# Patient Record
Sex: FEMALE | Race: BLACK OR AFRICAN AMERICAN | NOT HISPANIC OR LATINO | Employment: UNEMPLOYED | URBAN - METROPOLITAN AREA
[De-identification: names, ages, dates, MRNs, and addresses within clinical notes are randomized per-mention and may not be internally consistent; named-entity substitution may affect disease eponyms.]

---

## 2017-08-10 ENCOUNTER — HOSPITAL ENCOUNTER (EMERGENCY)
Facility: HOSPITAL | Age: 32
Discharge: HOME | End: 2017-08-10
Attending: EMERGENCY MEDICINE
Payer: MEDICAID

## 2017-08-10 VITALS
TEMPERATURE: 98 F | SYSTOLIC BLOOD PRESSURE: 155 MMHG | WEIGHT: 186 LBS | DIASTOLIC BLOOD PRESSURE: 95 MMHG | HEART RATE: 72 BPM | RESPIRATION RATE: 19 BRPM | HEIGHT: 69 IN | BODY MASS INDEX: 27.55 KG/M2 | OXYGEN SATURATION: 100 %

## 2017-08-10 DIAGNOSIS — R00.1 BRADYCARDIA: ICD-10-CM

## 2017-08-10 DIAGNOSIS — D64.9 ANEMIA, UNSPECIFIED TYPE: ICD-10-CM

## 2017-08-10 DIAGNOSIS — Q44.6 LIVER, POLYCYSTIC: ICD-10-CM

## 2017-08-10 DIAGNOSIS — Q61.3 POLYCYSTIC KIDNEY DISEASE: Primary | ICD-10-CM

## 2017-08-10 DIAGNOSIS — K59.00 CONSTIPATION, UNSPECIFIED CONSTIPATION TYPE: ICD-10-CM

## 2017-08-10 DIAGNOSIS — R03.0 ELEVATED BLOOD PRESSURE READING: ICD-10-CM

## 2017-08-10 LAB
ALBUMIN SERPL BCP-MCNC: 3.7 G/DL
ALP SERPL-CCNC: 33 U/L
ALT SERPL W/O P-5'-P-CCNC: 9 U/L
ANION GAP SERPL CALC-SCNC: 9 MMOL/L
AST SERPL-CCNC: 11 U/L
B-HCG UR QL: NEGATIVE
BACTERIA #/AREA URNS HPF: NORMAL /HPF
BASOPHILS # BLD AUTO: 0.02 K/UL
BASOPHILS NFR BLD: 0.3 %
BILIRUB SERPL-MCNC: 0.4 MG/DL
BILIRUB UR QL STRIP: NEGATIVE
BUN SERPL-MCNC: 10 MG/DL
CALCIUM SERPL-MCNC: 9.2 MG/DL
CHLORIDE SERPL-SCNC: 107 MMOL/L
CLARITY UR: ABNORMAL
CO2 SERPL-SCNC: 22 MMOL/L
COLOR UR: YELLOW
CREAT SERPL-MCNC: 0.8 MG/DL
CTP QC/QA: YES
DIFFERENTIAL METHOD: ABNORMAL
EOSINOPHIL # BLD AUTO: 0 K/UL
EOSINOPHIL NFR BLD: 0.5 %
ERYTHROCYTE [DISTWIDTH] IN BLOOD BY AUTOMATED COUNT: 15.6 %
EST. GFR  (AFRICAN AMERICAN): >60 ML/MIN/1.73 M^2
EST. GFR  (NON AFRICAN AMERICAN): >60 ML/MIN/1.73 M^2
GLUCOSE SERPL-MCNC: 114 MG/DL
GLUCOSE UR QL STRIP: NEGATIVE
HCT VFR BLD AUTO: 33.7 %
HGB BLD-MCNC: 10.9 G/DL
HGB UR QL STRIP: NEGATIVE
HYALINE CASTS #/AREA URNS LPF: 0 /LPF
KETONES UR QL STRIP: NEGATIVE
LEUKOCYTE ESTERASE UR QL STRIP: NEGATIVE
LIPASE SERPL-CCNC: 36 U/L
LYMPHOCYTES # BLD AUTO: 1 K/UL
LYMPHOCYTES NFR BLD: 15.5 %
MCH RBC QN AUTO: 26.8 PG
MCHC RBC AUTO-ENTMCNC: 32.3 G/DL
MCV RBC AUTO: 83 FL
MICROSCOPIC COMMENT: NORMAL
MONOCYTES # BLD AUTO: 0.2 K/UL
MONOCYTES NFR BLD: 3.5 %
NEUTROPHILS # BLD AUTO: 5 K/UL
NEUTROPHILS NFR BLD: 80.2 %
NITRITE UR QL STRIP: NEGATIVE
PH UR STRIP: 8 [PH] (ref 5–8)
PLATELET # BLD AUTO: 227 K/UL
PMV BLD AUTO: 11.5 FL
POTASSIUM SERPL-SCNC: 3.4 MMOL/L
PROT SERPL-MCNC: 7.7 G/DL
PROT UR QL STRIP: ABNORMAL
RBC # BLD AUTO: 4.07 M/UL
RBC #/AREA URNS HPF: 1 /HPF (ref 0–4)
SODIUM SERPL-SCNC: 138 MMOL/L
SP GR UR STRIP: 1.02 (ref 1–1.03)
SQUAMOUS #/AREA URNS HPF: 2 /HPF
URN SPEC COLLECT METH UR: ABNORMAL
UROBILINOGEN UR STRIP-ACNC: ABNORMAL EU/DL
WBC # BLD AUTO: 6.25 K/UL
WBC #/AREA URNS HPF: 1 /HPF (ref 0–5)

## 2017-08-10 PROCEDURE — 83690 ASSAY OF LIPASE: CPT

## 2017-08-10 PROCEDURE — 93005 ELECTROCARDIOGRAM TRACING: CPT

## 2017-08-10 PROCEDURE — 25500020 PHARM REV CODE 255: Performed by: EMERGENCY MEDICINE

## 2017-08-10 PROCEDURE — 81025 URINE PREGNANCY TEST: CPT | Performed by: NURSE PRACTITIONER

## 2017-08-10 PROCEDURE — 80053 COMPREHEN METABOLIC PANEL: CPT

## 2017-08-10 PROCEDURE — 99284 EMERGENCY DEPT VISIT MOD MDM: CPT | Mod: 25

## 2017-08-10 PROCEDURE — 85025 COMPLETE CBC W/AUTO DIFF WBC: CPT

## 2017-08-10 PROCEDURE — 63600175 PHARM REV CODE 636 W HCPCS: Performed by: NURSE PRACTITIONER

## 2017-08-10 PROCEDURE — 96374 THER/PROPH/DIAG INJ IV PUSH: CPT

## 2017-08-10 PROCEDURE — 96375 TX/PRO/DX INJ NEW DRUG ADDON: CPT

## 2017-08-10 PROCEDURE — 81000 URINALYSIS NONAUTO W/SCOPE: CPT

## 2017-08-10 RX ORDER — NAPROXEN 500 MG/1
500 TABLET ORAL 2 TIMES DAILY PRN
Qty: 10 TABLET | Refills: 0 | Status: ON HOLD | OUTPATIENT
Start: 2017-08-10 | End: 2018-08-31 | Stop reason: HOSPADM

## 2017-08-10 RX ORDER — MORPHINE SULFATE 10 MG/ML
4 INJECTION INTRAMUSCULAR; INTRAVENOUS; SUBCUTANEOUS
Status: COMPLETED | OUTPATIENT
Start: 2017-08-10 | End: 2017-08-10

## 2017-08-10 RX ORDER — HYDRALAZINE HYDROCHLORIDE 20 MG/ML
20 INJECTION INTRAMUSCULAR; INTRAVENOUS
Status: COMPLETED | OUTPATIENT
Start: 2017-08-10 | End: 2017-08-10

## 2017-08-10 RX ORDER — ONDANSETRON 2 MG/ML
8 INJECTION INTRAMUSCULAR; INTRAVENOUS
Status: COMPLETED | OUTPATIENT
Start: 2017-08-10 | End: 2017-08-10

## 2017-08-10 RX ORDER — POLYETHYLENE GLYCOL 3350 17 G/17G
17 POWDER, FOR SOLUTION ORAL DAILY
Qty: 1 BOTTLE | Refills: 0 | Status: SHIPPED | OUTPATIENT
Start: 2017-08-10 | End: 2018-01-25

## 2017-08-10 RX ADMIN — ONDANSETRON 8 MG: 2 INJECTION INTRAMUSCULAR; INTRAVENOUS at 04:08

## 2017-08-10 RX ADMIN — MORPHINE SULFATE 4 MG: 10 INJECTION INTRAVENOUS at 04:08

## 2017-08-10 RX ADMIN — IOHEXOL 100 ML: 350 INJECTION, SOLUTION INTRAVENOUS at 04:08

## 2017-08-10 RX ADMIN — HYDRALAZINE HYDROCHLORIDE 20 MG: 20 INJECTION INTRAMUSCULAR; INTRAVENOUS at 07:08

## 2017-08-10 NOTE — ED PROVIDER NOTES
"Encounter Date: 8/10/2017    SCRIBE #1 NOTE: I, Diane Petey, am scribing for, and in the presence of, Sotero Almonte NP. Other sections scribed: HPI/ROS.       History     Chief Complaint   Patient presents with    Flank Pain     Patient states, "I got polycystic kidney disease. My whole stomach and lower back hurts."      CC: Abdominal pain    Pt is a 31 y.o. female w/ HTN, polycystic kidney disease, and hx of anemia presenting to the ED c/o constant, severe (10/10), acute onset, diffuse lower abdominal pain and lower back pain. Pt states the abdominal pain is slightly worse to the RLQ. Pt states the pain started suddenly at 1230 today. Pt states the pain is exacerbated with talking, walking, and laying down. Pt states she felt like this once before, when she was told she has polycystic kidney disease. Pt states she never f/u with nephrology because she found out she was pregnant. Pt states she is also constipated and her last BM was yesterday with difficulty. Pt states she does not have a BM every day.     Pt denies history of abdominal surgery or appendix issues, V/D/N, dysuria, frequency, fever, or vaginal bleeding/discharge. Pt reports no further symptoms. No prior attempted treatment. No alleviating or aggravating factors.         The history is provided by the patient.     Review of patient's allergies indicates:  No Known Allergies  Past Medical History:   Diagnosis Date    Anemia     Hypertension     since 2012    Polycystic kidney disease     Polycystic kidney disease     Renal disorder     Since childhood      Past Surgical History:   Procedure Laterality Date    DILATION AND CURETTAGE OF UTERUS      2013     Family History   Problem Relation Age of Onset    Hypertension Mother     Polycystic kidney disease Mother     Hypertension Paternal Grandmother     Polycystic kidney disease Daughter      Social History   Substance Use Topics    Smoking status: Never Smoker    Smokeless tobacco: Never " Used    Alcohol use Yes      Comment: occasional     Review of Systems   Constitutional: Negative for fever.   HENT: Negative for ear pain, mouth sores and rhinorrhea.    Eyes: Negative for visual disturbance.   Respiratory: Negative for cough, choking and chest tightness.    Cardiovascular: Negative for chest pain and leg swelling.   Gastrointestinal: Positive for abdominal pain and constipation. Negative for diarrhea, nausea and vomiting.   Genitourinary: Negative for dysuria, frequency, vaginal bleeding and vaginal discharge.   Musculoskeletal: Positive for back pain.   Skin: Negative for rash and wound.   Neurological: Negative for headaches.       Physical Exam     Initial Vitals [08/10/17 1320]   BP Pulse Resp Temp SpO2   109/69 60 16 98.9 °F (37.2 °C) 99 %      MAP       82.33         Physical Exam    Nursing note and vitals reviewed.  Constitutional: She appears well-developed and well-nourished. She is not diaphoretic. No distress.   HENT:   Head: Normocephalic and atraumatic.   Right Ear: External ear normal.   Left Ear: External ear normal.   Nose: Nose normal.   Eyes: Conjunctivae and EOM are normal. Pupils are equal, round, and reactive to light. Right eye exhibits no discharge. Left eye exhibits no discharge. No scleral icterus.   Neck: Normal range of motion. Neck supple. No thyromegaly present. No tracheal deviation present. No JVD present.   Cardiovascular: Normal rate, regular rhythm, normal heart sounds and intact distal pulses. Exam reveals no gallop and no friction rub.    No murmur heard.  Pulmonary/Chest: Breath sounds normal. No stridor. No respiratory distress. She has no wheezes. She has no rhonchi. She has no rales.   Abdominal: Soft. Bowel sounds are normal. She exhibits no distension and no mass. There is tenderness in the right lower quadrant and left lower quadrant. There is no rebound and no guarding.   Musculoskeletal: Normal range of motion. She exhibits no edema.        Lumbar  back: She exhibits pain.   Lymphadenopathy:     She has no cervical adenopathy.   Neurological: She is alert and oriented to person, place, and time. She has normal strength and normal reflexes. She displays normal reflexes. No cranial nerve deficit or sensory deficit.   Skin: Skin is warm and dry. No rash and no abscess noted. No erythema. No pallor.   Psychiatric: She has a normal mood and affect. Her behavior is normal. Judgment and thought content normal.         ED Course   Procedures  Labs Reviewed   CBC W/ AUTO DIFFERENTIAL - Abnormal; Notable for the following:        Result Value    Hemoglobin 10.9 (*)     Hematocrit 33.7 (*)     MCH 26.8 (*)     RDW 15.6 (*)     Mono # 0.2 (*)     Gran% 80.2 (*)     Lymph% 15.5 (*)     Mono% 3.5 (*)     All other components within normal limits   COMPREHENSIVE METABOLIC PANEL - Abnormal; Notable for the following:     Potassium 3.4 (*)     CO2 22 (*)     Glucose 114 (*)     Alkaline Phosphatase 33 (*)     ALT 9 (*)     All other components within normal limits   URINALYSIS - Abnormal; Notable for the following:     Appearance, UA Hazy (*)     Protein, UA 1+ (*)     Urobilinogen, UA 2.0-3.0 (*)     All other components within normal limits   LIPASE   URINALYSIS MICROSCOPIC   POCT URINE PREGNANCY     EKG Readings: (Independently Interpreted)   Initial Reading: No STEMI. Rhythm: Sinus Bradycardia. Heart Rate: 45. Ectopy: No Ectopy. Axis: Normal. Clinical Impression: Sinus Bradycardia          Medical Decision Making:   Differential Diagnosis:   Appendicitis, pyelonephritis, nephrolithiasis, tubo-ovarian abscess, ovarian torsion, diverticulitis  Clinical Tests:   Lab Tests: Ordered and Reviewed  Radiological Study: Ordered and Reviewed  ED Management:  This is a 31-year-old female with a history of polycystic kidney disease and hypertension presenting to the emergency department for evaluation of lower abdominal and low back pain that began abruptly earlier today. She also  reports constipation. She denies nausea, vomiting, diarrhea, fevers, vaginal discharge, vaginal bleeding, shortness of breath, chest pain, or any other associated symptoms. She is afebrile, appears uncomfortable. There is bilateral lower abdominal quadrant tenderness to palpation, but it is worse in the right. Abdomen is soft and without guarding or rigidity. I doubt peritonitis. Patient is hypertensive and bradycardic. Sinus bradycardia on EKG. Original vitals were from EMS and I doubt they are accurate. She reports a history of hypertension with unknown antihypertensive medication at home. She is noncompliant and has not taken them for the last several days. Ordered hydralazine 20 milligrams IV after which patient's blood pressure is lowered and heart rate is increased. Instructed patient to begin taking her blood pressure medication as prescribed. CBC remarkable for anemia. Urinalysis and lipase are unremarkable. CMP remarkable for mild hypokalemia at 3.4 ultrasound positive for large amount of colonic stool, polycystic liver and kidney disease, hemorrhagic right adnexal follicle. I suspect that this follicle and the constipation are the etiology of this patient's pain. Instructed patient to follow-up with nephrology and her PCP. Patient's vitals remain stable at discharge. Strict ED return precautions given. Patient expressed understanding of diagnoses and discharge instructions. She is stable for discharge and outpatient follow-up.  Other:   I have discussed this case with another health care provider.       <> Summary of the Discussion: Case discussed my attending physician Carroll Gaona M.D. who agreed with the assessment and plan.            Scribe Attestation:   Scribe #1: I performed the above scribed service and the documentation accurately describes the services I performed. I attest to the accuracy of the note.    Attending Attestation:           Physician Attestation for Scribe:  Physician Attestation  Statement for Scribe #1: I, Sotero Almonte NP, reviewed documentation, as scribed by Diane Angelo in my presence, and it is both accurate and complete.                 ED Course     Clinical Impression:   The primary encounter diagnosis was Polycystic kidney disease. Diagnoses of Bradycardia, Liver, polycystic, Anemia, unspecified type, Elevated blood pressure reading, and Constipation, unspecified constipation type were also pertinent to this visit.    Disposition:   Disposition: Discharged  Condition: Stable                        Sotero Almonte NP  08/10/17 2010

## 2017-08-10 NOTE — ED TRIAGE NOTES
Pt states pain to stomach and lower back began this afternoon.  Denies any trauma.  Denies any problems voiding.  Pt reports pain constant at this time.  Pt rates pain as 10.

## 2017-08-11 ENCOUNTER — TELEPHONE (OUTPATIENT)
Dept: NEPHROLOGY | Facility: CLINIC | Age: 32
End: 2017-08-11

## 2017-08-11 DIAGNOSIS — Q61.3 PKD (POLYCYSTIC KIDNEY DISEASE): Primary | ICD-10-CM

## 2017-08-11 NOTE — DISCHARGE INSTRUCTIONS
Begin taking your blood pressure medicine every day as prescribed as discussed.    Take constipation medicine as prescribed. Take pain medication as needed and only as prescribed.    Follow-up with your primary doctor for your elevated blood pressure and constipation.    Follow-up with nephrology for management of your polycystic kidney disease.

## 2017-08-11 NOTE — TELEPHONE ENCOUNTER
----- Message from Marilyn Tolbert sent at 8/11/2017  8:56 AM CDT -----  Contact: pt  Jasmine     Pt said you told her to call back if PCP wouldn't order refill-   Her pcp quit  New PCP wont fill until he sees her     -     oxycodone-acetaminophen (PERCOCET) 5-325 mg per tablet  pantoperazole   40mg  Once a day -   For acid reflux   gabatentin  300mg     2 in AM  And 2 PM as needed     [facility]       [address]   [place]     Ph [phone]       Pt will call ann to see if she can get in earlier so meds can get filled

## 2017-09-21 DIAGNOSIS — Q61.3 POLYCYSTIC KIDNEY, UNSPECIFIED TYPE: Primary | ICD-10-CM

## 2018-01-25 ENCOUNTER — HOSPITAL ENCOUNTER (EMERGENCY)
Facility: HOSPITAL | Age: 33
Discharge: HOME | End: 2018-01-25
Attending: EMERGENCY MEDICINE
Payer: MEDICAID

## 2018-01-25 VITALS
RESPIRATION RATE: 18 BRPM | OXYGEN SATURATION: 100 % | WEIGHT: 145 LBS | DIASTOLIC BLOOD PRESSURE: 92 MMHG | SYSTOLIC BLOOD PRESSURE: 170 MMHG | TEMPERATURE: 98 F | BODY MASS INDEX: 21.48 KG/M2 | HEIGHT: 69 IN | HEART RATE: 80 BPM

## 2018-01-25 DIAGNOSIS — I10 HYPERTENSION, UNSPECIFIED TYPE: Primary | ICD-10-CM

## 2018-01-25 DIAGNOSIS — R51.9 NONINTRACTABLE EPISODIC HEADACHE, UNSPECIFIED HEADACHE TYPE: ICD-10-CM

## 2018-01-25 LAB
ANION GAP SERPL CALC-SCNC: 11 MMOL/L
B-HCG UR QL: NEGATIVE
BASOPHILS # BLD AUTO: 0.02 K/UL
BASOPHILS NFR BLD: 0.4 %
BUN SERPL-MCNC: 10 MG/DL
CALCIUM SERPL-MCNC: 9.2 MG/DL
CHLORIDE SERPL-SCNC: 103 MMOL/L
CO2 SERPL-SCNC: 27 MMOL/L
CREAT SERPL-MCNC: 0.9 MG/DL
CTP QC/QA: YES
DIFFERENTIAL METHOD: ABNORMAL
EOSINOPHIL # BLD AUTO: 0 K/UL
EOSINOPHIL NFR BLD: 0.4 %
ERYTHROCYTE [DISTWIDTH] IN BLOOD BY AUTOMATED COUNT: 15.7 %
EST. GFR  (AFRICAN AMERICAN): >60 ML/MIN/1.73 M^2
EST. GFR  (NON AFRICAN AMERICAN): >60 ML/MIN/1.73 M^2
GLUCOSE SERPL-MCNC: 98 MG/DL
HCT VFR BLD AUTO: 33.1 %
HGB BLD-MCNC: 10.8 G/DL
LYMPHOCYTES # BLD AUTO: 1.4 K/UL
LYMPHOCYTES NFR BLD: 27 %
MCH RBC QN AUTO: 27.1 PG
MCHC RBC AUTO-ENTMCNC: 32.6 G/DL
MCV RBC AUTO: 83 FL
MONOCYTES # BLD AUTO: 0.3 K/UL
MONOCYTES NFR BLD: 6.3 %
NEUTROPHILS # BLD AUTO: 3.5 K/UL
NEUTROPHILS NFR BLD: 65.9 %
PLATELET # BLD AUTO: 294 K/UL
PMV BLD AUTO: 11.1 FL
POTASSIUM SERPL-SCNC: 2.7 MMOL/L
RBC # BLD AUTO: 3.98 M/UL
SODIUM SERPL-SCNC: 141 MMOL/L
WBC # BLD AUTO: 5.26 K/UL

## 2018-01-25 PROCEDURE — 96376 TX/PRO/DX INJ SAME DRUG ADON: CPT

## 2018-01-25 PROCEDURE — 99285 EMERGENCY DEPT VISIT HI MDM: CPT | Mod: 25

## 2018-01-25 PROCEDURE — 96375 TX/PRO/DX INJ NEW DRUG ADDON: CPT

## 2018-01-25 PROCEDURE — 25000003 PHARM REV CODE 250: Performed by: EMERGENCY MEDICINE

## 2018-01-25 PROCEDURE — 96374 THER/PROPH/DIAG INJ IV PUSH: CPT

## 2018-01-25 PROCEDURE — 85025 COMPLETE CBC W/AUTO DIFF WBC: CPT

## 2018-01-25 PROCEDURE — 80048 BASIC METABOLIC PNL TOTAL CA: CPT

## 2018-01-25 PROCEDURE — 81025 URINE PREGNANCY TEST: CPT | Performed by: EMERGENCY MEDICINE

## 2018-01-25 PROCEDURE — 63600175 PHARM REV CODE 636 W HCPCS: Performed by: EMERGENCY MEDICINE

## 2018-01-25 RX ORDER — KETOROLAC TROMETHAMINE 30 MG/ML
15 INJECTION, SOLUTION INTRAMUSCULAR; INTRAVENOUS
Status: COMPLETED | OUTPATIENT
Start: 2018-01-25 | End: 2018-01-25

## 2018-01-25 RX ORDER — POTASSIUM CHLORIDE 20 MEQ/1
20 TABLET, EXTENDED RELEASE ORAL DAILY
Qty: 6 TABLET | Refills: 0 | Status: SHIPPED | OUTPATIENT
Start: 2018-01-25 | End: 2019-01-29

## 2018-01-25 RX ORDER — ONDANSETRON 8 MG/1
8 TABLET, ORALLY DISINTEGRATING ORAL
Status: COMPLETED | OUTPATIENT
Start: 2018-01-25 | End: 2018-01-25

## 2018-01-25 RX ORDER — NIFEDIPINE 30 MG/1
30 TABLET, EXTENDED RELEASE ORAL DAILY
Qty: 30 TABLET | Refills: 1 | Status: ON HOLD | OUTPATIENT
Start: 2018-01-25 | End: 2018-08-31 | Stop reason: HOSPADM

## 2018-01-25 RX ORDER — POTASSIUM CHLORIDE 750 MG/1
50 TABLET, EXTENDED RELEASE ORAL
Status: COMPLETED | OUTPATIENT
Start: 2018-01-25 | End: 2018-01-25

## 2018-01-25 RX ORDER — TRAMADOL HYDROCHLORIDE 50 MG/1
50 TABLET ORAL EVERY 6 HOURS PRN
Qty: 12 TABLET | Refills: 0 | Status: SHIPPED | OUTPATIENT
Start: 2018-01-25 | End: 2018-02-04

## 2018-01-25 RX ORDER — HYDROMORPHONE HYDROCHLORIDE 2 MG/ML
0.5 INJECTION, SOLUTION INTRAMUSCULAR; INTRAVENOUS; SUBCUTANEOUS
Status: COMPLETED | OUTPATIENT
Start: 2018-01-25 | End: 2018-01-25

## 2018-01-25 RX ORDER — ONDANSETRON 4 MG/1
8 TABLET, FILM COATED ORAL EVERY 6 HOURS PRN
Qty: 12 TABLET | Refills: 0 | Status: SHIPPED | OUTPATIENT
Start: 2018-01-25 | End: 2022-03-12 | Stop reason: SDDI

## 2018-01-25 RX ORDER — HYDRALAZINE HYDROCHLORIDE 20 MG/ML
20 INJECTION INTRAMUSCULAR; INTRAVENOUS
Status: COMPLETED | OUTPATIENT
Start: 2018-01-25 | End: 2018-01-25

## 2018-01-25 RX ADMIN — ONDANSETRON 8 MG: 8 TABLET, ORALLY DISINTEGRATING ORAL at 12:01

## 2018-01-25 RX ADMIN — HYDROMORPHONE HYDROCHLORIDE 0.5 MG: 2 INJECTION, SOLUTION INTRAMUSCULAR; INTRAVENOUS; SUBCUTANEOUS at 12:01

## 2018-01-25 RX ADMIN — HYDROMORPHONE HYDROCHLORIDE 0.5 MG: 2 INJECTION, SOLUTION INTRAMUSCULAR; INTRAVENOUS; SUBCUTANEOUS at 01:01

## 2018-01-25 RX ADMIN — KETOROLAC TROMETHAMINE 15 MG: 30 INJECTION, SOLUTION INTRAMUSCULAR at 02:01

## 2018-01-25 RX ADMIN — POTASSIUM CHLORIDE 50 MEQ: 750 TABLET, EXTENDED RELEASE ORAL at 01:01

## 2018-01-25 RX ADMIN — HYDRALAZINE HYDROCHLORIDE 20 MG: 20 INJECTION INTRAMUSCULAR; INTRAVENOUS at 12:01

## 2018-01-25 NOTE — ED NOTES
Pt discharged with mother Bindu who states that they will get a ride home. Pt and mom state that she is not driving.
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
## 2018-01-25 NOTE — ED NOTES
Patient medicated as ordered for elevated blood pressure.   201/114    Pt. Feeling a little better after medication given and bp came down.
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
## 2018-05-16 ENCOUNTER — HOSPITAL ENCOUNTER (EMERGENCY)
Facility: HOSPITAL | Age: 33
Discharge: HOME | End: 2018-05-16
Attending: EMERGENCY MEDICINE | Admitting: EMERGENCY MEDICINE
Payer: MEDICAID

## 2018-05-16 VITALS
HEART RATE: 60 BPM | RESPIRATION RATE: 20 BRPM | BODY MASS INDEX: 22.22 KG/M2 | WEIGHT: 150 LBS | OXYGEN SATURATION: 94 % | TEMPERATURE: 99 F | SYSTOLIC BLOOD PRESSURE: 189 MMHG | DIASTOLIC BLOOD PRESSURE: 106 MMHG | HEIGHT: 69 IN

## 2018-05-16 DIAGNOSIS — Z3A.12 12 WEEKS GESTATION OF PREGNANCY: Primary | ICD-10-CM

## 2018-05-16 DIAGNOSIS — O46.90 VAGINAL BLEEDING DURING PREGNANCY: ICD-10-CM

## 2018-05-16 LAB
ABO + RH BLD: NORMAL
ALBUMIN SERPL BCP-MCNC: 3.4 G/DL
ALP SERPL-CCNC: 39 U/L
ALT SERPL W/O P-5'-P-CCNC: 7 U/L
ANION GAP SERPL CALC-SCNC: 7 MMOL/L
AST SERPL-CCNC: 12 U/L
B-HCG UR QL: POSITIVE
BACTERIA #/AREA URNS HPF: NORMAL /HPF
BASOPHILS # BLD AUTO: 0.01 K/UL
BASOPHILS NFR BLD: 0.2 %
BILIRUB SERPL-MCNC: 0.2 MG/DL
BILIRUB UR QL STRIP: NEGATIVE
BLD GP AB SCN CELLS X3 SERPL QL: NORMAL
BUN SERPL-MCNC: 11 MG/DL
CALCIUM SERPL-MCNC: 9.1 MG/DL
CHLORIDE SERPL-SCNC: 105 MMOL/L
CLARITY UR: ABNORMAL
CO2 SERPL-SCNC: 23 MMOL/L
COLOR UR: YELLOW
CREAT SERPL-MCNC: 0.7 MG/DL
CTP QC/QA: YES
DIFFERENTIAL METHOD: ABNORMAL
EOSINOPHIL # BLD AUTO: 0 K/UL
EOSINOPHIL NFR BLD: 0.2 %
ERYTHROCYTE [DISTWIDTH] IN BLOOD BY AUTOMATED COUNT: 14.9 %
EST. GFR  (AFRICAN AMERICAN): >60 ML/MIN/1.73 M^2
EST. GFR  (NON AFRICAN AMERICAN): >60 ML/MIN/1.73 M^2
GLUCOSE SERPL-MCNC: 89 MG/DL
GLUCOSE UR QL STRIP: NEGATIVE
HCG INTACT+B SERPL-ACNC: NORMAL MIU/ML
HCT VFR BLD AUTO: 32 %
HGB BLD-MCNC: 10.8 G/DL
HGB UR QL STRIP: NEGATIVE
HYALINE CASTS #/AREA URNS LPF: 0 /LPF
KETONES UR QL STRIP: NEGATIVE
LEUKOCYTE ESTERASE UR QL STRIP: ABNORMAL
LIPASE SERPL-CCNC: 37 U/L
LYMPHOCYTES # BLD AUTO: 1.1 K/UL
LYMPHOCYTES NFR BLD: 18.5 %
MCH RBC QN AUTO: 28.2 PG
MCHC RBC AUTO-ENTMCNC: 33.8 G/DL
MCV RBC AUTO: 84 FL
MICROSCOPIC COMMENT: NORMAL
MONOCYTES # BLD AUTO: 0.4 K/UL
MONOCYTES NFR BLD: 6 %
NEUTROPHILS # BLD AUTO: 4.4 K/UL
NEUTROPHILS NFR BLD: 74.9 %
NITRITE UR QL STRIP: NEGATIVE
PH UR STRIP: 8 [PH] (ref 5–8)
PLATELET # BLD AUTO: 267 K/UL
PMV BLD AUTO: 10.6 FL
POTASSIUM SERPL-SCNC: 3.4 MMOL/L
PROT SERPL-MCNC: 7.4 G/DL
PROT UR QL STRIP: ABNORMAL
RBC # BLD AUTO: 3.83 M/UL
RBC #/AREA URNS HPF: 1 /HPF (ref 0–4)
SODIUM SERPL-SCNC: 135 MMOL/L
SP GR UR STRIP: 1.02 (ref 1–1.03)
SQUAMOUS #/AREA URNS HPF: NORMAL /HPF
URN SPEC COLLECT METH UR: ABNORMAL
UROBILINOGEN UR STRIP-ACNC: NEGATIVE EU/DL
WBC # BLD AUTO: 5.85 K/UL
WBC #/AREA URNS HPF: 4 /HPF (ref 0–5)

## 2018-05-16 PROCEDURE — 81000 URINALYSIS NONAUTO W/SCOPE: CPT

## 2018-05-16 PROCEDURE — 84702 CHORIONIC GONADOTROPIN TEST: CPT

## 2018-05-16 PROCEDURE — 85025 COMPLETE CBC W/AUTO DIFF WBC: CPT

## 2018-05-16 PROCEDURE — 25000003 PHARM REV CODE 250: Performed by: EMERGENCY MEDICINE

## 2018-05-16 PROCEDURE — 99285 EMERGENCY DEPT VISIT HI MDM: CPT | Mod: 25

## 2018-05-16 PROCEDURE — 83690 ASSAY OF LIPASE: CPT

## 2018-05-16 PROCEDURE — 81025 URINE PREGNANCY TEST: CPT | Performed by: NURSE PRACTITIONER

## 2018-05-16 PROCEDURE — 86901 BLOOD TYPING SEROLOGIC RH(D): CPT

## 2018-05-16 PROCEDURE — 80053 COMPREHEN METABOLIC PANEL: CPT

## 2018-05-16 RX ORDER — NIFEDIPINE 30 MG/1
30 TABLET, EXTENDED RELEASE ORAL ONCE
Status: COMPLETED | OUTPATIENT
Start: 2018-05-16 | End: 2018-05-16

## 2018-05-16 RX ORDER — ACETAMINOPHEN 500 MG
1000 TABLET ORAL
Status: COMPLETED | OUTPATIENT
Start: 2018-05-16 | End: 2018-05-16

## 2018-05-16 RX ADMIN — NIFEDIPINE 30 MG: 30 TABLET, FILM COATED, EXTENDED RELEASE ORAL at 12:05

## 2018-05-16 RX ADMIN — ACETAMINOPHEN 1000 MG: 500 TABLET, FILM COATED ORAL at 12:05

## 2018-05-16 NOTE — DISCHARGE INSTRUCTIONS
Continue her blood pressure medication as he had been prescribed.  Use prenatal vitamins.  Use over-the-counter Tylenol as needed for pain.  Do not use ibuprofen or naproxen as this can be harmful to her baby.  Make an appointment to see your gynecologist as soon as possible for prenatal care and further management.  Return to the emergency room for any new or worsening symptoms.

## 2018-05-16 NOTE — ED TRIAGE NOTES
Patient stated on Sat she had vaginal bleeding which was heavy and not her normal cycle. Since then she's had abd pain sharp in nature that radiates to her back. 7/10 pain. Denies fever, chills or N&V.

## 2018-08-10 ENCOUNTER — ANESTHESIA EVENT (OUTPATIENT)
Dept: ENDOSCOPY | Facility: HOSPITAL | Age: 33
End: 2018-08-10
Payer: MEDICAID

## 2018-08-10 ENCOUNTER — HOSPITAL ENCOUNTER (INPATIENT)
Facility: HOSPITAL | Age: 33
LOS: 21 days | Discharge: HOME | End: 2018-08-31
Attending: EMERGENCY MEDICINE | Admitting: PSYCHIATRY & NEUROLOGY
Payer: MEDICAID

## 2018-08-10 ENCOUNTER — ANESTHESIA (OUTPATIENT)
Dept: ENDOSCOPY | Facility: HOSPITAL | Age: 33
End: 2018-08-10
Payer: MEDICAID

## 2018-08-10 DIAGNOSIS — I60.9 NONTRAUMATIC SUBARACHNOID HEMORRHAGE: ICD-10-CM

## 2018-08-10 DIAGNOSIS — Z3A.25 25 WEEKS GESTATION OF PREGNANCY: ICD-10-CM

## 2018-08-10 DIAGNOSIS — E87.6 HYPOKALEMIA: ICD-10-CM

## 2018-08-10 DIAGNOSIS — N17.9 AKI (ACUTE KIDNEY INJURY): ICD-10-CM

## 2018-08-10 DIAGNOSIS — Z36.3 ANTENATAL SCREENING FOR MALFORMATION USING ULTRASONICS: ICD-10-CM

## 2018-08-10 DIAGNOSIS — Z33.3 PREGNANT STATE, GESTATIONAL CARRIER: ICD-10-CM

## 2018-08-10 DIAGNOSIS — G93.5 BRAIN COMPRESSION: ICD-10-CM

## 2018-08-10 DIAGNOSIS — I10 HYPERTENSION, UNSPECIFIED TYPE: ICD-10-CM

## 2018-08-10 DIAGNOSIS — I60.9 SUBARACHNOID HEMORRHAGE: Primary | ICD-10-CM

## 2018-08-10 DIAGNOSIS — O14.92 PRE-ECLAMPSIA IN SECOND TRIMESTER: ICD-10-CM

## 2018-08-10 DIAGNOSIS — O16.2 HYPERTENSION AFFECTING PREGNANCY IN SECOND TRIMESTER: ICD-10-CM

## 2018-08-10 DIAGNOSIS — I60.8 SUBARACHNOID HEMORRHAGE DUE TO RUPTURED ANEURYSM: ICD-10-CM

## 2018-08-10 DIAGNOSIS — R47.01 APHASIA: ICD-10-CM

## 2018-08-10 DIAGNOSIS — I63.9 STROKE: ICD-10-CM

## 2018-08-10 DIAGNOSIS — I67.848 CEREBRAL ARTERY VASOSPASM: ICD-10-CM

## 2018-08-10 DIAGNOSIS — I60.2 SUBARACHNOID HEMORRHAGE FROM ANTERIOR COMMUNICATING ARTERY ANEURYSM: ICD-10-CM

## 2018-08-10 DIAGNOSIS — R01.1 SYSTOLIC MURMUR: ICD-10-CM

## 2018-08-10 DIAGNOSIS — E87.1 HYPONATREMIA: ICD-10-CM

## 2018-08-10 DIAGNOSIS — R40.2423 GLASGOW COMA SCALE TOTAL SCORE 9-12, AT HOSPITAL ADMISSION: ICD-10-CM

## 2018-08-10 PROBLEM — D72.829 LEUKOCYTOSIS: Status: ACTIVE | Noted: 2018-08-10

## 2018-08-10 PROBLEM — Z3A.24 24 WEEKS GESTATION OF PREGNANCY: Status: ACTIVE | Noted: 2018-08-10

## 2018-08-10 LAB
ABO + RH BLD: NORMAL
ALBUMIN SERPL BCP-MCNC: 2.9 G/DL
ALBUMIN SERPL BCP-MCNC: 3 G/DL
ALLENS TEST: ABNORMAL
ALP SERPL-CCNC: 54 U/L
ALP SERPL-CCNC: 58 U/L
ALT SERPL W/O P-5'-P-CCNC: 12 U/L
ALT SERPL W/O P-5'-P-CCNC: 15 U/L
AMPHET+METHAMPHET UR QL: NEGATIVE
ANION GAP SERPL CALC-SCNC: 10 MMOL/L
ANION GAP SERPL CALC-SCNC: 10 MMOL/L
APTT BLDCRRT: 21.4 SEC
AST SERPL-CCNC: 23 U/L
AST SERPL-CCNC: 33 U/L
BACTERIA #/AREA URNS HPF: NORMAL /HPF
BARBITURATES UR QL SCN>200 NG/ML: NEGATIVE
BASOPHILS # BLD AUTO: 0.02 K/UL
BASOPHILS # BLD AUTO: 0.02 K/UL
BASOPHILS NFR BLD: 0.1 %
BASOPHILS NFR BLD: 0.1 %
BENZODIAZ UR QL SCN>200 NG/ML: NEGATIVE
BILIRUB SERPL-MCNC: 0.3 MG/DL
BILIRUB SERPL-MCNC: 0.4 MG/DL
BILIRUB UR QL STRIP: NEGATIVE
BLD GP AB SCN CELLS X3 SERPL QL: NORMAL
BUN SERPL-MCNC: 7 MG/DL
BUN SERPL-MCNC: 8 MG/DL
BZE UR QL SCN: NEGATIVE
CALCIUM SERPL-MCNC: 8.1 MG/DL
CALCIUM SERPL-MCNC: 8.6 MG/DL
CANNABINOIDS UR QL SCN: NEGATIVE
CHLORIDE SERPL-SCNC: 101 MMOL/L
CHLORIDE SERPL-SCNC: 103 MMOL/L
CHOLEST SERPL-MCNC: 242 MG/DL
CHOLEST/HDLC SERPL: 3.9 {RATIO}
CLARITY UR: CLEAR
CO2 SERPL-SCNC: 19 MMOL/L
CO2 SERPL-SCNC: 21 MMOL/L
COLOR UR: COLORLESS
CREAT SERPL-MCNC: 0.6 MG/DL
CREAT SERPL-MCNC: 0.7 MG/DL
CREAT UR-MCNC: 11.1 MG/DL
CREAT UR-MCNC: 11.1 MG/DL
DELSYS: ABNORMAL
DIFFERENTIAL METHOD: ABNORMAL
DIFFERENTIAL METHOD: ABNORMAL
EOSINOPHIL # BLD AUTO: 0 K/UL
EOSINOPHIL # BLD AUTO: 0 K/UL
EOSINOPHIL NFR BLD: 0.1 %
EOSINOPHIL NFR BLD: 0.1 %
ERYTHROCYTE [DISTWIDTH] IN BLOOD BY AUTOMATED COUNT: 14.3 %
ERYTHROCYTE [DISTWIDTH] IN BLOOD BY AUTOMATED COUNT: 14.4 %
ERYTHROCYTE [SEDIMENTATION RATE] IN BLOOD BY WESTERGREN METHOD: 16 MM/H
EST. GFR  (AFRICAN AMERICAN): >60 ML/MIN/1.73 M^2
EST. GFR  (AFRICAN AMERICAN): >60 ML/MIN/1.73 M^2
EST. GFR  (NON AFRICAN AMERICAN): >60 ML/MIN/1.73 M^2
EST. GFR  (NON AFRICAN AMERICAN): >60 ML/MIN/1.73 M^2
FIO2: 80
FLOW: 60
GLUCOSE SERPL-MCNC: 119 MG/DL (ref 70–110)
GLUCOSE SERPL-MCNC: 148 MG/DL
GLUCOSE SERPL-MCNC: 167 MG/DL
GLUCOSE UR QL STRIP: ABNORMAL
HCO3 UR-SCNC: 23.4 MMOL/L (ref 24–28)
HCO3 UR-SCNC: 25.1 MMOL/L (ref 24–28)
HCT VFR BLD AUTO: 29.9 %
HCT VFR BLD AUTO: 32.9 %
HCT VFR BLD CALC: 25 %PCV (ref 36–54)
HDLC SERPL-MCNC: 62 MG/DL
HDLC SERPL: 25.6 %
HGB BLD-MCNC: 10.7 G/DL
HGB BLD-MCNC: 9.8 G/DL
HGB UR QL STRIP: ABNORMAL
HYALINE CASTS #/AREA URNS LPF: 0 /LPF
IMM GRANULOCYTES # BLD AUTO: 0.08 K/UL
IMM GRANULOCYTES # BLD AUTO: 0.13 K/UL
IMM GRANULOCYTES NFR BLD AUTO: 0.4 %
IMM GRANULOCYTES NFR BLD AUTO: 0.7 %
INR PPP: 1
KETONES UR QL STRIP: ABNORMAL
LDH SERPL L TO P-CCNC: 236 U/L
LDLC SERPL CALC-MCNC: 156.8 MG/DL
LEUKOCYTE ESTERASE UR QL STRIP: NEGATIVE
LYMPHOCYTES # BLD AUTO: 0.9 K/UL
LYMPHOCYTES # BLD AUTO: 1.5 K/UL
LYMPHOCYTES NFR BLD: 5.2 %
LYMPHOCYTES NFR BLD: 8.1 %
MAGNESIUM SERPL-MCNC: 1.5 MG/DL
MAGNESIUM SERPL-MCNC: 1.8 MG/DL
MAGNESIUM SERPL-MCNC: 7.4 MG/DL
MCH RBC QN AUTO: 28.5 PG
MCH RBC QN AUTO: 28.8 PG
MCHC RBC AUTO-ENTMCNC: 32.5 G/DL
MCHC RBC AUTO-ENTMCNC: 32.8 G/DL
MCV RBC AUTO: 87 FL
MCV RBC AUTO: 89 FL
METHADONE UR QL SCN>300 NG/ML: NEGATIVE
MICROSCOPIC COMMENT: NORMAL
MODE: ABNORMAL
MONOCYTES # BLD AUTO: 0.7 K/UL
MONOCYTES # BLD AUTO: 0.7 K/UL
MONOCYTES NFR BLD: 3.7 %
MONOCYTES NFR BLD: 3.9 %
NEUTROPHILS # BLD AUTO: 16.1 K/UL
NEUTROPHILS # BLD AUTO: 16.3 K/UL
NEUTROPHILS NFR BLD: 87.1 %
NEUTROPHILS NFR BLD: 90.5 %
NITRITE UR QL STRIP: NEGATIVE
NONHDLC SERPL-MCNC: 180 MG/DL
NRBC BLD-RTO: 0 /100 WBC
NRBC BLD-RTO: 0 /100 WBC
OPIATES UR QL SCN: NEGATIVE
PCO2 BLDA: 34.9 MMHG (ref 35–45)
PCO2 BLDA: 41.7 MMHG (ref 35–45)
PCP UR QL SCN>25 NG/ML: NEGATIVE
PEEP: 5
PH SMN: 7.39 [PH] (ref 7.35–7.45)
PH SMN: 7.43 [PH] (ref 7.35–7.45)
PH UR STRIP: 7 [PH] (ref 5–8)
PHOSPHATE SERPL-MCNC: 1.7 MG/DL
PHOSPHATE SERPL-MCNC: 2.4 MG/DL
PIP: 20
PLATELET # BLD AUTO: 196 K/UL
PLATELET # BLD AUTO: 230 K/UL
PMV BLD AUTO: 10.8 FL
PMV BLD AUTO: 11.4 FL
PO2 BLDA: 445 MMHG (ref 80–100)
PO2 BLDA: 513 MMHG (ref 80–100)
POC BE: -1 MMOL/L
POC BE: 0 MMOL/L
POC IONIZED CALCIUM: 1.25 MMOL/L (ref 1.06–1.42)
POC SATURATED O2: 100 % (ref 95–100)
POC SATURATED O2: 100 % (ref 95–100)
POC TCO2: 24 MMOL/L (ref 23–27)
POC TCO2: 26 MMOL/L (ref 23–27)
POTASSIUM BLD-SCNC: 2.4 MMOL/L (ref 3.5–5.1)
POTASSIUM SERPL-SCNC: 2.4 MMOL/L
POTASSIUM SERPL-SCNC: 3 MMOL/L
POTASSIUM SERPL-SCNC: 3.3 MMOL/L
PROT SERPL-MCNC: 7 G/DL
PROT SERPL-MCNC: 7.4 G/DL
PROT UR QL STRIP: ABNORMAL
PROT UR-MCNC: 91 MG/DL
PROT/CREAT UR: 8.2 MG/G{CREAT}
PROTHROMBIN TIME: 10.2 SEC
PS: 5
RBC # BLD AUTO: 3.44 M/UL
RBC # BLD AUTO: 3.71 M/UL
RBC #/AREA URNS HPF: 4 /HPF (ref 0–4)
SAMPLE: ABNORMAL
SAMPLE: ABNORMAL
SITE: ABNORMAL
SODIUM BLD-SCNC: 134 MMOL/L (ref 136–145)
SODIUM SERPL-SCNC: 132 MMOL/L
SODIUM SERPL-SCNC: 132 MMOL/L
SP GR UR STRIP: 1.01 (ref 1–1.03)
SP02: 100
SQUAMOUS #/AREA URNS HPF: 1 /HPF
TOXICOLOGY INFORMATION: ABNORMAL
TRIGL SERPL-MCNC: 116 MG/DL
TSH SERPL DL<=0.005 MIU/L-ACNC: 1.08 UIU/ML
URN SPEC COLLECT METH UR: ABNORMAL
UROBILINOGEN UR STRIP-ACNC: NEGATIVE EU/DL
VT: 450
WBC # BLD AUTO: 17.79 K/UL
WBC # BLD AUTO: 18.69 K/UL
WBC #/AREA URNS HPF: 0 /HPF (ref 0–5)

## 2018-08-10 PROCEDURE — 99291 CRITICAL CARE FIRST HOUR: CPT | Mod: ,,, | Performed by: PSYCHIATRY & NEUROLOGY

## 2018-08-10 PROCEDURE — 0BH17EZ INSERTION OF ENDOTRACHEAL AIRWAY INTO TRACHEA, VIA NATURAL OR ARTIFICIAL OPENING: ICD-10-PCS | Performed by: ANESTHESIOLOGY

## 2018-08-10 PROCEDURE — 96365 THER/PROPH/DIAG IV INF INIT: CPT

## 2018-08-10 PROCEDURE — 5A1945Z RESPIRATORY VENTILATION, 24-96 CONSECUTIVE HOURS: ICD-10-PCS | Performed by: ANESTHESIOLOGY

## 2018-08-10 PROCEDURE — 85025 COMPLETE CBC W/AUTO DIFF WBC: CPT

## 2018-08-10 PROCEDURE — 25000003 PHARM REV CODE 250: Performed by: NURSE ANESTHETIST, CERTIFIED REGISTERED

## 2018-08-10 PROCEDURE — 85610 PROTHROMBIN TIME: CPT

## 2018-08-10 PROCEDURE — 02HV33Z INSERTION OF INFUSION DEVICE INTO SUPERIOR VENA CAVA, PERCUTANEOUS APPROACH: ICD-10-PCS | Performed by: PSYCHIATRY & NEUROLOGY

## 2018-08-10 PROCEDURE — 63600175 PHARM REV CODE 636 W HCPCS: Performed by: NURSE ANESTHETIST, CERTIFIED REGISTERED

## 2018-08-10 PROCEDURE — 94002 VENT MGMT INPAT INIT DAY: CPT

## 2018-08-10 PROCEDURE — 63600175 PHARM REV CODE 636 W HCPCS: Performed by: EMERGENCY MEDICINE

## 2018-08-10 PROCEDURE — 86850 RBC ANTIBODY SCREEN: CPT

## 2018-08-10 PROCEDURE — 36620 INSERTION CATHETER ARTERY: CPT | Mod: 59,,, | Performed by: ANESTHESIOLOGY

## 2018-08-10 PROCEDURE — 80061 LIPID PANEL: CPT

## 2018-08-10 PROCEDURE — 84443 ASSAY THYROID STIM HORMONE: CPT

## 2018-08-10 PROCEDURE — 99233 SBSQ HOSP IP/OBS HIGH 50: CPT | Mod: ,,, | Performed by: OBSTETRICS & GYNECOLOGY

## 2018-08-10 PROCEDURE — 63600175 PHARM REV CODE 636 W HCPCS: Performed by: NURSE PRACTITIONER

## 2018-08-10 PROCEDURE — 85730 THROMBOPLASTIN TIME PARTIAL: CPT

## 2018-08-10 PROCEDURE — 63600175 PHARM REV CODE 636 W HCPCS: Performed by: STUDENT IN AN ORGANIZED HEALTH CARE EDUCATION/TRAINING PROGRAM

## 2018-08-10 PROCEDURE — D9220A PRA ANESTHESIA: Mod: CRNA,,, | Performed by: NURSE ANESTHETIST, CERTIFIED REGISTERED

## 2018-08-10 PROCEDURE — 27201037 HC PRESSURE MONITORING SET UP

## 2018-08-10 PROCEDURE — 84100 ASSAY OF PHOSPHORUS: CPT

## 2018-08-10 PROCEDURE — 84132 ASSAY OF SERUM POTASSIUM: CPT

## 2018-08-10 PROCEDURE — 31500 INSERT EMERGENCY AIRWAY: CPT

## 2018-08-10 PROCEDURE — 93010 ELECTROCARDIOGRAM REPORT: CPT | Mod: ,,, | Performed by: INTERNAL MEDICINE

## 2018-08-10 PROCEDURE — 37000008 HC ANESTHESIA 1ST 15 MINUTES

## 2018-08-10 PROCEDURE — 25000003 PHARM REV CODE 250: Performed by: STUDENT IN AN ORGANIZED HEALTH CARE EDUCATION/TRAINING PROGRAM

## 2018-08-10 PROCEDURE — 84100 ASSAY OF PHOSPHORUS: CPT | Mod: 91

## 2018-08-10 PROCEDURE — 51702 INSERT TEMP BLADDER CATH: CPT

## 2018-08-10 PROCEDURE — 80053 COMPREHEN METABOLIC PANEL: CPT | Mod: 91

## 2018-08-10 PROCEDURE — 99285 EMERGENCY DEPT VISIT HI MDM: CPT | Mod: ,,, | Performed by: PSYCHIATRY & NEUROLOGY

## 2018-08-10 PROCEDURE — 83735 ASSAY OF MAGNESIUM: CPT | Mod: 91

## 2018-08-10 PROCEDURE — 37000009 HC ANESTHESIA EA ADD 15 MINS

## 2018-08-10 PROCEDURE — 83615 LACTATE (LD) (LDH) ENZYME: CPT

## 2018-08-10 PROCEDURE — 25000003 PHARM REV CODE 250: Performed by: PSYCHIATRY & NEUROLOGY

## 2018-08-10 PROCEDURE — 81000 URINALYSIS NONAUTO W/SCOPE: CPT | Mod: 59

## 2018-08-10 PROCEDURE — 81001 URINALYSIS AUTO W/SCOPE: CPT

## 2018-08-10 PROCEDURE — 63600175 PHARM REV CODE 636 W HCPCS

## 2018-08-10 PROCEDURE — 82570 ASSAY OF URINE CREATININE: CPT

## 2018-08-10 PROCEDURE — 84156 ASSAY OF PROTEIN URINE: CPT | Mod: 91

## 2018-08-10 PROCEDURE — 99291 CRITICAL CARE FIRST HOUR: CPT | Mod: 25

## 2018-08-10 PROCEDURE — D9220A PRA ANESTHESIA: Mod: ANES,,, | Performed by: ANESTHESIOLOGY

## 2018-08-10 PROCEDURE — 96375 TX/PRO/DX INJ NEW DRUG ADDON: CPT

## 2018-08-10 PROCEDURE — 63600175 PHARM REV CODE 636 W HCPCS: Performed by: PSYCHIATRY & NEUROLOGY

## 2018-08-10 PROCEDURE — A4216 STERILE WATER/SALINE, 10 ML: HCPCS | Performed by: STUDENT IN AN ORGANIZED HEALTH CARE EDUCATION/TRAINING PROGRAM

## 2018-08-10 PROCEDURE — 99233 SBSQ HOSP IP/OBS HIGH 50: CPT | Mod: ,,, | Performed by: NEUROLOGICAL SURGERY

## 2018-08-10 PROCEDURE — 80053 COMPREHEN METABOLIC PANEL: CPT

## 2018-08-10 PROCEDURE — 96366 THER/PROPH/DIAG IV INF ADDON: CPT

## 2018-08-10 PROCEDURE — 20000000 HC ICU ROOM

## 2018-08-10 PROCEDURE — 83735 ASSAY OF MAGNESIUM: CPT

## 2018-08-10 PROCEDURE — 87040 BLOOD CULTURE FOR BACTERIA: CPT

## 2018-08-10 PROCEDURE — S0028 INJECTION, FAMOTIDINE, 20 MG: HCPCS | Performed by: STUDENT IN AN ORGANIZED HEALTH CARE EDUCATION/TRAINING PROGRAM

## 2018-08-10 PROCEDURE — 99285 EMERGENCY DEPT VISIT HI MDM: CPT | Mod: 25

## 2018-08-10 PROCEDURE — 80307 DRUG TEST PRSMV CHEM ANLYZR: CPT

## 2018-08-10 PROCEDURE — 25000003 PHARM REV CODE 250: Performed by: OBSTETRICS & GYNECOLOGY

## 2018-08-10 RX ORDER — MIDAZOLAM HYDROCHLORIDE 1 MG/ML
2 INJECTION INTRAMUSCULAR; INTRAVENOUS
Status: COMPLETED | OUTPATIENT
Start: 2018-08-10 | End: 2018-08-10

## 2018-08-10 RX ORDER — CALCIUM GLUCONATE 98 MG/ML
1 INJECTION, SOLUTION INTRAVENOUS
Status: DISCONTINUED | OUTPATIENT
Start: 2018-08-10 | End: 2018-08-25

## 2018-08-10 RX ORDER — NICARDIPINE HYDROCHLORIDE 0.2 MG/ML
2.5 INJECTION INTRAVENOUS CONTINUOUS
Status: DISCONTINUED | OUTPATIENT
Start: 2018-08-10 | End: 2018-08-12

## 2018-08-10 RX ORDER — CEFEPIME HYDROCHLORIDE 1 G/1
1 INJECTION, POWDER, FOR SOLUTION INTRAMUSCULAR; INTRAVENOUS
Status: DISCONTINUED | OUTPATIENT
Start: 2018-08-10 | End: 2018-08-13

## 2018-08-10 RX ORDER — VANCOMYCIN HCL IN 5 % DEXTROSE 1G/250ML
15 PLASTIC BAG, INJECTION (ML) INTRAVENOUS
Status: DISCONTINUED | OUTPATIENT
Start: 2018-08-10 | End: 2018-08-12

## 2018-08-10 RX ORDER — SODIUM CHLORIDE 9 MG/ML
INJECTION, SOLUTION INTRAVENOUS CONTINUOUS PRN
Status: DISCONTINUED | OUTPATIENT
Start: 2018-08-10 | End: 2018-08-10

## 2018-08-10 RX ORDER — PHENYLEPHRINE HYDROCHLORIDE 10 MG/ML
INJECTION INTRAVENOUS
Status: DISCONTINUED | OUTPATIENT
Start: 2018-08-10 | End: 2018-08-10

## 2018-08-10 RX ORDER — FENTANYL CITRATE 50 UG/ML
INJECTION, SOLUTION INTRAMUSCULAR; INTRAVENOUS
Status: COMPLETED
Start: 2018-08-10 | End: 2018-08-11

## 2018-08-10 RX ORDER — LEVETIRACETAM 5 MG/ML
500 INJECTION INTRAVASCULAR EVERY 12 HOURS
Status: DISCONTINUED | OUTPATIENT
Start: 2018-08-10 | End: 2018-08-13

## 2018-08-10 RX ORDER — FENTANYL CITRATE 50 UG/ML
INJECTION, SOLUTION INTRAMUSCULAR; INTRAVENOUS
Status: DISCONTINUED | OUTPATIENT
Start: 2018-08-10 | End: 2018-08-10

## 2018-08-10 RX ORDER — POTASSIUM CHLORIDE 7.45 MG/ML
10 INJECTION INTRAVENOUS
Status: DISCONTINUED | OUTPATIENT
Start: 2018-08-10 | End: 2018-08-10

## 2018-08-10 RX ORDER — MAGNESIUM SULFATE HEPTAHYDRATE 500 MG/ML
10 INJECTION, SOLUTION INTRAMUSCULAR; INTRAVENOUS ONCE
Status: DISCONTINUED | OUTPATIENT
Start: 2018-08-10 | End: 2018-08-10

## 2018-08-10 RX ORDER — LIDOCAINE HYDROCHLORIDE 20 MG/ML
10 INJECTION, SOLUTION EPIDURAL; INFILTRATION; INTRACAUDAL; PERINEURAL ONCE
Status: DISCONTINUED | OUTPATIENT
Start: 2018-08-10 | End: 2018-08-13

## 2018-08-10 RX ORDER — FENTANYL CITRATE 50 UG/ML
50 INJECTION, SOLUTION INTRAMUSCULAR; INTRAVENOUS ONCE
Status: COMPLETED | OUTPATIENT
Start: 2018-08-10 | End: 2018-08-11

## 2018-08-10 RX ORDER — SODIUM CHLORIDE 0.9 % (FLUSH) 0.9 %
3 SYRINGE (ML) INJECTION EVERY 8 HOURS
Status: DISCONTINUED | OUTPATIENT
Start: 2018-08-10 | End: 2018-08-11

## 2018-08-10 RX ORDER — DEXMEDETOMIDINE HYDROCHLORIDE 4 UG/ML
0.2 INJECTION, SOLUTION INTRAVENOUS CONTINUOUS
Status: DISCONTINUED | OUTPATIENT
Start: 2018-08-10 | End: 2018-08-11

## 2018-08-10 RX ORDER — MAGNESIUM SULFATE HEPTAHYDRATE 40 MG/ML
4 INJECTION, SOLUTION INTRAVENOUS
Status: DISCONTINUED | OUTPATIENT
Start: 2018-08-10 | End: 2018-08-10

## 2018-08-10 RX ORDER — SODIUM CHLORIDE, SODIUM LACTATE, POTASSIUM CHLORIDE, CALCIUM CHLORIDE 600; 310; 30; 20 MG/100ML; MG/100ML; MG/100ML; MG/100ML
1000 INJECTION, SOLUTION INTRAVENOUS CONTINUOUS
Status: DISCONTINUED | OUTPATIENT
Start: 2018-08-10 | End: 2018-08-11

## 2018-08-10 RX ORDER — MAGNESIUM SULFATE HEPTAHYDRATE 40 MG/ML
6 INJECTION, SOLUTION INTRAVENOUS ONCE
Status: DISCONTINUED | OUTPATIENT
Start: 2018-08-10 | End: 2018-08-10

## 2018-08-10 RX ORDER — SODIUM CHLORIDE 0.9 % (FLUSH) 0.9 %
5 SYRINGE (ML) INJECTION
Status: DISCONTINUED | OUTPATIENT
Start: 2018-08-10 | End: 2018-08-31 | Stop reason: HOSPADM

## 2018-08-10 RX ORDER — ROCURONIUM BROMIDE 10 MG/ML
INJECTION, SOLUTION INTRAVENOUS
Status: DISCONTINUED | OUTPATIENT
Start: 2018-08-10 | End: 2018-08-10

## 2018-08-10 RX ORDER — MAGNESIUM SULFATE HEPTAHYDRATE 40 MG/ML
INJECTION, SOLUTION INTRAVENOUS
Status: DISCONTINUED | OUTPATIENT
Start: 2018-08-10 | End: 2018-08-10

## 2018-08-10 RX ORDER — MAGNESIUM SULFATE HEPTAHYDRATE 40 MG/ML
4 INJECTION, SOLUTION INTRAVENOUS ONCE
Status: COMPLETED | OUTPATIENT
Start: 2018-08-10 | End: 2018-08-10

## 2018-08-10 RX ORDER — NICARDIPINE HYDROCHLORIDE 0.2 MG/ML
2.5 INJECTION INTRAVENOUS CONTINUOUS
Status: DISCONTINUED | OUTPATIENT
Start: 2018-08-10 | End: 2018-08-10

## 2018-08-10 RX ORDER — MIDAZOLAM HYDROCHLORIDE 1 MG/ML
2 INJECTION INTRAMUSCULAR; INTRAVENOUS
Status: DISCONTINUED | OUTPATIENT
Start: 2018-08-10 | End: 2018-08-23

## 2018-08-10 RX ORDER — HYDRALAZINE HYDROCHLORIDE 20 MG/ML
10 INJECTION INTRAMUSCULAR; INTRAVENOUS
Status: COMPLETED | OUTPATIENT
Start: 2018-08-10 | End: 2018-08-10

## 2018-08-10 RX ORDER — MIDAZOLAM HYDROCHLORIDE 1 MG/ML
2 INJECTION INTRAMUSCULAR; INTRAVENOUS
Status: DISCONTINUED | OUTPATIENT
Start: 2018-08-10 | End: 2018-08-10

## 2018-08-10 RX ORDER — MIDAZOLAM HYDROCHLORIDE 1 MG/ML
INJECTION INTRAMUSCULAR; INTRAVENOUS
Status: DISPENSED
Start: 2018-08-10 | End: 2018-08-11

## 2018-08-10 RX ORDER — FENTANYL CITRATE-0.9 % NACL/PF 10 MCG/ML
50 PLASTIC BAG, INJECTION (ML) INTRAVENOUS CONTINUOUS
Status: DISCONTINUED | OUTPATIENT
Start: 2018-08-10 | End: 2018-08-10

## 2018-08-10 RX ORDER — POTASSIUM CHLORIDE 14.9 MG/ML
INJECTION INTRAVENOUS
Status: COMPLETED
Start: 2018-08-10 | End: 2018-08-10

## 2018-08-10 RX ORDER — AMOXICILLIN 250 MG
1 CAPSULE ORAL 2 TIMES DAILY
Status: DISCONTINUED | OUTPATIENT
Start: 2018-08-10 | End: 2018-08-31 | Stop reason: HOSPADM

## 2018-08-10 RX ORDER — PROPOFOL 10 MG/ML
VIAL (ML) INTRAVENOUS
Status: DISCONTINUED | OUTPATIENT
Start: 2018-08-10 | End: 2018-08-10

## 2018-08-10 RX ORDER — PROPOFOL 10 MG/ML
40 INJECTION, EMULSION INTRAVENOUS CONTINUOUS
Status: DISCONTINUED | OUTPATIENT
Start: 2018-08-10 | End: 2018-08-10

## 2018-08-10 RX ORDER — POTASSIUM CHLORIDE 14.9 MG/ML
INJECTION INTRAVENOUS CONTINUOUS PRN
Status: DISCONTINUED | OUTPATIENT
Start: 2018-08-10 | End: 2018-08-10

## 2018-08-10 RX ORDER — HYDRALAZINE HYDROCHLORIDE 20 MG/ML
INJECTION INTRAMUSCULAR; INTRAVENOUS
Status: COMPLETED
Start: 2018-08-10 | End: 2018-08-10

## 2018-08-10 RX ORDER — FAMOTIDINE 10 MG/ML
20 INJECTION INTRAVENOUS 2 TIMES DAILY
Status: DISCONTINUED | OUTPATIENT
Start: 2018-08-10 | End: 2018-08-12

## 2018-08-10 RX ORDER — BETAMETHASONE SODIUM PHOSPHATE AND BETAMETHASONE ACETATE 3; 3 MG/ML; MG/ML
12 INJECTION, SUSPENSION INTRA-ARTICULAR; INTRALESIONAL; INTRAMUSCULAR; SOFT TISSUE DAILY
Status: COMPLETED | OUTPATIENT
Start: 2018-08-10 | End: 2018-08-11

## 2018-08-10 RX ORDER — PROPOFOL 10 MG/ML
40 INJECTION, EMULSION INTRAVENOUS CONTINUOUS
Status: DISCONTINUED | OUTPATIENT
Start: 2018-08-10 | End: 2018-08-11

## 2018-08-10 RX ORDER — MAGNESIUM SULFATE HEPTAHYDRATE 40 MG/ML
2 INJECTION, SOLUTION INTRAVENOUS CONTINUOUS
Status: DISCONTINUED | OUTPATIENT
Start: 2018-08-10 | End: 2018-08-11

## 2018-08-10 RX ORDER — SUCCINYLCHOLINE CHLORIDE 20 MG/ML
INJECTION INTRAMUSCULAR; INTRAVENOUS
Status: DISCONTINUED | OUTPATIENT
Start: 2018-08-10 | End: 2018-08-10

## 2018-08-10 RX ORDER — ONDANSETRON 2 MG/ML
INJECTION INTRAMUSCULAR; INTRAVENOUS
Status: DISCONTINUED | OUTPATIENT
Start: 2018-08-10 | End: 2018-08-10

## 2018-08-10 RX ORDER — CALCIUM GLUCONATE 98 MG/ML
1 INJECTION, SOLUTION INTRAVENOUS
Status: DISCONTINUED | OUTPATIENT
Start: 2018-08-10 | End: 2018-08-10

## 2018-08-10 RX ORDER — MIDAZOLAM HYDROCHLORIDE 5 MG/ML
2 INJECTION INTRAMUSCULAR; INTRAVENOUS
Status: DISCONTINUED | OUTPATIENT
Start: 2018-08-10 | End: 2018-08-10

## 2018-08-10 RX ORDER — LABETALOL HYDROCHLORIDE 5 MG/ML
10 INJECTION, SOLUTION INTRAVENOUS
Status: DISCONTINUED | OUTPATIENT
Start: 2018-08-10 | End: 2018-08-18

## 2018-08-10 RX ORDER — MAGNESIUM SULFATE HEPTAHYDRATE 40 MG/ML
4 INJECTION, SOLUTION INTRAVENOUS
Status: COMPLETED | OUTPATIENT
Start: 2018-08-10 | End: 2018-08-10

## 2018-08-10 RX ORDER — MAGNESIUM SULFATE HEPTAHYDRATE 40 MG/ML
2 INJECTION, SOLUTION INTRAVENOUS CONTINUOUS
Status: DISCONTINUED | OUTPATIENT
Start: 2018-08-10 | End: 2018-08-10

## 2018-08-10 RX ORDER — LIDOCAINE HYDROCHLORIDE AND EPINEPHRINE 10; 10 MG/ML; UG/ML
1 INJECTION, SOLUTION INFILTRATION; PERINEURAL ONCE
Status: COMPLETED | OUTPATIENT
Start: 2018-08-10 | End: 2018-08-10

## 2018-08-10 RX ADMIN — PROPOFOL 150 MG: 10 INJECTION, EMULSION INTRAVENOUS at 04:08

## 2018-08-10 RX ADMIN — ROCURONIUM BROMIDE 10 MG: 10 INJECTION, SOLUTION INTRAVENOUS at 05:08

## 2018-08-10 RX ADMIN — FENTANYL CITRATE 100 MCG: 50 INJECTION, SOLUTION INTRAMUSCULAR; INTRAVENOUS at 07:08

## 2018-08-10 RX ADMIN — MAGNESIUM SULFATE HEPTAHYDRATE 4 G: 40 INJECTION, SOLUTION INTRAVENOUS at 11:08

## 2018-08-10 RX ADMIN — PROPOFOL 50 MG: 10 INJECTION, EMULSION INTRAVENOUS at 07:08

## 2018-08-10 RX ADMIN — PROPOFOL 60 MCG/KG/MIN: 10 INJECTION, EMULSION INTRAVENOUS at 11:08

## 2018-08-10 RX ADMIN — SUCCINYLCHOLINE CHLORIDE 120 MG: 20 INJECTION, SOLUTION INTRAMUSCULAR; INTRAVENOUS at 04:08

## 2018-08-10 RX ADMIN — CALCIUM CHLORIDE 300 MG: 100 INJECTION, SOLUTION INTRAVENOUS at 05:08

## 2018-08-10 RX ADMIN — MIDAZOLAM HYDROCHLORIDE 2 MG: 1 INJECTION, SOLUTION INTRAMUSCULAR; INTRAVENOUS at 03:08

## 2018-08-10 RX ADMIN — MAGNESIUM SULFATE IN WATER 4 G: 40 INJECTION, SOLUTION INTRAVENOUS at 02:08

## 2018-08-10 RX ADMIN — POTASSIUM CHLORIDE: 14.9 INJECTION, SOLUTION INTRAVENOUS at 05:08

## 2018-08-10 RX ADMIN — PHENYLEPHRINE HYDROCHLORIDE 100 MCG: 10 INJECTION INTRAVENOUS at 05:08

## 2018-08-10 RX ADMIN — SODIUM CHLORIDE, SODIUM LACTATE, POTASSIUM CHLORIDE, AND CALCIUM CHLORIDE 500 ML: .6; .31; .03; .02 INJECTION, SOLUTION INTRAVENOUS at 02:08

## 2018-08-10 RX ADMIN — MAGNESIUM SULFATE IN WATER 2 G: 40 INJECTION, SOLUTION INTRAVENOUS at 04:08

## 2018-08-10 RX ADMIN — ROCURONIUM BROMIDE 10 MG: 10 INJECTION, SOLUTION INTRAVENOUS at 06:08

## 2018-08-10 RX ADMIN — NICARDIPINE HYDROCHLORIDE 5 MG/HR: 0.2 INJECTION, SOLUTION INTRAVENOUS at 07:08

## 2018-08-10 RX ADMIN — SODIUM CHLORIDE, SODIUM GLUCONATE, SODIUM ACETATE, POTASSIUM CHLORIDE, MAGNESIUM CHLORIDE, SODIUM PHOSPHATE, DIBASIC, AND POTASSIUM PHOSPHATE: .53; .5; .37; .037; .03; .012; .00082 INJECTION, SOLUTION INTRAVENOUS at 04:08

## 2018-08-10 RX ADMIN — LEVETIRACETAM 500 MG: 5 INJECTION INTRAVENOUS at 08:08

## 2018-08-10 RX ADMIN — HYDRALAZINE HYDROCHLORIDE 10 MG: 20 INJECTION INTRAMUSCULAR; INTRAVENOUS at 02:08

## 2018-08-10 RX ADMIN — VANCOMYCIN HYDROCHLORIDE 1000 MG: 1 INJECTION, POWDER, LYOPHILIZED, FOR SOLUTION INTRAVENOUS at 11:08

## 2018-08-10 RX ADMIN — PROPOFOL 60 MCG/KG/MIN: 10 INJECTION, EMULSION INTRAVENOUS at 07:08

## 2018-08-10 RX ADMIN — PROPOFOL 60 MCG/KG/MIN: 10 INJECTION, EMULSION INTRAVENOUS at 09:08

## 2018-08-10 RX ADMIN — CEFEPIME 1 G: 1 INJECTION, POWDER, FOR SOLUTION INTRAMUSCULAR; INTRAVENOUS at 09:08

## 2018-08-10 RX ADMIN — FAMOTIDINE 20 MG: 10 INJECTION, SOLUTION INTRAVENOUS at 09:08

## 2018-08-10 RX ADMIN — POTASSIUM CHLORIDE 10 MEQ: 10 INJECTION, SOLUTION INTRAVENOUS at 07:08

## 2018-08-10 RX ADMIN — PHENYLEPHRINE HYDROCHLORIDE 100 MCG: 10 INJECTION INTRAVENOUS at 06:08

## 2018-08-10 RX ADMIN — ROCURONIUM BROMIDE 50 MG: 10 INJECTION, SOLUTION INTRAVENOUS at 04:08

## 2018-08-10 RX ADMIN — SODIUM CHLORIDE: 0.9 INJECTION, SOLUTION INTRAVENOUS at 04:08

## 2018-08-10 RX ADMIN — FENTANYL CITRATE 50 MCG: 50 INJECTION, SOLUTION INTRAMUSCULAR; INTRAVENOUS at 06:08

## 2018-08-10 RX ADMIN — MAGNESIUM SULFATE IN WATER 2 G/HR: 40 INJECTION, SOLUTION INTRAVENOUS at 10:08

## 2018-08-10 RX ADMIN — CALCIUM CHLORIDE 200 MG: 100 INJECTION, SOLUTION INTRAVENOUS at 05:08

## 2018-08-10 RX ADMIN — FENTANYL CITRATE 50 MCG: 50 INJECTION, SOLUTION INTRAMUSCULAR; INTRAVENOUS at 04:08

## 2018-08-10 RX ADMIN — Medication 3 ML: at 10:08

## 2018-08-10 RX ADMIN — LIDOCAINE HYDROCHLORIDE AND EPINEPHRINE 1 ML: 10; 10 INJECTION, SOLUTION INFILTRATION; PERINEURAL at 11:08

## 2018-08-10 NOTE — H&P
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 C/o acute onset AMS that began prior to arrival in ED.  Patient's aunt reports calling the patient's phone at 11:30 am this morning and reports the patient's 6 year old son answering the phone stating that the patient was lying on the floor, not able to get up. EMS reports upon their arrival, the patient was found on the floor, faced down, with her head against a wall. EMS reports the patient to be hypertensive and incontinent.   Pt has been non-verbal since arrival. Per family patient began complaining of a headache yesterday. Last time normal per  was 0500 today while on his way to work. Patient's aunt reports the patient has not received any prenatal care for this pregnancy.  No other history could be obtained at this time.  History is limited secondary to acuity of the patient's condition.  So last seen normal by adult at 5am. jayeshd reports pt has been taking some OTC meds for headache. Pt's  reports pt not taking any regular medications at home.     Past Medical History:   Diagnosis Date    Anemia     Hypertension     since 2012    Polycystic kidney disease     Polycystic kidney disease     Renal disorder     Since childhood      Past Surgical History:   Procedure Laterality Date    DILATION AND CURETTAGE OF UTERUS      2013      No current facility-administered medications on file prior to encounter.      Current Outpatient Prescriptions on File Prior to Encounter   Medication Sig Dispense Refill    ibuprofen (ADVIL,MOTRIN) 600 MG tablet Take 1 tablet (600 mg total) by mouth every 6 (six) hours. 40 tablet 1    naproxen (NAPROSYN) 500 MG tablet Take  1 tablet (500 mg total) by mouth 2 (two) times daily as needed (for pain). 10 tablet 0    NIFEdipine (PROCARDIA-XL) 30 MG (OSM) 24 hr tablet Take 1 tablet (30 mg total) by mouth once daily. 30 tablet 1    ondansetron (ZOFRAN) 4 MG tablet Take 2 tablets (8 mg total) by mouth every 6 (six) hours as needed for Nausea. 12 tablet 0    potassium chloride SA (K-DUR,KLOR-CON) 20 MEQ tablet Take 1 tablet (20 mEq total) by mouth once daily. 6 tablet 0    prenatal multivit-Ca-min-Fe-FA (PRENATAL VITAMIN) Tab Take 1 tablet by mouth once daily. 30 each 11      Allergies: Patient has no known allergies.    Family History   Problem Relation Age of Onset    Hypertension Mother     Polycystic kidney disease Mother     Hypertension Paternal Grandmother     Polycystic kidney disease Daughter      Social History   Substance Use Topics    Smoking status: Never Smoker    Smokeless tobacco: Never Used    Alcohol use Yes      Comment: occasional     Review of Systems   Unable to perform ROS: Mental status change     Objective:     Vitals:    Temp: (S)  (Unable to obtain)  Pulse: 83  BP: (!) 177/102  MAP (mmHg): 121  Resp: 20  ETCO2 (mmHg): 0 mmHg  SpO2: 100 %  O2 Device (Oxygen Therapy): room air    Pulse  Min: 57  Max: 83  BP  Min: 141/91  Max: 191/82  MAP (mmHg)  Min: 112  Max: 135  Resp  Min: 18  Max: 28  ETCO2 (mmHg)  Min: 0 mmHg  Max: 0 mmHg  SpO2  Min: 90 %  Max: 100 %    No intake/output data recorded.           Physical Exam   Constitutional: She appears well-developed and well-nourished. No distress.   HENT:   Head: Normocephalic and atraumatic.   Eyes: Pupils are equal, round, and reactive to light.   Neck: Normal range of motion. Neck supple. No JVD present. No tracheal deviation present. No thyromegaly present.   Cardiovascular: Normal rate, regular rhythm and normal heart sounds.    Pulmonary/Chest: Effort normal and breath sounds normal.   Abdominal: Soft. Bowel sounds are normal. She exhibits no distension. There  is no tenderness.   Musculoskeletal: Normal range of motion. She exhibits no tenderness.   Neurological: She is disoriented. GCS eye subscore is 4. GCS verbal subscore is 2. GCS motor subscore is 4.   Pt unable to respond to commands. Unable to participate in neuro exam.; moving all extremities.    Skin: Skin is warm and dry. Capillary refill takes less than 2 seconds. She is not diaphoretic.     Unable to test orientation, language, memory, judgment, insight, fund of knowledge, hearing, shoulder shrug, tongue protrusion, coordination, gait due to level of consciousness.    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    Goal BP <140 [currently unsecured]  cardene drip   If dx changes to secured: permissive HTN <160 in setting of pregnancy  If vasospasm, fluid bolus, then radha drip to achieve symptomatic resolution and maintain lowest pressure needed to prevent vasospasm  If not vasospaming, permissive HTN <160   If dx remains unsecured, <140   CT shows SAH  MRA head/neck following MRI  Neuro checks q1  Admit to [facility]   Seizure prophylaxis - keppra 500 BID IV  Nimodipine 60 q4  Daily TCDs  A line, central line Mg drip - restart after theraputic range of Mg achieved   Monitor for reflexes   Pt intubated for procedure - agitation prevented cooperation           Renal/   Hypokalemia    K 2.4 on CMP  Replace K PRN  CMP Q12 hours          PKD (polycystic kidney disease)    May predispose to aneurysm         Oncology   Leukocytosis    SAH vs infection  panculture  Vanc, cefepime day 1 = today           Obstetric   24 weeks gestation of pregnancy    Mg drip  theraputic range 4.8 - 8.4  Betamethasone   -- day 1 = 8/10/18   limited prenatal care  Approximate age 24 weeks        Hypertension affecting pregnancy in second trimester    obgyn consulted  Mg per their recommendations   Mg level 4.8 - 8.4 is  goal               Prophylaxis:  Venous Thromboembolism: mechanical  Stress Ulcer: H2B  Ventilator Pneumonia: no     Activity Orders          None        Full Code    Brandon Valdivia MD  Neurocritical Care  [facility]

## 2018-08-10 NOTE — HPI
32 y.o. female 24 weeks pregnant with a history of polycystic kidney disease who presents as transfer from [facility] for SAH. Patient found down by 6 yr old son. Last known normal 0500 today. CT at [facility] revealed SAH within the basal cisterns. US at [facility] with + fetal heart tones. Transferred for neuro evaluation.

## 2018-08-10 NOTE — SUBJECTIVE & OBJECTIVE
Past Medical History:   Diagnosis Date    Anemia     Hypertension     since 2012    Polycystic kidney disease     Polycystic kidney disease     Renal disorder     Since childhood      Past Surgical History:   Procedure Laterality Date    DILATION AND CURETTAGE OF UTERUS      2013      No current facility-administered medications on file prior to encounter.      Current Outpatient Prescriptions on File Prior to Encounter   Medication Sig Dispense Refill    ibuprofen (ADVIL,MOTRIN) 600 MG tablet Take 1 tablet (600 mg total) by mouth every 6 (six) hours. 40 tablet 1    naproxen (NAPROSYN) 500 MG tablet Take 1 tablet (500 mg total) by mouth 2 (two) times daily as needed (for pain). 10 tablet 0    NIFEdipine (PROCARDIA-XL) 30 MG (OSM) 24 hr tablet Take 1 tablet (30 mg total) by mouth once daily. 30 tablet 1    ondansetron (ZOFRAN) 4 MG tablet Take 2 tablets (8 mg total) by mouth every 6 (six) hours as needed for Nausea. 12 tablet 0    potassium chloride SA (K-DUR,KLOR-CON) 20 MEQ tablet Take 1 tablet (20 mEq total) by mouth once daily. 6 tablet 0    prenatal multivit-Ca-min-Fe-FA (PRENATAL VITAMIN) Tab Take 1 tablet by mouth once daily. 30 each 11      Allergies: Patient has no known allergies.    Family History   Problem Relation Age of Onset    Hypertension Mother     Polycystic kidney disease Mother     Hypertension Paternal Grandmother     Polycystic kidney disease Daughter      Social History   Substance Use Topics    Smoking status: Never Smoker    Smokeless tobacco: Never Used    Alcohol use Yes      Comment: occasional     Review of Systems   Unable to perform ROS: Mental status change     Objective:     Vitals:    Temp: (S)  (Unable to obtain)  Pulse: 83  BP: (!) 177/102  MAP (mmHg): 121  Resp: 20  ETCO2 (mmHg): 0 mmHg  SpO2: 100 %  O2 Device (Oxygen Therapy): room air    Pulse  Min: 57  Max: 83  BP  Min: 141/91  Max: 191/82  MAP (mmHg)  Min: 112  Max: 135  Resp  Min: 18  Max: 28  ETCO2 (mmHg)   Min: 0 mmHg  Max: 0 mmHg  SpO2  Min: 90 %  Max: 100 %    No intake/output data recorded.           Physical Exam   Constitutional: She appears well-developed and well-nourished. No distress.   HENT:   Head: Normocephalic and atraumatic.   Eyes: Pupils are equal, round, and reactive to light.   Neck: Normal range of motion. Neck supple. No JVD present. No tracheal deviation present. No thyromegaly present.   Cardiovascular: Normal rate, regular rhythm and normal heart sounds.    Pulmonary/Chest: Effort normal and breath sounds normal.   Abdominal: Soft. Bowel sounds are normal. She exhibits no distension. There is no tenderness.   Musculoskeletal: Normal range of motion. She exhibits no tenderness.   Neurological: She is disoriented. GCS eye subscore is 4. GCS verbal subscore is 2. GCS motor subscore is 4.   Pt unable to respond to commands. Unable to participate in neuro exam.; moving all extremities.    Skin: Skin is warm and dry. Capillary refill takes less than 2 seconds. She is not diaphoretic.     Unable to test orientation, language, memory, judgment, insight, fund of knowledge, hearing, shoulder shrug, tongue protrusion, coordination, gait due to level of consciousness.    Today I personally reviewed pertinent lab results, notably: CMP

## 2018-08-10 NOTE — SUBJECTIVE & OBJECTIVE
Obstetric History       T2      L5     SAB0   TAB0   Ectopic0   Multiple0   Live Births5       # Outcome Date GA Lbr Newton/2nd Weight Sex Delivery Anes PTL Lv   6  11/10/16 36w1d  2.77 kg (6 lb 1.7 oz) F Vag-Spont EPI Y PRITESH      Name: WILFRED MCMILLAN      Apgar1:  8                Apgar5: 9   5  09/12/15 36w2d / 00:42 2.657 kg (5 lb 13.7 oz) M Vag-Vacuum EPI Y PRITESH      Apgar1:  9                Apgar5: 9   4 AB 2013     SAB      3     1.446 kg (3 lb 3 oz) M Vag-Spont   PRITESH      Complications:  premature rupture of membranes (PPROM) delivered, current hospitalization   2 Term    3.402 kg (7 lb 8 oz) F Vag-Spont   PRITESH   1 Term    3.175 kg (7 lb) F Vag-Spont   PRITESH      Obstetric Comments   G3 - PPROM at 24 wks, expectantly managed until 28 wks   G4 - SAB ~ 6 wks s/p suction D&C     Past Medical History:   Diagnosis Date    Anemia     Hypertension     since     Polycystic kidney disease     Polycystic kidney disease     Renal disorder     Since childhood      Past Surgical History:   Procedure Laterality Date    DILATION AND CURETTAGE OF UTERUS               (Not in a hospital admission)    Review of patient's allergies indicates:  No Known Allergies     Family History     Problem Relation (Age of Onset)    Hypertension Mother, Paternal Grandmother    Polycystic kidney disease Mother, Daughter        Social History Main Topics    Smoking status: Never Smoker    Smokeless tobacco: Never Used    Alcohol use Yes      Comment: occasional    Drug use: No    Sexual activity: Yes     Partners: Male     Birth control/ protection: None     Review of Systems   Unable to perform ROS: Mental status change      Objective:     Vital Signs (Most Recent):  Temp: (S)  (Unable to obtain) (08/10/18 1255)  Pulse: 83 (08/10/18 1600)  Resp: 20 (08/10/18 1600)  BP: (!) 177/102 (08/10/18 1600)  SpO2: 100 % (08/10/18 1500) Vital Signs (24h Range):  Pulse:  [57-83]  83  Resp:  [18-28] 20  SpO2:  [90 %-100 %] 100 %  BP: (141-191)/() 177/102     Weight: 68 kg (150 lb)  Body mass index is 22.15 kg/m².    No LMP recorded.    Physical Exam:                           Neurological: She exhibits normal muscle tone. Coordination normal.   Somnolent, non-responsive    Skin: Skin is warm.        Laboratory:  CBC:   Recent Labs  Lab 08/10/18  1500   WBC 18.69*   RBC 3.71*   HGB 10.7*   HCT 32.9*      MCV 89   MCH 28.8   MCHC 32.5     CMP:   Recent Labs  Lab 08/10/18  1500   *   CALCIUM 8.1*   ALBUMIN 2.9*   PROT 7.0   *   K 2.4*   CO2 21*      BUN 7   CREATININE 0.7   ALKPHOS 54*   ALT 12   AST 23   BILITOT 0.3       Diagnostic Results:  CT: Reviewed  aneurysm in right anterior communicating artery

## 2018-08-10 NOTE — PROGRESS NOTES
Procedure complete, pt tolerated well.  Hemostasis achieved by angioseal at this time, dry sterile drsg applied. Pt to remain flat until 2040.

## 2018-08-10 NOTE — ED PROVIDER NOTES
Encounter Date: 8/10/2018    SCRIBE #1 NOTE: I, Natalia Ewing, am scribing for, and in the presence of,  Lidia Iqbal MD. I have scribed the following portions of the note - Other sections scribed: HPI/ROS.       History     Chief Complaint   Patient presents with    Altered Mental Status     Found unresponsive by 6yr old son this AM and called EMS. Per EMS pt was found face down with her face on the wall, with house in shambles. EMS reports possible seizure activity. Hx of HTN and appears to be 5-6 months pregnant with no OB care. Upon arrival pt is altered and possibly post ictal.      CC: Altered Mental Status  Patient arrived via EMS    HPI: This 32 y.o female, approximately 5-6 months gravid presents to the ED for an evaluation for acute onset altered mental status that began prior to arrival.  Patient's aunt reports calling the patient's phone at 11:30 am this morning and reports the patient's 6 year old son answering the phone stating that the patient was lying on the floor, not able to get up.  Aunt reports EMS was activated and sent to the home.  EMS reports upon their arrival, the patient was found on the floor, faced down, with her head against a wall. EMS reports the patient to be hypertensive and incontinent.  EMS reports she has been non-verbal since their arrival.  Aunt reports being informed the patient began complaining of a headache on yesterday and reports the patient's  last seeing her normal at 0500 today while on his way to work.  He reports her making his lunch prior to him leaving and reports her with normal speech.  Patient's aunt reports the patient has not received any prenatal care for this pregnancy.  No other history could be obtained at this time.  History is limited secondary to acuity of the patient's condition.  So last seen normal by adult at 5am.       The history is provided by the EMS personnel and a relative. No  was used.     Review of patient's  allergies indicates:  No Known Allergies  Past Medical History:   Diagnosis Date    Anemia     Hypertension     since     Polycystic kidney disease     Polycystic kidney disease     Renal disorder     Since childhood      Past Surgical History:   Procedure Laterality Date    DILATION AND CURETTAGE OF UTERUS           Family History   Problem Relation Age of Onset    Hypertension Mother     Polycystic kidney disease Mother     Hypertension Paternal Grandmother     Polycystic kidney disease Daughter      Social History   Substance Use Topics    Smoking status: Never Smoker    Smokeless tobacco: Never Used    Alcohol use Yes      Comment: occasional     Review of Systems   Unable to perform ROS: Acuity of condition   Constitutional: Positive for activity change.   Neurological:        Altered mental status   All other systems reviewed and are negative.      Physical Exam     Initial Vitals   BP Pulse Resp Temp SpO2   08/10/18 1255 08/10/18 1255 08/10/18 1410 -- 08/10/18 1255   (!) 173/95 71 (!) 28  98 %      MAP       --                Physical Exam    Nursing note and vitals reviewed.  Constitutional:   Awake looking around the room moving all extremities nonverbal, no obvious signs of trauma.   HENT:   Head: Normocephalic and atraumatic.   Eyes: Conjunctivae are normal.   Neck: Normal range of motion.   Cardiovascular: Normal rate, regular rhythm and normal heart sounds.   No murmur heard.  Abdominal: Soft.    appearance is present.  No signs of trauma.   Musculoskeletal: She exhibits no edema.   Moves all extremities spontaneously.  Does not follow commands.  Grossly well perfused.   Neurological:   Awake.  Looking around the room.  Not interactive.  Does not appear to understand what is going on.  Moves all extremities.  Does not follow commands.  Intermittently mildly combative   Skin: Skin is warm. Capillary refill takes less than 2 seconds. No rash noted. No erythema.   Psychiatric:    Not interactive.  Nonverbal.         ED Course   Critical Care  Date/Time: 8/10/2018 2:41 PM  Performed by: KIKI ROMAN  Authorized by: KIKI ROMAN   Direct patient critical care time: 25 minutes  Additional history critical care time: 10 minutes  Ordering / reviewing critical care time: 10 minutes  Documentation critical care time: 10 minutes  Consulting other physicians critical care time: 10 minutes  Consult with family critical care time: 5 minutes  Total critical care time (exclusive of procedural time) : 70 minutes        Labs Reviewed   CBC W/ AUTO DIFFERENTIAL   COMPREHENSIVE METABOLIC PANEL   PROTIME-INR   MAGNESIUM   PHOSPHORUS   LACTATE DEHYDROGENASE          Imaging Results          CT Head Without Contrast (Final result)  Result time 08/10/18 13:17:12    Final result by Price Ramos MD (08/10/18 13:17:12)                 Impression:      1. Extensive subarachnoid hemorrhage, most likely from an aneurysm rupture.  It is difficult to determine the site of the aneurysm rupture on this study due to the diffuse nature of the subarachnoid hemorrhage throughout the posterior fossa as well as the basilar cisternal spaces.  2. Slight increase in the ventricular size when compared to the previous study suggestive of a developing hydrocephalus.  Results of this test were discussed with the physician in charge of the patient in the emergency room (Virginia Roman at 1.15 p.m. on 08/10/2018).      Electronically signed by: Price Ramos MD  Date:    08/10/2018  Time:    13:17             Narrative:    EXAMINATION:  CT HEAD WITHOUT CONTRAST    CLINICAL HISTORY:  altered LOC, pregnant, htn;    TECHNIQUE:  Low dose axial images were obtained through the head.  Coronal and sagittal reformations were also performed. Contrast was not administered.    COMPARISON:  CT 01/25/2018    FINDINGS:  Since the previous examination there is extensive hyperdense hemorrhage in the basilar cisternal spaces as well as in  the ambient cistern and in the posterior fossa subarachnoid space and in the sylvian fissures as well as in the anterior interhemispheric fissures.  Questionable hyperdensity in the 4th ventricle, suspicious for refluxed hemorrhage within the 4th ventricle.    Slight increase in the size of the lateral ventricles and the temporal bones when compared to the previous study is suggestive of developing hydrocephalus.    No definite signs for parenchymal hemorrhage or midline shift or definite signs for herniations noted.                                 Medical Decision Making:   ED Management:  EMS report given.  One vital signs taken in the ER.  Blood pressure 1 70s over 1 teens.  Patient immediately taken for head CT.  Called Labor and delivery and OBGYN.  High suspicion for eclampsia versus preeclampsia given blood pressure, history of hypertension, pregnant state, altered mental status, evidence of recent urination on self.  Jason PAIGE wanted patient to be taken immediately to their floor.  Patient taken immediately to L and D after CT.  EN route to Labor and delivery, head CT reveals diffuse subarachnoid blood.  Discussed immediately with labor and delivery upstairs.  Patient needs to come back downstairs for likely transfer for neuro interventional care and OB care similar pain easily.  [facility] called and over the course of the next several minutes I spoke with, doctor Jauregui, Dr. Patel, Dr. Seay and Dr. Arzola. Blas Patel once patient transfer to [facility] [place] for further emergent imaging and possible neurosurgical intervention.  I spoke with family extensively.  Patient came back to ED.  In conversations with OBGYN plan was initially to give patient magnesium hydralazine.  Ob did not give meds in L&D.  Hydralazine 10 mg IV and magnesium 4 g IV given in the ED prior to departure from the ED.  Patient at and of stay did respond yes or no to rare questions, and remained awake alert and looking around  theRoom, but not interactive and mostly nonverbal.             Scribe Attestation:   Scribe #1: I performed the above scribed service and the documentation accurately describes the services I performed. I attest to the accuracy of the note.    Attending Attestation:           Physician Attestation for Scribe:  Physician Attestation Statement for Scribe #1: I, Lidia Iqbal MD, reviewed documentation, as scribed by Natalia Ewing in my presence, and it is both accurate and complete.                    Clinical Impression:   The primary encounter diagnosis was Subarachnoid hemorrhage. Diagnoses of Pregnant state, gestational carrier, Hypertension, unspecified type, and Pre-eclampsia in second trimester were also pertinent to this visit.                             Lidia Iqbal MD  08/10/18 [id number]

## 2018-08-10 NOTE — SUBJECTIVE & OBJECTIVE
(Not in a hospital admission)    Review of patient's allergies indicates:  No Known Allergies    Past Medical History:   Diagnosis Date    Anemia     Hypertension     since 2012    Polycystic kidney disease     Polycystic kidney disease     Renal disorder     Since childhood      Past Surgical History:   Procedure Laterality Date    DILATION AND CURETTAGE OF UTERUS      2013     Family History     Problem Relation (Age of Onset)    Hypertension Mother, Paternal Grandmother    Polycystic kidney disease Mother, Daughter        Social History Main Topics    Smoking status: Never Smoker    Smokeless tobacco: Never Used    Alcohol use Yes      Comment: occasional    Drug use: No    Sexual activity: Yes     Partners: Male     Birth control/ protection: None     Review of Systems   Constitutional: no fever, chills or night sweats. No changes in weight   Eyes: no visual changes   ENT: no nasal congestion or sore throat   Respiratory: no cough or shortness of breath   Cardiovascular: no chest pain or palpitations   Gastrointestinal: no nausea or vomiting   Genitourinary: no hematuria or dysuria   Integument/Breast: no rash or pruritis   Hematologic/Lymphatic: no easy bruising or lymphadenopathy   Musculoskeletal: no arthralgias or myalgias.   Neurological: no seizures or tremors   Behavioral/Psych: no auditory or visual hallucinations   Endocrine: no heat or cold intolerance   Objective:     Weight: 68 kg (150 lb)  Body mass index is 22.15 kg/m².  Vital Signs (Most Recent):  Temp: (S)  (Unable to obtain) (08/10/18 1255)  Pulse: 83 (08/10/18 1600)  Resp: 20 (08/10/18 1600)  BP: (!) 177/102 (08/10/18 1600)  SpO2: 100 % (08/10/18 1500) Vital Signs (24h Range):  Pulse:  [57-83] 83  Resp:  [18-28] 20  SpO2:  [90 %-100 %] 100 %  BP: (141-191)/() 177/102       Date 08/10/18 0700 - 08/11/18 0659   Shift 5960-6554 8714-3852 8996-5075 24 Hour Total   I  N  T  A  K  E   I.V.  (mL/kg) 500  (7.3)   500  (7.3)    Shift  Total  (mL/kg) 500  (7.3)   500  (7.3)   O  U  T  P  U  T   Urine  (mL/kg/hr)  1000  1000    Shift Total  (mL/kg)  1000  (14.7)  1000  (14.7)   Weight (kg) 68 68 68 68                        Urethral Catheter 08/10/18 1330 Straight-tip 16 Fr. (Active)       Neurosurgery Physical Exam  General: well developed, well nourished, agitated.   Head: normocephalic, atraumatic  Neurologic: Alert  GCS: Motor: 5/Verbal: 2/Eyes: 4 GCS Total: 11  Agitated, does not follow commands, global aphasia with severe dysarthria  Mental Status: Awake, Alert  Language: Global aphasia  Speech: dysarthria  Cranial nerves: L lower facial droop, CN II-XII otherwise intact.   Eyes: pupils equal, round, reactive to light with accomodation, EOMI.  Pulmonary: normal respirations, no signs of respiratory distress  Abdomen: soft, not tender to palpation  Sensory: intact to light touch throughout    Motor Strength: Moves all extremities spontaneously with good tone. No abnormal movements seen.     DTR's: 2 + and symmetric in UE and LE  Pronator Drift: unable to assess 2/2 does not follow commands  Finger-to-nose: unable to assess 2/2 does not follow commands  Pulses: 2+ and symmetric radial and dorsalis pedis.  Skin: Skin is warm, dry and intact.    Significant Labs:    Recent Labs  Lab 08/10/18  1500   *   *   K 2.4*      CO2 21*   BUN 7   CREATININE 0.7   CALCIUM 8.1*   MG 7.4*       Recent Labs  Lab 08/10/18  1500   WBC 18.69*   HGB 10.7*   HCT 32.9*          Recent Labs  Lab 08/10/18  1500   INR 1.0   APTT 21.4     Microbiology Results (last 7 days)     ** No results found for the last 168 hours. **        Significant Diagnostics:  CT head reviewed 08/10/2018   Diffuse SAH throughout basal cisterns. Possible developing hydrocephalus.

## 2018-08-10 NOTE — SUBJECTIVE & OBJECTIVE
Past Medical History:   Diagnosis Date    Anemia     Hypertension     since 2012    Polycystic kidney disease     Polycystic kidney disease     Renal disorder     Since childhood      Past Surgical History:   Procedure Laterality Date    DILATION AND CURETTAGE OF UTERUS      2013     Family History   Problem Relation Age of Onset    Hypertension Mother     Polycystic kidney disease Mother     Hypertension Paternal Grandmother     Polycystic kidney disease Daughter      Social History   Substance Use Topics    Smoking status: Never Smoker    Smokeless tobacco: Never Used    Alcohol use Yes      Comment: occasional     Review of patient's allergies indicates:  No Known Allergies    Medications: I have reviewed the current medication administration record.      (Not in a hospital admission)    Review of Systems   Constitutional: Negative for chills and fever.   HENT: Negative for congestion and drooling.    Eyes: Negative for photophobia and visual disturbance.   Respiratory: Negative for cough and shortness of breath.    Gastrointestinal: Negative for diarrhea and vomiting.   Genitourinary: Negative for hematuria and urgency.   Musculoskeletal: Negative for arthralgias and back pain.   Skin: Negative for pallor and rash.   Neurological: Positive for speech difficulty and headaches.   Psychiatric/Behavioral: Positive for agitation and confusion.     Objective:     Vital Signs (Most Recent):  Temp: (S)  (Unable to obtain) (08/10/18 1255)  Pulse: 83 (08/10/18 1600)  Resp: 20 (08/10/18 1600)  BP: (!) 177/102 (08/10/18 1600)  SpO2: 100 % (08/10/18 1500)    Vital Signs Range (Last 24H):  Pulse:  [57-83]   Resp:  [18-28]   BP: (141-191)/()   SpO2:  [90 %-100 %]     Physical Exam   Constitutional: She appears well-developed and well-nourished.   HENT:   Head: Normocephalic and atraumatic.   Eyes: EOM are normal. Pupils are equal, round, and reactive to light.   Neck: No thyromegaly present.    Cardiovascular: Normal rate and regular rhythm.    Pulmonary/Chest: Effort normal. No respiratory distress.   Abdominal: She exhibits no distension. There is no guarding.   Musculoskeletal: Normal range of motion.   Neurological:   See below     Skin: Skin is warm and dry.   Psychiatric: Her affect is not angry. She is agitated. She is attentive.       Neurological Exam:   LOC: drowsy  Attention Span: poor  Language: Mute  Articulation: Untestable due to severe aphasia   Orientation: Untestable due to severe aphasia   Visual Fields: Full  EOM (CN III, IV, VI): Full/intact  Pupils (CN II, III): PERRL  Facial Sensation (CN V): Normal  Facial Movement (CN VII): Symmetric facial expression    Gag Reflex: not examined  Reflexes: not examined   Motor:move all extremities   Cebellar: No evidence of appendicular or axial ataxia  Sensation: Intact to light touch, temperature and vibration  Tone: Normal tone throughout      Laboratory:  CMP:   Recent Labs  Lab 08/10/18  1500   CALCIUM 8.1*   ALBUMIN 2.9*   PROT 7.0   *   K 2.4*   CO2 21*      BUN 7   CREATININE 0.7   ALKPHOS 54*   ALT 12   AST 23   BILITOT 0.3     Lipid Panel:   Recent Labs  Lab 08/10/18  1500   CHOL 242*   LDLCALC 156.8   HDL 62   TRIG 116     Hgb A1C: No results for input(s): HGBA1C in the last 168 hours.  TSH:   Recent Labs  Lab 08/10/18  1500   TSH 1.082       Diagnostic Results:      Brain imaging:  CT head 08/10/18  Diffuse SAH   Vasogenic cerebral edema  Motion artifact       Vessel Imaging:      Cardiac Evaluation:

## 2018-08-10 NOTE — ASSESSMENT & PLAN NOTE
Patient is a 32 year old female with medical history of HTN, Renal disorder, polycystic kidney disease who was transferred from [facility] with diffuse SAH.  Etiology likely aneurysmal.  Recommend cerebral angiogram    Antithrombotics for secondary stroke prevention: none SAH     Statins for secondary stroke prevention and hyperlipidemia, if present: SAH     Aggressive risk factor modification: polycytsic kidney disease, HTN      Rehab efforts: Occupational Therapy, PT/OT/SLP to evaluate and treat when appropriate     Diagnostics ordered/pending: cerebral angiogram     VTE prophylaxis: SCDs    BP parameters: SAH: Unsecured aneurysm, SBP <140

## 2018-08-10 NOTE — LETTER
August 20, 2018     To Whom it May Concern             [facility]  [address] [place]  [address]  Phone: [phone]  Fax: [phone] August 20, 2018     Patient: Sharon Grande   YOB: 1985       To Whom It May Concern:    Sharon Grande was admitted to the hospital on 8/10/2018 12:53 PM and has been in the Neurological ICU at [facility] requiring acute care since this time for subarachnoid hemorrhage.  She is anticipated to require continued 24-hour inpatient care for at least 5, but possibly 10 or more days not including rehabilitation or home health care services. Her children have been in the care of their Aunt Steffany Roy since this time and may be enrolled into school in her abscence.  If you have any questions or concerns, please don't hesitate to call staff on her medical team (Dr. Valerio Croft), or the office at [phone].      Sincerely,        Valerio Croft MD  Department of Hospital Medicine

## 2018-08-10 NOTE — HPI
Per earlier notes:   32 y.o female, approximately 5-6 months gravid. C/o acute onset AMS that began prior to arrival in ED.  Patient's aunt reports calling the patient's phone at 11:30 am this morning and reports the patient's 6 year old son answering the phone stating that the patient was lying on the floor, not able to get up. EMS reports upon their arrival, the patient was found on the floor, faced down, with her head against a wall. EMS reports the patient to be hypertensive and incontinent.  Pt has been non-verbal since arrival. Per family patient began complaining of a headache yesterday. Last time normal per  was 0500 today while on his way to work. Patient's aunt reports the patient has not received any prenatal care for this pregnancy.  No other history could be obtained at this time.  History is limited secondary to acuity of the patient's condition.  So last seen normal by adult at 5am. eleanor reports pt has been taking some OTC meds for headache. Pt's  reports pt not taking any regular medications at home.

## 2018-08-10 NOTE — ASSESSMENT & PLAN NOTE
Mg drip  theraputic range 4.8 - 8.4  Betamethasone   -- day 1 = 8/10/18   limited prenatal care  Approximate age 24 weeks

## 2018-08-10 NOTE — ASSESSMENT & PLAN NOTE
- Likely related to inflammation and pregnancy however cannot rule out infectious etiology, cultures pending, on broad spectrum ABX per neuro critical care.
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
## 2018-08-10 NOTE — ED NOTES
"Pt arrived back to ED via stretcher with L&D. Pt awake and alert, pt responds to questioning with "yes". Pt confused and disoriented to situation, time and person. Pt attempting to verbalize headache. Dr. Joseph at bedside. Stat transferred initated.  Verbal order received from Dr. Iqbal to administer 4g of IVPB magnesium. IV medication pulled in emergency to administer. Medication started prior transfer. Report given to EMS personal. Significant other at bedside.   "

## 2018-08-10 NOTE — HOSPITAL COURSE
Admitted on 8/10, underwent angiogram which revealed aneurism in in the anterior communicating artery, which was coiled by interventional radiology. Remained intubated and sedated and transferred to neuro-critical care floor. She also required external ventricular drain placement overnight. Patient extubated on 8/12, stable. No obstetrics issues.

## 2018-08-10 NOTE — CONSULTS
[facility]  Neurosurgery  Consult Note    Inpatient consult to Neurosurgery  Consult performed by: NORRIS KENT  Consult ordered by: JASEN VELEZ        Subjective:     Chief Complaint/Reason for Admission: SAH    History of Present Illness: 32 y.o. female 24 weeks pregnant with a history of polycystic kidney disease who presents as transfer from [facility] for SAH. Patient found down by 6 yr old son. Last known normal 0500 today. CT at [facility] revealed SAH within the basal cisterns. US at [facility] with + fetal heart tones. Transferred for neuro evaluation.       (Not in a hospital admission)    Review of patient's allergies indicates:  No Known Allergies    Past Medical History:   Diagnosis Date    Anemia     Hypertension     since 2012    Polycystic kidney disease     Polycystic kidney disease     Renal disorder     Since childhood      Past Surgical History:   Procedure Laterality Date    DILATION AND CURETTAGE OF UTERUS      2013     Family History     Problem Relation (Age of Onset)    Hypertension Mother, Paternal Grandmother    Polycystic kidney disease Mother, Daughter        Social History Main Topics    Smoking status: Never Smoker    Smokeless tobacco: Never Used    Alcohol use Yes      Comment: occasional    Drug use: No    Sexual activity: Yes     Partners: Male     Birth control/ protection: None     Review of Systems   Constitutional: no fever, chills or night sweats. No changes in weight   Eyes: no visual changes   ENT: no nasal congestion or sore throat   Respiratory: no cough or shortness of breath   Cardiovascular: no chest pain or palpitations   Gastrointestinal: no nausea or vomiting   Genitourinary: no hematuria or dysuria   Integument/Breast: no rash or pruritis   Hematologic/Lymphatic: no easy bruising or lymphadenopathy   Musculoskeletal: no arthralgias or myalgias.   Neurological: no seizures or tremors   Behavioral/Psych: no auditory or visual  hallucinations   Endocrine: no heat or cold intolerance   Objective:     Weight: 68 kg (150 lb)  Body mass index is 22.15 kg/m².  Vital Signs (Most Recent):  Temp: (S)  (Unable to obtain) (08/10/18 1255)  Pulse: 83 (08/10/18 1600)  Resp: 20 (08/10/18 1600)  BP: (!) 177/102 (08/10/18 1600)  SpO2: 100 % (08/10/18 1500) Vital Signs (24h Range):  Pulse:  [57-83] 83  Resp:  [18-28] 20  SpO2:  [90 %-100 %] 100 %  BP: (141-191)/() 177/102       Date 08/10/18 0700 - 08/11/18 0659   Shift 6527-7161 2589-0717 2543-9092 24 Hour Total   I  N  T  A  K  E   I.V.  (mL/kg) 500  (7.3)   500  (7.3)    Shift Total  (mL/kg) 500  (7.3)   500  (7.3)   O  U  T  P  U  T   Urine  (mL/kg/hr)  1000  1000    Shift Total  (mL/kg)  1000  (14.7)  1000  (14.7)   Weight (kg) 68 68 68 68                        Urethral Catheter 08/10/18 1330 Straight-tip 16 Fr. (Active)       Neurosurgery Physical Exam  General: well developed, well nourished, agitated.   Head: normocephalic, atraumatic  Neurologic: Alert  GCS: Motor: 5/Verbal: 2/Eyes: 4 GCS Total: 11  Agitated, does not follow commands, global aphasia with severe dysarthria  Mental Status: Awake, Alert  Language: Global aphasia  Speech: dysarthria  Cranial nerves: L lower facial droop, CN II-XII otherwise intact.   Eyes: pupils equal, round, reactive to light with accomodation, EOMI.  Pulmonary: normal respirations, no signs of respiratory distress  Abdomen: soft, not tender to palpation  Sensory: intact to light touch throughout    Motor Strength: Moves all extremities spontaneously with good tone. No abnormal movements seen.     DTR's: 2 + and symmetric in UE and LE  Pronator Drift: unable to assess 2/2 does not follow commands  Finger-to-nose: unable to assess 2/2 does not follow commands  Pulses: 2+ and symmetric radial and dorsalis pedis.  Skin: Skin is warm, dry and intact.    Significant Labs:    Recent Labs  Lab 08/10/18  1500   *   *   K 2.4*      CO2 21*   BUN 7    CREATININE 0.7   CALCIUM 8.1*   MG 7.4*       Recent Labs  Lab 08/10/18  1500   WBC 18.69*   HGB 10.7*   HCT 32.9*          Recent Labs  Lab 08/10/18  1500   INR 1.0   APTT 21.4     Microbiology Results (last 7 days)     ** No results found for the last 168 hours. **        Significant Diagnostics:  CT head reviewed 08/10/2018   Diffuse SAH throughout basal cisterns. Possible developing hydrocephalus.    Assessment/Plan:     Nontraumatic subarachnoid hemorrhage    32 y.o. female 24 weeks pregnant with history of polycystic kidney disease with [id number] SAH.    - To IR for cerebral angio with possible intervention.  - SBP<140  - Neuro checks qhour  - Nimotop  - OB following.  - Mg, medical management per NCC.  - Will continue to follow. Please contact emergently with any change in exam.              Jojo Serrano PA-C  Neurosurgery  [facility]
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
## 2018-08-10 NOTE — ED NOTES
.Patient identifiers have been checked and are correct.  Patient assisted to ED stretcher and placed in a hospital gown.     LOC: The patient is awake, alert. Non verbal.   SKIN: The skin is warm, dry  RESPIRATORY: Airway is open and patent. Bilateral chest rise and fall. Respirations are spontaneous, even and unlabored. Normal effort and rate noted. No accessory muscle use noted.   CARDIAC: Patient has a normal rate and rhythm.   ABDOMEN: Soft and non tender to palpation. No distention noted.   URINARY: Folet cath in place, draining yellow colored urine.   NEUROLOGIC: See neuro flow sheet.   MUSCULOSKELETAL: No obvious deformities noted.     Side rails up x2.  at bedside. Updated on POC. Will continue to monitor.

## 2018-08-10 NOTE — PROGRESS NOTES
Patient transferred to IR room 190 for cerebral angiogram with possible intervention. Patient under care of anesthesia. OB at bedside as well.
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
## 2018-08-10 NOTE — ED PROVIDER NOTES
Encounter Date: 8/10/2018       History     Chief Complaint   Patient presents with    Altered Mental Status     Found unresponsive by 6yr old son this AM and called EMS. Per EMS pt was found face down with her face on the wall, with house in shambles. EMS reports possible seizure activity. Hx of HTN and appears to be 5-6 months pregnant with no OB care. Upon arrival pt is altered and possibly post ictal.      32y F  with history of HTN presents with acute altered mental status and is a transfer from outside hospital.  Last seen normal at 5:00 a.m. She was found on the ground, face down with urinary incontinence by her son.  Evaluation at outside facility revealed significantly elevated hypertension, pregnancy and a subarachnoid hemorrhage. Patient was found to be nonverbal.  She is not following commands, but she is alert. Her  reports that she is approximately 5-6 months pregnant, though has received no prenatal care.  She has a history of hypertension has not been taking her medications.      The history is provided by the EMS personnel, medical records and a significant other. The history is limited by the condition of the patient.     Review of patient's allergies indicates:  No Known Allergies  Past Medical History:   Diagnosis Date    Anemia     Hypertension     since     Polycystic kidney disease     Polycystic kidney disease     Renal disorder     Since childhood      Past Surgical History:   Procedure Laterality Date    DILATION AND CURETTAGE OF UTERUS           Family History   Problem Relation Age of Onset    Hypertension Mother     Polycystic kidney disease Mother     Hypertension Paternal Grandmother     Polycystic kidney disease Daughter      Social History   Substance Use Topics    Smoking status: Never Smoker    Smokeless tobacco: Never Used    Alcohol use Yes      Comment: occasional     Review of Systems   Unable to perform ROS: Patient nonverbal       Physical  Exam     Initial Vitals   BP Pulse Resp Temp SpO2   08/10/18 1255 08/10/18 1255 08/10/18 1410 -- 08/10/18 1255   (!) 173/95 71 (!) 28  98 %      MAP       --                Physical Exam    Vitals reviewed.  Constitutional: Vital signs are normal. She appears well-developed and well-nourished.   HENT:   Head: Normocephalic and atraumatic.   Mouth/Throat: Oropharynx is clear and moist.   Eyes: Conjunctivae and EOM are normal. Pupils are equal, round, and reactive to light.   Neck: Trachea normal and normal range of motion. Neck supple.   Cardiovascular: Normal rate, regular rhythm, normal heart sounds and normal pulses.   Pulmonary/Chest: Breath sounds normal. No respiratory distress.   Abdominal: Soft. Normal appearance and bowel sounds are normal. There is no tenderness.   Gravid uterus   Musculoskeletal: Normal range of motion.   Neurological: She is alert.   Nonverbal, does not follow commands, moves all extremities equally, seems to respond to painful stimulus   Skin: Skin is warm and dry.         ED Course   Procedures  Labs Reviewed          Imaging Results          CT Head Without Contrast (Final result)  Result time 08/10/18 13:17:12    Final result by Price Ramos MD (08/10/18 13:17:12)                 Impression:      1. Extensive subarachnoid hemorrhage, most likely from an aneurysm rupture.  It is difficult to determine the site of the aneurysm rupture on this study due to the diffuse nature of the subarachnoid hemorrhage throughout the posterior fossa as well as the basilar cisternal spaces.  2. Slight increase in the ventricular size when compared to the previous study suggestive of a developing hydrocephalus.  Results of this test were discussed with the physician in charge of the patient in the emergency room (Virginia Iqbal at 1.15 p.m. on 08/10/2018).      Electronically signed by: Price Ramos MD  Date:    08/10/2018  Time:    13:17             Narrative:    EXAMINATION:  CT HEAD WITHOUT  CONTRAST    CLINICAL HISTORY:  altered LOC, pregnant, htn;    TECHNIQUE:  Low dose axial images were obtained through the head.  Coronal and sagittal reformations were also performed. Contrast was not administered.    COMPARISON:  CT 01/25/2018    FINDINGS:  Since the previous examination there is extensive hyperdense hemorrhage in the basilar cisternal spaces as well as in the ambient cistern and in the posterior fossa subarachnoid space and in the sylvian fissures as well as in the anterior interhemispheric fissures.  Questionable hyperdensity in the 4th ventricle, suspicious for refluxed hemorrhage within the 4th ventricle.    Slight increase in the size of the lateral ventricles and the temporal bones when compared to the previous study is suggestive of developing hydrocephalus.    No definite signs for parenchymal hemorrhage or midline shift or definite signs for herniations noted.                                 Medical Decision Making:   History:   I obtained history from: someone other than patient.  Old Medical Records: I decided to obtain old medical records.  Other:   I have discussed this case with another health care provider.              Attending Attestation:             Attending ED Notes:   Emergent evaluation of subarachnoid transferred from outside hospital.  Patient consult it too narrow ICU, neuro surgery and  Maternal-Fetal Medicine.  Patient has received 4 g of magnesium.  She has received 10 mg of hydralazine from outside facility.  Her blood pressure is currently stable and not requiring additional interventions at this time.  Patient will be sent for MRI emergently and admitted to the neuro ICU.             Clinical Impression:   The primary encounter diagnosis was Subarachnoid hemorrhage. Diagnoses of Pregnant state, gestational carrier, Hypertension, unspecified type, Pre-eclampsia in second trimester, Stroke, and Nontraumatic subarachnoid hemorrhage were also pertinent to this  visit.      Disposition:   Disposition: Admitted  Condition: Serious                        Aleida Seay MD  08/10/18 0475

## 2018-08-10 NOTE — H&P
Radiology History & Physical      SUBJECTIVE:     Chief Complaint: SAH    History of Present Illness:  Sharon Grande is a 32 y.o. female with Grade 5 SAH and currently  5-6 months pregnant who presents for diagnostic cerebral angiogram and possible intervention.  Past Medical History:   Diagnosis Date    Anemia     Hypertension     since 2012    Polycystic kidney disease     Polycystic kidney disease     Renal disorder     Since childhood      Past Surgical History:   Procedure Laterality Date    DILATION AND CURETTAGE OF UTERUS      2013       Home Meds:   Prior to Admission medications    Medication Sig Start Date End Date Taking? Authorizing Provider   ibuprofen (ADVIL,MOTRIN) 600 MG tablet Take 1 tablet (600 mg total) by mouth every 6 (six) hours. 11/11/16   Roman Archibald MD   naproxen (NAPROSYN) 500 MG tablet Take 1 tablet (500 mg total) by mouth 2 (two) times daily as needed (for pain). 8/10/17   Sotero Almonte NP   NIFEdipine (PROCARDIA-XL) 30 MG (OSM) 24 hr tablet Take 1 tablet (30 mg total) by mouth once daily. 1/25/18 1/25/19  Spenser Nina MD   ondansetron (ZOFRAN) 4 MG tablet Take 2 tablets (8 mg total) by mouth every 6 (six) hours as needed for Nausea. 1/25/18   Spenser Nina MD   potassium chloride SA (K-DUR,KLOR-CON) 20 MEQ tablet Take 1 tablet (20 mEq total) by mouth once daily. 1/25/18   Spenser Nina MD   prenatal multivit-Ca-min-Fe-FA (PRENATAL VITAMIN) Tab Take 1 tablet by mouth once daily. 4/23/15 4/22/16  Kentrell Reagan MD     Anticoagulants/Antiplatelets: no anticoagulation    Allergies: Review of patient's allergies indicates:  No Known Allergies    Review of Systems:   Unable to obtain        OBJECTIVE:     Vital Signs (Most Recent)  Temp: (S)  (Unable to obtain) (08/10/18 1255)  Pulse: 83 (08/10/18 1600)  Resp: 20 (08/10/18 1600)  BP: (!) 177/102 (08/10/18 1600)  SpO2: 100 % (08/10/18 1500)    Physical Exam:  ASA: 4      General: no acute distress  Mental Status:  alert and oriented to person, place and time  HEENT: normocephalic, atraumatic  Chest: unlabored breathing  Heart: regular heart rate  Abdomen: nondistended  Extremity: moves all extremities    Laboratory  Lab Results   Component Value Date    INR 1.0 08/10/2018       Lab Results   Component Value Date    WBC 18.69 (H) 08/10/2018    HGB 10.7 (L) 08/10/2018    HCT 32.9 (L) 08/10/2018    MCV 89 08/10/2018     08/10/2018      Lab Results   Component Value Date     (H) 08/10/2018     (L) 08/10/2018    K 2.4 (LL) 08/10/2018     08/10/2018    CO2 21 (L) 08/10/2018    BUN 7 08/10/2018    CREATININE 0.7 08/10/2018    CALCIUM 8.1 (L) 08/10/2018    MG 7.4 (HH) 08/10/2018    ALT 12 08/10/2018    AST 23 08/10/2018    ALBUMIN 2.9 (L) 08/10/2018    BILITOT 0.3 08/10/2018       ASSESSMENT/PLAN:     Sedation Plan: general anesthesia  Patient will undergo diagnostic cerebral angiogram and possible intervention.    Eugene Topete  Radiology PGY-5

## 2018-08-10 NOTE — TREATMENT PLAN
"Pt arrived to unit from ED for fetal monitoring. Pt not able to follow commands and just thrashing around the bed shouting "I need to pee". [facility] started in R AC by CRNA. Card placed, pt calmed down and laid down. FHT assessed via Doppler and EFM monitor. FHT in 125s-130s. Dr. Griffin on unit, ordered labs and to transfer pt back down to ER for care.   "

## 2018-08-10 NOTE — ASSESSMENT & PLAN NOTE
32 y.o. female 24 weeks pregnant with history of polycystic kidney disease with HH4F4 SAH.    - To IR for cerebral angio with possible intervention.  - SBP<140  - Neuro checks qhour  - Nimotop  - OB following.  - Mg, medical management per [facility].  - Will continue to follow. Please contact emergently with any change in exam.

## 2018-08-10 NOTE — HPI
Patient is a 32 year old female with medical history of HTN, Renal disorder, polycystic kidney disease who was transferred from [facility] with SAH.  Patient is currently 24weeks pregnant who was found face down by her 6 year old son.  Attempting to get MRI/MRA by neurosurgery to look for aneurysm.  Patient received 8mg of versed to calm patient down for scan but unsuccessful.  Anesthesia consulted for intubation and further sedation.

## 2018-08-10 NOTE — H&P
`History and Physical  Obstetrics      SUBJECTIVE:     Sharon Grande is a 32 y.o.  female at 24w5 day by a 12 wk us (EDC 18). Pt was found face down on the floor by 7 yo son. Pt has been disoriented since presentation. Her CT shows Extensive subarachnoid hemorrhage, most likely from an aneurysm rupture.    PTA Medications   Medication Sig    ibuprofen (ADVIL,MOTRIN) 600 MG tablet Take 1 tablet (600 mg total) by mouth every 6 (six) hours.    naproxen (NAPROSYN) 500 MG tablet Take 1 tablet (500 mg total) by mouth 2 (two) times daily as needed (for pain).    NIFEdipine (PROCARDIA-XL) 30 MG (OSM) 24 hr tablet Take 1 tablet (30 mg total) by mouth once daily.    ondansetron (ZOFRAN) 4 MG tablet Take 2 tablets (8 mg total) by mouth every 6 (six) hours as needed for Nausea.    potassium chloride SA (K-DUR,KLOR-CON) 20 MEQ tablet Take 1 tablet (20 mEq total) by mouth once daily.    prenatal multivit-Ca-min-Fe-FA (PRENATAL VITAMIN) Tab Take 1 tablet by mouth once daily.       Review of patient's allergies indicates:  No Known Allergies   Obstetric History       T2      L5     SAB0   TAB0   Ectopic0   Multiple0   Live Births5       # Outcome Date GA Lbr Newton/2nd Weight Sex Delivery Anes PTL Lv   6  11/10/16 36w1d  2.77 kg (6 lb 1.7 oz) F Vag-Spont EPI Y PRITESH      Name: HIPOLITO, GIRL SHARON PINTO      Apgar1:  8                Apgar5: 9   5  09/12/15 36w2d / 00:42 2.657 kg (5 lb 13.7 oz) M Vag-Vacuum EPI Y PRITESH      Apgar1:  9                Apgar5: 9   4 AB 2013     SAB      3     1.446 kg (3 lb 3 oz) M Vag-Spont   PRITESH      Complications:  premature rupture of membranes (PPROM) delivered, current hospitalization   2 Term    3.402 kg (7 lb 8 oz) F Vag-Spont   PRITESH   1 Term    3.175 kg (7 lb) F Vag-Spont   PRITESH      Obstetric Comments   G3 - PPROM at 24 wks, expectantly managed until 28 wks   G4 - SAB ~ 6 wks s/p suction D&C     Past Medical History:   Diagnosis Date     Anemia     Hypertension     since 2012    Polycystic kidney disease     Polycystic kidney disease     Renal disorder     Since childhood      Past Surgical History:   Procedure Laterality Date    DILATION AND CURETTAGE OF UTERUS      2013     Family History   Problem Relation Age of Onset    Hypertension Mother     Polycystic kidney disease Mother     Hypertension Paternal Grandmother     Polycystic kidney disease Daughter      Social History   Substance Use Topics    Smoking status: Never Smoker    Smokeless tobacco: Never Used    Alcohol use Yes      Comment: occasional        OBJECTIVE:     Vital Signs (Most Recent)  Pulse:  [71] 71  SpO2:  [98 %] 98 %  BP: (173)/(95) 173/95    Physical Exam:  General:  combative while trying to place thomas. Repeating same phrase.    RRR  Normal resp effort  Ext: No CEC                    Abdomen: Soft gravid no FT,  ~20 wks fundal height           FHT: reviewed   Pelvis: NEFG   Cervix: df                              Laboratory:  No results for input(s): ABORH, HEPBSAG, RUBELLAIGGSC, GBS, AFP, BGPVAWS5LP in the last 168 hours.     Imaging  COMPARISON:  CT 01/25/2018     FINDINGS:  Since the previous examination there is extensive hyperdense hemorrhage in the basilar cisternal spaces as well as in the ambient cistern and in the posterior fossa subarachnoid space and in the sylvian fissures as well as in the anterior interhemispheric fissures.  Questionable hyperdensity in the 4th ventricle, suspicious for refluxed hemorrhage within the 4th ventricle.     Slight increase in the size of the lateral ventricles and the temporal bones when compared to the previous study is suggestive of developing hydrocephalus.     No definite signs for parenchymal hemorrhage or midline shift or definite signs for herniations noted.     IMPRESSION:      1. Extensive subarachnoid hemorrhage, most likely from an aneurysm rupture.  It is difficult to determine the site of the aneurysm  rupture on this study due to the diffuse nature of the subarachnoid hemorrhage throughout the posterior fossa as well as the basilar cisternal spaces.  2. Slight increase in the ventricular size when compared to the previous study suggestive of a developing hydrocephalus.    FHTs: 125bpm      ASSESSMENT/PLAN:   IUP at 24w5d  Extensive subarachnoid hemorrhage, most likely from an aneurysm rupture. - will transfer care to another facility with appropriate Neuro resources and [facility]  cHTN with PCKD- has some severe range blood pressures but resolved spontaneously (no meds needed). Ordered Hellp panel, P:C ratio, UDS, UA, Coags. Will start Magnesium (difficulty for IV bolus so giving 5g per buttocks, total 10g).  Fetal status- reassuring 3 mins stripe.   Disposition- sending pt back down to ER for transfer.

## 2018-08-10 NOTE — ASSESSMENT & PLAN NOTE
Goal BP <140 [currently unsecured]  cardene drip   If dx changes to secured: permissive HTN <160 in setting of pregnancy  If vasospasm, fluid bolus, then radha drip to achieve symptomatic resolution and maintain lowest pressure needed to prevent vasospasm  If not vasospaming, permissive HTN <160   If dx remains unsecured, <140   CT shows SAH  MRA head/neck following MRI  Neuro checks q1  Admit to [facility]   Seizure prophylaxis - keppra 500 BID IV  Nimodipine 60 q4  Daily TCDs  A line, central line Mg drip - restart after theraputic range of Mg achieved   Monitor for reflexes   Pt intubated for procedure - agitation prevented cooperation

## 2018-08-10 NOTE — ED NOTES
Dr. Iqbal in CT room. Per her verbal order pt needs to be transported L&D immediately following CT. MD states L&D charge notified

## 2018-08-10 NOTE — HOSPITAL COURSE
08/10/2018 Cerebral angio  8/11: EVD placed overnight, acom aneurysm coiled.  8/12: Extubated yesterday  8/13: stable neuro exam  8/14: NAEON, neuro exam stable  8/15: NAEON, stable neuro exam, started on 2% yesterday. ICP 4-7.   8/16: NAEON. To angio today for IV verapamil.  8/17: NAEON. Angio yesterday revealed moderate vaspospasm, treated with IV verapamil. EVD@10, ICP 6-13.   8/18: NAEON. Exam stable. Remains on 2% for Na goals >135  8/19: NAEON. Exam stable. Remains on 2%. TCDs elevated today, will give pRBCs and albumin to expand blood volume  8/23: NAEON. Mild L upward drift this AM. Remains on 2%. TCDs remain elevated, will bolus 1L NS this AM.   8/24: NAEON. No drift this AM. Remains on 2%. TCDs elevated. EVD open at 10, ICP 4-10.   8/25: raised evd to 20  8/26: clamp evd today, cth tmrw  8/27: MRI stable, will pull EVD today  8/28: NAEON, exam stable, TTF today on NSGY  8/29: NAEON, exam stable, TTF orders in today, pending bed opening, PT/OT reccs for IPR  8/30: AFVSS, NAEON, labs wnl, exam intact  8/31: HA significantly improved since yesterday and overall since admission. Patient requesting to be discharged on opioids for her headaches in case she needs it. Denies any seizure activity, focal weakness, or blurry vision. She states she feels a lot better after shower yesterday. Tolerating diet and voiding appropriately. Plan for DC home with nimodipine, salt tabs, outpt therapy, support cane, and bedside commode. Patient will return to clinic in 2 weeks for EVD staple removal and 6 weeks with Dr. Bo. She will follow up with OBGYN within 1 week of dc.

## 2018-08-10 NOTE — ED NOTES
Pt arrives per [facility] from [facility] as a transfer for neuro surgery consult. Report given- Pt was found down by 6 year old son this AM. Appeared postical upon arrival to ED, went to CT, CT showed head bleed. Checked in L/D- pregnant approximately 24 weeks, 5 days (measured by ultrasound). Pt arrives awake, non verbal, not following commands.  at bedside. Pt received Hydralazine 10 mg and Magnesium 4 mg from previous facility.

## 2018-08-10 NOTE — PROCEDURES
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    Right femoral hemostasis via angioseal device.  No hematoma at time of hemostasis.    Patient tolerated procedure well.    Eugene Topete  Radiology PGY-5

## 2018-08-10 NOTE — ASSESSMENT & PLAN NOTE
- BP management acutely per neurocritical care team  - Hypertension is either related to intracranial hemorrhage or eclampsia, will remain on magnesium for seizure prophylaxis, elevated P\C ratio either from pre-eclampsia/eclampsia or intracranial hemmorrhage and PCKD  - Will continue to monitor blood pressures, if continue to be labile and hypertensive may be an indication of pre-eclampsia with worsening disease, where delivery would be curative. However maternal stability is currently priority given early gestational age and concern for intra-operative fluid shifts affect on maternal morbidity and mortality. Will continue to monitor.   - Repeat MG level tonight, if ~6 or less will restart Mg at @2g/hour.  - Heart tones q-daily - confirmed post procedure  - Upper level spectra-link [id number] if needed

## 2018-08-10 NOTE — ANESTHESIA PROCEDURE NOTES
Arterial    Diagnosis: cerebral bleed    Patient location during procedure: done in OR  Procedure start time: 8/10/2018 4:54 PM  Timeout: 8/10/2018 4:54 PM  Procedure end time: 8/10/2018 5:00 PM  Staffing  Anesthesiologist: Wilver Soni Jr., MD  Resident/CRNA: Juliocesar Hernandez MD  Performed: resident/CRNA   Anesthesiologist was present at the time of the procedure.  Preanesthetic Checklist  Completed: patient identified, site marked, surgical consent, pre-op evaluation, timeout performed, IV checked, risks and benefits discussed, monitors and equipment checked and anesthesia consent givenArterial  Skin Prep: chlorhexidine gluconate  Local Infiltration: none  Orientation: right  Location: radial  Catheter Size: 20 G  Catheter placement by Anatomical landmarks. Heme positive aspiration all ports.Insertion Attempts: 1  Assessment  Dressing: secured with tape and tegaderm  Patient: Tolerated well

## 2018-08-10 NOTE — CONSULTS
[facility]  Vascular Neurology  Comprehensive Stroke Center  Consult Note    Inpatient consult to Vascular (Stroke) Neurology  Consult performed by: TOY KELLER  Consult ordered by: JASEN VELEZ  Reason for consult: SAH        Assessment/Plan:     Patient is a 32 y.o. year old female with:    24 weeks gestation of pregnancy    Recommend consult to Obgyn and ethics         Nontraumatic subarachnoid hemorrhage    Patient is a 32 year old female with medical history of HTN, Renal disorder, polycystic kidney disease who was transferred from [facility] with diffuse SAH.  Etiology likely aneurysmal.  Recommend cerebral angiogram    Antithrombotics for secondary stroke prevention: none SAH     Statins for secondary stroke prevention and hyperlipidemia, if present: SAH     Aggressive risk factor modification: polycytsic kidney disease, HTN      Rehab efforts: Occupational Therapy, PT/OT/SLP to evaluate and treat when appropriate     Diagnostics ordered/pending: cerebral angiogram     VTE prophylaxis: SCDs    BP parameters: SAH: Unsecured aneurysm, SBP <140            Hypertension affecting pregnancy in second trimester    Risk factor for stroke  Keep systolic under 140        PKD (polycystic kidney disease)    Risk factor for aneurysm             STROKE DOCUMENTATION          NIH Scale:  1a. Level Of Consciousness: 0-->Alert: keenly responsive  1b. LOC Questions: 2-->Answers neither question correctly  1c. LOC Commands: 2-->Performs neither task correctly  2. Best Gaze: 0-->Normal  3. Visual: 0-->No visual loss  4. Facial Palsy: 0-->Normal symmetrical movements  5a. Motor Arm, Left: 0-->No drift: limb holds 90 (or 45) degrees for full 10 secs  5b. Motor Arm, Right: 0-->No drift: limb holds 90 (or 45) degrees for full 10 secs  6a. Motor Leg, Left: 0-->No drift: leg holds 30 degree position for full 5 secs  6b. Motor Leg, Right: 0-->No drift: leg holds 30 degree position for full 5  secs  7. Limb Ataxia: 0-->Absent  8. Sensory: 0-->Normal: no sensory loss  9. Best Language: 3-->Mute, global aphasia: no usable speech or auditory comprehension  10. Dysarthria: 0-->Normal  11. Extinction and Inattention (formerly Neglect): 0-->No abnormality  Total (NIH Stroke Scale): 7    Modified Seb Score: 1  Matthew Coma Scale:    ABCD2 Score:    [id number] Score:   HAS -BLED Score:   ICH Score:   Hunt & Collins Classification:Grade III      Thrombolysis Candidate? No, SAH      Interventional Revascularization Candidate?   Is the patient eligible for mechanical endovascular reperfusion (AB)?  No; No large vessel occlusion      Hemorrhagic change of an Ischemic Stroke: Does this patient have an ischemic stroke with hemorrhagic changes? No     Subjective:     History of Present Illness:  Patient is a 32 year old female with medical history of HTN, Renal disorder, polycystic kidney disease who was transferred from [facility] with SAH.  Patient is currently 24weeks pregnant who was found face down by her 6 year old son.  Attempting to get MRI/MRA by neurosurgery to look for aneurysm.  Patient received 8mg of versed to calm patient down for scan but unsuccessful.  Anesthesia consulted for intubation and further sedation.              Past Medical History:   Diagnosis Date    Anemia     Hypertension     since 2012    Polycystic kidney disease     Polycystic kidney disease     Renal disorder     Since childhood      Past Surgical History:   Procedure Laterality Date    DILATION AND CURETTAGE OF UTERUS      2013     Family History   Problem Relation Age of Onset    Hypertension Mother     Polycystic kidney disease Mother     Hypertension Paternal Grandmother     Polycystic kidney disease Daughter      Social History   Substance Use Topics    Smoking status: Never Smoker    Smokeless tobacco: Never Used    Alcohol use Yes      Comment: occasional     Review of patient's allergies indicates:  No Known  Allergies    Medications: I have reviewed the current medication administration record.      (Not in a hospital admission)    Review of Systems   Constitutional: Negative for chills and fever.   HENT: Negative for congestion and drooling.    Eyes: Negative for photophobia and visual disturbance.   Respiratory: Negative for cough and shortness of breath.    Gastrointestinal: Negative for diarrhea and vomiting.   Genitourinary: Negative for hematuria and urgency.   Musculoskeletal: Negative for arthralgias and back pain.   Skin: Negative for pallor and rash.   Neurological: Positive for speech difficulty and headaches.   Psychiatric/Behavioral: Positive for agitation and confusion.     Objective:     Vital Signs (Most Recent):  Temp: (S)  (Unable to obtain) (08/10/18 1255)  Pulse: 83 (08/10/18 1600)  Resp: 20 (08/10/18 1600)  BP: (!) 177/102 (08/10/18 1600)  SpO2: 100 % (08/10/18 1500)    Vital Signs Range (Last 24H):  Pulse:  [57-83]   Resp:  [18-28]   BP: (141-191)/()   SpO2:  [90 %-100 %]     Physical Exam   Constitutional: She appears well-developed and well-nourished.   HENT:   Head: Normocephalic and atraumatic.   Eyes: EOM are normal. Pupils are equal, round, and reactive to light.   Neck: No thyromegaly present.   Cardiovascular: Normal rate and regular rhythm.    Pulmonary/Chest: Effort normal. No respiratory distress.   Abdominal: She exhibits no distension. There is no guarding.   Musculoskeletal: Normal range of motion.   Neurological:   See below     Skin: Skin is warm and dry.   Psychiatric: Her affect is not angry. She is agitated. She is attentive.       Neurological Exam:   LOC: drowsy  Attention Span: poor  Language: Mute  Articulation: Untestable due to severe aphasia   Orientation: Untestable due to severe aphasia   Visual Fields: Full  EOM (CN III, IV, VI): Full/intact  Pupils (CN II, III): PERRL  Facial Sensation (CN V): Normal  Facial Movement (CN VII): Symmetric facial expression    Gag  Reflex: not examined  Reflexes: not examined   Motor:move all extremities   Cebellar: No evidence of appendicular or axial ataxia  Sensation: Intact to light touch, temperature and vibration  Tone: Normal tone throughout      Laboratory:  CMP:   Recent Labs  Lab 08/10/18  1500   CALCIUM 8.1*   ALBUMIN 2.9*   PROT 7.0   *   K 2.4*   CO2 21*      BUN 7   CREATININE 0.7   ALKPHOS 54*   ALT 12   AST 23   BILITOT 0.3     Lipid Panel:   Recent Labs  Lab 08/10/18  1500   CHOL 242*   LDLCALC 156.8   HDL 62   TRIG 116     Hgb A1C: No results for input(s): HGBA1C in the last 168 hours.  TSH:   Recent Labs  Lab 08/10/18  1500   TSH 1.082       Diagnostic Results:      Brain imaging:  CT head 08/10/18  Diffuse SAH   Vasogenic cerebral edema  Motion artifact       Vessel Imaging:      Cardiac Evaluation:         Sera Oakes PA-C  Comprehensive Stroke Center  Department of Vascular Neurology   [facility]

## 2018-08-10 NOTE — CONSULTS
Neuro ICU   Consult Note    Will admit to neuro ICU.  Please see H&P dated today for full consult.    Brandon Valdivia MD

## 2018-08-10 NOTE — ED TRIAGE NOTES
Pt arrived to ED via EMS for altered mental status. Pt was found face down on the floor by son. Last seen normal last night. Pts family reports pt was c/o HA. Pt is pregnant with no prenatal care. PMHX of HTN. Pt is disoriented at this time, unable to follow commands.

## 2018-08-10 NOTE — HOSPITAL COURSE
8/10: pt admitted to [facility] for SAH. CT, MRI, MRA ordered and results pending   8/11: post angio with coil acom, EVD at 10, wean prop to extubate, MRI once stable, Urine studies for hyponatremia, TCDs, check Mg q 4  8/12: Mg gtt stopped over night, fluid boluses, pain control  8/16: PBD 6: +620mL fluid overnight.  Elevated peak velocity on TCD, Angio in AM.   8/19: PBD 9: +547mL, Na 134, increase 2% to 45/hour, decrease NS to 110/hour. TCDs decreased yesterday from 8/17, monitor today. SBP < 180  8/20: PBD 10: +1.3L, transfused 1 unit pRBC last night, Na 135 put on 2% 100mL/hour this morning. Monitor TCDs. Moderate neck pain; No BM for 4 days.  8/21: PBD 11: + 1.3L last 24 hours, Na 136 on 2% NS at 125mL/hour. TCDs from yesterday remain elevated, will follow exam. Phenylephrine gtt to maintain -220.   8/23: PBD 13, -1.1L last 24 hours, will give 1 unit of blood. TCDs remain elevated, will follow up with neurosurgery on plan to clamp EVD, currently open at 10.   8/26: Hgb slightly down overnight.  Headache decreased this morning, but increased in the afternoon. EVD clamped.

## 2018-08-11 PROBLEM — G93.41 METABOLIC ENCEPHALOPATHY: Status: ACTIVE | Noted: 2018-08-11

## 2018-08-11 PROBLEM — R47.01 APHASIA: Status: ACTIVE | Noted: 2018-08-11

## 2018-08-11 PROBLEM — O14.92 PRE-ECLAMPSIA IN SECOND TRIMESTER: Status: ACTIVE | Noted: 2018-08-11

## 2018-08-11 PROBLEM — N17.9 AKI (ACUTE KIDNEY INJURY): Status: ACTIVE | Noted: 2018-08-11

## 2018-08-11 PROBLEM — G93.6 BRAIN EDEMA: Status: ACTIVE | Noted: 2018-08-11

## 2018-08-11 PROBLEM — G93.5 BRAIN COMPRESSION: Status: ACTIVE | Noted: 2018-08-11

## 2018-08-11 PROBLEM — E83.39 HYPOPHOSPHATEMIA: Status: ACTIVE | Noted: 2018-08-11

## 2018-08-11 LAB
ALBUMIN SERPL BCP-MCNC: 2.9 G/DL
ALLENS TEST: ABNORMAL
ALP SERPL-CCNC: 53 U/L
ALT SERPL W/O P-5'-P-CCNC: 14 U/L
ANION GAP SERPL CALC-SCNC: 10 MMOL/L
AST SERPL-CCNC: 36 U/L
BACTERIA #/AREA URNS AUTO: ABNORMAL /HPF
BACTERIA #/AREA URNS AUTO: NORMAL /HPF
BASOPHILS # BLD AUTO: 0.01 K/UL
BASOPHILS NFR BLD: 0.1 %
BILIRUB SERPL-MCNC: 0.3 MG/DL
BILIRUB UR QL STRIP: NEGATIVE
BILIRUB UR QL STRIP: NEGATIVE
BUN SERPL-MCNC: 7 MG/DL
CALCIUM SERPL-MCNC: 8 MG/DL
CHLORIDE SERPL-SCNC: 103 MMOL/L
CLARITY UR REFRACT.AUTO: CLEAR
CLARITY UR REFRACT.AUTO: CLEAR
CO2 SERPL-SCNC: 18 MMOL/L
COLOR UR AUTO: ABNORMAL
COLOR UR AUTO: YELLOW
CREAT SERPL-MCNC: 0.7 MG/DL
CREAT UR-MCNC: 13 MG/DL
DELSYS: ABNORMAL
DIASTOLIC DYSFUNCTION: NO
DIFFERENTIAL METHOD: ABNORMAL
EOSINOPHIL # BLD AUTO: 0 K/UL
EOSINOPHIL NFR BLD: 0 %
ERYTHROCYTE [DISTWIDTH] IN BLOOD BY AUTOMATED COUNT: 14.2 %
ERYTHROCYTE [SEDIMENTATION RATE] IN BLOOD BY WESTERGREN METHOD: 16 MM/H
EST. GFR  (AFRICAN AMERICAN): >60 ML/MIN/1.73 M^2
EST. GFR  (NON AFRICAN AMERICAN): >60 ML/MIN/1.73 M^2
ESTIMATED PA SYSTOLIC PRESSURE: 30.88
FIO2: 45
FLOW: 60
GLOBAL PERICARDIAL EFFUSION: NORMAL
GLUCOSE SERPL-MCNC: 139 MG/DL
GLUCOSE UR QL STRIP: ABNORMAL
GLUCOSE UR QL STRIP: NEGATIVE
HCO3 UR-SCNC: 21.9 MMOL/L (ref 24–28)
HCT VFR BLD AUTO: 27.3 %
HGB BLD-MCNC: 9.2 G/DL
HGB UR QL STRIP: ABNORMAL
HGB UR QL STRIP: ABNORMAL
HYALINE CASTS UR QL AUTO: 3 /LPF
IMM GRANULOCYTES # BLD AUTO: 0.09 K/UL
IMM GRANULOCYTES NFR BLD AUTO: 0.6 %
KETONES UR QL STRIP: ABNORMAL
KETONES UR QL STRIP: ABNORMAL
LEUKOCYTE ESTERASE UR QL STRIP: NEGATIVE
LEUKOCYTE ESTERASE UR QL STRIP: NEGATIVE
LYMPHOCYTES # BLD AUTO: 0.9 K/UL
LYMPHOCYTES NFR BLD: 5.9 %
MAGNESIUM SERPL-MCNC: 4.3 MG/DL
MAGNESIUM SERPL-MCNC: 4.7 MG/DL
MAGNESIUM SERPL-MCNC: 5.6 MG/DL
MAGNESIUM SERPL-MCNC: 6 MG/DL
MAGNESIUM SERPL-MCNC: 6.5 MG/DL
MCH RBC QN AUTO: 28.9 PG
MCHC RBC AUTO-ENTMCNC: 33.7 G/DL
MCV RBC AUTO: 86 FL
MICROSCOPIC COMMENT: ABNORMAL
MICROSCOPIC COMMENT: NORMAL
MODE: ABNORMAL
MONOCYTES # BLD AUTO: 0.3 K/UL
MONOCYTES NFR BLD: 2 %
NEUTROPHILS # BLD AUTO: 14.5 K/UL
NEUTROPHILS NFR BLD: 91.4 %
NITRITE UR QL STRIP: NEGATIVE
NITRITE UR QL STRIP: NEGATIVE
NRBC BLD-RTO: 0 /100 WBC
OSMOLALITY SERPL: 284 MOSM/KG
OSMOLALITY UR: 395 MOSM/KG
PCO2 BLDA: 29.9 MMHG (ref 35–45)
PEEP: 5
PH SMN: 7.47 [PH] (ref 7.35–7.45)
PH UR STRIP: 5 [PH] (ref 5–8)
PH UR STRIP: 7 [PH] (ref 5–8)
PHOSPHATE SERPL-MCNC: 1.7 MG/DL
PHOSPHATE SERPL-MCNC: 2.4 MG/DL
PIP: 21
PLATELET # BLD AUTO: 218 K/UL
PMV BLD AUTO: 11.3 FL
PO2 BLDA: 232 MMHG (ref 80–100)
POC BE: -2 MMOL/L
POC SATURATED O2: 100 % (ref 95–100)
POC TCO2: 23 MMOL/L (ref 23–27)
POCT GLUCOSE: 134 MG/DL (ref 70–110)
POCT GLUCOSE: 168 MG/DL (ref 70–110)
POCT GLUCOSE: 181 MG/DL (ref 70–110)
POTASSIUM SERPL-SCNC: 3.1 MMOL/L
POTASSIUM SERPL-SCNC: 3.6 MMOL/L
PROT SERPL-MCNC: 7 G/DL
PROT UR QL STRIP: NEGATIVE
PROT UR QL STRIP: NEGATIVE
PROT UR-MCNC: 11 MG/DL
PROT UR-MCNC: 22 MG/DL
PROT/CREAT UR: 0.85 MG/G{CREAT}
PS: 5
RBC # BLD AUTO: 3.18 M/UL
RBC #/AREA URNS AUTO: 2 /HPF (ref 0–4)
RBC #/AREA URNS AUTO: 2 /HPF (ref 0–4)
RETIRED EF AND QEF - SEE NOTES: 60 (ref 55–65)
SAMPLE: ABNORMAL
SITE: ABNORMAL
SODIUM SERPL-SCNC: 131 MMOL/L
SODIUM UR-SCNC: 30 MMOL/L
SP GR UR STRIP: 1.02 (ref 1–1.03)
SP GR UR STRIP: 1.02 (ref 1–1.03)
SP02: 100
SQUAMOUS #/AREA URNS AUTO: 0 /HPF
SQUAMOUS #/AREA URNS AUTO: 0 /HPF
URN SPEC COLLECT METH UR: ABNORMAL
URN SPEC COLLECT METH UR: ABNORMAL
UROBILINOGEN UR STRIP-ACNC: NEGATIVE EU/DL
UROBILINOGEN UR STRIP-ACNC: NEGATIVE EU/DL
VT: 450
WBC # BLD AUTO: 15.87 K/UL
WBC #/AREA URNS AUTO: 2 /HPF (ref 0–5)
WBC #/AREA URNS AUTO: 2 /HPF (ref 0–5)

## 2018-08-11 PROCEDURE — 80053 COMPREHEN METABOLIC PANEL: CPT | Mod: 91

## 2018-08-11 PROCEDURE — 86703 HIV-1/HIV-2 1 RESULT ANTBDY: CPT

## 2018-08-11 PROCEDURE — 63600175 PHARM REV CODE 636 W HCPCS: Performed by: NURSE PRACTITIONER

## 2018-08-11 PROCEDURE — 25000003 PHARM REV CODE 250: Performed by: STUDENT IN AN ORGANIZED HEALTH CARE EDUCATION/TRAINING PROGRAM

## 2018-08-11 PROCEDURE — 63600175 PHARM REV CODE 636 W HCPCS: Performed by: STUDENT IN AN ORGANIZED HEALTH CARE EDUCATION/TRAINING PROGRAM

## 2018-08-11 PROCEDURE — 93306 TTE W/DOPPLER COMPLETE: CPT | Mod: 26,,, | Performed by: INTERNAL MEDICINE

## 2018-08-11 PROCEDURE — 25000003 PHARM REV CODE 250: Performed by: NURSE PRACTITIONER

## 2018-08-11 PROCEDURE — S0028 INJECTION, FAMOTIDINE, 20 MG: HCPCS | Performed by: STUDENT IN AN ORGANIZED HEALTH CARE EDUCATION/TRAINING PROGRAM

## 2018-08-11 PROCEDURE — 99232 SBSQ HOSP IP/OBS MODERATE 35: CPT | Mod: ,,, | Performed by: OBSTETRICS & GYNECOLOGY

## 2018-08-11 PROCEDURE — 009630Z DRAINAGE OF CEREBRAL VENTRICLE WITH DRAINAGE DEVICE, PERCUTANEOUS APPROACH: ICD-10-PCS | Performed by: NEUROLOGICAL SURGERY

## 2018-08-11 PROCEDURE — 82570 ASSAY OF URINE CREATININE: CPT

## 2018-08-11 PROCEDURE — 81001 URINALYSIS AUTO W/SCOPE: CPT

## 2018-08-11 PROCEDURE — 93306 TTE W/DOPPLER COMPLETE: CPT

## 2018-08-11 PROCEDURE — 84100 ASSAY OF PHOSPHORUS: CPT | Mod: 91

## 2018-08-11 PROCEDURE — 36415 COLL VENOUS BLD VENIPUNCTURE: CPT

## 2018-08-11 PROCEDURE — 83735 ASSAY OF MAGNESIUM: CPT

## 2018-08-11 PROCEDURE — A4216 STERILE WATER/SALINE, 10 ML: HCPCS | Performed by: STUDENT IN AN ORGANIZED HEALTH CARE EDUCATION/TRAINING PROGRAM

## 2018-08-11 PROCEDURE — 84300 ASSAY OF URINE SODIUM: CPT

## 2018-08-11 PROCEDURE — 99900035 HC TECH TIME PER 15 MIN (STAT)

## 2018-08-11 PROCEDURE — 99291 CRITICAL CARE FIRST HOUR: CPT | Mod: ,,, | Performed by: PSYCHIATRY & NEUROLOGY

## 2018-08-11 PROCEDURE — 84132 ASSAY OF SERUM POTASSIUM: CPT

## 2018-08-11 PROCEDURE — 83735 ASSAY OF MAGNESIUM: CPT | Mod: 91

## 2018-08-11 PROCEDURE — 63600175 PHARM REV CODE 636 W HCPCS: Performed by: PSYCHIATRY & NEUROLOGY

## 2018-08-11 PROCEDURE — 25000003 PHARM REV CODE 250: Performed by: PSYCHIATRY & NEUROLOGY

## 2018-08-11 PROCEDURE — 85025 COMPLETE CBC W/AUTO DIFF WBC: CPT

## 2018-08-11 PROCEDURE — 94003 VENT MGMT INPAT SUBQ DAY: CPT

## 2018-08-11 PROCEDURE — 86762 RUBELLA ANTIBODY: CPT

## 2018-08-11 PROCEDURE — 87340 HEPATITIS B SURFACE AG IA: CPT

## 2018-08-11 PROCEDURE — 27000221 HC OXYGEN, UP TO 24 HOURS

## 2018-08-11 PROCEDURE — 63600175 PHARM REV CODE 636 W HCPCS

## 2018-08-11 PROCEDURE — 20000000 HC ICU ROOM

## 2018-08-11 PROCEDURE — 99900026 HC AIRWAY MAINTENANCE (STAT)

## 2018-08-11 PROCEDURE — 83935 ASSAY OF URINE OSMOLALITY: CPT

## 2018-08-11 PROCEDURE — 86592 SYPHILIS TEST NON-TREP QUAL: CPT

## 2018-08-11 PROCEDURE — 83930 ASSAY OF BLOOD OSMOLALITY: CPT

## 2018-08-11 PROCEDURE — 94761 N-INVAS EAR/PLS OXIMETRY MLT: CPT

## 2018-08-11 RX ORDER — ACETAMINOPHEN 500 MG
1000 TABLET ORAL EVERY 6 HOURS PRN
Status: DISCONTINUED | OUTPATIENT
Start: 2018-08-11 | End: 2018-08-11

## 2018-08-11 RX ORDER — SODIUM,POTASSIUM PHOSPHATES 280-250MG
2 POWDER IN PACKET (EA) ORAL
Status: DISCONTINUED | OUTPATIENT
Start: 2018-08-11 | End: 2018-08-11

## 2018-08-11 RX ORDER — POTASSIUM CHLORIDE 14.9 MG/ML
20 INJECTION INTRAVENOUS
Status: DISCONTINUED | OUTPATIENT
Start: 2018-08-11 | End: 2018-08-11

## 2018-08-11 RX ORDER — POTASSIUM CHLORIDE 29.8 MG/ML
40 INJECTION INTRAVENOUS
Status: DISCONTINUED | OUTPATIENT
Start: 2018-08-11 | End: 2018-08-13

## 2018-08-11 RX ORDER — SODIUM CHLORIDE 9 MG/ML
INJECTION, SOLUTION INTRAVENOUS CONTINUOUS
Status: DISCONTINUED | OUTPATIENT
Start: 2018-08-11 | End: 2018-08-11

## 2018-08-11 RX ORDER — PROPOFOL 10 MG/ML
40 INJECTION, EMULSION INTRAVENOUS CONTINUOUS
Status: DISCONTINUED | OUTPATIENT
Start: 2018-08-11 | End: 2018-08-11

## 2018-08-11 RX ORDER — MAGNESIUM SULFATE HEPTAHYDRATE 40 MG/ML
2 INJECTION, SOLUTION INTRAVENOUS
Status: DISCONTINUED | OUTPATIENT
Start: 2018-08-11 | End: 2018-08-11

## 2018-08-11 RX ORDER — DEXMEDETOMIDINE HYDROCHLORIDE 4 UG/ML
0.2 INJECTION, SOLUTION INTRAVENOUS CONTINUOUS
Status: DISCONTINUED | OUTPATIENT
Start: 2018-08-11 | End: 2018-08-11

## 2018-08-11 RX ORDER — POTASSIUM CHLORIDE 14.9 MG/ML
60 INJECTION INTRAVENOUS
Status: DISCONTINUED | OUTPATIENT
Start: 2018-08-11 | End: 2018-08-13

## 2018-08-11 RX ORDER — FENTANYL CITRATE 50 UG/ML
INJECTION, SOLUTION INTRAMUSCULAR; INTRAVENOUS
Status: DISPENSED
Start: 2018-08-11 | End: 2018-08-12

## 2018-08-11 RX ORDER — POTASSIUM CHLORIDE 29.8 MG/ML
80 INJECTION INTRAVENOUS
Status: DISCONTINUED | OUTPATIENT
Start: 2018-08-11 | End: 2018-08-13

## 2018-08-11 RX ORDER — FENTANYL CITRATE 50 UG/ML
25 INJECTION, SOLUTION INTRAMUSCULAR; INTRAVENOUS ONCE
Status: COMPLETED | OUTPATIENT
Start: 2018-08-11 | End: 2018-08-11

## 2018-08-11 RX ORDER — ACETAMINOPHEN 325 MG/1
650 TABLET ORAL EVERY 6 HOURS PRN
Status: DISCONTINUED | OUTPATIENT
Start: 2018-08-11 | End: 2018-08-24

## 2018-08-11 RX ORDER — MAGNESIUM SULFATE HEPTAHYDRATE 40 MG/ML
4 INJECTION, SOLUTION INTRAVENOUS
Status: DISCONTINUED | OUTPATIENT
Start: 2018-08-11 | End: 2018-08-11

## 2018-08-11 RX ORDER — OXYCODONE HYDROCHLORIDE 5 MG/1
10 TABLET ORAL EVERY 4 HOURS PRN
Status: DISCONTINUED | OUTPATIENT
Start: 2018-08-11 | End: 2018-08-12

## 2018-08-11 RX ADMIN — BETAMETHASONE SODIUM PHOSPHATE AND BETAMETHASONE ACETATE 12 MG: 3; 3 INJECTION, SUSPENSION INTRA-ARTICULAR; INTRALESIONAL; INTRAMUSCULAR at 09:08

## 2018-08-11 RX ADMIN — FENTANYL CITRATE 50 MCG: 50 INJECTION INTRAMUSCULAR; INTRAVENOUS at 12:08

## 2018-08-11 RX ADMIN — LABETALOL HYDROCHLORIDE 10 MG: 5 INJECTION, SOLUTION INTRAVENOUS at 06:08

## 2018-08-11 RX ADMIN — NIMODIPINE 60 MG: 60 SOLUTION ORAL at 09:08

## 2018-08-11 RX ADMIN — VANCOMYCIN HYDROCHLORIDE 1000 MG: 1 INJECTION, POWDER, LYOPHILIZED, FOR SOLUTION INTRAVENOUS at 11:08

## 2018-08-11 RX ADMIN — OXYCODONE HYDROCHLORIDE 10 MG: 5 TABLET ORAL at 10:08

## 2018-08-11 RX ADMIN — NIMODIPINE 60 MG: 60 SOLUTION ORAL at 01:08

## 2018-08-11 RX ADMIN — FAMOTIDINE 20 MG: 10 INJECTION, SOLUTION INTRAVENOUS at 08:08

## 2018-08-11 RX ADMIN — BETAMETHASONE SODIUM PHOSPHATE AND BETAMETHASONE ACETATE 12 MG: 3; 3 INJECTION, SUSPENSION INTRA-ARTICULAR; INTRALESIONAL; INTRAMUSCULAR at 12:08

## 2018-08-11 RX ADMIN — LEVETIRACETAM 500 MG: 5 INJECTION INTRAVENOUS at 09:08

## 2018-08-11 RX ADMIN — DEXMEDETOMIDINE HYDROCHLORIDE 0.4 MCG/KG/HR: 4 INJECTION, SOLUTION INTRAVENOUS at 09:08

## 2018-08-11 RX ADMIN — FENTANYL CITRATE 25 MCG: 50 INJECTION INTRAMUSCULAR; INTRAVENOUS at 04:08

## 2018-08-11 RX ADMIN — ACETAMINOPHEN 650 MG: 325 TABLET, FILM COATED ORAL at 01:08

## 2018-08-11 RX ADMIN — POTASSIUM CHLORIDE 20 MEQ: 200 INJECTION, SOLUTION INTRAVENOUS at 10:08

## 2018-08-11 RX ADMIN — NIMODIPINE 60 MG: 60 SOLUTION ORAL at 10:08

## 2018-08-11 RX ADMIN — MIDAZOLAM 0.5 MG/HR: 5 INJECTION INTRAMUSCULAR; INTRAVENOUS at 09:08

## 2018-08-11 RX ADMIN — POTASSIUM CHLORIDE 10 MEQ: 10 INJECTION, SOLUTION INTRAVENOUS at 02:08

## 2018-08-11 RX ADMIN — SODIUM CHLORIDE 500 ML: 0.9 INJECTION, SOLUTION INTRAVENOUS at 03:08

## 2018-08-11 RX ADMIN — NIMODIPINE 60 MG: 60 SOLUTION ORAL at 06:08

## 2018-08-11 RX ADMIN — POTASSIUM CHLORIDE 20 MEQ: 200 INJECTION, SOLUTION INTRAVENOUS at 08:08

## 2018-08-11 RX ADMIN — SODIUM PHOSPHATE, MONOBASIC, MONOHYDRATE AND SODIUM PHOSPHATE, DIBASIC, ANHYDROUS 15 MMOL: 276; 142 INJECTION, SOLUTION INTRAVENOUS at 11:08

## 2018-08-11 RX ADMIN — POTASSIUM CHLORIDE 10 MEQ: 10 INJECTION, SOLUTION INTRAVENOUS at 01:08

## 2018-08-11 RX ADMIN — ACETAMINOPHEN 650 MG: 325 TABLET, FILM COATED ORAL at 07:08

## 2018-08-11 RX ADMIN — FENTANYL CITRATE 50 MCG: 50 INJECTION, SOLUTION INTRAMUSCULAR; INTRAVENOUS at 12:08

## 2018-08-11 RX ADMIN — CEFEPIME 1 G: 1 INJECTION, POWDER, FOR SOLUTION INTRAMUSCULAR; INTRAVENOUS at 05:08

## 2018-08-11 RX ADMIN — POTASSIUM CHLORIDE 20 MEQ: 200 INJECTION, SOLUTION INTRAVENOUS at 06:08

## 2018-08-11 RX ADMIN — POTASSIUM & SODIUM PHOSPHATES POWDER PACK 280-160-250 MG 2 PACKET: 280-160-250 PACK at 06:08

## 2018-08-11 RX ADMIN — SENNOSIDES AND DOCUSATE SODIUM 1 TABLET: 8.6; 5 TABLET ORAL at 09:08

## 2018-08-11 RX ADMIN — NIMODIPINE 60 MG: 60 SOLUTION ORAL at 05:08

## 2018-08-11 RX ADMIN — SENNOSIDES AND DOCUSATE SODIUM 1 TABLET: 8.6; 5 TABLET ORAL at 08:08

## 2018-08-11 RX ADMIN — LEVETIRACETAM 500 MG: 5 INJECTION INTRAVENOUS at 08:08

## 2018-08-11 RX ADMIN — CEFEPIME 1 G: 1 INJECTION, POWDER, FOR SOLUTION INTRAMUSCULAR; INTRAVENOUS at 06:08

## 2018-08-11 RX ADMIN — OXYCODONE HYDROCHLORIDE 10 MG: 5 TABLET ORAL at 06:08

## 2018-08-11 RX ADMIN — FAMOTIDINE 20 MG: 10 INJECTION, SOLUTION INTRAVENOUS at 09:08

## 2018-08-11 RX ADMIN — DEXMEDETOMIDINE HYDROCHLORIDE 0.1 MCG/KG/HR: 4 INJECTION, SOLUTION INTRAVENOUS at 01:08

## 2018-08-11 RX ADMIN — PROPOFOL 60 MCG/KG/MIN: 10 INJECTION, EMULSION INTRAVENOUS at 04:08

## 2018-08-11 RX ADMIN — Medication 3 ML: at 06:08

## 2018-08-11 NOTE — ASSESSMENT & PLAN NOTE
32 y.o. female 24 weeks pregnant with history of polycystic kidney disease with HH4F4 SAH, now s/p acom coiling.    -q 1 hr neuro checks  -permissive HTN as aneurysm secured  -nimotop  -strict is and os  -daily tcds  -evd open to 10 and draining, monitor icps  -further mgmt per ncc and obgyn.

## 2018-08-11 NOTE — PROGRESS NOTES
Notified Dr. Alfaro with NCC regading pt's increased agitation and low SBP. Pt maxed on Propofol gtt at 60 mcg/kg/hr. Pt also on 0.1 of Precedex. Pt trying to sit up in bed anD pulling lines. Precedex stopped. Per orders continue to hold precedex and bolus pt with propofol. Will be at bedside shortly.

## 2018-08-11 NOTE — ASSESSMENT & PLAN NOTE
- BP management acutely per neurocritical care team, recommend goal BP <160/110  - BP decreasing, P:C ratio significantly lower than previous, LFTs and Cr normal -- less consistent with pre-eclampsia picture although remains uncertain  - continue magnesium sulfate for seizure ppx, 2g/hr. OB managing mag gtt orders.  - last mag level 5.6 --therapeutic, continue to monitor  - DTR checks q2-4 hrs by nursing  - Heart tones q-daily - confirmed today as above  - Upper level resident spectra-link [id number] if needed

## 2018-08-11 NOTE — SUBJECTIVE & OBJECTIVE
Patient remains intubated and sedated. Awakens with stimulation such as suctioning or positioning, able to follow commands per RN. Not easily arousable to light stimuli. No vaginal bleeding.     Objective:     Vital Signs (Most Recent):  Temp: 99 °F (37.2 °C) (08/11/18 0905)  Pulse: 81 (08/11/18 0905)  Resp: 20 (08/11/18 0905)  BP: 120/73 (08/11/18 0905)  SpO2: 100 % (08/11/18 0905) Vital Signs (24h Range):  Temp:  [96.4 °F (35.8 °C)-99 °F (37.2 °C)] 99 °F (37.2 °C)  Pulse:  [] 81  Resp:  [16-48] 20  SpO2:  [72 %-100 %] 100 %  BP: ()/() 120/73  Arterial Line BP: ()/(49-95) 150/74     Weight: 68 kg (150 lb)  Body mass index is 22.15 kg/m².    FHT: verified with bedside US at 128 bpm, normal  US: fluid grossly normal, + fetal movement, cervix does not appear dilated      Intake/Output Summary (Last 24 hours) at 08/11/18 0935  Last data filed at 08/11/18 0932   Gross per 24 hour   Intake          4471.53 ml   Output             4655 ml   Net          -183.47 ml       Significant Labs:    Recent Labs  Lab 08/10/18  1500 08/10/18  1737 08/10/18  1946 08/11/18  0403   WBC 18.69*  --  17.79* 15.87*   HGB 10.7*  --  9.8* 9.2*   HCT 32.9* 25* 29.9* 27.3*   MCV 89  --  87 86     --  196 218        Recent Labs  Lab 08/10/18  1348  08/10/18  1500 08/10/18  1946 08/10/18  2120 08/11/18  0016 08/11/18  0403   NA  --   --  132* 132*  --   --  131*   K  --   < > 2.4* 3.3* 3.0*  --  3.1*   CL  --   --  101 103  --   --  103   CO2  --   --  21* 19*  --   --  18*   BUN  --   --  7 8  --   --  7   CREATININE  --   --  0.7 0.6  --   --  0.7   GLU  --   --  167* 148*  --   --  139*   PROT  --   --  7.0 7.4  --   --  7.0   BILITOT  --   --  0.3 0.4  --   --  0.3   ALKPHOS  --   --  54* 58  --   --  53*   ALT  --   --  12 15  --   --  14   AST  --   --  23 33  --   --  36   MG  --   < > 7.4* 1.8 1.5*  --  5.6*   PHOS  --   --  1.7* 2.4*  --   --  1.7*   LDH  --   --   --  236  --   --   --    UTPCR 8.20*  --    --   --   --  0.85*  --    < > = values in this interval not displayed.       Physical Exam:   Constitutional: She appears well-developed and well-nourished. No distress.    HENT:   EVD and ET tube in place      Cardiovascular: Normal rate and regular rhythm.     Pulmonary/Chest: No respiratory distress.   intubated        Abdominal: Soft. She exhibits no distension. There is no tenderness.   gravid     Genitourinary:   Genitourinary Comments: No vaginal bleeding on pad           Musculoskeletal: She exhibits no edema.       Neurological:   Sedated, DTRs 1+ in bilateral lower extremities    Skin: Skin is warm and dry.

## 2018-08-11 NOTE — PROGRESS NOTES
Patient arrived to [facility] from  [facility] via EMS    Type of Stroke: SAH    TPA start time and end time: N/A    Patients current symptoms include: Aphasia, HA and AMS

## 2018-08-11 NOTE — PROGRESS NOTES
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 PMhx notable for hypertension (on 30xl procardia), polycystic kidney diease, and renal disorder. Patient has history of  delivery at 36 weeks and PPROM (unknown GA)  Found unresponsive by 6yr old son this AM and called EMS. Per EMS pt was found face down with her face on the wall, with house in shambles. EMS reports possible seizure activity in transit.  Transfer [facility]. She was started on magnesium for eclampsia ppx. Acutely hypertensive (BP in the 190s systolic) P\C ratio noted to be 8.2  Bedside U/S estimates fetal growth at around 23 weeks.    Of note patient has had severe headaches before, with CTA in 2018 that was grossly normal.      Hospital Course:  Admitted on 8/10, underwent angiogram which revealed aneurism in in the anterior communicating artery, which was coiled by interventional radiology. Remained intubated and sedated and transferred to neuro-critical care floor. She also required external ventricular drain placement overnight.    Patient remains intubated and sedated. Awakens with stimulation such as suctioning or positioning, able to follow commands per RN. Not easily arousable to light stimuli. No vaginal bleeding.     Objective:     Vital Signs (Most Recent):  Temp: 99 °F (37.2 °C) (18)  Pulse: 81 (18)  Resp: 20 (18)  BP: 120/73 (18)  SpO2: 100 % (18) Vital Signs (24h Range):  Temp:  [96.4 °F (35.8 °C)-99 °F (37.2 °C)] 99 °F (37.2 °C)  Pulse:   [] 81  Resp:  [16-48] 20  SpO2:  [72 %-100 %] 100 %  BP: ()/() 120/73  Arterial Line BP: ()/(49-95) 150/74     Weight: 68 kg (150 lb)  Body mass index is 22.15 kg/m².    FHT: verified with bedside US at 128 bpm, normal  US: fluid grossly normal, + fetal movement, cervix does not appear dilated      Intake/Output Summary (Last 24 hours) at 08/11/18 0935  Last data filed at 08/11/18 0932   Gross per 24 hour   Intake          4471.53 ml   Output             4655 ml   Net          -183.47 ml       Significant Labs:    Recent Labs  Lab 08/10/18  1500 08/10/18  1737 08/10/18  1946 08/11/18  0403   WBC 18.69*  --  17.79* 15.87*   HGB 10.7*  --  9.8* 9.2*   HCT 32.9* 25* 29.9* 27.3*   MCV 89  --  87 86     --  196 218        Recent Labs  Lab 08/10/18  1348  08/10/18  1500 08/10/18  1946 08/10/18  2120 08/11/18  0016 08/11/18  0403   NA  --   --  132* 132*  --   --  131*   K  --   < > 2.4* 3.3* 3.0*  --  3.1*   CL  --   --  101 103  --   --  103   CO2  --   --  21* 19*  --   --  18*   BUN  --   --  7 8  --   --  7   CREATININE  --   --  0.7 0.6  --   --  0.7   GLU  --   --  167* 148*  --   --  139*   PROT  --   --  7.0 7.4  --   --  7.0   BILITOT  --   --  0.3 0.4  --   --  0.3   ALKPHOS  --   --  54* 58  --   --  53*   ALT  --   --  12 15  --   --  14   AST  --   --  23 33  --   --  36   MG  --   < > 7.4* 1.8 1.5*  --  5.6*   PHOS  --   --  1.7* 2.4*  --   --  1.7*   LDH  --   --   --  236  --   --   --    UTPCR 8.20*  --   --   --   --  0.85*  --    < > = values in this interval not displayed.       Physical Exam:   Constitutional: She appears well-developed and well-nourished. No distress.    HENT:   EVD and ET tube in place      Cardiovascular: Normal rate and regular rhythm.     Pulmonary/Chest: No respiratory distress.   intubated        Abdominal: Soft. She exhibits no distension. There is no tenderness.   gravid     Genitourinary:   Genitourinary Comments: No vaginal bleeding on pad            Musculoskeletal: She exhibits no edema.       Neurological:   Sedated, DTRs 1+ in bilateral lower extremities    Skin: Skin is warm and dry.        Assessment/Plan:     32 y.o. 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   - Family has been counseled that fetus may or may not survive through all effects of maternal condition. Main priority should be optimal care of the mother.        Hypertension affecting pregnancy in second trimester    - BP management acutely per neurocritical care team, recommend goal BP <160/110  - BP decreasing, P:C ratio significantly lower than previous, LFTs and Cr normal -- less consistent with pre-eclampsia picture although remains uncertain  - continue magnesium sulfate for seizure ppx, 2g/hr. OB managing mag gtt orders.  - last mag level 5.6 --therapeutic, continue to monitor  - DTR checks q2-4 hrs by nursing  - Heart tones q-daily - confirmed today as above  - Upper level resident spectra-link [id number] if needed              Guera Jimenes MD  Obstetrics  [facility]

## 2018-08-11 NOTE — PROGRESS NOTES
Dr. Ngo contacted me that patient more hypotensive and getting fluids. Given this, I recommend stopping magnesium sulfate. While preeclampsia cannot be fully ruled out, I think this is somewhat less likely with current trend but we will monitor her carefully for that. If patient has severe Htn,I would recommend critical care notify us.  24 hour urine in progress. I am available if questions.  Patient was in left lat tilt this am and recommend continued slight tilt.

## 2018-08-11 NOTE — PROGRESS NOTES
Pt put on CPAP at 1000 per MD order. Tolerated well. RRT Bulmaro extubated pt at 1024 per MD order. Tolerated well. Sat 100% on 2L NC.

## 2018-08-11 NOTE — PROGRESS NOTES
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 C/o acute onset AMS that began prior to arrival in ED.  Patient's aunt reports calling the patient's phone at 11:30 am this morning and reports the patient's 6 year old son answering the phone stating that the patient was lying on the floor, not able to get up. EMS reports upon their arrival, the patient was found on the floor, faced down, with her head against a wall. EMS reports the patient to be hypertensive and incontinent.   Pt has been non-verbal since arrival. Per family patient began complaining of a headache yesterday. Last time normal per  was 0500 today while on his way to work. Patient's aunt reports the patient has not received any prenatal care for this pregnancy.  No other history could be obtained at this time.  History is limited secondary to acuity of the patient's condition.  So last seen normal by adult at 5am. eleanor reports pt has been taking some OTC meds for headache. Pt's  reports pt not taking any regular medications at home.     Hospital Course: 8/10: pt admitted to [facility] for SAH. CT, MRI, MRA ordered and results pending   8/11: post angio with coil acom, EVD at 10, wean prop to extubate, MRI once stable, Urine studies for hyponatremia, TCDs, check Mg q 4    Review of Symptoms:   Unable to obtain, intubated, neuro-exam    Physical Exam:  GA: comfortable, no acute distress.   HEENT: No scleral icterus or JVD.   Pulmonary: Clear to auscultation A/L. No wheezing, crackles, or rhonchi. intubated  Cardiac: RRR S1 & S2 w/o rubs/murmurs/gallops.   Abdominal: Bowel sounds present x 4. No appreciable hepatosplenomegaly.  Skin: No jaundice, rashes, or  visible lesions.  Pulses: 2+ Dp bilat    Neuro:  --sedation: propofol  --GCS: I2A5pR1  --Mental Status: sedated though follows simple commands to show thumb, stick out tongue, and nods appropriately   --CN II-XII grossly intact.   --Pupils 3-->2mm, PERRL.   --brainstem: intact  --Motor: 4/5 throughout  --sensory: see motor  --Reflexes: not tested  --Gait: deferred      Recent Labs  Lab 08/11/18  0403   WBC 15.87*   RBC 3.18*   HGB 9.2*   HCT 27.3*      MCV 86   MCH 28.9   MCHC 33.7       Recent Labs  Lab 08/11/18  0403   CALCIUM 8.0*   PROT 7.0   *   K 3.1*   CO2 18*      BUN 7   CREATININE 0.7   ALKPHOS 53*   ALT 14   AST 36   BILITOT 0.3       Recent Labs  Lab 08/10/18  1500   INR 1.0   APTT 21.4     Vent Mode: SIMV  Oxygen Concentration (%):  [40-80] 40  Resp Rate Total:  [17 br/min-35 br/min] 20 br/min  Vt Set:  [450 mL] 450 mL  PEEP/CPAP:  [5 cmH20] 5 cmH20  Pressure Support:  [5 cmH20] 5 cmH20  Mean Airway Pressure:  [7.5 cmH20-8.6 cmH20] 7.8 cmH20    Temp: 99 °F (37.2 °C)  Pulse: 81  Rhythm: normal sinus rhythm  BP: 120/73  MAP (mmHg): 90  ICP Mean (mmHg): 7 mmHg  Resp: 20  SpO2: 100 %  Oxygen Concentration (%): 40  O2 Device (Oxygen Therapy): ventilator  Vent Mode: SIMV  Set Rate: 14 bmp  Vt Set: 450 mL  Pressure Support: 5 cmH20  PEEP/CPAP: 5 cmH20  Peak Airway Pressure: 11 cmH2O  Mean Airway Pressure: 7.8 cmH20  Plateau Pressure: 0 cmH20    Temp  Min: 96.4 °F (35.8 °C)  Max: 99 °F (37.2 °C)  Pulse  Min: 57  Max: 118  BP  Min: 94/53  Max: 191/82  MAP (mmHg)  Min: 67  Max: 135  ICP Mean (mmHg)  Min: 3 mmHg  Max: 20 mmHg  Resp  Min: 16  Max: 48  ETCO2 (mmHg)  Min: 0 mmHg  Max: 0 mmHg  SpO2  Min: 72 %  Max: 100 %  Oxygen Concentration (%)  Min: 40  Max: 80    08/10 0701 - 08/11 0700  In: 3689.4 [I.V.:2669.4]  Out: 3995 [Urine:3930; Drains:65]             Body mass index is 22.15 kg/m².    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    Post coiling  EVD at 10  Daily TCDs  Nimodipine  MRI once stable          Brain edema    Goal Na normal  EVD        Brain compression    EVD  Goal Na normal  Neuro checks          Aphasia    From SAH  Monitor  SLP eval once extubated  Though following simple commands today        Metabolic encephalopathy    Seems better now with EVD  Will monitor  Wean to extubate        Renal/   JOSE C (acute kidney injury)    Elevated P:C ratio on admit  Monitor urine output  Monitor Crt        Hypophosphatemia    Replete prn          Hypokalemia    Replete prn            PKD (polycystic kidney disease)     predispose to aneurysm   Will monitor        Oncology   Leukocytosis    Cultures  Broadly cover during workup  Likely inflammatory from SAH          Obstetric   Pre-eclampsia in second trimester    Monitor  Mg gtt and monitoring  MFM consulted  No plan to deliver currently  BP control 120-140 SBP goal        24 weeks gestation of pregnancy    Mg drip  theraputic range 4.8 - 8.4  Betamethasone   -- day 1 = 8/10/18   limited prenatal care  Approximate age 24 weeks            Prophylaxis:  Venous Thromboembolism: mechanical  Stress Ulcer: H2B  Ventilator Pneumonia: not applicable     Activity Orders          None        Full Code    Vishnu Ngo MD  Neurocritical Care  [facility]    Cc time 45 mins  Critical Care was time spent personally by me on the following activities: development of treatment plan with patient or surrogate and bedside caregivers, discussions with consultants, evaluation of patient's response to treatment, examination of patient, ordering and performing treatments and interventions, ordering and review of laboratory studies, ordering and review of radiographic studies, pulse oximetry, re-evaluation of patient's condition. This critical care time did not overlap with that of any other provider or involve time for any  procedures.

## 2018-08-11 NOTE — PT/OT/SLP PROGRESS
Speech Language Pathology      Sharon Grande  MRN: [id number]    Orders received and chart reviewed.  Patient not seen today secondary to Other (Comment) (pt intubated). Orders discontinued.  Please reconsult when medically appropriate.  Thank you.    Sravani Cotton CCC-SLP   8/11/2018

## 2018-08-11 NOTE — SUBJECTIVE & OBJECTIVE
Interval History: naeon    Medications:  Continuous Infusions:   dexmedetomidine (PRECEDEX) infusion Stopped (08/11/18 0315)    magnesium sulfate in water 50 mL/hr (08/11/18 0600)    niCARdipine Stopped (08/10/18 2236)    propofol Stopped (08/11/18 0540)     Scheduled Meds:   betamethasone acetate-betamethasone sodium phosphate  12 mg Intramuscular Daily    ceFEPime (MAXIPIME) IVPB  1 g Intravenous Q12H    famotidine (PF)  20 mg Intravenous BID    levetiracetam IVPB  500 mg Intravenous Q12H    lidocaine (PF) 20 mg/mL (2%)  10 mL Other Once    niMODipine  60 mg Per NG tube Q4H    senna-docusate 8.6-50 mg  1 tablet Oral BID    sodium chloride 0.9%  3 mL Intravenous Q8H    vancomycin (VANCOCIN) IVPB  15 mg/kg Intravenous Q12H     PRN Meds:calcium gluconate, labetalol, midazolam, potassium chloride in water, potassium, sodium phosphates, sodium chloride 0.9%     Review of Systems  Objective:     Weight: 68 kg (150 lb)  Body mass index is 22.15 kg/m².  Vital Signs (Most Recent):  Temp: 99 °F (37.2 °C) (08/11/18 0605)  Pulse: 96 (08/11/18 0605)  Resp: 17 (08/11/18 0605)  BP: (!) 94/53 (08/11/18 0505)  SpO2: 100 % (08/11/18 0605) Vital Signs (24h Range):  Temp:  [96.4 °F (35.8 °C)-99 °F (37.2 °C)] 99 °F (37.2 °C)  Pulse:  [] 96  Resp:  [16-48] 17  SpO2:  [72 %-100 %] 100 %  BP: ()/() 94/53  Arterial Line BP: ()/(49-95) 98/49                 Vent Mode: SIMV  Oxygen Concentration (%):  [40-80] 40  Resp Rate Total:  [17 br/min-35 br/min] 27 br/min  Vt Set:  [450 mL] 450 mL  PEEP/CPAP:  [5 cmH20] 5 cmH20  Pressure Support:  [5 cmH20] 5 cmH20  Mean Airway Pressure:  [7.5 cmH20-8.6 cmH20] 7.8 cmH20         NG/OG Tube 08/10/18 2100 Right mouth (Active)   Placement Check placement verified by aspirate characteristics;placement verified by x-ray 8/11/2018  3:05 AM   Distal Tube Length (cm) 70 8/11/2018  3:05 AM   Tolerance no signs/symptoms of discomfort 8/11/2018  3:05 AM   Securement taped to  "commercial device 8/11/2018  3:05 AM   Clamp Status/Tolerance unclamped 8/11/2018  3:05 AM   Insertion Site Appearance no redness, warmth, tenderness, skin breakdown, drainage 8/11/2018  3:05 AM   Intake (mL) 50 mL 8/11/2018  6:25 AM            Urethral Catheter 08/10/18 2105 (Active)   Site Assessment Clean;Intact 8/11/2018  3:05 AM   Collection Container Urimeter 8/11/2018  3:05 AM   Securement Method secured to top of thigh w/ adhesive device 8/11/2018  3:05 AM   Catheter Care Performed yes 8/11/2018  3:05 AM   Reason for Continuing Urinary Catheterization Critically ill in ICU requiring intensive monitoring 8/11/2018  3:05 AM   CAUTI Prevention Bundle StatLock in place w 1" slack;Intact seal between catheter & drainage tubing;Drainage bag off the floor;Green sheeting clip in use;No dependent loops or kinks;Drainage bag not overfilled (<2/3 full);Drainage bag below bladder 8/11/2018  3:05 AM   Output (mL) 175 mL 8/11/2018  6:00 AM            ICP/Ventriculostomy 08/04/18 0000 Ventricular drainage catheter with ICP microsensor Right Other (Comment) (Active)   Level of Ventriculostomy (cm above) 10 8/11/2018  3:05 AM   Status Open to drainage 8/11/2018  3:05 AM   Site Assessment Clean;Dry 8/11/2018  3:05 AM   Site Drainage Cloudy;Bloody 8/11/2018  3:05 AM   Waveform normal waveform 8/11/2018  3:05 AM   Output (mL) 3 mL 8/11/2018  6:00 AM   CSF Color turbid (cloudy);pink 8/11/2018  3:05 AM   Dressing Status Biopatch in place;Clean;Dry;Intact 8/11/2018  3:05 AM       Neurosurgery Physical Exam   [id number]  PERRL, tongue midline  FCX4  SILT    Significant Labs:    Recent Labs  Lab 08/10/18  1500 08/10/18  1946 08/10/18  2120 08/11/18  0403   * 148*  --  139*   * 132*  --  131*   K 2.4* 3.3* 3.0* 3.1*    103  --  103   CO2 21* 19*  --  18*   BUN 7 8  --  7   CREATININE 0.7 0.6  --  0.7   CALCIUM 8.1* 8.6*  --  8.0*   MG 7.4* 1.8 1.5* 5.6*       Recent Labs  Lab 08/10/18  1500 08/10/18  1737 " 08/10/18  1946 08/11/18  0403   WBC 18.69*  --  17.79* 15.87*   HGB 10.7*  --  9.8* 9.2*   HCT 32.9* 25* 29.9* 27.3*     --  196 218       Recent Labs  Lab 08/10/18  1500   INR 1.0   APTT 21.4     Microbiology Results (last 7 days)     Procedure Component Value Units Date/Time    Blood culture [[id number]] Collected:  08/10/18 2020    Order Status:  Sent Specimen:  Blood from Peripheral, Foot, Left Updated:  08/10/18 2348    Blood culture [[id number]] Collected:  08/10/18 2040    Order Status:  Sent Specimen:  Blood from Peripheral, Antecubital, Left Updated:  08/10/18 2347    Culture, Respiratory with Gram Stain [[id number]]     Order Status:  No result Specimen:  Respiratory             Significant Diagnostics:

## 2018-08-11 NOTE — TRANSFER OF CARE
Anesthesia Transfer of Care Note    Patient: Sharon Grande    Procedure(s) Performed: Procedure(s) (LRB):  Mri (magnetic resonance imaging) (N/A)    Patient location: ICU    Anesthesia Type: general    Transport from OR: Transported from OR intubated on 100% O2 by AMBU with adequate controlled ventilation. Continuous ECG monitoring in transport. Continuous SpO2 monitoring in transport. Continuos invasive BP monitoring in transport    Post pain: adequate analgesia    Post assessment: no apparent anesthetic complications and tolerated procedure well    Post vital signs: stable    Level of consciousness: sedated    Nausea/Vomiting: no nausea/vomiting    Complications: none    Transfer of care protocol was followed      Last vitals:   Hr=86  Rio0=902% on SIMV 400mL x 16  Np=140/86

## 2018-08-11 NOTE — PLAN OF CARE
Problem: Patient Care Overview  Goal: Plan of Care Review  Outcome: Ongoing (interventions implemented as appropriate)  Nutrition assessment completed. Please see RD note for details.    Recommendation/Intervention:   1. If unable to advance diet and enteral access gained, recommend starting TF.   Isosource 1.5 @ goal rate 60mL/hr.   - Initiate @ 10mL/hr and increase by 10mL q4hrs, or as tolerated, until goal rate is reached.   - Hold for residuals >500mL.   - Provides 2160kcals, 98g protein and 1100mL free water.   -This includes additional caloric intake needed for 2nd trimester.     2. If able to advance diet, recommend Regular with texture per SLP recommendations.     RD to monitor.    Goals: Pt to receive nutrition by RD follow up  Nutrition Goal Status: new  Communication of RD Recs: reviewed with RN

## 2018-08-11 NOTE — PROGRESS NOTES
Notified Dr. Alfaro of ,K+ 3.1, Phos 1.7. Per orders no NA goal as of now. Continue to replace k+

## 2018-08-11 NOTE — ASSESSMENT & PLAN NOTE
Monitor  Mg gtt and monitoring  MFM consulted  No plan to deliver currently  BP control 120-140 SBP goal

## 2018-08-11 NOTE — PROCEDURES
"Sharon Grande is a 32 y.o. female patient.    Temp: 97.7 °F (36.5 °C) (08/10/18 2316)  Pulse: 90 (08/10/18 2316)  Resp: (!) 21 (08/10/18 2316)  BP: (!) 177/102 (08/10/18 1600)  SpO2: 100 % (08/10/18 2316)  Weight: 68 kg (150 lb) (08/10/18 2100)  Height: 5' 9" (175.3 cm) (08/10/18 2100)       Central Line  Date/Time: 8/10/2018 11:51 PM  Location procedure was performed: [facility] NEURO CRITICAL CARE  Performed by: ANTHONY ALFARO  Consent Done: Yes  Time out: Immediately prior to procedure a "time out" was called to verify the correct patient, procedure, equipment, support staff and site/side marked as required.  Indications: med administration and vascular access  Anesthesia: local infiltration    Anesthesia:  Local Anesthetic: lidocaine 1% without epinephrine  Preparation: skin prepped with ChloraPrep  Skin prep agent dried: skin prep agent completely dried prior to procedure  Sterile barriers: all five maximum sterile barriers used - cap, mask, sterile gown, sterile gloves, and large sterile sheet  Hand hygiene: hand hygiene performed prior to central venous catheter insertion  Location details: right subclavian  Catheter type: triple lumen  Catheter size: 7 Fr  Catheter Length: 16cm    Ultrasound guidance: yes  Vessel Caliber: medium, compressibility normal  Needle advanced into vessel with real time Ultrasound guidance.  Guidewire confirmed in vessel.  Image recorded and saved.  Sterile sheath used.  Number of attempts: 1  Post-procedure: line sutured,  chlorhexidine patch,  sterile dressing applied and blood return through all ports          Anthony Alfaro  8/10/2018  "

## 2018-08-11 NOTE — PROCEDURES
PREOPERATIVE DIAGNOSES: Hydrocephalus    POSTOPERATIVE DIAGNOSES: Same    PROCEDURE: Chamois hole and insertion of external ventricular drain catheter.    ANESTHESIA: Local, moderate    PROCEDURE:   Consent was obtained. Scalp was clipped. Patient was prepped with Duraprep. Kocher's point was identified on R side. Incision was infiltrated with 1% Xylocaine with epinephrine 1:990023. Patient received preoperative antibiotics. Patient was prepped and draped in typical sterile fashion.     1cm incision was made with scalpel. A self-retaining retractor was placed. Chamois hole was drilled with the cranial twist drill. The dura was punctured with a spinal needle. Ventricular catheter was then passed to a depth of 5cm with return of CSF and then soft passed to a final depth of 7cm.  The CSF was pink. The distal portion of catheter was then tunneled through separate incision and secured with #3-0 nylon suture and staples. The ani hole incision was then closed with staples. The patient tolerated the procedure well. No complications. Sponge and needle counts were correct. Blood loss is minimal. None replaced. Opening pressure was 12.

## 2018-08-11 NOTE — PROGRESS NOTES
Chemistry labs markedly different than on admission-namely magnesium and potassium.  Dr. Jimenes will contact and assist critical care with ordering magnesium.  Will restart at the 2g/hour.  Recommend repeat level in 4 hours.  If low will need a rebolus.  Treat mother to keep stable-ok to increase sedation if needed to keep stable-use lowest dose that can achieve optimal effect.

## 2018-08-11 NOTE — PROGRESS NOTES
BP discussed with Dr. Barbosa on rounds. Advised to go by A-line as A-line and cuff pressures are 20-25 mmHg different.

## 2018-08-11 NOTE — CONSULTS
"  [facility]  Adult Nutrition  Consult Note    SUMMARY     Recommendations    Recommendation/Intervention:   1. If unable to advance diet and enteral access gained, recommend starting TF.   Isosource 1.5 @ goal rate 60mL/hr.   - Initiate @ 10mL/hr and increase by 10mL q4hrs, or as tolerated, until goal rate is reached.   - Hold for residuals >500mL.   - Provides 2160kcals, 98g protein and 1100mL free water.   -This includes additional caloric intake needed for 2nd trimester.     2. If able to advance diet, recommend Regular with texture per SLP recommendations.     RD to monitor.    Goals: Pt to receive nutrition by RD follow up  Nutrition Goal Status: new  Communication of RD Recs: reviewed with RN    Reason for Assessment    Reason for Assessment: consult  Diagnosis: hemorrhage  Relevant Medical History: HTN, PKD  General Information Comments: Pt extubated this morning. OB services following pt as pt is 5-6 months gravid. Pt remains NPO s/p extubation.   Nutrition Discharge Planning: unable to determine at this time    Nutrition Risk Screen    Nutrition Risk Screen: other (see comments) (faith)    Nutrition/Diet History    Do you have any cultural, spiritual, Anglican conflicts, given your current situation?: faith  Factors Affecting Nutritional Intake: NPO    Anthropometrics    Temp: 99.1 °F (37.3 °C)  Height Method: Estimated  Height: 5' 9" (175.3 cm)  Height (inches): 69 in  Weight Method: Bed Scale  Weight: 68 kg (150 lb)  Weight (lb): 150 lb  Ideal Body Weight (IBW), Female: 145 lb  % Ideal Body Weight, Female (lb): 103.45 lb  BMI (Calculated): 22.2  BMI Grade: 18.5-24.9 - normal       Lab/Procedures/Meds    Pertinent Labs Reviewed: reviewed  Pertinent Labs Comments: Na 131, K 3.1, Mg 6.0, POCT Glu 139-167  Pertinent Medications Reviewed: reviewed  Pertinent Medications Comments: precedex, cardene    Physical Findings/Assessment    Overall Physical Appearance: lethargic, listlessness, on " oxygen therapy  Skin: abrasion    Estimated/Assessed Needs    Weight Used For Calorie Calculations: 68 kg (149 lb 14.6 oz)  Energy Calorie Requirements (kcal): 2163  Energy Need Method: [place]-St Jeor (PAL 1.25 + 340calories (2nd trimester))  Protein Requirements: 82-95g (1.2-1.4g/kg)  Weight Used For Protein Calculations: 68 kg (149 lb 14.6 oz)  Fluid Requirements (mL): 1mL/kcal or per MD     RDA Method (mL): 2163         Nutrition Prescription Ordered    Current Diet Order: NPO    Evaluation of Received Nutrient/Fluid Intake       % Intake of Estimated Energy Needs: 0 - 25 %  % Meal Intake: NPO    Nutrition Risk    Level of Risk/Frequency of Follow-up:  (f/u 2x/week)     Assessment and Plan    Nutrition Problem  Inadequate energy intake    Related to (etiology):   NPO    Signs and Symptoms (as evidenced by):   Pt receiving <85% EEN and EPN.     Nutrition Diagnosis Status:   New    Monitor and Evaluation    Food and Nutrient Intake: energy intake, enteral nutrition intake, food and beverage intake  Food and Nutrient Adminstration: diet order, enteral and parenteral nutrition administration  Anthropometric Measurements: weight, weight change, body mass index  Biochemical Data, Medical Tests and Procedures: electrolyte and renal panel, gastrointestinal profile, glucose/endocrine profile, inflammatory profile, lipid profile  Nutrition-Focused Physical Findings: overall appearance     Nutrition Follow-Up    RD Follow-up?: Yes

## 2018-08-11 NOTE — PT/OT/SLP PROGRESS
Physical Therapy      Patient Name:  Sharon Grande   MRN:  [id number]    PT orders acknowledged and attempted. Hold 2* pt extubated in AM. Will re-attempt PT evaluation when next scheduled.     CATIA LINDER, PT

## 2018-08-11 NOTE — CONSULTS
Inpatient consult to Physical Medicine Rehab  Consult performed by: ALEXANDRO LAMBERT  Consult ordered by: JASEN VELEZ  Reason for consult: assess rehab needs      Patient is too acute at this time; rehab potential cannot be appropriately assessed.  Recommend early mobility, OOB in chair, and PT/OT/SLP when appropriate.  Will follow for additional rehab needs and post-acute recommendation when patient is medically stable and able to participate with therapy.    Thank you for consult.    SANDIP Soliman, FNP-C  Physical Medicine & Rehabilitation   08/10/2018  Spectralink: [id number]

## 2018-08-11 NOTE — ASSESSMENT & PLAN NOTE
- fetal status reassuring on US this AM  - At this point, would not deliver emergently for fetal indications as fetus periviable.   - Family has been counseled that fetus may or may not survive through all effects of maternal condition. Main priority should be optimal care of the mother.

## 2018-08-11 NOTE — PLAN OF CARE
Problem: Patient Care Overview  Goal: Plan of Care Review  Outcome: Ongoing (interventions implemented as appropriate)  POC reviewed with pt at 0500. No acute events overnight. Pt SBP maintained 120s-140s. Propofol gtt at 50mcg. EVD open at 10. Right side remains elevated.  Repeat CT completed.  Pt progressing toward goals. Will continue to monitor. See flowsheets for full assessment and VS info   Goal: Individualization & Mutuality  Outcome: Ongoing (interventions implemented as appropriate)   08/11/18 0246   Mutuality/Individual Preferences   What Anxieties, Fears, Concerns, or Questions Do You Have About Your Care? (faith)   What Information Would Help Us Give You More Personalized Care? faith

## 2018-08-11 NOTE — PROGRESS NOTES
Notified Dr. Alfaro with [facility] regarding pt's SBP 90-100s. Pt currently on 60mcg Propofol. Pt still agitated, sitting up in bed, pulling lines. Pt not responding to Precedex gtt. Per orders, once BP recovers to 120s-140s, increase Propofol to 75mcg and keep Precedex off.

## 2018-08-11 NOTE — PROGRESS NOTES
[facility]  Neurosurgery  Progress Note    Subjective:     History of Present Illness: 32 y.o. female 24 weeks pregnant with a history of polycystic kidney disease who presents as transfer from [facility] for SAH. Patient found down by 6 yr old son. Last known normal 0500 today. CT at [facility] revealed SAH within the basal cisterns. US at [facility] with + fetal heart tones. Transferred for neuro evaluation.     Post-Op Info:  Procedure(s) (LRB):  Mri (magnetic resonance imaging) (N/A)   1 Day Post-Op     Interval History: naeon    Medications:  Continuous Infusions:   dexmedetomidine (PRECEDEX) infusion Stopped (08/11/18 0315)    magnesium sulfate in water 50 mL/hr (08/11/18 0600)    niCARdipine Stopped (08/10/18 2236)    propofol Stopped (08/11/18 0540)     Scheduled Meds:   betamethasone acetate-betamethasone sodium phosphate  12 mg Intramuscular Daily    ceFEPime (MAXIPIME) IVPB  1 g Intravenous Q12H    famotidine (PF)  20 mg Intravenous BID    levetiracetam IVPB  500 mg Intravenous Q12H    lidocaine (PF) 20 mg/mL (2%)  10 mL Other Once    niMODipine  60 mg Per NG tube Q4H    senna-docusate 8.6-50 mg  1 tablet Oral BID    sodium chloride 0.9%  3 mL Intravenous Q8H    vancomycin (VANCOCIN) IVPB  15 mg/kg Intravenous Q12H     PRN Meds:calcium gluconate, labetalol, midazolam, potassium chloride in water, potassium, sodium phosphates, sodium chloride 0.9%     Review of Systems  Objective:     Weight: 68 kg (150 lb)  Body mass index is 22.15 kg/m².  Vital Signs (Most Recent):  Temp: 99 °F (37.2 °C) (08/11/18 0605)  Pulse: 96 (08/11/18 0605)  Resp: 17 (08/11/18 0605)  BP: (!) 94/53 (08/11/18 0505)  SpO2: 100 % (08/11/18 0605) Vital Signs (24h Range):  Temp:  [96.4 °F (35.8 °C)-99 °F (37.2 °C)] 99 °F (37.2 °C)  Pulse:  [] 96  Resp:  [16-48] 17  SpO2:  [72 %-100 %] 100 %  BP: ()/() 94/53  Arterial Line BP: ()/(49-95) 98/49                 Vent Mode: SIMV  Oxygen Concentration (%):   "[40-80] 40  Resp Rate Total:  [17 br/min-35 br/min] 27 br/min  Vt Set:  [450 mL] 450 mL  PEEP/CPAP:  [5 cmH20] 5 cmH20  Pressure Support:  [5 cmH20] 5 cmH20  Mean Airway Pressure:  [7.5 cmH20-8.6 cmH20] 7.8 cmH20         NG/OG Tube 08/10/18 2100 Right mouth (Active)   Placement Check placement verified by aspirate characteristics;placement verified by x-ray 8/11/2018  3:05 AM   Distal Tube Length (cm) 70 8/11/2018  3:05 AM   Tolerance no signs/symptoms of discomfort 8/11/2018  3:05 AM   Securement taped to commercial device 8/11/2018  3:05 AM   Clamp Status/Tolerance unclamped 8/11/2018  3:05 AM   Insertion Site Appearance no redness, warmth, tenderness, skin breakdown, drainage 8/11/2018  3:05 AM   Intake (mL) 50 mL 8/11/2018  6:25 AM            Urethral Catheter 08/10/18 2105 (Active)   Site Assessment Clean;Intact 8/11/2018  3:05 AM   Collection Container Urimeter 8/11/2018  3:05 AM   Securement Method secured to top of thigh w/ adhesive device 8/11/2018  3:05 AM   Catheter Care Performed yes 8/11/2018  3:05 AM   Reason for Continuing Urinary Catheterization Critically ill in ICU requiring intensive monitoring 8/11/2018  3:05 AM   CAUTI Prevention Bundle StatLock in place w 1" slack;Intact seal between catheter & drainage tubing;Drainage bag off the floor;Green sheeting clip in use;No dependent loops or kinks;Drainage bag not overfilled (<2/3 full);Drainage bag below bladder 8/11/2018  3:05 AM   Output (mL) 175 mL 8/11/2018  6:00 AM            ICP/Ventriculostomy 08/04/18 0000 Ventricular drainage catheter with ICP microsensor Right Other (Comment) (Active)   Level of Ventriculostomy (cm above) 10 8/11/2018  3:05 AM   Status Open to drainage 8/11/2018  3:05 AM   Site Assessment Clean;Dry 8/11/2018  3:05 AM   Site Drainage Cloudy;Bloody 8/11/2018  3:05 AM   Waveform normal waveform 8/11/2018  3:05 AM   Output (mL) 3 mL 8/11/2018  6:00 AM   CSF Color turbid (cloudy);pink 8/11/2018  3:05 AM   Dressing Status " Biopatch in place;Clean;Dry;Intact 8/11/2018  3:05 AM       Neurosurgery Physical Exam   [id number]  PERRL, tongue midline  FCX4  SILT    Significant Labs:    Recent Labs  Lab 08/10/18  1500 08/10/18  1946 08/10/18  2120 08/11/18  0403   * 148*  --  139*   * 132*  --  131*   K 2.4* 3.3* 3.0* 3.1*    103  --  103   CO2 21* 19*  --  18*   BUN 7 8  --  7   CREATININE 0.7 0.6  --  0.7   CALCIUM 8.1* 8.6*  --  8.0*   MG 7.4* 1.8 1.5* 5.6*       Recent Labs  Lab 08/10/18  1500 08/10/18  1737 08/10/18  1946 08/11/18  0403   WBC 18.69*  --  17.79* 15.87*   HGB 10.7*  --  9.8* 9.2*   HCT 32.9* 25* 29.9* 27.3*     --  196 218       Recent Labs  Lab 08/10/18  1500   INR 1.0   APTT 21.4     Microbiology Results (last 7 days)     Procedure Component Value Units Date/Time    Blood culture [[id number]] Collected:  08/10/18 2020    Order Status:  Sent Specimen:  Blood from Peripheral, Foot, Left Updated:  08/10/18 2348    Blood culture [[id number]] Collected:  08/10/18 2040    Order Status:  Sent Specimen:  Blood from Peripheral, Antecubital, Left Updated:  08/10/18 2347    Culture, Respiratory with Gram Stain [[id number]]     Order Status:  No result Specimen:  Respiratory             Significant Diagnostics:      Assessment/Plan:     * 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 female 24 weeks pregnant with history of polycystic kidney disease with [id number] SAH, now s/p acom coiling.    -q 1 hr neuro checks  -permissive HTN as aneurysm secured  -nimotop  -strict is and os  -daily tcds  -evd open to 10 and draining, monitor icps  -further mgmt per ncc and obgyn.            Philippe Yao, DO  Neurosurgery  [facility]

## 2018-08-12 PROBLEM — Z3A.25 25 WEEKS GESTATION OF PREGNANCY: Status: ACTIVE | Noted: 2018-08-10

## 2018-08-12 PROBLEM — E87.6 HYPOKALEMIA: Status: RESOLVED | Noted: 2018-08-10 | Resolved: 2018-08-12

## 2018-08-12 LAB
ALBUMIN SERPL BCP-MCNC: 2.7 G/DL
ALP SERPL-CCNC: 49 U/L
ALT SERPL W/O P-5'-P-CCNC: 14 U/L
ANION GAP SERPL CALC-SCNC: 10 MMOL/L
AST SERPL-CCNC: 27 U/L
BASOPHILS # BLD AUTO: 0.01 K/UL
BASOPHILS NFR BLD: 0.1 %
BILIRUB SERPL-MCNC: 0.2 MG/DL
BUN SERPL-MCNC: 9 MG/DL
CALCIUM SERPL-MCNC: 7.4 MG/DL
CHLORIDE SERPL-SCNC: 113 MMOL/L
CO2 SERPL-SCNC: 14 MMOL/L
CREAT 24H UR-MRATE: 43.3 MG/HR
CREAT SERPL-MCNC: 0.8 MG/DL
CREAT UR-MCNC: 40 MG/DL
CREATININE, URINE (MG/SPEC): 1040 MG/SPEC
DIFFERENTIAL METHOD: ABNORMAL
EOSINOPHIL # BLD AUTO: 0 K/UL
EOSINOPHIL NFR BLD: 0 %
ERYTHROCYTE [DISTWIDTH] IN BLOOD BY AUTOMATED COUNT: 15.2 %
EST. GFR  (AFRICAN AMERICAN): >60 ML/MIN/1.73 M^2
EST. GFR  (NON AFRICAN AMERICAN): >60 ML/MIN/1.73 M^2
GLUCOSE SERPL-MCNC: 186 MG/DL
HCT VFR BLD AUTO: 25.6 %
HGB BLD-MCNC: 8.4 G/DL
IMM GRANULOCYTES # BLD AUTO: 0.12 K/UL
IMM GRANULOCYTES NFR BLD AUTO: 0.7 %
LYMPHOCYTES # BLD AUTO: 0.6 K/UL
LYMPHOCYTES NFR BLD: 3.3 %
MAGNESIUM SERPL-MCNC: 2.6 MG/DL
MAGNESIUM SERPL-MCNC: 2.7 MG/DL
MAGNESIUM SERPL-MCNC: 3 MG/DL
MAGNESIUM SERPL-MCNC: 3.2 MG/DL
MAGNESIUM SERPL-MCNC: 3.5 MG/DL
MAGNESIUM SERPL-MCNC: 3.7 MG/DL
MAGNESIUM SERPL-MCNC: 3.7 MG/DL
MCH RBC QN AUTO: 29.5 PG
MCHC RBC AUTO-ENTMCNC: 32.8 G/DL
MCV RBC AUTO: 90 FL
MONOCYTES # BLD AUTO: 0.6 K/UL
MONOCYTES NFR BLD: 3.2 %
NEUTROPHILS # BLD AUTO: 16.4 K/UL
NEUTROPHILS NFR BLD: 92.7 %
NRBC BLD-RTO: 0 /100 WBC
PHOSPHATE SERPL-MCNC: 2.2 MG/DL
PLATELET # BLD AUTO: 226 K/UL
PMV BLD AUTO: 11.4 FL
POCT GLUCOSE: 116 MG/DL (ref 70–110)
POCT GLUCOSE: 127 MG/DL (ref 70–110)
POCT GLUCOSE: 151 MG/DL (ref 70–110)
POCT GLUCOSE: 196 MG/DL (ref 70–110)
POTASSIUM SERPL-SCNC: 3.7 MMOL/L
PROT 24H UR-MRATE: 234 MG/SPEC
PROT SERPL-MCNC: 6.6 G/DL
PROT UR-MCNC: 9 MG/DL
RBC # BLD AUTO: 2.85 M/UL
SODIUM SERPL-SCNC: 137 MMOL/L
URINE COLLECTION DURATION: 24 HR
URINE COLLECTION DURATION: 24 HR
URINE VOLUME: 2600 ML
URINE VOLUME: 2600 ML
VANCOMYCIN TROUGH SERPL-MCNC: 9.1 UG/ML
WBC # BLD AUTO: 17.66 K/UL

## 2018-08-12 PROCEDURE — 97162 PT EVAL MOD COMPLEX 30 MIN: CPT

## 2018-08-12 PROCEDURE — 25000003 PHARM REV CODE 250: Performed by: STUDENT IN AN ORGANIZED HEALTH CARE EDUCATION/TRAINING PROGRAM

## 2018-08-12 PROCEDURE — 20000000 HC ICU ROOM

## 2018-08-12 PROCEDURE — 63600175 PHARM REV CODE 636 W HCPCS: Performed by: STUDENT IN AN ORGANIZED HEALTH CARE EDUCATION/TRAINING PROGRAM

## 2018-08-12 PROCEDURE — 25000003 PHARM REV CODE 250: Performed by: PSYCHIATRY & NEUROLOGY

## 2018-08-12 PROCEDURE — 80202 ASSAY OF VANCOMYCIN: CPT

## 2018-08-12 PROCEDURE — 84156 ASSAY OF PROTEIN URINE: CPT

## 2018-08-12 PROCEDURE — 99233 SBSQ HOSP IP/OBS HIGH 50: CPT | Mod: ,,, | Performed by: PSYCHIATRY & NEUROLOGY

## 2018-08-12 PROCEDURE — 83735 ASSAY OF MAGNESIUM: CPT | Mod: 91

## 2018-08-12 PROCEDURE — 97166 OT EVAL MOD COMPLEX 45 MIN: CPT

## 2018-08-12 PROCEDURE — 87086 URINE CULTURE/COLONY COUNT: CPT

## 2018-08-12 PROCEDURE — 83735 ASSAY OF MAGNESIUM: CPT

## 2018-08-12 PROCEDURE — 99231 SBSQ HOSP IP/OBS SF/LOW 25: CPT | Mod: ,,, | Performed by: OBSTETRICS & GYNECOLOGY

## 2018-08-12 PROCEDURE — 82570 ASSAY OF URINE CREATININE: CPT

## 2018-08-12 PROCEDURE — 25000003 PHARM REV CODE 250: Performed by: NURSE PRACTITIONER

## 2018-08-12 RX ORDER — SODIUM,POTASSIUM PHOSPHATES 280-250MG
2 POWDER IN PACKET (EA) ORAL
Status: DISCONTINUED | OUTPATIENT
Start: 2018-08-12 | End: 2018-08-25

## 2018-08-12 RX ORDER — POTASSIUM CHLORIDE 20 MEQ/15ML
40 SOLUTION ORAL
Status: DISCONTINUED | OUTPATIENT
Start: 2018-08-12 | End: 2018-08-25

## 2018-08-12 RX ORDER — NIMODIPINE 30 MG/1
30 CAPSULE, LIQUID FILLED ORAL
Status: DISCONTINUED | OUTPATIENT
Start: 2018-08-12 | End: 2018-08-31 | Stop reason: HOSPADM

## 2018-08-12 RX ORDER — SODIUM CHLORIDE, SODIUM LACTATE, POTASSIUM CHLORIDE, CALCIUM CHLORIDE 600; 310; 30; 20 MG/100ML; MG/100ML; MG/100ML; MG/100ML
INJECTION, SOLUTION INTRAVENOUS CONTINUOUS
Status: DISCONTINUED | OUTPATIENT
Start: 2018-08-12 | End: 2018-08-13

## 2018-08-12 RX ORDER — LANOLIN ALCOHOL/MO/W.PET/CERES
800 CREAM (GRAM) TOPICAL
Status: DISCONTINUED | OUTPATIENT
Start: 2018-08-12 | End: 2018-08-25

## 2018-08-12 RX ORDER — FAMOTIDINE 20 MG/1
20 TABLET, FILM COATED ORAL 2 TIMES DAILY
Status: DISCONTINUED | OUTPATIENT
Start: 2018-08-12 | End: 2018-08-25

## 2018-08-12 RX ORDER — OXYCODONE HYDROCHLORIDE 5 MG/1
15 TABLET ORAL EVERY 4 HOURS PRN
Status: DISCONTINUED | OUTPATIENT
Start: 2018-08-12 | End: 2018-08-17

## 2018-08-12 RX ORDER — POTASSIUM CHLORIDE 20 MEQ/15ML
60 SOLUTION ORAL
Status: DISCONTINUED | OUTPATIENT
Start: 2018-08-12 | End: 2018-08-25

## 2018-08-12 RX ADMIN — LABETALOL HYDROCHLORIDE 10 MG: 5 INJECTION, SOLUTION INTRAVENOUS at 11:08

## 2018-08-12 RX ADMIN — CEFEPIME 1 G: 1 INJECTION, POWDER, FOR SOLUTION INTRAMUSCULAR; INTRAVENOUS at 06:08

## 2018-08-12 RX ADMIN — OXYCODONE HYDROCHLORIDE 15 MG: 5 TABLET ORAL at 08:08

## 2018-08-12 RX ADMIN — CEFEPIME 1 G: 1 INJECTION, POWDER, FOR SOLUTION INTRAMUSCULAR; INTRAVENOUS at 07:08

## 2018-08-12 RX ADMIN — OXYCODONE HYDROCHLORIDE 15 MG: 5 TABLET ORAL at 03:08

## 2018-08-12 RX ADMIN — LEVETIRACETAM 500 MG: 5 INJECTION INTRAVENOUS at 09:08

## 2018-08-12 RX ADMIN — LABETALOL HYDROCHLORIDE 10 MG: 5 INJECTION, SOLUTION INTRAVENOUS at 06:08

## 2018-08-12 RX ADMIN — NIMODIPINE 30 MG: 30 CAPSULE, LIQUID FILLED ORAL at 04:08

## 2018-08-12 RX ADMIN — SENNOSIDES AND DOCUSATE SODIUM 1 TABLET: 8.6; 5 TABLET ORAL at 08:08

## 2018-08-12 RX ADMIN — NIMODIPINE 60 MG: 60 SOLUTION ORAL at 06:08

## 2018-08-12 RX ADMIN — NIMODIPINE 60 MG: 60 SOLUTION ORAL at 02:08

## 2018-08-12 RX ADMIN — FAMOTIDINE 20 MG: 20 TABLET ORAL at 08:08

## 2018-08-12 RX ADMIN — NIMODIPINE 30 MG: 30 CAPSULE, LIQUID FILLED ORAL at 02:08

## 2018-08-12 RX ADMIN — OXYCODONE HYDROCHLORIDE 15 MG: 5 TABLET ORAL at 10:08

## 2018-08-12 RX ADMIN — LABETALOL HYDROCHLORIDE 10 MG: 5 INJECTION, SOLUTION INTRAVENOUS at 03:08

## 2018-08-12 RX ADMIN — NIMODIPINE 30 MG: 30 CAPSULE, LIQUID FILLED ORAL at 08:08

## 2018-08-12 RX ADMIN — NIMODIPINE 30 MG: 30 CAPSULE, LIQUID FILLED ORAL at 12:08

## 2018-08-12 RX ADMIN — NIMODIPINE 30 MG: 30 CAPSULE, LIQUID FILLED ORAL at 10:08

## 2018-08-12 RX ADMIN — OXYCODONE HYDROCHLORIDE 10 MG: 5 TABLET ORAL at 07:08

## 2018-08-12 RX ADMIN — FAMOTIDINE 20 MG: 20 TABLET ORAL at 10:08

## 2018-08-12 RX ADMIN — POTASSIUM & SODIUM PHOSPHATES POWDER PACK 280-160-250 MG 2 PACKET: 280-160-250 PACK at 08:08

## 2018-08-12 RX ADMIN — LEVETIRACETAM 500 MG: 5 INJECTION INTRAVENOUS at 08:08

## 2018-08-12 RX ADMIN — POTASSIUM & SODIUM PHOSPHATES POWDER PACK 280-160-250 MG 2 PACKET: 280-160-250 PACK at 04:08

## 2018-08-12 RX ADMIN — VANCOMYCIN HYDROCHLORIDE 1000 MG: 1 INJECTION, POWDER, LYOPHILIZED, FOR SOLUTION INTRAVENOUS at 11:08

## 2018-08-12 RX ADMIN — SODIUM CHLORIDE, SODIUM LACTATE, POTASSIUM CHLORIDE, AND CALCIUM CHLORIDE: .6; .31; .03; .02 INJECTION, SOLUTION INTRAVENOUS at 11:08

## 2018-08-12 RX ADMIN — OXYCODONE HYDROCHLORIDE 10 MG: 5 TABLET ORAL at 02:08

## 2018-08-12 RX ADMIN — POTASSIUM & SODIUM PHOSPHATES POWDER PACK 280-160-250 MG 2 PACKET: 280-160-250 PACK at 12:08

## 2018-08-12 RX ADMIN — SENNOSIDES AND DOCUSATE SODIUM 1 TABLET: 8.6; 5 TABLET ORAL at 09:08

## 2018-08-12 RX ADMIN — NIMODIPINE 30 MG: 30 CAPSULE, LIQUID FILLED ORAL at 06:08

## 2018-08-12 RX ADMIN — ACETAMINOPHEN 650 MG: 325 TABLET, FILM COATED ORAL at 05:08

## 2018-08-12 NOTE — ASSESSMENT & PLAN NOTE
Post coiling  EVD at 10  Daily TCDs  Nimodipine changed to 30q2 for better BP mnagement  MRI once stable

## 2018-08-12 NOTE — SUBJECTIVE & OBJECTIVE
Interval History: naeon    Medications:  Continuous Infusions:   niCARdipine Stopped (08/10/18 2236)     Scheduled Meds:   ceFEPime (MAXIPIME) IVPB  1 g Intravenous Q12H    famotidine (PF)  20 mg Intravenous BID    levetiracetam IVPB  500 mg Intravenous Q12H    lidocaine (PF) 20 mg/mL (2%)  10 mL Other Once    niMODipine  60 mg Per NG tube Q4H    senna-docusate 8.6-50 mg  1 tablet Oral BID    vancomycin (VANCOCIN) IVPB  15 mg/kg Intravenous Q12H     PRN Meds:acetaminophen, calcium gluconate IVPB, calcium gluconate IVPB, calcium gluconate IVPB, calcium gluconate, labetalol, midazolam, oxyCODONE, potassium chloride in water **AND** potassium chloride in water **AND** potassium chloride in water, sodium chloride 0.9%, sodium phosphate IVPB, sodium phosphate IVPB, sodium phosphate IVPB     Review of Systems  Objective:     Weight: 68 kg (150 lb 0 oz)  Body mass index is 22.15 kg/m².  Vital Signs (Most Recent):  Temp: 99.3 °F (37.4 °C) (08/11/18 2300)  Pulse: 81 (08/12/18 0600)  Resp: (!) 21 (08/12/18 0600)  BP: 128/64 (08/12/18 0600)  SpO2: 100 % (08/12/18 0600) Vital Signs (24h Range):  Temp:  [98.8 °F (37.1 °C)-99.7 °F (37.6 °C)] 99.3 °F (37.4 °C)  Pulse:  [78-98] 81  Resp:  [12-35] 21  SpO2:  [99 %-100 %] 100 %  BP: ()/(51-90) 128/64  Arterial Line BP: (106-159)/(51-80) 136/80                 Vent Mode: Spont  Oxygen Concentration (%):  [32-40] 32  Resp Rate Total:  [16 br/min-21 br/min] 16 br/min  Vt Set:  [450 mL] 450 mL  PEEP/CPAP:  [5 cmH20] 5 cmH20  Pressure Support:  [5 cmH20-7 cmH20] 7 cmH20  Mean Airway Pressure:  [7.8 cmH20-8.2 cmH20] 7.9 cmH20         Urethral Catheter 08/10/18 2103 (Active)   Site Assessment Clean;Intact 8/12/2018  3:00 AM   Collection Container Urimeter 8/12/2018  3:00 AM   Securement Method secured to top of thigh w/ adhesive device 8/12/2018  3:00 AM   Catheter Care Performed yes 8/12/2018  3:00 AM   Reason for Continuing Urinary Catheterization Critically ill in ICU  "requiring intensive monitoring 8/12/2018  3:00 AM   CAUTI Prevention Bundle StatLock in place w 1" slack;Intact seal between catheter & drainage tubing;Drainage bag off the floor;Green sheeting clip in use;No dependent loops or kinks;Drainage bag not overfilled (<2/3 full);Drainage bag below bladder 8/12/2018  3:00 AM   Output (mL) 75 mL 8/12/2018  6:00 AM            ICP/Ventriculostomy 08/04/18 0000 Ventricular drainage catheter with ICP microsensor Right Other (Comment) (Active)   Level of Ventriculostomy (cm above) 10 8/11/2018  7:05 PM   Status Open to drainage 8/12/2018  3:00 AM   Site Assessment Clean;Dry 8/12/2018  3:00 AM   Site Drainage No drainage 8/12/2018  3:00 AM   Waveform normal waveform 8/12/2018  3:00 AM   Output (mL) 11 mL 8/12/2018  6:00 AM   CSF Color clear;pink 8/12/2018  3:00 AM   Dressing Status Biopatch in place;Clean;Dry;Intact 8/12/2018  3:00 AM   Interventions HOB degrees;bed controls locked 8/12/2018  3:00 AM       Neurosurgery Physical Exam   AOX3, speech fluent  PERRL, EOMI, face symm, tongue midline  HOBSON symm  No drift    Significant Labs:  Recent Labs   Lab  08/10/18   1946   08/11/18   0403   08/11/18   1826  08/11/18   2049  08/11/18   2335  08/12/18   0501   GLU  148*   --   139*   --    --    --   186*   --    NA  132*   --   131*   --    --    --   137   --    K  3.3*   < >  3.1*   --   3.6   --   3.7   --    CL  103   --   103   --    --    --   113*   --    CO2  19*   --   18*   --    --    --   14*   --    BUN  8   --   7   --    --    --   9   --    CREATININE  0.6   --   0.7   --    --    --   0.8   --    CALCIUM  8.6*   --   8.0*   --    --    --   7.4*   --    MG  1.8   < >  5.6*   < >  4.7*  4.3*  3.7*  3.7*  3.5*    < > = values in this interval not displayed.     Recent Labs   Lab  08/10/18   1946  08/11/18   0403  08/11/18   2335   WBC  17.79*  15.87*  17.66*   HGB  9.8*  9.2*  8.4*   HCT  29.9*  27.3*  25.6*   PLT  196  218  226     Recent Labs   Lab  08/10/18   1500 "   INR  1.0   APTT  21.4     Microbiology Results (last 7 days)     Procedure Component Value Units Date/Time    Blood culture [[id number]] Collected:  08/10/18 2020    Order Status:  Completed Specimen:  Blood from Peripheral, Foot, Left Updated:  08/12/18 0612     Blood Culture, Routine No Growth to date     Blood Culture, Routine No Growth to date    Blood culture [[id number]] Collected:  08/10/18 2040    Order Status:  Completed Specimen:  Blood from Peripheral, Antecubital, Left Updated:  08/12/18 0612     Blood Culture, Routine No Growth to date     Blood Culture, Routine No Growth to date    C. trachomatis/N. gonorrhoeae by AMP DNA [facility]; Urine [[id number]] Collected:  08/11/18 1435    Order Status:  Sent Specimen:  Genital Updated:  08/11/18 1435    Culture, Respiratory with Gram Stain [[id number]]     Order Status:  No result Specimen:  Respiratory             Significant Diagnostics:

## 2018-08-12 NOTE — PLAN OF CARE
Problem: Patient Care Overview  Goal: Plan of Care Review  Outcome: Ongoing (interventions implemented as appropriate)  POC reviewed with pt and spouse at 0600. Pt verbalized understanding. Questions and concerns addressed. No acute events today. EVD remains open at 10. Card in place. Pt progressing toward goals. Will continue to monitor. See flowsheets for full assessment and VS info.

## 2018-08-12 NOTE — ASSESSMENT & PLAN NOTE
- s/p magnesium sulfate discontinued 8/11  - s/p cardine gtt  - BP now mostly normal with occasional mild range  - no s/s of preeclampsia  - platelets, Cr, LFTs wnl  - 24 hr urine in progress

## 2018-08-12 NOTE — PLAN OF CARE
Problem: Occupational Therapy Goal  Goal: Occupational Therapy Goal  Outcome: Ongoing (interventions implemented as appropriate)  Goals to be met by: 8/19     Patient will increase functional independence with ADLs by performing:    UE Dressing while standing with Set-up Assistance and Stand-by Assistance.  LE Dressing (socks, brief) with Contact Guard Assistance.  Grooming while standing with Contact Guard Assistance.  Stand pivot transfers with Contact Guard Assistance.  Functional mobility at short household distance for ADL task with min(A) and vitals WFL.  Upper extremity exercise program x15 reps per handout, with assistance as needed.    Comments: OT andrés completed. Rec Rehab at this time. Will follow up 4x/w in acute setting. GARRET White 8/12/2018

## 2018-08-12 NOTE — PLAN OF CARE
Problem: Patient Care Overview  Goal: Plan of Care Review  Outcome: Ongoing (interventions implemented as appropriate)  POC reviewed with pt and family at 1400. Pt verbalized understanding. Questions and concerns addressed. No acute events today. Pt progressing toward goals. Pt extubated at 1024, tolerated well. Satting 100% on RA. Passed TALIA and advanced to regular diet per order. Will continue to monitor. See flowsheets for full assessment and VS info.

## 2018-08-12 NOTE — PLAN OF CARE
Problem: Physical Therapy Goal  Goal: Physical Therapy Goal  Goals to be met by: 2018     Patient will increase functional independence with mobility by performin. Supine to sit with Stand-by Assistance  2. Sit to supine with Stand-by Assistance  3. Sit to stand transfer with Minimal Assistance  4. Bed to chair transfer with Minimal Assistance 5  5. Gait  x 10 feet with Moderate Assistance with or without appropriate AD   6. Lower extremity exercise program x15 reps per handout, with assistance as needed     Outcome: Ongoing (interventions implemented as appropriate)  Pt evaluation complete; pt goals set.    CATIA LINDER, PT  2018

## 2018-08-12 NOTE — SUBJECTIVE & OBJECTIVE
Patient extubated yesterday morning. Awake and resting comfortably this AM. Denies contractions, VB, LOF. Good FM. No obstetrics complaints. Reports mild right leg weakness.    Objective:     Vital Signs (Most Recent):  Temp: 99.1 °F (37.3 °C) (08/12/18 0705)  Pulse: 81 (08/12/18 0705)  Resp: 19 (08/12/18 0705)  BP: 122/69 (08/12/18 0705)  SpO2: 100 % (08/12/18 0705) Vital Signs (24h Range):  Temp:  [98.8 °F (37.1 °C)-99.7 °F (37.6 °C)] 99.1 °F (37.3 °C)  Pulse:  [78-98] 81  Resp:  [12-35] 19  SpO2:  [99 %-100 %] 100 %  BP: ()/(51-90) 122/69  Arterial Line BP: (106-159)/(55-80) 135/73     Weight: 68 kg (150 lb 0 oz)  Body mass index is 22.15 kg/m².    US: FHT normal at 132 bpm, fluid grossly normal, + fetal movement      Intake/Output Summary (Last 24 hours) at 8/12/2018 0752  Last data filed at 8/12/2018 0705  Gross per 24 hour   Intake 603.04 ml   Output 3491 ml   Net -2887.96 ml       Significant Labs:  Recent Labs   Lab  08/10/18   1946  08/11/18   0403  08/11/18   2335   WBC  17.79*  15.87*  17.66*   HGB  9.8*  9.2*  8.4*   HCT  29.9*  27.3*  25.6*   MCV  87  86  90   PLT  196  218  226      Recent Labs   Lab  08/10/18   1348   08/10/18   1946   08/11/18   0016  08/11/18   0403   08/11/18   1826  08/11/18   2049  08/11/18   2250  08/11/18   2335  08/12/18   0501   NA   --    < >  132*   --    --   131*   --    --    --    --   137   --    K   --    < >  3.3*   < >   --   3.1*   --   3.6   --    --   3.7   --    CL   --    < >  103   --    --   103   --    --    --    --   113*   --    CO2   --    < >  19*   --    --   18*   --    --    --    --   14*   --    BUN   --    < >  8   --    --   7   --    --    --    --   9   --    CREATININE   --    < >  0.6   --    --   0.7   --    --    --    --   0.8   --    GLU   --    < >  148*   --    --   139*   --    --    --    --   186*   --    PROT   --    < >  7.4   --    --   7.0   --    --    --    --   6.6   --    BILITOT   --    < >  0.4   --    --   0.3   --     --    --    --   0.2   --    ALKPHOS   --    < >  58   --    --   53*   --    --    --    --   49*   --    ALT   --    < >  15   --    --   14   --    --    --    --   14   --    AST   --    < >  33   --    --   36   --    --    --    --   27   --    MG   --    < >  1.8   < >   --   5.6*   < >  4.7*  4.3*   --   3.7*  3.7*  3.5*   PHOS   --    < >  2.4*   --    --   1.7*   --    --    --   2.4*  2.2*   --    LDH   --    --   236   --    --    --    --    --    --    --    --    --    UTPCR  8.20*   --    --    --   0.85*   --    --    --    --    --    --    --     < > = values in this interval not displayed.          Physical Exam:   Constitutional: She is oriented to person, place, and time. She appears well-developed and well-nourished. No distress.    HENT:   EVD and ET tube in place      Cardiovascular: Normal rate and regular rhythm.     Pulmonary/Chest: No respiratory distress.        Abdominal: Soft. She exhibits no distension. There is no tenderness.   gravid             Musculoskeletal: Moves all extremeties. She exhibits no edema.       Neurological: She is alert and oriented to person, place, and time.   answered all questions and followed commands appropriately    Skin: Skin is warm and dry.

## 2018-08-12 NOTE — PROGRESS NOTES
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 PMhx notable for hypertension (on 30xl procardia), polycystic kidney diease, and renal disorder. Patient has history of  delivery at 36 weeks and PPROM (unknown GA)  Found unresponsive by 6yr old son this AM and called EMS. Per EMS pt was found face down with her face on the wall, with house in shambles. EMS reports possible seizure activity in transit.  Transfer from [facility]. She was started on magnesium for eclampsia ppx. Acutely hypertensive (BP in the 190s systolic) P\C ratio noted to be 8.2  Bedside U/S estimates fetal growth at around 23 weeks.    Of note patient has had severe headaches before, with CTA in 2018 that was grossly normal.      Hospital Course:  Admitted on 8/10, underwent angiogram which revealed aneurism in in the anterior communicating artery, which was coiled by interventional radiology. Remained intubated and sedated and transferred to neuro-critical care floor. She also required external ventricular drain placement overnight. Patient extubated on , stable. No obstetrics issues.    Interval History:  Patient extubated yesterday morning. Awake and resting comfortably this AM. Denies contractions, VB, LOF. Good FM. No obstetrics complaints. Reports mild right leg weakness.    Objective:     Vital Signs (Most Recent):  Temp: 99.1 °F (37.3 °C) (18)  Pulse: 81 (18)  Resp: 19 (18)  BP: 122/69 (18)  SpO2: 100 % (18) Vital Signs (24h Range):  Temp:   [98.8 °F (37.1 °C)-99.7 °F (37.6 °C)] 99.1 °F (37.3 °C)  Pulse:  [78-98] 81  Resp:  [12-35] 19  SpO2:  [99 %-100 %] 100 %  BP: ()/(51-90) 122/69  Arterial Line BP: (106-159)/(55-80) 135/73     Weight: 68 kg (150 lb 0 oz)  Body mass index is 22.15 kg/m².    US: FHT normal at 132 bpm, fluid grossly normal, + fetal movement      Intake/Output Summary (Last 24 hours) at 8/12/2018 0752  Last data filed at 8/12/2018 0705  Gross per 24 hour   Intake 603.04 ml   Output 3491 ml   Net -2887.96 ml       Significant Labs:  Recent Labs   Lab  08/10/18   1946  08/11/18   0403  08/11/18   2335   WBC  17.79*  15.87*  17.66*   HGB  9.8*  9.2*  8.4*   HCT  29.9*  27.3*  25.6*   MCV  87  86  90   PLT  196  218  226      Recent Labs   Lab  08/10/18   1348   08/10/18   1946   08/11/18   0016  08/11/18   0403   08/11/18   1826  08/11/18   2049  08/11/18   2250  08/11/18   2335  08/12/18   0501   NA   --    < >  132*   --    --   131*   --    --    --    --   137   --    K   --    < >  3.3*   < >   --   3.1*   --   3.6   --    --   3.7   --    CL   --    < >  103   --    --   103   --    --    --    --   113*   --    CO2   --    < >  19*   --    --   18*   --    --    --    --   14*   --    BUN   --    < >  8   --    --   7   --    --    --    --   9   --    CREATININE   --    < >  0.6   --    --   0.7   --    --    --    --   0.8   --    GLU   --    < >  148*   --    --   139*   --    --    --    --   186*   --    PROT   --    < >  7.4   --    --   7.0   --    --    --    --   6.6   --    BILITOT   --    < >  0.4   --    --   0.3   --    --    --    --   0.2   --    ALKPHOS   --    < >  58   --    --   53*   --    --    --    --   49*   --    ALT   --    < >  15   --    --   14   --    --    --    --   14   --    AST   --    < >  33   --    --   36   --    --    --    --   27   --    MG   --    < >  1.8   < >   --   5.6*   < >  4.7*  4.3*   --   3.7*  3.7*  3.5*   PHOS   --    < >  2.4*   --    --   1.7*   --    --    --   2.4*   2.2*   --    LDH   --    --   236   --    --    --    --    --    --    --    --    --    UTPCR  8.20*   --    --    --   0.85*   --    --    --    --    --    --    --     < > = values in this interval not displayed.          Physical Exam:   Constitutional: She is oriented to person, place, and time. She appears well-developed and well-nourished. No distress.    HENT:   EVD and ET tube in place      Cardiovascular: Normal rate and regular rhythm.     Pulmonary/Chest: No respiratory distress.        Abdominal: Soft. She exhibits no distension. There is no tenderness.   gravid             Musculoskeletal: Moves all extremeties. She exhibits no edema.       Neurological: She is alert and oriented to person, place, and time.   answered all questions and followed commands appropriately    Skin: Skin is warm and dry.        Assessment/Plan:     32 y.o. 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        25 weeks gestation of pregnancy    - fetal status reassuring on US this AM  - prenatal labs in process  - continue FHT daily        Hypertension affecting pregnancy in second trimester    - s/p magnesium sulfate discontinued   - s/p cardine gtt  - BP now mostly normal with occasional mild range  - no s/s of preeclampsia  - platelets, Cr, LFTs wnl  - 24 hr urine in progress              Guera Jimenes MD  Obstetrics  [facility]

## 2018-08-12 NOTE — PT/OT/SLP EVAL
Physical Therapy Evaluation    Patient Name:  Sharon Grande   MRN:  [id number]    Recommendations:     Discharge Recommendations:  rehabilitation facility   Discharge Equipment Recommendations: other (see comments)(TBD)   Barriers to discharge: Decreased caregiver support    Assessment:     Sharon Grande is a 32 y.o. 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  She presents with the following impairments/functional limitations:  weakness, gait instability, decreased lower extremity function, impaired balance, impaired endurance, decreased upper extremity function, decreased safety awareness, impaired functional mobilty, impaired cardiopulmonary response to activity, impaired sensation, impaired self care skills. Pt performed bed mobility min A and sat EOB for ~10min with CGA/min A. Pt performed seated scooting EOB with min, sit to stand transfers not attempted 2* fatigue. Pt will benefit from skilled PT to improve deficits and increase overall functional mobility.     Rehab Prognosis:  Good; patient would benefit from acute skilled PT services to address these deficits and reach maximum level of function.      Recent Surgery: Procedure(s) (LRB):  Mri (magnetic resonance imaging) (N/A) 2 Days Post-Op    Plan:     During this hospitalization, patient to be seen 4 x/week to address the above listed problems via gait training, therapeutic activities, therapeutic exercises, neuromuscular re-education  · Plan of Care Expires:  09/12/18   Plan of Care Reviewed with: patient, spouse    Subjective     Communicated with RN prior to session; EVD clamped.  Patient found supine in bed upon PT entry to room, agreeable to evaluation.      Chief Complaint: HA  Patient comments/goals:return to PLOF  Pain/Comfort:  · Pain Rating 1: other (see comments)(pt did not rate pain)  · Location - Side 1: Bilateral  · Location - Orientation 1: generalized  · Location 1:  head  · Pain Addressed 1: Reposition, Distraction  · Pain Rating Post-Intervention 1: other (see comments)(pt did not rate pain)    Living Environment:  Pt lives with  and 5 children in split level house without DIMITRI and 14 steps with R handrails to 2nd floor. Pt reports (I) with ADLs and amb. Pt is full-time mother. Pt  works during the day.  Prior to admission, patients level of function was (I).  Patient has the following equipment: none.  DME owned (not currently used): none.  Upon discharge, patient will have assistance from  but works during the day.    Objective:     Patient found with: arterial line, blood pressure cuff, peripheral IV, telemetry, pulse ox (continuous), thomas catheter, external ventricular drain     General Precautions: Standard, aspiration, fall   Orthopedic Precautions:N/A   Braces: N/A     Exams:  · Cognitive Exam:  Patient is oriented to Person, Place, Time and Situation and follows 100% of multi-step commands   · Gross Motor Coordination:  WFL  · Postural Exam:  Patient presented with the following abnormalities:    · -       Rounded shoulders  · Sensation:    · -       Intact  light/touch B LE  · Skin Integrity/Edema:      · -       Skin integrity: Visible skin intact  · RLE ROM: WFL  · RLE Strength: gross 4/5  · LLE ROM: WFL  · LLE Strength: gross 3-/5    Functional Mobility:  Bed Mobility:     · Supine to Sit: minimum assistance  · Sit to Supine: minimum assistance    Transfers:     · Seated scooting EOB to R with min A x3 trials.   · Sit to stand not performed 2* fatigue.     AM-PAC 6 CLICK MOBILITY  Total Score:12       Therapeutic Activities and Exercises:  Pt sat EOB for ~10min with CGA/min A for postural control, vc's for head in upright posture.   Pt returned to supine 2* HA and new onset nausea.  Pt and  educated on:  -role of PT/POC    Patient left HOB elevated with all lines intact, call button in reach and Rn notified.    GOALS:    Multidisciplinary Problems     Physical Therapy Goals        Problem: Physical Therapy Goal    Goal Priority Disciplines Outcome Goal Variances Interventions   Physical Therapy Goal     PT, PT/OT Ongoing (interventions implemented as appropriate)     Description:  Goals to be met by: 2018     Patient will increase functional independence with mobility by performin. Supine to sit with Stand-by Assistance  2. Sit to supine with Stand-by Assistance  3. Sit to stand transfer with Minimal Assistance  4. Bed to chair transfer with Minimal Assistance 5  5. Gait  x 10 feet with Moderate Assistance with or without appropriate AD   6. Lower extremity exercise program x15 reps per handout, with assistance as needed                      History:     Past Medical History:   Diagnosis Date    Anemia     Hypertension     since     Polycystic kidney disease     Polycystic kidney disease     Renal disorder     Since childhood        Past Surgical History:   Procedure Laterality Date    DILATION AND CURETTAGE OF UTERUS             Clinical Decision Making:     Decision Making/ Complexity Score   On examination of body system using standardized tests and measures patient presents with 3 or more elements from any of the following: body structures and functions, activity limitations, and/or participation restrictions.  Leading to a clinical presentation that is considered evolving with changing characteristics                              Clinical Decision Making  (Eval Complexity):  Moderate - 04769     Time Tracking:     PT Received On: 18  PT Start Time: 902     PT Stop Time: 925  PT Total Time (min): 23 min     Billable Minutes: Evaluation 23      CATIA LINDER, PT  2018

## 2018-08-12 NOTE — PLAN OF CARE
Problem: Patient Care Overview  Goal: Plan of Care Review  Outcome: Ongoing (interventions implemented as appropriate)  POC reviewed with pt and family at 1700. Pt verbalized understanding. Questions and concerns addressed. No acute events today. Pt needed one dose of PRN labetalol for SBP over 140, complains of constant headache mild relief with oxy and tylenol.  Pt progressing toward goals. Will continue to monitor. See flowsheets for full assessment and VS info.

## 2018-08-12 NOTE — PROGRESS NOTES
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 C/o acute onset AMS that began prior to arrival in ED.  Patient's aunt reports calling the patient's phone at 11:30 am this morning and reports the patient's 6 year old son answering the phone stating that the patient was lying on the floor, not able to get up. EMS reports upon their arrival, the patient was found on the floor, faced down, with her head against a wall. EMS reports the patient to be hypertensive and incontinent.   Pt has been non-verbal since arrival. Per family patient began complaining of a headache yesterday. Last time normal per  was 0500 today while on his way to work. Patient's aunt reports the patient has not received any prenatal care for this pregnancy.  No other history could be obtained at this time.  History is limited secondary to acuity of the patient's condition.  So last seen normal by adult at 5am. eleanor reports pt has been taking some OTC meds for headache. Pt's  reports pt not taking any regular medications at home.     Hospital Course: 8/10: pt admitted to [facility] for SAH. CT, MRI, MRA ordered and results pending   8/11: post angio with coil acom, EVD at 10, wean prop to extubate, MRI once stable, Urine studies for hyponatremia, TCDs, check Mg q 4  8/12: Mg gtt stopped over night, fluid boluses, pain control    Review of Symptoms:   Constitutional: Denies fevers or chills.  ENT: no hearing difficulty, no visual changes  Pulmonary: Denies shortness of breath or cough.  Cardiology: Denies chest pain or palpitations.  GI: Denies abdominal pain or constipation.  Neurologic: Denies new weakness, headaches, or paresthesias.   : no  dysuria  Musk: no muscle pain, no joint pain  Psych: no hallucinations    Physical Exam:  GA: Alert, comfortable, no acute distress.   HEENT: No scleral icterus or JVD.   Pulmonary: Clear to auscultation A/L. No wheezing, crackles, or rhonchi.  Cardiac: RRR S1 & S2 w/o rubs/murmurs/gallops.   Abdominal: Bowel sounds present x 4. No appreciable hepatosplenomegaly.  Skin: No jaundice, rashes, or visible lesions.  Pulses: 2+ Dp bila    Neuro:  --sedation: none  --GCS: 15  --Mental Status: AOx3    --CN II-XII grossly intact.   --Pupils 3-->2mm, PERRL.   --brainstem: intact  --Motor: bilat uppers 5/5 no drift, lower both 3/5 though weaker on right side  --sensory: intact to soft touch and pain throughout  --Reflexes: not tested  --Gait: deferred    Recent Labs   Lab  08/11/18   2335   WBC  17.66*   RBC  2.85*   HGB  8.4*   HCT  25.6*   PLT  226   MCV  90   MCH  29.5   MCHC  32.8     Recent Labs   Lab  08/11/18   2335   CALCIUM  7.4*   PROT  6.6   NA  137   K  3.7   CO2  14*   CL  113*   BUN  9   CREATININE  0.8   ALKPHOS  49*   ALT  14   AST  27   BILITOT  0.2     No results for input(s): PT, INR, APTT in the last 24 hours.       Temp: 98.6 °F (37 °C)  Pulse: 78  Rhythm: normal sinus rhythm  BP: (!) 144/82(gave PRN medication for high BP)  MAP (mmHg): 106  ICP Mean (mmHg): 6 mmHg  Resp: 18  SpO2: 100 %  O2 Device (Oxygen Therapy): room air    Temp  Min: 98.5 °F (36.9 °C)  Max: 99.7 °F (37.6 °C)  Pulse  Min: 66  Max: 98  BP  Min: 115/68  Max: 149/79  MAP (mmHg)  Min: 80  Max: 113  ICP Mean (mmHg)  Min: 3 mmHg  Max: 10 mmHg  Resp  Min: 12  Max: 35  SpO2  Min: 99 %  Max: 100 %    08/11 0701 - 08/12 0700  In: 1153.9 [I.V.:1083.9]  Out: 3490 [Urine:3335; Drains:155]        Nutrition Prescription Ordered  Current Diet Order: NPO    Body mass index is 22.15 kg/m².      I have personally reviewed all labs, imaging, and studies today      Assessment/Plan:     Neuro   * Subarachnoid hemorrhage from anterior communicating artery  aneurysm    Post coiling  EVD at 10  Daily TCDs  Nimodipine changed to 30q2 for better BP mnagement  MRI once stable          Brain edema    Goal Na normal  EVD        Brain compression    EVD  Goal Na normal  Neuro checks          Aphasia    Resolved          Metabolic encephalopathy    Resolved          Renal/   JOSE C (acute kidney injury)    Elevated P:C ratio on admit, now improved  Monitor urine output  Monitor Crt        Hypophosphatemia    Replete prn          PKD (polycystic kidney disease)     predispose to aneurysm   Will monitor        Oncology   Leukocytosis    Improving  NGTD          Obstetric   Pre-eclampsia in second trimester    Workup under way  24 hour protein creatinine  Stop Mag        25 weeks gestation of pregnancy      Betamethasone   -- day 1 = 8/10/18   limited prenatal care  Approximate age 24 weeks        Hypertension affecting pregnancy in second trimester    obgyn consulted  Mg discontinued  Cont to monitor              Prophylaxis:  Venous Thromboembolism: chemical  Stress Ulcer: NA  Ventilator Pneumonia: not applicable     Activity Orders          None        Full Code    Vishnu Ngo MD  Neurocritical Care  [facility]    Level III

## 2018-08-12 NOTE — PT/OT/SLP EVAL
Occupational Therapy   Evaluation    Name: Sharon Grande  MRN: [id number]  Admitting Diagnosis:  Subarachnoid hemorrhage from anterior communicating artery aneurysm 2 Days Post-Op   8/11: post angio wuth coil acom, EVD at 10; extubated on this date; 24 weeks gestation. OT orders placed on this date    Recommendations:     Discharge Recommendations: rehabilitation facility  Discharge Equipment Recommendations:  (TBD )  Barriers to discharge:  Other (Comment)(Level of skilled assistance required at this time)    History:     Occupational Profile:  Living Environment: Pt lives with spouse and 5 dependent children (ages 15,10,6,3,2) in split level home with 0 DIMITRI. Bed/bath on 2nd level with ~14 DIMITRI and R hand rial. Tub/shower combo.   Previous level of function: full time mother. Active and indep. Completing all home mgmt tasks and activities. Requires no DME/assistance for ADLs/self care/mobility.   *Pt reported increased R side weakness, more noted in LE leading up to incident   Roles and Routines: wife, mother, care taker to self and children, home and community dweller,    Equipment Owned:  none  Assistance upon Discharge: yes, good family support; however, spouse works during the day    Past Medical History:   Diagnosis Date    Anemia     Hypertension     since 2012    Polycystic kidney disease     Polycystic kidney disease     Renal disorder     Since childhood        Past Surgical History:   Procedure Laterality Date    DILATION AND CURETTAGE OF UTERUS      2013       Subjective     Chief Complaint: headache  Patient/Family stated goals: none reported  Communicated with: RN prior to session-- RN to clamp EVD for evaluation. Completed with PT.   Pain/Comfort:  · Pain Rating 1: (c/o HA; not given on numerical scale)  · Pain Addressed 1: Reposition, Distraction, Cessation of Activity, Nurse notified  · Pain Rating Post-Intervention 1: (remained throughout)    Patients cultural, spiritual, Protestant  conflicts given the current situation: none reported    Objective:     Patient found with: arterial line, bed alarm, blood pressure cuff, thomas catheter, SCD, telemetry, peripheral IV, external ventricular drain    General Precautions: Standard, aspiration, fall (EVD clamped for mobility)  Orthopedic Precautions:N/A   Braces: N/A     Occupational Performance:    Bed Mobility:    · Patient completed Rolling/Turning to Right with minimum assistance and with side rail  · Patient completed Scooting/Bridging with minimum assistance  · Patient completed Supine to Sit with minimum assistance and with side rail  · Patient completed Sit to Supine with minimum assistance and with side rail    Functional Mobility/Transfers:  · Lateral scooting to R x3 trials with minimal assistance and B UE support    Activities of Daily Living:  · Feeding:  modified independence with set up and added time  · Grooming: unable to complete in standing; requires seated with set up and CGA    · Upper Body Dressing: minimum assistance to don gown as jacket  · Lower Body Dressing: maximal assistance B socks    Cognitive/Visual Perceptual:  Cognitive/Psychosocial Skills:     -       Oriented to: Person, Place, Time and Situation   -       Follows Commands/attention:Follows multistep  commands  -       Communication: clear/fluent and delayed processing  -       Memory: No Deficits noted  -       Safety awareness/insight to disability: intact   -       Mood/Affect/Coping skills/emotional control: Cooperative  Visual/Perceptual:      -Intact  tracking    Physical Exam:  Balance:    -       CGA-min(A) for dynamic seated task ; requiring unilateral UE support  Postural examination/scapula alignment:    -       Rounded shoulders  -       Forward head  -       Posterior pelvic tilt  Skin integrity: Visible skin intact  Edema:  None noted  Sensation:    -       Intact  light/touch B UE  Motor Planning:    -       Intact B UE/Delayed/slowed movt  Dominant  hand:    -       R  Upper Extremity Range of Motion:     -       Right Upper Extremity: WFL  -       Left Upper Extremity: WFL  Upper Extremity Strength:    -       Right Upper Extremity: 3+/5  -       Left Upper Extremity: 4-/5   Strength:    -       Right Upper Extremity: 4/5  -       Left Upper Extremity: 4/5  Fine Motor Coordination:    -       Intact  Left hand, finger to nose, Right hand, finger to nose, Left hand thumb/finger opposition skills and Right hand thumb/finger opposition skills  Gross motor coordination:   R hemiplegia/paresis (mild)    Patient left with bed in chair position with all lines intact, call button in reach, RN notified and spouse present    [facility] 6 Click:  [facility] Total Score: 18     Body mass index is 22.15 kg/m².    Vitals:    08/12/18 1005   BP: 134/81   Pulse: 66   Resp: (!) 21   Temp:        Treatment & Education:  -Pt alert and agreeable to therapy session; edu pt/spouse on OT role in care and POC for acute setting  -EOB ~10 min with CGA-min(A) for postural control  *Return to supine 2/2 cont HA and new onset nausea   -Communication board updated; questions/concerns addressed within OT scope of practice    Education:    Assessment:     Sharon Grande is a 32 y.o. female with a medical diagnosis of Subarachnoid hemorrhage from anterior communicating artery aneurysm.  She presents with generalized weakness throughout; however, more noted on R side. She demo impaired endurance with overall delayed reaction and processing time. She remains with EVD at this time. She would cont to benefit from OT services 4x/w for progression in care for functional tasks and activities. rec Rehab at this time for post acute level of care.  Performance deficits affecting function are weakness, impaired endurance, impaired self care skills, impaired functional mobilty, gait instability, impaired balance, pain, decreased upper extremity function, decreased lower extremity function, impaired  "coordination, impaired fine motor, impaired cardiopulmonary response to activity.      Rehab Prognosis:  Good; patient would benefit from acute skilled OT services to address these deficits and reach maximum level of function.         Clinical Decision Makin.  OT Mod:  "Pt evaluation falls under moderate complexity for evaluation coding due to identification of 3-5 performance deficits noted as stated above. Eval required Min/Mod assistance to complete on this date and detailed assessment(s) were utilized. Moreover, an expanded review of history and occupational profile obtained with additional review of cognitive, physical and psychosocial hx."     Plan:     Patient to be seen 4 x/week to address the above listed problems via self-care/home management, therapeutic activities, therapeutic exercises, neuromuscular re-education  · Plan of Care Expires: 09/10/18  · Plan of Care Reviewed with: patient, spouse    This Plan of care has been discussed with the patient who was involved in its development and understands and is in agreement with the identified goals and treatment plan    GOALS:   Multidisciplinary Problems     Occupational Therapy Goals        Problem: Occupational Therapy Goal    Goal Priority Disciplines Outcome Interventions   Occupational Therapy Goal     OT, PT/OT Ongoing (interventions implemented as appropriate)    Description:  Goals to be met by:     UE Dressing while standing with Set-up Assistance and Stand-by Assistance.  LE Dressing (socks, brief) with Contact Guard Assistance.  Grooming while standing with Contact Guard Assistance.  Stand pivot transfers with Contact Guard Assistance.  Functional mobility at short household distance for ADL task with min(A) and vitals WFL.  Upper extremity exercise program x15 reps per handout, with assistance as needed.                    Time Tracking:     OT Date of Treatment: 18  OT Start Time: 903  OT Stop Time: 925  OT Total Time " (min): 22 min    Billable Minutes:Evaluation 20    GARRET White  8/12/2018

## 2018-08-12 NOTE — PROGRESS NOTES
[facility]  Neurosurgery  Progress Note    Subjective:     History of Present Illness: 32 y.o. female 24 weeks pregnant with a history of polycystic kidney disease who presents as transfer from [facility] for SAH. Patient found down by 6 yr old son. Last known normal 0500 today. CT at [facility] revealed SAH within the basal cisterns. US at [facility] with + fetal heart tones. Transferred for neuro evaluation.     Post-Op Info:  Procedure(s) (LRB):  Mri (magnetic resonance imaging) (N/A)   2 Days Post-Op     Interval History: naeon    Medications:  Continuous Infusions:   niCARdipine Stopped (08/10/18 2236)     Scheduled Meds:   ceFEPime (MAXIPIME) IVPB  1 g Intravenous Q12H    famotidine (PF)  20 mg Intravenous BID    levetiracetam IVPB  500 mg Intravenous Q12H    lidocaine (PF) 20 mg/mL (2%)  10 mL Other Once    niMODipine  60 mg Per NG tube Q4H    senna-docusate 8.6-50 mg  1 tablet Oral BID    vancomycin (VANCOCIN) IVPB  15 mg/kg Intravenous Q12H     PRN Meds:acetaminophen, calcium gluconate IVPB, calcium gluconate IVPB, calcium gluconate IVPB, calcium gluconate, labetalol, midazolam, oxyCODONE, potassium chloride in water **AND** potassium chloride in water **AND** potassium chloride in water, sodium chloride 0.9%, sodium phosphate IVPB, sodium phosphate IVPB, sodium phosphate IVPB     Review of Systems  Objective:     Weight: 68 kg (150 lb 0 oz)  Body mass index is 22.15 kg/m².  Vital Signs (Most Recent):  Temp: 99.3 °F (37.4 °C) (08/11/18 2300)  Pulse: 81 (08/12/18 0600)  Resp: (!) 21 (08/12/18 0600)  BP: 128/64 (08/12/18 0600)  SpO2: 100 % (08/12/18 0600) Vital Signs (24h Range):  Temp:  [98.8 °F (37.1 °C)-99.7 °F (37.6 °C)] 99.3 °F (37.4 °C)  Pulse:  [78-98] 81  Resp:  [12-35] 21  SpO2:  [99 %-100 %] 100 %  BP: ()/(51-90) 128/64  Arterial Line BP: (106-159)/(51-80) 136/80                 Vent Mode: Spont  Oxygen Concentration (%):  [32-40] 32  Resp Rate Total:  [16 br/min-21 br/min] 16  "br/min  Vt Set:  [450 mL] 450 mL  PEEP/CPAP:  [5 cmH20] 5 cmH20  Pressure Support:  [5 cmH20-7 cmH20] 7 cmH20  Mean Airway Pressure:  [7.8 cmH20-8.2 cmH20] 7.9 cmH20         Urethral Catheter 08/10/18 2105 (Active)   Site Assessment Clean;Intact 8/12/2018  3:00 AM   Collection Container Urimeter 8/12/2018  3:00 AM   Securement Method secured to top of thigh w/ adhesive device 8/12/2018  3:00 AM   Catheter Care Performed yes 8/12/2018  3:00 AM   Reason for Continuing Urinary Catheterization Critically ill in ICU requiring intensive monitoring 8/12/2018  3:00 AM   CAUTI Prevention Bundle StatLock in place w 1" slack;Intact seal between catheter & drainage tubing;Drainage bag off the floor;Green sheeting clip in use;No dependent loops or kinks;Drainage bag not overfilled (<2/3 full);Drainage bag below bladder 8/12/2018  3:00 AM   Output (mL) 75 mL 8/12/2018  6:00 AM            ICP/Ventriculostomy 08/04/18 0000 Ventricular drainage catheter with ICP microsensor Right Other (Comment) (Active)   Level of Ventriculostomy (cm above) 10 8/11/2018  7:05 PM   Status Open to drainage 8/12/2018  3:00 AM   Site Assessment Clean;Dry 8/12/2018  3:00 AM   Site Drainage No drainage 8/12/2018  3:00 AM   Waveform normal waveform 8/12/2018  3:00 AM   Output (mL) 11 mL 8/12/2018  6:00 AM   CSF Color clear;pink 8/12/2018  3:00 AM   Dressing Status Biopatch in place;Clean;Dry;Intact 8/12/2018  3:00 AM   Interventions HOB degrees;bed controls locked 8/12/2018  3:00 AM       Neurosurgery Physical Exam   AOX3, speech fluent  PERRL, EOMI, face symm, tongue midline  HOBSON symm  No drift    Significant Labs:  Recent Labs   Lab  08/10/18   1946   08/11/18   0403   08/11/18   1826  08/11/18   2049  08/11/18   2335  08/12/18   0501   GLU  148*   --   139*   --    --    --   186*   --    NA  132*   --   131*   --    --    --   137   --    K  3.3*   < >  3.1*   --   3.6   --   3.7   --    CL  103   --   103   --    --    --   113*   --    CO2  19*   -- "   18*   --    --    --   14*   --    BUN  8   --   7   --    --    --   9   --    CREATININE  0.6   --   0.7   --    --    --   0.8   --    CALCIUM  8.6*   --   8.0*   --    --    --   7.4*   --    MG  1.8   < >  5.6*   < >  4.7*  4.3*  3.7*  3.7*  3.5*    < > = values in this interval not displayed.     Recent Labs   Lab  08/10/18   1946  08/11/18   0403  08/11/18   2335   WBC  17.79*  15.87*  17.66*   HGB  9.8*  9.2*  8.4*   HCT  29.9*  27.3*  25.6*   PLT  196  218  226     Recent Labs   Lab  08/10/18   1500   INR  1.0   APTT  21.4     Microbiology Results (last 7 days)     Procedure Component Value Units Date/Time    Blood culture [[id number]] Collected:  08/10/18 2020    Order Status:  Completed Specimen:  Blood from Peripheral, Foot, Left Updated:  08/12/18 0612     Blood Culture, Routine No Growth to date     Blood Culture, Routine No Growth to date    Blood culture [[id number]] Collected:  08/10/18 2040    Order Status:  Completed Specimen:  Blood from Peripheral, Antecubital, Left Updated:  08/12/18 0612     Blood Culture, Routine No Growth to date     Blood Culture, Routine No Growth to date    C. trachomatis/N. gonorrhoeae by AMP DNA [facility]; Urine [[id number]] Collected:  08/11/18 1435    Order Status:  Sent Specimen:  Genital Updated:  08/11/18 1435    Culture, Respiratory with Gram Stain [[id number]]     Order Status:  No result Specimen:  Respiratory             Significant Diagnostics:      Assessment/Plan:     * 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 female 24 weeks pregnant with history of polycystic kidney disease with HH4F4 SAH, now s/p acom coiling.    -q 1 hr neuro checks  -permissive HTN as aneurysm secured  -nimotop  -strict is and os  -daily tcds  -evd open to 10 and draining, monitor icps  -further mgmt per ncc and obgyn.            Philippe Yao, DO  Neurosurgery  [facility]

## 2018-08-13 PROBLEM — E87.6 HYPOKALEMIA: Status: ACTIVE | Noted: 2018-08-13

## 2018-08-13 PROBLEM — E87.1 HYPONATREMIA: Status: ACTIVE | Noted: 2018-08-13

## 2018-08-13 PROBLEM — Z97.8 ENDOTRACHEAL TUBE PRESENT: Status: ACTIVE | Noted: 2018-08-13

## 2018-08-13 PROBLEM — R40.2420 GLASGOW COMA SCALE TOTAL SCORE 9-12: Status: ACTIVE | Noted: 2018-08-13

## 2018-08-13 PROBLEM — G93.2 INTRACRANIAL HYPERTENSION: Status: ACTIVE | Noted: 2018-08-13

## 2018-08-13 LAB
ALBUMIN SERPL BCP-MCNC: 2.5 G/DL
ALP SERPL-CCNC: 48 U/L
ALT SERPL W/O P-5'-P-CCNC: 15 U/L
ANION GAP SERPL CALC-SCNC: 6 MMOL/L
AST SERPL-CCNC: 19 U/L
BACTERIA UR CULT: NO GROWTH
BASOPHILS # BLD AUTO: 0.01 K/UL
BASOPHILS NFR BLD: 0.1 %
BILIRUB SERPL-MCNC: 0.2 MG/DL
BUN SERPL-MCNC: 11 MG/DL
CALCIUM SERPL-MCNC: 7.3 MG/DL
CHLORIDE SERPL-SCNC: 111 MMOL/L
CO2 SERPL-SCNC: 19 MMOL/L
CREAT SERPL-MCNC: 0.6 MG/DL
DIFFERENTIAL METHOD: ABNORMAL
EOSINOPHIL # BLD AUTO: 0 K/UL
EOSINOPHIL NFR BLD: 0 %
ERYTHROCYTE [DISTWIDTH] IN BLOOD BY AUTOMATED COUNT: 15.8 %
EST. GFR  (AFRICAN AMERICAN): >60 ML/MIN/1.73 M^2
EST. GFR  (NON AFRICAN AMERICAN): >60 ML/MIN/1.73 M^2
GLUCOSE SERPL-MCNC: 103 MG/DL
HBV SURFACE AG SERPL QL IA: NEGATIVE
HCT VFR BLD AUTO: 24.7 %
HGB BLD-MCNC: 7.7 G/DL
HIV 1+2 AB+HIV1 P24 AG SERPL QL IA: NEGATIVE
IMM GRANULOCYTES # BLD AUTO: 0.12 K/UL
IMM GRANULOCYTES NFR BLD AUTO: 0.9 %
INR PPP: 1
LYMPHOCYTES # BLD AUTO: 1.2 K/UL
LYMPHOCYTES NFR BLD: 8.8 %
MAGNESIUM SERPL-MCNC: 2.2 MG/DL
MAGNESIUM SERPL-MCNC: 2.3 MG/DL
MCH RBC QN AUTO: 28.9 PG
MCHC RBC AUTO-ENTMCNC: 31.2 G/DL
MCV RBC AUTO: 93 FL
MONOCYTES # BLD AUTO: 1.1 K/UL
MONOCYTES NFR BLD: 8.1 %
NEUTROPHILS # BLD AUTO: 11.2 K/UL
NEUTROPHILS NFR BLD: 82.1 %
NRBC BLD-RTO: 0 /100 WBC
PHOSPHATE SERPL-MCNC: 2.3 MG/DL
PHOSPHATE SERPL-MCNC: 2.6 MG/DL
PLATELET # BLD AUTO: 196 K/UL
PMV BLD AUTO: 11 FL
POCT GLUCOSE: 106 MG/DL (ref 70–110)
POCT GLUCOSE: 125 MG/DL (ref 70–110)
POCT GLUCOSE: 128 MG/DL (ref 70–110)
POCT GLUCOSE: 144 MG/DL (ref 70–110)
POTASSIUM SERPL-SCNC: 3.8 MMOL/L
POTASSIUM SERPL-SCNC: 4.2 MMOL/L
PROT SERPL-MCNC: 6.1 G/DL
PROTHROMBIN TIME: 10.8 SEC
RBC # BLD AUTO: 2.66 M/UL
RPR SER QL: NORMAL
RUBV IGG SER-ACNC: 16.2 IU/ML
RUBV IGG SER-IMP: REACTIVE
SODIUM SERPL-SCNC: 136 MMOL/L
SODIUM SERPL-SCNC: 136 MMOL/L
SODIUM SERPL-SCNC: 138 MMOL/L
WBC # BLD AUTO: 13.57 K/UL

## 2018-08-13 PROCEDURE — C1751 CATH, INF, PER/CENT/MIDLINE: HCPCS

## 2018-08-13 PROCEDURE — 84295 ASSAY OF SERUM SODIUM: CPT

## 2018-08-13 PROCEDURE — 25000003 PHARM REV CODE 250: Performed by: PHYSICIAN ASSISTANT

## 2018-08-13 PROCEDURE — 84100 ASSAY OF PHOSPHORUS: CPT

## 2018-08-13 PROCEDURE — 36569 INSJ PICC 5 YR+ W/O IMAGING: CPT

## 2018-08-13 PROCEDURE — 92523 SPEECH SOUND LANG COMPREHEN: CPT

## 2018-08-13 PROCEDURE — A4216 STERILE WATER/SALINE, 10 ML: HCPCS | Performed by: PSYCHIATRY & NEUROLOGY

## 2018-08-13 PROCEDURE — 25000003 PHARM REV CODE 250: Performed by: NURSE PRACTITIONER

## 2018-08-13 PROCEDURE — 02HV33Z INSERTION OF INFUSION DEVICE INTO SUPERIOR VENA CAVA, PERCUTANEOUS APPROACH: ICD-10-PCS | Performed by: ANESTHESIOLOGY

## 2018-08-13 PROCEDURE — 63600175 PHARM REV CODE 636 W HCPCS: Performed by: STUDENT IN AN ORGANIZED HEALTH CARE EDUCATION/TRAINING PROGRAM

## 2018-08-13 PROCEDURE — 83735 ASSAY OF MAGNESIUM: CPT | Mod: 91

## 2018-08-13 PROCEDURE — 85025 COMPLETE CBC W/AUTO DIFF WBC: CPT

## 2018-08-13 PROCEDURE — 94761 N-INVAS EAR/PLS OXIMETRY MLT: CPT

## 2018-08-13 PROCEDURE — 63600175 PHARM REV CODE 636 W HCPCS: Performed by: PHYSICIAN ASSISTANT

## 2018-08-13 PROCEDURE — 25000003 PHARM REV CODE 250: Performed by: STUDENT IN AN ORGANIZED HEALTH CARE EDUCATION/TRAINING PROGRAM

## 2018-08-13 PROCEDURE — 85610 PROTHROMBIN TIME: CPT

## 2018-08-13 PROCEDURE — 92610 EVALUATE SWALLOWING FUNCTION: CPT

## 2018-08-13 PROCEDURE — 36620 INSERTION CATHETER ARTERY: CPT | Mod: ,,, | Performed by: ANESTHESIOLOGY

## 2018-08-13 PROCEDURE — 20000000 HC ICU ROOM

## 2018-08-13 PROCEDURE — 99291 CRITICAL CARE FIRST HOUR: CPT | Mod: ,,, | Performed by: ANESTHESIOLOGY

## 2018-08-13 PROCEDURE — 63600175 PHARM REV CODE 636 W HCPCS: Performed by: PSYCHIATRY & NEUROLOGY

## 2018-08-13 PROCEDURE — 76937 US GUIDE VASCULAR ACCESS: CPT

## 2018-08-13 PROCEDURE — 36620 INSERTION CATHETER ARTERY: CPT

## 2018-08-13 PROCEDURE — 80053 COMPREHEN METABOLIC PANEL: CPT

## 2018-08-13 PROCEDURE — 25000003 PHARM REV CODE 250: Performed by: PSYCHIATRY & NEUROLOGY

## 2018-08-13 PROCEDURE — 84132 ASSAY OF SERUM POTASSIUM: CPT

## 2018-08-13 PROCEDURE — 84100 ASSAY OF PHOSPHORUS: CPT | Mod: 91

## 2018-08-13 PROCEDURE — 83735 ASSAY OF MAGNESIUM: CPT

## 2018-08-13 RX ORDER — CEFEPIME HYDROCHLORIDE 1 G/1
1 INJECTION, POWDER, FOR SOLUTION INTRAMUSCULAR; INTRAVENOUS
Status: DISCONTINUED | OUTPATIENT
Start: 2018-08-13 | End: 2018-08-14

## 2018-08-13 RX ORDER — HEPARIN SODIUM 5000 [USP'U]/ML
5000 INJECTION, SOLUTION INTRAVENOUS; SUBCUTANEOUS EVERY 8 HOURS
Status: CANCELLED | OUTPATIENT
Start: 2018-08-13

## 2018-08-13 RX ORDER — LIDOCAINE HYDROCHLORIDE 20 MG/ML
INJECTION, SOLUTION INFILTRATION; PERINEURAL
Status: DISPENSED
Start: 2018-08-13 | End: 2018-08-14

## 2018-08-13 RX ORDER — SODIUM CHLORIDE 0.9 % (FLUSH) 0.9 %
10 SYRINGE (ML) INJECTION
Status: DISCONTINUED | OUTPATIENT
Start: 2018-08-13 | End: 2018-08-31 | Stop reason: HOSPADM

## 2018-08-13 RX ORDER — HEPARIN SODIUM 5000 [USP'U]/ML
5000 INJECTION, SOLUTION INTRAVENOUS; SUBCUTANEOUS EVERY 8 HOURS
Status: DISCONTINUED | OUTPATIENT
Start: 2018-08-13 | End: 2018-08-31 | Stop reason: HOSPADM

## 2018-08-13 RX ORDER — SODIUM CHLORIDE 0.9 % (FLUSH) 0.9 %
10 SYRINGE (ML) INJECTION EVERY 6 HOURS
Status: DISCONTINUED | OUTPATIENT
Start: 2018-08-13 | End: 2018-08-31 | Stop reason: HOSPADM

## 2018-08-13 RX ORDER — LEVETIRACETAM 500 MG/1
500 TABLET ORAL 2 TIMES DAILY
Status: COMPLETED | OUTPATIENT
Start: 2018-08-13 | End: 2018-08-16

## 2018-08-13 RX ORDER — ONDANSETRON 4 MG/1
4 TABLET, ORALLY DISINTEGRATING ORAL ONCE
Status: DISCONTINUED | OUTPATIENT
Start: 2018-08-13 | End: 2018-08-13

## 2018-08-13 RX ORDER — SODIUM CHLORIDE 9 MG/ML
INJECTION, SOLUTION INTRAVENOUS CONTINUOUS
Status: DISCONTINUED | OUTPATIENT
Start: 2018-08-13 | End: 2018-08-20

## 2018-08-13 RX ADMIN — NIMODIPINE 30 MG: 30 CAPSULE, LIQUID FILLED ORAL at 04:08

## 2018-08-13 RX ADMIN — POTASSIUM & SODIUM PHOSPHATES POWDER PACK 280-160-250 MG 2 PACKET: 280-160-250 PACK at 08:08

## 2018-08-13 RX ADMIN — NIMODIPINE 30 MG: 30 CAPSULE, LIQUID FILLED ORAL at 08:08

## 2018-08-13 RX ADMIN — ACETAMINOPHEN 650 MG: 325 TABLET, FILM COATED ORAL at 08:08

## 2018-08-13 RX ADMIN — CEFEPIME 1 G: 1 INJECTION, POWDER, FOR SOLUTION INTRAMUSCULAR; INTRAVENOUS at 05:08

## 2018-08-13 RX ADMIN — NIMODIPINE 30 MG: 30 CAPSULE, LIQUID FILLED ORAL at 10:08

## 2018-08-13 RX ADMIN — VANCOMYCIN HYDROCHLORIDE 1250 MG: 1 INJECTION, POWDER, LYOPHILIZED, FOR SOLUTION INTRAVENOUS at 12:08

## 2018-08-13 RX ADMIN — POTASSIUM CHLORIDE 40 MEQ: 20 SOLUTION ORAL at 05:08

## 2018-08-13 RX ADMIN — OXYCODONE HYDROCHLORIDE 15 MG: 5 TABLET ORAL at 04:08

## 2018-08-13 RX ADMIN — SODIUM CHLORIDE: 0.9 INJECTION, SOLUTION INTRAVENOUS at 10:08

## 2018-08-13 RX ADMIN — OXYCODONE HYDROCHLORIDE 15 MG: 5 TABLET ORAL at 09:08

## 2018-08-13 RX ADMIN — CEFEPIME 1 G: 1 INJECTION, POWDER, FOR SOLUTION INTRAMUSCULAR; INTRAVENOUS at 09:08

## 2018-08-13 RX ADMIN — ACETAMINOPHEN 650 MG: 325 TABLET, FILM COATED ORAL at 11:08

## 2018-08-13 RX ADMIN — ACETAMINOPHEN 650 MG: 325 TABLET, FILM COATED ORAL at 04:08

## 2018-08-13 RX ADMIN — Medication 10 ML: at 06:08

## 2018-08-13 RX ADMIN — NIMODIPINE 30 MG: 30 CAPSULE, LIQUID FILLED ORAL at 05:08

## 2018-08-13 RX ADMIN — NIMODIPINE 30 MG: 30 CAPSULE, LIQUID FILLED ORAL at 06:08

## 2018-08-13 RX ADMIN — NIMODIPINE 30 MG: 30 CAPSULE, LIQUID FILLED ORAL at 11:08

## 2018-08-13 RX ADMIN — LEVETIRACETAM 500 MG: 5 INJECTION INTRAVENOUS at 08:08

## 2018-08-13 RX ADMIN — NIMODIPINE 30 MG: 30 CAPSULE, LIQUID FILLED ORAL at 02:08

## 2018-08-13 RX ADMIN — ONDANSETRON HYDROCHLORIDE 4 MG: 4 TABLET, ORALLY DISINTEGRATING ORAL at 05:08

## 2018-08-13 RX ADMIN — POTASSIUM & SODIUM PHOSPHATES POWDER PACK 280-160-250 MG 2 PACKET: 280-160-250 PACK at 04:08

## 2018-08-13 RX ADMIN — OXYCODONE HYDROCHLORIDE 15 MG: 5 TABLET ORAL at 05:08

## 2018-08-13 RX ADMIN — Medication 10 ML: at 11:08

## 2018-08-13 RX ADMIN — OXYCODONE HYDROCHLORIDE 15 MG: 5 TABLET ORAL at 12:08

## 2018-08-13 RX ADMIN — LEVETIRACETAM 500 MG: 500 TABLET, FILM COATED ORAL at 08:08

## 2018-08-13 RX ADMIN — NIMODIPINE 30 MG: 30 CAPSULE, LIQUID FILLED ORAL at 12:08

## 2018-08-13 RX ADMIN — HEPARIN SODIUM 5000 UNITS: 5000 INJECTION, SOLUTION INTRAVENOUS; SUBCUTANEOUS at 09:08

## 2018-08-13 RX ADMIN — FAMOTIDINE 20 MG: 20 TABLET ORAL at 08:08

## 2018-08-13 RX ADMIN — CEFEPIME 1 G: 1 INJECTION, POWDER, FOR SOLUTION INTRAMUSCULAR; INTRAVENOUS at 01:08

## 2018-08-13 RX ADMIN — OXYCODONE HYDROCHLORIDE 15 MG: 5 TABLET ORAL at 01:08

## 2018-08-13 RX ADMIN — PRENATAL VIT W/ FE FUMARATE-FA TAB 27-0.8 MG 1 TABLET: 27-0.8 TAB at 12:08

## 2018-08-13 RX ADMIN — HEPARIN SODIUM 5000 UNITS: 5000 INJECTION, SOLUTION INTRAVENOUS; SUBCUTANEOUS at 01:08

## 2018-08-13 NOTE — PROGRESS NOTES
[facility]  Neurosurgery  Progress Note    Subjective:     History of Present Illness: 32 y.o. female 24 weeks pregnant with a history of polycystic kidney disease who presents as transfer from [facility] for SAH. Patient found down by 6 yr old son. Last known normal 0500 today. CT at [facility] revealed SAH within the basal cisterns. US at [facility] with + fetal heart tones. Transferred for neuro evaluation.     Post-Op Info:  Procedure(s) (LRB):  Mri (magnetic resonance imaging) (N/A)   3 Days Post-Op     Interval History: NAEON, stable neuro exam this morning. TCDs wnl    Medications:  Continuous Infusions:   lactated ringers 50 mL/hr at 08/13/18 0600     Scheduled Meds:   ceFEPime (MAXIPIME) IVPB  1 g Intravenous Q12H    famotidine  20 mg Oral BID    levetiracetam IVPB  500 mg Intravenous Q12H    lidocaine (PF) 20 mg/mL (2%)  10 mL Other Once    niMODipine  30 mg Oral Q2H    senna-docusate 8.6-50 mg  1 tablet Oral BID    vancomycin (VANCOCIN) IVPB  1,250 mg Intravenous Q12H     PRN Meds:acetaminophen, calcium gluconate IVPB, calcium gluconate IVPB, calcium gluconate IVPB, calcium gluconate, labetalol, magnesium oxide, magnesium oxide, midazolam, oxyCODONE, potassium chloride 10%, potassium chloride 10%, potassium chloride 10%, potassium chloride in water **AND** potassium chloride in water **AND** potassium chloride in water, potassium, sodium phosphates, potassium, sodium phosphates, potassium, sodium phosphates, sodium chloride 0.9%, sodium phosphate IVPB, sodium phosphate IVPB, sodium phosphate IVPB     Review of Systems    Objective:     Weight: 74.4 kg (164 lb 0.4 oz)  Body mass index is 24.22 kg/m².  Vital Signs (Most Recent):  Temp: 98.6 °F (37 °C) (08/13/18 0300)  Pulse: 71 (08/13/18 0600)  Resp: 15 (08/13/18 0600)  BP: 135/76 (08/13/18 0600)  SpO2: 98 % (08/13/18 0600) Vital Signs (24h Range):  Temp:  [98.5 °F (36.9 °C)-99.4 °F (37.4 °C)] 98.6 °F (37 °C)  Pulse:  [66-89] 71  Resp:  [15-34]  "15  SpO2:  [98 %-100 %] 98 %  BP: (111-149)/(58-87) 135/76  Arterial Line BP: (105-154)/(59-87) 107/76                           Urethral Catheter 08/10/18 2105 (Active)   Site Assessment Clean;Intact 8/12/2018  3:00 AM   Collection Container Urimeter 8/12/2018  3:00 AM   Securement Method secured to top of thigh w/ adhesive device 8/12/2018  3:00 AM   Catheter Care Performed yes 8/12/2018  3:00 AM   Reason for Continuing Urinary Catheterization Critically ill in ICU requiring intensive monitoring 8/12/2018  3:00 AM   CAUTI Prevention Bundle StatLock in place w 1" slack;Intact seal between catheter & drainage tubing;Drainage bag off the floor;Green sheeting clip in use;No dependent loops or kinks;Drainage bag not overfilled (<2/3 full);Drainage bag below bladder 8/12/2018  3:00 AM   Output (mL) 75 mL 8/12/2018  6:00 AM            ICP/Ventriculostomy 08/04/18 0000 Ventricular drainage catheter with ICP microsensor Right Other (Comment) (Active)   Level of Ventriculostomy (cm above) 10 8/11/2018  7:05 PM   Status Open to drainage 8/12/2018  3:00 AM   Site Assessment Clean;Dry 8/12/2018  3:00 AM   Site Drainage No drainage 8/12/2018  3:00 AM   Waveform normal waveform 8/12/2018  3:00 AM   Output (mL) 11 mL 8/12/2018  6:00 AM   CSF Color clear;pink 8/12/2018  3:00 AM   Dressing Status Biopatch in place;Clean;Dry;Intact 8/12/2018  3:00 AM   Interventions HOB degrees;bed controls locked 8/12/2018  3:00 AM       Neurosurgery Physical Exam     AOX3, speech fluent  PERRL, EOMI, face symm, tongue midline  HOBSON symm  No drift    Significant Labs:  Recent Labs   Lab  08/11/18   1826   08/11/18   2335   08/13/18   0029  08/13/18   0220  08/13/18   0424   GLU   --    --   186*   --    --   103   --    NA   --    --   137   --    --   136   --    K  3.6   --   3.7   --    --   3.8   --    CL   --    --   113*   --    --   111*   --    CO2   --    --   14*   --    --   19*   --    BUN   --    --   9   --    --   11   --  "   CREATININE   --    --   0.8   --    --   0.6   --    CALCIUM   --    --   7.4*   --    --   7.3*   --    MG  4.7*   < >  3.7*  3.7*   < >  2.2  2.3  2.2    < > = values in this interval not displayed.     Recent Labs   Lab  08/11/18   2335  08/13/18   0220   WBC  17.66*  13.57*   HGB  8.4*  7.7*   HCT  25.6*  24.7*   PLT  226  196     No results for input(s): LABPT, INR, APTT in the last 48 hours.  Microbiology Results (last 7 days)     Procedure Component Value Units Date/Time    Blood culture [[id number]] Collected:  08/10/18 2020    Order Status:  Completed Specimen:  Blood from Peripheral, Foot, Left Updated:  08/13/18 0612     Blood Culture, Routine No Growth to date     Blood Culture, Routine No Growth to date     Blood Culture, Routine No Growth to date    Blood culture [[id number]] Collected:  08/10/18 2040    Order Status:  Completed Specimen:  Blood from Peripheral, Antecubital, Left Updated:  08/13/18 0612     Blood Culture, Routine No Growth to date     Blood Culture, Routine No Growth to date     Blood Culture, Routine No Growth to date    Urine culture [[id number]] Collected:  08/12/18 1526    Order Status:  Sent Specimen:  Urine, Catheterized Updated:  08/12/18 1709    C. trachomatis/N. 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 female 24 weeks pregnant with history of polycystic kidney disease with HH4F4 SAH on, now s/p acom coiling 8/10.     NEURO  -q 1 hr neuro checks  -evd open to 10 and draining, monitor icps  -nimotop  -daily tcds  -Keppra    CV  -permissive HTN as aneurysm secured  -exact SBP goals to be discussed with OBGYN    RESP  -stable on room  air    FEN/GI  -strict is and os  -goal net even  -ok for diet      ID  -vanc for drain ppx    PPX  -ok for DVT ppx from neurosurgical perspective  -famotidine for PUD ppx  -TEDs/SCDs    -further mgmt per ncc and obgyn.            Nacho Sanders MD  Neurosurgery  [facility]

## 2018-08-13 NOTE — CONSULTS
Double lumen PICC place to right brachial vein.  36 cm in length, 0 cm exposed. Initial arm circumference 31cm. Lot # [id number].

## 2018-08-13 NOTE — PLAN OF CARE
Problem: Patient Care Overview  Goal: Plan of Care Review  Outcome: Ongoing (interventions implemented as appropriate)  POC reviewed with pt and family at 1400. Pt verbalized understanding. Questions and concerns addressed. No acute events today. R SC TLC removed per physician order d/t loop in catheter as seen on chest xray. PICC placed. R art line removed and L art line inserted. EVD remains open at 10. Pt progressing toward goals. Will continue to monitor. See flowsheets for full assessment and VS info.

## 2018-08-13 NOTE — PT/OT/SLP EVAL
Speech Language Pathology Evaluation  Cognitive/Bedside Swallow    Patient Name:  Sharon Grande   MRN:  [id number]  Admitting Diagnosis: Subarachnoid hemorrhage from anterior communicating artery aneurysm    Recommendations:                  General Recommendations:  Cognitive-linguistic therapy  Diet recommendations:  Regular, Thin   Aspiration Precautions: Standard aspiration precautions   General Precautions: Standard, aspiration  Communication strategies:  none    History:     Past Medical History:   Diagnosis Date    Anemia     Hypertension     since 2012    Polycystic kidney disease     Polycystic kidney disease     Renal disorder     Since childhood        Past Surgical History:   Procedure Laterality Date    DILATION AND CURETTAGE OF UTERUS      2013       Social History: Patient lives home with  and 5 children, Pt currently pregnant .    Prior diet: regular solids and thin liquids     Occupation/hobbies/homemaking: Pt independently cares for children and completes house chores     Subjective     Pt awake alert upon arrival     Pain/Comfort:  · Pain Rating 1: (Pt did not report )  · Pain Rating Post-Intervention 1: (Pt did not report )    Objective:     Cognitive Status:      Attention WFL   Orientation to self,place, situation, date independently   Unable to assess additional problem solving deficits this session ongoing assessment warranted      Receptive Language:   Comprehension:      WFL    Pragmatics:    Pt with significantly flat affect     Expressive Language:  Verbal:    Automatic Speech  Counting WFL, Days of the week WFL and Months of the year WFL  Repetition Words WFL  Naming Confrontation WFL and Divergent responsive WFL  Sentence formulation Fluent conversational exchanges      Motor Speech:  WFL    Voice:   WFL    Visual-Spatial:  WFL    Reading:   WFL for functional information     Written Expression:   Dominant hand: Right for functional information, name date and sentence    Oral  Musculature Evaluation  · Oral Musculature: WFL  · Dentition: present and adequate  · Mucosal Quality: good  · Oral Labial Strength and Mobility: WFL  · Lingual Strength and Mobility: WFL  · Volitional Cough: functional   · Volitional Swallow: prompt   · Voice Prior to PO Intake: strong and clear     Bedside Swallow Eval:   Consistencies Assessed:  · Solids 6oz via open cup and by straw    · Regular solids x3    Oral Phase:   · WFL    Pharyngeal Phase:   · no overt clinical signs/symptoms of aspiration  · no overt clinical signs/symptoms of pharyngeal dysphagia    Compensatory Strategies  · None    Treatment:  Education provided to Pt re: SLP role in acute care setting, overall impressions and therapeutic goals. Whiteboard updated.      Assessment:     Sharon Grande is a 32 y.o. female with an SLP diagnosis of Cognitive-Linguistic Impairment unable to determine if change from baseline.     Goals:   Multidisciplinary Problems     SLP Goals        Problem: SLP Goal    Goal Priority Disciplines Outcome   SLP Goal     SLP    Description:  Speech Language Pathology Goals  Goals expected to be met by 8/ 20    1. Pt will complete mental flexiblity tasks with 90% acc independently to enhance cognition  2. Pt will complete problem solving tasks with 90% acc independently to enhance safety awareness                       Plan:     · Patient to be seen:  3 x/week   · Plan of Care expires:  09/11/18  · Plan of Care reviewed with:  patient   · SLP Follow-Up:  Yes       Discharge recommendations:  Discharge Facility/Level Of Care Needs: home with home health   Barriers to Discharge:  Level of Skilled Assistance Needed      Time Tracking:     SLP Treatment Date:   08/13/18  Speech Start Time:  0915  Speech Stop Time:  0937     Speech Total Time (min):  22 min    Billable Minutes: Eval 11  and Eval Swallow and Oral Function 11    Ashley Olivas CCC-SLP  08/13/2018

## 2018-08-13 NOTE — PROCEDURES
"Sharon Grande is a 32 y.o. female patient.    Temp: 98.8 °F (37.1 °C) (08/13/18 1300)  Pulse: 66 (08/13/18 1500)  Resp: 16 (08/13/18 1500)  BP: 125/64 (08/13/18 1500)  SpO2: 98 % (08/13/18 1500)  Weight: 74.4 kg (164 lb 0.4 oz) (08/13/18 0300)  Height: 5' 9" (175.3 cm) (08/10/18 2100)    PICC  Date/Time: 8/13/2018 3:50 PM  Performed by: Juan Foy RN  Consent Done: Yes  Time out: Immediately prior to procedure a time out was called to verify the correct patient, procedure, equipment, support staff and site/side marked as required  Indications: med administration and vascular access  Anesthesia: local infiltration  Local anesthetic: lidocaine 1% without epinephrine  Anesthetic Total (mL): 3  Preparation: skin prepped with ChloraPrep  Skin prep agent dried: skin prep agent completely dried prior to procedure  Sterile barriers: all five maximum sterile barriers used - cap, mask, sterile gown, sterile gloves, and large sterile sheet  Hand hygiene: hand hygiene performed prior to central venous catheter insertion  Location details: right brachial  Catheter type: double lumen  Catheter size: 5 Fr  Catheter Length: 36cm    Ultrasound guidance: yes  Vessel Caliber: medium and patent, compressibility normal  Needle advanced into vessel with real time Ultrasound guidance.  Guidewire confirmed in vessel.  Image recorded and saved.  Sterile sheath used.  Number of attempts: 1  Post-procedure: blood return through all ports, chlorhexidine patch and sterile dressing applied  Specimens: No  Implants: No  Assessment: placement verified by x-ray  Complications: none          Pato Keller  8/13/2018    "

## 2018-08-13 NOTE — PROCEDURES
"Sharon Grande is a 32 y.o. female patient.    Temp: 98.8 °F (37.1 °C) (18 1300)  Pulse: 66 (18 1800)  Resp: (!) 23 (18 1800)  BP: (!) 145/88 (18 1800)  SpO2: 100 % (18 1800)  Weight: 74.4 kg (164 lb 0.4 oz) (18 0300)  Height: 5' 9" (175.3 cm) (08/10/18 2100)       Arterial Line  Date/Time: 2018 6:24 PM  Location procedure was performed: [facility] NEURO CRITICAL C[facility]  Performed by: Valerio Croft MD  Authorized by: Valerio Croft MD   Assisting provider: Dharmesh Capone MD  Pre-op Diagnosis: SAH  Post-operative diagnosis: SAH  Consent Done: Yes  Consent: Written consent obtained.  Risks and benefits: risks, benefits and alternatives were discussed  Consent given by: patient  Patient understanding: patient states understanding of the procedure being performed  Patient consent: the patient's understanding of the procedure matches consent given  Procedure consent: procedure consent matches procedure scheduled  Relevant documents: relevant documents present and verified  Patient identity confirmed: , MRN, name and verbally with patient  Time out: Immediately prior to procedure a "time out" was called to verify the correct patient, procedure, equipment, support staff and site/side marked as required.  Location: left radial          Valerio Croft  2018  "

## 2018-08-13 NOTE — PLAN OF CARE
Problem: Patient Care Overview  Goal: Plan of Care Review  Outcome: Ongoing (interventions implemented as appropriate)  POC reviewed with pt at 0345. Pt verbalized understanding. Questions and concerns addressed. EVD remains open at 10. LR at 50ml/hr. No acute events overnight. Pt progressing toward goals. Will continue to monitor. See flowsheets for full assessment and VS info

## 2018-08-13 NOTE — PLAN OF CARE
Problem: SLP Goal  Goal: SLP Goal  Pt participated in a comprehensive speech language assessment and swallow assessment.  Pt with safe swallow function to continue current diet. Speech to continue to follow for cognition and overall delayed responses.     Ashley Olivas MS, CCC-SLP  Speech Language Pathologist  Pager: [phone]  Date 8/13/2018

## 2018-08-13 NOTE — SUBJECTIVE & OBJECTIVE
Interval History: NAEON, stable neuro exam this morning. TCDs wnl    Medications:  Continuous Infusions:   lactated ringers 50 mL/hr at 08/13/18 0600     Scheduled Meds:   ceFEPime (MAXIPIME) IVPB  1 g Intravenous Q12H    famotidine  20 mg Oral BID    levetiracetam IVPB  500 mg Intravenous Q12H    lidocaine (PF) 20 mg/mL (2%)  10 mL Other Once    niMODipine  30 mg Oral Q2H    senna-docusate 8.6-50 mg  1 tablet Oral BID    vancomycin (VANCOCIN) IVPB  1,250 mg Intravenous Q12H     PRN Meds:acetaminophen, calcium gluconate IVPB, calcium gluconate IVPB, calcium gluconate IVPB, calcium gluconate, labetalol, magnesium oxide, magnesium oxide, midazolam, oxyCODONE, potassium chloride 10%, potassium chloride 10%, potassium chloride 10%, potassium chloride in water **AND** potassium chloride in water **AND** potassium chloride in water, potassium, sodium phosphates, potassium, sodium phosphates, potassium, sodium phosphates, sodium chloride 0.9%, sodium phosphate IVPB, sodium phosphate IVPB, sodium phosphate IVPB     Review of Systems    Objective:     Weight: 74.4 kg (164 lb 0.4 oz)  Body mass index is 24.22 kg/m².  Vital Signs (Most Recent):  Temp: 98.6 °F (37 °C) (08/13/18 0300)  Pulse: 71 (08/13/18 0600)  Resp: 15 (08/13/18 0600)  BP: 135/76 (08/13/18 0600)  SpO2: 98 % (08/13/18 0600) Vital Signs (24h Range):  Temp:  [98.5 °F (36.9 °C)-99.4 °F (37.4 °C)] 98.6 °F (37 °C)  Pulse:  [66-89] 71  Resp:  [15-34] 15  SpO2:  [98 %-100 %] 98 %  BP: (111-149)/(58-87) 135/76  Arterial Line BP: (105-154)/(59-87) 107/76                           Urethral Catheter 08/10/18 2105 (Active)   Site Assessment Clean;Intact 8/12/2018  3:00 AM   Collection Container Urimeter 8/12/2018  3:00 AM   Securement Method secured to top of thigh w/ adhesive device 8/12/2018  3:00 AM   Catheter Care Performed yes 8/12/2018  3:00 AM   Reason for Continuing Urinary Catheterization Critically ill in ICU requiring intensive monitoring 8/12/2018  3:00  "AM   CAUTI Prevention Bundle StatLock in place w 1" slack;Intact seal between catheter & drainage tubing;Drainage bag off the floor;Green sheeting clip in use;No dependent loops or kinks;Drainage bag not overfilled (<2/3 full);Drainage bag below bladder 8/12/2018  3:00 AM   Output (mL) 75 mL 8/12/2018  6:00 AM            ICP/Ventriculostomy 08/04/18 0000 Ventricular drainage catheter with ICP microsensor Right Other (Comment) (Active)   Level of Ventriculostomy (cm above) 10 8/11/2018  7:05 PM   Status Open to drainage 8/12/2018  3:00 AM   Site Assessment Clean;Dry 8/12/2018  3:00 AM   Site Drainage No drainage 8/12/2018  3:00 AM   Waveform normal waveform 8/12/2018  3:00 AM   Output (mL) 11 mL 8/12/2018  6:00 AM   CSF Color clear;pink 8/12/2018  3:00 AM   Dressing Status Biopatch in place;Clean;Dry;Intact 8/12/2018  3:00 AM   Interventions HOB degrees;bed controls locked 8/12/2018  3:00 AM       Neurosurgery Physical Exam     AOX3, speech fluent  PERRL, EOMI, face symm, tongue midline  HOBSON symm  No drift    Significant Labs:  Recent Labs   Lab  08/11/18   1826   08/11/18 2335 08/13/18   0029  08/13/18 0220 08/13/18   0424   GLU   --    --   186*   --    --   103   --    NA   --    --   137   --    --   136   --    K  3.6   --   3.7   --    --   3.8   --    CL   --    --   113*   --    --   111*   --    CO2   --    --   14*   --    --   19*   --    BUN   --    --   9   --    --   11   --    CREATININE   --    --   0.8   --    --   0.6   --    CALCIUM   --    --   7.4*   --    --   7.3*   --    MG  4.7*   < >  3.7*  3.7*   < >  2.2  2.3  2.2    < > = values in this interval not displayed.     Recent Labs   Lab  08/11/18 2335 08/13/18 0220   WBC  17.66*  13.57*   HGB  8.4*  7.7*   HCT  25.6*  24.7*   PLT  226  196     No results for input(s): LABPT, INR, APTT in the last 48 hours.  Microbiology Results (last 7 days)     Procedure Component Value Units Date/Time    Blood culture [[id number]] Collected:  " 08/10/18 2020    Order Status:  Completed Specimen:  Blood from Peripheral, Foot, Left Updated:  08/13/18 0612     Blood Culture, Routine No Growth to date     Blood Culture, Routine No Growth to date     Blood Culture, Routine No Growth to date    Blood culture [[id number]] Collected:  08/10/18 2040    Order Status:  Completed Specimen:  Blood from Peripheral, Antecubital, Left Updated:  08/13/18 0612     Blood Culture, Routine No Growth to date     Blood Culture, Routine No Growth to date     Blood Culture, Routine No Growth to date    Urine culture [[id number]] Collected:  08/12/18 1526    Order Status:  Sent Specimen:  Urine, Catheterized Updated:  08/12/18 1709    C. trachomatis/N. gonorrhoeae by AMP DNA [facility]; Urine [[id number]] Collected:  08/11/18 1435    Order Status:  Sent Specimen:  Genital Updated:  08/11/18 1435    Culture, Respiratory with Gram Stain [[id number]]     Order Status:  No result Specimen:  Respiratory             Significant Diagnostics:

## 2018-08-13 NOTE — PLAN OF CARE
08/13/18 1349   Discharge Assessment   Assessment Type Discharge Planning Assessment   Confirmed/corrected address and phone number on facesheet? Yes   Assessment information obtained from? Patient   Expected Length of Stay (days) 7   Communicated expected length of stay with patient/caregiver yes   Prior to hospitilization cognitive status: Alert/Oriented   Prior to hospitalization functional status: Independent   Current cognitive status: Alert/Oriented   Current Functional Status: Needs Assistance   Lives With child(traci), dependent   Able to Return to Prior Arrangements unable to determine at this time (comments)   Is patient able to care for self after discharge? Unable to determine at this time (comments)   Who are your caregiver(s) and their phone number(s)? Sharon Edwards (grandmother)  [phone]   Patient's perception of discharge disposition home or selfcare   Readmission Within The Last 30 Days no previous admission in last 30 days   Patient currently being followed by outpatient case management? No   Patient currently receives any other outside agency services? No   Equipment Currently Used at Home none   Do you have any problems affording any of your prescribed medications? No   Is the patient taking medications as prescribed? yes   Does the patient have transportation home? Yes   Transportation Available family or friend will provide   Does the patient receive services at the [facility]? No   Discharge Plan A Rehab   Discharge Plan B Home with family   Patient/Family In Agreement With Plan yes       Inpatient rehab provided to patient for choices.    Patient would like to review list with family.       Discharge/ My Health Packet Folder Given to patient/family:      yes      PCP:  Roman Archibald MD        Pharmacy:    [facility] - [facility] - [address]  [address]  [place] [address]  Phone: [phone] Fax: [phone]        Emergency  Contacts:  Extended Emergency Contact Information  Primary Emergency Contact: Jerry   [facility]  Home Phone: [phone]  Mobile Phone: [phone]  Relation: Grandparent  Secondary Emergency Contact: Eren Roy  Address: [address] [place] [address] [place] [address] [facility]  Home Phone: [phone]  Mobile Phone: [phone]  Relation: Relative      Insurance:  Payor: MEDICAID / Plan: [facility] / Product Type: Managed Medicaid /     Penelope Canseco RN, CCRN-K, CCM  Neuro-Critical Care   [phone]

## 2018-08-13 NOTE — ASSESSMENT & PLAN NOTE
32 y.o. female 24 weeks pregnant with history of polycystic kidney disease with HH4F4 SAH on, now s/p acom coiling 8/10.     NEURO  -q 1 hr neuro checks  -evd open to 10 and draining, monitor icps  -nimotop  -daily tcds  -Keppra    CV  -permissive HTN as aneurysm secured  -exact SBP goals to be discussed with OBGYN    RESP  -stable on room air    FEN/GI  -strict is and os  -goal net even  -ok for diet      ID  -vanc for drain ppx    PPX  -ok for DVT ppx from neurosurgical perspective  -famotidine for PUD ppx  -TEDs/SCDs    -further mgmt per ncc and obgyn.

## 2018-08-14 LAB
ABO + RH BLD: NORMAL
ALBUMIN SERPL BCP-MCNC: 2.4 G/DL
ALP SERPL-CCNC: 54 U/L
ALT SERPL W/O P-5'-P-CCNC: 23 U/L
ANION GAP SERPL CALC-SCNC: 7 MMOL/L
AST SERPL-CCNC: 22 U/L
BASOPHILS # BLD AUTO: 0.01 K/UL
BASOPHILS NFR BLD: 0.1 %
BILIRUB SERPL-MCNC: 0.3 MG/DL
BLD GP AB SCN CELLS X3 SERPL QL: NORMAL
BUN SERPL-MCNC: 11 MG/DL
CALCIUM SERPL-MCNC: 7.9 MG/DL
CHLORIDE SERPL-SCNC: 111 MMOL/L
CO2 SERPL-SCNC: 20 MMOL/L
CREAT SERPL-MCNC: 0.6 MG/DL
DIFFERENTIAL METHOD: ABNORMAL
EOSINOPHIL # BLD AUTO: 0 K/UL
EOSINOPHIL NFR BLD: 0.1 %
ERYTHROCYTE [DISTWIDTH] IN BLOOD BY AUTOMATED COUNT: 15.4 %
EST. GFR  (AFRICAN AMERICAN): >60 ML/MIN/1.73 M^2
EST. GFR  (NON AFRICAN AMERICAN): >60 ML/MIN/1.73 M^2
GLUCOSE SERPL-MCNC: 90 MG/DL
HCT VFR BLD AUTO: 23.9 %
HGB BLD-MCNC: 7.3 G/DL
IMM GRANULOCYTES # BLD AUTO: 0.11 K/UL
IMM GRANULOCYTES NFR BLD AUTO: 1.1 %
INR PPP: 1.1
LYMPHOCYTES # BLD AUTO: 1.9 K/UL
LYMPHOCYTES NFR BLD: 18.9 %
MAGNESIUM SERPL-MCNC: 1.5 MG/DL
MAGNESIUM SERPL-MCNC: 1.7 MG/DL
MCH RBC QN AUTO: 28.2 PG
MCHC RBC AUTO-ENTMCNC: 30.5 G/DL
MCV RBC AUTO: 92 FL
MONOCYTES # BLD AUTO: 0.9 K/UL
MONOCYTES NFR BLD: 8.9 %
NEUTROPHILS # BLD AUTO: 7.1 K/UL
NEUTROPHILS NFR BLD: 70.9 %
NRBC BLD-RTO: 0 /100 WBC
PHOSPHATE SERPL-MCNC: 2.3 MG/DL
PHOSPHATE SERPL-MCNC: 2.4 MG/DL
PLATELET # BLD AUTO: 183 K/UL
PMV BLD AUTO: 10.8 FL
POCT GLUCOSE: 122 MG/DL (ref 70–110)
POCT GLUCOSE: 87 MG/DL (ref 70–110)
POCT GLUCOSE: 92 MG/DL (ref 70–110)
POTASSIUM SERPL-SCNC: 4 MMOL/L
PROT SERPL-MCNC: 6 G/DL
PROTHROMBIN TIME: 11.3 SEC
RBC # BLD AUTO: 2.59 M/UL
SODIUM SERPL-SCNC: 134 MMOL/L
SODIUM SERPL-SCNC: 134 MMOL/L
SODIUM SERPL-SCNC: 136 MMOL/L
SODIUM SERPL-SCNC: 138 MMOL/L
WBC # BLD AUTO: 10.02 K/UL

## 2018-08-14 PROCEDURE — 97530 THERAPEUTIC ACTIVITIES: CPT

## 2018-08-14 PROCEDURE — 84100 ASSAY OF PHOSPHORUS: CPT | Mod: 91

## 2018-08-14 PROCEDURE — 84100 ASSAY OF PHOSPHORUS: CPT

## 2018-08-14 PROCEDURE — 20000000 HC ICU ROOM

## 2018-08-14 PROCEDURE — A4216 STERILE WATER/SALINE, 10 ML: HCPCS | Performed by: PSYCHIATRY & NEUROLOGY

## 2018-08-14 PROCEDURE — 25000003 PHARM REV CODE 250: Performed by: STUDENT IN AN ORGANIZED HEALTH CARE EDUCATION/TRAINING PROGRAM

## 2018-08-14 PROCEDURE — 63600175 PHARM REV CODE 636 W HCPCS: Performed by: PHYSICIAN ASSISTANT

## 2018-08-14 PROCEDURE — 99232 SBSQ HOSP IP/OBS MODERATE 35: CPT | Mod: ,,, | Performed by: NEUROLOGICAL SURGERY

## 2018-08-14 PROCEDURE — 94761 N-INVAS EAR/PLS OXIMETRY MLT: CPT

## 2018-08-14 PROCEDURE — 83735 ASSAY OF MAGNESIUM: CPT | Mod: 91

## 2018-08-14 PROCEDURE — 83735 ASSAY OF MAGNESIUM: CPT

## 2018-08-14 PROCEDURE — 85610 PROTHROMBIN TIME: CPT

## 2018-08-14 PROCEDURE — 97535 SELF CARE MNGMENT TRAINING: CPT

## 2018-08-14 PROCEDURE — 25000003 PHARM REV CODE 250: Performed by: PHYSICIAN ASSISTANT

## 2018-08-14 PROCEDURE — A4217 STERILE WATER/SALINE, 500 ML: HCPCS | Performed by: STUDENT IN AN ORGANIZED HEALTH CARE EDUCATION/TRAINING PROGRAM

## 2018-08-14 PROCEDURE — 25000003 PHARM REV CODE 250: Performed by: PSYCHIATRY & NEUROLOGY

## 2018-08-14 PROCEDURE — 99233 SBSQ HOSP IP/OBS HIGH 50: CPT | Mod: ,,, | Performed by: NEUROLOGICAL SURGERY

## 2018-08-14 PROCEDURE — 80053 COMPREHEN METABOLIC PANEL: CPT

## 2018-08-14 PROCEDURE — 63600175 PHARM REV CODE 636 W HCPCS: Performed by: STUDENT IN AN ORGANIZED HEALTH CARE EDUCATION/TRAINING PROGRAM

## 2018-08-14 PROCEDURE — 84295 ASSAY OF SERUM SODIUM: CPT

## 2018-08-14 PROCEDURE — 63600175 PHARM REV CODE 636 W HCPCS: Performed by: PSYCHIATRY & NEUROLOGY

## 2018-08-14 PROCEDURE — 85025 COMPLETE CBC W/AUTO DIFF WBC: CPT

## 2018-08-14 PROCEDURE — 99232 SBSQ HOSP IP/OBS MODERATE 35: CPT | Mod: ,,, | Performed by: NURSE PRACTITIONER

## 2018-08-14 PROCEDURE — 86850 RBC ANTIBODY SCREEN: CPT

## 2018-08-14 PROCEDURE — 99291 CRITICAL CARE FIRST HOUR: CPT | Mod: ,,, | Performed by: ANESTHESIOLOGY

## 2018-08-14 RX ORDER — CEFAZOLIN SODIUM 1 G/3ML
1 INJECTION, POWDER, FOR SOLUTION INTRAMUSCULAR; INTRAVENOUS
Status: DISCONTINUED | OUTPATIENT
Start: 2018-08-14 | End: 2018-08-28

## 2018-08-14 RX ADMIN — OXYCODONE HYDROCHLORIDE 15 MG: 5 TABLET ORAL at 06:08

## 2018-08-14 RX ADMIN — Medication 10 ML: at 05:08

## 2018-08-14 RX ADMIN — NIMODIPINE 30 MG: 30 CAPSULE, LIQUID FILLED ORAL at 08:08

## 2018-08-14 RX ADMIN — NIMODIPINE 30 MG: 30 CAPSULE, LIQUID FILLED ORAL at 02:08

## 2018-08-14 RX ADMIN — POTASSIUM & SODIUM PHOSPHATES POWDER PACK 280-160-250 MG 2 PACKET: 280-160-250 PACK at 08:08

## 2018-08-14 RX ADMIN — NIMODIPINE 30 MG: 30 CAPSULE, LIQUID FILLED ORAL at 10:08

## 2018-08-14 RX ADMIN — OXYCODONE HYDROCHLORIDE 15 MG: 5 TABLET ORAL at 07:08

## 2018-08-14 RX ADMIN — HEPARIN SODIUM 5000 UNITS: 5000 INJECTION, SOLUTION INTRAVENOUS; SUBCUTANEOUS at 10:08

## 2018-08-14 RX ADMIN — MAGNESIUM OXIDE TAB 400 MG (241.3 MG ELEMENTAL MG) 800 MG: 400 (241.3 MG) TAB at 08:08

## 2018-08-14 RX ADMIN — CEFAZOLIN 1 G: 1 INJECTION, POWDER, FOR SOLUTION INTRAMUSCULAR; INTRAVENOUS at 02:08

## 2018-08-14 RX ADMIN — MAGNESIUM OXIDE TAB 400 MG (241.3 MG ELEMENTAL MG) 800 MG: 400 (241.3 MG) TAB at 04:08

## 2018-08-14 RX ADMIN — ACETAMINOPHEN 650 MG: 325 TABLET, FILM COATED ORAL at 08:08

## 2018-08-14 RX ADMIN — LEVETIRACETAM 500 MG: 500 TABLET, FILM COATED ORAL at 08:08

## 2018-08-14 RX ADMIN — OXYCODONE HYDROCHLORIDE 15 MG: 5 TABLET ORAL at 11:08

## 2018-08-14 RX ADMIN — SODIUM CHLORIDE 500 ML: 0.9 INJECTION, SOLUTION INTRAVENOUS at 10:08

## 2018-08-14 RX ADMIN — Medication 10 ML: at 11:08

## 2018-08-14 RX ADMIN — NIMODIPINE 30 MG: 30 CAPSULE, LIQUID FILLED ORAL at 05:08

## 2018-08-14 RX ADMIN — OXYCODONE HYDROCHLORIDE 15 MG: 5 TABLET ORAL at 10:08

## 2018-08-14 RX ADMIN — CEFEPIME 1 G: 1 INJECTION, POWDER, FOR SOLUTION INTRAMUSCULAR; INTRAVENOUS at 05:08

## 2018-08-14 RX ADMIN — NIMODIPINE 30 MG: 30 CAPSULE, LIQUID FILLED ORAL at 04:08

## 2018-08-14 RX ADMIN — SODIUM CHLORIDE: 234 INJECTION INTRAMUSCULAR; INTRAVENOUS; SUBCUTANEOUS at 11:08

## 2018-08-14 RX ADMIN — POTASSIUM & SODIUM PHOSPHATES POWDER PACK 280-160-250 MG 2 PACKET: 280-160-250 PACK at 04:08

## 2018-08-14 RX ADMIN — HEPARIN SODIUM 5000 UNITS: 5000 INJECTION, SOLUTION INTRAVENOUS; SUBCUTANEOUS at 02:08

## 2018-08-14 RX ADMIN — FAMOTIDINE 20 MG: 20 TABLET ORAL at 08:08

## 2018-08-14 RX ADMIN — HEPARIN SODIUM 5000 UNITS: 5000 INJECTION, SOLUTION INTRAVENOUS; SUBCUTANEOUS at 05:08

## 2018-08-14 RX ADMIN — OXYCODONE HYDROCHLORIDE 15 MG: 5 TABLET ORAL at 02:08

## 2018-08-14 RX ADMIN — POTASSIUM & SODIUM PHOSPHATES POWDER PACK 280-160-250 MG 2 PACKET: 280-160-250 PACK at 05:08

## 2018-08-14 RX ADMIN — SENNOSIDES AND DOCUSATE SODIUM 1 TABLET: 8.6; 5 TABLET ORAL at 08:08

## 2018-08-14 RX ADMIN — CEFAZOLIN 1 G: 1 INJECTION, POWDER, FOR SOLUTION INTRAMUSCULAR; INTRAVENOUS at 09:08

## 2018-08-14 RX ADMIN — PRENATAL VIT W/ FE FUMARATE-FA TAB 27-0.8 MG 1 TABLET: 27-0.8 TAB at 10:08

## 2018-08-14 RX ADMIN — NIMODIPINE 30 MG: 30 CAPSULE, LIQUID FILLED ORAL at 01:08

## 2018-08-14 RX ADMIN — ACETAMINOPHEN 650 MG: 325 TABLET, FILM COATED ORAL at 05:08

## 2018-08-14 NOTE — HPI
" "Rolando Grande is a 32-year-old female  at 24 weeks (EDC 18) with PMHx of HTN, polycystic kidney disease, and anemia.  Patient presented to [facility] with AMS after being found face down by 6-year-old son.  On arrival, found to be hypertensive and aphasic (non-verbal and not following commands).  CTH revealed SAH within the basal cisterns, likely 2/2 aneurysm rupture.  She was intubated, sedated, and then transferred to [facility] [place]10[place] for further evaluation and management.  Upon admission, cerebral angiogram performed with successful coil embolization of Acomm aneurysm.  Evaluated by Neurosurgery and underwent ani hole EVD placement.  Repeat CTH stable.  Extubated 18.  OB following.     Functional History: Patient lives in [place] with her  and 5 children in a 2 story home without steps to enter and 14 steps to 2nd floor.  Prior to admission, she was (I) with ADLs and mobility.  DME: none.    "

## 2018-08-14 NOTE — ANESTHESIA POSTPROCEDURE EVALUATION
"Anesthesia Post Evaluation    Patient: Sharon Grande    Procedure(s) Performed: Procedure(s) (LRB):  Mri (magnetic resonance imaging) (N/A)    Final Anesthesia Type: general  Patient location during evaluation: ICU  Patient participation: No - Unable to Participate, Intubation  Level of consciousness: sedated  Post-procedure vital signs: reviewed and stable  Pain management: adequate  Airway patency: patent  PONV status at discharge: No PONV  Anesthetic complications: no      Cardiovascular status: blood pressure returned to baseline and hemodynamically stable  Respiratory status: unassisted  Hydration status: euvolemic  Follow-up not needed.        Visit Vitals  BP (!) 164/81   Pulse 62   Temp 36.8 °C (98.2 °F)   Resp 16   Ht 5' 9" (1.753 m)   Wt 76.9 kg (169 lb 8.5 oz)   LMP 05/12/2018   SpO2 100%   Breastfeeding? No   BMI 25.04 kg/m²       Pain/Chicho Score: Pain Assessment Performed: Yes (8/14/2018  9:05 AM)  Presence of Pain: complains of pain/discomfort (8/14/2018  9:05 AM)  Pain Rating Prior to Med Admin: 10 (8/14/2018 10:15 AM)  Pain Rating Post Med Admin: 8 (8/13/2018  2:27 PM)        "

## 2018-08-14 NOTE — PT/OT/SLP PROGRESS
Speech Language Pathology      Sharon Grande  MRN: [id number]    Patient not seen today secondary to Patient unwilling to participate. Pt reporting 10/10 head pain and did not want to participate in cognitive -linguistic and critical thinking tasks.       Ashley Olivas, TIAN-SLP

## 2018-08-14 NOTE — PROGRESS NOTES
[facility]  Neurosurgery  Progress Note    Subjective:     History of Present Illness: 32 y.o. female 24 weeks pregnant with a history of polycystic kidney disease who presents as transfer from [facility] for SAH. Patient found down by 6 yr old son. Last known normal 0500 today. CT at [facility] revealed SAH within the basal cisterns. US at [facility] with + fetal heart tones. Transferred for neuro evaluation.     Post-Op Info:  Procedure(s) (LRB):  Mri (magnetic resonance imaging) (N/A)   4 Days Post-Op     Interval History: NAEON, stable neuro exam this morning. TCDs wnl    Medications:  Continuous Infusions:   sodium chloride 0.9% 75 mL/hr at 08/14/18 0905     Scheduled Meds:   ceFEPime (MAXIPIME) IVPB  1 g Intravenous Q8H    famotidine  20 mg Oral BID    heparin (porcine)  5,000 Units Subcutaneous Q8H    levETIRAcetam  500 mg Oral BID    niMODipine  30 mg Oral Q2H    prenatal vitamin  1 tablet Oral Daily    senna-docusate 8.6-50 mg  1 tablet Oral BID    sodium chloride 0.9%  10 mL Intravenous Q6H     PRN Meds:acetaminophen, calcium gluconate, labetalol, magnesium oxide, magnesium oxide, midazolam, oxyCODONE, potassium chloride 10%, potassium chloride 10%, potassium chloride 10%, potassium, sodium phosphates, potassium, sodium phosphates, potassium, sodium phosphates, Flushing PICC Protocol **AND** sodium chloride 0.9% **AND** sodium chloride 0.9%, sodium chloride 0.9%     Review of Systems    Objective:     Weight: 76.9 kg (169 lb 8.5 oz)  Body mass index is 25.04 kg/m².  Vital Signs (Most Recent):  Temp: 98.2 °F (36.8 °C) (08/14/18 0805)  Pulse: 62 (08/14/18 0905)  Resp: 16 (08/14/18 0905)  BP: (!) 164/81 (08/14/18 0905)  SpO2: 100 % (08/14/18 0905) Vital Signs (24h Range):  Temp:  [98.2 °F (36.8 °C)-100 °F (37.8 °C)] 98.2 °F (36.8 °C)  Pulse:  [59-78] 62  Resp:  [14-28] 16  SpO2:  [98 %-100 %] 100 %  BP: (125-164)/(63-95) 164/81  Arterial Line BP: (141-190)/(68-85) 182/84     Date 08/14/18 0700 -  "08/15/18 0659   Shift 9762-7671 6738-5441 6600-1500 24 Hour Total   INTAKE   I.V.(mL/kg) 246.3(3.2)   246.3(3.2)   Shift Total(mL/kg) 246.3(3.2)   246.3(3.2)   OUTPUT   Urine(mL/kg/hr) 300   300   Drains 47   47   Shift Total(mL/kg) 347(4.5)   347(4.5)   Weight (kg) 76.9 76.9 76.9 76.9                        Urethral Catheter 08/10/18 2105 (Active)   Site Assessment Clean;Intact 8/12/2018  3:00 AM   Collection Container Urimeter 8/12/2018  3:00 AM   Securement Method secured to top of thigh w/ adhesive device 8/12/2018  3:00 AM   Catheter Care Performed yes 8/12/2018  3:00 AM   Reason for Continuing Urinary Catheterization Critically ill in ICU requiring intensive monitoring 8/12/2018  3:00 AM   CAUTI Prevention Bundle StatLock in place w 1" slack;Intact seal between catheter & drainage tubing;Drainage bag off the floor;Green sheeting clip in use;No dependent loops or kinks;Drainage bag not overfilled (<2/3 full);Drainage bag below bladder 8/12/2018  3:00 AM   Output (mL) 75 mL 8/12/2018  6:00 AM            ICP/Ventriculostomy 08/04/18 0000 Ventricular drainage catheter with ICP microsensor Right Other (Comment) (Active)   Level of Ventriculostomy (cm above) 10 8/11/2018  7:05 PM   Status Open to drainage 8/12/2018  3:00 AM   Site Assessment Clean;Dry 8/12/2018  3:00 AM   Site Drainage No drainage 8/12/2018  3:00 AM   Waveform normal waveform 8/12/2018  3:00 AM   Output (mL) 11 mL 8/12/2018  6:00 AM   CSF Color clear;pink 8/12/2018  3:00 AM   Dressing Status Biopatch in place;Clean;Dry;Intact 8/12/2018  3:00 AM   Interventions HOB degrees;bed controls locked 8/12/2018  3:00 AM       Neurosurgery Physical Exam     AOX3, speech fluent  PERRL, EOMI, face symm, tongue midline  HOBSON symm  No drift    Significant Labs:  Recent Labs   Lab  08/13/18   0220  08/13/18   0424  08/13/18   0850   08/13/18   1957  08/14/18   0157  08/14/18   0404   GLU  103   --    --    --    --   90   --    NA  136   --    --    < >  136  138  " 134*   K  3.8   --   4.2   --    --   4.0   --    CL  111*   --    --    --    --   111*   --    CO2  19*   --    --    --    --   20*   --    BUN  11   --    --    --    --   11   --    CREATININE  0.6   --    --    --    --   0.6   --    CALCIUM  7.3*   --    --    --    --   7.9*   --    MG  2.3  2.2  2.2   --    --   1.7   --     < > = values in this interval not displayed.     Recent Labs   Lab  08/13/18   0220  08/14/18   0157   WBC  13.57*  10.02   HGB  7.7*  7.3*   HCT  24.7*  23.9*   PLT  196  183     Recent Labs   Lab  08/13/18   1230  08/14/18   0156   INR  1.0  1.1     Microbiology Results (last 7 days)     Procedure Component Value Units Date/Time    Blood culture [[id number]] Collected:  08/10/18 2020    Order Status:  Completed Specimen:  Blood from Peripheral, Foot, Left Updated:  08/14/18 0612     Blood Culture, Routine No Growth to date     Blood Culture, Routine No Growth to date     Blood Culture, Routine No Growth to date     Blood Culture, Routine No Growth to date    Blood culture [[id number]] Collected:  08/10/18 2040    Order Status:  Completed Specimen:  Blood from Peripheral, Antecubital, Left Updated:  08/14/18 0612     Blood Culture, Routine No Growth to date     Blood Culture, Routine No Growth to date     Blood Culture, Routine No Growth to date     Blood Culture, Routine No Growth to date    Urine culture [[id number]] Collected:  08/12/18 1526    Order Status:  Completed Specimen:  Urine, Catheterized Updated:  08/13/18 2118     Urine Culture, Routine No growth    C. trachomatis/N. 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 female 24 weeks pregnant with history of polycystic kidney disease with HH4F4 SAH on 8/10, now s/p acom coiling 8/10.     NEURO  -Admitted to neuro-ICU  -q 1 hr neuro checks  -evd open to 10 and draining, monitor icps  -nimotop  -daily tcds  -Keppra    CV  -permissive HTN  <200 as aneurysm secured    RESP  -stable on room air  -Aggressive pulmonary management  -IS @ bedside    FEN/GI  -strict I/Os  -goal net even or positive  -regular diet    ID  -vanc for drain ppx    PPX  -ok for DVT ppx from neurosurgical perspective  -famotidine for PUD ppx  -TEDs/SCDs    -further mgmt per ncc and obgyn.            Josue Thomas MD  Neurosurgery  [facility]

## 2018-08-14 NOTE — PLAN OF CARE
Problem: Patient Care Overview  Goal: Plan of Care Review  Outcome: Ongoing (interventions implemented as appropriate)  POC reviewed with pt and spouse at 0430. Pt verbalized understanding. Questions and concerns addressed. No acute events overnight. EVD remains open at 10. NS at 75ml/hr. Pt progressing toward goals. Will continue to monitor. See flowsheets for full assessment and VS info

## 2018-08-14 NOTE — SUBJECTIVE & OBJECTIVE
Interval History: NAEON, stable neuro exam this morning. TCDs wnl    Medications:  Continuous Infusions:   sodium chloride 0.9% 75 mL/hr at 08/14/18 0905     Scheduled Meds:   ceFEPime (MAXIPIME) IVPB  1 g Intravenous Q8H    famotidine  20 mg Oral BID    heparin (porcine)  5,000 Units Subcutaneous Q8H    levETIRAcetam  500 mg Oral BID    niMODipine  30 mg Oral Q2H    prenatal vitamin  1 tablet Oral Daily    senna-docusate 8.6-50 mg  1 tablet Oral BID    sodium chloride 0.9%  10 mL Intravenous Q6H     PRN Meds:acetaminophen, calcium gluconate, labetalol, magnesium oxide, magnesium oxide, midazolam, oxyCODONE, potassium chloride 10%, potassium chloride 10%, potassium chloride 10%, potassium, sodium phosphates, potassium, sodium phosphates, potassium, sodium phosphates, Flushing PICC Protocol **AND** sodium chloride 0.9% **AND** sodium chloride 0.9%, sodium chloride 0.9%     Review of Systems    Objective:     Weight: 76.9 kg (169 lb 8.5 oz)  Body mass index is 25.04 kg/m².  Vital Signs (Most Recent):  Temp: 98.2 °F (36.8 °C) (08/14/18 0805)  Pulse: 62 (08/14/18 0905)  Resp: 16 (08/14/18 0905)  BP: (!) 164/81 (08/14/18 0905)  SpO2: 100 % (08/14/18 0905) Vital Signs (24h Range):  Temp:  [98.2 °F (36.8 °C)-100 °F (37.8 °C)] 98.2 °F (36.8 °C)  Pulse:  [59-78] 62  Resp:  [14-28] 16  SpO2:  [98 %-100 %] 100 %  BP: (125-164)/(63-95) 164/81  Arterial Line BP: (141-190)/(68-85) 182/84     Date 08/14/18 0700 - 08/15/18 0659   Shift 5993-3709 9365-6926 9002-1790 24 Hour Total   INTAKE   I.V.(mL/kg) 246.3(3.2)   246.3(3.2)   Shift Total(mL/kg) 246.3(3.2)   246.3(3.2)   OUTPUT   Urine(mL/kg/hr) 300   300   Drains 47   47   Shift Total(mL/kg) 347(4.5)   347(4.5)   Weight (kg) 76.9 76.9 76.9 76.9                        Urethral Catheter 08/10/18 2105 (Active)   Site Assessment Clean;Intact 8/12/2018  3:00 AM   Collection Container Urimeter 8/12/2018  3:00 AM   Securement Method secured to top of thigh w/ adhesive device  "8/12/2018  3:00 AM   Catheter Care Performed yes 8/12/2018  3:00 AM   Reason for Continuing Urinary Catheterization Critically ill in ICU requiring intensive monitoring 8/12/2018  3:00 AM   CAUTI Prevention Bundle StatLock in place w 1" slack;Intact seal between catheter & drainage tubing;Drainage bag off the floor;Green sheeting clip in use;No dependent loops or kinks;Drainage bag not overfilled (<2/3 full);Drainage bag below bladder 8/12/2018  3:00 AM   Output (mL) 75 mL 8/12/2018  6:00 AM            ICP/Ventriculostomy 08/04/18 0000 Ventricular drainage catheter with ICP microsensor Right Other (Comment) (Active)   Level of Ventriculostomy (cm above) 10 8/11/2018  7:05 PM   Status Open to drainage 8/12/2018  3:00 AM   Site Assessment Clean;Dry 8/12/2018  3:00 AM   Site Drainage No drainage 8/12/2018  3:00 AM   Waveform normal waveform 8/12/2018  3:00 AM   Output (mL) 11 mL 8/12/2018  6:00 AM   CSF Color clear;pink 8/12/2018  3:00 AM   Dressing Status Biopatch in place;Clean;Dry;Intact 8/12/2018  3:00 AM   Interventions HOB degrees;bed controls locked 8/12/2018  3:00 AM       Neurosurgery Physical Exam     AOX3, speech fluent  PERRL, EOMI, face symm, tongue midline  HOBSON symm  No drift    Significant Labs:  Recent Labs   Lab  08/13/18   0220  08/13/18   0424  08/13/18   0850   08/13/18   1957  08/14/18   0157  08/14/18   0404   GLU  103   --    --    --    --   90   --    NA  136   --    --    < >  136  138  134*   K  3.8   --   4.2   --    --   4.0   --    CL  111*   --    --    --    --   111*   --    CO2  19*   --    --    --    --   20*   --    BUN  11   --    --    --    --   11   --    CREATININE  0.6   --    --    --    --   0.6   --    CALCIUM  7.3*   --    --    --    --   7.9*   --    MG  2.3  2.2  2.2   --    --   1.7   --     < > = values in this interval not displayed.     Recent Labs   Lab  08/13/18   0220  08/14/18   0157   WBC  13.57*  10.02   HGB  7.7*  7.3*   HCT  24.7*  23.9*   PLT  196  183 "     Recent Labs   Lab  08/13/18   1230  08/14/18   0156   INR  1.0  1.1     Microbiology Results (last 7 days)     Procedure Component Value Units Date/Time    Blood culture [[id number]] Collected:  08/10/18 2020    Order Status:  Completed Specimen:  Blood from Peripheral, Foot, Left Updated:  08/14/18 0612     Blood Culture, Routine No Growth to date     Blood Culture, Routine No Growth to date     Blood Culture, Routine No Growth to date     Blood Culture, Routine No Growth to date    Blood culture [[id number]] Collected:  08/10/18 2040    Order Status:  Completed Specimen:  Blood from Peripheral, Antecubital, Left Updated:  08/14/18 0612     Blood Culture, Routine No Growth to date     Blood Culture, Routine No Growth to date     Blood Culture, Routine No Growth to date     Blood Culture, Routine No Growth to date    Urine culture [[id number]] Collected:  08/12/18 1526    Order Status:  Completed Specimen:  Urine, Catheterized Updated:  08/13/18 2118     Urine Culture, Routine No growth    C. trachomatis/N. gonorrhoeae by AMP DNA [facility]; Urine [[id number]] Collected:  08/11/18 1435    Order Status:  Sent Specimen:  Genital Updated:  08/11/18 1435    Culture, Respiratory with Gram Stain [[id number]]     Order Status:  No result Specimen:  Respiratory             Significant Diagnostics:  All significant diagnostic labs and imaging reviewed

## 2018-08-14 NOTE — PT/OT/SLP PROGRESS
Occupational Therapy   Treatment    Name: Sharon Grande  MRN: [id number]  Admitting Diagnosis:  Subarachnoid hemorrhage from anterior communicating artery aneurysm  4 Days Post-Op    Recommendations:     Discharge Recommendations: rehabilitation facility  Discharge Equipment Recommendations:  (TBD)  Barriers to discharge:  Other (Comment)(increased assistance required)    Subjective     Communicated with: RN prior to session. RN clamped EVD prior to pt participating in therapy session.  Patient found supine upon OT entry to room. Pt alert and agreeable to treatment.      Pain/Comfort:  · Pain Rating 1: (Did not rate)  · Location 1: head  · Pain Addressed 1: Reposition, Distraction, Cessation of Activity, Nurse notified    Patients cultural, spiritual, Restoration conflicts given the current situation: None    Objective:     Patient found with: arterial line, blood pressure cuff, external ventricular drain, pulse ox (continuous), peripheral IV, telemetry    General Precautions: Standard, aspiration, fall   Orthopedic Precautions:N/A   Braces: N/A     Occupational Performance:    Bed Mobility:    · Patient completed Rolling/Turning to Right with CGA with side rail  · Patient completed Supine to Sit with Min A and increased time due to headache  · Patient completed Sit to Supine with CGA     Functional Mobility/Transfers:  · Patient completed Sit <> Stand Transfer with Min A with no assistive device from EOB, hand-held assist  · Functional Mobility: Few side steps along EOB with Min A hand-held assist; distance limited by headache    Activities of Daily Living:  · Feeding:  set-up assistance with breakfast tray in bed    · Grooming: Min A for standing balance while completing oral hygiene on bedside tray with UE support; postural sway and posterior lean    · Upper Body Dressing: Min A to don gown around back    · Lower Body Dressing: Total A to don socks EOB; unable to lean forward or come to 4 pt position      Patient  left supine with all lines intact, call button in reach, RN notified and SLP present    [facility] 6 Click:  [facility] Total Score: 18    Treatment & Education:  Pt limited by headache throughout treatment; able to sit EOB with supervision with B UE supported on bed, completed gentle cervical stretches; standing ADLs ~5 min at EOB using bedside tray; able to take few side steps and returned to supine due to hedadache; reviewed HEP to complete as tolerated  Education:    Assessment:     Sharon Grande is a 32 y.o. female with a medical diagnosis of Subarachnoid hemorrhage from anterior communicating artery aneurysm.  She presents with performance deficits including weakness, impaired endurance, impaired self care skills, impaired functional mobilty, impaired balance, pain. Pt would continue to benefit from OT to increase functional independence. Recommend rehab upon D/C.     Rehab Prognosis:  Good; patient would benefit from acute skilled OT services to address these deficits and reach maximum level of function.       Plan:     Patient to be seen 4 x/week to address the above listed problems via self-care/home management, therapeutic activities, therapeutic exercises, neuromuscular re-education  · Plan of Care Expires: 09/10/18  · Plan of Care Reviewed with: patient    This Plan of care has been discussed with the patient who was involved in its development and understands and is in agreement with the identified goals and treatment plan    GOALS:   Multidisciplinary Problems     Occupational Therapy Goals        Problem: Occupational Therapy Goal    Goal Priority Disciplines Outcome Interventions   Occupational Therapy Goal     OT, PT/OT Ongoing (interventions implemented as appropriate)    Description:  Goals to be met by: 8/19    UE Dressing while standing with Set-up Assistance and Stand-by Assistance.  LE Dressing (socks, brief) with Contact Guard Assistance.  Grooming while standing with Contact Guard Assistance.  Stand  pivot transfers with Contact Guard Assistance.  Functional mobility at short household distance for ADL task with min(A) and vitals WFL.  Upper extremity exercise program x15 reps per handout, with assistance as needed.                    Time Tracking:     OT Date of Treatment: 08/14/18  OT Start Time: 0919  OT Stop Time: 0942  OT Total Time (min): 23 min (co-treat with PT)    Billable Minutes:Self Care/Home Management 23 minutes    GARRET Flood  8/14/2018

## 2018-08-14 NOTE — SUBJECTIVE & OBJECTIVE
Past Medical History:   Diagnosis Date    Anemia     Hypertension     since 2012    Polycystic kidney disease     Polycystic kidney disease     Renal disorder     Since childhood      Past Surgical History:   Procedure Laterality Date    DILATION AND CURETTAGE OF UTERUS      2013     Review of patient's allergies indicates:  No Known Allergies    Scheduled Medications:    ceFAZolin (ANCEF) IVPB  1 g Intravenous Q8H    famotidine  20 mg Oral BID    heparin (porcine)  5,000 Units Subcutaneous Q8H    levETIRAcetam  500 mg Oral BID    niMODipine  30 mg Oral Q2H    prenatal vitamin  1 tablet Oral Daily    senna-docusate 8.6-50 mg  1 tablet Oral BID    sodium chloride 0.9%  500 mL Intravenous Once    sodium chloride 0.9%  10 mL Intravenous Q6H       PRN Medications: acetaminophen, calcium gluconate, labetalol, magnesium oxide, magnesium oxide, midazolam, oxyCODONE, potassium chloride 10%, potassium chloride 10%, potassium chloride 10%, potassium, sodium phosphates, potassium, sodium phosphates, potassium, sodium phosphates, Flushing PICC Protocol **AND** sodium chloride 0.9% **AND** sodium chloride 0.9%, sodium chloride 0.9%    Family History     Problem Relation (Age of Onset)    Hypertension Mother, Paternal Grandmother    Polycystic kidney disease Mother, Daughter        Tobacco Use    Smoking status: Never Smoker    Smokeless tobacco: Never Used   Substance and Sexual Activity    Alcohol use: Yes     Comment: occasional    Drug use: No    Sexual activity: Yes     Partners: Male     Birth control/protection: None     Review of Systems   Constitutional: Positive for activity change. Negative for chills, fatigue and fever.   HENT: Negative for drooling, hearing loss, trouble swallowing and voice change.    Eyes: Negative for pain and visual disturbance.   Respiratory: Negative for cough, shortness of breath and wheezing.    Cardiovascular: Negative for chest pain and palpitations.   Gastrointestinal:  Negative for abdominal distention, nausea and vomiting.   Genitourinary: Negative for difficulty urinating and flank pain.        Positive for pregnancy.    Musculoskeletal: Positive for neck pain and neck stiffness. Negative for arthralgias, back pain and myalgias.   Skin: Positive for wound. Negative for rash.   Neurological: Positive for weakness and headaches. Negative for dizziness and numbness.   Psychiatric/Behavioral: Negative for agitation and hallucinations. The patient is not nervous/anxious.      Objective:     Vital Signs (Most Recent):  Temp: 98.2 °F (36.8 °C) (18)  Pulse: 62 (18)  Resp: 16 (18)  BP: (!) 164/81 (18)  SpO2: 100 % (18)    Vital Signs (24h Range):  Temp:  [98.2 °F (36.8 °C)-100 °F (37.8 °C)] 98.2 °F (36.8 °C)  Pulse:  [59-78] 62  Resp:  [14-28] 16  SpO2:  [98 %-100 %] 100 %  BP: (125-164)/(63-95) 164/81  Arterial Line BP: (141-190)/(68-85) 182/84     Body mass index is 25.04 kg/m².    Physical Exam   Constitutional: She is oriented to person, place, and time. She appears well-developed and well-nourished. No distress.   HENT:   Head: Normocephalic and atraumatic.   Right Ear: External ear normal.   Left Ear: External ear normal.   Nose: Nose normal.   EVD in place.    Eyes: Right eye exhibits no discharge. Left eye exhibits no discharge. No scleral icterus.   Neck: Muscular tenderness present. Decreased range of motion present.   Cardiovascular: Normal rate, regular rhythm and intact distal pulses.   Pulmonary/Chest: Effort normal. No respiratory distress. She has no wheezes.   Abdominal: She exhibits no ascites. There is no tenderness.   Musculoskeletal: She exhibits no edema or tenderness.   Neurological: She is alert and oriented to person, place, and time. No sensory deficit.   -  Mental Status:  AAOx3.  Follows commands.  Answers correct age and .  Recent and remote memory intact.  Recalls 3/3 objects.  -  Speech and language:   no aphasia or dysarthria.    -  Facial movement (CN VII): symmetrical.   -  Motor:  No pronator drift. RUE: 4/5.  LUE: 4/5.  RLE: 3-/5.  LLE: 3-/5.  -  Sensory:  Intact to light touch and pin prick.   Skin: Skin is warm and dry. No rash noted.   Psychiatric: She has a normal mood and affect. Her behavior is normal. Thought content normal.   Vitals reviewed.    NEUROLOGICAL EXAMINATION:     MENTAL STATUS   Oriented to person, place, and time.       Diagnostic Results:   Labs: Reviewed  X-Ray: Reviewed  US: Reviewed  CT: Reviewed

## 2018-08-14 NOTE — ASSESSMENT & PLAN NOTE
32 y.o. female 24 weeks pregnant with history of polycystic kidney disease with HH4F4 SAH on 8/10, now s/p acom coiling 8/10.     NEURO  -Admitted to neuro-ICU  -q 1 hr neuro checks  -evd open to 10 and draining, monitor icps  -nimotop  -daily tcds  -Keppra    CV  -permissive HTN  <200 as aneurysm secured    RESP  -stable on room air  -Aggressive pulmonary management  -IS @ bedside    FEN/GI  -strict I/Os  -goal net even or positive  -regular diet    ID  -vanc for drain ppx    PPX  -ok for DVT ppx from neurosurgical perspective  -famotidine for PUD ppx  -TEDs/SCDs    -further mgmt per ncc and obgyn.

## 2018-08-14 NOTE — PROGRESS NOTES
MFM Chart Review    Chart reviewed and events since yesterday noted. Overall appears stable from an obstetric standpoint.  We will continue to follow along with you but will space out visits.  If any acute problems arise please notify 1 of the residents on-call at [facility].  They can be reached on [phone].  At this point in reviewing all of her labs and her blood pressures, she does not have evidence of preeclampsia.  If any questions arise regarding medication use or any other concerns, please do not hesitate to call.    With regard to blood pressure goals, would aim to try to have her blood pressure is from a pregnancy standpoint between 90 and 155 systolic and diastolics anything less than 100.    Nacho Thacker Jr., MD  Maternal Fetal Medicine

## 2018-08-14 NOTE — PT/OT/SLP PROGRESS
"Physical Therapy Treatment    Patient Name:  Sharon Grande   MRN:  [id number]    Recommendations:     Discharge Recommendations:  rehabilitation facility   Discharge Equipment Recommendations: (TBD)   Barriers to discharge: Decreased caregiver support    Assessment:     Sharon Grande is a 32 y.o. 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  She presents with the following impairments/functional limitations:  weakness, impaired endurance, impaired self care skills, gait instability, impaired functional mobilty, decreased safety awareness. Pt demonstrated good effort during session, despite pain. Overall activity tolerance limited by pt's reports of headache at rest and upon exertion. Pt would benefit from continued PT intervention to address listed deficits and maximize return to PLOF.     Rehab Prognosis:  good; patient would benefit from acute skilled PT services to address these deficits and reach maximum level of function.      Recent Surgery: Procedure(s) (LRB):  Mri (magnetic resonance imaging) (N/A) 4 Days Post-Op    Plan:     During this hospitalization, patient to be seen 4 x/week to address the above listed problems via gait training, therapeutic activities, neuromuscular re-education  · Plan of Care Expires:  09/12/18   Plan of Care Reviewed with: patient    Subjective     Communicated with RN prior to session. RN clamped EVD prior to pt participating in therapy session.  Patient found supine upon PT entry to room. Pt alert and agreeable to treatment.      Chief Complaint: headache  Patient Comments: "It feels good to stretch my back out"   Pain/Comfort:  · Pain Rating 1: (Pt did not rate pain on numeric scale)  · Location 1: head  · Pain Addressed 1: Reposition, Distraction, Nurse notified    Patients cultural, spiritual, Quaker conflicts given the current situation: no conflicts    Objective:     Patient found with: arterial line, " blood pressure cuff, external ventricular drain, pulse ox (continuous), peripheral IV     General Precautions: Standard, fall, aspiration   Orthopedic Precautions:N/A   Braces: N/A     Functional Mobility:       Bed Mobility  · Rolling: to right with CGA  · Supine to sit: minimum assistance     · Sit to supine: CGA       Transfers · Sit <> Stand: minimum assistance from EOB x 2 trials, no AD      Gait  Pt performed side stepping to R x 3 steps with Latrell to maintain balance and perform weight shifting. *distance limited 2/2 headache. Increased postural sway noted in standing with posterior lean.          Therapeutic Activities and Exercises:  Therapeutic activities aimed to:  - increase pt's independence, safety, and efficiency with bed mobility and functional transfers. See above for assistance levels.   - improve functional endurance and standing balance: pt performed standing self-care tasks including brushing teeth, requiring min A and UE support via table to maintain balance x ~4 minutes. Tactile cueing provided to improve upright posture in standing and to maintain stability. Pt performed side stepping to L along EOB as described above.   - educate pt on PT POC and safety with mobility. Pt verbalized understanding of education provided, no family present at bedside.   - improve BLE AROM: pt performed seated LAQ and ankle pumps x 10 reps while EOB. Able to maintain balance EOB with SBA.     Patient left HOB elevated with all lines intact, call button in reach, RN notified and SLP present.    AM-PAC 6 CLICK MOBILITY  Turning over in bed (including adjusting bedclothes, sheets and blankets)?: 3  Sitting down on and standing up from a chair with arms (e.g., wheelchair, bedside commode, etc.): 3  Moving from lying on back to sitting on the side of the bed?: 3  Moving to and from a bed to a chair (including a wheelchair)?: 3  Need to walk in hospital room?: 2  Climbing 3-5 steps with a railing?: 1  Basic Mobility  Total Score: 15     GOALS:   Multidisciplinary Problems     Physical Therapy Goals        Problem: Physical Therapy Goal    Goal Priority Disciplines Outcome Goal Variances Interventions   Physical Therapy Goal     PT, PT/OT Ongoing (interventions implemented as appropriate)     Description:  Goals to be met by: 2018     Patient will increase functional independence with mobility by performin. Supine to sit with Stand-by Assistance  2. Sit to supine with Stand-by Assistance  3. Sit to stand transfer with Minimal Assistance -- MET       Updated: Sit to stand transfer with supervision   4. Bed to chair transfer with Minimal Assistance   5. Gait  x 50 ft with minimum assistance with or without appropriate AD   6. Lower extremity exercise program x15 reps per handout, with assistance as needed                       Time Tracking:     PT Received On: 18  PT Start Time: 920     PT Stop Time: 943  PT Total Time (min): 23 min (Co-tx with OT)    Billable Minutes: Therapeutic Activity 12 min     Treatment Type: Treatment  PT/PTA: PT     PTA Visit Number: 0     Sonja Narvaez PT, DPT   2018  Pager: [phone]

## 2018-08-14 NOTE — HOSPITAL COURSE
8/12/18:  Evaluated by PT and OT.  Bed mobility Latrell.  EOB CGA-Latrell.  No sit to stand or further transfers 2/2 fatigue.  UBD Latrell and LBD maxA.  8/13/18:  Evaluated by SLP.  Found to have cognitive-linguistic impairment.  SLP recommendation: regular diet and thin liquids.

## 2018-08-14 NOTE — ASSESSMENT & PLAN NOTE
-  Presented with AMS and aphasia after being found down  -  CTH showed SAH  -  S/p cerebral angiogram with successful coil embolization of Acomm aneurysm   See hospital course for functional, cognitive/speech/language, and nutrition/swallow status.      Recommendations  -  Encourage mobility, OOB in chair, and early ambulation as appropriate   -  PT/OT evaluate and treat  -  SLP speech and cognitive evaluate and treat  -  Monitor mood and sleep disturbances  -  Establish consistent sleep-wake cycle  -  Monitor for bowel and bladder dysfunction  -  Monitor for shoulder pain and subluxation  -  Monitor for spasticity  -  DVT prophylaxis  -  Monitor for and prevent skin breakdown and pressure ulcers  · Early mobility, repositioning/weight shifting every 20-30 minutes when sitting, turn patient every 2 hours, proper mattress/overlay and chair cushioning, pressure relief/heel protector boots

## 2018-08-14 NOTE — CONSULTS
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  Patient presented to [facility] with AMS after being found face down by 6-year-old son.  On arrival, found to be hypertensive and aphasic (non-verbal and not following commands).  CTH revealed SAH within the basal cisterns, likely 2/2 aneurysm rupture.  She was intubated, sedated, and then transferred to [facility] on 8/10/18 for further evaluation and management.  Upon admission, cerebral angiogram performed with successful coil embolization of Acomm aneurysm.  Evaluated by Neurosurgery and underwent ani hole EVD placement.  Repeat CTH stable.  Extubated 18.  OB following.     Functional History: Patient lives in [place] with her  and 5 children in a 2 story home without steps to enter and 14 steps to 2nd floor.  Prior to admission, she was (I) with ADLs and mobility.  DME: none.    Hospital Course:   18:  Evaluated by PT and OT.  Bed mobility Latrell.  EOB CGA-Latrell.  No sit to stand or further transfers 2/2 fatigue.  UBD Latrell and LBD maxA.  18:  Evaluated by SLP.  Found to have cognitive-linguistic impairment.  SLP recommendation: regular diet and thin liquids.     Past Medical History:   Diagnosis Date    Anemia     Hypertension     since     Polycystic kidney disease     " Polycystic kidney disease     Renal disorder     Since childhood      Past Surgical History:   Procedure Laterality Date    DILATION AND CURETTAGE OF UTERUS      2013     Review of patient's allergies indicates:  No Known Allergies    Scheduled Medications:    ceFAZolin (ANCEF) IVPB  1 g Intravenous Q8H    famotidine  20 mg Oral BID    heparin (porcine)  5,000 Units Subcutaneous Q8H    levETIRAcetam  500 mg Oral BID    niMODipine  30 mg Oral Q2H    prenatal vitamin  1 tablet Oral Daily    senna-docusate 8.6-50 mg  1 tablet Oral BID    sodium chloride 0.9%  500 mL Intravenous Once    sodium chloride 0.9%  10 mL Intravenous Q6H       PRN Medications: acetaminophen, calcium gluconate, labetalol, magnesium oxide, magnesium oxide, midazolam, oxyCODONE, potassium chloride 10%, potassium chloride 10%, potassium chloride 10%, potassium, sodium phosphates, potassium, sodium phosphates, potassium, sodium phosphates, Flushing PICC Protocol **AND** sodium chloride 0.9% **AND** sodium chloride 0.9%, sodium chloride 0.9%    Family History     Problem Relation (Age of Onset)    Hypertension Mother, Paternal Grandmother    Polycystic kidney disease Mother, Daughter        Tobacco Use    Smoking status: Never Smoker    Smokeless tobacco: Never Used   Substance and Sexual Activity    Alcohol use: Yes     Comment: occasional    Drug use: No    Sexual activity: Yes     Partners: Male     Birth control/protection: None     Review of Systems   Constitutional: Positive for activity change. Negative for chills, fatigue and fever.   HENT: Negative for drooling, hearing loss, trouble swallowing and voice change.    Eyes: Negative for pain and visual disturbance.   Respiratory: Negative for cough, shortness of breath and wheezing.    Cardiovascular: Negative for chest pain and palpitations.   Gastrointestinal: Negative for abdominal distention, nausea and vomiting.   Genitourinary: Negative for difficulty urinating and flank  pain.        Positive for pregnancy.    Musculoskeletal: Positive for neck pain and neck stiffness. Negative for arthralgias, back pain and myalgias.   Skin: Positive for wound. Negative for rash.   Neurological: Positive for weakness and headaches. Negative for dizziness and numbness.   Psychiatric/Behavioral: Negative for agitation and hallucinations. The patient is not nervous/anxious.      Objective:     Vital Signs (Most Recent):  Temp: 98.2 °F (36.8 °C) (18)  Pulse: 62 (18)  Resp: 16 (18)  BP: (!) 164/81 (18)  SpO2: 100 % (18)    Vital Signs (24h Range):  Temp:  [98.2 °F (36.8 °C)-100 °F (37.8 °C)] 98.2 °F (36.8 °C)  Pulse:  [59-78] 62  Resp:  [14-28] 16  SpO2:  [98 %-100 %] 100 %  BP: (125-164)/(63-95) 164/81  Arterial Line BP: (141-190)/(68-85) 182/84     Body mass index is 25.04 kg/m².    Physical Exam   Constitutional: She is oriented to person, place, and time. She appears well-developed and well-nourished. No distress.   HENT:   Head: Normocephalic and atraumatic.   Right Ear: External ear normal.   Left Ear: External ear normal.   Nose: Nose normal.   EVD in place.    Eyes: Right eye exhibits no discharge. Left eye exhibits no discharge. No scleral icterus.   Neck: Muscular tenderness present. Decreased range of motion present.   Cardiovascular: Normal rate, regular rhythm and intact distal pulses.   Pulmonary/Chest: Effort normal. No respiratory distress. She has no wheezes.   Abdominal: She exhibits no ascites. There is no tenderness.   Musculoskeletal: She exhibits no edema or tenderness.   Neurological: She is alert and oriented to person, place, and time. No sensory deficit.   -  Mental Status:  AAOx3.  Follows commands.  Answers correct age and .  Recent and remote memory intact.  Recalls 3/3 objects.  -  Speech and language:  no aphasia or dysarthria.    -  Facial movement (CN VII): symmetrical.   -  Motor:  No pronator drift. RUE: .   LUE: 4/5.  RLE: 3-/5.  LLE: 3-/5.  -  Sensory:  Intact to light touch and pin prick.   Skin: Skin is warm and dry. No rash noted.   Psychiatric: She has a normal mood and affect. Her behavior is normal. Thought content normal.   Vitals reviewed.    Diagnostic Results:   Labs: Reviewed  X-Ray: Reviewed  US: Reviewed  CT: Reviewed    Assessment/Plan:     * Subarachnoid hemorrhage from anterior communicating artery aneurysm    -  Presented with AMS and aphasia after being found down  -  CTH showed SAH  -  S/p cerebral angiogram with successful coil embolization of Acomm aneurysm   See hospital course for functional, cognitive/speech/language, and nutrition/swallow status.      Recommendations  -  Encourage mobility, OOB in chair, and early ambulation as appropriate   -  PT/OT evaluate and treat  -  SLP speech and cognitive evaluate and treat  -  Monitor mood and sleep disturbances  -  Establish consistent sleep-wake cycle  -  Monitor for bowel and bladder dysfunction  -  Monitor for shoulder pain and subluxation  -  Monitor for spasticity  -  DVT prophylaxis  -  Monitor for and prevent skin breakdown and pressure ulcers  · Early mobility, repositioning/weight shifting every 20-30 minutes when sitting, turn patient every 2 hours, proper mattress/overlay and chair cushioning, pressure relief/heel protector boots        Brain compression    -  S/p EVD on 8/11/18  -  Neurosurgery following        Aphasia    -  2/2 SAH  -  SLP following        25 weeks gestation of pregnancy    -  OB following        Hypertension affecting pregnancy in second trimester    -  OB following        Patient with therapy needs.  Not medically ready for discharge, daily TCDs.  Recommend early mobility, OOB in chair, and continue PT/OT/SLP as appropriate.  Will follow for additional rehab needs and post-acute recommendation when patient is medically stable and nearing discharge.     Thank you for your consult.     Suzy Baker, MISTY  Department  of Physical Medicine & Rehab  [facility]

## 2018-08-14 NOTE — PLAN OF CARE
Problem: Occupational Therapy Goal  Goal: Occupational Therapy Goal  Goals to be met by: 8/19    UE Dressing while standing with Set-up Assistance and Stand-by Assistance.  LE Dressing (socks, brief) with Contact Guard Assistance.  Grooming while standing with Contact Guard Assistance.  Stand pivot transfers with Contact Guard Assistance.  Functional mobility at short household distance for ADL task with min(A) and vitals WFL.  Upper extremity exercise program x15 reps per handout, with assistance as needed.   Outcome: Ongoing (interventions implemented as appropriate)  Continue OT plan of care.

## 2018-08-14 NOTE — PLAN OF CARE
Problem: Patient Care Overview  Goal: Plan of Care Review  Outcome: Ongoing (interventions implemented as appropriate)  POC reviewed with pt and family at 1400. Pt verbalized understanding. Questions and concerns addressed. No acute events today. EVD remained open at 10, ICP 5-15. Pain management with oxycodone Q4H prn and acetaminophen Q6H prn. Pt progressing toward goals. Will continue to monitor. See flowsheets for full assessment and VS info.

## 2018-08-15 PROBLEM — I67.848 CEREBRAL ARTERY VASOSPASM: Status: ACTIVE | Noted: 2018-08-15

## 2018-08-15 LAB
ALBUMIN SERPL BCP-MCNC: 2.4 G/DL
ALP SERPL-CCNC: 56 U/L
ALT SERPL W/O P-5'-P-CCNC: 19 U/L
ANION GAP SERPL CALC-SCNC: 9 MMOL/L
AST SERPL-CCNC: 16 U/L
BASOPHILS # BLD AUTO: 0.01 K/UL
BASOPHILS NFR BLD: 0.1 %
BILIRUB SERPL-MCNC: 0.3 MG/DL
BUN SERPL-MCNC: 8 MG/DL
CALCIUM SERPL-MCNC: 8 MG/DL
CHLORIDE SERPL-SCNC: 108 MMOL/L
CO2 SERPL-SCNC: 19 MMOL/L
CREAT SERPL-MCNC: 0.6 MG/DL
DIFFERENTIAL METHOD: ABNORMAL
EOSINOPHIL # BLD AUTO: 0 K/UL
EOSINOPHIL NFR BLD: 0.2 %
ERYTHROCYTE [DISTWIDTH] IN BLOOD BY AUTOMATED COUNT: 14.7 %
EST. GFR  (AFRICAN AMERICAN): >60 ML/MIN/1.73 M^2
EST. GFR  (NON AFRICAN AMERICAN): >60 ML/MIN/1.73 M^2
FERRITIN SERPL-MCNC: 21 NG/ML
GLUCOSE SERPL-MCNC: 93 MG/DL
HCT VFR BLD AUTO: 22.1 %
HCT VFR BLD AUTO: 24.1 %
HGB BLD-MCNC: 7.1 G/DL
HGB BLD-MCNC: 7.9 G/DL
IMM GRANULOCYTES # BLD AUTO: 0.11 K/UL
IMM GRANULOCYTES NFR BLD AUTO: 1.2 %
INR PPP: 1.1
IRON SERPL-MCNC: 58 UG/DL
LYMPHOCYTES # BLD AUTO: 1.9 K/UL
LYMPHOCYTES NFR BLD: 19.5 %
MAGNESIUM SERPL-MCNC: 1.7 MG/DL
MAGNESIUM SERPL-MCNC: 1.7 MG/DL
MCH RBC QN AUTO: 29 PG
MCHC RBC AUTO-ENTMCNC: 32.1 G/DL
MCV RBC AUTO: 90 FL
MONOCYTES # BLD AUTO: 0.8 K/UL
MONOCYTES NFR BLD: 8.5 %
NEUTROPHILS # BLD AUTO: 6.7 K/UL
NEUTROPHILS NFR BLD: 70.5 %
NRBC BLD-RTO: 0 /100 WBC
PHOSPHATE SERPL-MCNC: 2.8 MG/DL
PLATELET # BLD AUTO: 194 K/UL
PMV BLD AUTO: 11.1 FL
POCT GLUCOSE: 102 MG/DL (ref 70–110)
POCT GLUCOSE: 95 MG/DL (ref 70–110)
POTASSIUM SERPL-SCNC: 3.7 MMOL/L
PROT SERPL-MCNC: 5.9 G/DL
PROTHROMBIN TIME: 11 SEC
RBC # BLD AUTO: 2.45 M/UL
SATURATED IRON: 11 %
SODIUM SERPL-SCNC: 135 MMOL/L
SODIUM SERPL-SCNC: 136 MMOL/L
SODIUM SERPL-SCNC: 136 MMOL/L
SODIUM SERPL-SCNC: 139 MMOL/L
TOTAL IRON BINDING CAPACITY: 521 UG/DL
TRANSFERRIN SERPL-MCNC: 352 MG/DL
WBC # BLD AUTO: 9.54 K/UL

## 2018-08-15 PROCEDURE — 84295 ASSAY OF SERUM SODIUM: CPT | Mod: 91

## 2018-08-15 PROCEDURE — 83540 ASSAY OF IRON: CPT

## 2018-08-15 PROCEDURE — 25000003 PHARM REV CODE 250: Performed by: STUDENT IN AN ORGANIZED HEALTH CARE EDUCATION/TRAINING PROGRAM

## 2018-08-15 PROCEDURE — A4216 STERILE WATER/SALINE, 10 ML: HCPCS | Performed by: PSYCHIATRY & NEUROLOGY

## 2018-08-15 PROCEDURE — 99232 SBSQ HOSP IP/OBS MODERATE 35: CPT | Mod: ,,, | Performed by: NEUROLOGICAL SURGERY

## 2018-08-15 PROCEDURE — 83735 ASSAY OF MAGNESIUM: CPT | Mod: 91

## 2018-08-15 PROCEDURE — A4217 STERILE WATER/SALINE, 500 ML: HCPCS | Performed by: STUDENT IN AN ORGANIZED HEALTH CARE EDUCATION/TRAINING PROGRAM

## 2018-08-15 PROCEDURE — 92507 TX SP LANG VOICE COMM INDIV: CPT

## 2018-08-15 PROCEDURE — 63600175 PHARM REV CODE 636 W HCPCS: Performed by: PHYSICIAN ASSISTANT

## 2018-08-15 PROCEDURE — 85025 COMPLETE CBC W/AUTO DIFF WBC: CPT

## 2018-08-15 PROCEDURE — 94761 N-INVAS EAR/PLS OXIMETRY MLT: CPT

## 2018-08-15 PROCEDURE — 99291 CRITICAL CARE FIRST HOUR: CPT | Mod: ,,, | Performed by: ANESTHESIOLOGY

## 2018-08-15 PROCEDURE — 83735 ASSAY OF MAGNESIUM: CPT

## 2018-08-15 PROCEDURE — 25000003 PHARM REV CODE 250: Performed by: PSYCHIATRY & NEUROLOGY

## 2018-08-15 PROCEDURE — 85610 PROTHROMBIN TIME: CPT

## 2018-08-15 PROCEDURE — 63600175 PHARM REV CODE 636 W HCPCS: Performed by: STUDENT IN AN ORGANIZED HEALTH CARE EDUCATION/TRAINING PROGRAM

## 2018-08-15 PROCEDURE — 20000000 HC ICU ROOM

## 2018-08-15 PROCEDURE — 84100 ASSAY OF PHOSPHORUS: CPT

## 2018-08-15 PROCEDURE — 82728 ASSAY OF FERRITIN: CPT

## 2018-08-15 PROCEDURE — 85014 HEMATOCRIT: CPT

## 2018-08-15 PROCEDURE — 85018 HEMOGLOBIN: CPT

## 2018-08-15 PROCEDURE — 25000003 PHARM REV CODE 250: Performed by: PHYSICIAN ASSISTANT

## 2018-08-15 PROCEDURE — 80053 COMPREHEN METABOLIC PANEL: CPT

## 2018-08-15 RX ORDER — FERROUS SULFATE 325(65) MG
325 TABLET, DELAYED RELEASE (ENTERIC COATED) ORAL DAILY
Status: DISCONTINUED | OUTPATIENT
Start: 2018-08-15 | End: 2018-08-27

## 2018-08-15 RX ADMIN — FAMOTIDINE 20 MG: 20 TABLET ORAL at 09:08

## 2018-08-15 RX ADMIN — PRENATAL VIT W/ FE FUMARATE-FA TAB 27-0.8 MG 1 TABLET: 27-0.8 TAB at 10:08

## 2018-08-15 RX ADMIN — NIMODIPINE 30 MG: 30 CAPSULE, LIQUID FILLED ORAL at 10:08

## 2018-08-15 RX ADMIN — LEVETIRACETAM 500 MG: 500 TABLET, FILM COATED ORAL at 09:08

## 2018-08-15 RX ADMIN — OXYCODONE HYDROCHLORIDE 15 MG: 5 TABLET ORAL at 12:08

## 2018-08-15 RX ADMIN — NIMODIPINE 30 MG: 30 CAPSULE, LIQUID FILLED ORAL at 09:08

## 2018-08-15 RX ADMIN — SENNOSIDES AND DOCUSATE SODIUM 1 TABLET: 8.6; 5 TABLET ORAL at 09:08

## 2018-08-15 RX ADMIN — MAGNESIUM OXIDE TAB 400 MG (241.3 MG ELEMENTAL MG) 800 MG: 400 (241.3 MG) TAB at 08:08

## 2018-08-15 RX ADMIN — NIMODIPINE 30 MG: 30 CAPSULE, LIQUID FILLED ORAL at 08:08

## 2018-08-15 RX ADMIN — NIMODIPINE 30 MG: 30 CAPSULE, LIQUID FILLED ORAL at 05:08

## 2018-08-15 RX ADMIN — LEVETIRACETAM 500 MG: 500 TABLET, FILM COATED ORAL at 08:08

## 2018-08-15 RX ADMIN — NIMODIPINE 30 MG: 30 CAPSULE, LIQUID FILLED ORAL at 02:08

## 2018-08-15 RX ADMIN — MAGNESIUM OXIDE TAB 400 MG (241.3 MG ELEMENTAL MG) 800 MG: 400 (241.3 MG) TAB at 05:08

## 2018-08-15 RX ADMIN — MAGNESIUM OXIDE TAB 400 MG (241.3 MG ELEMENTAL MG) 800 MG: 400 (241.3 MG) TAB at 10:08

## 2018-08-15 RX ADMIN — OXYCODONE HYDROCHLORIDE 15 MG: 5 TABLET ORAL at 09:08

## 2018-08-15 RX ADMIN — OXYCODONE HYDROCHLORIDE 15 MG: 5 TABLET ORAL at 03:08

## 2018-08-15 RX ADMIN — NIMODIPINE 30 MG: 30 CAPSULE, LIQUID FILLED ORAL at 04:08

## 2018-08-15 RX ADMIN — HEPARIN SODIUM 5000 UNITS: 5000 INJECTION, SOLUTION INTRAVENOUS; SUBCUTANEOUS at 02:08

## 2018-08-15 RX ADMIN — CEFAZOLIN 1 G: 1 INJECTION, POWDER, FOR SOLUTION INTRAMUSCULAR; INTRAVENOUS at 05:08

## 2018-08-15 RX ADMIN — ACETAMINOPHEN 650 MG: 325 TABLET, FILM COATED ORAL at 02:08

## 2018-08-15 RX ADMIN — MAGNESIUM OXIDE TAB 400 MG (241.3 MG ELEMENTAL MG) 800 MG: 400 (241.3 MG) TAB at 04:08

## 2018-08-15 RX ADMIN — POTASSIUM CHLORIDE 40 MEQ: 20 SOLUTION ORAL at 04:08

## 2018-08-15 RX ADMIN — ACETAMINOPHEN 650 MG: 325 TABLET, FILM COATED ORAL at 11:08

## 2018-08-15 RX ADMIN — SODIUM CHLORIDE: 234 INJECTION INTRAMUSCULAR; INTRAVENOUS; SUBCUTANEOUS at 03:08

## 2018-08-15 RX ADMIN — FAMOTIDINE 20 MG: 20 TABLET ORAL at 08:08

## 2018-08-15 RX ADMIN — SODIUM CHLORIDE 500 ML: 0.9 INJECTION, SOLUTION INTRAVENOUS at 06:08

## 2018-08-15 RX ADMIN — Medication 10 ML: at 05:08

## 2018-08-15 RX ADMIN — CEFAZOLIN 1 G: 1 INJECTION, POWDER, FOR SOLUTION INTRAMUSCULAR; INTRAVENOUS at 09:08

## 2018-08-15 RX ADMIN — HEPARIN SODIUM 5000 UNITS: 5000 INJECTION, SOLUTION INTRAVENOUS; SUBCUTANEOUS at 09:08

## 2018-08-15 RX ADMIN — NIMODIPINE 30 MG: 30 CAPSULE, LIQUID FILLED ORAL at 12:08

## 2018-08-15 RX ADMIN — Medication 10 ML: at 11:08

## 2018-08-15 RX ADMIN — HEPARIN SODIUM 5000 UNITS: 5000 INJECTION, SOLUTION INTRAVENOUS; SUBCUTANEOUS at 05:08

## 2018-08-15 RX ADMIN — ACETAMINOPHEN 650 MG: 325 TABLET, FILM COATED ORAL at 05:08

## 2018-08-15 RX ADMIN — OXYCODONE HYDROCHLORIDE 15 MG: 5 TABLET ORAL at 08:08

## 2018-08-15 RX ADMIN — CEFAZOLIN 1 G: 1 INJECTION, POWDER, FOR SOLUTION INTRAMUSCULAR; INTRAVENOUS at 02:08

## 2018-08-15 RX ADMIN — FERROUS SULFATE TAB EC 325 MG (65 MG FE EQUIVALENT) 325 MG: 325 (65 FE) TABLET DELAYED RESPONSE at 11:08

## 2018-08-15 RX ADMIN — SENNOSIDES AND DOCUSATE SODIUM 1 TABLET: 8.6; 5 TABLET ORAL at 08:08

## 2018-08-15 RX ADMIN — OXYCODONE HYDROCHLORIDE 15 MG: 5 TABLET ORAL at 04:08

## 2018-08-15 NOTE — PLAN OF CARE
Problem: Patient Care Overview  Goal: Plan of Care Review  Outcome: Ongoing (interventions implemented as appropriate)  POC reviewed with pt at 0350. Pt verbalized understanding. Questions and concerns addressed. No acute events overnight. EVD remains opens at 10; ICP 4-8. Managing pain with PRN Oxycodone q4h and Tylenol q6h. Pt progressing toward goals. Will continue to monitor. See flowsheets for full assessment and VS info

## 2018-08-15 NOTE — PLAN OF CARE
NEETU spoke with Fernando regarding having an OB be able to see a Pt that would need rehab. He reported it would take 48 hours to do the necessary paperwork to torey the OB privileges to see the Pt at their facility, but they can do it.     NEETU attempted to meet with Pt and/or family at bedside without success. Will attempt dc planning discussion tomorrow.    Taylor Harris, BRYANT  Neurocritical Care   [facility]  [id number]

## 2018-08-15 NOTE — ASSESSMENT & PLAN NOTE
32 y.o. female 24 weeks pregnant with history of polycystic kidney disease with HH4F4 SAH on 8/10, now s/p acom coiling 8/10.     NEURO  -Continue NCC  -q 1 hr neuro checks  -evd open to 10 and draining, monitor icps  -nimotop  -daily tcds  -Keppra    CV  -permissive HTN  <200 as aneurysm secured    RESP  -stable on room air  -Aggressive pulmonary management  -IS @ bedside    FEN/GI  -strict I/Os  -goal net even or positive  -regular diet    ID  -vanc for drain ppx    PPX  -ok for DVT ppx from neurosurgical perspective  -famotidine for PUD ppx  -TEDs/SCDs    -further mgmt per ncc and obgyn.

## 2018-08-15 NOTE — PT/OT/SLP PROGRESS
Speech Language Pathology Treatment/ Discharge [place]    Patient Name:  Sharon Grande   MRN:  [id number]  Admitting Diagnosis: Subarachnoid hemorrhage from anterior communicating artery aneurysm    Recommendations:                 General Recommendations:  Cognitive-linguistic therapy  Diet recommendations:  Regular, Liquid Diet Level: Thin   Aspiration Precautions: Standard aspiration precautions   General Precautions: Standard, aspiration  Communication strategies:  none    Subjective     Pt awake and alert     Pain/Comfort:  · Pain Rating 1: 10/10  · Location 1: head  · Pain Addressed 1: Distraction, Nurse notified  · Pain Rating Post-Intervention 1: 10/10    Objective:     Has the patient been evaluated by SLP for swallowing?   Yes  Keep patient NPO? No   Current Respiratory Status: room air      Pt independently completed situational sequencing tasks with 100% acc. Pt independently completed calendar planning and tv scheduling task with 100% acc independently. Pt independently completed daily problem solving tasks with 100% acc independently. Pt has achieved all speech language goals and appearing at or near baseline status without need for additional cognitive linguistic therapy and speech service follow up.     Assessment:     Sharon Grande is a 32 y.o. female with an SLP diagnosis of at or near baseline speech language and cognitive linguistic skills. No further speech services warranted.       Goals:   Multidisciplinary Problems     SLP Goals     Not on file          Multidisciplinary Problems (Resolved)        Problem: SLP Goal    Goal Priority Disciplines Outcome   SLP Goal   (Resolved)     SLP Outcome(s) achieved   Description:  Speech Language Pathology Goals  Goals expected to be met by 8/ 20    1. Pt will complete mental flexiblity tasks with 90% acc independently to enhance cognition  2. Pt will complete problem solving tasks with 90% acc independently to enhance safety awareness                        Plan:     · Patient to be seen:  3 x/week   · Plan of Care expires:  09/11/18  · Plan of Care reviewed with:  patient   · SLP Follow-Up:  No       Discharge recommendations:  home(no ST  f/u warranted )   Barriers to Discharge:  None    Time Tracking:     SLP Treatment Date:   08/15/18  Speech Start Time:  1100  Speech Stop Time:  1109     Speech Total Time (min):  9 min    Billable Minutes: Speech Therapy Individual 9    Ashley Olivas CCC-SLP  08/15/2018

## 2018-08-15 NOTE — PROGRESS NOTES
ICU Progress Note  Neurocritical Care    Admit Date: 8/10/2018  LOS: 5    CC: Subarachnoid hemorrhage from anterior communicating artery aneurysm    Code Status: Full Code     SUBJECTIVE:     Interval History/Significant Events:   Anemia sec to dilution  Hyponatremia  Hypokalemia  Cerebral vasospasm Right  cm/sec  Basilar mild increase in velocity  ICP's - wnl      Medications:  Continuous Infusions:   sodium chloride 0.9% 100 mL/hr at 08/15/18 0900    Sodium Chloride 2% 25 mL/hr at 08/15/18 0900     Scheduled Meds:   ceFAZolin (ANCEF) IVPB  1 g Intravenous Q8H    famotidine  20 mg Oral BID    heparin (porcine)  5,000 Units Subcutaneous Q8H    levETIRAcetam  500 mg Oral BID    niMODipine  30 mg Oral Q2H    prenatal vitamin  1 tablet Oral Daily    senna-docusate 8.6-50 mg  1 tablet Oral BID    sodium chloride 0.9%  10 mL Intravenous Q6H     PRN Meds:.acetaminophen, calcium gluconate, labetalol, magnesium oxide, magnesium oxide, midazolam, oxyCODONE, potassium chloride 10%, potassium chloride 10%, potassium chloride 10%, potassium, sodium phosphates, potassium, sodium phosphates, potassium, sodium phosphates, Flushing PICC Protocol **AND** sodium chloride 0.9% **AND** sodium chloride 0.9%, sodium chloride 0.9%    OBJECTIVE:   Vital Signs (Most Recent):   Temp: 99.4 °F (37.4 °C) (08/15/18 0705)  Pulse: 81 (08/15/18 0957)  Resp: (!) 49 (08/15/18 0957)  BP: (!) 175/81 (08/15/18 0900)  SpO2: 100 % (08/15/18 0957)    Vital Signs (24h Range):   Temp:  [98.4 °F (36.9 °C)-100.1 °F (37.8 °C)] 99.4 °F (37.4 °C)  Pulse:  [62-96] 81  Resp:  [14-49] 49  SpO2:  [94 %-100 %] 100 %  BP: (128-199)/(65-98) 175/81  Arterial Line BP: (134-201)/(62-90) 201/86    ICP/CPP (Last 24h):   ICP Mean (mmHg):  [4 mmHg-13 mmHg] 13 mmHg  CPP (mmHg calculated using NBP):  [] 104  CPP:  [81 mmHg-117 mmHg] 101 mmHg    I & O (Last 24h):     Intake/Output Summary (Last 24 hours) at 8/15/2018 1002  Last data filed at 8/15/2018  0900  Gross per 24 hour   Intake 3419.57 ml   Output 2355 ml   Net 1064.57 ml     Physical Exam:  GA: Alert, comfortable, no acute distress.   HEENT: No scleral icterus or JVD.   Pulmonary: Clear to auscultation A/P/L. No wheezing, crackles, or rhonchi.  Cardiac: RRR S1 & S2 w/o rubs/murmurs/gallops.   Abdominal: Bowel sounds present x 4. No appreciable hepatosplenomegaly.  Skin: No jaundice, rashes, or visible lesions.  Neuro:  Awake  Alert  OX4  Power 5/5 all exts  Pupils 3 mm reactive/equal  BS reflexes in tact           Lines/Drains/Airway:   Gobles;2  PICC-2       PICC Double Lumen 08/13/18 1551 right brachial (Active)   Site Assessment Clean;Dry;Intact;No redness;No swelling 8/15/2018  7:05 AM   Lumen 1 Status Infusing 8/15/2018  7:05 AM   Lumen 2 Status Infusing 8/15/2018  7:05 AM   Length martha (cm) 36 cm 8/13/2018  3:50 PM   Current Exposed Catheter (cm) 0 cm 8/13/2018  3:50 PM   Extremity Circumference (cm) 31 cm 8/13/2018  3:50 PM   Dressing Type Transparent 8/15/2018  7:05 AM   Dressing Status Biopatch in place;Clean;Dry;Intact 8/15/2018  7:05 AM   Dressing Intervention Dressing reinforced 8/15/2018  3:00 AM   Dressing Change Due 08/20/18 8/15/2018  7:05 AM   Daily Line Review Performed 8/15/2018  7:05 AM             ICP/Ventriculostomy 08/11/18 0000 Ventricular drainage catheter with ICP microsensor Right Other (Comment) (Active)   Level of Ventriculostomy (cm above) 10 8/15/2018  7:05 AM   Status Open to drainage 8/15/2018  7:05 AM   Site Assessment Dry;Clean 8/15/2018  7:05 AM   Site Drainage No drainage 8/15/2018  7:05 AM   Waveform normal waveform 8/15/2018  7:05 AM   Output (mL) 13 mL 8/15/2018  9:00 AM   CSF Color red 8/15/2018  7:05 AM   Dressing Status Biopatch in place;Clean;Dry;Intact 8/15/2018  7:05 AM   Interventions bed controls locked;HOB degrees;zeroed 8/15/2018  7:05 AM     Nutrition/Tube Feeds (if NPO state why): po    Labs:  ABG: No results for input(s): PH, PO2, PCO2, HCO3,  "POCSATURATED, BE in the last 24 hours.  BMP:  Recent Labs   Lab  08/15/18   0114  08/15/18   0358   NA  136  135*   K  3.7   --    CL  108   --    CO2  19*   --    BUN  8   --    CREATININE  0.6   --    GLU  93   --    MG  1.7   --    PHOS  2.8   --      LFT:   Lab Results   Component Value Date    AST 16 08/15/2018    ALT 19 08/15/2018    ALKPHOS 56 08/15/2018    BILITOT 0.3 08/15/2018    ALBUMIN 2.4 (L) 08/15/2018    PROT 5.9 (L) 08/15/2018     CBC:   Lab Results   Component Value Date    WBC 9.54 08/15/2018    HGB 7.1 (L) 08/15/2018    HCT 22.1 (L) 08/15/2018    MCV 90 08/15/2018     08/15/2018     Microbiology x 7d:   Microbiology Results (last 7 days)     Procedure Component Value Units Date/Time    Blood culture [[id number]] Collected:  08/10/18 2020    Order Status:  Completed Specimen:  Blood from Peripheral, Foot, Left Updated:  08/15/18 0612     Blood Culture, Routine No Growth to date     Blood Culture, Routine No Growth to date     Blood Culture, Routine No Growth to date     Blood Culture, Routine No Growth to date     Blood Culture, Routine No Growth to date    Blood culture [[id number]] Collected:  08/10/18 2040    Order Status:  Completed Specimen:  Blood from Peripheral, Antecubital, Left Updated:  08/15/18 0612     Blood Culture, Routine No Growth to date     Blood Culture, Routine No Growth to date     Blood Culture, Routine No Growth to date     Blood Culture, Routine No Growth to date     Blood Culture, Routine No Growth to date    Urine culture [[id number]] Collected:  08/12/18 1526    Order Status:  Completed Specimen:  Urine, Catheterized Updated:  08/13/18 2118     Urine Culture, Routine No growth    C. trachomatis/N. gonorrhoeae by AMP DNA [facility]; Urine [[id number]] Collected:  08/11/18 1435    Order Status:  Sent Specimen:  Genital Updated:  08/11/18 1435    Culture, Respiratory with Gram Stain [[id number]]     Order Status:  No result Specimen:  Respiratory         Imaging:  TCD"s " Right  cm/sec  Increase in basilar velocity     I personally reviewed the above image.    ASSESSMENT/PLAN:     Active Hospital Problems    Diagnosis    *Subarachnoid hemorrhage from anterior communicating artery aneurysm    Intracranial hypertension    [place] coma scale total score 9-12    Endotracheal tube present    Hyponatremia    Hypokalemia    Hypophosphatemia    Metabolic encephalopathy    Aphasia    JOSE C (acute kidney injury)    Pre-eclampsia in second trimester    Brain compression    Brain edema    Hypertension affecting pregnancy in second trimester    25 weeks gestation of pregnancy    Leukocytosis    PKD (polycystic kidney disease)     Needs baseline P/C ratio.  Strong family history of renal abnormalities              Plan:  Had a detailed discussion with HENRY  OK to transfuse  Follow BP  Follow exam  Follow TCd's  Follow Na's  Follow ICP'    Critical secondary to Patient has a condition that poses threat to life and bodily function: at high risk of deterioration from ischemia sec to vasospasm.  Need close follow of exam/tcd's      Total cc time 37 mins     Critical care was time spent personally by me on the following activities: development of treatment plan with patient or surrogate and bedside caregivers, discussions with consultants, evaluation of patient's response to treatment, examination of patient, ordering and performing treatments and interventions, ordering and review of laboratory studies, ordering and review of radiographic studies, pulse oximetry, re-evaluation of patient's condition. This critical care time did not overlap with that of any other provider or involve time for any procedures.

## 2018-08-15 NOTE — PROGRESS NOTES
[facility]  Neurosurgery  Progress Note    Subjective:     History of Present Illness: 32 y.o. female 24 weeks pregnant with a history of polycystic kidney disease who presents as transfer from [facility] for SAH. Patient found down by 6 yr old son. Last known normal 0500 today. CT at [facility] revealed SAH within the basal cisterns. US at [facility] with + fetal heart tones. Transferred for neuro evaluation.     Post-Op Info:  Procedure(s) (LRB):  Mri (magnetic resonance imaging) (N/A)   5 Days Post-Op     Interval History: NAEON, stable neuro exam, started on 2% yesterday. EVD @10, ICP 4-7. TCDs wnl.     Medications:  Continuous Infusions:   sodium chloride 0.9% 100 mL/hr at 08/15/18 1300    Sodium Chloride 2% 25 mL/hr at 08/15/18 1300     Scheduled Meds:   ceFAZolin (ANCEF) IVPB  1 g Intravenous Q8H    famotidine  20 mg Oral BID    ferrous sulfate  325 mg Oral Daily    heparin (porcine)  5,000 Units Subcutaneous Q8H    levETIRAcetam  500 mg Oral BID    niMODipine  30 mg Oral Q2H    prenatal vitamin  1 tablet Oral Daily    senna-docusate 8.6-50 mg  1 tablet Oral BID    sodium chloride 0.9%  10 mL Intravenous Q6H     PRN Meds:acetaminophen, calcium gluconate, labetalol, magnesium oxide, magnesium oxide, midazolam, oxyCODONE, potassium chloride 10%, potassium chloride 10%, potassium chloride 10%, potassium, sodium phosphates, potassium, sodium phosphates, potassium, sodium phosphates, Flushing PICC Protocol **AND** sodium chloride 0.9% **AND** sodium chloride 0.9%, sodium chloride 0.9%     Review of Systems  Objective:     Weight: 76.9 kg (169 lb 8.5 oz)  Body mass index is 25.04 kg/m².  Vital Signs (Most Recent):  Temp: 99 °F (37.2 °C) (08/15/18 1105)  Pulse: 71 (08/15/18 1300)  Resp: (!) 45 (08/15/18 1300)  BP: (!) 144/86 (08/15/18 1300)  SpO2: 100 % (08/15/18 1300) Vital Signs (24h Range):  Temp:  [99 °F (37.2 °C)-100.1 °F (37.8 °C)] 99 °F (37.2 °C)  Pulse:  [62-96] 71  Resp:  [16-49] 45  SpO2:  [94  %-100 %] 100 %  BP: (128-198)/(65-98) 144/86  Arterial Line BP: (134-201)/(62-90) 150/74     Date 08/15/18 0700 - 08/16/18 0659   Shift 8899-5452 7355-9733 7730-7680 24 Hour Total   INTAKE   I.V.(mL/kg) 875(11.4)   875(11.4)   Shift Total(mL/kg) 875(11.4)   875(11.4)   OUTPUT   Drains 72   72   Shift Total(mL/kg) 72(0.9)   72(0.9)   Weight (kg) 76.9 76.9 76.9 76.9                        ICP/Ventriculostomy 08/11/18 0000 Ventricular drainage catheter with ICP microsensor Right Other (Comment) (Active)   Level of Ventriculostomy (cm above) 10 8/15/2018 11:05 AM   Status Open to drainage 8/15/2018 11:05 AM   Site Assessment Dry;Clean 8/15/2018 11:05 AM   Site Drainage No drainage 8/15/2018 11:05 AM   Waveform normal waveform 8/15/2018  7:05 AM   Output (mL) 13 mL 8/15/2018 12:00 PM   CSF Color pink 8/15/2018 11:05 AM   Dressing Status Biopatch in place;Clean;Dry;Intact 8/15/2018 11:05 AM   Interventions bed controls locked;HOB degrees;zeroed 8/15/2018 11:05 AM       Neurosurgery Physical Exam   AOX3, speech fluent  PERRL, EOMI, face symm, tongue midline  HOBSON symm  Slight R drift        Significant Labs:  Recent Labs   Lab  08/14/18   0157   08/14/18   1602   08/15/18   0114  08/15/18   0358  08/15/18   1112   GLU  90   --    --    --   93   --    --    NA  138   < >   --    < >  136  135*  136   K  4.0   --    --    --   3.7   --    --    CL  111*   --    --    --   108   --    --    CO2  20*   --    --    --   19*   --    --    BUN  11   --    --    --   8   --    --    CREATININE  0.6   --    --    --   0.6   --    --    CALCIUM  7.9*   --    --    --   8.0*   --    --    MG  1.7   --   1.5*   --   1.7   --    --     < > = values in this interval not displayed.     Recent Labs   Lab  08/14/18   0157  08/15/18   0114   WBC  10.02  9.54   HGB  7.3*  7.1*   HCT  23.9*  22.1*   PLT  183  194     Recent Labs   Lab  08/14/18   0156  08/15/18   0113   INR  1.1  1.1     Microbiology Results (last 7 days)     Procedure  Component Value Units Date/Time    Blood culture [[id number]] Collected:  08/10/18 2020    Order Status:  Completed Specimen:  Blood from Peripheral, Foot, Left Updated:  08/15/18 0612     Blood Culture, Routine No Growth to date     Blood Culture, Routine No Growth to date     Blood Culture, Routine No Growth to date     Blood Culture, Routine No Growth to date     Blood Culture, Routine No Growth to date    Blood culture [[id number]] Collected:  08/10/18 2040    Order Status:  Completed Specimen:  Blood from Peripheral, Antecubital, Left Updated:  08/15/18 0612     Blood Culture, Routine No Growth to date     Blood Culture, Routine No Growth to date     Blood Culture, Routine No Growth to date     Blood Culture, Routine No Growth to date     Blood Culture, Routine No Growth to date    Urine culture [[id number]] Collected:  08/12/18 1526    Order Status:  Completed Specimen:  Urine, Catheterized Updated:  08/13/18 2118     Urine Culture, Routine No growth    C. trachomatis/N. gonorrhoeae by AMP DNA [facility]; Urine [[id number]] Collected:  08/11/18 1435    Order Status:  Sent Specimen:  Genital Updated:  08/11/18 1435    Culture, Respiratory with Gram Stain [[id number]]     Order Status:  No result Specimen:  Respiratory         Recent Lab Results       08/15/18  1112 08/15/18  0502 08/15/18  0358 08/15/18  0122 08/15/18  0114      Immature Granulocytes     1.2     Immature Grans (Abs)     0.11  Comment:  Mild elevation in immature granulocytes is non specific and   can be seen in a variety of conditions including stress response,   acute inflammation, trauma and pregnancy. Correlation with other   laboratory and clinical findings is essential.       Albumin     2.4     Alkaline Phosphatase     56     ALT     19     Anion Gap     9     AST     16     Baso #     0.01     Basophil%     0.1     Total Bilirubin     0.3  Comment:  For infants and newborns, interpretation of results should be based  on gestational age, weight  and in agreement with clinical  observations.  Premature Infant recommended reference ranges:  Up to 24 hours.............<8.0 mg/dL  Up to 48 hours............<12.0 mg/dL  3-5 days..................<15.0 mg/dL  6-29 days.................<15.0 mg/dL       BUN, Bld     8     Calcium     8.0     Chloride     108     CO2     19     Creatinine     0.6     Differential Method     Automated     eGFR if      >60.0     eGFR if non      >60.0  Comment:  Calculation used to obtain the estimated glomerular filtration  rate (eGFR) is the CKD-EPI equation.        Eos #     0.0     Eosinophil%     0.2     Ferritin 21         Glucose     93     Gran # (ANC)     6.7     Gran%     70.5     Hematocrit     22.1     Hemoglobin     7.1     Coumadin Monitoring INR          Iron 58         Lymph #     1.9     Lymph%     19.5     Magnesium     1.7     MCH     29.0     MCHC     32.1     MCV     90     Mono #     0.8     Mono%     8.5     MPV     11.1     nRBC     0     Phosphorus     2.8     Platelets     194     POCT Glucose  102  95      Potassium     3.7     Total Protein     5.9     Protime          RBC     2.45     RDW     14.7     Saturated Iron 11         Sodium 136  135  136     TIBC 521         Transferrin 352         WBC     9.54                 08/15/18  0113 08/14/18 2009 08/14/18  1602      Immature Granulocytes        Immature Grans (Abs)        Albumin        Alkaline Phosphatase        ALT        Anion Gap        AST        Baso #        Basophil%        Total Bilirubin        BUN, Bld        Calcium        Chloride        CO2        Creatinine        Differential Method        eGFR if         eGFR if non         Eos #        Eosinophil%        Ferritin        Glucose        Gran # (ANC)        Gran%        Hematocrit        Hemoglobin        Coumadin Monitoring INR 1.1  Comment:  Coumadin Therapy:  2.0 - 3.0 for INR for all indicators except mechanical heart  valves  and antiphospholipid syndromes which should use 2.5 - 3.5.         Iron        Lymph #        Lymph%        Magnesium   1.5     MCH        MCHC        MCV        Mono #        Mono%        MPV        nRBC        Phosphorus   2.3     Platelets        POCT Glucose        Potassium        Total Protein        Protime 11.0       RBC        RDW        Saturated Iron        Sodium  134      TIBC        Transferrin        WBC              Significant Diagnostics:  I have reviewed all pertinent imaging results/findings within the past 24 hours.    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 female 24 weeks pregnant with history of polycystic kidney disease with HH4F4 SAH on 8/10, now s/p acom coiling 8/10.     NEURO  -Continue NCC  -q 1 hr neuro checks  -evd open to 10 and draining, monitor icps  -nimotop  -daily tcds  -Keppra    CV  -permissive HTN  <200 as aneurysm secured    RESP  -stable on room air  -Aggressive pulmonary management  -IS @ bedside    FEN/GI  -strict I/Os  -goal net even or positive  -regular diet    ID  -vanc for drain ppx    PPX  -ok for DVT ppx from neurosurgical perspective  -famotidine for PUD ppx  -TEDs/SCDs    -further mgmt per ncc and obgyn.            Nayeli Sadler MD  Neurosurgery  [facility]

## 2018-08-15 NOTE — PROGRESS NOTES
[facility] Follow up    Patient resting this afternoon but was interactive and responded appropriately to questions.  Has had no acute obstetric events.  No abdominal pain or bleeding.    Vital signs as noted in the chart. She continues to have occasional episodes of significant hypertension.  Abdomen is soft and nontender.    Currently at approximately 25 weeks gestation now hospital day number 5 after a subarachnoid bleed due to a anterior communicating artery aneurysm likely related to polycystic kidney disease.    At this point she is stable from an obstetric standpoint.  I do not believe that her hypertension is at all related to preeclampsia or any acute obstetric issue.    We will continue to follow through epic but if any acute events or problems arise, please let the OB service know.    Unfortunately, and there were no other family members present at her bedside today.  Later this week we will try to get an official ultrasound done to get a better estimate of fetal weight, but as she has advancing gestation we will need to develop a plan for how to manage any acute events if maternal deterioration occurs.  At this gestational age, given the maternal medical comorbidities, would not intervene unless the maternal status became preterminal and even then would only intervene with emergent operative delivery if the family so desired given the potential morbidity for a severely  infant.  As gestational age advances, this plan might need to be modified depending on the maternal status.  She has received  corticosteroids on the  and the .    If the maternal status worsens, we should try to arrange a family meeting to discuss plan of care with regard to pregnancy management.    Nacho Thacker Jr., MD  Maternal Fetal Medicine

## 2018-08-15 NOTE — PLAN OF CARE
Problem: SLP Goal  Goal: SLP Goal  Speech Language Pathology Goals  Goals expected to be met by 8/ 20    1. Pt will complete mental flexiblity tasks with 90% acc independently to enhance cognition  2. Pt will complete problem solving tasks with 90% acc independently to enhance safety awareness      Outcome: Outcome(s) achieved Date Met: 08/15/18    Pt with excellent progress towards goals no further skilled speech services warranted     Ashley Olivas MS, CCC-SLP  Speech Language Pathologist  Pager: [phone]  Date 8/15/2018

## 2018-08-15 NOTE — PROGRESS NOTES
I received communication that radiation dosing to fetus from angio procedure itself was .32211 mGy which is clinically insignificant dose at max from physicist. Imaging had previously been discussed with patient.

## 2018-08-15 NOTE — PLAN OF CARE
Problem: Patient Care Overview  Goal: Plan of Care Review  Outcome: Ongoing (interventions implemented as appropriate)  POC reviewed with pt and family at 1400. Pt verbalized understanding. Questions and concerns addressed. No acute events today. EVD open at 10. Pt progressing toward goals. Will continue to monitor. See flowsheets for full assessment and VS info.

## 2018-08-15 NOTE — SUBJECTIVE & OBJECTIVE
Interval History: NAEON, stable neuro exam, started on 2% yesterday. EVD @10, ICP 4-7. TCDs wnl.     Medications:  Continuous Infusions:   sodium chloride 0.9% 100 mL/hr at 08/15/18 1300    Sodium Chloride 2% 25 mL/hr at 08/15/18 1300     Scheduled Meds:   ceFAZolin (ANCEF) IVPB  1 g Intravenous Q8H    famotidine  20 mg Oral BID    ferrous sulfate  325 mg Oral Daily    heparin (porcine)  5,000 Units Subcutaneous Q8H    levETIRAcetam  500 mg Oral BID    niMODipine  30 mg Oral Q2H    prenatal vitamin  1 tablet Oral Daily    senna-docusate 8.6-50 mg  1 tablet Oral BID    sodium chloride 0.9%  10 mL Intravenous Q6H     PRN Meds:acetaminophen, calcium gluconate, labetalol, magnesium oxide, magnesium oxide, midazolam, oxyCODONE, potassium chloride 10%, potassium chloride 10%, potassium chloride 10%, potassium, sodium phosphates, potassium, sodium phosphates, potassium, sodium phosphates, Flushing PICC Protocol **AND** sodium chloride 0.9% **AND** sodium chloride 0.9%, sodium chloride 0.9%     Review of Systems  Objective:     Weight: 76.9 kg (169 lb 8.5 oz)  Body mass index is 25.04 kg/m².  Vital Signs (Most Recent):  Temp: 99 °F (37.2 °C) (08/15/18 1105)  Pulse: 71 (08/15/18 1300)  Resp: (!) 45 (08/15/18 1300)  BP: (!) 144/86 (08/15/18 1300)  SpO2: 100 % (08/15/18 1300) Vital Signs (24h Range):  Temp:  [99 °F (37.2 °C)-100.1 °F (37.8 °C)] 99 °F (37.2 °C)  Pulse:  [62-96] 71  Resp:  [16-49] 45  SpO2:  [94 %-100 %] 100 %  BP: (128-198)/(65-98) 144/86  Arterial Line BP: (134-201)/(62-90) 150/74     Date 08/15/18 0700 - 08/16/18 0659   Shift 0782-9809 2694-6675 5120-1319 24 Hour Total   INTAKE   I.V.(mL/kg) 875(11.4)   875(11.4)   Shift Total(mL/kg) 875(11.4)   875(11.4)   OUTPUT   Drains 72   72   Shift Total(mL/kg) 72(0.9)   72(0.9)   Weight (kg) 76.9 76.9 76.9 76.9                        ICP/Ventriculostomy 08/11/18 0000 Ventricular drainage catheter with ICP microsensor Right Other (Comment) (Active)   Level  of Ventriculostomy (cm above) 10 8/15/2018 11:05 AM   Status Open to drainage 8/15/2018 11:05 AM   Site Assessment Dry;Clean 8/15/2018 11:05 AM   Site Drainage No drainage 8/15/2018 11:05 AM   Waveform normal waveform 8/15/2018  7:05 AM   Output (mL) 13 mL 8/15/2018 12:00 PM   CSF Color pink 8/15/2018 11:05 AM   Dressing Status Biopatch in place;Clean;Dry;Intact 8/15/2018 11:05 AM   Interventions bed controls locked;HOB degrees;zeroed 8/15/2018 11:05 AM       Neurosurgery Physical Exam   AOX3, speech fluent  PERRL, EOMI, face symm, tongue midline  HOBSON symm  Slight R drift        Significant Labs:  Recent Labs   Lab  08/14/18   0157   08/14/18   1602   08/15/18   0114  08/15/18   0358  08/15/18   1112   GLU  90   --    --    --   93   --    --    NA  138   < >   --    < >  136  135*  136   K  4.0   --    --    --   3.7   --    --    CL  111*   --    --    --   108   --    --    CO2  20*   --    --    --   19*   --    --    BUN  11   --    --    --   8   --    --    CREATININE  0.6   --    --    --   0.6   --    --    CALCIUM  7.9*   --    --    --   8.0*   --    --    MG  1.7   --   1.5*   --   1.7   --    --     < > = values in this interval not displayed.     Recent Labs   Lab  08/14/18   0157  08/15/18   0114   WBC  10.02  9.54   HGB  7.3*  7.1*   HCT  23.9*  22.1*   PLT  183  194     Recent Labs   Lab  08/14/18   0156  08/15/18   0113   INR  1.1  1.1     Microbiology Results (last 7 days)     Procedure Component Value Units Date/Time    Blood culture [[id number]] Collected:  08/10/18 2020    Order Status:  Completed Specimen:  Blood from Peripheral, Foot, Left Updated:  08/15/18 0612     Blood Culture, Routine No Growth to date     Blood Culture, Routine No Growth to date     Blood Culture, Routine No Growth to date     Blood Culture, Routine No Growth to date     Blood Culture, Routine No Growth to date    Blood culture [[id number]] Collected:  08/10/18 2040    Order Status:  Completed Specimen:  Blood from  Peripheral, Antecubital, Left Updated:  08/15/18 0612     Blood Culture, Routine No Growth to date     Blood Culture, Routine No Growth to date     Blood Culture, Routine No Growth to date     Blood Culture, Routine No Growth to date     Blood Culture, Routine No Growth to date    Urine culture [[id number]] Collected:  08/12/18 1526    Order Status:  Completed Specimen:  Urine, Catheterized Updated:  08/13/18 2118     Urine Culture, Routine No growth    C. trachomatis/N. gonorrhoeae by AMP DNA [facility]; Urine [[id number]] Collected:  08/11/18 1435    Order Status:  Sent Specimen:  Genital Updated:  08/11/18 1435    Culture, Respiratory with Gram Stain [[id number]]     Order Status:  No result Specimen:  Respiratory         Recent Lab Results       08/15/18  1112 08/15/18  0502 08/15/18  0358 08/15/18  0122 08/15/18  0114      Immature Granulocytes     1.2     Immature Grans (Abs)     0.11  Comment:  Mild elevation in immature granulocytes is non specific and   can be seen in a variety of conditions including stress response,   acute inflammation, trauma and pregnancy. Correlation with other   laboratory and clinical findings is essential.       Albumin     2.4     Alkaline Phosphatase     56     ALT     19     Anion Gap     9     AST     16     Baso #     0.01     Basophil%     0.1     Total Bilirubin     0.3  Comment:  For infants and newborns, interpretation of results should be based  on gestational age, weight and in agreement with clinical  observations.  Premature Infant recommended reference ranges:  Up to 24 hours.............<8.0 mg/dL  Up to 48 hours............<12.0 mg/dL  3-5 days..................<15.0 mg/dL  6-29 days.................<15.0 mg/dL       BUN, Bld     8     Calcium     8.0     Chloride     108     CO2     19     Creatinine     0.6     Differential Method     Automated     eGFR if      >60.0     eGFR if non      >60.0  Comment:  Calculation used to obtain the  estimated glomerular filtration  rate (eGFR) is the CKD-EPI equation.        Eos #     0.0     Eosinophil%     0.2     Ferritin 21         Glucose     93     Gran # (ANC)     6.7     Gran%     70.5     Hematocrit     22.1     Hemoglobin     7.1     Coumadin Monitoring INR          Iron 58         Lymph #     1.9     Lymph%     19.5     Magnesium     1.7     MCH     29.0     MCHC     32.1     MCV     90     Mono #     0.8     Mono%     8.5     MPV     11.1     nRBC     0     Phosphorus     2.8     Platelets     194     POCT Glucose  102  95      Potassium     3.7     Total Protein     5.9     Protime          RBC     2.45     RDW     14.7     Saturated Iron 11         Sodium 136  135  136     TIBC 521         Transferrin 352         WBC     9.54                 08/15/18  0113 08/14/18  2009 08/14/18  1602      Immature Granulocytes        Immature Grans (Abs)        Albumin        Alkaline Phosphatase        ALT        Anion Gap        AST        Baso #        Basophil%        Total Bilirubin        BUN, Bld        Calcium        Chloride        CO2        Creatinine        Differential Method        eGFR if         eGFR if non         Eos #        Eosinophil%        Ferritin        Glucose        Gran # (ANC)        Gran%        Hematocrit        Hemoglobin        Coumadin Monitoring INR 1.1  Comment:  Coumadin Therapy:  2.0 - 3.0 for INR for all indicators except mechanical heart valves  and antiphospholipid syndromes which should use 2.5 - 3.5.         Iron        Lymph #        Lymph%        Magnesium   1.5     MCH        MCHC        MCV        Mono #        Mono%        MPV        nRBC        Phosphorus   2.3     Platelets        POCT Glucose        Potassium        Total Protein        Protime 11.0       RBC        RDW        Saturated Iron        Sodium  134      TIBC        Transferrin        WBC              Significant Diagnostics:  I have reviewed all pertinent imaging  results/findings within the past 24 hours.

## 2018-08-15 NOTE — PROGRESS NOTES
" [facility]  Adult Nutrition  Progress Note    SUMMARY       Recommendations    Recommendation/Intervention:   Continue Regular diet. Educated pt on consuming extra 340kcals/day per guidelines for 2nd trimester fetal growth.     RD to monitor.    Goals: Pt to receive nutrition by RD follow up  Nutrition Goal Status: goal met  Communication of RD Recs: reviewed with RN    Reason for Assessment    Reason for Assessment: RD follow-up  Diagnosis: hemorrhage  Relevant Medical History: HTN, PKD  Interdisciplinary Rounds: attended  General Information Comments: Pt's diet advanced. Consuming 75% of meals. c/o headache which is hindering some intake.  Nutrition Discharge Planning: adequate po intake for optimal nutrition    Nutrition Risk Screen    Nutrition Risk Screen: other (see comments)(faith)    Nutrition/Diet History    Do you have any cultural, spiritual, Restorationist conflicts, given your current situation?: None  Factors Affecting Nutritional Intake: pain    Anthropometrics    Temp: 99 °F (37.2 °C)  Height Method: Estimated  Height: 5' 9" (175.3 cm)  Height (inches): 69 in  Weight Method: Bed Scale  Weight: 76.9 kg (169 lb 8.5 oz)  Weight (lb): 169.54 lb  Ideal Body Weight (IBW), Female: 145 lb  % Ideal Body Weight, Female (lb): 103.45 lb  BMI (Calculated): 22.2  BMI Grade: 18.5-24.9 - normal       Lab/Procedures/Meds    Pertinent Labs Reviewed: reviewed  Pertinent Labs Comments: noted  Pertinent Medications Reviewed: reviewed  Pertinent Medications Comments: prenatal vitamins, IVF    Physical Findings/Assessment    Overall Physical Appearance: lethargic, nourished  Skin: abrasion    Estimated/Assessed Needs    Weight Used For Calorie Calculations: 68 kg (149 lb 14.6 oz)  Energy Calorie Requirements (kcal): 2163  Energy Need Method: [place]-St Jeor(PAL 1.25 + 340calories (2nd trimester))  Protein Requirements: 82-95g(1.2-1.4g/kg)  Weight Used For Protein Calculations: 68 kg (149 lb 14.6 oz)  Fluid " Requirements (mL): 1mL/kcal or per MD     RDA Method (mL): 2163         Nutrition Prescription Ordered    Current Diet Order: Regular    Evaluation of Received Nutrient/Fluid Intake       % Intake of Estimated Energy Needs: 50 - 75 %  % Meal Intake: 50 - 75 %    Nutrition Risk    Level of Risk/Frequency of Follow-up: (f/u 1x/week)     Assessment and Plan    Nutrition Problem  Inadequate energy intake     Related to (etiology):   NPO     Signs and Symptoms (as evidenced by):   Pt receiving <85% EEN and EPN.      Nutrition Diagnosis Status:   Resolved       Monitor and Evaluation    Food and Nutrient Intake: energy intake, food and beverage intake  Food and Nutrient Adminstration: diet order  Anthropometric Measurements: weight, weight change, body mass index  Biochemical Data, Medical Tests and Procedures: electrolyte and renal panel, gastrointestinal profile, glucose/endocrine profile, inflammatory profile, lipid profile  Nutrition-Focused Physical Findings: overall appearance     Nutrition Follow-Up    RD Follow-up?: Yes

## 2018-08-16 LAB
ALBUMIN SERPL BCP-MCNC: 2.3 G/DL
ALP SERPL-CCNC: 64 U/L
ALT SERPL W/O P-5'-P-CCNC: 17 U/L
ANION GAP SERPL CALC-SCNC: 11 MMOL/L
AST SERPL-CCNC: 16 U/L
BACTERIA BLD CULT: NORMAL
BACTERIA BLD CULT: NORMAL
BASOPHILS # BLD AUTO: 0.03 K/UL
BASOPHILS # BLD AUTO: 0.03 K/UL
BASOPHILS NFR BLD: 0.2 %
BASOPHILS NFR BLD: 0.3 %
BILIRUB SERPL-MCNC: 0.3 MG/DL
BUN SERPL-MCNC: 9 MG/DL
CALCIUM SERPL-MCNC: 8.4 MG/DL
CHLORIDE SERPL-SCNC: 108 MMOL/L
CO2 SERPL-SCNC: 17 MMOL/L
CREAT SERPL-MCNC: 0.7 MG/DL
DIFFERENTIAL METHOD: ABNORMAL
DIFFERENTIAL METHOD: ABNORMAL
EOSINOPHIL # BLD AUTO: 0 K/UL
EOSINOPHIL # BLD AUTO: 0 K/UL
EOSINOPHIL NFR BLD: 0 %
EOSINOPHIL NFR BLD: 0.2 %
ERYTHROCYTE [DISTWIDTH] IN BLOOD BY AUTOMATED COUNT: 14.6 %
ERYTHROCYTE [DISTWIDTH] IN BLOOD BY AUTOMATED COUNT: 14.8 %
EST. GFR  (AFRICAN AMERICAN): >60 ML/MIN/1.73 M^2
EST. GFR  (NON AFRICAN AMERICAN): >60 ML/MIN/1.73 M^2
GLUCOSE SERPL-MCNC: 82 MG/DL
HCT VFR BLD AUTO: 22.9 %
HCT VFR BLD AUTO: 24 %
HGB BLD-MCNC: 7.6 G/DL
HGB BLD-MCNC: 7.6 G/DL
IMM GRANULOCYTES # BLD AUTO: 0.13 K/UL
IMM GRANULOCYTES # BLD AUTO: 0.18 K/UL
IMM GRANULOCYTES NFR BLD AUTO: 1 %
IMM GRANULOCYTES NFR BLD AUTO: 1.5 %
INR PPP: 1.1
LYMPHOCYTES # BLD AUTO: 1.3 K/UL
LYMPHOCYTES # BLD AUTO: 1.9 K/UL
LYMPHOCYTES NFR BLD: 16.3 %
LYMPHOCYTES NFR BLD: 9.8 %
MAGNESIUM SERPL-MCNC: 1.7 MG/DL
MCH RBC QN AUTO: 28.3 PG
MCH RBC QN AUTO: 28.9 PG
MCHC RBC AUTO-ENTMCNC: 31.7 G/DL
MCHC RBC AUTO-ENTMCNC: 33.2 G/DL
MCV RBC AUTO: 87 FL
MCV RBC AUTO: 89 FL
MONOCYTES # BLD AUTO: 0.9 K/UL
MONOCYTES # BLD AUTO: 1.1 K/UL
MONOCYTES NFR BLD: 7.1 %
MONOCYTES NFR BLD: 9.4 %
NEUTROPHILS # BLD AUTO: 10.5 K/UL
NEUTROPHILS # BLD AUTO: 8.5 K/UL
NEUTROPHILS NFR BLD: 72.3 %
NEUTROPHILS NFR BLD: 81.9 %
NRBC BLD-RTO: 0 /100 WBC
NRBC BLD-RTO: 0 /100 WBC
PHOSPHATE SERPL-MCNC: 3.1 MG/DL
PLATELET # BLD AUTO: 200 K/UL
PLATELET # BLD AUTO: 223 K/UL
PMV BLD AUTO: 10.7 FL
PMV BLD AUTO: 11.6 FL
POCT GLUCOSE: 106 MG/DL (ref 70–110)
POCT GLUCOSE: 146 MG/DL (ref 70–110)
POCT GLUCOSE: 70 MG/DL (ref 70–110)
POTASSIUM SERPL-SCNC: 3.8 MMOL/L
PROT SERPL-MCNC: 6.2 G/DL
PROTHROMBIN TIME: 11.6 SEC
RBC # BLD AUTO: 2.63 M/UL
RBC # BLD AUTO: 2.69 M/UL
SODIUM SERPL-SCNC: 133 MMOL/L
SODIUM SERPL-SCNC: 134 MMOL/L
SODIUM SERPL-SCNC: 136 MMOL/L
SODIUM SERPL-SCNC: 136 MMOL/L
WBC # BLD AUTO: 11.69 K/UL
WBC # BLD AUTO: 12.85 K/UL

## 2018-08-16 PROCEDURE — 25000003 PHARM REV CODE 250: Performed by: STUDENT IN AN ORGANIZED HEALTH CARE EDUCATION/TRAINING PROGRAM

## 2018-08-16 PROCEDURE — 99232 SBSQ HOSP IP/OBS MODERATE 35: CPT | Mod: ,,, | Performed by: NEUROLOGICAL SURGERY

## 2018-08-16 PROCEDURE — 85025 COMPLETE CBC W/AUTO DIFF WBC: CPT | Mod: 91

## 2018-08-16 PROCEDURE — 20000000 HC ICU ROOM

## 2018-08-16 PROCEDURE — 63600175 PHARM REV CODE 636 W HCPCS: Performed by: PHYSICIAN ASSISTANT

## 2018-08-16 PROCEDURE — 25000003 PHARM REV CODE 250: Performed by: PSYCHIATRY & NEUROLOGY

## 2018-08-16 PROCEDURE — 84295 ASSAY OF SERUM SODIUM: CPT

## 2018-08-16 PROCEDURE — 99291 CRITICAL CARE FIRST HOUR: CPT | Mod: ,,, | Performed by: ANESTHESIOLOGY

## 2018-08-16 PROCEDURE — 84100 ASSAY OF PHOSPHORUS: CPT

## 2018-08-16 PROCEDURE — 63600175 PHARM REV CODE 636 W HCPCS: Performed by: STUDENT IN AN ORGANIZED HEALTH CARE EDUCATION/TRAINING PROGRAM

## 2018-08-16 PROCEDURE — 80053 COMPREHEN METABOLIC PANEL: CPT

## 2018-08-16 PROCEDURE — A4216 STERILE WATER/SALINE, 10 ML: HCPCS | Performed by: PSYCHIATRY & NEUROLOGY

## 2018-08-16 PROCEDURE — 85610 PROTHROMBIN TIME: CPT

## 2018-08-16 PROCEDURE — 25000003 PHARM REV CODE 250: Performed by: NEUROLOGICAL SURGERY

## 2018-08-16 PROCEDURE — 83735 ASSAY OF MAGNESIUM: CPT

## 2018-08-16 PROCEDURE — 94761 N-INVAS EAR/PLS OXIMETRY MLT: CPT

## 2018-08-16 PROCEDURE — 25000003 PHARM REV CODE 250: Performed by: PHYSICIAN ASSISTANT

## 2018-08-16 PROCEDURE — 25500020 PHARM REV CODE 255: Performed by: ANESTHESIOLOGY

## 2018-08-16 RX ORDER — IODIXANOL 320 MG/ML
200 INJECTION, SOLUTION INTRAVASCULAR
Status: COMPLETED | OUTPATIENT
Start: 2018-08-16 | End: 2018-08-16

## 2018-08-16 RX ORDER — NICARDIPINE HYDROCHLORIDE 0.2 MG/ML
1 INJECTION INTRAVENOUS CONTINUOUS
Status: DISCONTINUED | OUTPATIENT
Start: 2018-08-16 | End: 2018-08-16

## 2018-08-16 RX ORDER — VERAPAMIL HYDROCHLORIDE 2.5 MG/ML
5 INJECTION, SOLUTION INTRAVENOUS ONCE
Status: COMPLETED | OUTPATIENT
Start: 2018-08-16 | End: 2018-08-16

## 2018-08-16 RX ORDER — NICARDIPINE HYDROCHLORIDE 0.2 MG/ML
1 INJECTION INTRAVENOUS CONTINUOUS
Status: DISCONTINUED | OUTPATIENT
Start: 2018-08-16 | End: 2018-08-17

## 2018-08-16 RX ORDER — VERAPAMIL HYDROCHLORIDE 2.5 MG/ML
10 INJECTION, SOLUTION INTRAVENOUS ONCE
Status: COMPLETED | OUTPATIENT
Start: 2018-08-16 | End: 2018-08-16

## 2018-08-16 RX ADMIN — VERAPAMIL HYDROCHLORIDE 5 MG: 2.5 INJECTION, SOLUTION INTRAVENOUS at 10:08

## 2018-08-16 RX ADMIN — NIMODIPINE 30 MG: 30 CAPSULE, LIQUID FILLED ORAL at 03:08

## 2018-08-16 RX ADMIN — OXYCODONE HYDROCHLORIDE 15 MG: 5 TABLET ORAL at 09:08

## 2018-08-16 RX ADMIN — Medication 10 ML: at 12:08

## 2018-08-16 RX ADMIN — VERAPAMIL HYDROCHLORIDE 10 MG: 2.5 INJECTION, SOLUTION INTRAVENOUS at 10:08

## 2018-08-16 RX ADMIN — SENNOSIDES AND DOCUSATE SODIUM 1 TABLET: 8.6; 5 TABLET ORAL at 08:08

## 2018-08-16 RX ADMIN — FAMOTIDINE 20 MG: 20 TABLET ORAL at 09:08

## 2018-08-16 RX ADMIN — LEVETIRACETAM 500 MG: 500 TABLET, FILM COATED ORAL at 09:08

## 2018-08-16 RX ADMIN — HEPARIN SODIUM 5000 UNITS: 5000 INJECTION, SOLUTION INTRAVENOUS; SUBCUTANEOUS at 09:08

## 2018-08-16 RX ADMIN — NIMODIPINE 30 MG: 30 CAPSULE, LIQUID FILLED ORAL at 11:08

## 2018-08-16 RX ADMIN — Medication 10 ML: at 06:08

## 2018-08-16 RX ADMIN — NIMODIPINE 30 MG: 30 CAPSULE, LIQUID FILLED ORAL at 06:08

## 2018-08-16 RX ADMIN — NIMODIPINE 30 MG: 30 CAPSULE, LIQUID FILLED ORAL at 05:08

## 2018-08-16 RX ADMIN — CEFAZOLIN 1 G: 1 INJECTION, POWDER, FOR SOLUTION INTRAMUSCULAR; INTRAVENOUS at 09:08

## 2018-08-16 RX ADMIN — OXYCODONE HYDROCHLORIDE 15 MG: 5 TABLET ORAL at 11:08

## 2018-08-16 RX ADMIN — FAMOTIDINE 20 MG: 20 TABLET ORAL at 08:08

## 2018-08-16 RX ADMIN — NIMODIPINE 30 MG: 30 CAPSULE, LIQUID FILLED ORAL at 08:08

## 2018-08-16 RX ADMIN — MAGNESIUM OXIDE TAB 400 MG (241.3 MG ELEMENTAL MG) 800 MG: 400 (241.3 MG) TAB at 06:08

## 2018-08-16 RX ADMIN — Medication 10 ML: at 11:08

## 2018-08-16 RX ADMIN — ACETAMINOPHEN 650 MG: 325 TABLET, FILM COATED ORAL at 03:08

## 2018-08-16 RX ADMIN — NIMODIPINE 30 MG: 30 CAPSULE, LIQUID FILLED ORAL at 12:08

## 2018-08-16 RX ADMIN — HEPARIN SODIUM 5000 UNITS: 5000 INJECTION, SOLUTION INTRAVENOUS; SUBCUTANEOUS at 02:08

## 2018-08-16 RX ADMIN — Medication 10 ML: at 05:08

## 2018-08-16 RX ADMIN — OXYCODONE HYDROCHLORIDE 15 MG: 5 TABLET ORAL at 01:08

## 2018-08-16 RX ADMIN — NIMODIPINE 30 MG: 30 CAPSULE, LIQUID FILLED ORAL at 04:08

## 2018-08-16 RX ADMIN — NIMODIPINE 30 MG: 30 CAPSULE, LIQUID FILLED ORAL at 02:08

## 2018-08-16 RX ADMIN — FERROUS SULFATE TAB EC 325 MG (65 MG FE EQUIVALENT) 325 MG: 325 (65 FE) TABLET DELAYED RESPONSE at 08:08

## 2018-08-16 RX ADMIN — NIMODIPINE 30 MG: 30 CAPSULE, LIQUID FILLED ORAL at 10:08

## 2018-08-16 RX ADMIN — PRENATAL VIT W/ FE FUMARATE-FA TAB 27-0.8 MG 1 TABLET: 27-0.8 TAB at 08:08

## 2018-08-16 RX ADMIN — OXYCODONE HYDROCHLORIDE 15 MG: 5 TABLET ORAL at 05:08

## 2018-08-16 RX ADMIN — CEFAZOLIN 1 G: 1 INJECTION, POWDER, FOR SOLUTION INTRAMUSCULAR; INTRAVENOUS at 02:08

## 2018-08-16 RX ADMIN — CEFAZOLIN 1 G: 1 INJECTION, POWDER, FOR SOLUTION INTRAMUSCULAR; INTRAVENOUS at 06:08

## 2018-08-16 RX ADMIN — LEVETIRACETAM 500 MG: 500 TABLET, FILM COATED ORAL at 08:08

## 2018-08-16 RX ADMIN — IODIXANOL 70 ML: 320 INJECTION, SOLUTION INTRAVASCULAR at 11:08

## 2018-08-16 RX ADMIN — ACETAMINOPHEN 650 MG: 325 TABLET, FILM COATED ORAL at 09:08

## 2018-08-16 RX ADMIN — SENNOSIDES AND DOCUSATE SODIUM 1 TABLET: 8.6; 5 TABLET ORAL at 09:08

## 2018-08-16 NOTE — SUBJECTIVE & OBJECTIVE
Interval History:    Review of Systems   Constitutional: Negative for activity change.   Eyes: Positive for photophobia.   Respiratory: Negative for shortness of breath.    Cardiovascular: Negative for chest pain.   Neurological: Negative for seizures, speech difficulty and numbness.       Objective:     Vitals:  Temp: 98.6 °F (37 °C)  Pulse: 87  Rhythm: normal sinus rhythm  BP: (!) 140/83  MAP (mmHg): 119  ICP Mean (mmHg): 7 mmHg  Resp: (!) 21  SpO2: 98 %  O2 Device (Oxygen Therapy): room air    Temp  Min: 98.6 °F (37 °C)  Max: 99.4 °F (37.4 °C)  Pulse  Min: 63  Max: 87  BP  Min: 132/60  Max: 180/82  MAP (mmHg)  Min: 86  Max: 128  ICP Mean (mmHg)  Min: 4 mmHg  Max: 15 mmHg  Resp  Min: 14  Max: 45  SpO2  Min: 94 %  Max: 100 %    08/15 0701 - 08/16 0700  In: 4462.9 [P.O.:640; I.V.:3322.9]  Out: 3842 [Urine:3550; Drains:292]   Unmeasured Output  Urine Occurrence: 1  Stool Occurrence: 0  Emesis Occurrence: 1  Pad Count: 1       Physical Exam   Constitutional: She appears well-developed and well-nourished.   Cardiovascular: Normal rate, normal heart sounds and intact distal pulses.   Pulmonary/Chest: Effort normal. No respiratory distress.   General: Laying on side, comfortable, NAD  Neuro: A+Ox3,   CN: No facial droop, sensation intact V1-3, EOMI, PERRL, tongue midline  Motor: 5/5 in all extremities  Sensation: SILT  No pronator drift appreciable today.       Medications:  Continuous  sodium chloride 0.9% Last Rate: 125 mL/hr at 08/16/18 1000   Sodium Chloride 2% Last Rate: 25 mL/hr at 08/16/18 1000   Scheduled  ceFAZolin (ANCEF) IVPB 1 g Q8H   famotidine 20 mg BID   ferrous sulfate 325 mg Daily   heparin (porcine) 5,000 Units Q8H   levETIRAcetam 500 mg BID   niMODipine 30 mg Q2H   prenatal vitamin 1 tablet Daily   senna-docusate 8.6-50 mg 1 tablet BID   sodium chloride 0.9% 10 mL Q6H   PRN  acetaminophen 650 mg Q6H PRN   calcium gluconate 1 g PRN   labetalol 10 mg Q15 Min PRN   magnesium oxide 800 mg PRN   magnesium  oxide 800 mg PRN   midazolam 2 mg PRN   oxyCODONE 15 mg Q4H PRN   potassium chloride 10% 40 mEq PRN   potassium chloride 10% 40 mEq PRN   potassium chloride 10% 60 mEq PRN   potassium, sodium phosphates 2 packet PRN   potassium, sodium phosphates 2 packet PRN   potassium, sodium phosphates 2 packet PRN   sodium chloride 0.9% 10 mL PRN   sodium chloride 0.9% 5 mL PRN     Today I personally reviewed pertinent medications, lines/drains/airways, imaging, lab results, notably:    Diet  Diet NPO  Diet NPO

## 2018-08-16 NOTE — PLAN OF CARE
Problem: Patient Care Overview  Goal: Plan of Care Review  Outcome: Ongoing (interventions implemented as appropriate)  POC reviewed with pt at 0400. Pt verbalized understanding. Questions and concerns addressed. No acute events overnight. Pt still requiring prn pain medications for headaches. Pt NPO at midnight for possible procedure today. Pt progressing toward goals. Will continue to monitor. See flowsheets for full assessment and VS info

## 2018-08-16 NOTE — PT/OT/SLP PROGRESS
Physical Therapy      Patient Name:  Sharon Grande   MRN:  [id number]    Patient not seen today secondary to pt off the floor for angiogram. Will follow up tomorrow.     Sonja Narvaez, PT, DPT   8/16/2018

## 2018-08-16 NOTE — SUBJECTIVE & OBJECTIVE
Interval History: HAJA. EVD @10, ICP 6-11. To angio today for possible IV verapamil injection for vasospasm.    Medications:  Continuous Infusions:   sodium chloride 0.9% 125 mL/hr at 08/16/18 0700    Sodium Chloride 2% 25 mL/hr at 08/16/18 0700     Scheduled Meds:   ceFAZolin (ANCEF) IVPB  1 g Intravenous Q8H    famotidine  20 mg Oral BID    ferrous sulfate  325 mg Oral Daily    heparin (porcine)  5,000 Units Subcutaneous Q8H    levETIRAcetam  500 mg Oral BID    niMODipine  30 mg Oral Q2H    prenatal vitamin  1 tablet Oral Daily    senna-docusate 8.6-50 mg  1 tablet Oral BID    sodium chloride 0.9%  10 mL Intravenous Q6H     PRN Meds:acetaminophen, calcium gluconate, labetalol, magnesium oxide, magnesium oxide, midazolam, oxyCODONE, potassium chloride 10%, potassium chloride 10%, potassium chloride 10%, potassium, sodium phosphates, potassium, sodium phosphates, potassium, sodium phosphates, Flushing PICC Protocol **AND** sodium chloride 0.9% **AND** sodium chloride 0.9%, sodium chloride 0.9%     Review of Systems  Objective:     Weight: 76.5 kg (168 lb 10.4 oz)  Body mass index is 24.91 kg/m².  Vital Signs (Most Recent):  Temp: 98.6 °F (37 °C) (08/16/18 0705)  Pulse: 74 (08/16/18 0705)  Resp: (!) 23 (08/16/18 0705)  BP: 132/60 (08/16/18 0705)  SpO2: 99 % (08/16/18 0705) Vital Signs (24h Range):  Temp:  [98.6 °F (37 °C)-99.4 °F (37.4 °C)] 98.6 °F (37 °C)  Pulse:  [63-92] 74  Resp:  [16-49] 23  SpO2:  [94 %-100 %] 99 %  BP: (132-180)/(60-96) 132/60  Arterial Line BP: (136-201)/(69-86) 153/69     Date 08/16/18 0700 - 08/17/18 0659   Shift 9476-3823 7669-5832 1858-2430 24 Hour Total   INTAKE   I.V.(mL/kg) 150(2)   150(2)   Shift Total(mL/kg) 150(2)   150(2)   OUTPUT   Drains 15   15   Shift Total(mL/kg) 15(0.2)   15(0.2)   Weight (kg) 76.5 76.5 76.5 76.5                        ICP/Ventriculostomy 08/11/18 0000 Ventricular drainage catheter with ICP microsensor Right Other (Comment) (Active)   Level of  Ventriculostomy (cm above) 10 8/16/2018  7:05 AM   Status Open to drainage 8/16/2018  7:05 AM   Site Assessment Dry;Clean 8/16/2018  7:05 AM   Site Drainage No drainage 8/16/2018  7:05 AM   Waveform normal waveform 8/16/2018  7:05 AM   Output (mL) 15 mL 8/16/2018  7:00 AM   CSF Color pink 8/16/2018  7:05 AM   Dressing Status Biopatch in place;Clean;Dry;Intact 8/16/2018  7:05 AM   Interventions bed controls locked;HOB degrees;zeroed 8/16/2018  7:05 AM       Neurosurgery Physical Exam  AOX3  speech fluent  EVD in place   PERRL, EOMI, face symm, tongue midline  HOBSON symm  No drift appreciated on exam this AM    ASA: 2  Mallampati: 1           Significant Labs:  Recent Labs   Lab  08/15/18   0114   08/15/18   1112  08/15/18   1439  08/15/18   2101  08/16/18   0334   GLU  93   --    --    --    --   82   NA  136   < >  136   --   139  136  136   K  3.7   --    --    --    --   3.8   CL  108   --    --    --    --   108   CO2  19*   --    --    --    --   17*   BUN  8   --    --    --    --   9   CREATININE  0.6   --    --    --    --   0.7   CALCIUM  8.0*   --    --    --    --   8.4*   MG  1.7   --    --   1.7   --   1.7    < > = values in this interval not displayed.     Recent Labs   Lab  08/15/18   0114  08/15/18   1439  08/16/18   0334   WBC  9.54   --   11.69   HGB  7.1*  7.9*  7.6*   HCT  22.1*  24.1*  22.9*   PLT  194   --   200     Recent Labs   Lab  08/15/18   0113  08/16/18   0334   INR  1.1  1.1     Microbiology Results (last 7 days)     Procedure Component Value Units Date/Time    Blood culture [[id number]] Collected:  08/10/18 2020    Order Status:  Completed Specimen:  Blood from Peripheral, Foot, Left Updated:  08/16/18 0612     Blood Culture, Routine No growth after 5 days.    Blood culture [[id number]] Collected:  08/10/18 2040    Order Status:  Completed Specimen:  Blood from Peripheral, Antecubital, Left Updated:  08/16/18 0612     Blood Culture, Routine No growth after 5 days.    Urine culture  [[id number]] Collected:  08/12/18 1526    Order Status:  Completed Specimen:  Urine, Catheterized Updated:  08/13/18 2118     Urine Culture, Routine No growth    C. trachomatis/N. gonorrhoeae by AMP DNA [facility]; Urine [[id number]] Collected:  08/11/18 1435    Order Status:  Sent Specimen:  Genital Updated:  08/11/18 1435    Culture, Respiratory with Gram Stain [[id number]]     Order Status:  No result Specimen:  Respiratory         Recent Lab Results       08/16/18  0334 08/15/18  2101 08/15/18  1747 08/15/18  1439 08/15/18  1112      Immature Granulocytes 1.5         Immature Grans (Abs) 0.18  Comment:  Mild elevation in immature granulocytes is non specific and   can be seen in a variety of conditions including stress response,   acute inflammation, trauma and pregnancy. Correlation with other   laboratory and clinical findings is essential.           Albumin 2.3         Alkaline Phosphatase 64         ALT 17         Anion Gap 11         AST 16  Comment:  *Result may be interfered by visible hemolysis         Baso # 0.03         Basophil% 0.3         Total Bilirubin 0.3  Comment:  For infants and newborns, interpretation of results should be based  on gestational age, weight and in agreement with clinical  observations.  Premature Infant recommended reference ranges:  Up to 24 hours.............<8.0 mg/dL  Up to 48 hours............<12.0 mg/dL  3-5 days..................<15.0 mg/dL  6-29 days.................<15.0 mg/dL           BUN, Bld 9         Calcium 8.4         Chloride 108         CO2 17         Creatinine 0.7         Differential Method Automated         eGFR if  >60.0         eGFR if non  >60.0  Comment:  Calculation used to obtain the estimated glomerular filtration  rate (eGFR) is the CKD-EPI equation.            Eos # 0.0         Eosinophil% 0.2         Ferritin     21     Glucose 82         Gran # (ANC) 8.5         Gran% 72.3         Hematocrit 22.9   24.1      Hemoglobin  7.6   7.9      Coumadin Monitoring INR 1.1  Comment:  Coumadin Therapy:  2.0 - 3.0 for INR for all indicators except mechanical heart valves  and antiphospholipid syndromes which should use 2.5 - 3.5.           Iron     58     Lymph # 1.9         Lymph% 16.3         Magnesium 1.7   1.7      MCH 28.9         MCHC 33.2         MCV 87         Mono # 1.1         Mono% 9.4         MPV 11.6         nRBC 0         Phosphorus 3.1         Platelets 200         POCT Glucose   70       Potassium 3.8         Total Protein 6.2         Protime 11.6         RBC 2.63         RDW 14.6         Saturated Iron     11     Sodium 136 139   136      136         TIBC     521     Transferrin     352     WBC 11.69                         Significant Diagnostics:  I have reviewed all pertinent imaging results/findings within the past 24 hours.

## 2018-08-16 NOTE — PROGRESS NOTES
Fetal heart tones verified @ approx 160 bpm    Will recheck after procedure.    Salomón Donaldson MD  PGY2, OBGYN [facility]

## 2018-08-16 NOTE — PROGRESS NOTES
Cerebral Angiogram procedure complete. Patient tolerated well, no acute signs of distress noted. VSS. Dressing tor left groin is clean, dry and intact. Patient ready for transfer back to floor with nurse at bedside.

## 2018-08-16 NOTE — ASSESSMENT & PLAN NOTE
32 y.o. female 24 weeks pregnant with history of polycystic kidney disease with HH4F4 SAH on 8/10, now s/p acom coiling 8/10.     NEURO  -Continue NCC  -q 1 hr neuro checks  -evd open to 10 and draining, monitor icps  -nimotop  -daily tcds  -Keppra  - To angio today for possible IV verapamil for vasospasm    CV  -permissive HTN  <200 as aneurysm secured    RESP  -stable on room air  -Aggressive pulmonary management  -IS @ bedside    FEN/GI  -strict I/Os  -goal net even or positive  -NPO for angio this AM    ID  -vanc for drain ppx    PPX  -SQH  -famotidine for PUD ppx  -TEDs/SCDs    -further mgmt per ncc and obgyn.

## 2018-08-16 NOTE — PLAN OF CARE
NEETU met with Pt  at bedside. Discussed rehab list and choices. Discussed that OB will need to follow at the rehab so that would leave [facility], [facility], or [facility] as possibilities. He reported he is good with sending to all three and will talk preferences with the Pt later today. He also asked for help with applying for her to have disability.     NEETU sent email to Melody with [facility] and sent referrals via .    NEETU spoke with Danita with [facility] ([phone]) regarding this referral. They cannot take her due to the pregnancy. Her OB would have to be an on staff OB at [facility] for her to be able to go to [facility] rehab.     Taylor Harris, BRYANT  Neurocritical Care   [facility]  [id number]

## 2018-08-16 NOTE — PT/OT/SLP PROGRESS
Occupational Therapy      Patient Name:  Sharon Grande   MRN:  [id number]    Patient not seen today secondary to HAYDEN for angiogram on this date. Bed rest for 12 hours for therapy.   Will follow up 8/17 as appropriate.     GARRET White  8/16/2018

## 2018-08-16 NOTE — PROGRESS NOTES
Pt transported back to room from IR. Pt transported w/ RN and PCT while on portable monitor. VSS while in IR and during transport back to room. Will continue to monitor.

## 2018-08-16 NOTE — ASSESSMENT & PLAN NOTE
Neuro:  HH4F4 SAH  PBD 6 s/p coiling   EVD at 10 open: 15-20 cc per hour draining, ICPs < 15.   Daily TCDs: Elevated peak velocity yesterday: Angiogram today with verapimil injection.    Maintain on bedrest 4 hours s/p procedure.   Nimodipine 30q2   Keppra 500 BID  SBP < 200  Cefazolin 1g q8 for drain ppx    Fen/GI: NPO for angiogram  Maintain Euvolemia  Strict I's and O's: +620cc last 24 hours (-1.5 total admission)  IV /hour    Pulm:   CXR 8/13 wnl, lines confirmed.  Will attempt to minimize radiation for pregnancy  O2 saturation 99%   Monitor pulmonary status    CV: aneurysm secured, keep SBP < 200  Fluid bolus prn

## 2018-08-16 NOTE — PLAN OF CARE
08/16/18 0952   Discharge Reassessment   Assessment Type Discharge Planning Reassessment   Provided patient/caregiver education on the expected discharge date and the discharge plan No   Do you have any problems affording any of your prescribed medications? No   Discharge Plan A Rehab   Discharge Plan B Home with family   Patient choice form signed by patient/caregiver N/A   Can the patient answer the patient profile reliably? Yes, cognitively intact   How does the patient rate their overall health at the present time? Fair   Describe the patient's ability to walk at the present time. Does not walk or unable to take any steps at all   How often would a person be available to care for the patient? Whenever needed   Number of comorbid conditions (as recorded on the chart) Two       Patient with EVD and need for TCD monitoring.    Not medically stable for discharge.    Penelope Canseco RN, CCRN-K, [facility]  Neuro-Critical Care   [phone]

## 2018-08-16 NOTE — PROGRESS NOTES
[facility]  Neurosurgery  Progress Note    Subjective:     History of Present Illness: 32 y.o. female 24 weeks pregnant with a history of polycystic kidney disease who presents as transfer from [facility] for SAH. Patient found down by 6 yr old son. Last known normal 0500 today. CT at [facility] revealed SAH within the basal cisterns. US at [facility] with + fetal heart tones. Transferred for neuro evaluation.     Post-Op Info:  Procedure(s) (LRB):  Mri (magnetic resonance imaging) (N/A)   6 Days Post-Op     Interval History: NAEON. EVD @10, ICP 6-11. To angio today for possible IV verapamil injection for vasospasm.    Medications:  Continuous Infusions:   sodium chloride 0.9% 125 mL/hr at 08/16/18 0700    Sodium Chloride 2% 25 mL/hr at 08/16/18 0700     Scheduled Meds:   ceFAZolin (ANCEF) IVPB  1 g Intravenous Q8H    famotidine  20 mg Oral BID    ferrous sulfate  325 mg Oral Daily    heparin (porcine)  5,000 Units Subcutaneous Q8H    levETIRAcetam  500 mg Oral BID    niMODipine  30 mg Oral Q2H    prenatal vitamin  1 tablet Oral Daily    senna-docusate 8.6-50 mg  1 tablet Oral BID    sodium chloride 0.9%  10 mL Intravenous Q6H     PRN Meds:acetaminophen, calcium gluconate, labetalol, magnesium oxide, magnesium oxide, midazolam, oxyCODONE, potassium chloride 10%, potassium chloride 10%, potassium chloride 10%, potassium, sodium phosphates, potassium, sodium phosphates, potassium, sodium phosphates, Flushing PICC Protocol **AND** sodium chloride 0.9% **AND** sodium chloride 0.9%, sodium chloride 0.9%     Review of Systems  Objective:     Weight: 76.5 kg (168 lb 10.4 oz)  Body mass index is 24.91 kg/m².  Vital Signs (Most Recent):  Temp: 98.6 °F (37 °C) (08/16/18 0705)  Pulse: 74 (08/16/18 0705)  Resp: (!) 23 (08/16/18 0705)  BP: 132/60 (08/16/18 0705)  SpO2: 99 % (08/16/18 0705) Vital Signs (24h Range):  Temp:  [98.6 °F (37 °C)-99.4 °F (37.4 °C)] 98.6 °F (37 °C)  Pulse:  [63-92] 74  Resp:  [16-49] 23  SpO2:   [94 %-100 %] 99 %  BP: (132-180)/(60-96) 132/60  Arterial Line BP: (136-201)/(69-86) 153/69     Date 08/16/18 0700 - 08/17/18 0659   Shift 6963-5605 3894-9762 6215-3028 24 Hour Total   INTAKE   I.V.(mL/kg) 150(2)   150(2)   Shift Total(mL/kg) 150(2)   150(2)   OUTPUT   Drains 15   15   Shift Total(mL/kg) 15(0.2)   15(0.2)   Weight (kg) 76.5 76.5 76.5 76.5                        ICP/Ventriculostomy 08/11/18 0000 Ventricular drainage catheter with ICP microsensor Right Other (Comment) (Active)   Level of Ventriculostomy (cm above) 10 8/16/2018  7:05 AM   Status Open to drainage 8/16/2018  7:05 AM   Site Assessment Dry;Clean 8/16/2018  7:05 AM   Site Drainage No drainage 8/16/2018  7:05 AM   Waveform normal waveform 8/16/2018  7:05 AM   Output (mL) 15 mL 8/16/2018  7:00 AM   CSF Color pink 8/16/2018  7:05 AM   Dressing Status Biopatch in place;Clean;Dry;Intact 8/16/2018  7:05 AM   Interventions bed controls locked;HOB degrees;zeroed 8/16/2018  7:05 AM       Neurosurgery Physical Exam  AOX3  speech fluent  EVD in place   PERRL, EOMI, face symm, tongue midline  HOBSON symm  No drift appreciated on exam this AM    ASA: 2  Mallampati: 1           Significant Labs:  Recent Labs   Lab  08/15/18   0114   08/15/18   1112  08/15/18   1439  08/15/18   2101  08/16/18   0334   GLU  93   --    --    --    --   82   NA  136   < >  136   --   139  136  136   K  3.7   --    --    --    --   3.8   CL  108   --    --    --    --   108   CO2  19*   --    --    --    --   17*   BUN  8   --    --    --    --   9   CREATININE  0.6   --    --    --    --   0.7   CALCIUM  8.0*   --    --    --    --   8.4*   MG  1.7   --    --   1.7   --   1.7    < > = values in this interval not displayed.     Recent Labs   Lab  08/15/18   0114  08/15/18   1439  08/16/18   0334   WBC  9.54   --   11.69   HGB  7.1*  7.9*  7.6*   HCT  22.1*  24.1*  22.9*   PLT  194   --   200     Recent Labs   Lab  08/15/18   0113  08/16/18   0334   INR  1.1  1.1      Microbiology Results (last 7 days)     Procedure Component Value Units Date/Time    Blood culture [[id number]] Collected:  08/10/18 2020    Order Status:  Completed Specimen:  Blood from Peripheral, Foot, Left Updated:  08/16/18 0612     Blood Culture, Routine No growth after 5 days.    Blood culture [[id number]] Collected:  08/10/18 2040    Order Status:  Completed Specimen:  Blood from Peripheral, Antecubital, Left Updated:  08/16/18 0612     Blood Culture, Routine No growth after 5 days.    Urine culture [[id number]] Collected:  08/12/18 1526    Order Status:  Completed Specimen:  Urine, Catheterized Updated:  08/13/18 2118     Urine Culture, Routine No growth    C. trachomatis/N. gonorrhoeae by AMP DNA [facility]; Urine [[id number]] Collected:  08/11/18 1435    Order Status:  Sent Specimen:  Genital Updated:  08/11/18 1435    Culture, Respiratory with Gram Stain [[id number]]     Order Status:  No result Specimen:  Respiratory         Recent Lab Results       08/16/18  0334 08/15/18  2101 08/15/18  1747 08/15/18  1439 08/15/18  1112      Immature Granulocytes 1.5         Immature Grans (Abs) 0.18  Comment:  Mild elevation in immature granulocytes is non specific and   can be seen in a variety of conditions including stress response,   acute inflammation, trauma and pregnancy. Correlation with other   laboratory and clinical findings is essential.           Albumin 2.3         Alkaline Phosphatase 64         ALT 17         Anion Gap 11         AST 16  Comment:  *Result may be interfered by visible hemolysis         Baso # 0.03         Basophil% 0.3         Total Bilirubin 0.3  Comment:  For infants and newborns, interpretation of results should be based  on gestational age, weight and in agreement with clinical  observations.  Premature Infant recommended reference ranges:  Up to 24 hours.............<8.0 mg/dL  Up to 48 hours............<12.0 mg/dL  3-5 days..................<15.0 mg/dL  6-29  days.................<15.0 mg/dL           BUN, Bld 9         Calcium 8.4         Chloride 108         CO2 17         Creatinine 0.7         Differential Method Automated         eGFR if  >60.0         eGFR if non  >60.0  Comment:  Calculation used to obtain the estimated glomerular filtration  rate (eGFR) is the CKD-EPI equation.            Eos # 0.0         Eosinophil% 0.2         Ferritin     21     Glucose 82         Gran # (ANC) 8.5         Gran% 72.3         Hematocrit 22.9   24.1      Hemoglobin 7.6   7.9      Coumadin Monitoring INR 1.1  Comment:  Coumadin Therapy:  2.0 - 3.0 for INR for all indicators except mechanical heart valves  and antiphospholipid syndromes which should use 2.5 - 3.5.           Iron     58     Lymph # 1.9         Lymph% 16.3         Magnesium 1.7   1.7      MCH 28.9         MCHC 33.2         MCV 87         Mono # 1.1         Mono% 9.4         MPV 11.6         nRBC 0         Phosphorus 3.1         Platelets 200         POCT Glucose   70       Potassium 3.8         Total Protein 6.2         Protime 11.6         RBC 2.63         RDW 14.6         Saturated Iron     11     Sodium 136 139   136      136         TIBC     521     Transferrin     352     WBC 11.69                         Significant Diagnostics:  I have reviewed all pertinent imaging results/findings within the past 24 hours.    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 female 24 weeks pregnant with history of polycystic kidney disease with HH4F4 SAH on 8/10, now s/p acom coiling 8/10.     NEURO  -Continue NCC  -q 1 hr neuro checks  -evd open to 10 and draining, monitor icps  -nimotop  -daily tcds  -Keppra  - To angio today for possible IV verapamil for vasospasm    CV  -permissive HTN  <200 as aneurysm secured    RESP  -stable on room air  -Aggressive pulmonary management  -IS @ bedside    FEN/GI  -strict I/Os  -goal net even or  positive  -NPO for angio this AM    ID  -vanc for drain ppx    PPX  -SQH  -famotidine for PUD ppx  -TEDs/SCDs    -further mgmt per ncc and obgyn.            Nayeli Sadler MD  Neurosurgery  [facility]

## 2018-08-16 NOTE — PROGRESS NOTES
[facility]  Neurocritical Care  Progress Note    Admit Date: 8/10/2018  Service Date: 08/16/2018  Length of Stay: 6    Subjective:     Chief Complaint: Subarachnoid hemorrhage from anterior communicating artery aneurysm    History of Present Illness: Per earlier notes:   32 y.o female, approximately 5-6 months gravid. C/o acute onset AMS that began prior to arrival in ED.  Patient's aunt reports calling the patient's phone at 11:30 am this morning and reports the patient's 6 year old son answering the phone stating that the patient was lying on the floor, not able to get up. EMS reports upon their arrival, the patient was found on the floor, faced down, with her head against a wall. EMS reports the patient to be hypertensive and incontinent.   Pt has been non-verbal since arrival. Per family patient began complaining of a headache yesterday. Last time normal per  was 0500 today while on his way to work. Patient's aunt reports the patient has not received any prenatal care for this pregnancy.  No other history could be obtained at this time.  History is limited secondary to acuity of the patient's condition.  So last seen normal by adult at 5am. eleanor reports pt has been taking some OTC meds for headache. Pt's  reports pt not taking any regular medications at home.     Hospital Course: 8/10: pt admitted to [facility] for SAH. CT, MRI, MRA ordered and results pending   8/11: post angio with coil acom, EVD at 10, wean prop to extubate, MRI once stable, Urine studies for hyponatremia, TCDs, check Mg q 4  8/12: Mg gtt stopped over night, fluid boluses, pain control  8/16: PBD 6: +620mL fluid overnight.  Elevated peak velocity on TCD, Angio in AM.     Interval History:    Review of Systems   Constitutional: Negative for activity change.   Eyes: Positive for photophobia.   Respiratory: Negative for shortness of breath.    Cardiovascular: Negative for chest pain.   Neurological: Negative for  seizures, speech difficulty and numbness.       Objective:     Vitals:  Temp: 98.6 °F (37 °C)  Pulse: 87  Rhythm: normal sinus rhythm  BP: (!) 140/83  MAP (mmHg): 119  ICP Mean (mmHg): 7 mmHg  Resp: (!) 21  SpO2: 98 %  O2 Device (Oxygen Therapy): room air    Temp  Min: 98.6 °F (37 °C)  Max: 99.4 °F (37.4 °C)  Pulse  Min: 63  Max: 87  BP  Min: 132/60  Max: 180/82  MAP (mmHg)  Min: 86  Max: 128  ICP Mean (mmHg)  Min: 4 mmHg  Max: 15 mmHg  Resp  Min: 14  Max: 45  SpO2  Min: 94 %  Max: 100 %    08/15 0701 - 08/16 0700  In: 4462.9 [P.O.:640; I.V.:3322.9]  Out: 3842 [Urine:3550; Drains:292]   Unmeasured Output  Urine Occurrence: 1  Stool Occurrence: 0  Emesis Occurrence: 1  Pad Count: 1       Physical Exam   Constitutional: She appears well-developed and well-nourished.   Cardiovascular: Normal rate, normal heart sounds and intact distal pulses.   Pulmonary/Chest: Effort normal. No respiratory distress.   General: Laying on side, comfortable, NAD  Neuro: A+Ox3,   CN: No facial droop, sensation intact V1-3, EOMI, PERRL, tongue midline  Motor: 5/5 in all extremities  Sensation: SILT  No pronator drift appreciable today.       Medications:  Continuous  sodium chloride 0.9% Last Rate: 125 mL/hr at 08/16/18 1000   Sodium Chloride 2% Last Rate: 25 mL/hr at 08/16/18 1000   Scheduled  ceFAZolin (ANCEF) IVPB 1 g Q8H   famotidine 20 mg BID   ferrous sulfate 325 mg Daily   heparin (porcine) 5,000 Units Q8H   levETIRAcetam 500 mg BID   niMODipine 30 mg Q2H   prenatal vitamin 1 tablet Daily   senna-docusate 8.6-50 mg 1 tablet BID   sodium chloride 0.9% 10 mL Q6H   PRN  acetaminophen 650 mg Q6H PRN   calcium gluconate 1 g PRN   labetalol 10 mg Q15 Min PRN   magnesium oxide 800 mg PRN   magnesium oxide 800 mg PRN   midazolam 2 mg PRN   oxyCODONE 15 mg Q4H PRN   potassium chloride 10% 40 mEq PRN   potassium chloride 10% 40 mEq PRN   potassium chloride 10% 60 mEq PRN   potassium, sodium phosphates 2 packet PRN   potassium, sodium  phosphates 2 packet PRN   potassium, sodium phosphates 2 packet PRN   sodium chloride 0.9% 10 mL PRN   sodium chloride 0.9% 5 mL PRN     Today I personally reviewed pertinent medications, lines/drains/airways, imaging, lab results, notably:    Diet  Diet NPO  Diet NPO        Assessment/Plan:     Neuro   * Subarachnoid hemorrhage from anterior communicating artery aneurysm    Neuro:  [id number] SAH  PBD 6 s/p coiling   EVD at 10 open: 15-20 cc per hour draining, ICPs < 15.   Daily TCDs: Elevated peak velocity yesterday: Angiogram today with verapimil injection.    Maintain on bedrest 4 hours s/p procedure.   Nimodipine 30q2   Keppra 500 BID  SBP < 200  Cefazolin 1g q8 for drain ppx    Fen/GI: NPO for angiogram  Maintain Euvolemia  Strict I's and O's: +620cc last 24 hours (-1.5 total admission)  IV /hour    Pulm:   CXR 8/13 wnl, lines confirmed.  Will attempt to minimize radiation for pregnancy  O2 saturation 99%   Monitor pulmonary status    CV: aneurysm secured, keep SBP < 200  Fluid bolus prn              Brain compression    EVD  Goal Na normal: 136 today  Continue daily BMPs.   Neuro checks          Renal/   PKD (polycystic kidney disease)     predispose to aneurysm   BUN: 9; Cr 0.7  Will monitor        Oncology   Leukocytosis    Resolved, WBC 5.84 today             Obstetric   Hypertension affecting pregnancy in second trimester    obgyn consulted  Mg discontinued  Cont to monitor              Prophylaxis:  Venous Thromboembolism: mechanical, Heparin 5kq8.   Stress Ulcer: None  Ventilator Pneumonia: not applicable     Activity Orders          None        Full Code    Valerio Croft MD  Neurocritical Care  [facility]

## 2018-08-16 NOTE — PLAN OF CARE
Problem: Patient Care Overview  Goal: Plan of Care Review  Outcome: Ongoing (interventions implemented as appropriate)  POC reviewed with pt and family at 1400. Pt verbalized understanding. Questions and concerns addressed. No acute events today. Pt tolerated angio well. Pt progressing toward goals. Will continue to monitor. See flowsheets for full assessment and VS info.

## 2018-08-16 NOTE — PROGRESS NOTES
Pt transported to IR for cerebral angiogram w/ RN and PCT. Pt transported on portable monitor w/ ambu bag. EVD clamped during transport. VSS. Will continue to monitor in IR.

## 2018-08-16 NOTE — PROGRESS NOTES
PROGRESS NOTE  ANTEPARTUM    Admit Date: 8/10/2018   LOS: 6 days     Reason for Admission:  Subarachnoid hemorrhage from anterior communicating artery aneurysm    History of Present Illness:  33 y/o  @~24w5d admitted for management of subarachnoid hemmorhage likely due to ruptured anyeursm. PMhx notable for hypertension (on 30xl procardia), polycystic kidney diease, and renal disorder. Patient has history of  delivery at 36 weeks and PPROM (unknown GA)  Found unresponsive by 6yr old son this AM and called EMS. Per EMS pt was found face down with her face on the wall, with house in shambles. EMS reports possible seizure activity in transit.  Transfer from [facility]. She was started on magnesium for eclampsia ppx. Acutely hypertensive (BP in the 190s systolic) P\C ratio noted to be 8.2  Bedside U/S estimates fetal growth at around 23 weeks.     Of note patient has had severe headaches before, with CTA in 2018 that was grossly normal.    SUBJECTIVE:     Patient has returned from IR procedure and reports she feels tired but has no complaints. She is awake and cooperative. She reports active fetal movement and reports no vaginal bleeding, leakage of fluid, and contractions.     Scheduled Meds:   ceFAZolin (ANCEF) IVPB  1 g Intravenous Q8H    famotidine  20 mg Oral BID    ferrous sulfate  325 mg Oral Daily    heparin (porcine)  5,000 Units Subcutaneous Q8H    levETIRAcetam  500 mg Oral BID    niMODipine  30 mg Oral Q2H    prenatal vitamin  1 tablet Oral Daily    senna-docusate 8.6-50 mg  1 tablet Oral BID    sodium chloride 0.9%  10 mL Intravenous Q6H     Continuous Infusions:   sodium chloride 0.9% 125 mL/hr at 18 1600    nicardipine      Sodium Chloride 2% 25 mL/hr at 18 1600     PRN Meds:acetaminophen, calcium gluconate, labetalol, magnesium oxide, magnesium oxide, midazolam, oxyCODONE, potassium chloride 10%, potassium chloride 10%, potassium chloride 10%, potassium, sodium  phosphates, potassium, sodium phosphates, potassium, sodium phosphates, Flushing PICC Protocol **AND** sodium chloride 0.9% **AND** sodium chloride 0.9%, sodium chloride 0.9%    Review of patient's allergies indicates:  No Known Allergies    OBJECTIVE:     Vital Signs (Most Recent)  Temp: 98.9 °F (37.2 °C) (08/16/18 1505)  Pulse: 80 (08/16/18 1600)  Resp: (!) 28 (08/16/18 1600)  BP: (!) 151/70 (08/16/18 1600)  SpO2: 100 % (08/16/18 1600)    Temperature Range Min/Max (Last 24H):  Temp:  [98 °F (36.7 °C)-99.2 °F (37.3 °C)]     Vital Signs Range (Last 24H):  Temp:  [98 °F (36.7 °C)-99.2 °F (37.3 °C)]   Pulse:  [63-87]   Resp:  [14-55]   BP: (132-180)/()   SpO2:  [95 %-100 %]   Arterial Line BP: (136-198)/(69-93)     I & O (Last 24H):    Intake/Output Summary (Last 24 hours) at 8/16/2018 1619  Last data filed at 8/16/2018 1600  Gross per 24 hour   Intake 4687.92 ml   Output 2941 ml   Net 1746.92 ml       Physical Exam:  General: well developed, well nourished, no distress, with surgical drain visible on anterior head.  Lungs:  clear to auscultation bilaterally and normal respiratory effort  Heart: regular rate and rhythm, S1, S2 normal, no murmur, click, rub or gallop  Abdomen: soft, non-tender non-distented; bowel sounds normal; no masses,  no organomegaly. Gravid uterus with fundus palpable above the umbilicus.   Extremities: no cyanosis or edema, or clubbing    FHT: 140-145     Laboratory:  CBC:   Recent Labs   Lab  08/16/18   1326   WBC  12.85*   RBC  2.69*   HGB  7.6*   HCT  24.0*   PLT  223   MCV  89   MCH  28.3   MCHC  31.7*     CMP:   Recent Labs   Lab  08/16/18   0334  08/16/18   1303   GLU  82   --    CALCIUM  8.4*   --    ALBUMIN  2.3*   --    PROT  6.2   --    NA  136  136  134*   K  3.8   --    CO2  17*   --    CL  108   --    BUN  9   --    CREATININE  0.7   --    ALKPHOS  64   --    ALT  17   --    AST  16   --    BILITOT  0.3   --      Recent Labs   Lab  08/11/18   1011   COLORU  Yellow   SPECGRAV   1.025   PHUR  5.0   PROTEINUA  Negative   BACTERIA  Rare   NITRITE  Negative   LEUKOCYTESUR  Negative   UROBILINOGEN  Negative   HYALINECASTS  3*       ASSESSMENT/PLAN:     Active Hospital Problems    Diagnosis  POA    *Subarachnoid hemorrhage from anterior communicating artery aneurysm [I60.2]  Yes    Cerebral artery vasospasm [I67.848]  No    Intracranial hypertension [G93.2]  Yes    [place] coma scale total score 9-12 [R40.2420]  Yes    Endotracheal tube present [Z97.8]  Yes    Hyponatremia [E87.1]  Yes    Hypokalemia [E87.6]  Yes    Hypophosphatemia [E83.39]  Yes    Metabolic encephalopathy [G93.41]  Yes    Aphasia [R47.01]  Yes    JOSE C (acute kidney injury) [N17.9]  Yes    Pre-eclampsia in second trimester [O14.92]  Yes    Brain compression [G93.5]  Yes    Brain edema [G93.6]  Yes    Hypertension affecting pregnancy in second trimester [O16.2]  Yes    25 weeks gestation of pregnancy [Z3A.25]  Not Applicable    Leukocytosis [D72.829]  Yes    PKD (polycystic kidney disease) [Q61.3]  Not Applicable     Needs baseline P/C ratio.  Strong family history of renal abnormalities        Resolved Hospital Problems    Diagnosis Date Resolved POA    Hypokalemia [E87.6] 2018 Yes       Assessment:   32 y.o.  at 25w4 days admitted for treatment of subarachnoid hemorrhage.     Plan:    Subarachnoid hemorrhage from anterior communicating artery aneurysm     - S/p coil placement per IR and cerebral angiogram for treatment of vasospasm  - External ventricular drain in place  - Admitted to neuro critical care        Leukocytosis     - Likely related to inflammation and pregnancy however cannot rule out infectious etiology: cultures ordered on admission. on broad spectrum ABX per neuro critical care.  - Blood and urine cultures negative  - GC/CT pending       Hypokalemia     - S/p repletion        Hypertension affecting pregnancy in second trimester     - BP management acutely per neurocritical care  team - OK from pregnancy standpoint for BP to be elevated  - OK for blood transfusion as needed per primary team  - Now s/p IV magnesium for seizure prophylaxis  - S/p BMZ 12mg  @ 32 and   - Discussed with neuro critical care - maternal stability is currently priority given early gestational age and concern for intra-operative fluid shifts affect on maternal morbidity and mortality  - If current clinical course holds, patient may be stable enough for  delivery if indicated in about 1 week. As such, will plan to NOT intervene surgically for fetal indications.   - Heart tones twice weekly  - Upper level spectra-link [id number] if needed overnight; [id number] during the day          Virgilio Gracia M.D.  Obstetrics & Gynecology  PGY-2

## 2018-08-17 LAB
ALBUMIN SERPL BCP-MCNC: 2.3 G/DL
ALP SERPL-CCNC: 71 U/L
ALT SERPL W/O P-5'-P-CCNC: 14 U/L
ANION GAP SERPL CALC-SCNC: 11 MMOL/L
AST SERPL-CCNC: 14 U/L
BASOPHILS # BLD AUTO: 0.01 K/UL
BASOPHILS NFR BLD: 0.1 %
BILIRUB SERPL-MCNC: 0.4 MG/DL
BUN SERPL-MCNC: 9 MG/DL
CALCIUM SERPL-MCNC: 8.5 MG/DL
CHLORIDE SERPL-SCNC: 108 MMOL/L
CO2 SERPL-SCNC: 17 MMOL/L
CREAT SERPL-MCNC: 0.7 MG/DL
DIFFERENTIAL METHOD: ABNORMAL
EOSINOPHIL # BLD AUTO: 0 K/UL
EOSINOPHIL NFR BLD: 0.1 %
ERYTHROCYTE [DISTWIDTH] IN BLOOD BY AUTOMATED COUNT: 14.6 %
EST. GFR  (AFRICAN AMERICAN): >60 ML/MIN/1.73 M^2
EST. GFR  (NON AFRICAN AMERICAN): >60 ML/MIN/1.73 M^2
GLUCOSE SERPL-MCNC: 103 MG/DL
HCT VFR BLD AUTO: 23.9 %
HGB BLD-MCNC: 7.9 G/DL
IMM GRANULOCYTES # BLD AUTO: 0.13 K/UL
IMM GRANULOCYTES NFR BLD AUTO: 1 %
INR PPP: 1
LYMPHOCYTES # BLD AUTO: 1.8 K/UL
LYMPHOCYTES NFR BLD: 14.2 %
MAGNESIUM SERPL-MCNC: 1.8 MG/DL
MCH RBC QN AUTO: 28.7 PG
MCHC RBC AUTO-ENTMCNC: 33.1 G/DL
MCV RBC AUTO: 87 FL
MONOCYTES # BLD AUTO: 1.1 K/UL
MONOCYTES NFR BLD: 8.9 %
NEUTROPHILS # BLD AUTO: 9.7 K/UL
NEUTROPHILS NFR BLD: 75.7 %
NRBC BLD-RTO: 0 /100 WBC
PHOSPHATE SERPL-MCNC: 2.6 MG/DL
PLATELET # BLD AUTO: 231 K/UL
PMV BLD AUTO: 11.2 FL
POTASSIUM SERPL-SCNC: 3.6 MMOL/L
PROT SERPL-MCNC: 6.5 G/DL
PROTHROMBIN TIME: 10.8 SEC
RBC # BLD AUTO: 2.75 M/UL
SODIUM SERPL-SCNC: 136 MMOL/L
SODIUM SERPL-SCNC: 136 MMOL/L
SODIUM SERPL-SCNC: 137 MMOL/L
SODIUM SERPL-SCNC: 138 MMOL/L
WBC # BLD AUTO: 12.78 K/UL

## 2018-08-17 PROCEDURE — 85610 PROTHROMBIN TIME: CPT

## 2018-08-17 PROCEDURE — 99233 SBSQ HOSP IP/OBS HIGH 50: CPT | Mod: ,,, | Performed by: NURSE PRACTITIONER

## 2018-08-17 PROCEDURE — 63600175 PHARM REV CODE 636 W HCPCS: Performed by: PHYSICIAN ASSISTANT

## 2018-08-17 PROCEDURE — 63600175 PHARM REV CODE 636 W HCPCS: Mod: JG | Performed by: STUDENT IN AN ORGANIZED HEALTH CARE EDUCATION/TRAINING PROGRAM

## 2018-08-17 PROCEDURE — 84295 ASSAY OF SERUM SODIUM: CPT | Mod: 91

## 2018-08-17 PROCEDURE — 99232 SBSQ HOSP IP/OBS MODERATE 35: CPT | Mod: ,,, | Performed by: NEUROLOGICAL SURGERY

## 2018-08-17 PROCEDURE — 83735 ASSAY OF MAGNESIUM: CPT

## 2018-08-17 PROCEDURE — 84100 ASSAY OF PHOSPHORUS: CPT

## 2018-08-17 PROCEDURE — A4216 STERILE WATER/SALINE, 10 ML: HCPCS | Performed by: PSYCHIATRY & NEUROLOGY

## 2018-08-17 PROCEDURE — 25000003 PHARM REV CODE 250: Performed by: STUDENT IN AN ORGANIZED HEALTH CARE EDUCATION/TRAINING PROGRAM

## 2018-08-17 PROCEDURE — 99291 CRITICAL CARE FIRST HOUR: CPT | Mod: ,,, | Performed by: ANESTHESIOLOGY

## 2018-08-17 PROCEDURE — 25000003 PHARM REV CODE 250: Performed by: ANESTHESIOLOGY

## 2018-08-17 PROCEDURE — 76811 OB US DETAILED SNGL FETUS: CPT | Mod: 26,,, | Performed by: OBSTETRICS & GYNECOLOGY

## 2018-08-17 PROCEDURE — 80053 COMPREHEN METABOLIC PANEL: CPT

## 2018-08-17 PROCEDURE — 94761 N-INVAS EAR/PLS OXIMETRY MLT: CPT

## 2018-08-17 PROCEDURE — 25000003 PHARM REV CODE 250: Performed by: PSYCHIATRY & NEUROLOGY

## 2018-08-17 PROCEDURE — 25000003 PHARM REV CODE 250: Performed by: PHYSICIAN ASSISTANT

## 2018-08-17 PROCEDURE — 20000000 HC ICU ROOM

## 2018-08-17 PROCEDURE — 85025 COMPLETE CBC W/AUTO DIFF WBC: CPT

## 2018-08-17 PROCEDURE — P9045 ALBUMIN (HUMAN), 5%, 250 ML: HCPCS | Mod: JG | Performed by: STUDENT IN AN ORGANIZED HEALTH CARE EDUCATION/TRAINING PROGRAM

## 2018-08-17 RX ORDER — ALBUMIN HUMAN 50 G/1000ML
25 SOLUTION INTRAVENOUS ONCE
Status: COMPLETED | OUTPATIENT
Start: 2018-08-17 | End: 2018-08-17

## 2018-08-17 RX ORDER — NICARDIPINE HYDROCHLORIDE 0.2 MG/ML
2.5 INJECTION INTRAVENOUS CONTINUOUS
Status: DISCONTINUED | OUTPATIENT
Start: 2018-08-17 | End: 2018-08-18

## 2018-08-17 RX ORDER — OXYCODONE HYDROCHLORIDE 5 MG/1
10 TABLET ORAL EVERY 4 HOURS PRN
Status: DISCONTINUED | OUTPATIENT
Start: 2018-08-17 | End: 2018-08-24

## 2018-08-17 RX ADMIN — PRENATAL VIT W/ FE FUMARATE-FA TAB 27-0.8 MG 1 TABLET: 27-0.8 TAB at 08:08

## 2018-08-17 RX ADMIN — OXYCODONE HYDROCHLORIDE 15 MG: 5 TABLET ORAL at 06:08

## 2018-08-17 RX ADMIN — NIMODIPINE 30 MG: 30 CAPSULE, LIQUID FILLED ORAL at 04:08

## 2018-08-17 RX ADMIN — SENNOSIDES AND DOCUSATE SODIUM 1 TABLET: 8.6; 5 TABLET ORAL at 08:08

## 2018-08-17 RX ADMIN — OXYCODONE HYDROCHLORIDE 10 MG: 5 TABLET ORAL at 09:08

## 2018-08-17 RX ADMIN — CEFAZOLIN 1 G: 1 INJECTION, POWDER, FOR SOLUTION INTRAMUSCULAR; INTRAVENOUS at 06:08

## 2018-08-17 RX ADMIN — NIMODIPINE 30 MG: 30 CAPSULE, LIQUID FILLED ORAL at 02:08

## 2018-08-17 RX ADMIN — ALBUMIN HUMAN 25 G: 0.05 INJECTION, SOLUTION INTRAVENOUS at 12:08

## 2018-08-17 RX ADMIN — ACETAMINOPHEN 650 MG: 325 TABLET, FILM COATED ORAL at 08:08

## 2018-08-17 RX ADMIN — NIMODIPINE 30 MG: 30 CAPSULE, LIQUID FILLED ORAL at 03:08

## 2018-08-17 RX ADMIN — POTASSIUM & SODIUM PHOSPHATES POWDER PACK 280-160-250 MG 2 PACKET: 280-160-250 PACK at 06:08

## 2018-08-17 RX ADMIN — FERROUS SULFATE TAB EC 325 MG (65 MG FE EQUIVALENT) 325 MG: 325 (65 FE) TABLET DELAYED RESPONSE at 08:08

## 2018-08-17 RX ADMIN — OXYCODONE HYDROCHLORIDE 10 MG: 5 TABLET ORAL at 05:08

## 2018-08-17 RX ADMIN — SENNOSIDES AND DOCUSATE SODIUM 1 TABLET: 8.6; 5 TABLET ORAL at 09:08

## 2018-08-17 RX ADMIN — POTASSIUM & SODIUM PHOSPHATES POWDER PACK 280-160-250 MG 2 PACKET: 280-160-250 PACK at 11:08

## 2018-08-17 RX ADMIN — HEPARIN SODIUM 5000 UNITS: 5000 INJECTION, SOLUTION INTRAVENOUS; SUBCUTANEOUS at 03:08

## 2018-08-17 RX ADMIN — CEFAZOLIN 1 G: 1 INJECTION, POWDER, FOR SOLUTION INTRAMUSCULAR; INTRAVENOUS at 09:08

## 2018-08-17 RX ADMIN — Medication 10 ML: at 12:08

## 2018-08-17 RX ADMIN — NIMODIPINE 30 MG: 30 CAPSULE, LIQUID FILLED ORAL at 01:08

## 2018-08-17 RX ADMIN — ACETAMINOPHEN 650 MG: 325 TABLET, FILM COATED ORAL at 03:08

## 2018-08-17 RX ADMIN — NIMODIPINE 30 MG: 30 CAPSULE, LIQUID FILLED ORAL at 10:08

## 2018-08-17 RX ADMIN — CEFAZOLIN 1 G: 1 INJECTION, POWDER, FOR SOLUTION INTRAMUSCULAR; INTRAVENOUS at 02:08

## 2018-08-17 RX ADMIN — FAMOTIDINE 20 MG: 20 TABLET ORAL at 08:08

## 2018-08-17 RX ADMIN — Medication 10 ML: at 11:08

## 2018-08-17 RX ADMIN — NIMODIPINE 30 MG: 30 CAPSULE, LIQUID FILLED ORAL at 08:08

## 2018-08-17 RX ADMIN — HEPARIN SODIUM 5000 UNITS: 5000 INJECTION, SOLUTION INTRAVENOUS; SUBCUTANEOUS at 09:08

## 2018-08-17 RX ADMIN — NIMODIPINE 30 MG: 30 CAPSULE, LIQUID FILLED ORAL at 05:08

## 2018-08-17 RX ADMIN — NIMODIPINE 30 MG: 30 CAPSULE, LIQUID FILLED ORAL at 06:08

## 2018-08-17 RX ADMIN — MAGNESIUM OXIDE TAB 400 MG (241.3 MG ELEMENTAL MG) 800 MG: 400 (241.3 MG) TAB at 06:08

## 2018-08-17 RX ADMIN — OXYCODONE HYDROCHLORIDE 10 MG: 5 TABLET ORAL at 11:08

## 2018-08-17 RX ADMIN — OXYCODONE HYDROCHLORIDE 15 MG: 5 TABLET ORAL at 01:08

## 2018-08-17 RX ADMIN — Medication 10 ML: at 05:08

## 2018-08-17 RX ADMIN — MAGNESIUM OXIDE TAB 400 MG (241.3 MG ELEMENTAL MG) 800 MG: 400 (241.3 MG) TAB at 11:08

## 2018-08-17 RX ADMIN — HEPARIN SODIUM 5000 UNITS: 5000 INJECTION, SOLUTION INTRAVENOUS; SUBCUTANEOUS at 06:08

## 2018-08-17 RX ADMIN — NIMODIPINE 30 MG: 30 CAPSULE, LIQUID FILLED ORAL at 12:08

## 2018-08-17 RX ADMIN — POTASSIUM CHLORIDE 40 MEQ: 20 SOLUTION ORAL at 06:08

## 2018-08-17 RX ADMIN — Medication 10 ML: at 06:08

## 2018-08-17 RX ADMIN — FAMOTIDINE 20 MG: 20 TABLET ORAL at 09:08

## 2018-08-17 NOTE — PROGRESS NOTES
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  Etiology likely aneurysmal.  Recommend cerebral angiogram    Antithrombotics for secondary stroke prevention: none SAH     Statins for secondary stroke prevention and hyperlipidemia, if present: SAH     Aggressive risk factor modification: polycytsic kidney disease, HTN      Rehab efforts: Occupational Therapy, PT/OT/SLP to evaluate and treat when appropriate     Diagnostics ordered/pending: cerebral angiogram     VTE prophylaxis: SCDs    BP parameters: SAH: Unsecured aneurysm, SBP <140            25 weeks gestation of pregnancy    Recommend consult to Obgyn and ethics         Hypertension affecting pregnancy in second trimester    Risk factor for stroke  Keep systolic under 140        PKD (polycystic kidney disease)    Risk factor for aneurysm              33 y/o female found down @~24w5d admitted for management of subarachnoid hemmorhage likely due to ruptured aneurysm  Had coiling done ACOM 8-10-18  8-16-18 to IR for cerebral angiogram for vasospasm and had IV Verapamil instilled         STROKE DOCUMENTATION        NIH Scale:  1a. Level Of Consciousness: 0-->Alert: keenly responsive  1b. LOC Questions: 0-->Answers both questions correctly  1c. LOC Commands: 0-->Performs both tasks correctly  2. Best Gaze: 0-->Normal  3. Visual: 0-->No visual loss  4. Facial Palsy: 0-->Normal symmetrical movements  5a. Motor Arm, Left: 0-->No drift: limb holds 90 (or 45) degrees for full 10 secs  5b. Motor Arm, Right: 0-->No drift: limb holds 90 (or 45) degrees for full 10 secs  6a. Motor Leg, Left: 0-->No drift: leg holds 30 degree position for full 5 secs  6b. Motor Leg, Right: 0-->No  drift: leg holds 30 degree position for full 5 secs  7. Limb Ataxia: 0-->Absent  8. Sensory: 0-->Normal: no sensory loss  9. Best Language: 0-->No aphasia: normal  10. Dysarthria: 0-->Normal  11. Extinction and Inattention (formerly Neglect): 0-->No abnormality  Total (NIH Stroke Scale): 0       Modified [place] Score: 1  [place] Coma Scale:    ABCD2 Score:    [id number] Score:   HAS -BLED Score:   ICH Score:   Hunt & Collins Classification:Grade III     Hemorrhagic change of an Ischemic Stroke: Does this patient have an ischemic stroke with hemorrhagic changes? No SAH    Neurologic Chief Complaint: SAH    Subjective:     Interval History: Patient is seen for follow-up neurological assessment and treatment recommendations: No issues overnight, no neuro deficits    HPI, Past Medical, Family, and Social History remains the same as documented in the initial encounter.     Review of Systems   Constitutional: Negative for chills and fever.   Cardiovascular: Negative for chest pain and leg swelling.   Neurological: Negative for speech difficulty and weakness.     Scheduled Meds:   ceFAZolin (ANCEF) IVPB  1 g Intravenous Q8H    famotidine  20 mg Oral BID    ferrous sulfate  325 mg Oral Daily    heparin (porcine)  5,000 Units Subcutaneous Q8H    niMODipine  30 mg Oral Q2H    prenatal vitamin  1 tablet Oral Daily    senna-docusate 8.6-50 mg  1 tablet Oral BID    sodium chloride 0.9%  10 mL Intravenous Q6H     Continuous Infusions:   sodium chloride 0.9% 125 mL/hr at 08/17/18 1500    niCARdipine Stopped (08/17/18 0800)    Sodium Chloride 2% 25 mL/hr at 08/17/18 1500     PRN Meds:acetaminophen, calcium gluconate, labetalol, magnesium oxide, magnesium oxide, midazolam, oxyCODONE, potassium chloride 10%, potassium chloride 10%, potassium chloride 10%, potassium, sodium phosphates, potassium, sodium phosphates, potassium, sodium phosphates, Flushing PICC Protocol **AND** sodium chloride 0.9% **AND** sodium chloride 0.9%,  sodium chloride 0.9%    Objective:     Vital Signs (Most Recent):  Temp: 99.1 °F (37.3 °C) (08/17/18 1505)  Pulse: 71 (08/17/18 1505)  Resp: (!) 21 (08/17/18 1505)  BP: (!) 185/83 (08/17/18 1505)  SpO2: 97 % (08/17/18 1505)  BP Location: Left arm    Vital Signs Range (Last 24H):  Temp:  [98.6 °F (37 °C)-99.4 °F (37.4 °C)]   Pulse:  [69-95]   Resp:  [17-38]   BP: (125-191)/()   SpO2:  [96 %-100 %]   Arterial Line BP: (146-194)/(66-92)   BP Location: Left arm    Physical Exam   Constitutional: She is oriented to person, place, and time. She appears well-developed and well-nourished.   Pregnant   HENT:   Head: Normocephalic and atraumatic.   Neurological: She is alert and oriented to person, place, and time.   Nursing note and vitals reviewed.      Neurological Exam:   LOC: alert  Attention Span: Good   Language: No aphasia  Articulation: No dysarthria  Orientation: Person, Place, Time   Visual Fields: Full  EOM (CN III, IV, VI): Full/intact  Motor: Arm left  Normal 5/5  Leg left  Normal 5/5  Arm right  Normal 5/5  Leg right Normal 5/5  Tone: Normal tone throughout    Laboratory:  CMP:   Recent Labs   Lab  08/17/18 0132 08/17/18   1237   CALCIUM  8.5*   --    ALBUMIN  2.3*   --    PROT  6.5   --    NA  136  136  137   K  3.6   --    CO2  17*   --    CL  108   --    BUN  9   --    CREATININE  0.7   --    ALKPHOS  71   --    ALT  14   --    AST  14   --    BILITOT  0.4   --      CBC:   Recent Labs   Lab  08/17/18 0131   WBC  12.78*   RBC  2.75*   HGB  7.9*   HCT  23.9*   PLT  231   MCV  87   MCH  28.7   MCHC  33.1     Lipid Panel: No results for input(s): CHOL, LDLCALC, HDL, TRIG in the last 168 hours.  Coagulation:   Recent Labs   Lab  08/17/18 0131   INR  1.0     Platelet Aggregation Study: No results for input(s): PLTAGG, PLTAGINTERP, PLTAGREGLACO, ADPPLTAGGREG in the last 168 hours.  Hgb A1C: No results for input(s): HGBA1C in the last 168 hours.  TSH: No results for input(s): TSH in the last 168  hours.    Diagnostic Results     Brain Imaging   CT Head w/o contrast 8-10-18 results:    1. Extensive subarachnoid hemorrhage, most likely from an aneurysm rupture.  It is difficult to determine the site of the aneurysm rupture on this study due to the diffuse nature of the subarachnoid hemorrhage throughout the posterior fossa as well as the basilar cisternal spaces.  2. Slight increase in the ventricular size when compared to the previous study suggestive of a developing hydrocephalus.    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  Successful coil embolization of this aneurysm.     Cardiac Imaging   2D Echo 8-11-18 results:    1 - Eccentric hypertrophy.     2 - Normal left ventricular systolic function (EF 60-65%).     3 - No wall motion abnormalities.     4 - Normal left ventricular diastolic function.     5 - Normal right ventricular systolic function .     6 - The estimated PA systolic pressure is 31 mmHg.     7 - Trivial pericardial effusion.       Lorie Mercado NP  [facility]  Department of Vascular Neurology   [facility]

## 2018-08-17 NOTE — PROGRESS NOTES
[facility]  Neurosurgery  Progress Note    Subjective:     History of Present Illness: 32 y.o. female 24 weeks pregnant with a history of polycystic kidney disease who presents as transfer from [facility] for SAH. Patient found down by 6 yr old son. Last known normal 0500 today. CT at [facility] revealed SAH within the basal cisterns. US at [facility] with + fetal heart tones. Transferred for neuro evaluation.     Post-Op Info:  Procedure(s) (LRB):  Mri (magnetic resonance imaging) (N/A)   7 Days Post-Op     Interval History: NAEON. Angio yesterday revealed moderate vaspospasm, treated with IV verapamil. EVD@10, ICP 6-13. OB/GYN planning for fetal US today. Groin puncture site w/o hematoma.    Medications:  Continuous Infusions:   sodium chloride 0.9% 125 mL/hr at 08/17/18 0900    niCARdipine Stopped (08/17/18 0800)    Sodium Chloride 2% 25 mL/hr at 08/17/18 0900     Scheduled Meds:   ceFAZolin (ANCEF) IVPB  1 g Intravenous Q8H    famotidine  20 mg Oral BID    ferrous sulfate  325 mg Oral Daily    heparin (porcine)  5,000 Units Subcutaneous Q8H    niMODipine  30 mg Oral Q2H    prenatal vitamin  1 tablet Oral Daily    senna-docusate 8.6-50 mg  1 tablet Oral BID    sodium chloride 0.9%  10 mL Intravenous Q6H     PRN Meds:acetaminophen, calcium gluconate, labetalol, magnesium oxide, magnesium oxide, midazolam, oxyCODONE, potassium chloride 10%, potassium chloride 10%, potassium chloride 10%, potassium, sodium phosphates, potassium, sodium phosphates, potassium, sodium phosphates, Flushing PICC Protocol **AND** sodium chloride 0.9% **AND** sodium chloride 0.9%, sodium chloride 0.9%     Review of Systems  Objective:     Weight: 76.5 kg (168 lb 10.4 oz)  Body mass index is 24.91 kg/m².  Vital Signs (Most Recent):  Temp: 99 °F (37.2 °C) (08/17/18 0705)  Pulse: 78 (08/17/18 0904)  Resp: (!) 26 (08/17/18 0904)  BP: (!) 183/79 (08/17/18 0800)  SpO2: 100 % (08/17/18 0705) Vital Signs (24h Range):  Temp:  [98 °F (36.7  °C)-99.4 °F (37.4 °C)] 99 °F (37.2 °C)  Pulse:  [71-93] 78  Resp:  [14-55] 26  SpO2:  [95 %-100 %] 100 %  BP: (125-191)/() 183/79  Arterial Line BP: (146-198)/(68-93) 175/81     Date 08/17/18 0700 - 08/18/18 0659   Shift 9897-2060 2940-4677 5986-4759 24 Hour Total   INTAKE   I.V.(mL/kg) 450(5.9)   450(5.9)   Shift Total(mL/kg) 450(5.9)   450(5.9)   OUTPUT   Drains 14   14   Shift Total(mL/kg) 14(0.2)   14(0.2)   Weight (kg) 76.5 76.5 76.5 76.5                        ICP/Ventriculostomy 08/11/18 0000 Ventricular drainage catheter with ICP microsensor Right Other (Comment) (Active)   Level of Ventriculostomy (cm above) 10 8/17/2018  7:05 AM   Status Open to drainage 8/17/2018  7:05 AM   Site Assessment Dry;Clean 8/17/2018  7:05 AM   Site Drainage No drainage 8/17/2018  7:05 AM   Waveform normal waveform 8/17/2018  3:00 AM   Output (mL) 14 mL 8/17/2018  7:00 AM   CSF Color pink 8/17/2018  7:05 AM   Dressing Status Biopatch in place;Clean;Dry;Intact 8/17/2018  7:05 AM   Interventions bed controls locked;HOB degrees;zeroed 8/17/2018  7:05 AM       Neurosurgery Physical Exam  AOX3  speech fluent  EVD in place   PERRL, EOMI, face symm, tongue midline  HOBSON symm  No drift appreciated on exam this AM  2+ femoral, distal pulses  No groin hematoma appreciated        Significant Labs:  Recent Labs   Lab  08/15/18   1439   08/16/18   0334  08/16/18   1303  08/16/18   2157  08/17/18   0132   GLU   --    --   82   --    --   103   NA   --    < >  136  136  134*  133*  136  136   K   --    --   3.8   --    --   3.6   CL   --    --   108   --    --   108   CO2   --    --   17*   --    --   17*   BUN   --    --   9   --    --   9   CREATININE   --    --   0.7   --    --   0.7   CALCIUM   --    --   8.4*   --    --   8.5*   MG  1.7   --   1.7   --    --   1.8    < > = values in this interval not displayed.     Recent Labs   Lab  08/16/18   0334  08/16/18   1326  08/17/18   0131   WBC  11.69  12.85*  12.78*   HGB  7.6*  7.6*   7.9*   HCT  22.9*  24.0*  23.9*   PLT  200  223  231     Recent Labs   Lab  08/16/18   0334  08/17/18   0131   INR  1.1  1.0     Microbiology Results (last 7 days)     Procedure Component Value Units Date/Time    Blood culture [[id number]] Collected:  08/10/18 2020    Order Status:  Completed Specimen:  Blood from Peripheral, Foot, Left Updated:  08/16/18 0612     Blood Culture, Routine No growth after 5 days.    Blood culture [[id number]] Collected:  08/10/18 2040    Order Status:  Completed Specimen:  Blood from Peripheral, Antecubital, Left Updated:  08/16/18 0612     Blood Culture, Routine No growth after 5 days.    Urine culture [[id number]] Collected:  08/12/18 1526    Order Status:  Completed Specimen:  Urine, Catheterized Updated:  08/13/18 2118     Urine Culture, Routine No growth    C. trachomatis/N. gonorrhoeae by AMP DNA [facility]; Urine [[id number]] Collected:  08/11/18 1435    Order Status:  Sent Specimen:  Genital Updated:  08/11/18 1435    Culture, Respiratory with Gram Stain [[id number]]     Order Status:  No result Specimen:  Respiratory         Recent Lab Results       08/17/18  0132 08/17/18  0131 08/16/18  2157 08/16/18  2152 08/16/18  1742      Immature Granulocytes  1.0        Immature Grans (Abs)  0.13  Comment:  Mild elevation in immature granulocytes is non specific and   can be seen in a variety of conditions including stress response,   acute inflammation, trauma and pregnancy. Correlation with other   laboratory and clinical findings is essential.          Albumin 2.3         Alkaline Phosphatase 71         ALT 14         Anion Gap 11         AST 14         Baso #  0.01        Basophil%  0.1        Total Bilirubin 0.4  Comment:  For infants and newborns, interpretation of results should be based  on gestational age, weight and in agreement with clinical  observations.  Premature Infant recommended reference ranges:  Up to 24 hours.............<8.0 mg/dL  Up to 48 hours............<12.0  mg/dL  3-5 days..................<15.0 mg/dL  6-29 days.................<15.0 mg/dL           BUN, Bld 9         Calcium 8.5         Chloride 108         CO2 17         Creatinine 0.7         Differential Method  Automated        eGFR if  >60.0         eGFR if non  >60.0  Comment:  Calculation used to obtain the estimated glomerular filtration  rate (eGFR) is the CKD-EPI equation.            Eos #  0.0        Eosinophil%  0.1        Glucose 103         Gran # (ANC)  9.7        Gran%  75.7        Hematocrit  23.9        Hemoglobin  7.9        Coumadin Monitoring INR  1.0  Comment:  Coumadin Therapy:  2.0 - 3.0 for INR for all indicators except mechanical heart valves  and antiphospholipid syndromes which should use 2.5 - 3.5.          Lymph #  1.8        Lymph%  14.2        Magnesium 1.8         MCH  28.7        MCHC  33.1        MCV  87        Mono #  1.1        Mono%  8.9        MPV  11.2        nRBC  0        Phosphorus 2.6         Platelets  231        POCT Glucose    146 106     Potassium 3.6         Total Protein 6.5         Protime  10.8        RBC  2.75        RDW  14.6        Sodium 136  133        136         WBC  12.78                    08/16/18  1326 08/16/18  1303      Immature Granulocytes 1.0      Immature Grans (Abs) 0.13  Comment:  Mild elevation in immature granulocytes is non specific and   can be seen in a variety of conditions including stress response,   acute inflammation, trauma and pregnancy. Correlation with other   laboratory and clinical findings is essential.        Albumin       Alkaline Phosphatase       ALT       Anion Gap       AST       Baso # 0.03      Basophil% 0.2      Total Bilirubin       BUN, Bld       Calcium       Chloride       CO2       Creatinine       Differential Method Automated      eGFR if        eGFR if non        Eos # 0.0      Eosinophil% 0.0      Glucose       Gran # (ANC) 10.5      Gran% 81.9       Hematocrit 24.0      Hemoglobin 7.6      Coumadin Monitoring INR       Lymph # 1.3      Lymph% 9.8      Magnesium       MCH 28.3      MCHC 31.7      MCV 89      Mono # 0.9      Mono% 7.1      MPV 10.7      nRBC 0      Phosphorus       Platelets 223      POCT Glucose       Potassium       Total Protein       Protime       RBC 2.69      RDW 14.8      Sodium  134     WBC 12.85            Significant Diagnostics:  I have reviewed all pertinent imaging results/findings within the past 24 hours.    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 female 24 weeks pregnant with history of polycystic kidney disease with HH4F4 SAH on 8/10, now s/p acom coiling 8/10.     NEURO  -Continue NCC  -q 1 hr neuro checks  -evd open to 10 and draining, monitor icps  -nimotop  -daily tds  -Keppra      CV  -permissive HTN  <200 as aneurysm secured    RESP  -stable on room air  -Aggressive pulmonary management  -IS @ bedside    FEN/GI  -strict I/Os  -goal net even or positive  -Reg diet    ID  -vanc for drain ppx    PPX  -SQH  -famotidine for PUD ppx  -TEDs/SCDs    -further mgmt per ncc and obgyn.            Nayeli Sadler MD  Neurosurgery  [facility]

## 2018-08-17 NOTE — SUBJECTIVE & OBJECTIVE
Neurologic Chief Complaint: SAH    Subjective:     Interval History: Patient is seen for follow-up neurological assessment and treatment recommendations: No issues overnight, no neuro deficits    HPI, Past Medical, Family, and Social History remains the same as documented in the initial encounter.     Review of Systems   Constitutional: Negative for chills and fever.   Cardiovascular: Negative for chest pain and leg swelling.   Neurological: Negative for speech difficulty and weakness.     Scheduled Meds:   ceFAZolin (ANCEF) IVPB  1 g Intravenous Q8H    famotidine  20 mg Oral BID    ferrous sulfate  325 mg Oral Daily    heparin (porcine)  5,000 Units Subcutaneous Q8H    niMODipine  30 mg Oral Q2H    prenatal vitamin  1 tablet Oral Daily    senna-docusate 8.6-50 mg  1 tablet Oral BID    sodium chloride 0.9%  10 mL Intravenous Q6H     Continuous Infusions:   sodium chloride 0.9% 125 mL/hr at 08/17/18 1500    niCARdipine Stopped (08/17/18 0800)    Sodium Chloride 2% 25 mL/hr at 08/17/18 1500     PRN Meds:acetaminophen, calcium gluconate, labetalol, magnesium oxide, magnesium oxide, midazolam, oxyCODONE, potassium chloride 10%, potassium chloride 10%, potassium chloride 10%, potassium, sodium phosphates, potassium, sodium phosphates, potassium, sodium phosphates, Flushing PICC Protocol **AND** sodium chloride 0.9% **AND** sodium chloride 0.9%, sodium chloride 0.9%    Objective:     Vital Signs (Most Recent):  Temp: 99.1 °F (37.3 °C) (08/17/18 1505)  Pulse: 71 (08/17/18 1505)  Resp: (!) 21 (08/17/18 1505)  BP: (!) 185/83 (08/17/18 1505)  SpO2: 97 % (08/17/18 1505)  BP Location: Left arm    Vital Signs Range (Last 24H):  Temp:  [98.6 °F (37 °C)-99.4 °F (37.4 °C)]   Pulse:  [69-95]   Resp:  [17-38]   BP: (125-191)/()   SpO2:  [96 %-100 %]   Arterial Line BP: (146-194)/(66-92)   BP Location: Left arm    Physical Exam   Constitutional: She is oriented to person, place, and time. She appears well-developed  and well-nourished.   Pregnant   HENT:   Head: Normocephalic and atraumatic.   Neurological: She is alert and oriented to person, place, and time.   Nursing note and vitals reviewed.      Neurological Exam:   LOC: alert  Attention Span: Good   Language: No aphasia  Articulation: No dysarthria  Orientation: Person, Place, Time   Visual Fields: Full  EOM (CN III, IV, VI): Full/intact  Motor: Arm left  Normal 5/5  Leg left  Normal 5/5  Arm right  Normal 5/5  Leg right Normal 5/5  Tone: Normal tone throughout    Laboratory:  CMP:   Recent Labs   Lab  08/17/18 0132 08/17/18   1237   CALCIUM  8.5*   --    ALBUMIN  2.3*   --    PROT  6.5   --    NA  136  136  137   K  3.6   --    CO2  17*   --    CL  108   --    BUN  9   --    CREATININE  0.7   --    ALKPHOS  71   --    ALT  14   --    AST  14   --    BILITOT  0.4   --      CBC:   Recent Labs   Lab  08/17/18 0131   WBC  12.78*   RBC  2.75*   HGB  7.9*   HCT  23.9*   PLT  231   MCV  87   MCH  28.7   MCHC  33.1     Lipid Panel: No results for input(s): CHOL, LDLCALC, HDL, TRIG in the last 168 hours.  Coagulation:   Recent Labs   Lab  08/17/18 0131   INR  1.0     Platelet Aggregation Study: No results for input(s): PLTAGG, PLTAGINTERP, PLTAGREGLACO, ADPPLTAGGREG in the last 168 hours.  Hgb A1C: No results for input(s): HGBA1C in the last 168 hours.  TSH: No results for input(s): TSH in the last 168 hours.    Diagnostic Results     Brain Imaging   CT Head w/o contrast 8-10-18 results:    1. Extensive subarachnoid hemorrhage, most likely from an aneurysm rupture.  It is difficult to determine the site of the aneurysm rupture on this study due to the diffuse nature of the subarachnoid hemorrhage throughout the posterior fossa as well as the basilar cisternal spaces.  2. Slight increase in the ventricular size when compared to the previous study suggestive of a developing hydrocephalus.    Vessel Imaging   Cerebral angiogram 8-16-18 results:  IV Verapamil instilled due to  vasospasm    Cerebral angiogram 8-10-18 results:  2.3 x 2.7 x 2.0 mm anterior communicating artery aneurysm with Villafuerte's excrescence.  Successful coil embolization of this aneurysm.     Cardiac Imaging   2D Echo 8-11-18 results:    1 - Eccentric hypertrophy.     2 - Normal left ventricular systolic function (EF 60-65%).     3 - No wall motion abnormalities.     4 - Normal left ventricular diastolic function.     5 - Normal right ventricular systolic function .     6 - The estimated PA systolic pressure is 31 mmHg.     7 - Trivial pericardial effusion.

## 2018-08-17 NOTE — PROGRESS NOTES
ICU Progress Note  Neurocritical Care    Admit Date: 8/10/2018  LOS: 7    CC: Subarachnoid hemorrhage from anterior communicating artery aneurysm    Code Status: Full Code     SUBJECTIVE:     Interval History/Significant Events:     Awake  Alert  S/p angiogram yesterday  TCD's improved post angiogram  HX3  No change in exam  Anemia sec to loss and dilution  24 weeks pregnancy per USG          Medications:  Continuous Infusions:   sodium chloride 0.9% 125 mL/hr at 08/17/18 0900    niCARdipine Stopped (08/17/18 0800)    Sodium Chloride 2% 25 mL/hr at 08/17/18 0900     Scheduled Meds:   ceFAZolin (ANCEF) IVPB  1 g Intravenous Q8H    famotidine  20 mg Oral BID    ferrous sulfate  325 mg Oral Daily    heparin (porcine)  5,000 Units Subcutaneous Q8H    niMODipine  30 mg Oral Q2H    prenatal vitamin  1 tablet Oral Daily    senna-docusate 8.6-50 mg  1 tablet Oral BID    sodium chloride 0.9%  10 mL Intravenous Q6H     PRN Meds:.acetaminophen, calcium gluconate, labetalol, magnesium oxide, magnesium oxide, midazolam, oxyCODONE, potassium chloride 10%, potassium chloride 10%, potassium chloride 10%, potassium, sodium phosphates, potassium, sodium phosphates, potassium, sodium phosphates, Flushing PICC Protocol **AND** sodium chloride 0.9% **AND** sodium chloride 0.9%, sodium chloride 0.9%    OBJECTIVE:   Vital Signs (Most Recent):   Temp: 99 °F (37.2 °C) (08/17/18 0705)  Pulse: 78 (08/17/18 0904)  Resp: (!) 26 (08/17/18 0904)  BP: (!) 183/79 (08/17/18 0800)  SpO2: 100 % (08/17/18 0705)    Vital Signs (24h Range):   Temp:  [98 °F (36.7 °C)-99.4 °F (37.4 °C)] 99 °F (37.2 °C)  Pulse:  [71-93] 78  Resp:  [14-55] 26  SpO2:  [96 %-100 %] 100 %  BP: (125-191)/() 183/79  Arterial Line BP: (146-198)/(68-93) 175/81    ICP/CPP (Last 24h):   ICP Mean (mmHg):  [5 mmHg-21 mmHg] 15 mmHg  CPP (mmHg calculated using NBP):  [] 92  CPP:  [85 mmHg-120 mmHg] 97 mmHg    I & O (Last 24h):     Intake/Output Summary (Last 24  hours) at 8/17/2018 1007  Last data filed at 8/17/2018 0900  Gross per 24 hour   Intake 3450 ml   Output 3004 ml   Net 446 ml     Physical Exam:  GA: Alert, comfortable, no acute distress.   HEENT: No scleral icterus or JVD.   Pulmonary: Clear to auscultation A/P/L. No wheezing, crackles, or rhonchi.  Cardiac: RRR S1 & S2 w/o rubs/murmurs/gallops.   Abdominal: Bowel sounds present x 4. No appreciable hepatosplenomegaly.  Skin: No jaundice, rashes, or visible lesions.  Neuro:    Awake  Alert  3 mm brisk  Non-focal  No drift           Lines/Drains/Airway:     PICC;3  [place];2       PICC Double Lumen 08/13/18 1551 right brachial (Active)   Site Assessment Clean;Dry;Intact;No redness;No swelling 8/17/2018  7:05 AM   Lumen 1 Status Infusing 8/17/2018  7:05 AM   Lumen 2 Status Infusing 8/17/2018  7:05 AM   Length martha (cm) 36 cm 8/13/2018  3:50 PM   Current Exposed Catheter (cm) 0 cm 8/13/2018  3:50 PM   Extremity Circumference (cm) 31 cm 8/13/2018  3:50 PM   Dressing Type Transparent 8/17/2018  7:05 AM   Dressing Status Biopatch in place;Clean;Dry;Intact 8/17/2018  7:05 AM   Dressing Intervention Dressing reinforced 8/17/2018  3:00 AM   Dressing Change Due 08/20/18 8/17/2018  7:05 AM   Daily Line Review Performed 8/17/2018  7:05 AM           Arterial Line Left Radial (Active)   Site Assessment Dry;Clean;Intact;No redness;No swelling 8/17/2018  7:05 AM   Line Status Pulsatile blood flow 8/17/2018  7:05 AM   Art Line Waveform Appropriate;Square wave test performed 8/17/2018  7:05 AM   Arterial Line Interventions Zeroed and calibrated;Leveled;Connections checked and tightened;Flushed per protocol 8/17/2018  7:05 AM   Color/Movement/Sensation Capillary refill less than 3 sec 8/17/2018  7:05 AM   Dressing Type Transparent 8/17/2018  7:05 AM   Dressing Status Clean;Dry;Intact;Biopatch in place 8/17/2018  7:05 AM   Dressing Intervention Dressing reinforced 8/17/2018  3:00 AM           ICP/Ventriculostomy 08/11/18 0000  Ventricular drainage catheter with ICP microsensor Right Other (Comment) (Active)   Level of Ventriculostomy (cm above) 10 8/17/2018  7:05 AM   Status Open to drainage 8/17/2018  7:05 AM   Site Assessment Dry;Clean 8/17/2018  7:05 AM   Site Drainage No drainage 8/17/2018  7:05 AM   Waveform normal waveform 8/17/2018  3:00 AM   Output (mL) 14 mL 8/17/2018  7:00 AM   CSF Color pink 8/17/2018  7:05 AM   Dressing Status Biopatch in place;Clean;Dry;Intact 8/17/2018  7:05 AM   Interventions bed controls locked;HOB degrees;zeroed 8/17/2018  7:05 AM     Nutrition/Tube Feeds (if NPO state why): po    Labs:  ABG: No results for input(s): PH, PO2, PCO2, HCO3, POCSATURATED, BE in the last 24 hours.  BMP:  Recent Labs   Lab  08/17/18   0132   NA  136  136   K  3.6   CL  108   CO2  17*   BUN  9   CREATININE  0.7   GLU  103   MG  1.8   PHOS  2.6*     LFT:   Lab Results   Component Value Date    AST 14 08/17/2018    ALT 14 08/17/2018    ALKPHOS 71 08/17/2018    BILITOT 0.4 08/17/2018    ALBUMIN 2.3 (L) 08/17/2018    PROT 6.5 08/17/2018     CBC:   Lab Results   Component Value Date    WBC 12.78 (H) 08/17/2018    HGB 7.9 (L) 08/17/2018    HCT 23.9 (L) 08/17/2018    MCV 87 08/17/2018     08/17/2018     Microbiology x 7d:   Microbiology Results (last 7 days)     Procedure Component Value Units Date/Time    Blood culture [[id number]] Collected:  08/10/18 2020    Order Status:  Completed Specimen:  Blood from Peripheral, Foot, Left Updated:  08/16/18 0612     Blood Culture, Routine No growth after 5 days.    Blood culture [[id number]] Collected:  08/10/18 2040    Order Status:  Completed Specimen:  Blood from Peripheral, Antecubital, Left Updated:  08/16/18 0612     Blood Culture, Routine No growth after 5 days.    Urine culture [[id number]] Collected:  08/12/18 1526    Order Status:  Completed Specimen:  Urine, Catheterized Updated:  08/13/18 2118     Urine Culture, Routine No growth    C. trachomatis/N. gonorrhoeae by AMP DNA  [facility]; Urine [[id number]] Collected:  08/11/18 1435    Order Status:  Sent Specimen:  Genital Updated:  08/11/18 1435    Culture, Respiratory with Gram Stain [[id number]]     Order Status:  No result Specimen:  Respiratory         Imaging:     Findings suggest improving intracranial vasospasm.  Stable to slightly improved velocities in the middle and anterior cerebral arteries with improved velocities in the basilar and left vertebral artery  I personally reviewed the above image.    ASSESSMENT/PLAN:     Active Hospital Problems    Diagnosis    *Subarachnoid hemorrhage from anterior communicating artery aneurysm    Cerebral artery vasospasm    Intracranial hypertension    [place] coma scale total score 9-12    Endotracheal tube present    Hyponatremia    Hypokalemia    Hypophosphatemia    Metabolic encephalopathy    Aphasia    JOSE C (acute kidney injury)    Pre-eclampsia in second trimester    Brain compression    Brain edema    Hypertension affecting pregnancy in second trimester    25 weeks gestation of pregnancy    Leukocytosis    PKD (polycystic kidney disease)     Needs baseline P/C ratio.  Strong family history of renal abnormalities              Plan:  Follow Na  Follow exam  HX3  Follow ICP's  discussed with VN    Critical secondary to Patient has a condition that poses threat to life and bodily function: follow Na/icp/exam. At high risk of deterioration from VSP      Total cc time 39 mins     Critical care was time spent personally by me on the following activities: development of treatment plan with patient or surrogate and bedside caregivers, discussions with consultants, evaluation of patient's response to treatment, examination of patient, ordering and performing treatments and interventions, ordering and review of laboratory studies, ordering and review of radiographic studies, pulse oximetry, re-evaluation of patient's condition. This critical care time did not overlap with that of any  other provider or involve time for any procedures.

## 2018-08-17 NOTE — HOSPITAL COURSE
33 y/o female found down @~24w5d admitted for management of subarachnoid hemmorhage likely due to ruptured aneurysm  Had coiling done ACOM 8-10-18  8-16-18 to IR for cerebral angiogram for vasospasm and had IV Verapamil instilled   8-19-18 with headache TCD's better yeaterday  8/20:  Continues with HA worse with positional movement.  No neurological deficits.  TCDs for today pending.  Yesterday demonstrated multiple areas of mild and moderate vasospasm with no clinical deterioration.  NIH remains 0.    8/21: Continues with headache and stiff neck.  No new neuro symptoms.  TCDs ratios improved some, but continues with vasospasm with no clinical deterioration.  On radha drip to keep -220.  8/27/18 - MRI showing area of restricted diffusion in R Basal ganglia, when considering the neuro exam on Ms [place], suspect this is related to artifact from cathether   8/28:  Doing well.  HA and stiff neck improving.  Able to move around in bed more comfortably.  EVD removed 8/27.  Plan for stepdown.   8/29: Per neurosurgery note patient okay to step down. Continuing Nimotop, to be completed on 8/31. Doing well on exam today.

## 2018-08-17 NOTE — SUBJECTIVE & OBJECTIVE
Interval History: NAEON. Angio yesterday revealed moderate vaspospasm, treated with IV verapamil. EVD@10, ICP 6-13. OB/GYN planning for fetal US today.     Medications:  Continuous Infusions:   sodium chloride 0.9% 125 mL/hr at 08/17/18 0900    niCARdipine Stopped (08/17/18 0800)    Sodium Chloride 2% 25 mL/hr at 08/17/18 0900     Scheduled Meds:   ceFAZolin (ANCEF) IVPB  1 g Intravenous Q8H    famotidine  20 mg Oral BID    ferrous sulfate  325 mg Oral Daily    heparin (porcine)  5,000 Units Subcutaneous Q8H    niMODipine  30 mg Oral Q2H    prenatal vitamin  1 tablet Oral Daily    senna-docusate 8.6-50 mg  1 tablet Oral BID    sodium chloride 0.9%  10 mL Intravenous Q6H     PRN Meds:acetaminophen, calcium gluconate, labetalol, magnesium oxide, magnesium oxide, midazolam, oxyCODONE, potassium chloride 10%, potassium chloride 10%, potassium chloride 10%, potassium, sodium phosphates, potassium, sodium phosphates, potassium, sodium phosphates, Flushing PICC Protocol **AND** sodium chloride 0.9% **AND** sodium chloride 0.9%, sodium chloride 0.9%     Review of Systems  Objective:     Weight: 76.5 kg (168 lb 10.4 oz)  Body mass index is 24.91 kg/m².  Vital Signs (Most Recent):  Temp: 99 °F (37.2 °C) (08/17/18 0705)  Pulse: 78 (08/17/18 0904)  Resp: (!) 26 (08/17/18 0904)  BP: (!) 183/79 (08/17/18 0800)  SpO2: 100 % (08/17/18 0705) Vital Signs (24h Range):  Temp:  [98 °F (36.7 °C)-99.4 °F (37.4 °C)] 99 °F (37.2 °C)  Pulse:  [71-93] 78  Resp:  [14-55] 26  SpO2:  [95 %-100 %] 100 %  BP: (125-191)/() 183/79  Arterial Line BP: (146-198)/(68-93) 175/81     Date 08/17/18 0700 - 08/18/18 0659   Shift 4646-2800 2162-4971 9665-3150 24 Hour Total   INTAKE   I.V.(mL/kg) 450(5.9)   450(5.9)   Shift Total(mL/kg) 450(5.9)   450(5.9)   OUTPUT   Drains 14   14   Shift Total(mL/kg) 14(0.2)   14(0.2)   Weight (kg) 76.5 76.5 76.5 76.5                        ICP/Ventriculostomy 08/11/18 0000 Ventricular drainage catheter with  ICP microsensor Right Other (Comment) (Active)   Level of Ventriculostomy (cm above) 10 8/17/2018  7:05 AM   Status Open to drainage 8/17/2018  7:05 AM   Site Assessment Dry;Clean 8/17/2018  7:05 AM   Site Drainage No drainage 8/17/2018  7:05 AM   Waveform normal waveform 8/17/2018  3:00 AM   Output (mL) 14 mL 8/17/2018  7:00 AM   CSF Color pink 8/17/2018  7:05 AM   Dressing Status Biopatch in place;Clean;Dry;Intact 8/17/2018  7:05 AM   Interventions bed controls locked;HOB degrees;zeroed 8/17/2018  7:05 AM       Neurosurgery Physical Exam  AOX3  speech fluent  EVD in place   PERRL, EOMI, face symm, tongue midline  HOBSON symm  No drift appreciated on exam this AM        Significant Labs:  Recent Labs   Lab  08/15/18   1439   08/16/18   0334  08/16/18   1303  08/16/18   2157  08/17/18   0132   GLU   --    --   82   --    --   103   NA   --    < >  136  136  134*  133*  136  136   K   --    --   3.8   --    --   3.6   CL   --    --   108   --    --   108   CO2   --    --   17*   --    --   17*   BUN   --    --   9   --    --   9   CREATININE   --    --   0.7   --    --   0.7   CALCIUM   --    --   8.4*   --    --   8.5*   MG  1.7   --   1.7   --    --   1.8    < > = values in this interval not displayed.     Recent Labs   Lab  08/16/18   0334  08/16/18   1326  08/17/18   0131   WBC  11.69  12.85*  12.78*   HGB  7.6*  7.6*  7.9*   HCT  22.9*  24.0*  23.9*   PLT  200  223  231     Recent Labs   Lab  08/16/18   0334  08/17/18   0131   INR  1.1  1.0     Microbiology Results (last 7 days)     Procedure Component Value Units Date/Time    Blood culture [[id number]] Collected:  08/10/18 2020    Order Status:  Completed Specimen:  Blood from Peripheral, Foot, Left Updated:  08/16/18 0612     Blood Culture, Routine No growth after 5 days.    Blood culture [[id number]] Collected:  08/10/18 2040    Order Status:  Completed Specimen:  Blood from Peripheral, Antecubital, Left Updated:  08/16/18 0612     Blood Culture, Routine No  growth after 5 days.    Urine culture [[id number]] Collected:  08/12/18 1526    Order Status:  Completed Specimen:  Urine, Catheterized Updated:  08/13/18 2118     Urine Culture, Routine No growth    C. trachomatis/N. gonorrhoeae by AMP DNA [facility]; Urine [[id number]] Collected:  08/11/18 1435    Order Status:  Sent Specimen:  Genital Updated:  08/11/18 1435    Culture, Respiratory with Gram Stain [[id number]]     Order Status:  No result Specimen:  Respiratory         Recent Lab Results       08/17/18  0132 08/17/18  0131 08/16/18  2157 08/16/18 2152 08/16/18  1742      Immature Granulocytes  1.0        Immature Grans (Abs)  0.13  Comment:  Mild elevation in immature granulocytes is non specific and   can be seen in a variety of conditions including stress response,   acute inflammation, trauma and pregnancy. Correlation with other   laboratory and clinical findings is essential.          Albumin 2.3         Alkaline Phosphatase 71         ALT 14         Anion Gap 11         AST 14         Baso #  0.01        Basophil%  0.1        Total Bilirubin 0.4  Comment:  For infants and newborns, interpretation of results should be based  on gestational age, weight and in agreement with clinical  observations.  Premature Infant recommended reference ranges:  Up to 24 hours.............<8.0 mg/dL  Up to 48 hours............<12.0 mg/dL  3-5 days..................<15.0 mg/dL  6-29 days.................<15.0 mg/dL           BUN, Bld 9         Calcium 8.5         Chloride 108         CO2 17         Creatinine 0.7         Differential Method  Automated        eGFR if  >60.0         eGFR if non  >60.0  Comment:  Calculation used to obtain the estimated glomerular filtration  rate (eGFR) is the CKD-EPI equation.            Eos #  0.0        Eosinophil%  0.1        Glucose 103         Gran # (ANC)  9.7        Gran%  75.7        Hematocrit  23.9        Hemoglobin  7.9        Coumadin Monitoring INR   1.0  Comment:  Coumadin Therapy:  2.0 - 3.0 for INR for all indicators except mechanical heart valves  and antiphospholipid syndromes which should use 2.5 - 3.5.          Lymph #  1.8        Lymph%  14.2        Magnesium 1.8         MCH  28.7        MCHC  33.1        MCV  87        Mono #  1.1        Mono%  8.9        MPV  11.2        nRBC  0        Phosphorus 2.6         Platelets  231        POCT Glucose    146 106     Potassium 3.6         Total Protein 6.5         Protime  10.8        RBC  2.75        RDW  14.6        Sodium 136  133        136         WBC  12.78                    08/16/18  1326 08/16/18  1303      Immature Granulocytes 1.0      Immature Grans (Abs) 0.13  Comment:  Mild elevation in immature granulocytes is non specific and   can be seen in a variety of conditions including stress response,   acute inflammation, trauma and pregnancy. Correlation with other   laboratory and clinical findings is essential.        Albumin       Alkaline Phosphatase       ALT       Anion Gap       AST       Baso # 0.03      Basophil% 0.2      Total Bilirubin       BUN, Bld       Calcium       Chloride       CO2       Creatinine       Differential Method Automated      eGFR if        eGFR if non        Eos # 0.0      Eosinophil% 0.0      Glucose       Gran # (ANC) 10.5      Gran% 81.9      Hematocrit 24.0      Hemoglobin 7.6      Coumadin Monitoring INR       Lymph # 1.3      Lymph% 9.8      Magnesium       MCH 28.3      MCHC 31.7      MCV 89      Mono # 0.9      Mono% 7.1      MPV 10.7      nRBC 0      Phosphorus       Platelets 223      POCT Glucose       Potassium       Total Protein       Protime       RBC 2.69      RDW 14.8      Sodium  134     WBC 12.85            Significant Diagnostics:  I have reviewed all pertinent imaging results/findings within the past 24 hours.

## 2018-08-17 NOTE — ASSESSMENT & PLAN NOTE
32 y.o. female 24 weeks pregnant with history of polycystic kidney disease with HH4F4 SAH on 8/10, now s/p acom coiling 8/10.     NEURO  -Continue NCC  -q 1 hr neuro checks  -evd open to 10 and draining, monitor icps  -nimotop  -daily tds  -Keppra      CV  -permissive HTN  <200 as aneurysm secured    RESP  -stable on room air  -Aggressive pulmonary management  -IS @ bedside    FEN/GI  -strict I/Os  -goal net even or positive  -Reg diet    ID  -vanc for drain ppx    PPX  -SQH  -famotidine for PUD ppx  -TEDs/SCDs    -further mgmt per ncc and obgyn.

## 2018-08-17 NOTE — PLAN OF CARE
NEETU received call from the Pt aunt regarding concerns about the Pt family member in the room. She reported the Pt  is not legally  to the Pt, he is the kids father and they live together. The Pt grew up with the aunt and grandmother and her brother is helping out with the kids while the Pt is in the hospital. They would like to be added to the list of contacts. The grandmother is: Sharon Edwards ([phone]) and the aunt is Eren Roy ([phone]).     NEETU received reply email from Melody with [employer] reporting she will be at [facility] today to visit with the Pt and start the application process for SS disability.     NEETU spoke with RN to report the above. NEETU spoke with Pt family member to report [employer] will be coming today.    NEETU spoke with Fernando villanueva [facility] who reported he completed the eval and spoke with the family in the room. They are interested in accepting and will reach out to the Pt MD OB to start the process of getting him privileges at the rehab for when she is ready.     aTylor Harris, BRYANT  Neurocritical Care   [facility]  [id number]

## 2018-08-17 NOTE — PT/OT/SLP PROGRESS
"Occupational Therapy  HOLD      Patient Name:  Sharon Grande   MRN:  [id number]    Per Jhonathan, HOLD for OT/PT for a "few days" at this time.   Will follow up 8/20/2018 for appropriateness for physical activity.     GARRET White  8/17/2018  "

## 2018-08-17 NOTE — PLAN OF CARE
Problem: Patient Care Overview  Goal: Plan of Care Review  Outcome: Ongoing (interventions implemented as appropriate)  POC reviewed with pt at 0400. Pt verbalized understanding. Questions and concerns addressed. No acute events overnight. Pt complaining of neck pain throughout whole night, prn medications administered. Pt progressing toward goals. Will continue to monitor. See flowsheets for full assessment and VS info

## 2018-08-17 NOTE — PT/OT/SLP PROGRESS
"     Physical Therapy  Pt Not Seen    Patient Name:  Sharon Grande   MRN:  [id number]    Patient not seen today secondary to  MD hold (Comment)(for PT and OT "for a few days"). Will check for appropriateness for functional mobility on Monday 8/20.    Anisa Del Cid, PTA  8/17/2018      "

## 2018-08-18 PROBLEM — G93.41 METABOLIC ENCEPHALOPATHY: Status: RESOLVED | Noted: 2018-08-11 | Resolved: 2018-08-18

## 2018-08-18 PROBLEM — R47.01 APHASIA: Status: RESOLVED | Noted: 2018-08-11 | Resolved: 2018-08-18

## 2018-08-18 LAB
ALBUMIN SERPL BCP-MCNC: 2.4 G/DL
ALP SERPL-CCNC: 72 U/L
ALT SERPL W/O P-5'-P-CCNC: 11 U/L
ANION GAP SERPL CALC-SCNC: 7 MMOL/L
AST SERPL-CCNC: 11 U/L
BASOPHILS # BLD AUTO: 0.02 K/UL
BASOPHILS NFR BLD: 0.2 %
BILIRUB SERPL-MCNC: 0.4 MG/DL
BUN SERPL-MCNC: 8 MG/DL
CALCIUM SERPL-MCNC: 8.5 MG/DL
CHLORIDE SERPL-SCNC: 108 MMOL/L
CLARITY CSF: ABNORMAL
CO2 SERPL-SCNC: 20 MMOL/L
COLOR CSF: ABNORMAL
CREAT SERPL-MCNC: 0.6 MG/DL
DIFFERENTIAL METHOD: ABNORMAL
EOSINOPHIL # BLD AUTO: 0 K/UL
EOSINOPHIL NFR BLD: 0.1 %
ERYTHROCYTE [DISTWIDTH] IN BLOOD BY AUTOMATED COUNT: 14.9 %
EST. GFR  (AFRICAN AMERICAN): >60 ML/MIN/1.73 M^2
EST. GFR  (NON AFRICAN AMERICAN): >60 ML/MIN/1.73 M^2
GLUCOSE CSF-MCNC: 66 MG/DL
GLUCOSE SERPL-MCNC: 96 MG/DL
HCT VFR BLD AUTO: 21.9 %
HCT VFR BLD AUTO: 23.4 %
HGB BLD-MCNC: 7.1 G/DL
HGB BLD-MCNC: 7.5 G/DL
IMM GRANULOCYTES # BLD AUTO: 0.14 K/UL
IMM GRANULOCYTES NFR BLD AUTO: 1.4 %
INR PPP: 1.1
LYMPHOCYTES # BLD AUTO: 1.6 K/UL
LYMPHOCYTES NFR BLD: 15.7 %
LYMPHOCYTES NFR CSF MANUAL: 60 %
MAGNESIUM SERPL-MCNC: 1.7 MG/DL
MCH RBC QN AUTO: 28.7 PG
MCHC RBC AUTO-ENTMCNC: 32.4 G/DL
MCV RBC AUTO: 89 FL
MONOCYTES # BLD AUTO: 1.1 K/UL
MONOCYTES NFR BLD: 10.7 %
MONOS+MACROS NFR CSF MANUAL: 1 %
NEUTROPHILS # BLD AUTO: 7.3 K/UL
NEUTROPHILS NFR BLD: 71.9 %
NEUTROPHILS NFR CSF MANUAL: 39 %
NRBC BLD-RTO: 0 /100 WBC
PHOSPHATE SERPL-MCNC: 2.3 MG/DL
PLATELET # BLD AUTO: 244 K/UL
PMV BLD AUTO: 11.1 FL
POCT GLUCOSE: 112 MG/DL (ref 70–110)
POCT GLUCOSE: 123 MG/DL (ref 70–110)
POTASSIUM SERPL-SCNC: 3.4 MMOL/L
PROT CSF-MCNC: 9 MG/DL
PROT SERPL-MCNC: 6.1 G/DL
PROTHROMBIN TIME: 11.3 SEC
RBC # BLD AUTO: 2.47 M/UL
RBC # CSF: 2000 /CU MM
SODIUM SERPL-SCNC: 134 MMOL/L
SODIUM SERPL-SCNC: 134 MMOL/L
SODIUM SERPL-SCNC: 135 MMOL/L
SODIUM SERPL-SCNC: 135 MMOL/L
SPECIMEN VOL CSF: 4 ML
WBC # BLD AUTO: 10.19 K/UL
WBC # CSF: 44 /CU MM

## 2018-08-18 PROCEDURE — 63600175 PHARM REV CODE 636 W HCPCS: Performed by: STUDENT IN AN ORGANIZED HEALTH CARE EDUCATION/TRAINING PROGRAM

## 2018-08-18 PROCEDURE — 63600175 PHARM REV CODE 636 W HCPCS: Performed by: ANESTHESIOLOGY

## 2018-08-18 PROCEDURE — 85610 PROTHROMBIN TIME: CPT

## 2018-08-18 PROCEDURE — 89051 BODY FLUID CELL COUNT: CPT

## 2018-08-18 PROCEDURE — 63600175 PHARM REV CODE 636 W HCPCS: Performed by: PHYSICIAN ASSISTANT

## 2018-08-18 PROCEDURE — 99232 SBSQ HOSP IP/OBS MODERATE 35: CPT | Mod: ,,, | Performed by: NEUROLOGICAL SURGERY

## 2018-08-18 PROCEDURE — 83735 ASSAY OF MAGNESIUM: CPT

## 2018-08-18 PROCEDURE — 99291 CRITICAL CARE FIRST HOUR: CPT | Mod: ,,, | Performed by: ANESTHESIOLOGY

## 2018-08-18 PROCEDURE — 85018 HEMOGLOBIN: CPT

## 2018-08-18 PROCEDURE — 84100 ASSAY OF PHOSPHORUS: CPT

## 2018-08-18 PROCEDURE — A4216 STERILE WATER/SALINE, 10 ML: HCPCS | Performed by: PSYCHIATRY & NEUROLOGY

## 2018-08-18 PROCEDURE — 37799 UNLISTED PX VASCULAR SURGERY: CPT

## 2018-08-18 PROCEDURE — 99900035 HC TECH TIME PER 15 MIN (STAT)

## 2018-08-18 PROCEDURE — 94761 N-INVAS EAR/PLS OXIMETRY MLT: CPT

## 2018-08-18 PROCEDURE — 25000003 PHARM REV CODE 250: Performed by: STUDENT IN AN ORGANIZED HEALTH CARE EDUCATION/TRAINING PROGRAM

## 2018-08-18 PROCEDURE — 85025 COMPLETE CBC W/AUTO DIFF WBC: CPT

## 2018-08-18 PROCEDURE — 25000003 PHARM REV CODE 250: Performed by: ANESTHESIOLOGY

## 2018-08-18 PROCEDURE — 85014 HEMATOCRIT: CPT

## 2018-08-18 PROCEDURE — 99233 SBSQ HOSP IP/OBS HIGH 50: CPT | Mod: ,,, | Performed by: PSYCHIATRY & NEUROLOGY

## 2018-08-18 PROCEDURE — 84295 ASSAY OF SERUM SODIUM: CPT

## 2018-08-18 PROCEDURE — 82945 GLUCOSE OTHER FLUID: CPT

## 2018-08-18 PROCEDURE — 84157 ASSAY OF PROTEIN OTHER: CPT

## 2018-08-18 PROCEDURE — 25000003 PHARM REV CODE 250: Performed by: PHYSICIAN ASSISTANT

## 2018-08-18 PROCEDURE — 80053 COMPREHEN METABOLIC PANEL: CPT

## 2018-08-18 PROCEDURE — 87205 SMEAR GRAM STAIN: CPT

## 2018-08-18 PROCEDURE — 87070 CULTURE OTHR SPECIMN AEROBIC: CPT

## 2018-08-18 PROCEDURE — 25000003 PHARM REV CODE 250: Performed by: PSYCHIATRY & NEUROLOGY

## 2018-08-18 PROCEDURE — 63600175 PHARM REV CODE 636 W HCPCS: Performed by: NURSE PRACTITIONER

## 2018-08-18 PROCEDURE — 20000000 HC ICU ROOM

## 2018-08-18 RX ORDER — LABETALOL HYDROCHLORIDE 5 MG/ML
10 INJECTION, SOLUTION INTRAVENOUS EVERY 4 HOURS PRN
Status: DISCONTINUED | OUTPATIENT
Start: 2018-08-18 | End: 2018-08-27

## 2018-08-18 RX ORDER — MORPHINE SULFATE 2 MG/ML
1 INJECTION, SOLUTION INTRAMUSCULAR; INTRAVENOUS ONCE
Status: COMPLETED | OUTPATIENT
Start: 2018-08-18 | End: 2018-08-18

## 2018-08-18 RX ORDER — LABETALOL HYDROCHLORIDE 5 MG/ML
10 INJECTION, SOLUTION INTRAVENOUS EVERY 4 HOURS PRN
Status: DISCONTINUED | OUTPATIENT
Start: 2018-08-18 | End: 2018-08-18

## 2018-08-18 RX ORDER — NICARDIPINE HYDROCHLORIDE 0.2 MG/ML
2.5 INJECTION INTRAVENOUS CONTINUOUS
Status: DISCONTINUED | OUTPATIENT
Start: 2018-08-18 | End: 2018-08-19

## 2018-08-18 RX ORDER — MORPHINE SULFATE 2 MG/ML
2 INJECTION, SOLUTION INTRAMUSCULAR; INTRAVENOUS ONCE
Status: COMPLETED | OUTPATIENT
Start: 2018-08-18 | End: 2018-08-18

## 2018-08-18 RX ADMIN — NIMODIPINE 30 MG: 30 CAPSULE, LIQUID FILLED ORAL at 05:08

## 2018-08-18 RX ADMIN — HEPARIN SODIUM 5000 UNITS: 5000 INJECTION, SOLUTION INTRAVENOUS; SUBCUTANEOUS at 02:08

## 2018-08-18 RX ADMIN — FAMOTIDINE 20 MG: 20 TABLET ORAL at 08:08

## 2018-08-18 RX ADMIN — Medication 2 MG: at 11:08

## 2018-08-18 RX ADMIN — MAGNESIUM OXIDE TAB 400 MG (241.3 MG ELEMENTAL MG) 800 MG: 400 (241.3 MG) TAB at 06:08

## 2018-08-18 RX ADMIN — Medication 10 ML: at 05:08

## 2018-08-18 RX ADMIN — PRENATAL VIT W/ FE FUMARATE-FA TAB 27-0.8 MG 1 TABLET: 27-0.8 TAB at 08:08

## 2018-08-18 RX ADMIN — NIMODIPINE 30 MG: 30 CAPSULE, LIQUID FILLED ORAL at 06:08

## 2018-08-18 RX ADMIN — NIMODIPINE 30 MG: 30 CAPSULE, LIQUID FILLED ORAL at 09:08

## 2018-08-18 RX ADMIN — SENNOSIDES AND DOCUSATE SODIUM 1 TABLET: 8.6; 5 TABLET ORAL at 08:08

## 2018-08-18 RX ADMIN — OXYCODONE HYDROCHLORIDE 10 MG: 5 TABLET ORAL at 08:08

## 2018-08-18 RX ADMIN — CEFAZOLIN 1 G: 1 INJECTION, POWDER, FOR SOLUTION INTRAMUSCULAR; INTRAVENOUS at 06:08

## 2018-08-18 RX ADMIN — HEPARIN SODIUM 5000 UNITS: 5000 INJECTION, SOLUTION INTRAVENOUS; SUBCUTANEOUS at 06:08

## 2018-08-18 RX ADMIN — ACETAMINOPHEN 650 MG: 325 TABLET, FILM COATED ORAL at 08:08

## 2018-08-18 RX ADMIN — NIMODIPINE 30 MG: 30 CAPSULE, LIQUID FILLED ORAL at 08:08

## 2018-08-18 RX ADMIN — OXYCODONE HYDROCHLORIDE 10 MG: 5 TABLET ORAL at 02:08

## 2018-08-18 RX ADMIN — ACETAMINOPHEN 650 MG: 325 TABLET, FILM COATED ORAL at 12:08

## 2018-08-18 RX ADMIN — CEFAZOLIN 1 G: 1 INJECTION, POWDER, FOR SOLUTION INTRAMUSCULAR; INTRAVENOUS at 02:08

## 2018-08-18 RX ADMIN — Medication 10 ML: at 12:08

## 2018-08-18 RX ADMIN — OXYCODONE HYDROCHLORIDE 10 MG: 5 TABLET ORAL at 09:08

## 2018-08-18 RX ADMIN — NIMODIPINE 30 MG: 30 CAPSULE, LIQUID FILLED ORAL at 02:08

## 2018-08-18 RX ADMIN — OXYCODONE HYDROCHLORIDE 10 MG: 5 TABLET ORAL at 12:08

## 2018-08-18 RX ADMIN — NIMODIPINE 30 MG: 30 CAPSULE, LIQUID FILLED ORAL at 04:08

## 2018-08-18 RX ADMIN — POTASSIUM CHLORIDE 40 MEQ: 20 SOLUTION ORAL at 06:08

## 2018-08-18 RX ADMIN — HEPARIN SODIUM 5000 UNITS: 5000 INJECTION, SOLUTION INTRAVENOUS; SUBCUTANEOUS at 09:08

## 2018-08-18 RX ADMIN — NIMODIPINE 30 MG: 30 CAPSULE, LIQUID FILLED ORAL at 12:08

## 2018-08-18 RX ADMIN — OXYCODONE HYDROCHLORIDE 10 MG: 5 TABLET ORAL at 04:08

## 2018-08-18 RX ADMIN — CEFAZOLIN 1 G: 1 INJECTION, POWDER, FOR SOLUTION INTRAMUSCULAR; INTRAVENOUS at 09:08

## 2018-08-18 RX ADMIN — Medication 10 ML: at 06:08

## 2018-08-18 RX ADMIN — Medication 1 MG: at 05:08

## 2018-08-18 RX ADMIN — FERROUS SULFATE TAB EC 325 MG (65 MG FE EQUIVALENT) 325 MG: 325 (65 FE) TABLET DELAYED RESPONSE at 08:08

## 2018-08-18 NOTE — PLAN OF CARE
POC reviewed with pt at 1400. Pt verbalized understanding. Questions and concerns addressed. No acute events today. EVD open at 10, pain controlled with PRN medication. 2% at 35 ml/hr. Pt progressing toward goals. Will continue to monitor. See flowsheets for full assessment and VS info.

## 2018-08-18 NOTE — PROGRESS NOTES
NCC at bedside, pt in 10/10 pain. MD states give 2 mg IV morphine, per Tran from pharmacy morphine ok in pregnancy. Will continue to monitor closely.

## 2018-08-18 NOTE — PROGRESS NOTES
ICU Progress Note  Neurocritical Care    Admit Date: 8/10/2018  LOS: 8    CC: Subarachnoid hemorrhage from anterior communicating artery aneurysm    Code Status: Full Code     SUBJECTIVE:     Interval History/Significant Events:   Awake  Alert  OX3  No drift  Cerebral vasospasm  Hyponatremia  Hypokalemia  ICP- wnl      Medications:  Continuous Infusions:   sodium chloride 0.9% 125 mL/hr at 08/18/18 0905    niCARdipine Stopped (08/17/18 0800)    Sodium Chloride 2% 25 mL/hr at 08/18/18 0905     Scheduled Meds:   ceFAZolin (ANCEF) IVPB  1 g Intravenous Q8H    famotidine  20 mg Oral BID    ferrous sulfate  325 mg Oral Daily    heparin (porcine)  5,000 Units Subcutaneous Q8H    niMODipine  30 mg Oral Q2H    prenatal vitamin  1 tablet Oral Daily    senna-docusate 8.6-50 mg  1 tablet Oral BID    sodium chloride 0.9%  10 mL Intravenous Q6H     PRN Meds:.acetaminophen, calcium gluconate, labetalol, magnesium oxide, magnesium oxide, midazolam, oxyCODONE, potassium chloride 10%, potassium chloride 10%, potassium chloride 10%, potassium, sodium phosphates, potassium, sodium phosphates, potassium, sodium phosphates, Flushing PICC Protocol **AND** sodium chloride 0.9% **AND** sodium chloride 0.9%, sodium chloride 0.9%    OBJECTIVE:   Vital Signs (Most Recent):   Temp: 98.8 °F (37.1 °C) (08/18/18 0705)  Pulse: 76 (08/18/18 0905)  Resp: 20 (08/18/18 0905)  BP: (!) 180/82 (08/18/18 0705)  SpO2: 100 % (08/18/18 0905)    Vital Signs (24h Range):   Temp:  [98.6 °F (37 °C)-99.1 °F (37.3 °C)] 98.8 °F (37.1 °C)  Pulse:  [] 76  Resp:  [17-38] 20  SpO2:  [97 %-100 %] 100 %  BP: (149-187)/(81-83) 180/82  Arterial Line BP: (120-199)/(66-99) 194/86    ICP/CPP (Last 24h):   ICP Mean (mmHg):  [3 mmHg-13 mmHg] 7 mmHg  CPP (mmHg calculated using NBP):  [101-110] 105  CPP:  [81 mmHg-126 mmHg] 109 mmHg    I & O (Last 24h):     Intake/Output Summary (Last 24 hours) at 8/18/2018 0930  Last data filed at 8/18/2018 0905  Gross per 24  hour   Intake 3812.5 ml   Output 3572 ml   Net 240.5 ml     Physical Exam:  GA: Alert, comfortable, no acute distress.   HEENT: No scleral icterus or JVD.   Pulmonary: Clear to auscultation A/P/L. No wheezing, crackles, or rhonchi.  Cardiac: RRR S1 & S2 w/o rubs/murmurs/gallops.   Abdominal: Bowel sounds present x 4. No appreciable hepatosplenomegaly.  Skin: No jaundice, rashes, or visible lesions.  Neuro:  Awake  Alert  ox4  Power 5/5 all exts  CN intact  No drift         Lines/Drains/Airway:   A-5       PICC Double Lumen 08/13/18 1551 right brachial (Active)   Site Assessment Clean;Dry;Intact;No redness;No swelling 8/18/2018  7:05 AM   Lumen 1 Status Infusing 8/18/2018  7:05 AM   Lumen 2 Status Infusing 8/18/2018  7:05 AM   Length martha (cm) 36 cm 8/13/2018  3:50 PM   Current Exposed Catheter (cm) 0 cm 8/13/2018  3:50 PM   Extremity Circumference (cm) 31 cm 8/13/2018  3:50 PM   Dressing Type Transparent 8/18/2018  7:05 AM   Dressing Status Biopatch in place;Clean;Dry;Intact 8/18/2018  7:05 AM   Dressing Intervention Dressing reinforced 8/18/2018  3:02 AM   Dressing Change Due 08/20/18 8/18/2018  7:05 AM   Daily Line Review Performed 8/18/2018  7:05 AM           Arterial Line 08/13/18 Left Radial (Active)   Site Assessment Clean;Dry;Intact;No redness;No swelling 8/18/2018  7:05 AM   Line Status Pulsatile blood flow 8/18/2018  7:05 AM   Art Line Waveform Appropriate 8/18/2018  7:05 AM   Arterial Line Interventions Zeroed and calibrated;Leveled;Connections checked and tightened;Flushed per protocol 8/18/2018  7:05 AM   Color/Movement/Sensation Capillary refill less than 3 sec 8/18/2018  7:05 AM   Dressing Type Transparent 8/18/2018  7:05 AM   Dressing Status Biopatch in place;Clean;Dry;Intact 8/18/2018  7:05 AM   Dressing Intervention Dressing reinforced 8/18/2018  3:02 AM   Dressing Change Due 08/20/18 8/18/2018  7:05 AM           ICP/Ventriculostomy 08/11/18 0000 Ventricular drainage catheter with ICP microsensor  Right Other (Comment) (Active)   Level of Ventriculostomy (cm above) 10 8/18/2018  7:05 AM   Status Open to drainage 8/18/2018  7:05 AM   Site Assessment Clean;Dry 8/18/2018  7:05 AM   Site Drainage No drainage 8/18/2018  7:05 AM   Waveform normal waveform 8/18/2018  7:05 AM   Output (mL) 5 mL 8/18/2018  9:05 AM   CSF Color pink;pale 8/18/2018  7:05 AM   Dressing Status Biopatch in place;Clean;Dry;Intact 8/18/2018  7:05 AM   Interventions HOB degrees;bed controls locked;zeroed 8/18/2018  7:05 AM     Nutrition/Tube Feeds (if NPO state why):  po    Labs:  ABG: No results for input(s): PH, PO2, PCO2, HCO3, POCSATURATED, BE in the last 24 hours.  BMP:  Recent Labs   Lab  08/18/18   0303   NA  135*  135*   K  3.4*   CL  108   CO2  20*   BUN  8   CREATININE  0.6   GLU  96   MG  1.7   PHOS  2.3*     LFT:   Lab Results   Component Value Date    AST 11 08/18/2018    ALT 11 08/18/2018    ALKPHOS 72 08/18/2018    BILITOT 0.4 08/18/2018    ALBUMIN 2.4 (L) 08/18/2018    PROT 6.1 08/18/2018     CBC:   Lab Results   Component Value Date    WBC 10.19 08/18/2018    HGB 7.1 (L) 08/18/2018    HCT 21.9 (L) 08/18/2018    MCV 89 08/18/2018     08/18/2018     Microbiology x 7d:   Microbiology Results (last 7 days)     Procedure Component Value Units Date/Time    Blood culture [[id number]] Collected:  08/10/18 2020    Order Status:  Completed Specimen:  Blood from Peripheral, Foot, Left Updated:  08/16/18 0612     Blood Culture, Routine No growth after 5 days.    Blood culture [[id number]] Collected:  08/10/18 2040    Order Status:  Completed Specimen:  Blood from Peripheral, Antecubital, Left Updated:  08/16/18 0612     Blood Culture, Routine No growth after 5 days.    Urine culture [[id number]] Collected:  08/12/18 1526    Order Status:  Completed Specimen:  Urine, Catheterized Updated:  08/13/18 2118     Urine Culture, Routine No growth    C. trachomatis/N. gonorrhoeae by AMP DNA [facility]; Urine [[id number]] Collected:  08/11/18 1435     Order Status:  Sent Specimen:  Genital Updated:  08/11/18 1435        Imaging:   TCD results noted    I personally reviewed the above image.    ASSESSMENT/PLAN:     Active Hospital Problems    Diagnosis    *Subarachnoid hemorrhage from anterior communicating artery aneurysm    Cerebral artery vasospasm    Intracranial hypertension    Matthew coma scale total score 9-12    Endotracheal tube present    Hyponatremia    Hypokalemia    Hypophosphatemia    Metabolic encephalopathy    Aphasia    JOSE C (acute kidney injury)    Pre-eclampsia in second trimester    Brain compression    Brain edema    Hypertension affecting pregnancy in second trimester    25 weeks gestation of pregnancy    Leukocytosis    PKD (polycystic kidney disease)     Needs baseline P/C ratio.  Strong family history of renal abnormalities            Plan:  HX3  Follow exam  Follow Na  Follow ICP  Follow I/O's  Discussed with family      Critical secondary to Patient has a condition that poses threat to life and bodily function: at risk of deterioration from vasospasm. Needs close monitoring of ICP/TCD's/exam      Total cc time 38 mins     Critical care was time spent personally by me on the following activities: development of treatment plan with patient or surrogate and bedside caregivers, discussions with consultants, evaluation of patient's response to treatment, examination of patient, ordering and performing treatments and interventions, ordering and review of laboratory studies, ordering and review of radiographic studies, pulse oximetry, re-evaluation of patient's condition. This critical care time did not overlap with that of any other provider or involve time for any procedures.

## 2018-08-18 NOTE — PROGRESS NOTES
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  Etiology likely aneurysmal.  Recommend cerebral angiogram    Antithrombotics for secondary stroke prevention: none SAH     Statins for secondary stroke prevention and hyperlipidemia, if present: SAH     Aggressive risk factor modification: polycytsic kidney disease, HTN      Rehab efforts: Occupational Therapy, PT/OT/SLP to evaluate and treat when appropriate     Diagnostics ordered/pending: cerebral angiogram     VTE prophylaxis: SCDs    BP parameters: SAH: Unsecured aneurysm, SBP <140            Cerebral artery vasospasm    Monitor daily TCD's  Nimotop            25 weeks gestation of pregnancy    Per [facility] recommendations        Hypertension affecting pregnancy in second trimester    Risk factor for stroke  Keep systolic under 140        PKD (polycystic kidney disease)    Risk factor for aneurysm              33 y/o female found down @~24w5d admitted for management of subarachnoid hemmorhage likely due to ruptured aneurysm  Had coiling done ACOM 8-10-18  8-16-18 to IR for cerebral angiogram for vasospasm and had IV Verapamil instilled         STROKE DOCUMENTATION        NIH Scale:  1a. Level Of Consciousness: 0-->Alert: keenly responsive  1b. LOC Questions: 0-->Answers both questions correctly  1c. LOC Commands: 0-->Performs both tasks correctly  2. Best Gaze: 0-->Normal  3. Visual: 0-->No visual loss  4. Facial Palsy: 0-->Normal symmetrical movements  5a. Motor Arm, Left: 0-->No drift: limb holds 90 (or 45) degrees for full 10 secs  5b. Motor Arm, Right: 0-->No drift: limb holds 90 (or 45) degrees for full 10 secs  6a. Motor Leg, Left: 0-->No drift: leg holds 30 degree  position for full 5 secs  6b. Motor Leg, Right: 0-->No drift: leg holds 30 degree position for full 5 secs  7. Limb Ataxia: 0-->Absent  8. Sensory: 0-->Normal: no sensory loss  9. Best Language: 0-->No aphasia: normal  10. Dysarthria: 0-->Normal  11. Extinction and Inattention (formerly Neglect): 0-->No abnormality  Total (NIH Stroke Scale): 0       Modified Eddy Score: 1  Matthew Coma Scale:    ABCD2 Score:    [id number] Score:   HAS -BLED Score:   ICH Score:   Hunt & Collins Classification:Grade III     Hemorrhagic change of an Ischemic Stroke: Does this patient have an ischemic stroke with hemorrhagic changes? No SAH    Neurologic Chief Complaint: SAH    Subjective:     Interval History: Patient is seen for follow-up neurological assessment and treatment recommendations: No issues overnight, no neuro deficits    HPI, Past Medical, Family, and Social History remains the same as documented in the initial encounter.     Review of Systems   Constitutional: Negative for chills and fever.   Cardiovascular: Negative for chest pain and leg swelling.   Neurological: Negative for speech difficulty and weakness.     Scheduled Meds:   ceFAZolin (ANCEF) IVPB  1 g Intravenous Q8H    famotidine  20 mg Oral BID    ferrous sulfate  325 mg Oral Daily    heparin (porcine)  5,000 Units Subcutaneous Q8H    niMODipine  30 mg Oral Q2H    prenatal vitamin  1 tablet Oral Daily    senna-docusate 8.6-50 mg  1 tablet Oral BID    sodium chloride 0.9%  10 mL Intravenous Q6H     Continuous Infusions:   sodium chloride 0.9% 125 mL/hr at 08/18/18 1001    niCARdipine Stopped (08/17/18 0800)    Sodium Chloride 2% 25 mL/hr at 08/18/18 1001     PRN Meds:acetaminophen, calcium gluconate, labetalol, magnesium oxide, magnesium oxide, midazolam, oxyCODONE, potassium chloride 10%, potassium chloride 10%, potassium chloride 10%, potassium, sodium phosphates, potassium, sodium phosphates, potassium, sodium phosphates, Flushing PICC Protocol  **AND** sodium chloride 0.9% **AND** sodium chloride 0.9%, sodium chloride 0.9%    Objective:     Vital Signs (Most Recent):  Temp: 98.8 °F (37.1 °C) (08/18/18 0705)  Pulse: 76 (08/18/18 1001)  Resp: (!) 22 (08/18/18 1001)  BP: (!) 180/82 (08/18/18 0705)  SpO2: 97 % (08/18/18 1001)  BP Location: Left arm    Vital Signs Range (Last 24H):  Temp:  [98.6 °F (37 °C)-99.1 °F (37.3 °C)]   Pulse:  []   Resp:  [17-38]   BP: (149-187)/(81-83)   SpO2:  [97 %-100 %]   Arterial Line BP: (120-199)/(66-99)   BP Location: Left arm    Physical Exam   Constitutional: She is oriented to person, place, and time. She appears well-developed and well-nourished.   Pregnant   HENT:   Head: Normocephalic and atraumatic.   Neurological: She is alert and oriented to person, place, and time.   Nursing note and vitals reviewed.      Neurological Exam:   LOC: alert  Attention Span: Good   Language: No aphasia  Articulation: No dysarthria  Orientation: Person, Place, Time   Visual Fields: Full  EOM (CN III, IV, VI): Full/intact  Motor: Arm left  Normal 5/5  Leg left  Normal 5/5  Arm right  Normal 5/5  Leg right Normal 5/5  Tone: Normal tone throughout    Laboratory:  CMP:   Recent Labs   Lab  08/18/18   0303   CALCIUM  8.5*   ALBUMIN  2.4*   PROT  6.1   NA  135*  135*   K  3.4*   CO2  20*   CL  108   BUN  8   CREATININE  0.6   ALKPHOS  72   ALT  11   AST  11   BILITOT  0.4     CBC:   Recent Labs   Lab  08/18/18   0303   WBC  10.19   RBC  2.47*   HGB  7.1*   HCT  21.9*   PLT  244   MCV  89   MCH  28.7   MCHC  32.4     Lipid Panel: No results for input(s): CHOL, LDLCALC, HDL, TRIG in the last 168 hours.  Coagulation:   Recent Labs   Lab  08/18/18   0303   INR  1.1     Platelet Aggregation Study: No results for input(s): PLTAGG, PLTAGINTERP, PLTAGREGLACO, ADPPLTAGGREG in the last 168 hours.  Hgb A1C: No results for input(s): HGBA1C in the last 168 hours.  TSH: No results for input(s): TSH in the last 168 hours.    Diagnostic Results     Brain  Imaging   CT Head w/o contrast 8-10-18 results:    1. Extensive subarachnoid hemorrhage, most likely from an aneurysm rupture.  It is difficult to determine the site of the aneurysm rupture on this study due to the diffuse nature of the subarachnoid hemorrhage throughout the posterior fossa as well as the basilar cisternal spaces.  2. Slight increase in the ventricular size when compared to the previous study suggestive of a developing hydrocephalus.    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  Successful coil embolization of this aneurysm.     Cardiac Imaging   2D Echo 8-11-18 results:    1 - Eccentric hypertrophy.     2 - Normal left ventricular systolic function (EF 60-65%).     3 - No wall motion abnormalities.     4 - Normal left ventricular diastolic function.     5 - Normal right ventricular systolic function .     6 - The estimated PA systolic pressure is 31 mmHg.     7 - Trivial pericardial effusion.       Lorie Mercado NP  [facility]  Department of Vascular Neurology   [facility]

## 2018-08-18 NOTE — SUBJECTIVE & OBJECTIVE
Neurologic Chief Complaint: SAH    Subjective:     Interval History: Patient is seen for follow-up neurological assessment and treatment recommendations: No issues overnight, no neuro deficits    HPI, Past Medical, Family, and Social History remains the same as documented in the initial encounter.     Review of Systems   Constitutional: Negative for chills and fever.   Cardiovascular: Negative for chest pain and leg swelling.   Neurological: Negative for speech difficulty and weakness.     Scheduled Meds:   ceFAZolin (ANCEF) IVPB  1 g Intravenous Q8H    famotidine  20 mg Oral BID    ferrous sulfate  325 mg Oral Daily    heparin (porcine)  5,000 Units Subcutaneous Q8H    niMODipine  30 mg Oral Q2H    prenatal vitamin  1 tablet Oral Daily    senna-docusate 8.6-50 mg  1 tablet Oral BID    sodium chloride 0.9%  10 mL Intravenous Q6H     Continuous Infusions:   sodium chloride 0.9% 125 mL/hr at 08/18/18 1001    niCARdipine Stopped (08/17/18 0800)    Sodium Chloride 2% 25 mL/hr at 08/18/18 1001     PRN Meds:acetaminophen, calcium gluconate, labetalol, magnesium oxide, magnesium oxide, midazolam, oxyCODONE, potassium chloride 10%, potassium chloride 10%, potassium chloride 10%, potassium, sodium phosphates, potassium, sodium phosphates, potassium, sodium phosphates, Flushing PICC Protocol **AND** sodium chloride 0.9% **AND** sodium chloride 0.9%, sodium chloride 0.9%    Objective:     Vital Signs (Most Recent):  Temp: 98.8 °F (37.1 °C) (08/18/18 0705)  Pulse: 76 (08/18/18 1001)  Resp: (!) 22 (08/18/18 1001)  BP: (!) 180/82 (08/18/18 0705)  SpO2: 97 % (08/18/18 1001)  BP Location: Left arm    Vital Signs Range (Last 24H):  Temp:  [98.6 °F (37 °C)-99.1 °F (37.3 °C)]   Pulse:  []   Resp:  [17-38]   BP: (149-187)/(81-83)   SpO2:  [97 %-100 %]   Arterial Line BP: (120-199)/(66-99)   BP Location: Left arm    Physical Exam   Constitutional: She is oriented to person, place, and time. She appears well-developed  and well-nourished.   Pregnant   HENT:   Head: Normocephalic and atraumatic.   Neurological: She is alert and oriented to person, place, and time.   Nursing note and vitals reviewed.      Neurological Exam:   LOC: alert  Attention Span: Good   Language: No aphasia  Articulation: No dysarthria  Orientation: Person, Place, Time   Visual Fields: Full  EOM (CN III, IV, VI): Full/intact  Motor: Arm left  Normal 5/5  Leg left  Normal 5/5  Arm right  Normal 5/5  Leg right Normal 5/5  Tone: Normal tone throughout    Laboratory:  CMP:   Recent Labs   Lab  08/18/18   0303   CALCIUM  8.5*   ALBUMIN  2.4*   PROT  6.1   NA  135*  135*   K  3.4*   CO2  20*   CL  108   BUN  8   CREATININE  0.6   ALKPHOS  72   ALT  11   AST  11   BILITOT  0.4     CBC:   Recent Labs   Lab  08/18/18   0303   WBC  10.19   RBC  2.47*   HGB  7.1*   HCT  21.9*   PLT  244   MCV  89   MCH  28.7   MCHC  32.4     Lipid Panel: No results for input(s): CHOL, LDLCALC, HDL, TRIG in the last 168 hours.  Coagulation:   Recent Labs   Lab  08/18/18   0303   INR  1.1     Platelet Aggregation Study: No results for input(s): PLTAGG, PLTAGINTERP, PLTAGREGLACO, ADPPLTAGGREG in the last 168 hours.  Hgb A1C: No results for input(s): HGBA1C in the last 168 hours.  TSH: No results for input(s): TSH in the last 168 hours.    Diagnostic Results     Brain Imaging   CT Head w/o contrast 8-10-18 results:    1. Extensive subarachnoid hemorrhage, most likely from an aneurysm rupture.  It is difficult to determine the site of the aneurysm rupture on this study due to the diffuse nature of the subarachnoid hemorrhage throughout the posterior fossa as well as the basilar cisternal spaces.  2. Slight increase in the ventricular size when compared to the previous study suggestive of a developing hydrocephalus.    Vessel Imaging   Cerebral angiogram 8-16-18 results:  IV Verapamil instilled due to vasospasm    Cerebral angiogram 8-10-18 results:  2.3 x 2.7 x 2.0 mm anterior communicating  artery aneurysm with Villafuerte's excrescence.  Successful coil embolization of this aneurysm.     Cardiac Imaging   2D Echo 8-11-18 results:    1 - Eccentric hypertrophy.     2 - Normal left ventricular systolic function (EF 60-65%).     3 - No wall motion abnormalities.     4 - Normal left ventricular diastolic function.     5 - Normal right ventricular systolic function .     6 - The estimated PA systolic pressure is 31 mmHg.     7 - Trivial pericardial effusion.

## 2018-08-19 LAB
ABO + RH BLD: NORMAL
ALBUMIN SERPL BCP-MCNC: 2.3 G/DL
ALP SERPL-CCNC: 77 U/L
ALT SERPL W/O P-5'-P-CCNC: 17 U/L
ANION GAP SERPL CALC-SCNC: 7 MMOL/L
AST SERPL-CCNC: 15 U/L
BASOPHILS # BLD AUTO: 0.02 K/UL
BASOPHILS NFR BLD: 0.2 %
BILIRUB SERPL-MCNC: 0.3 MG/DL
BLD GP AB SCN CELLS X3 SERPL QL: NORMAL
BLD PROD TYP BPU: NORMAL
BLOOD UNIT EXPIRATION DATE: NORMAL
BLOOD UNIT TYPE CODE: 6200
BLOOD UNIT TYPE: NORMAL
BUN SERPL-MCNC: 7 MG/DL
CALCIUM SERPL-MCNC: 8.3 MG/DL
CHLORIDE SERPL-SCNC: 107 MMOL/L
CO2 SERPL-SCNC: 20 MMOL/L
CODING SYSTEM: NORMAL
CREAT SERPL-MCNC: 0.6 MG/DL
DIFFERENTIAL METHOD: ABNORMAL
DISPENSE STATUS: NORMAL
EOSINOPHIL # BLD AUTO: 0 K/UL
EOSINOPHIL NFR BLD: 0.2 %
ERYTHROCYTE [DISTWIDTH] IN BLOOD BY AUTOMATED COUNT: 14.8 %
EST. GFR  (AFRICAN AMERICAN): >60 ML/MIN/1.73 M^2
EST. GFR  (NON AFRICAN AMERICAN): >60 ML/MIN/1.73 M^2
GLUCOSE SERPL-MCNC: 92 MG/DL
HCT VFR BLD AUTO: 22.6 %
HCT VFR BLD AUTO: 23.1 %
HGB BLD-MCNC: 7.4 G/DL
HGB BLD-MCNC: 7.7 G/DL
IMM GRANULOCYTES # BLD AUTO: 0.16 K/UL
IMM GRANULOCYTES NFR BLD AUTO: 1.6 %
INR PPP: 1
LYMPHOCYTES # BLD AUTO: 1.7 K/UL
LYMPHOCYTES NFR BLD: 16.8 %
MAGNESIUM SERPL-MCNC: 1.5 MG/DL
MCH RBC QN AUTO: 29.5 PG
MCHC RBC AUTO-ENTMCNC: 33.3 G/DL
MCV RBC AUTO: 89 FL
MONOCYTES # BLD AUTO: 1 K/UL
MONOCYTES NFR BLD: 10.2 %
NEUTROPHILS # BLD AUTO: 7 K/UL
NEUTROPHILS NFR BLD: 71 %
NRBC BLD-RTO: 0 /100 WBC
PHOSPHATE SERPL-MCNC: 2.5 MG/DL
PLATELET # BLD AUTO: 264 K/UL
PMV BLD AUTO: 10.9 FL
POCT GLUCOSE: 108 MG/DL (ref 70–110)
POTASSIUM SERPL-SCNC: 3.7 MMOL/L
PROT SERPL-MCNC: 6.1 G/DL
PROTHROMBIN TIME: 10.4 SEC
RBC # BLD AUTO: 2.61 M/UL
SODIUM SERPL-SCNC: 132 MMOL/L
SODIUM SERPL-SCNC: 134 MMOL/L
TRANS ERYTHROCYTES VOL PATIENT: NORMAL ML
WBC # BLD AUTO: 9.84 K/UL

## 2018-08-19 PROCEDURE — 63600175 PHARM REV CODE 636 W HCPCS: Performed by: NURSE PRACTITIONER

## 2018-08-19 PROCEDURE — 25000003 PHARM REV CODE 250: Performed by: ANESTHESIOLOGY

## 2018-08-19 PROCEDURE — A4216 STERILE WATER/SALINE, 10 ML: HCPCS | Performed by: PSYCHIATRY & NEUROLOGY

## 2018-08-19 PROCEDURE — 84100 ASSAY OF PHOSPHORUS: CPT

## 2018-08-19 PROCEDURE — 25000003 PHARM REV CODE 250: Performed by: PSYCHIATRY & NEUROLOGY

## 2018-08-19 PROCEDURE — 85018 HEMOGLOBIN: CPT

## 2018-08-19 PROCEDURE — 36430 TRANSFUSION BLD/BLD COMPNT: CPT

## 2018-08-19 PROCEDURE — 99292 CRITICAL CARE ADDL 30 MIN: CPT | Mod: ,,, | Performed by: ANESTHESIOLOGY

## 2018-08-19 PROCEDURE — 86901 BLOOD TYPING SEROLOGIC RH(D): CPT

## 2018-08-19 PROCEDURE — 85025 COMPLETE CBC W/AUTO DIFF WBC: CPT

## 2018-08-19 PROCEDURE — P9021 RED BLOOD CELLS UNIT: HCPCS

## 2018-08-19 PROCEDURE — 20000000 HC ICU ROOM

## 2018-08-19 PROCEDURE — 84295 ASSAY OF SERUM SODIUM: CPT

## 2018-08-19 PROCEDURE — P9045 ALBUMIN (HUMAN), 5%, 250 ML: HCPCS | Mod: JG | Performed by: PHYSICIAN ASSISTANT

## 2018-08-19 PROCEDURE — 25000003 PHARM REV CODE 250: Performed by: STUDENT IN AN ORGANIZED HEALTH CARE EDUCATION/TRAINING PROGRAM

## 2018-08-19 PROCEDURE — 80053 COMPREHEN METABOLIC PANEL: CPT

## 2018-08-19 PROCEDURE — 63600175 PHARM REV CODE 636 W HCPCS: Performed by: STUDENT IN AN ORGANIZED HEALTH CARE EDUCATION/TRAINING PROGRAM

## 2018-08-19 PROCEDURE — 86920 COMPATIBILITY TEST SPIN: CPT

## 2018-08-19 PROCEDURE — 99232 SBSQ HOSP IP/OBS MODERATE 35: CPT | Mod: ,,, | Performed by: NEUROLOGICAL SURGERY

## 2018-08-19 PROCEDURE — 25000003 PHARM REV CODE 250: Performed by: PHYSICIAN ASSISTANT

## 2018-08-19 PROCEDURE — 94761 N-INVAS EAR/PLS OXIMETRY MLT: CPT

## 2018-08-19 PROCEDURE — 85610 PROTHROMBIN TIME: CPT

## 2018-08-19 PROCEDURE — 85014 HEMATOCRIT: CPT

## 2018-08-19 PROCEDURE — 63600175 PHARM REV CODE 636 W HCPCS: Performed by: PHYSICIAN ASSISTANT

## 2018-08-19 PROCEDURE — 99233 SBSQ HOSP IP/OBS HIGH 50: CPT | Mod: ,,, | Performed by: PSYCHIATRY & NEUROLOGY

## 2018-08-19 PROCEDURE — 83735 ASSAY OF MAGNESIUM: CPT

## 2018-08-19 PROCEDURE — 99291 CRITICAL CARE FIRST HOUR: CPT | Mod: ,,, | Performed by: ANESTHESIOLOGY

## 2018-08-19 RX ORDER — MORPHINE SULFATE 2 MG/ML
1 INJECTION, SOLUTION INTRAMUSCULAR; INTRAVENOUS ONCE
Status: COMPLETED | OUTPATIENT
Start: 2018-08-19 | End: 2018-08-19

## 2018-08-19 RX ORDER — ALBUMIN HUMAN 50 G/1000ML
25 SOLUTION INTRAVENOUS ONCE
Status: COMPLETED | OUTPATIENT
Start: 2018-08-19 | End: 2018-08-19

## 2018-08-19 RX ORDER — MORPHINE SULFATE 2 MG/ML
INJECTION, SOLUTION INTRAMUSCULAR; INTRAVENOUS
Status: DISPENSED
Start: 2018-08-19 | End: 2018-08-20

## 2018-08-19 RX ORDER — HYDROCODONE BITARTRATE AND ACETAMINOPHEN 500; 5 MG/1; MG/1
TABLET ORAL
Status: DISCONTINUED | OUTPATIENT
Start: 2018-08-19 | End: 2018-08-19

## 2018-08-19 RX ADMIN — NIMODIPINE 30 MG: 30 CAPSULE, LIQUID FILLED ORAL at 12:08

## 2018-08-19 RX ADMIN — OXYCODONE HYDROCHLORIDE 10 MG: 5 TABLET ORAL at 08:08

## 2018-08-19 RX ADMIN — Medication 10 ML: at 05:08

## 2018-08-19 RX ADMIN — NIMODIPINE 30 MG: 30 CAPSULE, LIQUID FILLED ORAL at 03:08

## 2018-08-19 RX ADMIN — PRENATAL VIT W/ FE FUMARATE-FA TAB 27-0.8 MG 1 TABLET: 27-0.8 TAB at 08:08

## 2018-08-19 RX ADMIN — HEPARIN SODIUM 5000 UNITS: 5000 INJECTION, SOLUTION INTRAVENOUS; SUBCUTANEOUS at 02:08

## 2018-08-19 RX ADMIN — FAMOTIDINE 20 MG: 20 TABLET ORAL at 09:08

## 2018-08-19 RX ADMIN — NIMODIPINE 30 MG: 30 CAPSULE, LIQUID FILLED ORAL at 10:08

## 2018-08-19 RX ADMIN — NIMODIPINE 30 MG: 30 CAPSULE, LIQUID FILLED ORAL at 05:08

## 2018-08-19 RX ADMIN — NIMODIPINE 30 MG: 30 CAPSULE, LIQUID FILLED ORAL at 02:08

## 2018-08-19 RX ADMIN — OXYCODONE HYDROCHLORIDE 10 MG: 5 TABLET ORAL at 04:08

## 2018-08-19 RX ADMIN — NIMODIPINE 30 MG: 30 CAPSULE, LIQUID FILLED ORAL at 04:08

## 2018-08-19 RX ADMIN — MAGNESIUM OXIDE TAB 400 MG (241.3 MG ELEMENTAL MG) 800 MG: 400 (241.3 MG) TAB at 05:08

## 2018-08-19 RX ADMIN — Medication 10 ML: at 12:08

## 2018-08-19 RX ADMIN — NIMODIPINE 30 MG: 30 CAPSULE, LIQUID FILLED ORAL at 01:08

## 2018-08-19 RX ADMIN — CEFAZOLIN 1 G: 1 INJECTION, POWDER, FOR SOLUTION INTRAMUSCULAR; INTRAVENOUS at 05:08

## 2018-08-19 RX ADMIN — SENNOSIDES AND DOCUSATE SODIUM 1 TABLET: 8.6; 5 TABLET ORAL at 08:08

## 2018-08-19 RX ADMIN — NIMODIPINE 30 MG: 30 CAPSULE, LIQUID FILLED ORAL at 08:08

## 2018-08-19 RX ADMIN — HEPARIN SODIUM 5000 UNITS: 5000 INJECTION, SOLUTION INTRAVENOUS; SUBCUTANEOUS at 09:08

## 2018-08-19 RX ADMIN — CEFAZOLIN 1 G: 1 INJECTION, POWDER, FOR SOLUTION INTRAMUSCULAR; INTRAVENOUS at 09:08

## 2018-08-19 RX ADMIN — ALBUMIN HUMAN 25 G: 0.05 INJECTION, SOLUTION INTRAVENOUS at 04:08

## 2018-08-19 RX ADMIN — HEPARIN SODIUM 5000 UNITS: 5000 INJECTION, SOLUTION INTRAVENOUS; SUBCUTANEOUS at 05:08

## 2018-08-19 RX ADMIN — POTASSIUM & SODIUM PHOSPHATES POWDER PACK 280-160-250 MG 2 PACKET: 280-160-250 PACK at 05:08

## 2018-08-19 RX ADMIN — NIMODIPINE 30 MG: 30 CAPSULE, LIQUID FILLED ORAL at 06:08

## 2018-08-19 RX ADMIN — POTASSIUM CHLORIDE 40 MEQ: 20 SOLUTION ORAL at 05:08

## 2018-08-19 RX ADMIN — SENNOSIDES AND DOCUSATE SODIUM 1 TABLET: 8.6; 5 TABLET ORAL at 09:08

## 2018-08-19 RX ADMIN — POTASSIUM & SODIUM PHOSPHATES POWDER PACK 280-160-250 MG 2 PACKET: 280-160-250 PACK at 10:08

## 2018-08-19 RX ADMIN — OXYCODONE HYDROCHLORIDE 10 MG: 5 TABLET ORAL at 12:08

## 2018-08-19 RX ADMIN — FAMOTIDINE 20 MG: 20 TABLET ORAL at 08:08

## 2018-08-19 RX ADMIN — Medication 1 MG: at 05:08

## 2018-08-19 RX ADMIN — FERROUS SULFATE TAB EC 325 MG (65 MG FE EQUIVALENT) 325 MG: 325 (65 FE) TABLET DELAYED RESPONSE at 08:08

## 2018-08-19 RX ADMIN — MAGNESIUM OXIDE TAB 400 MG (241.3 MG ELEMENTAL MG) 800 MG: 400 (241.3 MG) TAB at 02:08

## 2018-08-19 RX ADMIN — CEFAZOLIN 1 G: 1 INJECTION, POWDER, FOR SOLUTION INTRAMUSCULAR; INTRAVENOUS at 02:08

## 2018-08-19 RX ADMIN — OXYCODONE HYDROCHLORIDE 10 MG: 5 TABLET ORAL at 07:08

## 2018-08-19 RX ADMIN — Medication 1 MG: at 01:08

## 2018-08-19 RX ADMIN — MAGNESIUM OXIDE TAB 400 MG (241.3 MG ELEMENTAL MG) 800 MG: 400 (241.3 MG) TAB at 10:08

## 2018-08-19 NOTE — PROGRESS NOTES
Pt stated she was having an intense headache.  NCC notified.  1 mg morphine IV ordered and administered per verbal order Conner Russell NP

## 2018-08-19 NOTE — ASSESSMENT & PLAN NOTE
Neuro:  HH4F4 SAH  PBD 9 s/p coiling   EVD at 10 open: 251 output overnight, ICPs < 20.   Daily TCDs: Stable yesterday with 136 cm/sec with 2.9 Lind[place]ard in L MCA and R MCA with 139 cm/sec with 3.3 Lind[place]ard.   -Monitor TCDs today   -Consider blood transfusion and albumin admin if persistently elevated   -S/p angio with verapimil injection  Nimodipine 30q2   Keppra 500 BID  SBP < 200; notify if >180  Cefazolin 1g q8 for drain ppx    Fen/GI: normal diet  Maintain Euvolemia  Strict I's and O's: +547 last 24 hours, roughly even on admission  IV /hour  2% increase to 45/hour    Pulm:   CXR 8/13 wnl, lines confirmed.  Will attempt to minimize radiation for pregnancy  Monitor pulmonary status    CV: aneurysm secured, keep SBP < 200  Fluid bolus prn

## 2018-08-19 NOTE — SUBJECTIVE & OBJECTIVE
Interval History: NAEON. Exam stable. Remains on 2% for Na goals >135    Medications:  Continuous Infusions:   sodium chloride 0.9% 125 mL/hr at 08/18/18 1805    niCARdipine      Sodium Chloride 2% 35 mL/hr at 08/18/18 1805     Scheduled Meds:   ceFAZolin (ANCEF) IVPB  1 g Intravenous Q8H    famotidine  20 mg Oral BID    ferrous sulfate  325 mg Oral Daily    heparin (porcine)  5,000 Units Subcutaneous Q8H    niMODipine  30 mg Oral Q2H    prenatal vitamin  1 tablet Oral Daily    senna-docusate 8.6-50 mg  1 tablet Oral BID    sodium chloride 0.9%  10 mL Intravenous Q6H     PRN Meds:acetaminophen, calcium gluconate, labetalol, magnesium oxide, magnesium oxide, midazolam, oxyCODONE, potassium chloride 10%, potassium chloride 10%, potassium chloride 10%, potassium, sodium phosphates, potassium, sodium phosphates, potassium, sodium phosphates, Flushing PICC Protocol **AND** sodium chloride 0.9% **AND** sodium chloride 0.9%, sodium chloride 0.9%     Review of Systems    Objective:     Weight: 76.5 kg (168 lb 10.4 oz)  Body mass index is 24.91 kg/m².  Vital Signs (Most Recent):  Temp: 99 °F (37.2 °C) (08/18/18 1505)  Pulse: 78 (08/18/18 1823)  Resp: (!) 42 (08/18/18 1823)  BP: (!) 181/87 (08/18/18 1505)  SpO2: 97 % (08/18/18 1823) Vital Signs (24h Range):  Temp:  [98.5 °F (36.9 °C)-99 °F (37.2 °C)] 99 °F (37.2 °C)  Pulse:  [] 78  Resp:  [17-42] 42  SpO2:  [97 %-100 %] 97 %  BP: (180-181)/(82-96) 181/87  Arterial Line BP: (120-200)/(78-97) 190/85     Date 08/18/18 0700 - 08/19/18 0659   Shift 6531-6802 8399-0692 4405-3538 24 Hour Total   INTAKE   P.O. 250   250   I.V.(mL/kg) 1212.5(15.8) 629(8.2)  1841.5(24.1)   Shift Total(mL/kg) 1462.5(19.1) 629(8.2)  2091.5(27.3)   OUTPUT   Urine(mL/kg/hr) 1400(2.3) 350  1750   Drains 68 57  125   Shift Total(mL/kg) 1468(19.2) 407(5.3)  1875(24.5)   Weight (kg) 76.5 76.5 76.5 76.5                        ICP/Ventriculostomy 08/11/18 0000 Ventricular drainage catheter with  ICP microsensor Right Other (Comment) (Active)   Level of Ventriculostomy (cm above) 10 8/17/2018  7:05 AM   Status Open to drainage 8/17/2018  7:05 AM   Site Assessment Dry;Clean 8/17/2018  7:05 AM   Site Drainage No drainage 8/17/2018  7:05 AM   Waveform normal waveform 8/17/2018  3:00 AM   Output (mL) 14 mL 8/17/2018  7:00 AM   CSF Color pink 8/17/2018  7:05 AM   Dressing Status Biopatch in place;Clean;Dry;Intact 8/17/2018  7:05 AM   Interventions bed controls locked;HOB degrees;zeroed 8/17/2018  7:05 AM       Neurosurgery Physical Exam    AOX3  speech fluent  EVD in place   PERRL, EOMI, face symm, tongue midline  HOBSON symm  No drift appreciated on exam this AM        Significant Labs:  Recent Labs   Lab  08/17/18 0132 08/17/18 2032 08/18/18   0303  08/18/18   1145   GLU  103   --    --   96   --    NA  136  136   < >  138  135*  135*  134*   K  3.6   --    --   3.4*   --    CL  108   --    --   108   --    CO2  17*   --    --   20*   --    BUN  9   --    --   8   --    CREATININE  0.7   --    --   0.6   --    CALCIUM  8.5*   --    --   8.5*   --    MG  1.8   --    --   1.7   --     < > = values in this interval not displayed.     Recent Labs   Lab  08/17/18 0131 08/18/18   0303  08/18/18   1145   WBC  12.78*  10.19   --    HGB  7.9*  7.1*  7.5*   HCT  23.9*  21.9*  23.4*   PLT  231  244   --      Recent Labs   Lab  08/17/18 0131 08/18/18   0303   INR  1.0  1.1     Microbiology Results (last 7 days)     Procedure Component Value Units Date/Time    CSF culture [[id number]] Collected:  08/18/18 1354    Order Status:  Completed Specimen:  CSF (Spinal Fluid) from CSF Shunt Updated:  08/18/18 1604     Gram Stain Result Rare WBC's      No organisms seen    Blood culture [[id number]] Collected:  08/10/18 2020    Order Status:  Completed Specimen:  Blood from Peripheral, Foot, Left Updated:  08/16/18 0612     Blood Culture, Routine No growth after 5 days.    Blood culture [[id number]] Collected:  08/10/18 2040     Order Status:  Completed Specimen:  Blood from Peripheral, Antecubital, Left Updated:  08/16/18 0612     Blood Culture, Routine No growth after 5 days.    Urine culture [[id number]] Collected:  08/12/18 1526    Order Status:  Completed Specimen:  Urine, Catheterized Updated:  08/13/18 2118     Urine Culture, Routine No growth        Significant Diagnostics:  I have reviewed all pertinent imaging results/findings within the past 24 hours.

## 2018-08-19 NOTE — ASSESSMENT & PLAN NOTE
32 y.o. female 24 weeks pregnant with history of polycystic kidney disease with HH4F4 SAH on 8/10, now s/p acom coiling 8/10.     NEURO  -Continue NCC  -q 1 hr neuro checks  -evd open to 10 and draining, monitor icps   -CSF sent today  -nimotop  -daily TCDs  -Keppra      CV  -permissive HTN  <200 as aneurysm secured    RESP  -stable on room air  -Aggressive pulmonary management  -IS @ bedside    FEN/GI  -strict I/Os  -goal net even or positive  -Reg diet    ID  -vanc for drain ppx    PPX  -SQH  -famotidine for PUD ppx  -TEDs/SCDs    -further mgmt per ncc and obgyn.

## 2018-08-19 NOTE — PROGRESS NOTES
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  Etiology likely aneurysmal.  Recommend cerebral angiogram    Antithrombotics for secondary stroke prevention: none SAH     Statins for secondary stroke prevention and hyperlipidemia, if present: SAH     Aggressive risk factor modification: polycytsic kidney disease, HTN      Rehab efforts: Occupational Therapy, PT/OT/SLP to evaluate and treat when appropriate     Diagnostics ordered/pending: cerebral angiogram     VTE prophylaxis: SCDs    BP parameters: SAH: Unsecured aneurysm, SBP <140            Cerebral artery vasospasm    Monitor daily TCD's  Nimotop            25 weeks gestation of pregnancy    Per [facility] recommendations        Hypertension affecting pregnancy in second trimester    Risk factor for stroke  Keep systolic under 140        PKD (polycystic kidney disease)    Risk factor for aneurysm              33 y/o female found down @~24w5d admitted for management of subarachnoid hemmorhage likely due to ruptured aneurysm  Had coiling done ACOM 8-10-18  8-16-18 to IR for cerebral angiogram for vasospasm and had IV Verapamil instilled   8-19-18 with headache TCD's better yeaterday      STROKE DOCUMENTATION        NIH Scale:  1a. Level Of Consciousness: 0-->Alert: keenly responsive  1b. LOC Questions: 0-->Answers both questions correctly  1c. LOC Commands: 0-->Performs both tasks correctly  2. Best Gaze: 0-->Normal  3. Visual: 0-->No visual loss  4. Facial Palsy: 0-->Normal symmetrical movements  5a. Motor Arm, Left: 0-->No drift: limb holds 90 (or 45) degrees for full 10 secs  5b. Motor Arm, Right: 0-->No drift: limb holds 90 (or 45) degrees for full 10 secs  6a. Motor  Leg, Left: 0-->No drift: leg holds 30 degree position for full 5 secs  6b. Motor Leg, Right: 0-->No drift: leg holds 30 degree position for full 5 secs  7. Limb Ataxia: 0-->Absent  8. Sensory: 0-->Normal: no sensory loss  9. Best Language: 0-->No aphasia: normal  10. Dysarthria: 0-->Normal  11. Extinction and Inattention (formerly Neglect): 0-->No abnormality  Total (NIH Stroke Scale): 0       Modified Jennings Score: 1  [place] Coma Scale:    ABCD2 Score:    [id number] Score:   HAS -BLED Score:   ICH Score:   Hunt & Collins Classification:Grade III     Hemorrhagic change of an Ischemic Stroke: Does this patient have an ischemic stroke with hemorrhagic changes? No SAH    Neurologic Chief Complaint: SAH    Subjective:     Interval History: Patient is seen for follow-up neurological assessment and treatment recommendations: No issues overnight, no neuro deficits    HPI, Past Medical, Family, and Social History remains the same as documented in the initial encounter. Just c/o headache     Review of Systems   Constitutional: Negative for chills and fever.   Cardiovascular: Negative for chest pain and leg swelling.   Neurological: Negative for speech difficulty and weakness.     Scheduled Meds:   ceFAZolin (ANCEF) IVPB  1 g Intravenous Q8H    famotidine  20 mg Oral BID    ferrous sulfate  325 mg Oral Daily    heparin (porcine)  5,000 Units Subcutaneous Q8H    niMODipine  30 mg Oral Q2H    prenatal vitamin  1 tablet Oral Daily    senna-docusate 8.6-50 mg  1 tablet Oral BID    sodium chloride 0.9%  10 mL Intravenous Q6H     Continuous Infusions:   sodium chloride 0.9% 110 mL/hr at 08/19/18 1005    Sodium Chloride 2% 45 mL/hr at 08/19/18 1005     PRN Meds:acetaminophen, calcium gluconate, labetalol, magnesium oxide, magnesium oxide, midazolam, oxyCODONE, potassium chloride 10%, potassium chloride 10%, potassium chloride 10%, potassium, sodium phosphates, potassium, sodium phosphates, potassium, sodium phosphates,  Flushing PICC Protocol **AND** sodium chloride 0.9% **AND** sodium chloride 0.9%, sodium chloride 0.9%    Objective:     Vital Signs (Most Recent):  Temp: 99 °F (37.2 °C) (08/19/18 0705)  Pulse: 72 (08/19/18 1005)  Resp: (!) 22 (08/19/18 1005)  BP: (!) 170/80 (08/19/18 0705)  SpO2: 100 % (08/19/18 1005)  BP Location: Left arm    Vital Signs Range (Last 24H):  Temp:  [99 °F (37.2 °C)-99.4 °F (37.4 °C)]   Pulse:  []   Resp:  [16-45]   BP: (170-181)/(80-87)   SpO2:  [97 %-100 %]   Arterial Line BP: (110-200)/(78-99)   BP Location: Left arm    Physical Exam   Constitutional: She is oriented to person, place, and time. She appears well-developed and well-nourished.   Pregnant   HENT:   Head: Normocephalic and atraumatic.   Neurological: She is alert and oriented to person, place, and time.   Nursing note and vitals reviewed.      Neurological Exam:   LOC: alert  Attention Span: Good   Language: No aphasia  Articulation: No dysarthria  Orientation: Person, Place, Time   Visual Fields: Full  EOM (CN III, IV, VI): Full/intact  Motor: Arm left  Normal 5/5  Leg left  Normal 5/5  Arm right  Normal 5/5  Leg right Normal 5/5  Tone: Normal tone throughout    Laboratory:  CMP:   Recent Labs   Lab  08/19/18 0318   CALCIUM  8.3*   ALBUMIN  2.3*   PROT  6.1   NA  134*  134*   K  3.7   CO2  20*   CL  107   BUN  7   CREATININE  0.6   ALKPHOS  77   ALT  17   AST  15   BILITOT  0.3     CBC:   Recent Labs   Lab  08/19/18 0318   WBC  9.84   RBC  2.61*   HGB  7.7*   HCT  23.1*   PLT  264   MCV  89   MCH  29.5   MCHC  33.3     Lipid Panel: No results for input(s): CHOL, LDLCALC, HDL, TRIG in the last 168 hours.  Coagulation:   Recent Labs   Lab  08/19/18 0318   INR  1.0     Platelet Aggregation Study: No results for input(s): PLTAGG, PLTAGINTERP, PLTAGREGLACO, ADPPLTAGGREG in the last 168 hours.  Hgb A1C: No results for input(s): HGBA1C in the last 168 hours.  TSH: No results for input(s): TSH in the last 168  hours.    Diagnostic Results     Brain Imaging   CT Head w/o contrast 8-10-18 results:    1. Extensive subarachnoid hemorrhage, most likely from an aneurysm rupture.  It is difficult to determine the site of the aneurysm rupture on this study due to the diffuse nature of the subarachnoid hemorrhage throughout the posterior fossa as well as the basilar cisternal spaces.  2. Slight increase in the ventricular size when compared to the previous study suggestive of a developing hydrocephalus.    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  Successful coil embolization of this aneurysm.     Cardiac Imaging   2D Echo 8-11-18 results:    1 - Eccentric hypertrophy.     2 - Normal left ventricular systolic function (EF 60-65%).     3 - No wall motion abnormalities.     4 - Normal left ventricular diastolic function.     5 - Normal right ventricular systolic function .     6 - The estimated PA systolic pressure is 31 mmHg.     7 - Trivial pericardial effusion.       Lorie Mercado NP  [facility]  Department of Vascular Neurology   [facility]

## 2018-08-19 NOTE — PLAN OF CARE
POC reviewed with pt and family at 1600. Pt verbalized understanding. Questions and concerns addressed. No acute events today, 2% and NS infusing. 1 unit of blood and albumin given. Pt progressing toward goals. Will continue to monitor. See flowsheets for full assessment and VS info.

## 2018-08-19 NOTE — SUBJECTIVE & OBJECTIVE
Interval History: Morphine overnight for headache, minimally effective compared to oxycodone.     Review of Systems   Constitutional: Negative for activity change and appetite change.   Respiratory: Negative for chest tightness.    Cardiovascular: Negative for chest pain.   Gastrointestinal: Negative for abdominal distention.   Genitourinary: Negative for difficulty urinating.       Objective:     Vitals:  Temp: 99 °F (37.2 °C)  Pulse: 80  Rhythm: normal sinus rhythm  BP: (!) 170/80  ICP Mean (mmHg): 7 mmHg  Resp: (!) 23  SpO2: 99 %  O2 Device (Oxygen Therapy): room air    Temp  Min: 98.5 °F (36.9 °C)  Max: 99.4 °F (37.4 °C)  Pulse  Min: 72  Max: 106  BP  Min: 170/80  Max: 181/87  ICP Mean (mmHg)  Min: 4 mmHg  Max: 14 mmHg  Resp  Min: 16  Max: 45  SpO2  Min: 97 %  Max: 100 %    08/18 0701 - 08/19 0700  In: 3998.2 [P.O.:250; I.V.:3748.2]  Out: 3451 [Urine:3200; Drains:251]   Unmeasured Output  Urine Occurrence: 1  Stool Occurrence: 0  Emesis Occurrence: 1  Pad Count: 1       Physical Exam   Constitutional: She appears well-developed and well-nourished.   Cardiovascular: Normal rate and intact distal pulses.   Pulmonary/Chest: Effort normal. No respiratory distress.   General: Comfortable, sitting in bed, eating breakfast, conversational  Neuro: A+Ox4, speech clear and coherent  CN: PERRL (3mm brisk), EOMI, no facial droop, eyebrows symmetric v1-3 intact.  Motor: 5/5 in extremities x 4  SILT  No pronator drift    .    Medications:  Continuous  sodium chloride 0.9% Last Rate: 125 mL/hr at 08/19/18 0905   niCARdipine    Sodium Chloride 2% Last Rate: 35 mL/hr at 08/19/18 0905   Scheduled  ceFAZolin (ANCEF) IVPB 1 g Q8H   famotidine 20 mg BID   ferrous sulfate 325 mg Daily   heparin (porcine) 5,000 Units Q8H   niMODipine 30 mg Q2H   prenatal vitamin 1 tablet Daily   senna-docusate 8.6-50 mg 1 tablet BID   sodium chloride 0.9% 10 mL Q6H   PRN  acetaminophen 650 mg Q6H PRN   calcium gluconate 1 g PRN   labetalol 10 mg Q4H  PRN   magnesium oxide 800 mg PRN   magnesium oxide 800 mg PRN   midazolam 2 mg PRN   oxyCODONE 10 mg Q4H PRN   potassium chloride 10% 40 mEq PRN   potassium chloride 10% 40 mEq PRN   potassium chloride 10% 60 mEq PRN   potassium, sodium phosphates 2 packet PRN   potassium, sodium phosphates 2 packet PRN   potassium, sodium phosphates 2 packet PRN   sodium chloride 0.9% 10 mL PRN   sodium chloride 0.9% 5 mL PRN     Today I personally reviewed pertinent medications, lines/drains/airways, imaging, lab results, notably:    Diet  Diet Adult Regular (IDDSI Level 7)  Diet Adult Regular (IDDSI Level 7)

## 2018-08-19 NOTE — SUBJECTIVE & OBJECTIVE
Neurologic Chief Complaint: SAH    Subjective:     Interval History: Patient is seen for follow-up neurological assessment and treatment recommendations: No issues overnight, no neuro deficits    HPI, Past Medical, Family, and Social History remains the same as documented in the initial encounter. Just c/o headache     Review of Systems   Constitutional: Negative for chills and fever.   Cardiovascular: Negative for chest pain and leg swelling.   Neurological: Negative for speech difficulty and weakness.     Scheduled Meds:   ceFAZolin (ANCEF) IVPB  1 g Intravenous Q8H    famotidine  20 mg Oral BID    ferrous sulfate  325 mg Oral Daily    heparin (porcine)  5,000 Units Subcutaneous Q8H    niMODipine  30 mg Oral Q2H    prenatal vitamin  1 tablet Oral Daily    senna-docusate 8.6-50 mg  1 tablet Oral BID    sodium chloride 0.9%  10 mL Intravenous Q6H     Continuous Infusions:   sodium chloride 0.9% 110 mL/hr at 08/19/18 1005    Sodium Chloride 2% 45 mL/hr at 08/19/18 1005     PRN Meds:acetaminophen, calcium gluconate, labetalol, magnesium oxide, magnesium oxide, midazolam, oxyCODONE, potassium chloride 10%, potassium chloride 10%, potassium chloride 10%, potassium, sodium phosphates, potassium, sodium phosphates, potassium, sodium phosphates, Flushing PICC Protocol **AND** sodium chloride 0.9% **AND** sodium chloride 0.9%, sodium chloride 0.9%    Objective:     Vital Signs (Most Recent):  Temp: 99 °F (37.2 °C) (08/19/18 0705)  Pulse: 72 (08/19/18 1005)  Resp: (!) 22 (08/19/18 1005)  BP: (!) 170/80 (08/19/18 0705)  SpO2: 100 % (08/19/18 1005)  BP Location: Left arm    Vital Signs Range (Last 24H):  Temp:  [99 °F (37.2 °C)-99.4 °F (37.4 °C)]   Pulse:  []   Resp:  [16-45]   BP: (170-181)/(80-87)   SpO2:  [97 %-100 %]   Arterial Line BP: (110-200)/(78-99)   BP Location: Left arm    Physical Exam   Constitutional: She is oriented to person, place, and time. She appears well-developed and well-nourished.    Pregnant   HENT:   Head: Normocephalic and atraumatic.   Neurological: She is alert and oriented to person, place, and time.   Nursing note and vitals reviewed.      Neurological Exam:   LOC: alert  Attention Span: Good   Language: No aphasia  Articulation: No dysarthria  Orientation: Person, Place, Time   Visual Fields: Full  EOM (CN III, IV, VI): Full/intact  Motor: Arm left  Normal 5/5  Leg left  Normal 5/5  Arm right  Normal 5/5  Leg right Normal 5/5  Tone: Normal tone throughout    Laboratory:  CMP:   Recent Labs   Lab  08/19/18 0318   CALCIUM  8.3*   ALBUMIN  2.3*   PROT  6.1   NA  134*  134*   K  3.7   CO2  20*   CL  107   BUN  7   CREATININE  0.6   ALKPHOS  77   ALT  17   AST  15   BILITOT  0.3     CBC:   Recent Labs   Lab  08/19/18 0318   WBC  9.84   RBC  2.61*   HGB  7.7*   HCT  23.1*   PLT  264   MCV  89   MCH  29.5   MCHC  33.3     Lipid Panel: No results for input(s): CHOL, LDLCALC, HDL, TRIG in the last 168 hours.  Coagulation:   Recent Labs   Lab  08/19/18 0318   INR  1.0     Platelet Aggregation Study: No results for input(s): PLTAGG, PLTAGINTERP, PLTAGREGLACO, ADPPLTAGGREG in the last 168 hours.  Hgb A1C: No results for input(s): HGBA1C in the last 168 hours.  TSH: No results for input(s): TSH in the last 168 hours.    Diagnostic Results     Brain Imaging   CT Head w/o contrast 8-10-18 results:    1. Extensive subarachnoid hemorrhage, most likely from an aneurysm rupture.  It is difficult to determine the site of the aneurysm rupture on this study due to the diffuse nature of the subarachnoid hemorrhage throughout the posterior fossa as well as the basilar cisternal spaces.  2. Slight increase in the ventricular size when compared to the previous study suggestive of a developing hydrocephalus.    Vessel Imaging   Cerebral angiogram 8-16-18 results:  IV Verapamil instilled due to vasospasm    Cerebral angiogram 8-10-18 results:  2.3 x 2.7 x 2.0 mm anterior communicating artery aneurysm with  Villafuerte's excrescence.  Successful coil embolization of this aneurysm.     Cardiac Imaging   2D Echo 8-11-18 results:    1 - Eccentric hypertrophy.     2 - Normal left ventricular systolic function (EF 60-65%).     3 - No wall motion abnormalities.     4 - Normal left ventricular diastolic function.     5 - Normal right ventricular systolic function .     6 - The estimated PA systolic pressure is 31 mmHg.     7 - Trivial pericardial effusion.

## 2018-08-19 NOTE — SIGNIFICANT EVENT
TCD's have increased in anterior and posterior circulation  No change in neurological exam  Discussed  Findings with Dr Springer and Dr Leblanc      Plan:  Follow exam closely  PRBC  Repeat cbc  May need albumin  Increase IV fluids      At very high risk of deterioration      Total cc time 127 mins

## 2018-08-19 NOTE — PROGRESS NOTES
[facility]  Neurosurgery  Progress Note    Subjective:     History of Present Illness: 32 y.o. female 24 weeks pregnant with a history of polycystic kidney disease who presents as transfer from [facility] for SAH. Patient found down by 6 yr old son. Last known normal 0500 today. CT at [facility] revealed SAH within the basal cisterns. US at [facility] with + fetal heart tones. Transferred for neuro evaluation.     Post-Op Info:  Procedure(s) (LRB):  Mri (magnetic resonance imaging) (N/A)   9 Days Post-Op     Interval History: NAEON. Exam stable. Remains on 2% @35cc/h. TCDs elevated today, will give pRBCs and albumin to expand blood volume. Na 134    Medications:  Continuous Infusions:   sodium chloride 0.9% 110 mL/hr at 08/19/18 1105    Sodium Chloride 2% 45 mL/hr at 08/19/18 1105     Scheduled Meds:   ceFAZolin (ANCEF) IVPB  1 g Intravenous Q8H    famotidine  20 mg Oral BID    ferrous sulfate  325 mg Oral Daily    heparin (porcine)  5,000 Units Subcutaneous Q8H    niMODipine  30 mg Oral Q2H    prenatal vitamin  1 tablet Oral Daily    senna-docusate 8.6-50 mg  1 tablet Oral BID    sodium chloride 0.9%  10 mL Intravenous Q6H     PRN Meds:sodium chloride, acetaminophen, calcium gluconate, labetalol, magnesium oxide, magnesium oxide, midazolam, oxyCODONE, potassium chloride 10%, potassium chloride 10%, potassium chloride 10%, potassium, sodium phosphates, potassium, sodium phosphates, potassium, sodium phosphates, Flushing PICC Protocol **AND** sodium chloride 0.9% **AND** sodium chloride 0.9%, sodium chloride 0.9%     Review of Systems    Objective:     Weight: 76.8 kg (169 lb 5 oz)  Body mass index is 25 kg/m².  Vital Signs (Most Recent):  Temp: 99.2 °F (37.3 °C) (08/19/18 1105)  Pulse: 74 (08/19/18 1105)  Resp: (!) 24 (08/19/18 1105)  BP: (!) 191/91 (08/19/18 1105)  SpO2: 100 % (08/19/18 1105) Vital Signs (24h Range):  Temp:  [99 °F (37.2 °C)-99.4 °F (37.4 °C)] 99.2 °F (37.3 °C)  Pulse:  [] 74  Resp:   [16-45] 24  SpO2:  [97 %-100 %] 100 %  BP: (170-191)/(80-91) 191/91  Arterial Line BP: (110-200)/(78-99) 182/93     Date 08/19/18 0700 - 08/20/18 0659   Shift 0500-9395 9778-7821 6870-0685 24 Hour Total   INTAKE   I.V.(mL/kg) 791.6(10.3)   791.6(10.3)   Shift Total(mL/kg) 791.6(10.3)   791.6(10.3)   OUTPUT   Urine(mL/kg/hr) 600   600   Drains 57   57   Shift Total(mL/kg) 657(8.6)   657(8.6)   Weight (kg) 76.8 76.8 76.8 76.8                    ICP/Ventriculostomy 08/11/18 0000 Ventricular drainage catheter with ICP microsensor Right Other (Comment) (Active)   Level of Ventriculostomy (cm above) 10 8/17/2018  7:05 AM   Status Open to drainage 8/17/2018  7:05 AM   Site Assessment Dry;Clean 8/17/2018  7:05 AM   Site Drainage No drainage 8/17/2018  7:05 AM   Waveform normal waveform 8/17/2018  3:00 AM   Output (mL) 14 mL 8/17/2018  7:00 AM   CSF Color pink 8/17/2018  7:05 AM   Dressing Status Biopatch in place;Clean;Dry;Intact 8/17/2018  7:05 AM   Interventions bed controls locked;HOB degrees;zeroed 8/17/2018  7:05 AM       Neurosurgery Physical Exam    AOX3  speech fluent  EVD in place   PERRL, EOMI, face symm, tongue midline  HOBSON symm  No drift appreciated on exam this AM        Significant Labs:  Recent Labs   Lab  08/18/18   0303   08/18/18   2037  08/19/18   0318  08/19/18   1200   GLU  96   --    --   92   --    NA  135*  135*   < >  134*  134*  134*  132*   K  3.4*   --    --   3.7   --    CL  108   --    --   107   --    CO2  20*   --    --   20*   --    BUN  8   --    --   7   --    CREATININE  0.6   --    --   0.6   --    CALCIUM  8.5*   --    --   8.3*   --    MG  1.7   --    --   1.5*   --     < > = values in this interval not displayed.     Recent Labs   Lab  08/18/18   0303  08/18/18   1145  08/19/18 0318   WBC  10.19   --   9.84   HGB  7.1*  7.5*  7.7*   HCT  21.9*  23.4*  23.1*   PLT  244   --   264     Recent Labs   Lab  08/18/18   0303  08/19/18 0318   INR  1.1  1.0     Microbiology Results (last  7 days)     Procedure Component Value Units Date/Time    CSF culture [[id number]] Collected:  08/18/18 1354    Order Status:  Completed Specimen:  CSF (Spinal Fluid) from CSF Shunt Updated:  08/19/18 0801     CSF CULTURE No Growth to date     Gram Stain Result Rare WBC's      No organisms seen    Blood culture [[id number]] Collected:  08/10/18 2020    Order Status:  Completed Specimen:  Blood from Peripheral, Foot, Left Updated:  08/16/18 0612     Blood Culture, Routine No growth after 5 days.    Blood culture [[id number]] Collected:  08/10/18 2040    Order Status:  Completed Specimen:  Blood from Peripheral, Antecubital, Left Updated:  08/16/18 0612     Blood Culture, Routine No growth after 5 days.    Urine culture [[id number]] Collected:  08/12/18 1526    Order Status:  Completed Specimen:  Urine, Catheterized Updated:  08/13/18 2118     Urine Culture, Routine No growth        Significant Diagnostics:  I have reviewed all pertinent imaging results/findings within the past 24 hours.    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 female 24 weeks pregnant with history of polycystic kidney disease with HH4F4 SAH on 8/10, now s/p acom coiling 8/10.     NEURO  -Continue NCC  -q 1 hr neuro checks  -evd open to 10 and draining, monitor icps   -CSF profile reviewed from 8/19  -nimotop  -daily TCDs   -TCDs elevated today diffusely   -NCC to give 1UpRBCs and albumin and reassess   -On 35cc/h 2%  -Keppra      CV  -permissive HTN  <200 as aneurysm secured    RESP  -stable on room air  -Aggressive pulmonary management  -IS @ bedside    FEN/GI  -strict I/Os  -goal net even or positive  -Reg diet    ID  -vanc for drain ppx    PPX  -SQH  -famotidine for PUD ppx  -TEDs/SCDs    -further mgmt per ncc and obgyn.            Josue Thomas MD  Neurosurgery  [facility]

## 2018-08-19 NOTE — ASSESSMENT & PLAN NOTE
32 y.o. female 24 weeks pregnant with history of polycystic kidney disease with HH4F4 SAH on 8/10, now s/p acom coiling 8/10.     NEURO  -Continue NCC  -q 1 hr neuro checks  -evd open to 10 and draining, monitor icps   -CSF profile reviewed from 8/19  -nimotop  -daily TCDs   -TCDs elevated today diffusely   -NCC to give 1UpRBCs and albumin and reassess   -On 35cc/h 2%  -Keppra      CV  -permissive HTN  <200 as aneurysm secured    RESP  -stable on room air  -Aggressive pulmonary management  -IS @ bedside    FEN/GI  -strict I/Os  -goal net even or positive  -Reg diet    ID  -vanc for drain ppx    PPX  -SQH  -famotidine for PUD ppx  -TEDs/SCDs    -further mgmt per ncc and obgyn.

## 2018-08-19 NOTE — PROGRESS NOTES
[facility]  Neurosurgery  Progress Note    Subjective:     History of Present Illness: 32 y.o. female 24 weeks pregnant with a history of polycystic kidney disease who presents as transfer from [facility] for SAH. Patient found down by 6 yr old son. Last known normal 0500 today. CT at [facility] revealed SAH within the basal cisterns. US at [facility] with + fetal heart tones. Transferred for neuro evaluation.     Post-Op Info:  Procedure(s) (LRB):  Mri (magnetic resonance imaging) (N/A)   8 Days Post-Op     Interval History: NAEON. Exam stable. Remains on 2% for Na goals >135    Medications:  Continuous Infusions:   sodium chloride 0.9% 125 mL/hr at 08/18/18 1805    niCARdipine      Sodium Chloride 2% 35 mL/hr at 08/18/18 1805     Scheduled Meds:   ceFAZolin (ANCEF) IVPB  1 g Intravenous Q8H    famotidine  20 mg Oral BID    ferrous sulfate  325 mg Oral Daily    heparin (porcine)  5,000 Units Subcutaneous Q8H    niMODipine  30 mg Oral Q2H    prenatal vitamin  1 tablet Oral Daily    senna-docusate 8.6-50 mg  1 tablet Oral BID    sodium chloride 0.9%  10 mL Intravenous Q6H     PRN Meds:acetaminophen, calcium gluconate, labetalol, magnesium oxide, magnesium oxide, midazolam, oxyCODONE, potassium chloride 10%, potassium chloride 10%, potassium chloride 10%, potassium, sodium phosphates, potassium, sodium phosphates, potassium, sodium phosphates, Flushing PICC Protocol **AND** sodium chloride 0.9% **AND** sodium chloride 0.9%, sodium chloride 0.9%     Review of Systems    Objective:     Weight: 76.5 kg (168 lb 10.4 oz)  Body mass index is 24.91 kg/m².  Vital Signs (Most Recent):  Temp: 99 °F (37.2 °C) (08/18/18 1505)  Pulse: 78 (08/18/18 1823)  Resp: (!) 42 (08/18/18 1823)  BP: (!) 181/87 (08/18/18 1505)  SpO2: 97 % (08/18/18 1823) Vital Signs (24h Range):  Temp:  [98.5 °F (36.9 °C)-99 °F (37.2 °C)] 99 °F (37.2 °C)  Pulse:  [] 78  Resp:  [17-42] 42  SpO2:  [97 %-100 %] 97 %  BP: (180-181)/(82-96)  181/87  Arterial Line BP: (120-200)/(78-97) 190/85     Date 08/18/18 0700 - 08/19/18 0659   Shift 9903-6228 9734-3613 3311-1382 24 Hour Total   INTAKE   P.O. 250   250   I.V.(mL/kg) 1212.5(15.8) 629(8.2)  1841.5(24.1)   Shift Total(mL/kg) 1462.5(19.1) 629(8.2)  2091.5(27.3)   OUTPUT   Urine(mL/kg/hr) 1400(2.3) 350  1750   Drains 68 57  125   Shift Total(mL/kg) 1468(19.2) 407(5.3)  1875(24.5)   Weight (kg) 76.5 76.5 76.5 76.5                        ICP/Ventriculostomy 08/11/18 0000 Ventricular drainage catheter with ICP microsensor Right Other (Comment) (Active)   Level of Ventriculostomy (cm above) 10 8/17/2018  7:05 AM   Status Open to drainage 8/17/2018  7:05 AM   Site Assessment Dry;Clean 8/17/2018  7:05 AM   Site Drainage No drainage 8/17/2018  7:05 AM   Waveform normal waveform 8/17/2018  3:00 AM   Output (mL) 14 mL 8/17/2018  7:00 AM   CSF Color pink 8/17/2018  7:05 AM   Dressing Status Biopatch in place;Clean;Dry;Intact 8/17/2018  7:05 AM   Interventions bed controls locked;HOB degrees;zeroed 8/17/2018  7:05 AM       Neurosurgery Physical Exam    AOX3  speech fluent  EVD in place   PERRL, EOMI, face symm, tongue midline  HOBSON symm  No drift appreciated on exam this AM        Significant Labs:  Recent Labs   Lab  08/17/18   0132   08/17/18   2032  08/18/18   0303  08/18/18   1145   GLU  103   --    --   96   --    NA  136  136   < >  138  135*  135*  134*   K  3.6   --    --   3.4*   --    CL  108   --    --   108   --    CO2  17*   --    --   20*   --    BUN  9   --    --   8   --    CREATININE  0.7   --    --   0.6   --    CALCIUM  8.5*   --    --   8.5*   --    MG  1.8   --    --   1.7   --     < > = values in this interval not displayed.     Recent Labs   Lab  08/17/18 0131 08/18/18   0303  08/18/18   1145   WBC  12.78*  10.19   --    HGB  7.9*  7.1*  7.5*   HCT  23.9*  21.9*  23.4*   PLT  231  244   --      Recent Labs   Lab  08/17/18 0131 08/18/18   0303   INR  1.0  1.1     Microbiology Results  (last 7 days)     Procedure Component Value Units Date/Time    CSF culture [[id number]] Collected:  08/18/18 1354    Order Status:  Completed Specimen:  CSF (Spinal Fluid) from CSF Shunt Updated:  08/18/18 1604     Gram Stain Result Rare WBC's      No organisms seen    Blood culture [[id number]] Collected:  08/10/18 2020    Order Status:  Completed Specimen:  Blood from Peripheral, Foot, Left Updated:  08/16/18 0612     Blood Culture, Routine No growth after 5 days.    Blood culture [[id number]] Collected:  08/10/18 2040    Order Status:  Completed Specimen:  Blood from Peripheral, Antecubital, Left Updated:  08/16/18 0612     Blood Culture, Routine No growth after 5 days.    Urine culture [[id number]] Collected:  08/12/18 1526    Order Status:  Completed Specimen:  Urine, Catheterized Updated:  08/13/18 2118     Urine Culture, Routine No growth        Significant Diagnostics:  I have reviewed all pertinent imaging results/findings within the past 24 hours.    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 female 24 weeks pregnant with history of polycystic kidney disease with HH4F4 SAH on 8/10, now s/p acom coiling 8/10.     NEURO  -Continue NCC  -q 1 hr neuro checks  -evd open to 10 and draining, monitor icps   -CSF sent today  -nimotop  -daily TCDs  -Keppra      CV  -permissive HTN  <200 as aneurysm secured    RESP  -stable on room air  -Aggressive pulmonary management  -IS @ bedside    FEN/GI  -strict I/Os  -goal net even or positive  -Reg diet    ID  -vanc for drain ppx    PPX  -SQH  -famotidine for PUD ppx  -TEDs/SCDs    -further mgmt per ncc and obgyn.            Josue Thomas MD  Neurosurgery  [facility]

## 2018-08-19 NOTE — SUBJECTIVE & OBJECTIVE
Interval History: NAEON. Exam stable. Remains on 2% @35cc/h. TCDs elevated today, will give pRBCs and albumin to expand blood volume. Na 134    Medications:  Continuous Infusions:   sodium chloride 0.9% 110 mL/hr at 08/19/18 1105    Sodium Chloride 2% 45 mL/hr at 08/19/18 1105     Scheduled Meds:   ceFAZolin (ANCEF) IVPB  1 g Intravenous Q8H    famotidine  20 mg Oral BID    ferrous sulfate  325 mg Oral Daily    heparin (porcine)  5,000 Units Subcutaneous Q8H    niMODipine  30 mg Oral Q2H    prenatal vitamin  1 tablet Oral Daily    senna-docusate 8.6-50 mg  1 tablet Oral BID    sodium chloride 0.9%  10 mL Intravenous Q6H     PRN Meds:sodium chloride, acetaminophen, calcium gluconate, labetalol, magnesium oxide, magnesium oxide, midazolam, oxyCODONE, potassium chloride 10%, potassium chloride 10%, potassium chloride 10%, potassium, sodium phosphates, potassium, sodium phosphates, potassium, sodium phosphates, Flushing PICC Protocol **AND** sodium chloride 0.9% **AND** sodium chloride 0.9%, sodium chloride 0.9%     Review of Systems    Objective:     Weight: 76.8 kg (169 lb 5 oz)  Body mass index is 25 kg/m².  Vital Signs (Most Recent):  Temp: 99.2 °F (37.3 °C) (08/19/18 1105)  Pulse: 74 (08/19/18 1105)  Resp: (!) 24 (08/19/18 1105)  BP: (!) 191/91 (08/19/18 1105)  SpO2: 100 % (08/19/18 1105) Vital Signs (24h Range):  Temp:  [99 °F (37.2 °C)-99.4 °F (37.4 °C)] 99.2 °F (37.3 °C)  Pulse:  [] 74  Resp:  [16-45] 24  SpO2:  [97 %-100 %] 100 %  BP: (170-191)/(80-91) 191/91  Arterial Line BP: (110-200)/(78-99) 182/93     Date 08/19/18 0700 - 08/20/18 0659   Shift 8637-2230 2388-2275 8508-3998 24 Hour Total   INTAKE   I.V.(mL/kg) 791.6(10.3)   791.6(10.3)   Shift Total(mL/kg) 791.6(10.3)   791.6(10.3)   OUTPUT   Urine(mL/kg/hr) 600   600   Drains 57   57   Shift Total(mL/kg) 657(8.6)   657(8.6)   Weight (kg) 76.8 76.8 76.8 76.8                    ICP/Ventriculostomy 08/11/18 0000 Ventricular drainage catheter  with ICP microsensor Right Other (Comment) (Active)   Level of Ventriculostomy (cm above) 10 8/17/2018  7:05 AM   Status Open to drainage 8/17/2018  7:05 AM   Site Assessment Dry;Clean 8/17/2018  7:05 AM   Site Drainage No drainage 8/17/2018  7:05 AM   Waveform normal waveform 8/17/2018  3:00 AM   Output (mL) 14 mL 8/17/2018  7:00 AM   CSF Color pink 8/17/2018  7:05 AM   Dressing Status Biopatch in place;Clean;Dry;Intact 8/17/2018  7:05 AM   Interventions bed controls locked;HOB degrees;zeroed 8/17/2018  7:05 AM       Neurosurgery Physical Exam    AOX3  speech fluent  EVD in place   PERRL, EOMI, face symm, tongue midline  HOBSON symm  No drift appreciated on exam this AM        Significant Labs:  Recent Labs   Lab  08/18/18   0303   08/18/18   2037  08/19/18   0318  08/19/18   1200   GLU  96   --    --   92   --    NA  135*  135*   < >  134*  134*  134*  132*   K  3.4*   --    --   3.7   --    CL  108   --    --   107   --    CO2  20*   --    --   20*   --    BUN  8   --    --   7   --    CREATININE  0.6   --    --   0.6   --    CALCIUM  8.5*   --    --   8.3*   --    MG  1.7   --    --   1.5*   --     < > = values in this interval not displayed.     Recent Labs   Lab  08/18/18   0303  08/18/18   1145  08/19/18   0318   WBC  10.19   --   9.84   HGB  7.1*  7.5*  7.7*   HCT  21.9*  23.4*  23.1*   PLT  244   --   264     Recent Labs   Lab  08/18/18   0303  08/19/18   0318   INR  1.1  1.0     Microbiology Results (last 7 days)     Procedure Component Value Units Date/Time    CSF culture [[id number]] Collected:  08/18/18 1354    Order Status:  Completed Specimen:  CSF (Spinal Fluid) from CSF Shunt Updated:  08/19/18 0801     CSF CULTURE No Growth to date     Gram Stain Result Rare WBC's      No organisms seen    Blood culture [[id number]] Collected:  08/10/18 2020    Order Status:  Completed Specimen:  Blood from Peripheral, Foot, Left Updated:  08/16/18 0612     Blood Culture, Routine No growth after 5 days.    Blood  culture [[id number]] Collected:  08/10/18 2040    Order Status:  Completed Specimen:  Blood from Peripheral, Antecubital, Left Updated:  08/16/18 0612     Blood Culture, Routine No growth after 5 days.    Urine culture [[id number]] Collected:  08/12/18 1526    Order Status:  Completed Specimen:  Urine, Catheterized Updated:  08/13/18 2118     Urine Culture, Routine No growth        Significant Diagnostics:  I have reviewed all pertinent imaging results/findings within the past 24 hours.

## 2018-08-19 NOTE — PROGRESS NOTES
[facility]  Neurocritical Care  Progress Note    Admit Date: 8/10/2018  Service Date: 08/19/2018  Length of Stay: 9    Subjective:     Chief Complaint: Subarachnoid hemorrhage from anterior communicating artery aneurysm    History of Present Illness: Per earlier notes:   32 y.o female, approximately 5-6 months gravid. C/o acute onset AMS that began prior to arrival in ED.  Patient's aunt reports calling the patient's phone at 11:30 am this morning and reports the patient's 6 year old son answering the phone stating that the patient was lying on the floor, not able to get up. EMS reports upon their arrival, the patient was found on the floor, faced down, with her head against a wall. EMS reports the patient to be hypertensive and incontinent.   Pt has been non-verbal since arrival. Per family patient began complaining of a headache yesterday. Last time normal per  was 0500 today while on his way to work. Patient's aunt reports the patient has not received any prenatal care for this pregnancy.  No other history could be obtained at this time.  History is limited secondary to acuity of the patient's condition.  So last seen normal by adult at 5am. eleanor reports pt has been taking some OTC meds for headache. Pt's  reports pt not taking any regular medications at home.     Hospital Course: 8/10: pt admitted to [facility] for SAH. CT, MRI, MRA ordered and results pending   8/11: post angio with coil acom, EVD at 10, wean prop to extubate, MRI once stable, Urine studies for hyponatremia, TCDs, check Mg q 4  8/12: Mg gtt stopped over night, fluid boluses, pain control  8/16: PBD 6: +620mL fluid overnight.  Elevated peak velocity on TCD, Angio in AM.   8/19: PBD 9: +547mL, Na 134, increase 2% to 45/hour, decrease NS to 110/hour. TCDs decreased yesterday from 8/17, monitor today. SBP < 180    Interval History: Morphine overnight for headache, minimally effective compared to oxycodone.     Review of  Systems   Constitutional: Negative for activity change and appetite change.   Respiratory: Negative for chest tightness.    Cardiovascular: Negative for chest pain.   Gastrointestinal: Negative for abdominal distention.   Genitourinary: Negative for difficulty urinating.       Objective:     Vitals:  Temp: 99 °F (37.2 °C)  Pulse: 80  Rhythm: normal sinus rhythm  BP: (!) 170/80  ICP Mean (mmHg): 7 mmHg  Resp: (!) 23  SpO2: 99 %  O2 Device (Oxygen Therapy): room air    Temp  Min: 98.5 °F (36.9 °C)  Max: 99.4 °F (37.4 °C)  Pulse  Min: 72  Max: 106  BP  Min: 170/80  Max: 181/87  ICP Mean (mmHg)  Min: 4 mmHg  Max: 14 mmHg  Resp  Min: 16  Max: 45  SpO2  Min: 97 %  Max: 100 %    08/18 0701 - 08/19 0700  In: 3998.2 [P.O.:250; I.V.:3748.2]  Out: 3451 [Urine:3200; Drains:251]   Unmeasured Output  Urine Occurrence: 1  Stool Occurrence: 0  Emesis Occurrence: 1  Pad Count: 1       Physical Exam   Constitutional: She appears well-developed and well-nourished.   Cardiovascular: Normal rate and intact distal pulses.   Pulmonary/Chest: Effort normal. No respiratory distress.   General: Comfortable, sitting in bed, eating breakfast, conversational  Neuro: A+Ox4, speech clear and coherent  CN: PERRL (3mm brisk), EOMI, no facial droop, eyebrows symmetric v1-3 intact.  Motor: 5/5 in extremities x 4  SILT  No pronator drift    .    Medications:  Continuous  sodium chloride 0.9% Last Rate: 125 mL/hr at 08/19/18 0905   niCARdipine    Sodium Chloride 2% Last Rate: 35 mL/hr at 08/19/18 0905   Scheduled  ceFAZolin (ANCEF) IVPB 1 g Q8H   famotidine 20 mg BID   ferrous sulfate 325 mg Daily   heparin (porcine) 5,000 Units Q8H   niMODipine 30 mg Q2H   prenatal vitamin 1 tablet Daily   senna-docusate 8.6-50 mg 1 tablet BID   sodium chloride 0.9% 10 mL Q6H   PRN  acetaminophen 650 mg Q6H PRN   calcium gluconate 1 g PRN   labetalol 10 mg Q4H PRN   magnesium oxide 800 mg PRN   magnesium oxide 800 mg PRN   midazolam 2 mg PRN   oxyCODONE 10 mg Q4H PRN    potassium chloride 10% 40 mEq PRN   potassium chloride 10% 40 mEq PRN   potassium chloride 10% 60 mEq PRN   potassium, sodium phosphates 2 packet PRN   potassium, sodium phosphates 2 packet PRN   potassium, sodium phosphates 2 packet PRN   sodium chloride 0.9% 10 mL PRN   sodium chloride 0.9% 5 mL PRN     Today I personally reviewed pertinent medications, lines/drains/airways, imaging, lab results, notably:    Diet  Diet Adult Regular (IDDSI Level 7)  Diet Adult Regular (IDDSI Level 7)        Assessment/Plan:     Neuro   * Subarachnoid hemorrhage from anterior communicating artery aneurysm    Neuro:  HH4F4 SAH  PBD 9 s/p coiling   EVD at 10 open: 251 output overnight, ICPs < 20.   Daily TCDs: Stable yesterday with 136 cm/sec with 2.9 Lindegaard in L MCA and R MCA with 139 cm/sec with 3.3 Lindegaard.   -Monitor TCDs today   -Consider blood transfusion and albumin admin if persistently elevated   -S/p angio with verapimil injection  Nimodipine 30q2   Keppra 500 BID  SBP < 200; notify if >180  Cefazolin 1g q8 for drain ppx    Fen/GI: normal diet  Maintain Euvolemia  Strict I's and O's: +547 last 24 hours, roughly even on admission  IV /hour  2% increase to 45/hour    Pulm:   CXR 8/13 wnl, lines confirmed.  Will attempt to minimize radiation for pregnancy  Monitor pulmonary status    CV: aneurysm secured, keep SBP < 200  Fluid bolus prn              Renal/   PKD (polycystic kidney disease)     predispose to aneurysm   BUN: 7; Cr 0.6  Will monitor        Oncology   Leukocytosis    Resolved, WBC 9.8 today                 Prophylaxis:  Venous Thromboembolism: mechanical  Stress Ulcer: None  Ventilator Pneumonia: not applicable     Activity Orders          None        Full Code    Valerio Croft MD  Neurocritical Care  [facility]

## 2018-08-20 PROBLEM — D72.829 LEUKOCYTOSIS: Status: RESOLVED | Noted: 2018-08-10 | Resolved: 2018-08-20

## 2018-08-20 LAB
ALBUMIN SERPL BCP-MCNC: 2.5 G/DL
ALP SERPL-CCNC: 72 U/L
ALT SERPL W/O P-5'-P-CCNC: 16 U/L
ANION GAP SERPL CALC-SCNC: 7 MMOL/L
AST SERPL-CCNC: 12 U/L
BASOPHILS # BLD AUTO: 0.01 K/UL
BASOPHILS NFR BLD: 0.1 %
BILIRUB SERPL-MCNC: 0.5 MG/DL
BUN SERPL-MCNC: 7 MG/DL
CALCIUM SERPL-MCNC: 8.2 MG/DL
CHLORIDE SERPL-SCNC: 107 MMOL/L
CO2 SERPL-SCNC: 21 MMOL/L
CREAT SERPL-MCNC: 0.6 MG/DL
DIFFERENTIAL METHOD: ABNORMAL
EOSINOPHIL # BLD AUTO: 0 K/UL
EOSINOPHIL NFR BLD: 0.2 %
ERYTHROCYTE [DISTWIDTH] IN BLOOD BY AUTOMATED COUNT: 15.3 %
EST. GFR  (AFRICAN AMERICAN): >60 ML/MIN/1.73 M^2
EST. GFR  (NON AFRICAN AMERICAN): >60 ML/MIN/1.73 M^2
GLUCOSE SERPL-MCNC: 89 MG/DL
HCT VFR BLD AUTO: 22.9 %
HGB BLD-MCNC: 7.5 G/DL
IMM GRANULOCYTES # BLD AUTO: 0.21 K/UL
IMM GRANULOCYTES NFR BLD AUTO: 1.9 %
INR PPP: 1.1
LYMPHOCYTES # BLD AUTO: 1.5 K/UL
LYMPHOCYTES NFR BLD: 13.3 %
MAGNESIUM SERPL-MCNC: 1.5 MG/DL
MCH RBC QN AUTO: 29.1 PG
MCHC RBC AUTO-ENTMCNC: 32.8 G/DL
MCV RBC AUTO: 89 FL
MONOCYTES # BLD AUTO: 1.1 K/UL
MONOCYTES NFR BLD: 10.1 %
NEUTROPHILS # BLD AUTO: 8.3 K/UL
NEUTROPHILS NFR BLD: 74.4 %
NRBC BLD-RTO: 0 /100 WBC
PHOSPHATE SERPL-MCNC: 2.7 MG/DL
PLATELET # BLD AUTO: 228 K/UL
PMV BLD AUTO: 10.2 FL
POCT GLUCOSE: 105 MG/DL (ref 70–110)
POCT GLUCOSE: 84 MG/DL (ref 70–110)
POTASSIUM SERPL-SCNC: 3.8 MMOL/L
PROT SERPL-MCNC: 5.9 G/DL
PROTHROMBIN TIME: 10.9 SEC
RBC # BLD AUTO: 2.58 M/UL
SODIUM SERPL-SCNC: 134 MMOL/L
SODIUM SERPL-SCNC: 135 MMOL/L
WBC # BLD AUTO: 11.09 K/UL

## 2018-08-20 PROCEDURE — 85610 PROTHROMBIN TIME: CPT

## 2018-08-20 PROCEDURE — 63600175 PHARM REV CODE 636 W HCPCS: Performed by: NURSE PRACTITIONER

## 2018-08-20 PROCEDURE — 20000000 HC ICU ROOM

## 2018-08-20 PROCEDURE — 99291 CRITICAL CARE FIRST HOUR: CPT | Mod: ,,, | Performed by: PSYCHIATRY & NEUROLOGY

## 2018-08-20 PROCEDURE — 94761 N-INVAS EAR/PLS OXIMETRY MLT: CPT

## 2018-08-20 PROCEDURE — 99233 SBSQ HOSP IP/OBS HIGH 50: CPT | Mod: ,,, | Performed by: NURSE PRACTITIONER

## 2018-08-20 PROCEDURE — 83735 ASSAY OF MAGNESIUM: CPT

## 2018-08-20 PROCEDURE — 63600175 PHARM REV CODE 636 W HCPCS: Performed by: STUDENT IN AN ORGANIZED HEALTH CARE EDUCATION/TRAINING PROGRAM

## 2018-08-20 PROCEDURE — 99232 SBSQ HOSP IP/OBS MODERATE 35: CPT | Mod: ,,, | Performed by: NEUROLOGICAL SURGERY

## 2018-08-20 PROCEDURE — 80053 COMPREHEN METABOLIC PANEL: CPT

## 2018-08-20 PROCEDURE — 63600175 PHARM REV CODE 636 W HCPCS: Performed by: PHYSICIAN ASSISTANT

## 2018-08-20 PROCEDURE — 25000003 PHARM REV CODE 250: Performed by: NURSE PRACTITIONER

## 2018-08-20 PROCEDURE — 25000003 PHARM REV CODE 250: Performed by: STUDENT IN AN ORGANIZED HEALTH CARE EDUCATION/TRAINING PROGRAM

## 2018-08-20 PROCEDURE — 25000003 PHARM REV CODE 250: Performed by: PHYSICIAN ASSISTANT

## 2018-08-20 PROCEDURE — 25000003 PHARM REV CODE 250: Performed by: ANESTHESIOLOGY

## 2018-08-20 PROCEDURE — 85025 COMPLETE CBC W/AUTO DIFF WBC: CPT

## 2018-08-20 PROCEDURE — A4216 STERILE WATER/SALINE, 10 ML: HCPCS | Performed by: PSYCHIATRY & NEUROLOGY

## 2018-08-20 PROCEDURE — A4217 STERILE WATER/SALINE, 500 ML: HCPCS | Performed by: NURSE PRACTITIONER

## 2018-08-20 PROCEDURE — 84100 ASSAY OF PHOSPHORUS: CPT

## 2018-08-20 PROCEDURE — 25000003 PHARM REV CODE 250: Performed by: PSYCHIATRY & NEUROLOGY

## 2018-08-20 PROCEDURE — 84295 ASSAY OF SERUM SODIUM: CPT | Mod: 91

## 2018-08-20 RX ORDER — POLYETHYLENE GLYCOL 3350 17 G/17G
17 POWDER, FOR SOLUTION ORAL DAILY
Status: DISCONTINUED | OUTPATIENT
Start: 2018-08-20 | End: 2018-08-31 | Stop reason: HOSPADM

## 2018-08-20 RX ADMIN — NIMODIPINE 30 MG: 30 CAPSULE, LIQUID FILLED ORAL at 05:08

## 2018-08-20 RX ADMIN — POLYETHYLENE GLYCOL 3350 17 G: 17 POWDER, FOR SOLUTION ORAL at 12:08

## 2018-08-20 RX ADMIN — OXYCODONE HYDROCHLORIDE 10 MG: 5 TABLET ORAL at 05:08

## 2018-08-20 RX ADMIN — FAMOTIDINE 20 MG: 20 TABLET ORAL at 08:08

## 2018-08-20 RX ADMIN — FERROUS SULFATE TAB EC 325 MG (65 MG FE EQUIVALENT) 325 MG: 325 (65 FE) TABLET DELAYED RESPONSE at 08:08

## 2018-08-20 RX ADMIN — MAGNESIUM OXIDE TAB 400 MG (241.3 MG ELEMENTAL MG) 800 MG: 400 (241.3 MG) TAB at 05:08

## 2018-08-20 RX ADMIN — CEFAZOLIN 1 G: 1 INJECTION, POWDER, FOR SOLUTION INTRAMUSCULAR; INTRAVENOUS at 05:08

## 2018-08-20 RX ADMIN — PRENATAL VIT W/ FE FUMARATE-FA TAB 27-0.8 MG 1 TABLET: 27-0.8 TAB at 08:08

## 2018-08-20 RX ADMIN — NIMODIPINE 30 MG: 30 CAPSULE, LIQUID FILLED ORAL at 03:08

## 2018-08-20 RX ADMIN — HEPARIN SODIUM 5000 UNITS: 5000 INJECTION, SOLUTION INTRAVENOUS; SUBCUTANEOUS at 05:08

## 2018-08-20 RX ADMIN — NIMODIPINE 30 MG: 30 CAPSULE, LIQUID FILLED ORAL at 09:08

## 2018-08-20 RX ADMIN — HEPARIN SODIUM 5000 UNITS: 5000 INJECTION, SOLUTION INTRAVENOUS; SUBCUTANEOUS at 01:08

## 2018-08-20 RX ADMIN — Medication 0.5 MCG/KG/MIN: at 12:08

## 2018-08-20 RX ADMIN — HEPARIN SODIUM 5000 UNITS: 5000 INJECTION, SOLUTION INTRAVENOUS; SUBCUTANEOUS at 09:08

## 2018-08-20 RX ADMIN — Medication 10 ML: at 05:08

## 2018-08-20 RX ADMIN — NIMODIPINE 30 MG: 30 CAPSULE, LIQUID FILLED ORAL at 12:08

## 2018-08-20 RX ADMIN — CEFAZOLIN 1 G: 1 INJECTION, POWDER, FOR SOLUTION INTRAMUSCULAR; INTRAVENOUS at 03:08

## 2018-08-20 RX ADMIN — NIMODIPINE 30 MG: 30 CAPSULE, LIQUID FILLED ORAL at 01:08

## 2018-08-20 RX ADMIN — SODIUM CHLORIDE: 234 INJECTION INTRAMUSCULAR; INTRAVENOUS; SUBCUTANEOUS at 07:08

## 2018-08-20 RX ADMIN — SENNOSIDES AND DOCUSATE SODIUM 1 TABLET: 8.6; 5 TABLET ORAL at 08:08

## 2018-08-20 RX ADMIN — OXYCODONE HYDROCHLORIDE 10 MG: 5 TABLET ORAL at 08:08

## 2018-08-20 RX ADMIN — Medication 3 MCG/KG/MIN: at 11:08

## 2018-08-20 RX ADMIN — CEFAZOLIN 1 G: 1 INJECTION, POWDER, FOR SOLUTION INTRAMUSCULAR; INTRAVENOUS at 09:08

## 2018-08-20 RX ADMIN — NIMODIPINE 30 MG: 30 CAPSULE, LIQUID FILLED ORAL at 08:08

## 2018-08-20 RX ADMIN — Medication 3.5 MCG/KG/MIN: at 08:08

## 2018-08-20 RX ADMIN — POTASSIUM & SODIUM PHOSPHATES POWDER PACK 280-160-250 MG 2 PACKET: 280-160-250 PACK at 09:08

## 2018-08-20 RX ADMIN — POTASSIUM & SODIUM PHOSPHATES POWDER PACK 280-160-250 MG 2 PACKET: 280-160-250 PACK at 05:08

## 2018-08-20 RX ADMIN — MAGNESIUM OXIDE TAB 400 MG (241.3 MG ELEMENTAL MG) 800 MG: 400 (241.3 MG) TAB at 09:08

## 2018-08-20 RX ADMIN — OXYCODONE HYDROCHLORIDE 10 MG: 5 TABLET ORAL at 01:08

## 2018-08-20 RX ADMIN — Medication 10 ML: at 12:08

## 2018-08-20 RX ADMIN — SODIUM CHLORIDE: 234 INJECTION INTRAMUSCULAR; INTRAVENOUS; SUBCUTANEOUS at 11:08

## 2018-08-20 RX ADMIN — NIMODIPINE 30 MG: 30 CAPSULE, LIQUID FILLED ORAL at 04:08

## 2018-08-20 RX ADMIN — Medication 2.5 MCG/KG/MIN: at 05:08

## 2018-08-20 NOTE — ASSESSMENT & PLAN NOTE
32 y.o. female 24 weeks pregnant with history of polycystic kidney disease with HH4F4 SAH on 8/10, now s/p acom coiling 8/10.     NEURO  -Continue NCC  -q 1 hr neuro checks  -evd open to 10 and draining, monitor icps   -CSF profile reviewed from 8/19   -will start to wean in a couple days  -nimotop  -daily TCDs   -TCDs elevated; exam stable     -Keppra      CV  -permissive HTN  <220 as aneurysm secured    RESP  -stable on room air  -Aggressive pulmonary management  -IS @ bedside    FEN/GI  -On 50cc/h 2%  -strict I/Os  -goal net even or positive  -Reg diet    ID  -vanc for drain ppx    PPX  -SQH  -famotidine for PUD ppx  -TEDs/SCDs    -further mgmt per ncc and obgyn.

## 2018-08-20 NOTE — ASSESSMENT & PLAN NOTE
Neuro:   1. HH4F4 SAH.  PBD 10 s/p coiling (PCD 9).    Acute risk for vasospasm with elevated velocities noted on TCDs today and yesterday.  IR team on call made aware; given that no acute examination change, we will continue with Q1 hour neurochecks and consider angiography should condition worsen.   EVD at 10 open: 239 output last 24, ICPs < 9-12.   Daily TCDs: Has demonstrated elevated velocities   -s/p blood transfusion last night and increase in fluid administration   -s/p angio (8/16) with verapimil injections  Nimodipine 30q2   Finished 7D Keppra 500 BID course  -Will start Phenylephrine today to keep BP between 180-220.  EVD ppx with Cefazolin 1g q8     2. Hyponatremia: Na 135 today; was 132 yesterday   -Increased 2% Na to 100mL/hour   -D/c .9 % NS    -Continue serial BMPs    3. Acute pain: Oxycodone 10mg q 4 hours, Versed 2g PRN    Fen/GI: normal diet; encourage PO intake   -Maintain Euvolemia  Strict I's and O's: +1.3L last 24 hours  2% increase to 100/hour  Increase BR: Miralax today for no BM 4 days.    Pulm:   CXR 8/13 wnl, lines confirmed.  Will attempt to minimize radiation for pregnancy  Monitor pulmonary status, on RA.     CV: aneurysm secured, keep -220  -Has been off of Cardene  -Phenylephrine drip   -Fluid bolus prn

## 2018-08-20 NOTE — SUBJECTIVE & OBJECTIVE
Neurologic Chief Complaint: SAH    Subjective:     Interval History: Patient is seen for follow-up neurological assessment and treatment recommendations:  Continues with HA worse with positional movement.  No neurological deficits.  TCDs for today pending.  Yesterday demonstrated multiple areas of mild and moderate vasospasm with no clinical deterioration.  NIH remains 0.      HPI, Past Medical, Family, and Social History remains the same as documented in the initial encounter. Just c/o headache     Review of Systems   Constitutional: Negative for fever.   HENT: Negative for trouble swallowing.    Eyes: Negative for photophobia.   Musculoskeletal: Positive for neck stiffness.   Neurological: Positive for headaches. Negative for speech difficulty and weakness.     Scheduled Meds:   ceFAZolin (ANCEF) IVPB  1 g Intravenous Q8H    famotidine  20 mg Oral BID    ferrous sulfate  325 mg Oral Daily    heparin (porcine)  5,000 Units Subcutaneous Q8H    niMODipine  30 mg Oral Q2H    polyethylene glycol  17 g Oral Daily    prenatal vitamin  1 tablet Oral Daily    senna-docusate 8.6-50 mg  1 tablet Oral BID    sodium chloride 0.9%  10 mL Intravenous Q6H     Continuous Infusions:   phenylephrine 1.5 mcg/kg/min (08/20/18 1405)    Sodium Chloride 2% 100 mL/hr at 08/20/18 1405     PRN Meds:acetaminophen, calcium gluconate, labetalol, magnesium oxide, magnesium oxide, midazolam, oxyCODONE, potassium chloride 10%, potassium chloride 10%, potassium chloride 10%, potassium, sodium phosphates, potassium, sodium phosphates, potassium, sodium phosphates, Flushing PICC Protocol **AND** sodium chloride 0.9% **AND** sodium chloride 0.9%, sodium chloride 0.9%    Objective:     Vital Signs (Most Recent):  Temp: 99.1 °F (37.3 °C) (08/20/18 1105)  Pulse: 73 (08/20/18 1405)  Resp: (!) 23 (08/20/18 1405)  BP: (!) 160/87 (08/20/18 1405)  SpO2: 100 % (08/20/18 1405)  BP Location: Left arm    Vital Signs Range (Last 24H):  Temp:  [98 °F  (36.7 °C)-99.2 °F (37.3 °C)]   Pulse:  [65-99]   Resp:  [17-36]   BP: (148-172)/()   SpO2:  [97 %-100 %]   Arterial Line BP: (113-182)/()   BP Location: Left arm    Physical Exam   Constitutional: She appears well-developed and well-nourished.   Pregnant   Skin: Skin is warm and dry.   Psychiatric: She has a normal mood and affect. Her behavior is normal.   Nursing note and vitals reviewed.      Neurological Exam:   LOC: alert  Attention Span: Good   Language: No aphasia  Articulation: No dysarthria  Orientation: Person, Place, Time   Visual Fields: Full  EOM (CN III, IV, VI): Full/intact  Pupils (CN II, III): PERRL  Facial Sensation (CN V): Normal  Facial Movement (CN VII): Symmetric facial expression    Motor: Arm left  Normal 5/5  Leg left  Normal 5/5  Arm right  Normal 5/5  Leg right Normal 5/5  Sensation: Intact to light touch, temperature and vibration  Tone: Normal tone throughout    Laboratory:  CMP:   Recent Labs   Lab  08/20/18   0312  08/20/18   1334   CALCIUM  8.2*   --    ALBUMIN  2.5*   --    PROT  5.9*   --    NA  135*  135*  134*   K  3.8   --    CO2  21*   --    CL  107   --    BUN  7   --    CREATININE  0.6   --    ALKPHOS  72   --    ALT  16   --    AST  12   --    BILITOT  0.5   --      CBC:   Recent Labs   Lab  08/20/18 0312   WBC  11.09   RBC  2.58*   HGB  7.5*   HCT  22.9*   PLT  228   MCV  89   MCH  29.1   MCHC  32.8     Lipid Panel: No results for input(s): CHOL, LDLCALC, HDL, TRIG in the last 168 hours.  Coagulation:   Recent Labs   Lab  08/20/18 0312   INR  1.1     Platelet Aggregation Study: No results for input(s): PLTAGG, PLTAGINTERP, PLTAGREGLACO, ADPPLTAGGREG in the last 168 hours.  Hgb A1C: No results for input(s): HGBA1C in the last 168 hours.  TSH: No results for input(s): TSH in the last 168 hours.    Diagnostic Results     Brain Imaging   CT Head w/o contrast 8-10-18 results:    1. Extensive subarachnoid hemorrhage, most likely from an aneurysm rupture.  It is  difficult to determine the site of the aneurysm rupture on this study due to the diffuse nature of the subarachnoid hemorrhage throughout the posterior fossa as well as the basilar cisternal spaces.  2. Slight increase in the ventricular size when compared to the previous study suggestive of a developing hydrocephalus.    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  Successful coil embolization of this aneurysm.     Cardiac Imaging   2D Echo 8-11-18 results:    1 - Eccentric hypertrophy.     2 - Normal left ventricular systolic function (EF 60-65%).     3 - No wall motion abnormalities.     4 - Normal left ventricular diastolic function.     5 - Normal right ventricular systolic function .     6 - The estimated PA systolic pressure is 31 mmHg.     7 - Trivial pericardial effusion.

## 2018-08-20 NOTE — PT/OT/SLP PROGRESS
Physical Therapy      Patient Name:  Sharon Grande   MRN:  [id number]    Patient not seen today secondary to MD hold- monitoring TCDs 2/2 risk for vasospasms. Will follow up when pt is appropriate for increased activity per medical team.     Sonja Narvaez, PT, DPT   8/20/2018

## 2018-08-20 NOTE — PROGRESS NOTES
[facility]  Neurocritical Care  Progress Note    Admit Date: 8/10/2018  Service Date: 08/20/2018  Length of Stay: 10    Subjective:     Chief Complaint: Subarachnoid hemorrhage from anterior communicating artery aneurysm    History of Present Illness: Per earlier notes:   32 y.o female, approximately 5-6 months gravid. C/o acute onset AMS that began prior to arrival in ED.  Patient's aunt reports calling the patient's phone at 11:30 am this morning and reports the patient's 6 year old son answering the phone stating that the patient was lying on the floor, not able to get up. EMS reports upon their arrival, the patient was found on the floor, faced down, with her head against a wall. EMS reports the patient to be hypertensive and incontinent.   Pt has been non-verbal since arrival. Per family patient began complaining of a headache yesterday. Last time normal per  was 0500 today while on his way to work. Patient's aunt reports the patient has not received any prenatal care for this pregnancy.  No other history could be obtained at this time.  History is limited secondary to acuity of the patient's condition.  So last seen normal by adult at 5am. eleanor reports pt has been taking some OTC meds for headache. Pt's  reports pt not taking any regular medications at home.     Hospital Course: 8/10: pt admitted to [facility] for SAH. CT, MRI, MRA ordered and results pending   8/11: post angio with coil acom, EVD at 10, wean prop to extubate, MRI once stable, Urine studies for hyponatremia, TCDs, check Mg q 4  8/12: Mg gtt stopped over night, fluid boluses, pain control  8/16: PBD 6: +620mL fluid overnight.  Elevated peak velocity on TCD, Angio in AM.   8/19: PBD 9: +547mL, Na 134, increase 2% to 45/hour, decrease NS to 110/hour. TCDs decreased yesterday from 8/17, monitor today. SBP < 180  8/20: PBD 10: +1.3L, transfused 1 unit pRBC last night, Na 135 put on 2% 100mL/hour this morning. Monitor  TCDs. Moderate neck pain; No BM for 4 days.    Interval History:      Review of Systems   Constitutional: Negative for activity change.   HENT: Negative for congestion.    Eyes: Negative for pain.   Respiratory: Negative for shortness of breath.    Cardiovascular: Negative for chest pain.   Gastrointestinal: Negative for abdominal distention.   Genitourinary: Negative for difficulty urinating.   Neurological: Negative for seizures, syncope, speech difficulty, weakness and numbness.       Objective:     Vitals:  Temp: 99.2 °F (37.3 °C)  Pulse: 83  Rhythm: normal sinus rhythm  BP: (!) 161/87  MAP (mmHg): 117  ICP Mean (mmHg): 10 mmHg  Resp: (!) 24  SpO2: 97 %  O2 Device (Oxygen Therapy): room air    Temp  Min: 98 °F (36.7 °C)  Max: 99.2 °F (37.3 °C)  Pulse  Min: 65  Max: 99  BP  Min: 148/82  Max: 161/87  MAP (mmHg)  Min: 117  Max: 117  ICP Mean (mmHg)  Min: 5 mmHg  Max: 10 mmHg  Resp  Min: 17  Max: 36  SpO2  Min: 97 %  Max: 100 %    08/19 0701 - 08/20 0700  In: 4720.3 [P.O.:300; I.V.:4070.3]  Out: 3389 [Urine:3150; Drains:239]   Unmeasured Output  Urine Occurrence: 1  Stool Occurrence: 0  Emesis Occurrence: 1  Pad Count: 1       Physical Exam   Constitutional: She appears well-developed and well-nourished.   Cardiovascular: Normal rate and intact distal pulses.   Pulmonary/Chest: Effort normal. No respiratory distress.   General: comfortable, sleeping in bed, NAD  Neuro: A+Ox3, conversational, speech clear and coherent  CN: PERRL, EOMI, no facial droop, V1-3 intact, shrugs shoulders even  Motor: 5/5 in all extremities  SILT  Negative for pronator drift.       Medications:  Continuous  phenylephrine Last Rate: 0.5 mcg/kg/min (08/20/18 1215)   Sodium Chloride 2% Last Rate: 100 mL/hr at 08/20/18 1017   Scheduled  ceFAZolin (ANCEF) IVPB 1 g Q8H   famotidine 20 mg BID   ferrous sulfate 325 mg Daily   heparin (porcine) 5,000 Units Q8H   niMODipine 30 mg Q2H   polyethylene glycol 17 g Daily   prenatal vitamin 1 tablet Daily    senna-docusate 8.6-50 mg 1 tablet BID   sodium chloride 0.9% 10 mL Q6H   PRN  acetaminophen 650 mg Q6H PRN   calcium gluconate 1 g PRN   labetalol 10 mg Q4H PRN   magnesium oxide 800 mg PRN   magnesium oxide 800 mg PRN   midazolam 2 mg PRN   oxyCODONE 10 mg Q4H PRN   potassium chloride 10% 40 mEq PRN   potassium chloride 10% 40 mEq PRN   potassium chloride 10% 60 mEq PRN   potassium, sodium phosphates 2 packet PRN   potassium, sodium phosphates 2 packet PRN   potassium, sodium phosphates 2 packet PRN   sodium chloride 0.9% 10 mL PRN   sodium chloride 0.9% 5 mL PRN     Today I personally reviewed pertinent medications, lines/drains/airways, imaging, lab results, notably:    Diet  Diet Adult Regular (IDDSI Level 7)  Diet Adult Regular (IDDSI Level 7)        Assessment/Plan:     Neuro   * Subarachnoid 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 HH4F4 SAH.  PBD 10 s/p coiling (PCD 9).    Acute risk for vasospasm with elevated velocities noted on TCDs today and yesterday.  IR team on call made aware; given that no acute examination change, we will continue with Q1 hour neurochecks and consider angiography should condition worsen.   EVD at 10 open: 239 output last 24, ICPs < 9-12.   Daily TCDs: Has demonstrated elevated velocities   -s/p blood transfusion last night and increase in fluid administration   -s/p angio (8/16) with verapimil injections  Nimodipine 30q2   Finished 7D Keppra 500 BID course  -Will start Phenylephrine today to keep BP between 180-220.  EVD ppx with Cefazolin 1g q8     2. Hyponatremia: Na 135 today; was 132 yesterday   -Increased 2% Na to 100mL/hour   -D/c .9 % NS    -Continue serial BMPs    3. Acute pain: Oxycodone 10mg q 4 hours, Versed 2g PRN    Fen/GI: normal diet; encourage PO intake   -Maintain Euvolemia  Strict I's and O's: +1.3L last 24 hours  2% increase to 100/hour  Increase BR: Miralax today for no BM 4 days.    Pulm:   CXR 8/13 wnl, lines confirmed.  Will  attempt to minimize radiation for pregnancy  Monitor pulmonary status, on RA.     CV: aneurysm secured, keep -220  -Has been off of Cardene  -Phenylephrine drip   -Fluid bolus prn               Brain compression    EVD  Goal Na normal: 135 today  Continue daily BMPs.   Neuro checks          Renal/   PKD (polycystic kidney disease)    BUN: 7; Cr 0.6; no active issues or meds per condition  Will monitor        Obstetric   Hypertension affecting pregnancy in second trimester    obgyn consulted: Has noted OK to allow increased blood pressure parameters during acute subarachnoid window.  Mg discontinued  Cont to monitor              Prophylaxis:  Venous Thromboembolism: mechanical  Stress Ulcer: None  Ventilator Pneumonia: not applicable     Activity Orders          None        Full Code    Valerio Croft MD  Neurocritical Care  [facility]

## 2018-08-20 NOTE — PROGRESS NOTES
[facility]  Neurosurgery  Progress Note    Subjective:     History of Present Illness: 32 y.o. female 24 weeks pregnant with a history of polycystic kidney disease who presents as transfer from [facility] for SAH. Patient found down by 6 yr old son. Last known normal 0500 today. CT at [facility] revealed SAH within the basal cisterns. US at [facility] with + fetal heart tones. Transferred for neuro evaluation.     Post-Op Info:  Procedure(s) (LRB):  Mri (magnetic resonance imaging) (N/A)   10 Days Post-Op     Interval History: NAEON.     Medications:  Continuous Infusions:   phenylephrine      Sodium Chloride 2% 100 mL/hr at 08/20/18 1017     Scheduled Meds:   ceFAZolin (ANCEF) IVPB  1 g Intravenous Q8H    famotidine  20 mg Oral BID    ferrous sulfate  325 mg Oral Daily    heparin (porcine)  5,000 Units Subcutaneous Q8H    niMODipine  30 mg Oral Q2H    polyethylene glycol  17 g Oral Daily    prenatal vitamin  1 tablet Oral Daily    senna-docusate 8.6-50 mg  1 tablet Oral BID    sodium chloride 0.9%  10 mL Intravenous Q6H     PRN Meds:acetaminophen, calcium gluconate, labetalol, magnesium oxide, magnesium oxide, midazolam, oxyCODONE, potassium chloride 10%, potassium chloride 10%, potassium chloride 10%, potassium, sodium phosphates, potassium, sodium phosphates, potassium, sodium phosphates, Flushing PICC Protocol **AND** sodium chloride 0.9% **AND** sodium chloride 0.9%, sodium chloride 0.9%     Review of Systems    Objective:     Weight: 77 kg (169 lb 12.1 oz)  Body mass index is 25.07 kg/m².  Vital Signs (Most Recent):  Temp: 99.2 °F (37.3 °C) (08/20/18 0705)  Pulse: 83 (08/20/18 0905)  Resp: (!) 24 (08/20/18 0905)  BP: (!) 161/87 (08/20/18 0905)  SpO2: 97 % (08/20/18 0905) Vital Signs (24h Range):  Temp:  [98 °F (36.7 °C)-99.2 °F (37.3 °C)] 99.2 °F (37.3 °C)  Pulse:  [65-99] 83  Resp:  [17-36] 24  SpO2:  [97 %-100 %] 97 %  BP: (148-161)/(82-87) 161/87  Arterial Line BP: (114-182)/() 132/95      Date 08/20/18 0700 - 08/21/18 0659   Shift 2101-7101 6404-3779 0646-4423 24 Hour Total   INTAKE   I.V.(mL/kg) 364.6(4.7)   364.6(4.7)   Shift Total(mL/kg) 364.6(4.7)   364.6(4.7)   OUTPUT   Urine(mL/kg/hr) 350   350   Drains 35   35   Shift Total(mL/kg) 385(5)   385(5)   Weight (kg) 77 77 77 77                    ICP/Ventriculostomy 08/11/18 0000 Ventricular drainage catheter with ICP microsensor Right Other (Comment) (Active)   Level of Ventriculostomy (cm above) 10 8/17/2018  7:05 AM   Status Open to drainage 8/17/2018  7:05 AM   Site Assessment Dry;Clean 8/17/2018  7:05 AM   Site Drainage No drainage 8/17/2018  7:05 AM   Waveform normal waveform 8/17/2018  3:00 AM   Output (mL) 14 mL 8/17/2018  7:00 AM   CSF Color pink 8/17/2018  7:05 AM   Dressing Status Biopatch in place;Clean;Dry;Intact 8/17/2018  7:05 AM   Interventions bed controls locked;HOB degrees;zeroed 8/17/2018  7:05 AM       Neurosurgery Physical Exam    AOX3  speech fluent  EVD in place   PERRL, EOMI, face symm, tongue midline  HOBSON symm  No drift appreciated on exam this AM        Significant Labs:  Recent Labs   Lab  08/19/18 0318 08/19/18   1200  08/19/18   1943  08/20/18   0312   GLU  92   --    --   89   NA  134*  134*  132*  134*  135*  135*   K  3.7   --    --   3.8   CL  107   --    --   107   CO2  20*   --    --   21*   BUN  7   --    --   7   CREATININE  0.6   --    --   0.6   CALCIUM  8.3*   --    --   8.2*   MG  1.5*   --    --   1.5*     Recent Labs   Lab  08/19/18   0318  08/19/18   1401  08/20/18   0312   WBC  9.84   --   11.09   HGB  7.7*  7.4*  7.5*   HCT  23.1*  22.6*  22.9*   PLT  264   --   228     Recent Labs   Lab  08/19/18   0318  08/20/18   0312   INR  1.0  1.1     Microbiology Results (last 7 days)     Procedure Component Value Units Date/Time    CSF culture [[id number]] Collected:  08/18/18 1354    Order Status:  Completed Specimen:  CSF (Spinal Fluid) from CSF Shunt Updated:  08/20/18 0752     CSF CULTURE No Growth  to date     Gram Stain Result Rare WBC's      No organisms seen    Blood culture [[id number]] Collected:  08/10/18 2020    Order Status:  Completed Specimen:  Blood from Peripheral, Foot, Left Updated:  08/16/18 0612     Blood Culture, Routine No growth after 5 days.    Blood culture [[id number]] Collected:  08/10/18 2040    Order Status:  Completed Specimen:  Blood from Peripheral, Antecubital, Left Updated:  08/16/18 0612     Blood Culture, Routine No growth after 5 days.    Urine culture [[id number]] Collected:  08/12/18 1526    Order Status:  Completed Specimen:  Urine, Catheterized Updated:  08/13/18 2118     Urine Culture, Routine No growth        Significant Diagnostics:  I have reviewed all pertinent imaging results/findings within the past 24 hours.    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 female 24 weeks pregnant with history of polycystic kidney disease with HH4F4 SAH on 8/10, now s/p acom coiling 8/10.     NEURO  -Continue NCC  -q 1 hr neuro checks  -evd open to 10 and draining, monitor icps   -CSF profile reviewed from 8/19   -will start to wean in a couple days  -nimotop  -daily TCDs   -TCDs elevated; exam stable     -Keppra      CV  -permissive HTN  <220 as aneurysm secured    RESP  -stable on room air  -Aggressive pulmonary management  -IS @ bedside    FEN/GI  -On 50cc/h 2%  -strict I/Os  -goal net even or positive  -Reg diet    ID  -vanc for drain ppx    PPX  -SQH  -famotidine for PUD ppx  -TEDs/SCDs    -further mgmt per ncc and obgyn.            Volodymyr Claudio MD  Neurosurgery  [facility]

## 2018-08-20 NOTE — PLAN OF CARE
Problem: Patient Care Overview  Goal: Plan of Care Review  Outcome: Ongoing (interventions implemented as appropriate)  POC reviewed with pt and spouse at 1400. Pt and spouse verbalized understanding. Questions and concerns addressed. No acute events today. Started patient on Burton drip trying to keep patient BP between 180-220. Pt is able to tolerate Pt progressing toward goals. Will continue to monitor. See flowsheets for full assessment and VS info.

## 2018-08-20 NOTE — PHYSICIAN QUERY
"PT Name: Sharon Grande  MR #: [id number]    Physician Query Form - Hematology Clarification      CDS/: ROSITA Galindo,[email or web address]          Contact information:[email or web address]    This form is a permanent document in the medical record.      Query Date: August 20, 2018    By submitting this query, we are merely seeking further clarification of documentation. Please utilize your independent clinical judgment when addressing the question(s) below.    The Medical record contains the following:   Indicators  Supporting Clinical Findings Location in Medical Record   X "Anemia" documented Anemia sec to loss and dilution Critical Care Progress note 8/17@1012am   X H & H = Hgb=7.1-10.7  Hct=22.1-32.9 LAB 8/10-8/20    BP =                     HR=      "GI bleeding" documented     X Acute bleeding (Non GI site) Subarachnoid hemorrhage from anterior communicating artery aneurysm Critical Care Progress note 8/17@1012am   X Transfusion(s) Prepare RBC 1 Unit  Value: Transfused Orders 8/19    Treatment:      Other:        Provider, please specify diagnosis or diagnoses associated with above clinical findings.    [  x] Acute blood loss anemia  [  ] Other Hematological Diagnosis (please specify): _________________________________  [  ] Clinically Undetermined       Please document in your progress notes daily for the duration of treatment, until resolved, and include in your discharge summary.                                                                                                      "

## 2018-08-20 NOTE — ASSESSMENT & PLAN NOTE
Patient is a 32 year old female with medical history of HTN, Renal disorder, polycystic kidney disease who was transferred from [facility] with diffuse SAH.  Imaging identified an anterior communicating aneurysm which was successfully coiled 8/10.  On 8/16, patient developed vasospasm and was taken to IR and treated with verapamil.  She continues with mild and moderate vasospasm, but has no clinical deficits.  She does report significant headache and stiff neck.  She is receiving nimodipine and daily TCDs with close monitoring.    Antithrombotics for secondary stroke prevention: none SAH     Statins for secondary stroke prevention and hyperlipidemia, if present: SAH     Aggressive risk factor modification: polycytsic kidney disease, HTN      Rehab efforts: Occupational Therapy, PT/OT/SLP to evaluate and treat when appropriate     Diagnostics ordered/pending: Daily TCDs    VTE prophylaxis: SCDs    BP parameters: SAH: Secured aneurysm, no target, increase BP to prevent vasospasm if needed

## 2018-08-20 NOTE — SUBJECTIVE & OBJECTIVE
Interval History: NAEON.     Medications:  Continuous Infusions:   phenylephrine      Sodium Chloride 2% 100 mL/hr at 08/20/18 1017     Scheduled Meds:   ceFAZolin (ANCEF) IVPB  1 g Intravenous Q8H    famotidine  20 mg Oral BID    ferrous sulfate  325 mg Oral Daily    heparin (porcine)  5,000 Units Subcutaneous Q8H    niMODipine  30 mg Oral Q2H    polyethylene glycol  17 g Oral Daily    prenatal vitamin  1 tablet Oral Daily    senna-docusate 8.6-50 mg  1 tablet Oral BID    sodium chloride 0.9%  10 mL Intravenous Q6H     PRN Meds:acetaminophen, calcium gluconate, labetalol, magnesium oxide, magnesium oxide, midazolam, oxyCODONE, potassium chloride 10%, potassium chloride 10%, potassium chloride 10%, potassium, sodium phosphates, potassium, sodium phosphates, potassium, sodium phosphates, Flushing PICC Protocol **AND** sodium chloride 0.9% **AND** sodium chloride 0.9%, sodium chloride 0.9%     Review of Systems    Objective:     Weight: 77 kg (169 lb 12.1 oz)  Body mass index is 25.07 kg/m².  Vital Signs (Most Recent):  Temp: 99.2 °F (37.3 °C) (08/20/18 0705)  Pulse: 83 (08/20/18 0905)  Resp: (!) 24 (08/20/18 0905)  BP: (!) 161/87 (08/20/18 0905)  SpO2: 97 % (08/20/18 0905) Vital Signs (24h Range):  Temp:  [98 °F (36.7 °C)-99.2 °F (37.3 °C)] 99.2 °F (37.3 °C)  Pulse:  [65-99] 83  Resp:  [17-36] 24  SpO2:  [97 %-100 %] 97 %  BP: (148-161)/(82-87) 161/87  Arterial Line BP: (114-182)/() 132/95     Date 08/20/18 0700 - 08/21/18 0659   Shift 1556-0460 1630-2024 9749-0734 24 Hour Total   INTAKE   I.V.(mL/kg) 364.6(4.7)   364.6(4.7)   Shift Total(mL/kg) 364.6(4.7)   364.6(4.7)   OUTPUT   Urine(mL/kg/hr) 350   350   Drains 35   35   Shift Total(mL/kg) 385(5)   385(5)   Weight (kg) 77 77 77 77                    ICP/Ventriculostomy 08/11/18 0000 Ventricular drainage catheter with ICP microsensor Right Other (Comment) (Active)   Level of Ventriculostomy (cm above) 10 8/17/2018  7:05 AM   Status Open to drainage  8/17/2018  7:05 AM   Site Assessment Dry;Clean 8/17/2018  7:05 AM   Site Drainage No drainage 8/17/2018  7:05 AM   Waveform normal waveform 8/17/2018  3:00 AM   Output (mL) 14 mL 8/17/2018  7:00 AM   CSF Color pink 8/17/2018  7:05 AM   Dressing Status Biopatch in place;Clean;Dry;Intact 8/17/2018  7:05 AM   Interventions bed controls locked;HOB degrees;zeroed 8/17/2018  7:05 AM       Neurosurgery Physical Exam    AOX3  speech fluent  EVD in place   PERRL, EOMI, face symm, tongue midline  HOBSON symm  No drift appreciated on exam this AM        Significant Labs:  Recent Labs   Lab  08/19/18   0318  08/19/18   1200  08/19/18   1943  08/20/18   0312   GLU  92   --    --   89   NA  134*  134*  132*  134*  135*  135*   K  3.7   --    --   3.8   CL  107   --    --   107   CO2  20*   --    --   21*   BUN  7   --    --   7   CREATININE  0.6   --    --   0.6   CALCIUM  8.3*   --    --   8.2*   MG  1.5*   --    --   1.5*     Recent Labs   Lab  08/19/18   0318  08/19/18   1401  08/20/18   0312   WBC  9.84   --   11.09   HGB  7.7*  7.4*  7.5*   HCT  23.1*  22.6*  22.9*   PLT  264   --   228     Recent Labs   Lab  08/19/18   0318  08/20/18   0312   INR  1.0  1.1     Microbiology Results (last 7 days)     Procedure Component Value Units Date/Time    CSF culture [[id number]] Collected:  08/18/18 1354    Order Status:  Completed Specimen:  CSF (Spinal Fluid) from CSF Shunt Updated:  08/20/18 0752     CSF CULTURE No Growth to date     Gram Stain Result Rare WBC's      No organisms seen    Blood culture [[id number]] Collected:  08/10/18 2020    Order Status:  Completed Specimen:  Blood from Peripheral, Foot, Left Updated:  08/16/18 0612     Blood Culture, Routine No growth after 5 days.    Blood culture [[id number]] Collected:  08/10/18 2040    Order Status:  Completed Specimen:  Blood from Peripheral, Antecubital, Left Updated:  08/16/18 0612     Blood Culture, Routine No growth after 5 days.    Urine culture [[id number]] Collected:   08/12/18 1526    Order Status:  Completed Specimen:  Urine, Catheterized Updated:  08/13/18 2118     Urine Culture, Routine No growth        Significant Diagnostics:  I have reviewed all pertinent imaging results/findings within the past 24 hours.

## 2018-08-20 NOTE — SUBJECTIVE & OBJECTIVE
Interval History:      Review of Systems   Constitutional: Negative for activity change.   HENT: Negative for congestion.    Eyes: Negative for pain.   Respiratory: Negative for shortness of breath.    Cardiovascular: Negative for chest pain.   Gastrointestinal: Negative for abdominal distention.   Genitourinary: Negative for difficulty urinating.   Neurological: Negative for seizures, syncope, speech difficulty, weakness and numbness.       Objective:     Vitals:  Temp: 99.2 °F (37.3 °C)  Pulse: 83  Rhythm: normal sinus rhythm  BP: (!) 161/87  MAP (mmHg): 117  ICP Mean (mmHg): 10 mmHg  Resp: (!) 24  SpO2: 97 %  O2 Device (Oxygen Therapy): room air    Temp  Min: 98 °F (36.7 °C)  Max: 99.2 °F (37.3 °C)  Pulse  Min: 65  Max: 99  BP  Min: 148/82  Max: 161/87  MAP (mmHg)  Min: 117  Max: 117  ICP Mean (mmHg)  Min: 5 mmHg  Max: 10 mmHg  Resp  Min: 17  Max: 36  SpO2  Min: 97 %  Max: 100 %    08/19 0701 - 08/20 0700  In: 4720.3 [P.O.:300; I.V.:4070.3]  Out: 3389 [Urine:3150; Drains:239]   Unmeasured Output  Urine Occurrence: 1  Stool Occurrence: 0  Emesis Occurrence: 1  Pad Count: 1       Physical Exam   Constitutional: She appears well-developed and well-nourished.   Cardiovascular: Normal rate and intact distal pulses.   Pulmonary/Chest: Effort normal. No respiratory distress.   General: comfortable, sleeping in bed, NAD  Neuro: A+Ox3, conversational, speech clear and coherent  CN: PERRL, EOMI, no facial droop, V1-3 intact, shrugs shoulders even  Motor: 5/5 in all extremities  SILT  Negative for pronator drift.       Medications:  Continuous  phenylephrine Last Rate: 0.5 mcg/kg/min (08/20/18 1215)   Sodium Chloride 2% Last Rate: 100 mL/hr at 08/20/18 1017   Scheduled  ceFAZolin (ANCEF) IVPB 1 g Q8H   famotidine 20 mg BID   ferrous sulfate 325 mg Daily   heparin (porcine) 5,000 Units Q8H   niMODipine 30 mg Q2H   polyethylene glycol 17 g Daily   prenatal vitamin 1 tablet Daily   senna-docusate 8.6-50 mg 1 tablet BID    sodium chloride 0.9% 10 mL Q6H   PRN  acetaminophen 650 mg Q6H PRN   calcium gluconate 1 g PRN   labetalol 10 mg Q4H PRN   magnesium oxide 800 mg PRN   magnesium oxide 800 mg PRN   midazolam 2 mg PRN   oxyCODONE 10 mg Q4H PRN   potassium chloride 10% 40 mEq PRN   potassium chloride 10% 40 mEq PRN   potassium chloride 10% 60 mEq PRN   potassium, sodium phosphates 2 packet PRN   potassium, sodium phosphates 2 packet PRN   potassium, sodium phosphates 2 packet PRN   sodium chloride 0.9% 10 mL PRN   sodium chloride 0.9% 5 mL PRN     Today I personally reviewed pertinent medications, lines/drains/airways, imaging, lab results, notably:    Diet  Diet Adult Regular (IDDSI Level 7)  Diet Adult Regular (IDDSI Level 7)

## 2018-08-20 NOTE — PROGRESS NOTES
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  Imaging identified an anterior communicating aneurysm which was successfully coiled 8/10.  On 8/16, patient developed vasospasm and was taken to IR and treated with verapamil.  She continues with mild and moderate vasospasm, but has no clinical deficits.  She does report significant headache and stiff neck.  She is receiving nimodipine and daily TCDs with close monitoring.    Antithrombotics for secondary stroke prevention: none SAH     Statins for secondary stroke prevention and hyperlipidemia, if present: SAH     Aggressive risk factor modification: polycytsic kidney disease, HTN      Rehab efforts: Occupational Therapy, PT/OT/SLP to evaluate and treat when appropriate     Diagnostics ordered/pending: Daily TCDs    VTE prophylaxis: SCDs    BP parameters: SAH: Secured aneurysm, no target, increase BP to prevent vasospasm if needed             Cerebral artery vasospasm    -Monitor daily TCD's  -Nimotop  -Continues with mild and moderate vasospasm; no clinical deterioration  -close monitoring          25 weeks gestation of pregnancy    Per [facility] recommendations        Hypertension affecting pregnancy in second trimester    Risk factor for stroke  Keep systolic under 140        PKD (polycystic kidney disease)    Risk factor for aneurysm              33 y/o female found down @~24w5d admitted for management of subarachnoid hemmorhage likely due to ruptured aneurysm  Had coiling done ACOM 8-10-18  8-16-18 to IR for cerebral angiogram for vasospasm and had IV Verapamil instilled   8-19-18 with headache TCD's better yeaterday  8/20:  Continues with HA worse with positional  movement.  No neurological deficits.  TCDs for today pending.  Yesterday demonstrated multiple areas of mild and moderate vasospasm with no clinical deterioration.  NIH remains 0.      STROKE DOCUMENTATION        NIH Scale:  1a. Level Of Consciousness: 0-->Alert: keenly responsive  1b. LOC Questions: 0-->Answers both questions correctly  1c. LOC Commands: 0-->Performs both tasks correctly  2. Best Gaze: 0-->Normal  3. Visual: 0-->No visual loss  4. Facial Palsy: 0-->Normal symmetrical movements  5a. Motor Arm, Left: 0-->No drift: limb holds 90 (or 45) degrees for full 10 secs  5b. Motor Arm, Right: 0-->No drift: limb holds 90 (or 45) degrees for full 10 secs  6a. Motor Leg, Left: 0-->No drift: leg holds 30 degree position for full 5 secs  6b. Motor Leg, Right: 0-->No drift: leg holds 30 degree position for full 5 secs  7. Limb Ataxia: 0-->Absent  8. Sensory: 0-->Normal: no sensory loss  9. Best Language: 0-->No aphasia: normal  10. Dysarthria: 0-->Normal  11. Extinction and Inattention (formerly Neglect): 0-->No abnormality  Total (NIH Stroke Scale): 0       Modified Jack    Matthew Coma Scale:    ABCD2 Score:    [id number] Score:   HAS -BLED Score:   ICH Score:   Hunt & Collins Classification:      Hemorrhagic change of an Ischemic Stroke: Does this patient have an ischemic stroke with hemorrhagic changes? No     Neurologic Chief Complaint: SAH    Subjective:     Interval History: Patient is seen for follow-up neurological assessment and treatment recommendations:  Continues with HA worse with positional movement.  No neurological deficits.  TCDs for today pending.  Yesterday demonstrated multiple areas of mild and moderate vasospasm with no clinical deterioration.  NIH remains 0.      HPI, Past Medical, Family, and Social History remains the same as documented in the initial encounter. Just c/o headache     Review of Systems   Constitutional: Negative for fever.   HENT: Negative for trouble swallowing.    Eyes:  Negative for photophobia.   Musculoskeletal: Positive for neck stiffness.   Neurological: Positive for headaches. Negative for speech difficulty and weakness.     Scheduled Meds:   ceFAZolin (ANCEF) IVPB  1 g Intravenous Q8H    famotidine  20 mg Oral BID    ferrous sulfate  325 mg Oral Daily    heparin (porcine)  5,000 Units Subcutaneous Q8H    niMODipine  30 mg Oral Q2H    polyethylene glycol  17 g Oral Daily    prenatal vitamin  1 tablet Oral Daily    senna-docusate 8.6-50 mg  1 tablet Oral BID    sodium chloride 0.9%  10 mL Intravenous Q6H     Continuous Infusions:   phenylephrine 1.5 mcg/kg/min (08/20/18 1405)    Sodium Chloride 2% 100 mL/hr at 08/20/18 1405     PRN Meds:acetaminophen, calcium gluconate, labetalol, magnesium oxide, magnesium oxide, midazolam, oxyCODONE, potassium chloride 10%, potassium chloride 10%, potassium chloride 10%, potassium, sodium phosphates, potassium, sodium phosphates, potassium, sodium phosphates, Flushing PICC Protocol **AND** sodium chloride 0.9% **AND** sodium chloride 0.9%, sodium chloride 0.9%    Objective:     Vital Signs (Most Recent):  Temp: 99.1 °F (37.3 °C) (08/20/18 1105)  Pulse: 73 (08/20/18 1405)  Resp: (!) 23 (08/20/18 1405)  BP: (!) 160/87 (08/20/18 1405)  SpO2: 100 % (08/20/18 1405)  BP Location: Left arm    Vital Signs Range (Last 24H):  Temp:  [98 °F (36.7 °C)-99.2 °F (37.3 °C)]   Pulse:  [65-99]   Resp:  [17-36]   BP: (148-172)/()   SpO2:  [97 %-100 %]   Arterial Line BP: (113-182)/()   BP Location: Left arm    Physical Exam   Constitutional: She appears well-developed and well-nourished.   Pregnant   Skin: Skin is warm and dry.   Psychiatric: She has a normal mood and affect. Her behavior is normal.   Nursing note and vitals reviewed.      Neurological Exam:   LOC: alert  Attention Span: Good   Language: No aphasia  Articulation: No dysarthria  Orientation: Person, Place, Time   Visual Fields: Full  EOM (CN III, IV, VI):  Full/intact  Pupils (CN II, III): PERRL  Facial Sensation (CN V): Normal  Facial Movement (CN VII): Symmetric facial expression    Motor: Arm left  Normal 5/5  Leg left  Normal 5/5  Arm right  Normal 5/5  Leg right Normal 5/5  Sensation: Intact to light touch, temperature and vibration  Tone: Normal tone throughout    Laboratory:  CMP:   Recent Labs   Lab  08/20/18   0312  08/20/18   1334   CALCIUM  8.2*   --    ALBUMIN  2.5*   --    PROT  5.9*   --    NA  135*  135*  134*   K  3.8   --    CO2  21*   --    CL  107   --    BUN  7   --    CREATININE  0.6   --    ALKPHOS  72   --    ALT  16   --    AST  12   --    BILITOT  0.5   --      CBC:   Recent Labs   Lab  08/20/18 0312   WBC  11.09   RBC  2.58*   HGB  7.5*   HCT  22.9*   PLT  228   MCV  89   MCH  29.1   MCHC  32.8     Lipid Panel: No results for input(s): CHOL, LDLCALC, HDL, TRIG in the last 168 hours.  Coagulation:   Recent Labs   Lab  08/20/18 0312   INR  1.1     Platelet Aggregation Study: No results for input(s): PLTAGG, PLTAGINTERP, PLTAGREGLACO, ADPPLTAGGREG in the last 168 hours.  Hgb A1C: No results for input(s): HGBA1C in the last 168 hours.  TSH: No results for input(s): TSH in the last 168 hours.    Diagnostic Results     Brain Imaging   CT Head w/o contrast 8-10-18 results:    1. Extensive subarachnoid hemorrhage, most likely from an aneurysm rupture.  It is difficult to determine the site of the aneurysm rupture on this study due to the diffuse nature of the subarachnoid hemorrhage throughout the posterior fossa as well as the basilar cisternal spaces.  2. Slight increase in the ventricular size when compared to the previous study suggestive of a developing hydrocephalus.    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  Successful coil embolization of this aneurysm.      Cardiac Imaging   2D Echo 8-11-18 results:    1 - Eccentric hypertrophy.     2 - Normal left ventricular systolic function (EF 60-65%).     3 - No wall motion abnormalities.     4 - Normal left ventricular diastolic function.     5 - Normal right ventricular systolic function .     6 - The estimated PA systolic pressure is 31 mmHg.     7 - Trivial pericardial effusion.       Gladis Matt NP  [facility]  Department of Vascular Neurology   [facility]

## 2018-08-20 NOTE — ASSESSMENT & PLAN NOTE
-Monitor daily TCD's  -Nimotop  -Continues with mild and moderate vasospasm; no clinical deterioration  -close monitoring

## 2018-08-20 NOTE — ASSESSMENT & PLAN NOTE
obgyn consulted: Has noted OK to allow increased blood pressure parameters during acute subarachnoid window.  Mg discontinued  Cont to monitor

## 2018-08-20 NOTE — PT/OT/SLP DISCHARGE
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  Please refer to prior OT note dated 8/14/2018 for functional status.    Assessment:      Patient has not met goals.    Objective:     GOALS:   Multidisciplinary Problems     Occupational Therapy Goals     Not on file          Multidisciplinary Problems (Resolved)        Problem: Occupational Therapy Goal    Goal Priority Disciplines Outcome Interventions   Occupational Therapy Goal   (Resolved)     OT, PT/OT Outcome(s) achieved    Description:  Goals to be met by: 8/19    UE Dressing while standing with Set-up Assistance and Stand-by Assistance.  LE Dressing (socks, brief) with Contact Guard Assistance.  Grooming while standing with Contact Guard Assistance.  Stand pivot transfers with Contact Guard Assistance.  Functional mobility at short household distance for ADL task with min(A) and vitals WFL.  Upper extremity exercise program x15 reps per handout, with assistance as needed.                    Reasons for Discontinuation of Therapy Services and Plan:  Pt has been on HOLD from therapy services 2/2 TCDs and angio return. NCC huddle 8/20: NP (Sergio) reported to discontinued therapy at this time. Team will re consult when pt is medically appropriate for cont in care.         GARRET White  8/20/2018

## 2018-08-20 NOTE — PLAN OF CARE
08/20/18 0941   Discharge Reassessment   Assessment Type Discharge Planning Reassessment   Provided patient/caregiver education on the expected discharge date and the discharge plan No   Do you have any problems affording any of your prescribed medications? No   Discharge Plan A Rehab   Discharge Plan B Home with family   Patient choice form signed by patient/caregiver N/A   Can the patient answer the patient profile reliably? Yes, cognitively intact   How does the patient rate their overall health at the present time? Fair   Describe the patient's ability to walk at the present time. Does not walk or unable to take any steps at all   How often would a person be available to care for the patient? Whenever needed   Number of comorbid conditions (as recorded on the chart) Two   During the past month, has the patient often been bothered by feeling down, depressed or hopeless? No   During the past month, has the patient often been bothered by little interest or pleasure in doing things? No       Patient with EVD.  Need for TCD Monitoring per MD.  Not medically stable for discharge.   SW working with patient on Rehab choices.      Penelope Canseco RN, CCRN-K, [facility]  Neuro-Critical Care   [phone]

## 2018-08-21 LAB
ALBUMIN SERPL BCP-MCNC: 2.5 G/DL
ALP SERPL-CCNC: 84 U/L
ALT SERPL W/O P-5'-P-CCNC: 22 U/L
ANION GAP SERPL CALC-SCNC: 6 MMOL/L
AST SERPL-CCNC: 17 U/L
BASOPHILS # BLD AUTO: 0.05 K/UL
BASOPHILS NFR BLD: 0.3 %
BILIRUB SERPL-MCNC: 0.4 MG/DL
BUN SERPL-MCNC: 6 MG/DL
CALCIUM SERPL-MCNC: 8 MG/DL
CHLORIDE SERPL-SCNC: 109 MMOL/L
CO2 SERPL-SCNC: 21 MMOL/L
CREAT SERPL-MCNC: 0.6 MG/DL
DIFFERENTIAL METHOD: ABNORMAL
EOSINOPHIL # BLD AUTO: 0 K/UL
EOSINOPHIL NFR BLD: 0.2 %
ERYTHROCYTE [DISTWIDTH] IN BLOOD BY AUTOMATED COUNT: 15.1 %
EST. GFR  (AFRICAN AMERICAN): >60 ML/MIN/1.73 M^2
EST. GFR  (NON AFRICAN AMERICAN): >60 ML/MIN/1.73 M^2
GLUCOSE SERPL-MCNC: 95 MG/DL
HCT VFR BLD AUTO: 25.5 %
HGB BLD-MCNC: 8.2 G/DL
IMM GRANULOCYTES # BLD AUTO: 0.29 K/UL
IMM GRANULOCYTES NFR BLD AUTO: 1.7 %
INR PPP: 1.1
LYMPHOCYTES # BLD AUTO: 2.6 K/UL
LYMPHOCYTES NFR BLD: 14.8 %
MAGNESIUM SERPL-MCNC: 1.7 MG/DL
MCH RBC QN AUTO: 28.2 PG
MCHC RBC AUTO-ENTMCNC: 32.2 G/DL
MCV RBC AUTO: 88 FL
MONOCYTES # BLD AUTO: 1.5 K/UL
MONOCYTES NFR BLD: 8.4 %
NEUTROPHILS # BLD AUTO: 12.9 K/UL
NEUTROPHILS NFR BLD: 74.6 %
NRBC BLD-RTO: 0 /100 WBC
PHOSPHATE SERPL-MCNC: 2.2 MG/DL
PLATELET # BLD AUTO: 383 K/UL
PMV BLD AUTO: 10.2 FL
POCT GLUCOSE: 102 MG/DL (ref 70–110)
POCT GLUCOSE: 118 MG/DL (ref 70–110)
POCT GLUCOSE: 98 MG/DL (ref 70–110)
POCT GLUCOSE: 98 MG/DL (ref 70–110)
POTASSIUM SERPL-SCNC: 3.2 MMOL/L
PROT SERPL-MCNC: 6.3 G/DL
PROTHROMBIN TIME: 11.1 SEC
RBC # BLD AUTO: 2.91 M/UL
SODIUM SERPL-SCNC: 134 MMOL/L
SODIUM SERPL-SCNC: 136 MMOL/L
SODIUM SERPL-SCNC: 136 MMOL/L
SODIUM SERPL-SCNC: 137 MMOL/L
WBC # BLD AUTO: 17.33 K/UL

## 2018-08-21 PROCEDURE — 84295 ASSAY OF SERUM SODIUM: CPT | Mod: 91

## 2018-08-21 PROCEDURE — 99232 SBSQ HOSP IP/OBS MODERATE 35: CPT | Mod: ,,, | Performed by: OBSTETRICS & GYNECOLOGY

## 2018-08-21 PROCEDURE — 25000003 PHARM REV CODE 250: Performed by: STUDENT IN AN ORGANIZED HEALTH CARE EDUCATION/TRAINING PROGRAM

## 2018-08-21 PROCEDURE — 85025 COMPLETE CBC W/AUTO DIFF WBC: CPT

## 2018-08-21 PROCEDURE — 99291 CRITICAL CARE FIRST HOUR: CPT | Mod: ,,, | Performed by: PSYCHIATRY & NEUROLOGY

## 2018-08-21 PROCEDURE — 20000000 HC ICU ROOM

## 2018-08-21 PROCEDURE — 63600175 PHARM REV CODE 636 W HCPCS: Performed by: NURSE PRACTITIONER

## 2018-08-21 PROCEDURE — 63600175 PHARM REV CODE 636 W HCPCS: Performed by: PHYSICIAN ASSISTANT

## 2018-08-21 PROCEDURE — 85610 PROTHROMBIN TIME: CPT

## 2018-08-21 PROCEDURE — A4216 STERILE WATER/SALINE, 10 ML: HCPCS | Performed by: PSYCHIATRY & NEUROLOGY

## 2018-08-21 PROCEDURE — 25000003 PHARM REV CODE 250: Performed by: NURSE PRACTITIONER

## 2018-08-21 PROCEDURE — 25000003 PHARM REV CODE 250: Performed by: PSYCHIATRY & NEUROLOGY

## 2018-08-21 PROCEDURE — 25000003 PHARM REV CODE 250: Performed by: ANESTHESIOLOGY

## 2018-08-21 PROCEDURE — 99233 SBSQ HOSP IP/OBS HIGH 50: CPT | Mod: ,,, | Performed by: NURSE PRACTITIONER

## 2018-08-21 PROCEDURE — 80053 COMPREHEN METABOLIC PANEL: CPT

## 2018-08-21 PROCEDURE — A4217 STERILE WATER/SALINE, 500 ML: HCPCS | Performed by: NURSE PRACTITIONER

## 2018-08-21 PROCEDURE — 63600175 PHARM REV CODE 636 W HCPCS: Performed by: STUDENT IN AN ORGANIZED HEALTH CARE EDUCATION/TRAINING PROGRAM

## 2018-08-21 PROCEDURE — 25000003 PHARM REV CODE 250: Performed by: PHYSICIAN ASSISTANT

## 2018-08-21 PROCEDURE — 99231 SBSQ HOSP IP/OBS SF/LOW 25: CPT | Mod: ,,, | Performed by: NEUROLOGICAL SURGERY

## 2018-08-21 PROCEDURE — 83735 ASSAY OF MAGNESIUM: CPT

## 2018-08-21 PROCEDURE — 84100 ASSAY OF PHOSPHORUS: CPT

## 2018-08-21 RX ORDER — ACETAMINOPHEN 325 MG/1
650 TABLET ORAL EVERY 4 HOURS PRN
Status: DISCONTINUED | OUTPATIENT
Start: 2018-08-21 | End: 2018-08-24

## 2018-08-21 RX ADMIN — NIMODIPINE 30 MG: 30 CAPSULE, LIQUID FILLED ORAL at 02:08

## 2018-08-21 RX ADMIN — NIMODIPINE 30 MG: 30 CAPSULE, LIQUID FILLED ORAL at 01:08

## 2018-08-21 RX ADMIN — SENNOSIDES AND DOCUSATE SODIUM 1 TABLET: 8.6; 5 TABLET ORAL at 08:08

## 2018-08-21 RX ADMIN — Medication 2 MCG/KG/MIN: at 05:08

## 2018-08-21 RX ADMIN — NIMODIPINE 30 MG: 30 CAPSULE, LIQUID FILLED ORAL at 10:08

## 2018-08-21 RX ADMIN — NIMODIPINE 30 MG: 30 CAPSULE, LIQUID FILLED ORAL at 08:08

## 2018-08-21 RX ADMIN — Medication 3 MCG/KG/MIN: at 09:08

## 2018-08-21 RX ADMIN — Medication 10 ML: at 06:08

## 2018-08-21 RX ADMIN — SODIUM CHLORIDE: 234 INJECTION INTRAMUSCULAR; INTRAVENOUS; SUBCUTANEOUS at 07:08

## 2018-08-21 RX ADMIN — SODIUM CHLORIDE: 234 INJECTION INTRAMUSCULAR; INTRAVENOUS; SUBCUTANEOUS at 09:08

## 2018-08-21 RX ADMIN — POTASSIUM CHLORIDE 40 MEQ: 20 SOLUTION ORAL at 06:08

## 2018-08-21 RX ADMIN — FAMOTIDINE 20 MG: 20 TABLET ORAL at 10:08

## 2018-08-21 RX ADMIN — HEPARIN SODIUM 5000 UNITS: 5000 INJECTION, SOLUTION INTRAVENOUS; SUBCUTANEOUS at 02:08

## 2018-08-21 RX ADMIN — MAGNESIUM OXIDE TAB 400 MG (241.3 MG ELEMENTAL MG) 800 MG: 400 (241.3 MG) TAB at 10:08

## 2018-08-21 RX ADMIN — POTASSIUM & SODIUM PHOSPHATES POWDER PACK 280-160-250 MG 2 PACKET: 280-160-250 PACK at 02:08

## 2018-08-21 RX ADMIN — OXYCODONE HYDROCHLORIDE 10 MG: 5 TABLET ORAL at 12:08

## 2018-08-21 RX ADMIN — Medication 10 ML: at 01:08

## 2018-08-21 RX ADMIN — Medication 4 MCG/KG/MIN: at 01:08

## 2018-08-21 RX ADMIN — NIMODIPINE 30 MG: 30 CAPSULE, LIQUID FILLED ORAL at 12:08

## 2018-08-21 RX ADMIN — NIMODIPINE 30 MG: 30 CAPSULE, LIQUID FILLED ORAL at 04:08

## 2018-08-21 RX ADMIN — HEPARIN SODIUM 5000 UNITS: 5000 INJECTION, SOLUTION INTRAVENOUS; SUBCUTANEOUS at 10:08

## 2018-08-21 RX ADMIN — Medication 10 ML: at 12:08

## 2018-08-21 RX ADMIN — ACETAMINOPHEN 650 MG: 325 TABLET, FILM COATED ORAL at 02:08

## 2018-08-21 RX ADMIN — Medication 3 MCG/KG/MIN: at 11:08

## 2018-08-21 RX ADMIN — MAGNESIUM OXIDE TAB 400 MG (241.3 MG ELEMENTAL MG) 800 MG: 400 (241.3 MG) TAB at 06:08

## 2018-08-21 RX ADMIN — OXYCODONE HYDROCHLORIDE 10 MG: 5 TABLET ORAL at 04:08

## 2018-08-21 RX ADMIN — NIMODIPINE 30 MG: 30 CAPSULE, LIQUID FILLED ORAL at 05:08

## 2018-08-21 RX ADMIN — CEFAZOLIN 1 G: 1 INJECTION, POWDER, FOR SOLUTION INTRAMUSCULAR; INTRAVENOUS at 10:08

## 2018-08-21 RX ADMIN — NIMODIPINE 30 MG: 30 CAPSULE, LIQUID FILLED ORAL at 06:08

## 2018-08-21 RX ADMIN — Medication 3.5 MCG/KG/MIN: at 03:08

## 2018-08-21 RX ADMIN — OXYCODONE HYDROCHLORIDE 10 MG: 5 TABLET ORAL at 10:08

## 2018-08-21 RX ADMIN — SODIUM CHLORIDE: 234 INJECTION INTRAMUSCULAR; INTRAVENOUS; SUBCUTANEOUS at 03:08

## 2018-08-21 RX ADMIN — Medication 3 MCG/KG/MIN: at 05:08

## 2018-08-21 RX ADMIN — FAMOTIDINE 20 MG: 20 TABLET ORAL at 08:08

## 2018-08-21 RX ADMIN — POTASSIUM & SODIUM PHOSPHATES POWDER PACK 280-160-250 MG 2 PACKET: 280-160-250 PACK at 06:08

## 2018-08-21 RX ADMIN — FERROUS SULFATE TAB EC 325 MG (65 MG FE EQUIVALENT) 325 MG: 325 (65 FE) TABLET DELAYED RESPONSE at 10:08

## 2018-08-21 RX ADMIN — SODIUM CHLORIDE: 234 INJECTION INTRAMUSCULAR; INTRAVENOUS; SUBCUTANEOUS at 11:08

## 2018-08-21 RX ADMIN — CEFAZOLIN 1 G: 1 INJECTION, POWDER, FOR SOLUTION INTRAMUSCULAR; INTRAVENOUS at 02:08

## 2018-08-21 RX ADMIN — POTASSIUM CHLORIDE 40 MEQ: 20 SOLUTION ORAL at 10:08

## 2018-08-21 RX ADMIN — HEPARIN SODIUM 5000 UNITS: 5000 INJECTION, SOLUTION INTRAVENOUS; SUBCUTANEOUS at 06:08

## 2018-08-21 RX ADMIN — PRENATAL VIT W/ FE FUMARATE-FA TAB 27-0.8 MG 1 TABLET: 27-0.8 TAB at 10:08

## 2018-08-21 RX ADMIN — POTASSIUM & SODIUM PHOSPHATES POWDER PACK 280-160-250 MG 2 PACKET: 280-160-250 PACK at 10:08

## 2018-08-21 RX ADMIN — CEFAZOLIN 1 G: 1 INJECTION, POWDER, FOR SOLUTION INTRAMUSCULAR; INTRAVENOUS at 06:08

## 2018-08-21 RX ADMIN — OXYCODONE HYDROCHLORIDE 10 MG: 5 TABLET ORAL at 06:08

## 2018-08-21 NOTE — SUBJECTIVE & OBJECTIVE
Neurologic Chief Complaint: SAH    Subjective:     Interval History: Patient is seen for follow-up neurological assessment and treatment recommendations:  Continues with headache and stiff neck.  No new neuro symptoms.  TCDs ratios improved some, but continues with vasospasm with no clinical deterioration.  On radha drip to keep -220.    HPI, Past Medical, Family, and Social History remains the same as documented in the initial encounter. Just c/o headache     Review of Systems   Constitutional: Negative for fever.   HENT: Negative for trouble swallowing.    Eyes: Negative for photophobia.   Musculoskeletal: Positive for neck stiffness.   Neurological: Positive for headaches. Negative for speech difficulty and weakness.     Scheduled Meds:   ceFAZolin (ANCEF) IVPB  1 g Intravenous Q8H    famotidine  20 mg Oral BID    ferrous sulfate  325 mg Oral Daily    heparin (porcine)  5,000 Units Subcutaneous Q8H    niMODipine  30 mg Oral Q2H    polyethylene glycol  17 g Oral Daily    prenatal vitamin  1 tablet Oral Daily    senna-docusate 8.6-50 mg  1 tablet Oral BID    sodium chloride 0.9%  10 mL Intravenous Q6H     Continuous Infusions:   phenylephrine 3 mcg/kg/min (08/21/18 1601)    Sodium Chloride 2% 125 mL/hr at 08/21/18 1601     PRN Meds:acetaminophen, acetaminophen, calcium gluconate, labetalol, magnesium oxide, magnesium oxide, midazolam, oxyCODONE, potassium chloride 10%, potassium chloride 10%, potassium chloride 10%, potassium, sodium phosphates, potassium, sodium phosphates, potassium, sodium phosphates, Flushing PICC Protocol **AND** sodium chloride 0.9% **AND** sodium chloride 0.9%, sodium chloride 0.9%    Objective:     Vital Signs (Most Recent):  Temp: 99.6 °F (37.6 °C) (08/21/18 1501)  Pulse: 77 (08/21/18 1601)  Resp: (!) 40 (08/21/18 1601)  BP: (!) 160/91 (08/21/18 1601)  SpO2: 98 % (08/21/18 1601)  BP Location: Left arm    Vital Signs Range (Last 24H):  Temp:  [99 °F (37.2 °C)-100.2 °F (37.9  °C)]   Pulse:  [57-92]   Resp:  [20-40]   BP: (124-202)/()   SpO2:  [97 %-100 %]   Arterial Line BP: (129-216)/()   BP Location: Left arm    Physical Exam   Constitutional: She appears well-developed and well-nourished.   Pregnant   Skin: Skin is warm and dry.   Psychiatric: She has a normal mood and affect. Her behavior is normal.   Nursing note and vitals reviewed.      Neurological Exam:   LOC: alert  Attention Span: Good   Language: No aphasia  Articulation: No dysarthria  Orientation: Person, Place, Time   Visual Fields: Full  EOM (CN III, IV, VI): Full/intact  Pupils (CN II, III): PERRL  Facial Sensation (CN V): Normal  Facial Movement (CN VII): Symmetric facial expression    Motor: Arm left  Normal 5/5  Leg left  Normal 5/5  Arm right  Normal 5/5  Leg right Normal 5/5  Sensation: Intact to light touch, temperature and vibration  Tone: Normal tone throughout    Laboratory:  CMP:   Recent Labs   Lab  08/21/18 0422 08/21/18   1436   CALCIUM  8.0*   --    ALBUMIN  2.5*   --    PROT  6.3   --    NA  136  136  137   K  3.2*   --    CO2  21*   --    CL  109   --    BUN  6   --    CREATININE  0.6   --    ALKPHOS  84   --    ALT  22   --    AST  17   --    BILITOT  0.4   --      CBC:   Recent Labs   Lab  08/21/18 0422   WBC  17.33*   RBC  2.91*   HGB  8.2*   HCT  25.5*   PLT  383*   MCV  88   MCH  28.2   MCHC  32.2     Lipid Panel: No results for input(s): CHOL, LDLCALC, HDL, TRIG in the last 168 hours.  Coagulation:   Recent Labs   Lab  08/21/18 0422   INR  1.1     Platelet Aggregation Study: No results for input(s): PLTAGG, PLTAGINTERP, PLTAGREGLACO, ADPPLTAGGREG in the last 168 hours.  Hgb A1C: No results for input(s): HGBA1C in the last 168 hours.  TSH: No results for input(s): TSH in the last 168 hours.    Diagnostic Results     Brain Imaging   CT Head w/o contrast 8-10-18 results:    1. Extensive subarachnoid hemorrhage, most likely from an aneurysm rupture.  It is difficult to determine the  site of the aneurysm rupture on this study due to the diffuse nature of the subarachnoid hemorrhage throughout the posterior fossa as well as the basilar cisternal spaces.  2. Slight increase in the ventricular size when compared to the previous study suggestive of a developing hydrocephalus.    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  Successful coil embolization of this aneurysm.     Cardiac Imaging   2D Echo 8-11-18 results:    1 - Eccentric hypertrophy.     2 - Normal left ventricular systolic function (EF 60-65%).     3 - No wall motion abnormalities.     4 - Normal left ventricular diastolic function.     5 - Normal right ventricular systolic function .     6 - The estimated PA systolic pressure is 31 mmHg.     7 - Trivial pericardial effusion.

## 2018-08-21 NOTE — SUBJECTIVE & OBJECTIVE
Interval History: NAEON.     Medications:  Continuous Infusions:   phenylephrine 3 mcg/kg/min (08/21/18 0605)    Sodium Chloride 2% 125 mL/hr at 08/21/18 0718     Scheduled Meds:   ceFAZolin (ANCEF) IVPB  1 g Intravenous Q8H    famotidine  20 mg Oral BID    ferrous sulfate  325 mg Oral Daily    heparin (porcine)  5,000 Units Subcutaneous Q8H    niMODipine  30 mg Oral Q2H    polyethylene glycol  17 g Oral Daily    prenatal vitamin  1 tablet Oral Daily    senna-docusate 8.6-50 mg  1 tablet Oral BID    sodium chloride 0.9%  10 mL Intravenous Q6H     PRN Meds:acetaminophen, calcium gluconate, labetalol, magnesium oxide, magnesium oxide, midazolam, oxyCODONE, potassium chloride 10%, potassium chloride 10%, potassium chloride 10%, potassium, sodium phosphates, potassium, sodium phosphates, potassium, sodium phosphates, Flushing PICC Protocol **AND** sodium chloride 0.9% **AND** sodium chloride 0.9%, sodium chloride 0.9%     Review of Systems    Objective:     Weight: 75.7 kg (166 lb 14.2 oz)  Body mass index is 24.65 kg/m².  Vital Signs (Most Recent):  Temp: 99.4 °F (37.4 °C) (08/21/18 0305)  Pulse: 63 (08/21/18 0605)  Resp: 20 (08/21/18 0605)  BP: (!) 198/106 (08/21/18 0605)  SpO2: 100 % (08/21/18 0605) Vital Signs (24h Range):  Temp:  [99 °F (37.2 °C)-99.4 °F (37.4 °C)] 99.4 °F (37.4 °C)  Pulse:  [63-99] 63  Resp:  [20-31] 20  SpO2:  [97 %-100 %] 100 %  BP: (124-202)/() 198/106  Arterial Line BP: (113-216)/() 191/90                       ICP/Ventriculostomy 08/11/18 0000 Ventricular drainage catheter with ICP microsensor Right Other (Comment) (Active)   Level of Ventriculostomy (cm above) 10 8/17/2018  7:05 AM   Status Open to drainage 8/17/2018  7:05 AM   Site Assessment Dry;Clean 8/17/2018  7:05 AM   Site Drainage No drainage 8/17/2018  7:05 AM   Waveform normal waveform 8/17/2018  3:00 AM   Output (mL) 14 mL 8/17/2018  7:00 AM   CSF Color pink 8/17/2018  7:05 AM   Dressing Status Biopatch in  place;Clean;Dry;Intact 8/17/2018  7:05 AM   Interventions bed controls locked;HOB degrees;zeroed 8/17/2018  7:05 AM       Neurosurgery Physical Exam    AOX3  speech fluent  EVD in place   PERRL, EOMI, face symm, tongue midline  HOBSON symm  No drift appreciated on exam this AM        Significant Labs:  Recent Labs   Lab  08/20/18   0312  08/20/18   1334  08/20/18   2038  08/21/18   0422   GLU  89   --    --   95   NA  135*  135*  134*  135*  136  136   K  3.8   --    --   3.2*   CL  107   --    --   109   CO2  21*   --    --   21*   BUN  7   --    --   6   CREATININE  0.6   --    --   0.6   CALCIUM  8.2*   --    --   8.0*   MG  1.5*   --    --   1.7     Recent Labs   Lab  08/19/18   1401  08/20/18   0312  08/21/18   0422   WBC   --   11.09  17.33*   HGB  7.4*  7.5*  8.2*   HCT  22.6*  22.9*  25.5*   PLT   --   228  383*     Recent Labs   Lab  08/20/18   0312  08/21/18   0422   INR  1.1  1.1     Microbiology Results (last 7 days)     Procedure Component Value Units Date/Time    CSF culture [[id number]] Collected:  08/18/18 1354    Order Status:  Completed Specimen:  CSF (Spinal Fluid) from CSF Shunt Updated:  08/21/18 0729     CSF CULTURE No Growth to date     Gram Stain Result Rare WBC's      No organisms seen    Blood culture [[id number]] Collected:  08/10/18 2020    Order Status:  Completed Specimen:  Blood from Peripheral, Foot, Left Updated:  08/16/18 0612     Blood Culture, Routine No growth after 5 days.    Blood culture [[id number]] Collected:  08/10/18 2040    Order Status:  Completed Specimen:  Blood from Peripheral, Antecubital, Left Updated:  08/16/18 0612     Blood Culture, Routine No growth after 5 days.        Significant Diagnostics:  I have reviewed all pertinent imaging results/findings within the past 24 hours.

## 2018-08-21 NOTE — SUBJECTIVE & OBJECTIVE
Interval History:  ACACIA overnight, headache and neck pain are at same intermittent level.     Review of Systems   Constitutional: Negative for activity change.        +chills due to feeling cold; no fevers      HENT: Negative for postnasal drip.    Respiratory: Negative for chest tightness.    Cardiovascular: Negative for chest pain.   Neurological: Negative for dizziness, seizures, speech difficulty and weakness.        Objective:     Vitals:  Temp: 100.2 °F (37.9 °C)  Pulse: 88  Rhythm: normal sinus rhythm  BP: 133/67  MAP (mmHg): 93  ICP Mean (mmHg): 11 mmHg  Resp: (!) 28  SpO2: 98 %  O2 Device (Oxygen Therapy): room air    Temp  Min: 99 °F (37.2 °C)  Max: 100.2 °F (37.9 °C)  Pulse  Min: 61  Max: 88  BP  Min: 124/82  Max: 202/103  MAP (mmHg)  Min: 93  Max: 148  ICP Mean (mmHg)  Min: 2 mmHg  Max: 11 mmHg  Resp  Min: 20  Max: 37  SpO2  Min: 97 %  Max: 100 %    08/20 0701 - 08/21 0700  In: 4297.1 [I.V.:4297.1]  Out: 3005 [Urine:2765; Drains:240]   Unmeasured Output  Urine Occurrence: 1  Stool Occurrence: 1  Emesis Occurrence: 1  Pad Count: 1       Physical Exam   Constitutional: She appears well-developed and well-nourished. No distress.   Cardiovascular: Normal rate and intact distal pulses.   Pulmonary/Chest: Effort normal. No respiratory distress.   General: Laying comfortably in bed, NAD  Neuro: A+Ox3, speech clear and coherent.  [id number]; follows commands  CN: PERRL (4-3mm), EOMI, no facial droop, V1-3 intact, shrugs shoulders evenly  Motor: 5/5 in UE and LE b/l  Sensation: SILT  No pronator drift     Medications:  Continuous  phenylephrine Last Rate: 4 mcg/kg/min (08/21/18 1356)   Sodium Chloride 2% Last Rate: 125 mL/hr at 08/21/18 1301   Scheduled  ceFAZolin (ANCEF) IVPB 1 g Q8H   famotidine 20 mg BID   ferrous sulfate 325 mg Daily   heparin (porcine) 5,000 Units Q8H   niMODipine 30 mg Q2H   polyethylene glycol 17 g Daily   prenatal vitamin 1 tablet Daily   senna-docusate 8.6-50 mg 1 tablet BID   sodium chloride  0.9% 10 mL Q6H   PRN  acetaminophen 650 mg Q6H PRN   acetaminophen 650 mg Q4H PRN   calcium gluconate 1 g PRN   labetalol 10 mg Q4H PRN   magnesium oxide 800 mg PRN   magnesium oxide 800 mg PRN   midazolam 2 mg PRN   oxyCODONE 10 mg Q4H PRN   potassium chloride 10% 40 mEq PRN   potassium chloride 10% 40 mEq PRN   potassium chloride 10% 60 mEq PRN   potassium, sodium phosphates 2 packet PRN   potassium, sodium phosphates 2 packet PRN   potassium, sodium phosphates 2 packet PRN   sodium chloride 0.9% 10 mL PRN   sodium chloride 0.9% 5 mL PRN     Today I personally reviewed pertinent medications, lines/drains/airways, imaging, lab results, notably:    Diet  Diet Adult Regular (IDDSI Level 7)  Diet Adult Regular (IDDSI Level 7)

## 2018-08-21 NOTE — ASSESSMENT & PLAN NOTE
Neuro:   1. HH4F4 SAH.  PBD 11 s/p coiling (PCD 10).    Acute risk for vasospasm with elevated velocities noted on TCDs today and yesterday.  IR team on call made aware; given that no acute examination change, we will continue with Q1 hour neurochecks and consider angiography should condition worsen.   EVD at 10 open: 240 output last 24, ICPs < 20.   Daily TCDs: Has demonstrated elevated velocities (8/20)    -s/p blood transfusion (8/18) and increase in fluid administration   -s/p angio (8/16) with verapimil injections  Nimodipine 30q2   Finished 7D Keppra 500 BID course  -Phenylephrine gtt for BP between 180-220.  EVD ppx with Cefazolin 1g q8   -On 125mL of 2%NS per hour with +1.3L fluid balance yesterday    2. Hyponatremia: Na 136 today; was 135 yesterday   -Increased 2% Na to 125mL/hour   -Continue serial BMPs    3. Acute pain: Oxycodone 10mg q 4 hours, Versed 2g PRN    Fen/GI: normal diet; encourage PO intake   -Maintain Euvolemia  Strict I's and O's: +1.3L last 24 hours  2% increase to 125/hour  Had first BM in 4 days overnight; continue miralax and doc senna for BR.     Pulm:   CXR 8/13 wnl, lines confirmed.  Will attempt to minimize radiation for pregnancy  Monitor pulmonary status, on RA. Saturating well     CV: aneurysm secured, keep -220  -Has been off of Cardene  -Phenylephrine drip   -Fluid bolus prn   -Replace A Line today, as patient pulled out A Line this AM.

## 2018-08-21 NOTE — PLAN OF CARE
Problem: Patient Care Overview  Goal: Plan of Care Review  Outcome: Ongoing (interventions implemented as appropriate)  POC reviewed with pt at 1600. Pt verbalized understanding. Questions and concerns addressed. No acute events today. Pt progressing toward goals. Will continue to monitor. See flowsheets for full assessment and VS info.

## 2018-08-21 NOTE — PROGRESS NOTES
[facility] Follow-up    Chart reviewed and events as noted.    Awake and alert.  Able to respond to questions.  Denies any abdominal pain. Has not had any vaginal bleeding. Reports good fetal movement.    Fetal heart tones in the 150s by Doppler.  On exam abdomen is soft and nontender.  There is no evidence of fundal tenderness.    I discussed her case with Dr. Wooten and Dr. Montelongo.  At this point she remains at increased risk for vasospasm although her clinical exam is remaining stable.  We discussed the degree of hypertension that has been aimed for and the fact that with systolic blood pressures in the 180 to 200 range that there is a finite increase in the risk of abruption.  In addition, with the use of Burton-Synephrine there is an increase in the risk of uterine artery Vasa spasm and hypoperfusion which could lead to fetal heart rate decelerations.  At this point we are not monitoring the fetal heart rate because she is not a candidate for intervention for fetal indication.  I explained that if this therapy is needed for maternal benefit id should be continued and I would discuss with the mother that we are not going to intervene even with this known potential risk.  At this point however, they felt that this therapy is not making a significant difference in the maternal status and so were planning to try to discontinue it.    I discussed with Ms. Grande the clinical scenario at this point and explained to her again that given her medical status, we are not planning on intervening until the Neuro Critical Care team feels that she is more medically stable.  At this point, any intervention for fetal indications such as  delivery would markedly worse in the maternal prognosis and risk volume shifts and changes in the intracerebral circulation.  I explained to her that while we are doing everything to try to maintain fetal health as much as possible, given her overall medical status we have to put her care 1st  and not intervene for fetal indications at this time.  I explained that this means that there is a small possibility of stillbirth but that overall on balance this is the best course of action at this point.    Once the maternal medical status is more stable and she is off of her medications we can readdress this issue, but theoretically once her status is stable there should not be any specific  risk factor for fetal deterioration or monitoring.    Nacho Thacker Jr., MD  Maternal Fetal Medicine

## 2018-08-21 NOTE — PROGRESS NOTES
[facility]  Neurosurgery  Progress Note    Subjective:     History of Present Illness: 32 y.o. female 24 weeks pregnant with a history of polycystic kidney disease who presents as transfer from [facility] for SAH. Patient found down by 6 yr old son. Last known normal 0500 today. CT at [facility] revealed SAH within the basal cisterns. US at [facility] with + fetal heart tones. Transferred for neuro evaluation.     Post-Op Info:  Procedure(s) (LRB):  Mri (magnetic resonance imaging) (N/A)   11 Days Post-Op     Interval History: NAEON.     Medications:  Continuous Infusions:   phenylephrine 3 mcg/kg/min (08/21/18 0605)    Sodium Chloride 2% 125 mL/hr at 08/21/18 0718     Scheduled Meds:   ceFAZolin (ANCEF) IVPB  1 g Intravenous Q8H    famotidine  20 mg Oral BID    ferrous sulfate  325 mg Oral Daily    heparin (porcine)  5,000 Units Subcutaneous Q8H    niMODipine  30 mg Oral Q2H    polyethylene glycol  17 g Oral Daily    prenatal vitamin  1 tablet Oral Daily    senna-docusate 8.6-50 mg  1 tablet Oral BID    sodium chloride 0.9%  10 mL Intravenous Q6H     PRN Meds:acetaminophen, calcium gluconate, labetalol, magnesium oxide, magnesium oxide, midazolam, oxyCODONE, potassium chloride 10%, potassium chloride 10%, potassium chloride 10%, potassium, sodium phosphates, potassium, sodium phosphates, potassium, sodium phosphates, Flushing PICC Protocol **AND** sodium chloride 0.9% **AND** sodium chloride 0.9%, sodium chloride 0.9%     Review of Systems    Objective:     Weight: 75.7 kg (166 lb 14.2 oz)  Body mass index is 24.65 kg/m².  Vital Signs (Most Recent):  Temp: 99.4 °F (37.4 °C) (08/21/18 0305)  Pulse: 63 (08/21/18 0605)  Resp: 20 (08/21/18 0605)  BP: (!) 198/106 (08/21/18 0605)  SpO2: 100 % (08/21/18 0605) Vital Signs (24h Range):  Temp:  [99 °F (37.2 °C)-99.4 °F (37.4 °C)] 99.4 °F (37.4 °C)  Pulse:  [63-99] 63  Resp:  [20-31] 20  SpO2:  [97 %-100 %] 100 %  BP: (124-202)/() 198/106  Arterial Line BP:  (113-216)/() 191/90                       ICP/Ventriculostomy 08/11/18 0000 Ventricular drainage catheter with ICP microsensor Right Other (Comment) (Active)   Level of Ventriculostomy (cm above) 10 8/17/2018  7:05 AM   Status Open to drainage 8/17/2018  7:05 AM   Site Assessment Dry;Clean 8/17/2018  7:05 AM   Site Drainage No drainage 8/17/2018  7:05 AM   Waveform normal waveform 8/17/2018  3:00 AM   Output (mL) 14 mL 8/17/2018  7:00 AM   CSF Color pink 8/17/2018  7:05 AM   Dressing Status Biopatch in place;Clean;Dry;Intact 8/17/2018  7:05 AM   Interventions bed controls locked;HOB degrees;zeroed 8/17/2018  7:05 AM       Neurosurgery Physical Exam    AOX3  speech fluent  EVD in place   PERRL, EOMI, face symm, tongue midline  HOBSON symm  No drift appreciated on exam this AM        Significant Labs:  Recent Labs   Lab  08/20/18   0312 08/20/18   1334  08/20/18 2038  08/21/18   0422   GLU  89   --    --   95   NA  135*  135*  134*  135*  136  136   K  3.8   --    --   3.2*   CL  107   --    --   109   CO2  21*   --    --   21*   BUN  7   --    --   6   CREATININE  0.6   --    --   0.6   CALCIUM  8.2*   --    --   8.0*   MG  1.5*   --    --   1.7     Recent Labs   Lab  08/19/18   1401  08/20/18 0312 08/21/18   0422   WBC   --   11.09  17.33*   HGB  7.4*  7.5*  8.2*   HCT  22.6*  22.9*  25.5*   PLT   --   228  383*     Recent Labs   Lab  08/20/18 0312 08/21/18   0422   INR  1.1  1.1     Microbiology Results (last 7 days)     Procedure Component Value Units Date/Time    CSF culture [[id number]] Collected:  08/18/18 1354    Order Status:  Completed Specimen:  CSF (Spinal Fluid) from CSF Shunt Updated:  08/21/18 0729     CSF CULTURE No Growth to date     Gram Stain Result Rare WBC's      No organisms seen    Blood culture [[id number]] Collected:  08/10/18 2020    Order Status:  Completed Specimen:  Blood from Peripheral, Foot, Left Updated:  08/16/18 0612     Blood Culture, Routine No growth after 5 days.     Blood culture [[id number]] Collected:  08/10/18 2040    Order Status:  Completed Specimen:  Blood from Peripheral, Antecubital, Left Updated:  08/16/18 0612     Blood Culture, Routine No growth after 5 days.        Significant Diagnostics:  I have reviewed all pertinent imaging results/findings within the past 24 hours.    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 female 24 weeks pregnant with history of polycystic kidney disease with HH4F4 SAH on 8/10, now s/p acom coiling 8/10.     NEURO  -Continue NCC  -q 1 hr neuro checks  -evd open to 10 and draining, monitor icps   -CSF profile reviewed from 8/19   -will clamp EVD on thursday  -nimotop  -daily TCDs   -TCDs elevated; exam stable     -Keppra      CV  -permissive HTN  <220 as aneurysm secured    RESP  -stable on room air  -Aggressive pulmonary management  -IS @ bedside    FEN/GI  -cont 2%  -strict I/Os  -goal net even or positive  -Reg diet    ID  -ancef for drain ppx    PPX  -SQH  -famotidine for PUD ppx  -TEDs/SCDs    -further mgmt per ncc and obgyn.            Volodymyr Claudio MD  Neurosurgery  [facility]

## 2018-08-21 NOTE — ASSESSMENT & PLAN NOTE
Patient is a 32 year old female with medical history of HTN, Renal disorder, polycystic kidney disease who was transferred from [facility] with diffuse SAH.  Imaging identified an anterior communicating aneurysm which was successfully coiled 8/10.  On 8/16, patient developed vasospasm and was taken to IR and treated with verapamil.  She continues with mild and moderate vasospasm, but has no clinical deficits.  She does report significant headache and stiff neck.  She is receiving nimodipine and daily TCDs with close monitoring.  She is currently on neosynephrine to keep SBP between 180-220.    Antithrombotics for secondary stroke prevention: none SAH     Statins for secondary stroke prevention and hyperlipidemia, if present: SAH     Aggressive risk factor modification: polycytsic kidney disease, HTN      Rehab efforts: Occupational Therapy, PT/OT/SLP to evaluate and treat when appropriate     Diagnostics ordered/pending: Daily TCDs    VTE prophylaxis: SCDs    BP parameters: SAH: Secured aneurysm, no target, increase BP to prevent vasospasm if needed

## 2018-08-21 NOTE — ASSESSMENT & PLAN NOTE
-Monitor daily TCD's  -Nimotop  -Continues with mild and moderate vasospasm; no clinical deterioration  -close monitoring  -on Burton drip to keep -220

## 2018-08-21 NOTE — PROGRESS NOTES
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 C/o acute onset AMS that began prior to arrival in ED.  Patient's aunt reports calling the patient's phone at 11:30 am this morning and reports the patient's 6 year old son answering the phone stating that the patient was lying on the floor, not able to get up. EMS reports upon their arrival, the patient was found on the floor, faced down, with her head against a wall. EMS reports the patient to be hypertensive and incontinent.   Pt has been non-verbal since arrival. Per family patient began complaining of a headache yesterday. Last time normal per  was 0500 today while on his way to work. Patient's aunt reports the patient has not received any prenatal care for this pregnancy.  No other history could be obtained at this time.  History is limited secondary to acuity of the patient's condition.  So last seen normal by adult at 5am. eleanor reports pt has been taking some OTC meds for headache. Pt's  reports pt not taking any regular medications at home.     Hospital Course: 8/10: pt admitted to [facility] for SAH. CT, MRI, MRA ordered and results pending   8/11: post angio with coil acom, EVD at 10, wean prop to extubate, MRI once stable, Urine studies for hyponatremia, TCDs, check Mg q 4  8/12: Mg gtt stopped over night, fluid boluses, pain control  8/16: PBD 6: +620mL fluid overnight.  Elevated peak velocity on TCD, Angio in AM.   8/19: PBD 9: +547mL, Na 134, increase 2% to 45/hour, decrease NS to 110/hour. TCDs decreased yesterday from 8/17, monitor today. SBP < 180  8/20: PBD 10: +1.3L, transfused 1 unit pRBC last night, Na 135 put on 2% 100mL/hour this morning. Monitor  TCDs. Moderate neck pain; No BM for 4 days.  8/21: PBD 11: + 1.3L last 24 hours, Na 136 on 2% NS at 125mL/hour. TCDs from yesterday remain elevated, will follow exam. Phenylephrine gtt to maintain -220.     Interval History:  ACACIA overnight, headache and neck pain are at same intermittent level.     Review of Systems   Constitutional: Negative for activity change.        +chills due to feeling cold; no fevers      HENT: Negative for postnasal drip.    Respiratory: Negative for chest tightness.    Cardiovascular: Negative for chest pain.   Neurological: Negative for dizziness, seizures, speech difficulty and weakness.        Objective:     Vitals:  Temp: 100.2 °F (37.9 °C)  Pulse: 88  Rhythm: normal sinus rhythm  BP: 133/67  MAP (mmHg): 93  ICP Mean (mmHg): 11 mmHg  Resp: (!) 28  SpO2: 98 %  O2 Device (Oxygen Therapy): room air    Temp  Min: 99 °F (37.2 °C)  Max: 100.2 °F (37.9 °C)  Pulse  Min: 61  Max: 88  BP  Min: 124/82  Max: 202/103  MAP (mmHg)  Min: 93  Max: 148  ICP Mean (mmHg)  Min: 2 mmHg  Max: 11 mmHg  Resp  Min: 20  Max: 37  SpO2  Min: 97 %  Max: 100 %    08/20 0701 - 08/21 0700  In: 4297.1 [I.V.:4297.1]  Out: 3005 [Urine:2765; Drains:240]   Unmeasured Output  Urine Occurrence: 1  Stool Occurrence: 1  Emesis Occurrence: 1  Pad Count: 1       Physical Exam   Constitutional: She appears well-developed and well-nourished. No distress.   Cardiovascular: Normal rate and intact distal pulses.   Pulmonary/Chest: Effort normal. No respiratory distress.   General: Laying comfortably in bed, NAD  Neuro: A+Ox3, speech clear and coherent.  E4V5M6; follows commands  CN: PERRL (4-3mm), EOMI, no facial droop, V1-3 intact, shrugs shoulders evenly  Motor: 5/5 in UE and LE b/l  Sensation: SILT  No pronator drift     Medications:  Continuous  phenylephrine Last Rate: 4 mcg/kg/min (08/21/18 1356)   Sodium Chloride 2% Last Rate: 125 mL/hr at 08/21/18 1301   Scheduled  ceFAZolin (ANCEF) IVPB 1 g Q8H   famotidine 20 mg BID    ferrous sulfate 325 mg Daily   heparin (porcine) 5,000 Units Q8H   niMODipine 30 mg Q2H   polyethylene glycol 17 g Daily   prenatal vitamin 1 tablet Daily   senna-docusate 8.6-50 mg 1 tablet BID   sodium chloride 0.9% 10 mL Q6H   PRN  acetaminophen 650 mg Q6H PRN   acetaminophen 650 mg Q4H PRN   calcium gluconate 1 g PRN   labetalol 10 mg Q4H PRN   magnesium oxide 800 mg PRN   magnesium oxide 800 mg PRN   midazolam 2 mg PRN   oxyCODONE 10 mg Q4H PRN   potassium chloride 10% 40 mEq PRN   potassium chloride 10% 40 mEq PRN   potassium chloride 10% 60 mEq PRN   potassium, sodium phosphates 2 packet PRN   potassium, sodium phosphates 2 packet PRN   potassium, sodium phosphates 2 packet PRN   sodium chloride 0.9% 10 mL PRN   sodium chloride 0.9% 5 mL PRN     Today I personally reviewed pertinent medications, lines/drains/airways, imaging, lab results, notably:    Diet  Diet Adult Regular (IDDSI Level 7)  Diet Adult Regular (IDDSI Level 7)        Assessment/Plan:     Neuro   * Subarachnoid 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 HH4F4 SAH.  PBD 11 s/p coiling (PCD 10).    Acute risk for vasospasm with elevated velocities noted on TCDs today and yesterday.  IR team on call made aware; given that no acute examination change, we will continue with Q1 hour neurochecks and consider angiography should condition worsen.   EVD at 10 open: 240 output last 24, ICPs < 20.   Daily TCDs: Has demonstrated elevated velocities (8/20)    -s/p blood transfusion (8/18) and increase in fluid administration   -s/p angio (8/16) with verapimil injections  Nimodipine 30q2   Finished 7D Keppra 500 BID course  -Phenylephrine gtt for BP between 180-220.  EVD ppx with Cefazolin 1g q8   -On 125mL of 2%NS per hour with +1.3L fluid balance yesterday    2. Hyponatremia: Na 136 today; was 135 yesterday   -Increased 2% Na to 125mL/hour   -Continue serial BMPs    3. Acute pain: Oxycodone 10mg q 4 hours, Versed 2g  PRN    Fen/GI: normal diet; encourage PO intake   -Maintain Euvolemia  Strict I's and O's: +1.3L last 24 hours  2% increase to 125/hour  Had first BM in 4 days overnight; continue miralax and doc senna for BR.     Pulm:   CXR 8/13 wnl, lines confirmed.  Will attempt to minimize radiation for pregnancy  Monitor pulmonary status, on RA. Saturating well     CV: aneurysm secured, keep -220  -Has been off of Cardene  -Phenylephrine drip   -Fluid bolus prn   -Replace A Line today, as patient pulled out A Line this AM.               Brain compression    EVD  Goal Na normal: 136 today  Continue daily BMPs.   Neuro checks          Renal/   PKD (polycystic kidney disease)    BUN: 6; Cr 0.6; no active issues or meds per condition  Will monitor            Prophylaxis:  Venous Thromboembolism: mechanical chemical  Stress Ulcer: H2B  Ventilator Pneumonia: not applicable     Activity Orders          None        Full Code    Valerio Croft MD  Neurocritical Care  [facility]

## 2018-08-21 NOTE — ASSESSMENT & PLAN NOTE
32 y.o. female 24 weeks pregnant with history of polycystic kidney disease with HH4F4 SAH on 8/10, now s/p acom coiling 8/10.     NEURO  -Continue NCC  -q 1 hr neuro checks  -evd open to 10 and draining, monitor icps   -CSF profile reviewed from 8/19   -will clamp EVD on thursday  -nimotop  -daily TCDs   -TCDs elevated; exam stable     -Keppra      CV  -permissive HTN  <220 as aneurysm secured    RESP  -stable on room air  -Aggressive pulmonary management  -IS @ bedside    FEN/GI  -cont 2%  -strict I/Os  -goal net even or positive  -Reg diet    ID  -ancef for drain ppx    PPX  -SQH  -famotidine for PUD ppx  -TEDs/SCDs    -further mgmt per ncc and obgyn.

## 2018-08-21 NOTE — ASSESSMENT & PLAN NOTE
Risk factor for stroke  -Patient with continued vasospasm requiring increased SBP  -Close monitoring

## 2018-08-21 NOTE — PROGRESS NOTES
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  Imaging identified an anterior communicating aneurysm which was successfully coiled 8/10.  On 8/16, patient developed vasospasm and was taken to IR and treated with verapamil.  She continues with mild and moderate vasospasm, but has no clinical deficits.  She does report significant headache and stiff neck.  She is receiving nimodipine and daily TCDs with close monitoring.  She is currently on neosynephrine to keep SBP between 180-220.    Antithrombotics for secondary stroke prevention: none SAH     Statins for secondary stroke prevention and hyperlipidemia, if present: SAH     Aggressive risk factor modification: polycytsic kidney disease, HTN      Rehab efforts: Occupational Therapy, PT/OT/SLP to evaluate and treat when appropriate     Diagnostics ordered/pending: Daily TCDs    VTE prophylaxis: SCDs    BP parameters: SAH: Secured aneurysm, no target, increase BP to prevent vasospasm if needed             Cerebral artery vasospasm    -Monitor daily TCD's  -Nimotop  -Continues with mild and moderate vasospasm; no clinical deterioration  -close monitoring  -on Burton drip to keep -220          25 weeks gestation of pregnancy    Per [facility] recommendations        Hypertension affecting pregnancy in second trimester    Risk factor for stroke  -Patient with continued vasospasm requiring increased SBP  -Close monitoring        PKD (polycystic kidney disease)    Risk factor for aneurysm              33 y/o female found down @~24w5d admitted for management of subarachnoid hemmorhage likely due to ruptured aneurysm  Had coiling done ACOM 8-10-18  8-16-18 to IR for cerebral  angiogram for vasospasm and had IV Verapamil instilled   8-19-18 with headache TCD's better yeaterday  8/20:  Continues with HA worse with positional movement.  No neurological deficits.  TCDs for today pending.  Yesterday demonstrated multiple areas of mild and moderate vasospasm with no clinical deterioration.  NIH remains 0.    8/21: Continues with headache and stiff neck.  No new neuro symptoms.  TCDs ratios improved some, but continues with vasospasm with no clinical deterioration.  On radha drip to keep -220.      STROKE DOCUMENTATION        NIH Scale:  1a. Level Of Consciousness: 0-->Alert: keenly responsive  1b. LOC Questions: 0-->Answers both questions correctly  1c. LOC Commands: 0-->Performs both tasks correctly  2. Best Gaze: 0-->Normal  3. Visual: 0-->No visual loss  4. Facial Palsy: 0-->Normal symmetrical movements  5a. Motor Arm, Left: 0-->No drift: limb holds 90 (or 45) degrees for full 10 secs  5b. Motor Arm, Right: 0-->No drift: limb holds 90 (or 45) degrees for full 10 secs  6a. Motor Leg, Left: 0-->No drift: leg holds 30 degree position for full 5 secs  6b. Motor Leg, Right: 0-->No drift: leg holds 30 degree position for full 5 secs  7. Limb Ataxia: 0-->Absent  8. Sensory: 0-->Normal: no sensory loss  9. Best Language: 0-->No aphasia: normal  10. Dysarthria: 0-->Normal  11. Extinction and Inattention (formerly Neglect): 0-->No abnormality  Total (NIH Stroke Scale): 0       Modified Sherman    [place] Coma Scale:    ABCD2 Score:    [id number] Score:   HAS -BLED Score:   ICH Score:   Hunt & Collins Classification:      Hemorrhagic change of an Ischemic Stroke: Does this patient have an ischemic stroke with hemorrhagic changes? No     Neurologic Chief Complaint: SAH    Subjective:     Interval History: Patient is seen for follow-up neurological assessment and treatment recommendations:  Continues with headache and stiff neck.  No new neuro symptoms.  TCDs ratios improved some, but continues with  vasospasm with no clinical deterioration.  On radha drip to keep -220.    HPI, Past Medical, Family, and Social History remains the same as documented in the initial encounter. Just c/o headache     Review of Systems   Constitutional: Negative for fever.   HENT: Negative for trouble swallowing.    Eyes: Negative for photophobia.   Musculoskeletal: Positive for neck stiffness.   Neurological: Positive for headaches. Negative for speech difficulty and weakness.     Scheduled Meds:   ceFAZolin (ANCEF) IVPB  1 g Intravenous Q8H    famotidine  20 mg Oral BID    ferrous sulfate  325 mg Oral Daily    heparin (porcine)  5,000 Units Subcutaneous Q8H    niMODipine  30 mg Oral Q2H    polyethylene glycol  17 g Oral Daily    prenatal vitamin  1 tablet Oral Daily    senna-docusate 8.6-50 mg  1 tablet Oral BID    sodium chloride 0.9%  10 mL Intravenous Q6H     Continuous Infusions:   phenylephrine 3 mcg/kg/min (08/21/18 1601)    Sodium Chloride 2% 125 mL/hr at 08/21/18 1601     PRN Meds:acetaminophen, acetaminophen, calcium gluconate, labetalol, magnesium oxide, magnesium oxide, midazolam, oxyCODONE, potassium chloride 10%, potassium chloride 10%, potassium chloride 10%, potassium, sodium phosphates, potassium, sodium phosphates, potassium, sodium phosphates, Flushing PICC Protocol **AND** sodium chloride 0.9% **AND** sodium chloride 0.9%, sodium chloride 0.9%    Objective:     Vital Signs (Most Recent):  Temp: 99.6 °F (37.6 °C) (08/21/18 1501)  Pulse: 77 (08/21/18 1601)  Resp: (!) 40 (08/21/18 1601)  BP: (!) 160/91 (08/21/18 1601)  SpO2: 98 % (08/21/18 1601)  BP Location: Left arm    Vital Signs Range (Last 24H):  Temp:  [99 °F (37.2 °C)-100.2 °F (37.9 °C)]   Pulse:  [57-92]   Resp:  [20-40]   BP: (124-202)/()   SpO2:  [97 %-100 %]   Arterial Line BP: (129-216)/()   BP Location: Left arm    Physical Exam   Constitutional: She appears well-developed and well-nourished.   Pregnant   Skin: Skin is warm  and dry.   Psychiatric: She has a normal mood and affect. Her behavior is normal.   Nursing note and vitals reviewed.      Neurological Exam:   LOC: alert  Attention Span: Good   Language: No aphasia  Articulation: No dysarthria  Orientation: Person, Place, Time   Visual Fields: Full  EOM (CN III, IV, VI): Full/intact  Pupils (CN II, III): PERRL  Facial Sensation (CN V): Normal  Facial Movement (CN VII): Symmetric facial expression    Motor: Arm left  Normal 5/5  Leg left  Normal 5/5  Arm right  Normal 5/5  Leg right Normal 5/5  Sensation: Intact to light touch, temperature and vibration  Tone: Normal tone throughout    Laboratory:  CMP:   Recent Labs   Lab  08/21/18 0422 08/21/18   1436   CALCIUM  8.0*   --    ALBUMIN  2.5*   --    PROT  6.3   --    NA  136  136  137   K  3.2*   --    CO2  21*   --    CL  109   --    BUN  6   --    CREATININE  0.6   --    ALKPHOS  84   --    ALT  22   --    AST  17   --    BILITOT  0.4   --      CBC:   Recent Labs   Lab  08/21/18 0422   WBC  17.33*   RBC  2.91*   HGB  8.2*   HCT  25.5*   PLT  383*   MCV  88   MCH  28.2   MCHC  32.2     Lipid Panel: No results for input(s): CHOL, LDLCALC, HDL, TRIG in the last 168 hours.  Coagulation:   Recent Labs   Lab  08/21/18 0422   INR  1.1     Platelet Aggregation Study: No results for input(s): PLTAGG, PLTAGINTERP, PLTAGREGLACO, ADPPLTAGGREG in the last 168 hours.  Hgb A1C: No results for input(s): HGBA1C in the last 168 hours.  TSH: No results for input(s): TSH in the last 168 hours.    Diagnostic Results     Brain Imaging   CT Head w/o contrast 8-10-18 results:    1. Extensive subarachnoid hemorrhage, most likely from an aneurysm rupture.  It is difficult to determine the site of the aneurysm rupture on this study due to the diffuse nature of the subarachnoid hemorrhage throughout the posterior fossa as well as the basilar cisternal spaces.  2. Slight increase in the ventricular size when compared to the previous study suggestive  of a developing hydrocephalus.    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  Successful coil embolization of this aneurysm.     Cardiac Imaging   2D Echo 8-11-18 results:    1 - Eccentric hypertrophy.     2 - Normal left ventricular systolic function (EF 60-65%).     3 - No wall motion abnormalities.     4 - Normal left ventricular diastolic function.     5 - Normal right ventricular systolic function .     6 - The estimated PA systolic pressure is 31 mmHg.     7 - Trivial pericardial effusion.       Gladis Matt NP  Comprehensive Stroke Center  Department of Vascular Neurology   [facility]

## 2018-08-22 LAB
ALBUMIN SERPL BCP-MCNC: 2.3 G/DL
ALP SERPL-CCNC: 73 U/L
ALT SERPL W/O P-5'-P-CCNC: 20 U/L
ANION GAP SERPL CALC-SCNC: 9 MMOL/L
AST SERPL-CCNC: 15 U/L
BASOPHILS # BLD AUTO: 0.01 K/UL
BASOPHILS # BLD AUTO: 0.02 K/UL
BASOPHILS NFR BLD: 0.1 %
BASOPHILS NFR BLD: 0.2 %
BILIRUB SERPL-MCNC: 0.3 MG/DL
BILIRUB UR QL STRIP: NEGATIVE
BUN SERPL-MCNC: 4 MG/DL
CALCIUM SERPL-MCNC: 8 MG/DL
CHLORIDE SERPL-SCNC: 108 MMOL/L
CLARITY UR REFRACT.AUTO: CLEAR
CO2 SERPL-SCNC: 17 MMOL/L
COLOR UR AUTO: ABNORMAL
CREAT SERPL-MCNC: 0.6 MG/DL
DIFFERENTIAL METHOD: ABNORMAL
DIFFERENTIAL METHOD: ABNORMAL
EOSINOPHIL # BLD AUTO: 0 K/UL
EOSINOPHIL # BLD AUTO: 0.1 K/UL
EOSINOPHIL NFR BLD: 0.3 %
EOSINOPHIL NFR BLD: 0.5 %
ERYTHROCYTE [DISTWIDTH] IN BLOOD BY AUTOMATED COUNT: 15.9 %
ERYTHROCYTE [DISTWIDTH] IN BLOOD BY AUTOMATED COUNT: 16.5 %
EST. GFR  (AFRICAN AMERICAN): >60 ML/MIN/1.73 M^2
EST. GFR  (NON AFRICAN AMERICAN): >60 ML/MIN/1.73 M^2
GLUCOSE SERPL-MCNC: 95 MG/DL
GLUCOSE UR QL STRIP: NEGATIVE
HCT VFR BLD AUTO: 23.8 %
HCT VFR BLD AUTO: 24.2 %
HCT VFR BLD AUTO: 24.5 %
HGB BLD-MCNC: 7.6 G/DL
HGB BLD-MCNC: 7.7 G/DL
HGB BLD-MCNC: 7.9 G/DL
HGB UR QL STRIP: ABNORMAL
IMM GRANULOCYTES # BLD AUTO: 0.07 K/UL
IMM GRANULOCYTES # BLD AUTO: 0.1 K/UL
IMM GRANULOCYTES NFR BLD AUTO: 0.7 %
IMM GRANULOCYTES NFR BLD AUTO: 0.9 %
INR PPP: 1.1
KETONES UR QL STRIP: NEGATIVE
LEUKOCYTE ESTERASE UR QL STRIP: NEGATIVE
LYMPHOCYTES # BLD AUTO: 1.2 K/UL
LYMPHOCYTES # BLD AUTO: 1.4 K/UL
LYMPHOCYTES NFR BLD: 11.2 %
LYMPHOCYTES NFR BLD: 13.6 %
MAGNESIUM SERPL-MCNC: 1.5 MG/DL
MAGNESIUM SERPL-MCNC: 1.6 MG/DL
MCH RBC QN AUTO: 28.9 PG
MCH RBC QN AUTO: 29.6 PG
MCHC RBC AUTO-ENTMCNC: 31.4 G/DL
MCHC RBC AUTO-ENTMCNC: 32.4 G/DL
MCV RBC AUTO: 92 FL
MCV RBC AUTO: 92 FL
MICROSCOPIC COMMENT: NORMAL
MONOCYTES # BLD AUTO: 0.7 K/UL
MONOCYTES # BLD AUTO: 0.9 K/UL
MONOCYTES NFR BLD: 6.5 %
MONOCYTES NFR BLD: 8.1 %
NEUTROPHILS # BLD AUTO: 8.1 K/UL
NEUTROPHILS # BLD AUTO: 8.7 K/UL
NEUTROPHILS NFR BLD: 78.6 %
NEUTROPHILS NFR BLD: 79.3 %
NITRITE UR QL STRIP: NEGATIVE
NRBC BLD-RTO: 0 /100 WBC
NRBC BLD-RTO: 0 /100 WBC
OSMOLALITY UR: 440 MOSM/KG
PH UR STRIP: 7 [PH] (ref 5–8)
PHOSPHATE SERPL-MCNC: 1.8 MG/DL
PHOSPHATE SERPL-MCNC: 2.4 MG/DL
PLATELET # BLD AUTO: 258 K/UL
PLATELET # BLD AUTO: 275 K/UL
PMV BLD AUTO: 10.1 FL
PMV BLD AUTO: 10.4 FL
POCT GLUCOSE: 110 MG/DL (ref 70–110)
POCT GLUCOSE: 115 MG/DL (ref 70–110)
POCT GLUCOSE: 130 MG/DL (ref 70–110)
POTASSIUM SERPL-SCNC: 3.5 MMOL/L
POTASSIUM SERPL-SCNC: 3.7 MMOL/L
POTASSIUM SERPL-SCNC: 4 MMOL/L
PROT SERPL-MCNC: 5.9 G/DL
PROT UR QL STRIP: NEGATIVE
PROTHROMBIN TIME: 11.3 SEC
RBC # BLD AUTO: 2.6 M/UL
RBC # BLD AUTO: 2.63 M/UL
RBC #/AREA URNS AUTO: 3 /HPF (ref 0–4)
SODIUM SERPL-SCNC: 134 MMOL/L
SODIUM SERPL-SCNC: 134 MMOL/L
SODIUM SERPL-SCNC: 137 MMOL/L
SODIUM SERPL-SCNC: 137 MMOL/L
SODIUM UR-SCNC: 205 MMOL/L
SP GR UR STRIP: 1.01 (ref 1–1.03)
SQUAMOUS #/AREA URNS AUTO: 1 /HPF
URN SPEC COLLECT METH UR: ABNORMAL
UROBILINOGEN UR STRIP-ACNC: NEGATIVE EU/DL
WBC # BLD AUTO: 10.28 K/UL
WBC # BLD AUTO: 11.01 K/UL
WBC #/AREA URNS AUTO: 1 /HPF (ref 0–5)

## 2018-08-22 PROCEDURE — 83935 ASSAY OF URINE OSMOLALITY: CPT

## 2018-08-22 PROCEDURE — 63600175 PHARM REV CODE 636 W HCPCS: Performed by: STUDENT IN AN ORGANIZED HEALTH CARE EDUCATION/TRAINING PROGRAM

## 2018-08-22 PROCEDURE — A4216 STERILE WATER/SALINE, 10 ML: HCPCS | Performed by: PSYCHIATRY & NEUROLOGY

## 2018-08-22 PROCEDURE — 25000003 PHARM REV CODE 250: Performed by: ANESTHESIOLOGY

## 2018-08-22 PROCEDURE — 85014 HEMATOCRIT: CPT

## 2018-08-22 PROCEDURE — A4217 STERILE WATER/SALINE, 500 ML: HCPCS | Performed by: NURSE PRACTITIONER

## 2018-08-22 PROCEDURE — 84132 ASSAY OF SERUM POTASSIUM: CPT | Mod: 91

## 2018-08-22 PROCEDURE — 25000003 PHARM REV CODE 250: Performed by: NURSE PRACTITIONER

## 2018-08-22 PROCEDURE — 81001 URINALYSIS AUTO W/SCOPE: CPT

## 2018-08-22 PROCEDURE — 84100 ASSAY OF PHOSPHORUS: CPT | Mod: 91

## 2018-08-22 PROCEDURE — 84300 ASSAY OF URINE SODIUM: CPT

## 2018-08-22 PROCEDURE — 84100 ASSAY OF PHOSPHORUS: CPT

## 2018-08-22 PROCEDURE — 25000003 PHARM REV CODE 250: Performed by: PHYSICIAN ASSISTANT

## 2018-08-22 PROCEDURE — 94761 N-INVAS EAR/PLS OXIMETRY MLT: CPT

## 2018-08-22 PROCEDURE — 84132 ASSAY OF SERUM POTASSIUM: CPT

## 2018-08-22 PROCEDURE — 25000003 PHARM REV CODE 250: Performed by: STUDENT IN AN ORGANIZED HEALTH CARE EDUCATION/TRAINING PROGRAM

## 2018-08-22 PROCEDURE — P9045 ALBUMIN (HUMAN), 5%, 250 ML: HCPCS | Mod: JG | Performed by: NURSE PRACTITIONER

## 2018-08-22 PROCEDURE — 63600175 PHARM REV CODE 636 W HCPCS: Performed by: NURSE PRACTITIONER

## 2018-08-22 PROCEDURE — 83735 ASSAY OF MAGNESIUM: CPT | Mod: 91

## 2018-08-22 PROCEDURE — 84295 ASSAY OF SERUM SODIUM: CPT | Mod: 91

## 2018-08-22 PROCEDURE — 85018 HEMOGLOBIN: CPT

## 2018-08-22 PROCEDURE — 25000003 PHARM REV CODE 250: Performed by: PSYCHIATRY & NEUROLOGY

## 2018-08-22 PROCEDURE — 20000000 HC ICU ROOM

## 2018-08-22 PROCEDURE — 83735 ASSAY OF MAGNESIUM: CPT

## 2018-08-22 PROCEDURE — 99291 CRITICAL CARE FIRST HOUR: CPT | Mod: ,,, | Performed by: ANESTHESIOLOGY

## 2018-08-22 PROCEDURE — 85025 COMPLETE CBC W/AUTO DIFF WBC: CPT | Mod: 91

## 2018-08-22 PROCEDURE — 63600175 PHARM REV CODE 636 W HCPCS: Performed by: PHYSICIAN ASSISTANT

## 2018-08-22 PROCEDURE — 85610 PROTHROMBIN TIME: CPT

## 2018-08-22 PROCEDURE — 80053 COMPREHEN METABOLIC PANEL: CPT

## 2018-08-22 PROCEDURE — 99233 SBSQ HOSP IP/OBS HIGH 50: CPT | Mod: ,,, | Performed by: NEUROLOGICAL SURGERY

## 2018-08-22 RX ORDER — MAGNESIUM SULFATE HEPTAHYDRATE 40 MG/ML
2 INJECTION, SOLUTION INTRAVENOUS ONCE
Status: COMPLETED | OUTPATIENT
Start: 2018-08-22 | End: 2018-08-22

## 2018-08-22 RX ORDER — ALBUMIN HUMAN 50 G/1000ML
25 SOLUTION INTRAVENOUS ONCE
Status: COMPLETED | OUTPATIENT
Start: 2018-08-22 | End: 2018-08-22

## 2018-08-22 RX ADMIN — MAGNESIUM OXIDE TAB 400 MG (241.3 MG ELEMENTAL MG) 800 MG: 400 (241.3 MG) TAB at 08:08

## 2018-08-22 RX ADMIN — NIMODIPINE 30 MG: 30 CAPSULE, LIQUID FILLED ORAL at 07:08

## 2018-08-22 RX ADMIN — Medication 10 ML: at 05:08

## 2018-08-22 RX ADMIN — NIMODIPINE 30 MG: 30 CAPSULE, LIQUID FILLED ORAL at 09:08

## 2018-08-22 RX ADMIN — POTASSIUM CHLORIDE 40 MEQ: 20 SOLUTION ORAL at 06:08

## 2018-08-22 RX ADMIN — SODIUM CHLORIDE 500 ML: 0.9 INJECTION, SOLUTION INTRAVENOUS at 11:08

## 2018-08-22 RX ADMIN — HEPARIN SODIUM 5000 UNITS: 5000 INJECTION, SOLUTION INTRAVENOUS; SUBCUTANEOUS at 01:08

## 2018-08-22 RX ADMIN — MAGNESIUM SULFATE IN WATER 2 G: 40 INJECTION, SOLUTION INTRAVENOUS at 09:08

## 2018-08-22 RX ADMIN — CEFAZOLIN 1 G: 1 INJECTION, POWDER, FOR SOLUTION INTRAMUSCULAR; INTRAVENOUS at 05:08

## 2018-08-22 RX ADMIN — NIMODIPINE 30 MG: 30 CAPSULE, LIQUID FILLED ORAL at 05:08

## 2018-08-22 RX ADMIN — CEFAZOLIN 1 G: 1 INJECTION, POWDER, FOR SOLUTION INTRAMUSCULAR; INTRAVENOUS at 01:08

## 2018-08-22 RX ADMIN — CEFAZOLIN 1 G: 1 INJECTION, POWDER, FOR SOLUTION INTRAMUSCULAR; INTRAVENOUS at 09:08

## 2018-08-22 RX ADMIN — NIMODIPINE 30 MG: 30 CAPSULE, LIQUID FILLED ORAL at 04:08

## 2018-08-22 RX ADMIN — SODIUM CHLORIDE: 234 INJECTION INTRAMUSCULAR; INTRAVENOUS; SUBCUTANEOUS at 10:08

## 2018-08-22 RX ADMIN — OXYCODONE HYDROCHLORIDE 10 MG: 5 TABLET ORAL at 12:08

## 2018-08-22 RX ADMIN — NIMODIPINE 30 MG: 30 CAPSULE, LIQUID FILLED ORAL at 01:08

## 2018-08-22 RX ADMIN — Medication 10 ML: at 12:08

## 2018-08-22 RX ADMIN — ALBUMIN HUMAN 25 G: 0.05 INJECTION, SOLUTION INTRAVENOUS at 05:08

## 2018-08-22 RX ADMIN — POTASSIUM & SODIUM PHOSPHATES POWDER PACK 280-160-250 MG 2 PACKET: 280-160-250 PACK at 06:08

## 2018-08-22 RX ADMIN — HEPARIN SODIUM 5000 UNITS: 5000 INJECTION, SOLUTION INTRAVENOUS; SUBCUTANEOUS at 09:08

## 2018-08-22 RX ADMIN — SODIUM PHOSPHATE, MONOBASIC, MONOHYDRATE 30 MMOL: 276; 142 INJECTION, SOLUTION INTRAVENOUS at 10:08

## 2018-08-22 RX ADMIN — ACETAMINOPHEN 650 MG: 325 TABLET, FILM COATED ORAL at 07:08

## 2018-08-22 RX ADMIN — SENNOSIDES AND DOCUSATE SODIUM 1 TABLET: 8.6; 5 TABLET ORAL at 09:08

## 2018-08-22 RX ADMIN — OXYCODONE HYDROCHLORIDE 10 MG: 5 TABLET ORAL at 06:08

## 2018-08-22 RX ADMIN — MAGNESIUM OXIDE TAB 400 MG (241.3 MG ELEMENTAL MG) 800 MG: 400 (241.3 MG) TAB at 06:08

## 2018-08-22 RX ADMIN — SODIUM CHLORIDE: 234 INJECTION INTRAMUSCULAR; INTRAVENOUS; SUBCUTANEOUS at 07:08

## 2018-08-22 RX ADMIN — SODIUM CHLORIDE: 234 INJECTION INTRAMUSCULAR; INTRAVENOUS; SUBCUTANEOUS at 05:08

## 2018-08-22 RX ADMIN — FAMOTIDINE 20 MG: 20 TABLET ORAL at 08:08

## 2018-08-22 RX ADMIN — SODIUM CHLORIDE: 234 INJECTION INTRAMUSCULAR; INTRAVENOUS; SUBCUTANEOUS at 01:08

## 2018-08-22 RX ADMIN — NIMODIPINE 30 MG: 30 CAPSULE, LIQUID FILLED ORAL at 12:08

## 2018-08-22 RX ADMIN — NIMODIPINE 30 MG: 30 CAPSULE, LIQUID FILLED ORAL at 11:08

## 2018-08-22 RX ADMIN — ACETAMINOPHEN 650 MG: 325 TABLET, FILM COATED ORAL at 03:08

## 2018-08-22 RX ADMIN — POTASSIUM CHLORIDE 40 MEQ: 20 SOLUTION ORAL at 02:08

## 2018-08-22 RX ADMIN — Medication 10 ML: at 06:08

## 2018-08-22 RX ADMIN — ACETAMINOPHEN 650 MG: 325 TABLET, FILM COATED ORAL at 12:08

## 2018-08-22 RX ADMIN — OXYCODONE HYDROCHLORIDE 10 MG: 5 TABLET ORAL at 11:08

## 2018-08-22 RX ADMIN — POTASSIUM & SODIUM PHOSPHATES POWDER PACK 280-160-250 MG 2 PACKET: 280-160-250 PACK at 08:08

## 2018-08-22 RX ADMIN — PRENATAL VIT W/ FE FUMARATE-FA TAB 27-0.8 MG 1 TABLET: 27-0.8 TAB at 08:08

## 2018-08-22 RX ADMIN — OXYCODONE HYDROCHLORIDE 10 MG: 5 TABLET ORAL at 05:08

## 2018-08-22 RX ADMIN — SODIUM CHLORIDE: 234 INJECTION INTRAMUSCULAR; INTRAVENOUS; SUBCUTANEOUS at 03:08

## 2018-08-22 RX ADMIN — FAMOTIDINE 20 MG: 20 TABLET ORAL at 09:08

## 2018-08-22 RX ADMIN — FERROUS SULFATE TAB EC 325 MG (65 MG FE EQUIVALENT) 325 MG: 325 (65 FE) TABLET DELAYED RESPONSE at 08:08

## 2018-08-22 RX ADMIN — POTASSIUM & SODIUM PHOSPHATES POWDER PACK 280-160-250 MG 2 PACKET: 280-160-250 PACK at 01:08

## 2018-08-22 RX ADMIN — SODIUM CHLORIDE: 234 INJECTION INTRAMUSCULAR; INTRAVENOUS; SUBCUTANEOUS at 12:08

## 2018-08-22 RX ADMIN — SODIUM CHLORIDE: 234 INJECTION INTRAMUSCULAR; INTRAVENOUS; SUBCUTANEOUS at 08:08

## 2018-08-22 RX ADMIN — NIMODIPINE 30 MG: 30 CAPSULE, LIQUID FILLED ORAL at 08:08

## 2018-08-22 RX ADMIN — HEPARIN SODIUM 5000 UNITS: 5000 INJECTION, SOLUTION INTRAVENOUS; SUBCUTANEOUS at 05:08

## 2018-08-22 NOTE — PROGRESS NOTES
[facility]  Neurosurgery  Progress Note    Subjective:     History of Present Illness: 32 y.o. female 24 weeks pregnant with a history of polycystic kidney disease who presents as transfer from [facility] for SAH. Patient found down by 6 yr old son. Last known normal 0500 today. CT at [facility] revealed SAH within the basal cisterns. US at [facility] with + fetal heart tones. Transferred for neuro evaluation.     Post-Op Info:  Procedure(s) (LRB):  Mri (magnetic resonance imaging) (N/A)   12 Days Post-Op     Interval History: SEGUNDO WHITEHEAD@10. ICP 4-10. Stable neuro exam.     Medications:  Continuous Infusions:   Sodium Chloride 2% 150 mL/hr at 08/22/18 0725     Scheduled Meds:   ceFAZolin (ANCEF) IVPB  1 g Intravenous Q8H    famotidine  20 mg Oral BID    ferrous sulfate  325 mg Oral Daily    heparin (porcine)  5,000 Units Subcutaneous Q8H    niMODipine  30 mg Oral Q2H    polyethylene glycol  17 g Oral Daily    prenatal vitamin  1 tablet Oral Daily    senna-docusate 8.6-50 mg  1 tablet Oral BID    sodium chloride 0.9%  10 mL Intravenous Q6H     PRN Meds:acetaminophen, acetaminophen, calcium gluconate, labetalol, magnesium oxide, magnesium oxide, midazolam, oxyCODONE, potassium chloride 10%, potassium chloride 10%, potassium chloride 10%, potassium, sodium phosphates, potassium, sodium phosphates, potassium, sodium phosphates, Flushing PICC Protocol **AND** sodium chloride 0.9% **AND** sodium chloride 0.9%, sodium chloride 0.9%     Review of Systems  Objective:     Weight: 75.7 kg (166 lb 14.2 oz)  Body mass index is 24.65 kg/m².  Vital Signs (Most Recent):  Temp: 99.5 °F (37.5 °C) (08/22/18 0303)  Pulse: 80 (08/22/18 0801)  Resp: (!) 21 (08/22/18 0801)  BP: 131/81 (08/22/18 0801)  SpO2: 100 % (08/22/18 0801) Vital Signs (24h Range):  Temp:  [99 °F (37.2 °C)-100.2 °F (37.9 °C)] 99.5 °F (37.5 °C)  Pulse:  [] 80  Resp:  [21-51] 21  SpO2:  [97 %-100 %] 100 %  BP: (114-202)/() 131/81  Arterial Line BP:  (192)/(89-90) 192/89     Date 08/22/18 0700 - 08/23/18 0659   Shift 0361-3895 7569-6941 2232-7002 24 Hour Total   INTAKE   I.V.(mL/kg) 150(2)   150(2)   Shift Total(mL/kg) 150(2)   150(2)   OUTPUT   Drains 5   5   Shift Total(mL/kg) 5(0.1)   5(0.1)   Weight (kg) 75.7 75.7 75.7 75.7                        ICP/Ventriculostomy 08/11/18 0000 Ventricular drainage catheter with ICP microsensor Right Other (Comment) (Active)   Level of Ventriculostomy (cm above) 10 8/22/2018  3:03 AM   Status Open to drainage 8/22/2018  3:03 AM   Site Assessment Dry;Clean 8/22/2018  3:03 AM   Site Drainage No drainage 8/22/2018  3:03 AM   Waveform normal waveform 8/21/2018  7:01 PM   Output (mL) 5 mL 8/22/2018  7:01 AM   CSF Color clear;other (see comments) 8/22/2018  3:03 AM   Dressing Status Biopatch in place;Clean;Dry;Intact 8/22/2018  3:03 AM   Interventions HOB degrees;bed controls locked 8/22/2018  3:03 AM       Female External Urinary Catheter 08/20/18 0700 (Active)   Skin perineum cleansed w/ soap and water;no redness;no breakdown 8/22/2018  3:03 AM   Tolerance no signs/symptoms of discomfort 8/22/2018  3:03 AM   Suction Continuous suction at 50 mmHg 8/21/2018  7:01 PM   Date of last wick change 08/22/18 8/22/2018  3:03 AM   Time of last wick change 0300 8/22/2018  3:03 AM   Output (mL) 100 mL 8/22/2018  6:00 AM       Neurosurgery Physical Exam   AOX3  speech fluent  EVD in place   PERRL, EOMI, face symm, tongue midline  HOBSON symm  No drift appreciated on exam this AM        Significant Labs:  Recent Labs   Lab  08/21/18   0422  08/21/18   1436  08/21/18   2237  08/22/18   0303   GLU  95   --    --   95   NA  136  136  137  134*  134*  134*   K  3.2*   --    --   3.5   CL  109   --    --   108   CO2  21*   --    --   17*   BUN  6   --    --   4*   CREATININE  0.6   --    --   0.6   CALCIUM  8.0*   --    --   8.0*   MG  1.7   --    --   1.5*     Recent Labs   Lab  08/21/18   0422  08/22/18   0303   WBC  17.33*  11.01   HGB  8.2*   7.7*   HCT  25.5*  23.8*   PLT  383*  258     Recent Labs   Lab  08/21/18   0422  08/22/18   0303   INR  1.1  1.1     Microbiology Results (last 7 days)     Procedure Component Value Units Date/Time    CSF culture [[id number]] Collected:  08/18/18 1354    Order Status:  Completed Specimen:  CSF (Spinal Fluid) from CSF Shunt Updated:  08/22/18 0726     CSF CULTURE No Growth to date     Gram Stain Result Rare WBC's      No organisms seen    Blood culture [[id number]] Collected:  08/10/18 2020    Order Status:  Completed Specimen:  Blood from Peripheral, Foot, Left Updated:  08/16/18 0612     Blood Culture, Routine No growth after 5 days.    Blood culture [[id number]] Collected:  08/10/18 2040    Order Status:  Completed Specimen:  Blood from Peripheral, Antecubital, Left Updated:  08/16/18 0612     Blood Culture, Routine No growth after 5 days.        Recent Lab Results       08/22/18  0303 08/22/18  0011 08/21/18  2237 08/21/18  1829 08/21/18  1436      Immature Granulocytes 0.9         Immature Grans (Abs) 0.10  Comment:  Mild elevation in immature granulocytes is non specific and   can be seen in a variety of conditions including stress response,   acute inflammation, trauma and pregnancy. Correlation with other   laboratory and clinical findings is essential.           Albumin 2.3         Alkaline Phosphatase 73         ALT 20         Anion Gap 9         AST 15         Baso # 0.02         Basophil% 0.2         Total Bilirubin 0.3  Comment:  For infants and newborns, interpretation of results should be based  on gestational age, weight and in agreement with clinical  observations.  Premature Infant recommended reference ranges:  Up to 24 hours.............<8.0 mg/dL  Up to 48 hours............<12.0 mg/dL  3-5 days..................<15.0 mg/dL  6-29 days.................<15.0 mg/dL           BUN, Bld 4         Calcium 8.0         Chloride 108         CO2 17         Creatinine 0.6         Differential Method Automated          eGFR if  >60.0         eGFR if non  >60.0  Comment:  Calculation used to obtain the estimated glomerular filtration  rate (eGFR) is the CKD-EPI equation.            Eos # 0.0         Eosinophil% 0.3         Glucose 95         Gran # (ANC) 8.7         Gran% 79.3         Hematocrit 23.8         Hemoglobin 7.7         Coumadin Monitoring INR 1.1  Comment:  Coumadin Therapy:  2.0 - 3.0 for INR for all indicators except mechanical heart valves  and antiphospholipid syndromes which should use 2.5 - 3.5.           Lymph # 1.2         Lymph% 11.2         Magnesium 1.5         MCH 29.6         MCHC 32.4         MCV 92         Mono # 0.9         Mono% 8.1         MPV 10.1         nRBC 0         Phosphorus 1.8         Platelets 258         POCT Glucose  110  98      Potassium 3.5         Total Protein 5.9         Protime 11.3         RBC 2.60         RDW 15.9         Sodium 134  134  137      134         WBC 11.01                     08/21/18  1302      Immature Granulocytes      Immature Grans (Abs)      Albumin      Alkaline Phosphatase      ALT      Anion Gap      AST      Baso #      Basophil%      Total Bilirubin      BUN, Bld      Calcium      Chloride      CO2      Creatinine      Differential Method      eGFR if       eGFR if non       Eos #      Eosinophil%      Glucose      Gran # (ANC)      Gran%      Hematocrit      Hemoglobin      Coumadin Monitoring INR      Lymph #      Lymph%      Magnesium      MCH      MCHC      MCV      Mono #      Mono%      MPV      nRBC      Phosphorus      Platelets      POCT Glucose 102     Potassium      Total Protein      Protime      RBC      RDW      Sodium      WBC            Significant Diagnostics:  I have reviewed all pertinent imaging results/findings within the past 24 hours.    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 female 24 weeks pregnant with  history of polycystic kidney disease with [id number] SAH on 8/10, now s/p acom coiling 8/10.     NEURO  -Continue NCC  -q 1 hr neuro checks  -evd open to 10 and draining, monitor icps   -CSF profile reviewed from 8/19   -will clamp EVD on thursday  -nimotop  -daily TCDs   -TCDs continue to be elevated; exam stable     -Keppra      CV  -permissive HTN  <220 as aneurysm secured    RESP  -stable on room air  -Aggressive pulmonary management  -IS @ bedside    FEN/GI  -cont 2%  -strict I/Os  -goal net even or positive  -Reg diet    ID  -ancef for drain ppx    PPX  -SQH  -famotidine for PUD ppx  -TEDs/SCDs    -further mgmt per ncc and obgyn.            Nayeli Sadler MD  Neurosurgery  [facility]

## 2018-08-22 NOTE — PROGRESS NOTES
ICU Progress Note  Neurocritical Care    Admit Date: 8/10/2018  LOS: 12    CC: Subarachnoid hemorrhage from anterior communicating artery aneurysm    Code Status: Full Code     SUBJECTIVE:     Interval History/Significant Events:   Decrease in Wbc count  Afebrile  Cerebral vasospasm  Anemia   Hypokalemia  TCD's high  On 2% gtt  HX3      Medications:  Continuous Infusions:   Sodium Chloride 2% 150 mL/hr at 08/22/18 1101     Scheduled Meds:   ceFAZolin (ANCEF) IVPB  1 g Intravenous Q8H    famotidine  20 mg Oral BID    ferrous sulfate  325 mg Oral Daily    heparin (porcine)  5,000 Units Subcutaneous Q8H    niMODipine  30 mg Oral Q2H    polyethylene glycol  17 g Oral Daily    prenatal vitamin  1 tablet Oral Daily    senna-docusate 8.6-50 mg  1 tablet Oral BID    sodium chloride 0.9%  10 mL Intravenous Q6H     PRN Meds:.acetaminophen, acetaminophen, calcium gluconate, labetalol, magnesium oxide, magnesium oxide, midazolam, oxyCODONE, potassium chloride 10%, potassium chloride 10%, potassium chloride 10%, potassium, sodium phosphates, potassium, sodium phosphates, potassium, sodium phosphates, Flushing PICC Protocol **AND** sodium chloride 0.9% **AND** sodium chloride 0.9%, sodium chloride 0.9%    OBJECTIVE:   Vital Signs (Most Recent):   Temp: 99.8 °F (37.7 °C) (08/22/18 1101)  Pulse: 90 (08/22/18 1101)  Resp: (!) 30 (08/22/18 1101)  BP: (!) 146/92 (08/22/18 1101)  SpO2: 100 % (08/22/18 1101)    Vital Signs (24h Range):   Temp:  [98.7 °F (37.1 °C)-100.2 °F (37.9 °C)] 99.8 °F (37.7 °C)  Pulse:  [] 90  Resp:  [21-51] 30  SpO2:  [97 %-100 %] 100 %  BP: (114-202)/() 146/92    ICP/CPP (Last 24h):   ICP Mean (mmHg):  [1 mmHg-11 mmHg] 7 mmHg  CPP (mmHg calculated using NBP):  [] 104    I & O (Last 24h):     Intake/Output Summary (Last 24 hours) at 8/22/2018 1216  Last data filed at 8/22/2018 1101  Gross per 24 hour   Intake 4084.24 ml   Output 3450 ml   Net 634.24 ml     Physical Exam:  GA: Alert,  comfortable, no acute distress.   HEENT: No scleral icterus or JVD.   Pulmonary: Clear to auscultation A/P/L. No wheezing, crackles, or rhonchi.  Cardiac: RRR S1 & S2 w/o rubs/murmurs/gallops.   Abdominal: Bowel sounds present x 4. No appreciable hepatosplenomegaly.  Skin: No jaundice, rashes, or visible lesions.  Neuro:  Awake  Alert  Ox3  No drift  Power 5/5 all exts  No change in exam         Lines/Drains/Airway:          PICC Double Lumen 08/13/18 1551 right brachial (Active)   Site Assessment Clean;Dry;Intact;No redness;No swelling 8/22/2018  7:01 AM   Lumen 1 Status Infusing 8/22/2018  7:01 AM   Lumen 2 Status Infusing 8/22/2018  7:01 AM   Length martha (cm) 36 cm 8/13/2018  3:50 PM   Current Exposed Catheter (cm) 0 cm 8/13/2018  3:50 PM   Extremity Circumference (cm) 31 cm 8/13/2018  3:50 PM   Dressing Type Transparent 8/22/2018  7:01 AM   Dressing Status Biopatch in place;Clean;Dry;Intact 8/22/2018  7:01 AM   Dressing Intervention Dressing reinforced 8/22/2018  7:01 AM   Dressing Change Due 08/27/18 8/22/2018  7:01 AM   Daily Line Review Performed 8/22/2018  7:01 AM             ICP/Ventriculostomy 08/11/18 0000 Ventricular drainage catheter with ICP microsensor Right Other (Comment) (Active)   Level of Ventriculostomy (cm above) 10 8/22/2018  3:03 AM   Status Open to drainage 8/22/2018  7:01 AM   Site Assessment Clean;Dry 8/22/2018  7:01 AM   Site Drainage No drainage 8/22/2018  7:01 AM   Waveform normal waveform 8/22/2018  7:01 AM   Output (mL) 5 mL 8/22/2018 11:01 AM   CSF Color clear 8/22/2018  7:01 AM   Dressing Status Biopatch in place;Clean;Dry;Intact 8/22/2018  7:01 AM   Interventions HOB degrees 8/22/2018  7:01 AM       Female External Urinary Catheter 08/20/18 0700 (Active)   Skin no redness;no breakdown 8/22/2018  7:01 AM   Tolerance no signs/symptoms of discomfort 8/22/2018  7:01 AM   Suction Other 8/22/2018  7:01 AM   Date of last wick change 08/22/18 8/22/2018  8:01 AM   Time of last wick change  0801 2018  8:01 AM   Output (mL) 200 mL 2018 10:01 AM     Nutrition/Tube Feeds (if NPO state why): po    Labs:  ABG: No results for input(s): PH, PO2, PCO2, HCO3, POCSATURATED, BE in the last 24 hours.  BMP:  Recent Labs   Lab  18   0303  18   1124   NA  134*  134*  137   K  3.5  3.7   CL  108   --    CO2  17*   --    BUN  4*   --    CREATININE  0.6   --    GLU  95   --    MG  1.5*   --    PHOS  1.8*   --      LFT:   Lab Results   Component Value Date    AST 15 2018    ALT 20 2018    ALKPHOS 73 2018    BILITOT 0.3 2018    ALBUMIN 2.3 (L) 2018    PROT 5.9 (L) 2018     CBC:   Lab Results   Component Value Date    WBC 11.01 2018    HGB 7.7 (L) 2018    HCT 23.8 (L) 2018    MCV 92 2018     2018     Microbiology x 7d:   Microbiology Results (last 7 days)     Procedure Component Value Units Date/Time    CSF culture [[id number]] Collected:  18 1354    Order Status:  Completed Specimen:  CSF (Spinal Fluid) from CSF Shunt Updated:  18 0726     CSF CULTURE No Growth to date     Gram Stain Result Rare WBC's      No organisms seen    Blood culture [[id number]] Collected:  08/10/18 2020    Order Status:  Completed Specimen:  Blood from Peripheral, Foot, Left Updated:  18 0612     Blood Culture, Routine No growth after 5 days.    Blood culture [[id number]] Collected:  08/10/18 2040    Order Status:  Completed Specimen:  Blood from Peripheral, Antecubital, Left Updated:  1812     Blood Culture, Routine No growth after 5 days.        Imaging:  Follow TVD's  I personally reviewed the above image.    ASSESSMENT/PLAN:     Active Hospital Problems    Diagnosis    *Subarachnoid hemorrhage from anterior communicating artery aneurysm     screening for malformation using ultrasonics    Subarachnoid hemorrhage    Cerebral artery vasospasm    Intracranial hypertension    Matthew coma scale total score 9-12     Endotracheal tube present    Hyponatremia    Hypokalemia    Hypophosphatemia    JOSE C (acute kidney injury)    Pre-eclampsia in second trimester    Brain compression    Brain edema    Hypertension affecting pregnancy in second trimester    25 weeks gestation of pregnancy    PKD (polycystic kidney disease)     Needs baseline P/C ratio.  Strong family history of renal abnormalities              Plan:  HX3  Recheck Hb  Correct elects  Follow exam and BC's  Mattie need transfusion    Critical secondary to Patient has a condition that poses threat to life and bodily function: at high risk of deterioration from vasospasm - need close monitoring of exam/i&O's/Na/TCD's      Total cc time 37 mins     Critical care was time spent personally by me on the following activities: development of treatment plan with patient or surrogate and bedside caregivers, discussions with consultants, evaluation of patient's response to treatment, examination of patient, ordering and performing treatments and interventions, ordering and review of laboratory studies, ordering and review of radiographic studies, pulse oximetry, re-evaluation of patient's condition. This critical care time did not overlap with that of any other provider or involve time for any procedures.

## 2018-08-22 NOTE — PROGRESS NOTES
" [facility]  Adult Nutrition  Progress Note    SUMMARY       Recommendations    Recommendation/Intervention:   Continue Regular diet.   Add Boost TID as pt not consuming sufficient calories for 2nd trimester (additional 340kcal/day).   Pt eating candy from home with no nutritional value.     RD to monitor.    Goals: Pt to receive nutrition by RD follow up  Nutrition Goal Status: goal met  Communication of RD Recs: reviewed with RN    Reason for Assessment    Reason for Assessment: RD follow-up  Diagnosis: hemorrhage  Relevant Medical History: HTN, PKD  Interdisciplinary Rounds: attended  General Information Comments: Pt reports eating approx 25-50% of meals. Snacks from home however noted snacks are sugary candies with no nutritional value.   Nutrition Discharge Planning: adequate po intake for optimal nutrition    Nutrition Risk Screen    Nutrition Risk Screen: other (see comments)(faith)    Nutrition/Diet History    Do you have any cultural, spiritual, Congregational conflicts, given your current situation?: None  Factors Affecting Nutritional Intake: pain    Anthropometrics    Temp: 98.7 °F (37.1 °C)  Height Method: Estimated  Height: 5' 9" (175.3 cm)  Height (inches): 69 in  Weight Method: Bed Scale  Weight: 75.7 kg (166 lb 14.2 oz)  Weight (lb): 166.89 lb  Ideal Body Weight (IBW), Female: 145 lb  % Ideal Body Weight, Female (lb): 103.45 lb  BMI (Calculated): 22.2  BMI Grade: 18.5-24.9 - normal       Lab/Procedures/Meds    Pertinent Labs Reviewed: reviewed  Pertinent Labs Comments: noted  Pertinent Medications Reviewed: reviewed  Pertinent Medications Comments: prenatal vitamins, IVF    Physical Findings/Assessment    Overall Physical Appearance: lethargic, nourished  Skin: abrasion    Estimated/Assessed Needs    Weight Used For Calorie Calculations: 68 kg (149 lb 14.6 oz)  Energy Calorie Requirements (kcal): 2163  Energy Need Method: [place]-St Jeor(PAL 1.25 + 340calories (2nd trimester))  Protein " Requirements: 82-95g(1.2-1.4g/kg)  Weight Used For Protein Calculations: 68 kg (149 lb 14.6 oz)  Fluid Requirements (mL): 1mL/kcal or per MD     RDA Method (mL): 2163         Nutrition Prescription Ordered    Current Diet Order: Regular    Evaluation of Received Nutrient/Fluid Intake    IV Fluid (mL): 3600  % Intake of Estimated Energy Needs: 25 - 50 %  % Meal Intake: 25 - 50 %    Nutrition Risk    Level of Risk/Frequency of Follow-up: (f/u 1x/week)     Assessment and Plan    Nutrition Problem  Inadequate energy intake     Related to (etiology):   NPO     Signs and Symptoms (as evidenced by):   Pt receiving <85% EEN and EPN.      Nutrition Diagnosis Status:   Resolved        Monitor and Evaluation    Food and Nutrient Intake: energy intake, food and beverage intake  Food and Nutrient Adminstration: diet order  Anthropometric Measurements: weight, weight change, body mass index  Biochemical Data, Medical Tests and Procedures: electrolyte and renal panel, gastrointestinal profile, glucose/endocrine profile, inflammatory profile, lipid profile  Nutrition-Focused Physical Findings: overall appearance     Nutrition Follow-Up    RD Follow-up?: Yes

## 2018-08-22 NOTE — ASSESSMENT & PLAN NOTE
32 y.o. female 24 weeks pregnant with history of polycystic kidney disease with HH4F4 SAH on 8/10, now s/p acom coiling 8/10.     NEURO  -Continue NCC  -q 1 hr neuro checks  -evd open to 10 and draining, monitor icps   -CSF profile reviewed from 8/19   -will clamp EVD on thursday  -nimotop  -daily TCDs   -TCDs continue to be elevated; exam stable     -Keppra      CV  -permissive HTN  <220 as aneurysm secured    RESP  -stable on room air  -Aggressive pulmonary management  -IS @ bedside    FEN/GI  -cont 2%  -strict I/Os  -goal net even or positive  -Reg diet    ID  -ancef for drain ppx    PPX  -SQH  -famotidine for PUD ppx  -TEDs/SCDs    -further mgmt per ncc and obgyn.

## 2018-08-22 NOTE — PROGRESS NOTES
Patient's chart was reviewed by a stroke team provider.  Patient on sodium chloride 23.4%.  There is no new imaging to review.  Pending diagnostics to follow up on include: none.  For other recommendations please see our previous note completed on: 08/20/18      There are no new recommendations at this time. Will continue to follow. Discussed patient with staff. Please contact stroke team for any questions or concerns.     Sera Oakes PA-C  Vascular Neurology  [phone]

## 2018-08-22 NOTE — PLAN OF CARE
Problem: Patient Care Overview  Goal: Plan of Care Review  Outcome: Ongoing (interventions implemented as appropriate)  POC reviewed with pt at 0500. Pt verbalized understanding. Questions and concerns addressed. 2% increased to 150ml/hr. No acute events overnight. Pt progressing toward goals. Will continue to monitor. See flowsheets for full assessment and VS info

## 2018-08-22 NOTE — PLAN OF CARE
Problem: Patient Care Overview  Goal: Plan of Care Review  Outcome: Ongoing (interventions implemented as appropriate)  POC reviewed with pt at 1500. Pt verbalized understanding. Questions and concerns addressed. No acute events today. Pt progressing toward goals. Will continue to monitor. See flowsheets for full assessment and VS info.

## 2018-08-22 NOTE — SUBJECTIVE & OBJECTIVE
Interval History: SEGUNDO WHITEHEAD@10. ICP 4-10. Stable neuro exam.     Medications:  Continuous Infusions:   Sodium Chloride 2% 150 mL/hr at 08/22/18 0725     Scheduled Meds:   ceFAZolin (ANCEF) IVPB  1 g Intravenous Q8H    famotidine  20 mg Oral BID    ferrous sulfate  325 mg Oral Daily    heparin (porcine)  5,000 Units Subcutaneous Q8H    niMODipine  30 mg Oral Q2H    polyethylene glycol  17 g Oral Daily    prenatal vitamin  1 tablet Oral Daily    senna-docusate 8.6-50 mg  1 tablet Oral BID    sodium chloride 0.9%  10 mL Intravenous Q6H     PRN Meds:acetaminophen, acetaminophen, calcium gluconate, labetalol, magnesium oxide, magnesium oxide, midazolam, oxyCODONE, potassium chloride 10%, potassium chloride 10%, potassium chloride 10%, potassium, sodium phosphates, potassium, sodium phosphates, potassium, sodium phosphates, Flushing PICC Protocol **AND** sodium chloride 0.9% **AND** sodium chloride 0.9%, sodium chloride 0.9%     Review of Systems  Objective:     Weight: 75.7 kg (166 lb 14.2 oz)  Body mass index is 24.65 kg/m².  Vital Signs (Most Recent):  Temp: 99.5 °F (37.5 °C) (08/22/18 0303)  Pulse: 80 (08/22/18 0801)  Resp: (!) 21 (08/22/18 0801)  BP: 131/81 (08/22/18 0801)  SpO2: 100 % (08/22/18 0801) Vital Signs (24h Range):  Temp:  [99 °F (37.2 °C)-100.2 °F (37.9 °C)] 99.5 °F (37.5 °C)  Pulse:  [] 80  Resp:  [21-51] 21  SpO2:  [97 %-100 %] 100 %  BP: (114-202)/() 131/81  Arterial Line BP: (192)/(89-90) 192/89     Date 08/22/18 0700 - 08/23/18 0659   Shift 4148-7344 2538-8481 1548-6405 24 Hour Total   INTAKE   I.V.(mL/kg) 150(2)   150(2)   Shift Total(mL/kg) 150(2)   150(2)   OUTPUT   Drains 5   5   Shift Total(mL/kg) 5(0.1)   5(0.1)   Weight (kg) 75.7 75.7 75.7 75.7                        ICP/Ventriculostomy 08/11/18 0000 Ventricular drainage catheter with ICP microsensor Right Other (Comment) (Active)   Level of Ventriculostomy (cm above) 10 8/22/2018  3:03 AM   Status Open to drainage  8/22/2018  3:03 AM   Site Assessment Dry;Clean 8/22/2018  3:03 AM   Site Drainage No drainage 8/22/2018  3:03 AM   Waveform normal waveform 8/21/2018  7:01 PM   Output (mL) 5 mL 8/22/2018  7:01 AM   CSF Color clear;other (see comments) 8/22/2018  3:03 AM   Dressing Status Biopatch in place;Clean;Dry;Intact 8/22/2018  3:03 AM   Interventions HOB degrees;bed controls locked 8/22/2018  3:03 AM       Female External Urinary Catheter 08/20/18 0700 (Active)   Skin perineum cleansed w/ soap and water;no redness;no breakdown 8/22/2018  3:03 AM   Tolerance no signs/symptoms of discomfort 8/22/2018  3:03 AM   Suction Continuous suction at 50 mmHg 8/21/2018  7:01 PM   Date of last wick change 08/22/18 8/22/2018  3:03 AM   Time of last wick change 0300 8/22/2018  3:03 AM   Output (mL) 100 mL 8/22/2018  6:00 AM       Neurosurgery Physical Exam   AOX3  speech fluent  EVD in place   PERRL, EOMI, face symm, tongue midline  HOBSON symm  No drift appreciated on exam this AM        Significant Labs:  Recent Labs   Lab  08/21/18 0422 08/21/18   1436  08/21/18 2237  08/22/18   0303   GLU  95   --    --   95   NA  136  136  137  134*  134*  134*   K  3.2*   --    --   3.5   CL  109   --    --   108   CO2  21*   --    --   17*   BUN  6   --    --   4*   CREATININE  0.6   --    --   0.6   CALCIUM  8.0*   --    --   8.0*   MG  1.7   --    --   1.5*     Recent Labs   Lab  08/21/18   0422 08/22/18   0303   WBC  17.33*  11.01   HGB  8.2*  7.7*   HCT  25.5*  23.8*   PLT  383*  258     Recent Labs   Lab  08/21/18 0422 08/22/18   0303   INR  1.1  1.1     Microbiology Results (last 7 days)     Procedure Component Value Units Date/Time    CSF culture [[id number]] Collected:  08/18/18 1354    Order Status:  Completed Specimen:  CSF (Spinal Fluid) from CSF Shunt Updated:  08/22/18 0726     CSF CULTURE No Growth to date     Gram Stain Result Rare WBC's      No organisms seen    Blood culture [[id number]] Collected:  08/10/18 2020    Order  Status:  Completed Specimen:  Blood from Peripheral, Foot, Left Updated:  08/16/18 0612     Blood Culture, Routine No growth after 5 days.    Blood culture [[id number]] Collected:  08/10/18 2040    Order Status:  Completed Specimen:  Blood from Peripheral, Antecubital, Left Updated:  08/16/18 0612     Blood Culture, Routine No growth after 5 days.        Recent Lab Results       08/22/18  0303 08/22/18  0011 08/21/18  2237 08/21/18  1829 08/21/18  1436      Immature Granulocytes 0.9         Immature Grans (Abs) 0.10  Comment:  Mild elevation in immature granulocytes is non specific and   can be seen in a variety of conditions including stress response,   acute inflammation, trauma and pregnancy. Correlation with other   laboratory and clinical findings is essential.           Albumin 2.3         Alkaline Phosphatase 73         ALT 20         Anion Gap 9         AST 15         Baso # 0.02         Basophil% 0.2         Total Bilirubin 0.3  Comment:  For infants and newborns, interpretation of results should be based  on gestational age, weight and in agreement with clinical  observations.  Premature Infant recommended reference ranges:  Up to 24 hours.............<8.0 mg/dL  Up to 48 hours............<12.0 mg/dL  3-5 days..................<15.0 mg/dL  6-29 days.................<15.0 mg/dL           BUN, Bld 4         Calcium 8.0         Chloride 108         CO2 17         Creatinine 0.6         Differential Method Automated         eGFR if  >60.0         eGFR if non  >60.0  Comment:  Calculation used to obtain the estimated glomerular filtration  rate (eGFR) is the CKD-EPI equation.            Eos # 0.0         Eosinophil% 0.3         Glucose 95         Gran # (ANC) 8.7         Gran% 79.3         Hematocrit 23.8         Hemoglobin 7.7         Coumadin Monitoring INR 1.1  Comment:  Coumadin Therapy:  2.0 - 3.0 for INR for all indicators except mechanical heart valves  and antiphospholipid  syndromes which should use 2.5 - 3.5.           Lymph # 1.2         Lymph% 11.2         Magnesium 1.5         MCH 29.6         MCHC 32.4         MCV 92         Mono # 0.9         Mono% 8.1         MPV 10.1         nRBC 0         Phosphorus 1.8         Platelets 258         POCT Glucose  110  98      Potassium 3.5         Total Protein 5.9         Protime 11.3         RBC 2.60         RDW 15.9         Sodium 134  134  137      134         WBC 11.01                     08/21/18  1302      Immature Granulocytes      Immature Grans (Abs)      Albumin      Alkaline Phosphatase      ALT      Anion Gap      AST      Baso #      Basophil%      Total Bilirubin      BUN, Bld      Calcium      Chloride      CO2      Creatinine      Differential Method      eGFR if       eGFR if non       Eos #      Eosinophil%      Glucose      Gran # (ANC)      Gran%      Hematocrit      Hemoglobin      Coumadin Monitoring INR      Lymph #      Lymph%      Magnesium      MCH      MCHC      MCV      Mono #      Mono%      MPV      nRBC      Phosphorus      Platelets      POCT Glucose 102     Potassium      Total Protein      Protime      RBC      RDW      Sodium      WBC            Significant Diagnostics:  I have reviewed all pertinent imaging results/findings within the past 24 hours.

## 2018-08-23 LAB
ABO + RH BLD: NORMAL
ALBUMIN SERPL BCP-MCNC: 2.5 G/DL
ALBUMIN SERPL BCP-MCNC: 2.9 G/DL
ALP SERPL-CCNC: 71 U/L
ALP SERPL-CCNC: 86 U/L
ALT SERPL W/O P-5'-P-CCNC: 15 U/L
ALT SERPL W/O P-5'-P-CCNC: 17 U/L
ANION GAP SERPL CALC-SCNC: 8 MMOL/L
ANION GAP SERPL CALC-SCNC: 8 MMOL/L
AST SERPL-CCNC: 10 U/L
AST SERPL-CCNC: 13 U/L
BACTERIA CSF CULT: NO GROWTH
BASOPHILS # BLD AUTO: 0.02 K/UL
BASOPHILS # BLD AUTO: 0.02 K/UL
BASOPHILS NFR BLD: 0.2 %
BASOPHILS NFR BLD: 0.2 %
BILIRUB SERPL-MCNC: 0.2 MG/DL
BILIRUB SERPL-MCNC: 0.4 MG/DL
BILIRUB UR QL STRIP: NEGATIVE
BLD GP AB SCN CELLS X3 SERPL QL: NORMAL
BLD PROD TYP BPU: NORMAL
BLOOD UNIT EXPIRATION DATE: NORMAL
BLOOD UNIT TYPE CODE: 6200
BLOOD UNIT TYPE: NORMAL
BUN SERPL-MCNC: 4 MG/DL
BUN SERPL-MCNC: 5 MG/DL
CALCIUM SERPL-MCNC: 8.1 MG/DL
CALCIUM SERPL-MCNC: 9 MG/DL
CHLORIDE SERPL-SCNC: 106 MMOL/L
CHLORIDE SERPL-SCNC: 108 MMOL/L
CLARITY UR REFRACT.AUTO: CLEAR
CO2 SERPL-SCNC: 19 MMOL/L
CO2 SERPL-SCNC: 21 MMOL/L
CODING SYSTEM: NORMAL
COLOR UR AUTO: COLORLESS
CREAT SERPL-MCNC: 0.5 MG/DL
CREAT SERPL-MCNC: 0.6 MG/DL
DIFFERENTIAL METHOD: ABNORMAL
DIFFERENTIAL METHOD: ABNORMAL
DISPENSE STATUS: NORMAL
EOSINOPHIL # BLD AUTO: 0.1 K/UL
EOSINOPHIL # BLD AUTO: 0.1 K/UL
EOSINOPHIL NFR BLD: 0.8 %
EOSINOPHIL NFR BLD: 0.8 %
ERYTHROCYTE [DISTWIDTH] IN BLOOD BY AUTOMATED COUNT: 16.3 %
ERYTHROCYTE [DISTWIDTH] IN BLOOD BY AUTOMATED COUNT: 16.9 %
EST. GFR  (AFRICAN AMERICAN): >60 ML/MIN/1.73 M^2
EST. GFR  (AFRICAN AMERICAN): >60 ML/MIN/1.73 M^2
EST. GFR  (NON AFRICAN AMERICAN): >60 ML/MIN/1.73 M^2
EST. GFR  (NON AFRICAN AMERICAN): >60 ML/MIN/1.73 M^2
GLUCOSE SERPL-MCNC: 107 MG/DL
GLUCOSE SERPL-MCNC: 80 MG/DL
GLUCOSE UR QL STRIP: NEGATIVE
GRAM STN SPEC: NORMAL
GRAM STN SPEC: NORMAL
HCT VFR BLD AUTO: 23.1 %
HCT VFR BLD AUTO: 27.1 %
HGB BLD-MCNC: 7 G/DL
HGB BLD-MCNC: 9 G/DL
HGB UR QL STRIP: ABNORMAL
IMM GRANULOCYTES # BLD AUTO: 0.08 K/UL
IMM GRANULOCYTES # BLD AUTO: 0.09 K/UL
IMM GRANULOCYTES NFR BLD AUTO: 0.9 %
IMM GRANULOCYTES NFR BLD AUTO: 0.9 %
INR PPP: 1
KETONES UR QL STRIP: NEGATIVE
LEUKOCYTE ESTERASE UR QL STRIP: NEGATIVE
LYMPHOCYTES # BLD AUTO: 1.1 K/UL
LYMPHOCYTES # BLD AUTO: 1.3 K/UL
LYMPHOCYTES NFR BLD: 11.5 %
LYMPHOCYTES NFR BLD: 14.4 %
MAGNESIUM SERPL-MCNC: 1.6 MG/DL
MAGNESIUM SERPL-MCNC: 1.8 MG/DL
MCH RBC QN AUTO: 28.5 PG
MCH RBC QN AUTO: 29.1 PG
MCHC RBC AUTO-ENTMCNC: 30.3 G/DL
MCHC RBC AUTO-ENTMCNC: 33.2 G/DL
MCV RBC AUTO: 88 FL
MCV RBC AUTO: 94 FL
MICROSCOPIC COMMENT: ABNORMAL
MONOCYTES # BLD AUTO: 0.6 K/UL
MONOCYTES # BLD AUTO: 0.7 K/UL
MONOCYTES NFR BLD: 6.5 %
MONOCYTES NFR BLD: 6.6 %
NEUTROPHILS # BLD AUTO: 7 K/UL
NEUTROPHILS # BLD AUTO: 7.9 K/UL
NEUTROPHILS NFR BLD: 77.2 %
NEUTROPHILS NFR BLD: 80 %
NITRITE UR QL STRIP: NEGATIVE
NRBC BLD-RTO: 0 /100 WBC
NRBC BLD-RTO: 0 /100 WBC
OSMOLALITY SERPL: 275 MOSM/KG
OSMOLALITY SERPL: 275 MOSM/KG
PH UR STRIP: 8 [PH] (ref 5–8)
PHOSPHATE SERPL-MCNC: 4.1 MG/DL
PLATELET # BLD AUTO: 259 K/UL
PLATELET # BLD AUTO: 320 K/UL
PMV BLD AUTO: 10 FL
PMV BLD AUTO: 10 FL
POCT GLUCOSE: 103 MG/DL (ref 70–110)
POCT GLUCOSE: 82 MG/DL (ref 70–110)
POCT GLUCOSE: 89 MG/DL (ref 70–110)
POTASSIUM SERPL-SCNC: 3 MMOL/L
POTASSIUM SERPL-SCNC: 4 MMOL/L
PROT SERPL-MCNC: 5.9 G/DL
PROT SERPL-MCNC: 6.8 G/DL
PROT UR QL STRIP: NEGATIVE
PROTHROMBIN TIME: 10.4 SEC
RBC # BLD AUTO: 2.46 M/UL
RBC # BLD AUTO: 3.09 M/UL
RBC #/AREA URNS AUTO: 5 /HPF (ref 0–4)
SODIUM SERPL-SCNC: 135 MMOL/L
SODIUM SERPL-SCNC: 136 MMOL/L
SODIUM UR-SCNC: 197 MMOL/L
SP GR UR STRIP: 1.01 (ref 1–1.03)
SQUAMOUS #/AREA URNS AUTO: 0 /HPF
TRANS ERYTHROCYTES VOL PATIENT: NORMAL ML
URN SPEC COLLECT METH UR: ABNORMAL
UROBILINOGEN UR STRIP-ACNC: NEGATIVE EU/DL
WBC # BLD AUTO: 9.04 K/UL
WBC # BLD AUTO: 9.85 K/UL
WBC #/AREA URNS AUTO: 0 /HPF (ref 0–5)

## 2018-08-23 PROCEDURE — 86901 BLOOD TYPING SEROLOGIC RH(D): CPT

## 2018-08-23 PROCEDURE — 85610 PROTHROMBIN TIME: CPT

## 2018-08-23 PROCEDURE — 25000003 PHARM REV CODE 250: Performed by: ANESTHESIOLOGY

## 2018-08-23 PROCEDURE — 84100 ASSAY OF PHOSPHORUS: CPT

## 2018-08-23 PROCEDURE — 99291 CRITICAL CARE FIRST HOUR: CPT | Mod: ,,, | Performed by: ANESTHESIOLOGY

## 2018-08-23 PROCEDURE — 25000003 PHARM REV CODE 250: Performed by: STUDENT IN AN ORGANIZED HEALTH CARE EDUCATION/TRAINING PROGRAM

## 2018-08-23 PROCEDURE — 63600175 PHARM REV CODE 636 W HCPCS: Performed by: STUDENT IN AN ORGANIZED HEALTH CARE EDUCATION/TRAINING PROGRAM

## 2018-08-23 PROCEDURE — 20000000 HC ICU ROOM

## 2018-08-23 PROCEDURE — 84295 ASSAY OF SERUM SODIUM: CPT | Mod: 91

## 2018-08-23 PROCEDURE — 83930 ASSAY OF BLOOD OSMOLALITY: CPT

## 2018-08-23 PROCEDURE — 25000003 PHARM REV CODE 250: Performed by: NURSE PRACTITIONER

## 2018-08-23 PROCEDURE — P9021 RED BLOOD CELLS UNIT: HCPCS

## 2018-08-23 PROCEDURE — 63600175 PHARM REV CODE 636 W HCPCS: Performed by: NURSE PRACTITIONER

## 2018-08-23 PROCEDURE — A4216 STERILE WATER/SALINE, 10 ML: HCPCS | Performed by: PSYCHIATRY & NEUROLOGY

## 2018-08-23 PROCEDURE — 83735 ASSAY OF MAGNESIUM: CPT

## 2018-08-23 PROCEDURE — A4217 STERILE WATER/SALINE, 500 ML: HCPCS | Performed by: NURSE PRACTITIONER

## 2018-08-23 PROCEDURE — 63600175 PHARM REV CODE 636 W HCPCS: Performed by: PHYSICIAN ASSISTANT

## 2018-08-23 PROCEDURE — 80053 COMPREHEN METABOLIC PANEL: CPT | Mod: 91

## 2018-08-23 PROCEDURE — 85025 COMPLETE CBC W/AUTO DIFF WBC: CPT | Mod: 91

## 2018-08-23 PROCEDURE — 36430 TRANSFUSION BLD/BLD COMPNT: CPT

## 2018-08-23 PROCEDURE — 81001 URINALYSIS AUTO W/SCOPE: CPT

## 2018-08-23 PROCEDURE — 80053 COMPREHEN METABOLIC PANEL: CPT

## 2018-08-23 PROCEDURE — 94761 N-INVAS EAR/PLS OXIMETRY MLT: CPT

## 2018-08-23 PROCEDURE — 86920 COMPATIBILITY TEST SPIN: CPT

## 2018-08-23 PROCEDURE — 84300 ASSAY OF URINE SODIUM: CPT

## 2018-08-23 PROCEDURE — 83735 ASSAY OF MAGNESIUM: CPT | Mod: 91

## 2018-08-23 PROCEDURE — 25000003 PHARM REV CODE 250: Performed by: PHYSICIAN ASSISTANT

## 2018-08-23 PROCEDURE — 25000003 PHARM REV CODE 250: Performed by: PSYCHIATRY & NEUROLOGY

## 2018-08-23 RX ORDER — HYDROCODONE BITARTRATE AND ACETAMINOPHEN 500; 5 MG/1; MG/1
TABLET ORAL
Status: DISCONTINUED | OUTPATIENT
Start: 2018-08-23 | End: 2018-08-31 | Stop reason: HOSPADM

## 2018-08-23 RX ADMIN — NIMODIPINE 30 MG: 30 CAPSULE, LIQUID FILLED ORAL at 08:08

## 2018-08-23 RX ADMIN — HEPARIN SODIUM 5000 UNITS: 5000 INJECTION, SOLUTION INTRAVENOUS; SUBCUTANEOUS at 02:08

## 2018-08-23 RX ADMIN — NIMODIPINE 30 MG: 30 CAPSULE, LIQUID FILLED ORAL at 01:08

## 2018-08-23 RX ADMIN — SODIUM CHLORIDE: 234 INJECTION INTRAMUSCULAR; INTRAVENOUS; SUBCUTANEOUS at 03:08

## 2018-08-23 RX ADMIN — OXYCODONE HYDROCHLORIDE 10 MG: 5 TABLET ORAL at 05:08

## 2018-08-23 RX ADMIN — NIMODIPINE 30 MG: 30 CAPSULE, LIQUID FILLED ORAL at 11:08

## 2018-08-23 RX ADMIN — NIMODIPINE 30 MG: 30 CAPSULE, LIQUID FILLED ORAL at 02:08

## 2018-08-23 RX ADMIN — NIMODIPINE 30 MG: 30 CAPSULE, LIQUID FILLED ORAL at 07:08

## 2018-08-23 RX ADMIN — CEFAZOLIN 1 G: 1 INJECTION, POWDER, FOR SOLUTION INTRAMUSCULAR; INTRAVENOUS at 09:08

## 2018-08-23 RX ADMIN — CEFAZOLIN 1 G: 1 INJECTION, POWDER, FOR SOLUTION INTRAMUSCULAR; INTRAVENOUS at 05:08

## 2018-08-23 RX ADMIN — NIMODIPINE 30 MG: 30 CAPSULE, LIQUID FILLED ORAL at 04:08

## 2018-08-23 RX ADMIN — NIMODIPINE 30 MG: 30 CAPSULE, LIQUID FILLED ORAL at 10:08

## 2018-08-23 RX ADMIN — NIMODIPINE 30 MG: 30 CAPSULE, LIQUID FILLED ORAL at 09:08

## 2018-08-23 RX ADMIN — CEFAZOLIN 1 G: 1 INJECTION, POWDER, FOR SOLUTION INTRAMUSCULAR; INTRAVENOUS at 02:08

## 2018-08-23 RX ADMIN — SENNOSIDES AND DOCUSATE SODIUM 1 TABLET: 8.6; 5 TABLET ORAL at 08:08

## 2018-08-23 RX ADMIN — SODIUM CHLORIDE: 234 INJECTION INTRAMUSCULAR; INTRAVENOUS; SUBCUTANEOUS at 10:08

## 2018-08-23 RX ADMIN — PRENATAL VIT W/ FE FUMARATE-FA TAB 27-0.8 MG 1 TABLET: 27-0.8 TAB at 08:08

## 2018-08-23 RX ADMIN — FERROUS SULFATE TAB EC 325 MG (65 MG FE EQUIVALENT) 325 MG: 325 (65 FE) TABLET DELAYED RESPONSE at 08:08

## 2018-08-23 RX ADMIN — MAGNESIUM OXIDE TAB 400 MG (241.3 MG ELEMENTAL MG) 800 MG: 400 (241.3 MG) TAB at 07:08

## 2018-08-23 RX ADMIN — POTASSIUM CHLORIDE 60 MEQ: 20 SOLUTION ORAL at 09:08

## 2018-08-23 RX ADMIN — ACETAMINOPHEN 650 MG: 325 TABLET, FILM COATED ORAL at 11:08

## 2018-08-23 RX ADMIN — FAMOTIDINE 20 MG: 20 TABLET ORAL at 09:08

## 2018-08-23 RX ADMIN — ACETAMINOPHEN 650 MG: 325 TABLET, FILM COATED ORAL at 01:08

## 2018-08-23 RX ADMIN — SODIUM CHLORIDE: 234 INJECTION INTRAMUSCULAR; INTRAVENOUS; SUBCUTANEOUS at 04:08

## 2018-08-23 RX ADMIN — HEPARIN SODIUM 5000 UNITS: 5000 INJECTION, SOLUTION INTRAVENOUS; SUBCUTANEOUS at 09:08

## 2018-08-23 RX ADMIN — NIMODIPINE 30 MG: 30 CAPSULE, LIQUID FILLED ORAL at 05:08

## 2018-08-23 RX ADMIN — OXYCODONE HYDROCHLORIDE 10 MG: 5 TABLET ORAL at 09:08

## 2018-08-23 RX ADMIN — OXYCODONE HYDROCHLORIDE 10 MG: 5 TABLET ORAL at 01:08

## 2018-08-23 RX ADMIN — Medication 10 ML: at 06:08

## 2018-08-23 RX ADMIN — SODIUM CHLORIDE 1000 ML: 0.9 INJECTION, SOLUTION INTRAVENOUS at 06:08

## 2018-08-23 RX ADMIN — NIMODIPINE 30 MG: 30 CAPSULE, LIQUID FILLED ORAL at 03:08

## 2018-08-23 RX ADMIN — POTASSIUM CHLORIDE 60 MEQ: 20 SOLUTION ORAL at 05:08

## 2018-08-23 RX ADMIN — HEPARIN SODIUM 5000 UNITS: 5000 INJECTION, SOLUTION INTRAVENOUS; SUBCUTANEOUS at 05:08

## 2018-08-23 RX ADMIN — FAMOTIDINE 20 MG: 20 TABLET ORAL at 08:08

## 2018-08-23 RX ADMIN — NIMODIPINE 30 MG: 30 CAPSULE, LIQUID FILLED ORAL at 12:08

## 2018-08-23 RX ADMIN — Medication 10 ML: at 12:08

## 2018-08-23 RX ADMIN — ACETAMINOPHEN 650 MG: 325 TABLET, FILM COATED ORAL at 05:08

## 2018-08-23 RX ADMIN — NIMODIPINE 30 MG: 30 CAPSULE, LIQUID FILLED ORAL at 06:08

## 2018-08-23 NOTE — PLAN OF CARE
08/23/18 1038   Discharge Reassessment   Assessment Type Discharge Planning Reassessment   Provided patient/caregiver education on the expected discharge date and the discharge plan No   Do you have any problems affording any of your prescribed medications? No   Discharge Plan A Rehab   Discharge Plan B Home with family   Patient choice form signed by patient/caregiver N/A   Can the patient answer the patient profile reliably? Yes, cognitively intact   How does the patient rate their overall health at the present time? Fair   Describe the patient's ability to walk at the present time. Does not walk or unable to take any steps at all   How often would a person be available to care for the patient? Whenever needed   Number of comorbid conditions (as recorded on the chart) Two   During the past month, has the patient often been bothered by feeling down, depressed or hopeless? No   During the past month, has the patient often been bothered by little interest or pleasure in doing things? No       Patient not medically stable for discharge.     Penelope Canseco RN, CCRN-K, [facility]  Neuro-Critical Care   [phone]

## 2018-08-23 NOTE — PROGRESS NOTES
To be administered over an hour per Andras NP with NCC order for increased urine output. Pt output 2 L in 2 hours

## 2018-08-23 NOTE — PROGRESS NOTES
[facility]  Neurosurgery  Progress Note    Subjective:     History of Present Illness: 32 y.o. female 24 weeks pregnant with a history of polycystic kidney disease who presents as transfer from [facility] for SAH. Patient found down by 6 yr old son. Last known normal 0500 today. CT at [facility] revealed SAH within the basal cisterns. US at [facility] with + fetal heart tones. Transferred for neuro evaluation.     Post-Op Info:  Procedure(s) (LRB):  Mri (magnetic resonance imaging) (N/A)   13 Days Post-Op     Interval History: PBD/PCD 13.  NAEON. Mild L upward drift this AM. Remains on 2%. TCDs remain elevated. EVD open at 10, ICP 4-8.    Medications:  Continuous Infusions:   Sodium Chloride 2% 150 mL/hr at 08/23/18 1200     Scheduled Meds:   ceFAZolin (ANCEF) IVPB  1 g Intravenous Q8H    famotidine  20 mg Oral BID    ferrous sulfate  325 mg Oral Daily    heparin (porcine)  5,000 Units Subcutaneous Q8H    niMODipine  30 mg Oral Q2H    polyethylene glycol  17 g Oral Daily    prenatal vits96-iron fum-folic  1 tablet Oral Daily    senna-docusate 8.6-50 mg  1 tablet Oral BID    sodium chloride 0.9%  10 mL Intravenous Q6H     PRN Meds:sodium chloride, acetaminophen, acetaminophen, calcium gluconate, labetalol, magnesium oxide, magnesium oxide, oxyCODONE, potassium chloride 10%, potassium chloride 10%, potassium chloride 10%, potassium, sodium phosphates, potassium, sodium phosphates, potassium, sodium phosphates, Flushing PICC Protocol **AND** sodium chloride 0.9% **AND** sodium chloride 0.9%, sodium chloride 0.9%     Review of Systems  Objective:     Weight: 75.7 kg (166 lb 14.2 oz)  Body mass index is 24.65 kg/m².  Vital Signs (Most Recent):  Temp: 98.5 °F (36.9 °C) (08/23/18 1305)  Pulse: 85 (08/23/18 1305)  Resp: (!) 23 (08/23/18 1305)  BP: (!) 155/101 (08/23/18 1305)  SpO2: 100 % (08/23/18 1305) Vital Signs (24h Range):  Temp:  [98.4 °F (36.9 °C)-100.3 °F (37.9 °C)] 98.5 °F (36.9 °C)  Pulse:  []  85  Resp:  [19-46] 23  SpO2:  [97 %-100 %] 100 %  BP: (120-161)/() 155/101     Date 08/23/18 0700 - 08/24/18 0659   Shift 5621-9495 8882-3935 6976-3671 24 Hour Total   INTAKE   P.O. 600   600   I.V.(mL/kg) 900(11.9)   900(11.9)   Shift Total(mL/kg) 1500(19.8)   1500(19.8)   OUTPUT   Urine(mL/kg/hr) 1175   1175   Drains 72   72   Shift Total(mL/kg) 1247(16.5)   1247(16.5)   Weight (kg) 75.7 75.7 75.7 75.7                        ICP/Ventriculostomy 08/11/18 0000 Ventricular drainage catheter with ICP microsensor Right Other (Comment) (Active)   Level of Ventriculostomy (cm above) 10 8/23/2018 11:01 AM   Status Open to drainage 8/23/2018 11:01 AM   Site Assessment Clean;Dry 8/23/2018 11:01 AM   Site Drainage Clear 8/23/2018 11:01 AM   Waveform normal waveform 8/23/2018  7:01 AM   Output (mL) 3 mL 8/23/2018 12:00 PM   CSF Color clear 8/23/2018 11:01 AM   Dressing Status Biopatch in place;Clean;Dry;Intact 8/23/2018 11:01 AM   Interventions HOB degrees;bed controls locked;level adjusted per order 8/23/2018 11:01 AM       Female External Urinary Catheter 08/20/18 0700 (Active)   Skin perineum cleansed w/ soap and water 8/23/2018 11:01 AM   Tolerance no signs/symptoms of discomfort 8/23/2018 11:01 AM   Suction Other 8/23/2018 11:01 AM   Date of last wick change 08/23/18 8/23/2018 11:01 AM   Time of last wick change 1101 8/23/2018 11:01 AM   Output (mL) 375 mL 8/23/2018 11:01 AM       Neurosurgery Physical Exam   AOX3  speech fluent  EVD in place   PERRL, EOMI, face symm, tongue midline  HOBSON symm  Mild L upward drift appreciated on exam.         Significant Labs:  Recent Labs   Lab  08/22/18   0303  08/22/18   1124  08/22/18   1743  08/22/18   1955  08/23/18   0304  08/23/18   1126   GLU  95   --    --    --   107   --    NA  134*  134*  137   --   137  135*  135*  136   K  3.5  3.7  4.0   --   3.0*   --    CL  108   --    --    --   108   --    CO2  17*   --    --    --   19*   --    BUN  4*   --    --    --   5*    --    CREATININE  0.6   --    --    --   0.6   --    CALCIUM  8.0*   --    --    --   8.1*   --    MG  1.5*   --   1.6   --   1.8   --      Recent Labs   Lab  08/22/18   0303  08/22/18   1255  08/22/18   1955  08/23/18   0304   WBC  11.01   --   10.28  9.04   HGB  7.7*  7.9*  7.6*  7.0*   HCT  23.8*  24.5*  24.2*  23.1*   PLT  258   --   275  259     Recent Labs   Lab  08/22/18   0303  08/23/18   0304   INR  1.1  1.0     Microbiology Results (last 7 days)     Procedure Component Value Units Date/Time    CSF culture [[id number]] Collected:  08/18/18 1354    Order Status:  Completed Specimen:  CSF (Spinal Fluid) from CSF Shunt Updated:  08/23/18 0738     CSF CULTURE No Growth     Gram Stain Result Rare WBC's      No organisms seen        Recent Lab Results       08/23/18  1126 08/23/18  1119 08/23/18  1050 08/23/18  0629 08/23/18  0304      Immature Granulocytes     0.9     Immature Grans (Abs)     0.08  Comment:  Mild elevation in immature granulocytes is non specific and   can be seen in a variety of conditions including stress response,   acute inflammation, trauma and pregnancy. Correlation with other   laboratory and clinical findings is essential.       Unit Blood Type Code   6200[P]       Unit Expiration   [id number][P]       Unit Blood Type   A POS[P]       Albumin     2.5     Alkaline Phosphatase     71     ALT     15     Anion Gap     8     Appearance, UA          AST     10     Baso #     0.02     Basophil%     0.2     Bilirubin (UA)          Total Bilirubin     0.2  Comment:  For infants and newborns, interpretation of results should be based  on gestational age, weight and in agreement with clinical  observations.  Premature Infant recommended reference ranges:  Up to 24 hours.............<8.0 mg/dL  Up to 48 hours............<12.0 mg/dL  3-5 days..................<15.0 mg/dL  6-29 days.................<15.0 mg/dL       BUN, Bld     5     Calcium     8.1     Chloride     108     CO2     19     CODING  SYSTEM   [id number][P]       Color, UA          Creatinine     0.6     Differential Method     Automated     DISPENSE STATUS   ISSUED[P]       eGFR if      >60.0     eGFR if non      >60.0  Comment:  Calculation used to obtain the estimated glomerular filtration  rate (eGFR) is the CKD-EPI equation.        Eos #     0.1     Eosinophil%     0.8     Glucose     107     Glucose, UA          Gran # (ANC)     7.0     Gran%     77.2     Group & Rh A POS         Hematocrit     23.1     Hemoglobin     7.0     INDIRECT DAVIDE NEG         Coumadin Monitoring INR     1.0  Comment:  Coumadin Therapy:  2.0 - 3.0 for INR for all indicators except mechanical heart valves  and antiphospholipid syndromes which should use 2.5 - 3.5.       Ketones, UA          Leukocytes, UA          Lymph #     1.3     Lymph%     14.4     Magnesium     1.8     MCH     28.5     MCHC     30.3     MCV     94     Microscopic Comment          Mono #     0.6     Mono%     6.5     MPV     10.0     Nitrite, UA          nRBC     0     Occult Blood UA          Osmolality, Ur          pH, UA          Phosphorus     4.1     Platelets     259     POCT Glucose  82  103      Potassium     3.0     PRODUCT CODE   [id number][P]       Total Protein     5.9     Protein, UA          Protime     10.4     RBC     2.46     RBC, UA          RDW     16.3     Sodium 136    135          135     Sodium Urine Random          Specific Gravity, UA          Specimen UA          Squam Epithel, UA          UNIT NUMBER   [id number][P]       Urobilinogen, UA          WBC, UA          WBC     9.04                 08/22/18  2122 08/22/18  2117 08/22/18  1955 08/22/18  1743 08/22/18  1740      Immature Granulocytes   0.7       Immature Grans (Abs)   0.07  Comment:  Mild elevation in immature granulocytes is non specific and   can be seen in a variety of conditions including stress response,   acute inflammation, trauma and pregnancy. Correlation with other    laboratory and clinical findings is essential.         Unit Blood Type Code          Unit Expiration          Unit Blood Type          Albumin          Alkaline Phosphatase          ALT          Anion Gap          Appearance, UA Clear         AST          Baso #   0.01       Basophil%   0.1       Bilirubin (UA) Negative         Total Bilirubin          BUN, Bld          Calcium          Chloride          CO2          CODING SYSTEM          Color, UA Straw         Creatinine          Differential Method   Automated       DISPENSE STATUS          eGFR if           eGFR if non           Eos #   0.1       Eosinophil%   0.5       Glucose          Glucose, UA Negative         Gran # (ANC)   8.1       Gran%   78.6       Group & Rh          Hematocrit   24.2       Hemoglobin   7.6       INDIRECT DAVIDE          Coumadin Monitoring INR          Ketones, UA Negative         Leukocytes, UA Negative         Lymph #   1.4       Lymph%   13.6       Magnesium    1.6      MCH   28.9       MCHC   31.4       MCV   92       Microscopic Comment SEE COMMENT  Comment:  Other formed elements not mentioned in the report are not   present in the microscopic examination.            Mono #   0.7       Mono%   6.5       MPV   10.4       Nitrite, UA Negative         nRBC   0       Occult Blood UA 1+         Osmolality, Ur  440  Comment:  The random urine reference ranges provided were established   for 24 hour urine collections.  No reference ranges exist for  random urine specimens.  Correlate clinically.          pH, UA 7.0         Phosphorus   2.4       Platelets   275       POCT Glucose     130     Potassium    4.0      PRODUCT CODE          Total Protein          Protein, UA Negative  Comment:  Recommend a 24 hour urine protein or a urine   protein/creatinine ratio if globulin induced proteinuria is  clinically suspected.           Protime          RBC   2.63       RBC, UA 3         RDW   16.5       Sodium    137       Sodium Urine Random  205  Comment:  The random urine reference ranges provided were established   for 24 hour urine collections.  No reference ranges exist for  random urine specimens.  Correlate clinically.          Specific [place], UA 1.010         Specimen UA Urine, Unspecified         Squam Epithel, UA 1         UNIT NUMBER          Urobilinogen, UA Negative         WBC, UA 1         WBC   10.28                       Significant Diagnostics:  I have reviewed all pertinent imaging results/findings within the past 24 hours.    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 female 24 weeks pregnant with history of polycystic kidney disease with HH4F4 SAH on 8/10, now s/p acom coiling 8/10.     NEURO  -Continue NCC  -q 1 hr neuro checks  -evd open to 10 and draining, monitor icps   -CSF profile reviewed from 8/19   -will keep EVD open at 10 today.   -nimotop  -daily TCDs   -TCDs continue to be elevated; exam stable   - will consider bolus 1L NS today     -Keppra      CV  -permissive HTN  <220 as aneurysm secured    RESP  -stable on room air  -Aggressive pulmonary management  -IS @ bedside    FEN/GI  -cont 2%  -strict I/Os  -goal net even or positive  -Reg diet    ID  -ancef for drain ppx    PPX  -SQH  -famotidine for PUD ppx  -TEDs/SCDs    -further mgmt per ncc and obgyn.            Nayeli Sadler MD  Neurosurgery  [facility]

## 2018-08-23 NOTE — PROGRESS NOTES
Patient's chart was reviewed by a stroke team provider.  Neurosurgery planning to clamp EVD today. Remains on sodium chloride 23.4%.  There is no new imaging to review.  Pending diagnostics to follow up on include: none.  For other recommendations please see our previous note completed on: 08/20/18      There are no new recommendations at this time. Will continue to follow. Discussed patient with staff. Please contact stroke team for any questions or concerns.         Leslie Rodríguez PA-C  Vascular Neurology  [phone]

## 2018-08-23 NOTE — ASSESSMENT & PLAN NOTE
32 y.o. female 24 weeks pregnant with history of polycystic kidney disease with HH4F4 SAH on 8/10, now s/p acom coiling 8/10.     NEURO  -Continue NCC  -q 1 hr neuro checks  -evd open to 10 and draining, monitor icps   -CSF profile reviewed from 8/19   -will keep EVD open at 10 today.   -nimotop  -daily TCDs   -TCDs continue to be elevated; exam stable   - will bolus 1L NS today     -Keppra      CV  -permissive HTN  <220 as aneurysm secured    RESP  -stable on room air  -Aggressive pulmonary management  -IS @ bedside    FEN/GI  -cont 2%  -strict I/Os  -goal net even or positive  -Reg diet    ID  -ancef for drain ppx    PPX  -SQH  -famotidine for PUD ppx  -TEDs/SCDs    -further mgmt per ncc and obgyn.

## 2018-08-23 NOTE — PROGRESS NOTES
ICU Progress Note  Neurocritical Care    Admit Date: 8/10/2018  LOS: 13    CC: Subarachnoid hemorrhage from anterior communicating artery aneurysm    Code Status: Full Code     SUBJECTIVE:     Interval History/Significant Events:   Decrease in Wbc count  Afebrile  Cerebral vasospasm, increasing TCDs  Remains neurologically intact  evd open @ 10    Medications:  Continuous Infusions:   Sodium Chloride 2% 150 mL/hr at 08/23/18 1047     Scheduled Meds:   ceFAZolin (ANCEF) IVPB  1 g Intravenous Q8H    famotidine  20 mg Oral BID    ferrous sulfate  325 mg Oral Daily    heparin (porcine)  5,000 Units Subcutaneous Q8H    niMODipine  30 mg Oral Q2H    polyethylene glycol  17 g Oral Daily    prenatal vits96-iron fum-folic  1 tablet Oral Daily    senna-docusate 8.6-50 mg  1 tablet Oral BID    sodium chloride 0.9%  10 mL Intravenous Q6H     PRN Meds:.sodium chloride, acetaminophen, acetaminophen, calcium gluconate, labetalol, magnesium oxide, magnesium oxide, midazolam, oxyCODONE, potassium chloride 10%, potassium chloride 10%, potassium chloride 10%, potassium, sodium phosphates, potassium, sodium phosphates, potassium, sodium phosphates, Flushing PICC Protocol **AND** sodium chloride 0.9% **AND** sodium chloride 0.9%, sodium chloride 0.9%    OBJECTIVE:   Vital Signs (Most Recent):   Temp: 98.7 °F (37.1 °C) (08/23/18 0701)  Pulse: 96 (08/23/18 1000)  Resp: 20 (08/23/18 1000)  BP: (!) 140/86 (08/23/18 1000)  SpO2: 100 % (08/23/18 1000)    Vital Signs (24h Range):   Temp:  [98.6 °F (37 °C)-100.3 °F (37.9 °C)] 98.7 °F (37.1 °C)  Pulse:  [] 96  Resp:  [19-46] 20  SpO2:  [97 %-100 %] 100 %  BP: (120-161)/() 140/86    ICP/CPP (Last 24h):   ICP Mean (mmHg):  [1 mmHg-14 mmHg] 8 mmHg  CPP (mmHg calculated using NBP):  [] 100    I & O (Last 24h):     Intake/Output Summary (Last 24 hours) at 8/23/2018 1050  Last data filed at 8/23/2018 1000  Gross per 24 hour   Intake 5277.5 ml   Output 6110 ml   Net -832.5  ml     Physical Exam:  GA: Alert, comfortable, no acute distress.   HEENT: No scleral icterus or JVD.   Pulmonary: Clear to auscultation A/P/L. No wheezing, crackles, or rhonchi.  Cardiac: RRR S1 & S2 w/o rubs/murmurs/gallops.   Abdominal: Bowel sounds present x 4. No appreciable hepatosplenomegaly.  Skin: No jaundice, rashes, or visible lesions.  Neuro:     Alert and Oriented X4   no facial assymetry   Moves all four extremities   no sensory deficits   FNS intact        Lines/Drains/Airway:          PICC Double Lumen 08/13/18 1551 right brachial (Active)   Site Assessment Clean;Dry;Intact;No redness;No swelling 8/22/2018  7:01 AM   Lumen 1 Status Infusing 8/22/2018  7:01 AM   Lumen 2 Status Infusing 8/22/2018  7:01 AM   Length martha (cm) 36 cm 8/13/2018  3:50 PM   Current Exposed Catheter (cm) 0 cm 8/13/2018  3:50 PM   Extremity Circumference (cm) 31 cm 8/13/2018  3:50 PM   Dressing Type Transparent 8/22/2018  7:01 AM   Dressing Status Biopatch in place;Clean;Dry;Intact 8/22/2018  7:01 AM   Dressing Intervention Dressing reinforced 8/22/2018  7:01 AM   Dressing Change Due 08/27/18 8/22/2018  7:01 AM   Daily Line Review Performed 8/22/2018  7:01 AM             ICP/Ventriculostomy 08/11/18 0000 Ventricular drainage catheter with ICP microsensor Right Other (Comment) (Active)   Level of Ventriculostomy (cm above) 10 8/22/2018  3:03 AM   Status Open to drainage 8/22/2018  7:01 AM   Site Assessment Clean;Dry 8/22/2018  7:01 AM   Site Drainage No drainage 8/22/2018  7:01 AM   Waveform normal waveform 8/22/2018  7:01 AM   Output (mL) 5 mL 8/22/2018 11:01 AM   CSF Color clear 8/22/2018  7:01 AM   Dressing Status Biopatch in place;Clean;Dry;Intact 8/22/2018  7:01 AM   Interventions HOB degrees 8/22/2018  7:01 AM       Female External Urinary Catheter 08/20/18 0700 (Active)   Skin no redness;no breakdown 8/22/2018  7:01 AM   Tolerance no signs/symptoms of discomfort 8/22/2018  7:01 AM   Suction Other 8/22/2018  7:01 AM   Date  of last wick change 18  8:01 AM   Time of last wick change 0801 2018  8:01 AM   Output (mL) 200 mL 2018 10:01 AM     Nutrition/Tube Feeds (if NPO state why): po    Labs:  ABG: No results for input(s): PH, PO2, PCO2, HCO3, POCSATURATED, BE in the last 24 hours.  BMP:  Recent Labs   Lab  18   0304   NA  135*  135*   K  3.0*   CL  108   CO2  19*   BUN  5*   CREATININE  0.6   GLU  107   MG  1.8   PHOS  4.1     LFT:   Lab Results   Component Value Date    AST 10 2018    ALT 15 2018    ALKPHOS 71 2018    BILITOT 0.2 2018    ALBUMIN 2.5 (L) 2018    PROT 5.9 (L) 2018     CBC:   Lab Results   Component Value Date    WBC 9.04 2018    HGB 7.0 (L) 2018    HCT 23.1 (L) 2018    MCV 94 2018     2018     Microbiology x 7d:   Microbiology Results (last 7 days)     Procedure Component Value Units Date/Time    CSF culture [[id number]] Collected:  18 1354    Order Status:  Completed Specimen:  CSF (Spinal Fluid) from CSF Shunt Updated:  18 0738     CSF CULTURE No Growth     Gram Stain Result Rare WBC's      No organisms seen        Imaging:  Follow TVD's  I personally reviewed the above image.    ASSESSMENT/PLAN:     Active Hospital Problems    Diagnosis    *Subarachnoid hemorrhage from anterior communicating artery aneurysm     screening for malformation using ultrasonics    Subarachnoid hemorrhage    Cerebral artery vasospasm    Intracranial hypertension    Matthew coma scale total score 9-12    Endotracheal tube present    Hyponatremia    Hypokalemia    Hypophosphatemia    JOSE C (acute kidney injury)    Pre-eclampsia in second trimester    Brain compression    Brain edema    Hypertension affecting pregnancy in second trimester    25 weeks gestation of pregnancy    PKD (polycystic kidney disease)     Needs baseline P/C ratio.  Strong family history of renal abnormalities               Plan:  Transfuse 1U of blood  Follow exam  Talk to nsgy re EVD  Follow TCDs  Maintain normo natremia and euvolemia  Follow Is and Os    Ulises Ring MD  NCC

## 2018-08-23 NOTE — SUBJECTIVE & OBJECTIVE
Interval History: NAEON. Mild L upward drift this AM. Remains on 2%. TCDs remain elevated, will bolus 1L NS this AM. EVD open at 10, ICP 4-8.    Medications:  Continuous Infusions:   Sodium Chloride 2% 150 mL/hr at 08/23/18 1200     Scheduled Meds:   ceFAZolin (ANCEF) IVPB  1 g Intravenous Q8H    famotidine  20 mg Oral BID    ferrous sulfate  325 mg Oral Daily    heparin (porcine)  5,000 Units Subcutaneous Q8H    niMODipine  30 mg Oral Q2H    polyethylene glycol  17 g Oral Daily    prenatal vits96-iron fum-folic  1 tablet Oral Daily    senna-docusate 8.6-50 mg  1 tablet Oral BID    sodium chloride 0.9%  10 mL Intravenous Q6H     PRN Meds:sodium chloride, acetaminophen, acetaminophen, calcium gluconate, labetalol, magnesium oxide, magnesium oxide, oxyCODONE, potassium chloride 10%, potassium chloride 10%, potassium chloride 10%, potassium, sodium phosphates, potassium, sodium phosphates, potassium, sodium phosphates, Flushing PICC Protocol **AND** sodium chloride 0.9% **AND** sodium chloride 0.9%, sodium chloride 0.9%     Review of Systems  Objective:     Weight: 75.7 kg (166 lb 14.2 oz)  Body mass index is 24.65 kg/m².  Vital Signs (Most Recent):  Temp: 98.5 °F (36.9 °C) (08/23/18 1305)  Pulse: 85 (08/23/18 1305)  Resp: (!) 23 (08/23/18 1305)  BP: (!) 155/101 (08/23/18 1305)  SpO2: 100 % (08/23/18 1305) Vital Signs (24h Range):  Temp:  [98.4 °F (36.9 °C)-100.3 °F (37.9 °C)] 98.5 °F (36.9 °C)  Pulse:  [] 85  Resp:  [19-46] 23  SpO2:  [97 %-100 %] 100 %  BP: (120-161)/() 155/101     Date 08/23/18 0700 - 08/24/18 0659   Shift 4617-6839 9100-0437 6435-2592 24 Hour Total   INTAKE   P.O. 600   600   I.V.(mL/kg) 900(11.9)   900(11.9)   Shift Total(mL/kg) 1500(19.8)   1500(19.8)   OUTPUT   Urine(mL/kg/hr) 1175   1175   Drains 72   72   Shift Total(mL/kg) 1247(16.5)   1247(16.5)   Weight (kg) 75.7 75.7 75.7 75.7                        ICP/Ventriculostomy 08/11/18 0000 Ventricular drainage catheter with  ICP microsensor Right Other (Comment) (Active)   Level of Ventriculostomy (cm above) 10 8/23/2018 11:01 AM   Status Open to drainage 8/23/2018 11:01 AM   Site Assessment Clean;Dry 8/23/2018 11:01 AM   Site Drainage Clear 8/23/2018 11:01 AM   Waveform normal waveform 8/23/2018  7:01 AM   Output (mL) 3 mL 8/23/2018 12:00 PM   CSF Color clear 8/23/2018 11:01 AM   Dressing Status Biopatch in place;Clean;Dry;Intact 8/23/2018 11:01 AM   Interventions HOB degrees;bed controls locked;level adjusted per order 8/23/2018 11:01 AM       Female External Urinary Catheter 08/20/18 0700 (Active)   Skin perineum cleansed w/ soap and water 8/23/2018 11:01 AM   Tolerance no signs/symptoms of discomfort 8/23/2018 11:01 AM   Suction Other 8/23/2018 11:01 AM   Date of last wick change 08/23/18 8/23/2018 11:01 AM   Time of last wick change 1101 8/23/2018 11:01 AM   Output (mL) 375 mL 8/23/2018 11:01 AM       Neurosurgery Physical Exam   AOX3  speech fluent  EVD in place   PERRL, EOMI, face symm, tongue midline  HOBSON symm  Mild L upward drift appreciated on exam.         Significant Labs:  Recent Labs   Lab  08/22/18   0303  08/22/18   1124  08/22/18   1743  08/22/18 1955 08/23/18   0304  08/23/18   1126   GLU  95   --    --    --   107   --    NA  134*  134*  137   --   137  135*  135*  136   K  3.5  3.7  4.0   --   3.0*   --    CL  108   --    --    --   108   --    CO2  17*   --    --    --   19*   --    BUN  4*   --    --    --   5*   --    CREATININE  0.6   --    --    --   0.6   --    CALCIUM  8.0*   --    --    --   8.1*   --    MG  1.5*   --   1.6   --   1.8   --      Recent Labs   Lab  08/22/18   0303  08/22/18   1255  08/22/18 1955 08/23/18   0304   WBC  11.01   --   10.28  9.04   HGB  7.7*  7.9*  7.6*  7.0*   HCT  23.8*  24.5*  24.2*  23.1*   PLT  258   --   275  259     Recent Labs   Lab  08/22/18   0303  08/23/18   0304   INR  1.1  1.0     Microbiology Results (last 7 days)     Procedure Component Value Units Date/Time     CSF culture [[id number]] Collected:  08/18/18 1351    Order Status:  Completed Specimen:  CSF (Spinal Fluid) from CSF Shunt Updated:  08/23/18 0738     CSF CULTURE No Growth     Gram Stain Result Rare WBC's      No organisms seen        Recent Lab Results       08/23/18  1126 08/23/18  1119 08/23/18  1050 08/23/18  0629 08/23/18  0304      Immature Granulocytes     0.9     Immature Grans (Abs)     0.08  Comment:  Mild elevation in immature granulocytes is non specific and   can be seen in a variety of conditions including stress response,   acute inflammation, trauma and pregnancy. Correlation with other   laboratory and clinical findings is essential.       Unit Blood Type Code   6200[P]       Unit Expiration   [id number][P]       Unit Blood Type   A POS[P]       Albumin     2.5     Alkaline Phosphatase     71     ALT     15     Anion Gap     8     Appearance, UA          AST     10     Baso #     0.02     Basophil%     0.2     Bilirubin (UA)          Total Bilirubin     0.2  Comment:  For infants and newborns, interpretation of results should be based  on gestational age, weight and in agreement with clinical  observations.  Premature Infant recommended reference ranges:  Up to 24 hours.............<8.0 mg/dL  Up to 48 hours............<12.0 mg/dL  3-5 days..................<15.0 mg/dL  6-29 days.................<15.0 mg/dL       BUN, Bld     5     Calcium     8.1     Chloride     108     CO2     19     CODING SYSTEM   DJYL347[P]       Color, UA          Creatinine     0.6     Differential Method     Automated     DISPENSE STATUS   ISSUED[P]       eGFR if      >60.0     eGFR if non      >60.0  Comment:  Calculation used to obtain the estimated glomerular filtration  rate (eGFR) is the CKD-EPI equation.        Eos #     0.1     Eosinophil%     0.8     Glucose     107     Glucose, UA          Gran # (ANC)     7.0     Gran%     77.2     Group & Rh A POS         Hematocrit     23.1      Hemoglobin     7.0     INDIRECT DAVIDE NEG         Coumadin Monitoring INR     1.0  Comment:  Coumadin Therapy:  2.0 - 3.0 for INR for all indicators except mechanical heart valves  and antiphospholipid syndromes which should use 2.5 - 3.5.       Ketones, UA          Leukocytes, UA          Lymph #     1.3     Lymph%     14.4     Magnesium     1.8     MCH     28.5     MCHC     30.3     MCV     94     Microscopic Comment          Mono #     0.6     Mono%     6.5     MPV     10.0     Nitrite, UA          nRBC     0     Occult Blood UA          Osmolality, Ur          pH, UA          Phosphorus     4.1     Platelets     259     POCT Glucose  82  103      Potassium     3.0     PRODUCT CODE   A8334I30[P]       Total Protein     5.9     Protein, UA          Protime     10.4     RBC     2.46     RBC, UA          RDW     16.3     Sodium 136    135          135     Sodium Urine Random          Specific Gravity, UA          Specimen UA          Squam Epithel, UA          UNIT NUMBER   [id number][P]       Urobilinogen, UA          WBC, UA          WBC     9.04                 08/22/18  2122 08/22/18  2117 08/22/18  1955 08/22/18  1743 08/22/18  1740      Immature Granulocytes   0.7       Immature Grans (Abs)   0.07  Comment:  Mild elevation in immature granulocytes is non specific and   can be seen in a variety of conditions including stress response,   acute inflammation, trauma and pregnancy. Correlation with other   laboratory and clinical findings is essential.         Unit Blood Type Code          Unit Expiration          Unit Blood Type          Albumin          Alkaline Phosphatase          ALT          Anion Gap          Appearance, UA Clear         AST          Baso #   0.01       Basophil%   0.1       Bilirubin (UA) Negative         Total Bilirubin          BUN, Bld          Calcium          Chloride          CO2          CODING SYSTEM          Color, UA Straw         Creatinine          Differential Method    Automated       DISPENSE STATUS          eGFR if           eGFR if non           Eos #   0.1       Eosinophil%   0.5       Glucose          Glucose, UA Negative         Gran # (ANC)   8.1       Gran%   78.6       Group & Rh          Hematocrit   24.2       Hemoglobin   7.6       INDIRECT DAVIDE          Coumadin Monitoring INR          Ketones, UA Negative         Leukocytes, UA Negative         Lymph #   1.4       Lymph%   13.6       Magnesium    1.6      MCH   28.9       MCHC   31.4       MCV   92       Microscopic Comment SEE COMMENT  Comment:  Other formed elements not mentioned in the report are not   present in the microscopic examination.            Mono #   0.7       Mono%   6.5       MPV   10.4       Nitrite, UA Negative         nRBC   0       Occult Blood UA 1+         Osmolality, Ur  440  Comment:  The random urine reference ranges provided were established   for 24 hour urine collections.  No reference ranges exist for  random urine specimens.  Correlate clinically.          pH, UA 7.0         Phosphorus   2.4       Platelets   275       POCT Glucose     130     Potassium    4.0      PRODUCT CODE          Total Protein          Protein, UA Negative  Comment:  Recommend a 24 hour urine protein or a urine   protein/creatinine ratio if globulin induced proteinuria is  clinically suspected.           Protime          RBC   2.63       RBC, UA 3         RDW   16.5       Sodium   137       Sodium Urine Random  205  Comment:  The random urine reference ranges provided were established   for 24 hour urine collections.  No reference ranges exist for  random urine specimens.  Correlate clinically.          Specific [place], UA 1.010         Specimen UA Urine, Unspecified         Squam Epithel, UA 1         UNIT NUMBER          Urobilinogen, UA Negative         WBC, UA 1         WBC   10.28                       Significant Diagnostics:  I have reviewed all pertinent imaging  results/findings within the past 24 hours.

## 2018-08-23 NOTE — NURSING
Pt urine output 1.5 liters within an hour and 30 minutes. NCC notified and ordered to collect urinalysis, urine sodium and urine osmolarity and to collect serum Na Will continue to monitor and notify as needed.

## 2018-08-23 NOTE — PLAN OF CARE
Problem: Patient Care Overview  Goal: Plan of Care Review  Outcome: Ongoing (interventions implemented as appropriate)  POC reviewed with pt at 0445. Pt verbalized understanding. Questions and concerns addressed. No acute events overnight.   2% @ 150   Urine studies sent overnight because of increased urine output, >350ml most hours   >1000mL put out within first 2 hours of shift, NS fluid bolus given per NCC.  Pt continues to complain of neck pain and stiffness, treated with heat packs and medication per patient request. Will continue to monitor. See flowsheets for full assessment and VS info

## 2018-08-23 NOTE — PLAN OF CARE
Problem: Patient Care Overview  Goal: Plan of Care Review  Outcome: Ongoing (interventions implemented as appropriate)  No acute events throughout the day, VS and assessment per flow sheet, patient progressing towards goal as tolerated. Neuro status unchanged per baseline, GCS 15. EVD open at 10 cm of H20. Medications given per MD orders. Pt voided 1.5 L within 1 hour and 1/2 during shift, urine studies collected. 2% gtt continued, trending Na labs. Plan of care reviewed with Sharon [place] , all concerns addressed. Emotional support provided. Will continue to monitor.

## 2018-08-24 LAB
ALBUMIN SERPL BCP-MCNC: 2.5 G/DL
ALP SERPL-CCNC: 75 U/L
ALT SERPL W/O P-5'-P-CCNC: 16 U/L
ANION GAP SERPL CALC-SCNC: 8 MMOL/L
AST SERPL-CCNC: 13 U/L
BASOPHILS # BLD AUTO: 0.02 K/UL
BASOPHILS NFR BLD: 0.3 %
BILIRUB SERPL-MCNC: 0.2 MG/DL
BUN SERPL-MCNC: 6 MG/DL
CALCIUM SERPL-MCNC: 8.4 MG/DL
CHLORIDE SERPL-SCNC: 109 MMOL/L
CO2 SERPL-SCNC: 19 MMOL/L
CREAT SERPL-MCNC: 0.6 MG/DL
DIFFERENTIAL METHOD: ABNORMAL
EOSINOPHIL # BLD AUTO: 0.1 K/UL
EOSINOPHIL NFR BLD: 1.6 %
ERYTHROCYTE [DISTWIDTH] IN BLOOD BY AUTOMATED COUNT: 16.9 %
EST. GFR  (AFRICAN AMERICAN): >60 ML/MIN/1.73 M^2
EST. GFR  (NON AFRICAN AMERICAN): >60 ML/MIN/1.73 M^2
GLUCOSE SERPL-MCNC: 97 MG/DL
HCT VFR BLD AUTO: 26.4 %
HGB BLD-MCNC: 8.4 G/DL
IMM GRANULOCYTES # BLD AUTO: 0.07 K/UL
IMM GRANULOCYTES NFR BLD AUTO: 1 %
INR PPP: 1
LYMPHOCYTES # BLD AUTO: 1.1 K/UL
LYMPHOCYTES NFR BLD: 15.8 %
MAGNESIUM SERPL-MCNC: 1.7 MG/DL
MCH RBC QN AUTO: 29 PG
MCHC RBC AUTO-ENTMCNC: 31.8 G/DL
MCV RBC AUTO: 91 FL
MONOCYTES # BLD AUTO: 0.5 K/UL
MONOCYTES NFR BLD: 6.9 %
NEUTROPHILS # BLD AUTO: 5.2 K/UL
NEUTROPHILS NFR BLD: 74.4 %
NRBC BLD-RTO: 0 /100 WBC
PHOSPHATE SERPL-MCNC: 3 MG/DL
PLATELET # BLD AUTO: 265 K/UL
PMV BLD AUTO: 10.5 FL
POCT GLUCOSE: 114 MG/DL (ref 70–110)
POCT GLUCOSE: 91 MG/DL (ref 70–110)
POCT GLUCOSE: 98 MG/DL (ref 70–110)
POTASSIUM SERPL-SCNC: 3.5 MMOL/L
PROT SERPL-MCNC: 6 G/DL
PROTHROMBIN TIME: 10.5 SEC
RBC # BLD AUTO: 2.9 M/UL
SODIUM SERPL-SCNC: 134 MMOL/L
SODIUM SERPL-SCNC: 135 MMOL/L
SODIUM SERPL-SCNC: 136 MMOL/L
SODIUM SERPL-SCNC: 136 MMOL/L
SODIUM SERPL-SCNC: 139 MMOL/L
WBC # BLD AUTO: 6.98 K/UL

## 2018-08-24 PROCEDURE — 85610 PROTHROMBIN TIME: CPT

## 2018-08-24 PROCEDURE — 99232 SBSQ HOSP IP/OBS MODERATE 35: CPT | Mod: ,,, | Performed by: NEUROLOGICAL SURGERY

## 2018-08-24 PROCEDURE — 25000003 PHARM REV CODE 250: Performed by: NURSE PRACTITIONER

## 2018-08-24 PROCEDURE — 94761 N-INVAS EAR/PLS OXIMETRY MLT: CPT

## 2018-08-24 PROCEDURE — 80053 COMPREHEN METABOLIC PANEL: CPT

## 2018-08-24 PROCEDURE — 84100 ASSAY OF PHOSPHORUS: CPT

## 2018-08-24 PROCEDURE — 84295 ASSAY OF SERUM SODIUM: CPT | Mod: 91

## 2018-08-24 PROCEDURE — 20000000 HC ICU ROOM

## 2018-08-24 PROCEDURE — 25000003 PHARM REV CODE 250: Performed by: PHYSICIAN ASSISTANT

## 2018-08-24 PROCEDURE — 63600175 PHARM REV CODE 636 W HCPCS: Performed by: NURSE PRACTITIONER

## 2018-08-24 PROCEDURE — 63600175 PHARM REV CODE 636 W HCPCS: Performed by: PHYSICIAN ASSISTANT

## 2018-08-24 PROCEDURE — 85025 COMPLETE CBC W/AUTO DIFF WBC: CPT

## 2018-08-24 PROCEDURE — 99291 CRITICAL CARE FIRST HOUR: CPT | Mod: ,,, | Performed by: ANESTHESIOLOGY

## 2018-08-24 PROCEDURE — 25000003 PHARM REV CODE 250: Performed by: STUDENT IN AN ORGANIZED HEALTH CARE EDUCATION/TRAINING PROGRAM

## 2018-08-24 PROCEDURE — 83735 ASSAY OF MAGNESIUM: CPT

## 2018-08-24 PROCEDURE — A4217 STERILE WATER/SALINE, 500 ML: HCPCS | Performed by: NURSE PRACTITIONER

## 2018-08-24 PROCEDURE — A4216 STERILE WATER/SALINE, 10 ML: HCPCS | Performed by: PSYCHIATRY & NEUROLOGY

## 2018-08-24 PROCEDURE — 63600175 PHARM REV CODE 636 W HCPCS: Performed by: STUDENT IN AN ORGANIZED HEALTH CARE EDUCATION/TRAINING PROGRAM

## 2018-08-24 PROCEDURE — 84295 ASSAY OF SERUM SODIUM: CPT

## 2018-08-24 PROCEDURE — 25000003 PHARM REV CODE 250: Performed by: PSYCHIATRY & NEUROLOGY

## 2018-08-24 PROCEDURE — 25000003 PHARM REV CODE 250: Performed by: ANESTHESIOLOGY

## 2018-08-24 RX ORDER — ACETAMINOPHEN 325 MG/1
650 TABLET ORAL EVERY 4 HOURS PRN
Status: DISCONTINUED | OUTPATIENT
Start: 2018-08-24 | End: 2018-08-31 | Stop reason: HOSPADM

## 2018-08-24 RX ORDER — OXYCODONE HYDROCHLORIDE 5 MG/1
5 TABLET ORAL EVERY 4 HOURS PRN
Status: DISCONTINUED | OUTPATIENT
Start: 2018-08-24 | End: 2018-08-24

## 2018-08-24 RX ORDER — OXYCODONE HYDROCHLORIDE 5 MG/1
10 TABLET ORAL EVERY 4 HOURS PRN
Status: DISCONTINUED | OUTPATIENT
Start: 2018-08-24 | End: 2018-08-29

## 2018-08-24 RX ORDER — OXYCODONE HYDROCHLORIDE 5 MG/1
5 TABLET ORAL ONCE
Status: COMPLETED | OUTPATIENT
Start: 2018-08-24 | End: 2018-08-24

## 2018-08-24 RX ADMIN — OXYCODONE HYDROCHLORIDE 10 MG: 5 TABLET ORAL at 06:08

## 2018-08-24 RX ADMIN — NIMODIPINE 30 MG: 30 CAPSULE, LIQUID FILLED ORAL at 02:08

## 2018-08-24 RX ADMIN — CEFAZOLIN 1 G: 1 INJECTION, POWDER, FOR SOLUTION INTRAMUSCULAR; INTRAVENOUS at 06:08

## 2018-08-24 RX ADMIN — SODIUM CHLORIDE: 234 INJECTION INTRAMUSCULAR; INTRAVENOUS; SUBCUTANEOUS at 02:08

## 2018-08-24 RX ADMIN — OXYCODONE HYDROCHLORIDE 5 MG: 5 TABLET ORAL at 06:08

## 2018-08-24 RX ADMIN — SENNOSIDES AND DOCUSATE SODIUM 1 TABLET: 8.6; 5 TABLET ORAL at 08:08

## 2018-08-24 RX ADMIN — SODIUM CHLORIDE: 234 INJECTION INTRAMUSCULAR; INTRAVENOUS; SUBCUTANEOUS at 12:08

## 2018-08-24 RX ADMIN — FERROUS SULFATE TAB EC 325 MG (65 MG FE EQUIVALENT) 325 MG: 325 (65 FE) TABLET DELAYED RESPONSE at 08:08

## 2018-08-24 RX ADMIN — FAMOTIDINE 20 MG: 20 TABLET ORAL at 08:08

## 2018-08-24 RX ADMIN — HEPARIN SODIUM 5000 UNITS: 5000 INJECTION, SOLUTION INTRAVENOUS; SUBCUTANEOUS at 02:08

## 2018-08-24 RX ADMIN — FAMOTIDINE 20 MG: 20 TABLET ORAL at 09:08

## 2018-08-24 RX ADMIN — MAGNESIUM OXIDE TAB 400 MG (241.3 MG ELEMENTAL MG) 800 MG: 400 (241.3 MG) TAB at 01:08

## 2018-08-24 RX ADMIN — NIMODIPINE 30 MG: 30 CAPSULE, LIQUID FILLED ORAL at 08:08

## 2018-08-24 RX ADMIN — SODIUM CHLORIDE: 234 INJECTION INTRAMUSCULAR; INTRAVENOUS; SUBCUTANEOUS at 09:08

## 2018-08-24 RX ADMIN — NIMODIPINE 30 MG: 30 CAPSULE, LIQUID FILLED ORAL at 04:08

## 2018-08-24 RX ADMIN — Medication 10 ML: at 12:08

## 2018-08-24 RX ADMIN — SODIUM CHLORIDE: 234 INJECTION INTRAMUSCULAR; INTRAVENOUS; SUBCUTANEOUS at 11:08

## 2018-08-24 RX ADMIN — OXYCODONE HYDROCHLORIDE 5 MG: 5 TABLET ORAL at 11:08

## 2018-08-24 RX ADMIN — NIMODIPINE 30 MG: 30 CAPSULE, LIQUID FILLED ORAL at 10:08

## 2018-08-24 RX ADMIN — ACETAMINOPHEN 650 MG: 325 TABLET, FILM COATED ORAL at 12:08

## 2018-08-24 RX ADMIN — OXYCODONE HYDROCHLORIDE 10 MG: 5 TABLET ORAL at 09:08

## 2018-08-24 RX ADMIN — HEPARIN SODIUM 5000 UNITS: 5000 INJECTION, SOLUTION INTRAVENOUS; SUBCUTANEOUS at 06:08

## 2018-08-24 RX ADMIN — POTASSIUM CHLORIDE 40 MEQ: 20 SOLUTION ORAL at 06:08

## 2018-08-24 RX ADMIN — PRENATAL VIT W/ FE FUMARATE-FA TAB 27-0.8 MG 1 TABLET: 27-0.8 TAB at 08:08

## 2018-08-24 RX ADMIN — ACETAMINOPHEN 650 MG: 325 TABLET, FILM COATED ORAL at 04:08

## 2018-08-24 RX ADMIN — NIMODIPINE 30 MG: 30 CAPSULE, LIQUID FILLED ORAL at 11:08

## 2018-08-24 RX ADMIN — OXYCODONE HYDROCHLORIDE 5 MG: 5 TABLET ORAL at 04:08

## 2018-08-24 RX ADMIN — ACETAMINOPHEN 650 MG: 325 TABLET, FILM COATED ORAL at 06:08

## 2018-08-24 RX ADMIN — CEFAZOLIN 1 G: 1 INJECTION, POWDER, FOR SOLUTION INTRAMUSCULAR; INTRAVENOUS at 02:08

## 2018-08-24 RX ADMIN — Medication 10 ML: at 06:08

## 2018-08-24 RX ADMIN — NIMODIPINE 30 MG: 30 CAPSULE, LIQUID FILLED ORAL at 06:08

## 2018-08-24 RX ADMIN — HEPARIN SODIUM 5000 UNITS: 5000 INJECTION, SOLUTION INTRAVENOUS; SUBCUTANEOUS at 10:08

## 2018-08-24 RX ADMIN — OXYCODONE HYDROCHLORIDE 10 MG: 5 TABLET ORAL at 02:08

## 2018-08-24 RX ADMIN — CEFAZOLIN 1 G: 1 INJECTION, POWDER, FOR SOLUTION INTRAMUSCULAR; INTRAVENOUS at 10:08

## 2018-08-24 RX ADMIN — SODIUM CHLORIDE: 234 INJECTION INTRAMUSCULAR; INTRAVENOUS; SUBCUTANEOUS at 04:08

## 2018-08-24 NOTE — SUBJECTIVE & OBJECTIVE
Interval History: NAEON. No drift this AM. Remains on 2%. TCDs elevated. EVD open at 10, ICP 4-10.     Medications:  Continuous Infusions:   Sodium Chloride 2% 150 mL/hr at 08/24/18 1111     Scheduled Meds:   ceFAZolin (ANCEF) IVPB  1 g Intravenous Q8H    famotidine  20 mg Oral BID    ferrous sulfate  325 mg Oral Daily    heparin (porcine)  5,000 Units Subcutaneous Q8H    niMODipine  30 mg Oral Q2H    polyethylene glycol  17 g Oral Daily    prenatal vits96-iron fum-folic  1 tablet Oral Daily    senna-docusate 8.6-50 mg  1 tablet Oral BID    sodium chloride 0.9%  10 mL Intravenous Q6H     PRN Meds:sodium chloride, acetaminophen, calcium gluconate, labetalol, magnesium oxide, magnesium oxide, oxyCODONE, potassium chloride 10%, potassium chloride 10%, potassium chloride 10%, potassium, sodium phosphates, potassium, sodium phosphates, potassium, sodium phosphates, Flushing PICC Protocol **AND** sodium chloride 0.9% **AND** sodium chloride 0.9%, sodium chloride 0.9%     Review of Systems  Objective:     Weight: 77.2 kg (170 lb 3.1 oz)  Body mass index is 25.13 kg/m².  Vital Signs (Most Recent):  Temp: 98.2 °F (36.8 °C) (08/24/18 0701)  Pulse: 63 (08/24/18 1101)  Resp: (!) 23 (08/24/18 1101)  BP: (!) 170/98 (08/24/18 1101)  SpO2: 100 % (08/24/18 1101) Vital Signs (24h Range):  Temp:  [97.7 °F (36.5 °C)-98.5 °F (36.9 °C)] 98.2 °F (36.8 °C)  Pulse:  [59-98] 63  Resp:  [16-36] 23  SpO2:  [98 %-100 %] 100 %  BP: (116-172)/() 170/98     Date 08/24/18 0700 - 08/25/18 0659   Shift 1756-5862 5107-6616 1774-5576 24 Hour Total   INTAKE   P.O. 120   120   I.V.(mL/kg) 600(7.8)   600(7.8)   Shift Total(mL/kg) 720(9.3)   720(9.3)   OUTPUT   Urine(mL/kg/hr) 600   600   Drains 40   40   Shift Total(mL/kg) 640(8.3)   640(8.3)   Weight (kg) 77.2 77.2 77.2 77.2                        ICP/Ventriculostomy 08/11/18 0000 Ventricular drainage catheter with ICP microsensor Right Other (Comment) (Active)   Level of Ventriculostomy  (cm above) 10 8/24/2018 11:01 AM   Status Open to drainage 8/24/2018 11:01 AM   Site Assessment Clean;Dry 8/24/2018 11:01 AM   Site Drainage Clear 8/24/2018 11:01 AM   Waveform normal waveform;A-waves 8/24/2018 11:01 AM   Output (mL) 3 mL 8/24/2018 11:01 AM   CSF Color clear 8/24/2018 11:01 AM   Dressing Status Biopatch in place;Clean;Dry;Intact 8/24/2018 11:01 AM   Interventions HOB degrees;bed controls locked;level adjusted per order 8/24/2018 11:01 AM       Female External Urinary Catheter 08/20/18 0700 (Active)   Skin perineum cleansed w/ soap and water 8/24/2018 11:01 AM   Tolerance no signs/symptoms of discomfort 8/24/2018 11:01 AM   Suction Other 8/24/2018 11:01 AM   Date of last wick change 08/24/18 8/24/2018 11:01 AM   Time of last wick change 1101 8/24/2018 11:01 AM   Output (mL) 250 mL 8/24/2018  8:41 AM       Neurosurgery Physical Exam   AOX3  speech fluent  EVD in place   PERRL, EOMI, face symm, tongue midline  HOBSON symm  Mild L upward drift appreciated on exam.     Significant Labs:  Recent Labs   Lab  08/23/18 0304 08/23/18 1736 08/24/18 0323  08/24/18   1117   GLU  107   --   80  97   --    NA  135*  135*   < >  135*  135*  136  136  134*   K  3.0*   --   4.0  3.5   --    CL  108   --   106  109   --    CO2  19*   --   21*  19*   --    BUN  5*   --   4*  6   --    CREATININE  0.6   --   0.5  0.6   --    CALCIUM  8.1*   --   9.0  8.4*   --    MG  1.8   --   1.6  1.7   --     < > = values in this interval not displayed.     Recent Labs   Lab  08/23/18 0304 08/23/18 1736 08/24/18 0323   WBC  9.04  9.85  6.98   HGB  7.0*  9.0*  8.4*   HCT  23.1*  27.1*  26.4*   PLT  259  320  265     Recent Labs   Lab  08/23/18   0304  08/24/18   0323   INR  1.0  1.0     Microbiology Results (last 7 days)     Procedure Component Value Units Date/Time    CSF culture [[id number]] Collected:  08/18/18 1354    Order Status:  Completed Specimen:  CSF (Spinal Fluid) from CSF Shunt Updated:  08/23/18 0710      CSF CULTURE No Growth     Gram Stain Result Rare WBC's      No organisms seen        Recent Lab Results       08/24/18  1117 08/24/18  1117 08/24/18  0624 08/24/18  0323 08/23/18  1736      Immature Granulocytes    1.0      Immature Grans (Abs)    0.07  Comment:  Mild elevation in immature granulocytes is non specific and   can be seen in a variety of conditions including stress response,   acute inflammation, trauma and pregnancy. Correlation with other   laboratory and clinical findings is essential.        Albumin    2.5      Alkaline Phosphatase    75      ALT    16      Anion Gap    8      Appearance, UA          AST    13      Baso #    0.02      Basophil%    0.3      Bilirubin (UA)          Total Bilirubin    0.2  Comment:  For infants and newborns, interpretation of results should be based  on gestational age, weight and in agreement with clinical  observations.  Premature Infant recommended reference ranges:  Up to 24 hours.............<8.0 mg/dL  Up to 48 hours............<12.0 mg/dL  3-5 days..................<15.0 mg/dL  6-29 days.................<15.0 mg/dL        BUN, Bld    6      Calcium    8.4      Chloride    109      CO2    19      Color, UA          Creatinine    0.6      Differential Method    Automated      eGFR if     >60.0      eGFR if non     >60.0  Comment:  Calculation used to obtain the estimated glomerular filtration  rate (eGFR) is the CKD-EPI equation.         Eos #    0.1      Eosinophil%    1.6      Glucose    97      Glucose, UA          Gran # (ANC)    5.2      Gran%    74.4      Hematocrit    26.4      Hemoglobin    8.4      Coumadin Monitoring INR    1.0  Comment:  Coumadin Therapy:  2.0 - 3.0 for INR for all indicators except mechanical heart valves  and antiphospholipid syndromes which should use 2.5 - 3.5.        Ketones, UA          Leukocytes, UA          Lymph #    1.1      Lymph%    15.8      Magnesium    1.7      MCH    29.0      MCHC     31.8      MCV    91      Microscopic Comment          Mono #    0.5      Mono%    6.9      MPV    10.5      Nitrite, UA          nRBC    0      Occult Blood UA          Osmolality          pH, UA          Phosphorus    3.0      Platelets    265      POCT Glucose 114  91  89     Potassium    3.5      Total Protein    6.0      Protein, UA          Protime    10.5      RBC    2.90      RBC, UA          RDW    16.9      Sodium  134  136          136      Sodium Urine Random          Specific Gravity, UA          Specimen UA          Squam Epithel, UA          Urobilinogen, UA          WBC, UA          WBC    6.98                  08/23/18  1736 08/23/18  1652      Immature Granulocytes 0.9      Immature Grans (Abs) 0.09  Comment:  Mild elevation in immature granulocytes is non specific and   can be seen in a variety of conditions including stress response,   acute inflammation, trauma and pregnancy. Correlation with other   laboratory and clinical findings is essential.        Albumin 2.9      Alkaline Phosphatase 86      ALT 17      Anion Gap 8      Appearance, UA  Clear     AST 13      Baso # 0.02      Basophil% 0.2      Bilirubin (UA)  Negative     Total Bilirubin 0.4  Comment:  For infants and newborns, interpretation of results should be based  on gestational age, weight and in agreement with clinical  observations.  Premature Infant recommended reference ranges:  Up to 24 hours.............<8.0 mg/dL  Up to 48 hours............<12.0 mg/dL  3-5 days..................<15.0 mg/dL  6-29 days.................<15.0 mg/dL        BUN, Bld 4      Calcium 9.0      Chloride 106      CO2 21      Color, UA  Colorless     Creatinine 0.5      Differential Method Automated      eGFR if African American >60.0      eGFR if non  >60.0  Comment:  Calculation used to obtain the estimated glomerular filtration  rate (eGFR) is the CKD-EPI equation.         Eos # 0.1      Eosinophil% 0.8      Glucose 80      Glucose, UA   Negative     Gran # (ANC) 7.9      Gran% 80.0      Hematocrit 27.1      Hemoglobin 9.0      Coumadin Monitoring INR       Ketones, UA  Negative     Leukocytes, UA  Negative     Lymph # 1.1      Lymph% 11.5      Magnesium 1.6      MCH 29.1      MCHC 33.2      MCV 88      Microscopic Comment  SEE COMMENT  Comment:  Other formed elements not mentioned in the report are not   present in the microscopic examination.        Mono # 0.7      Mono% 6.6      MPV 10.0      Nitrite, UA  Negative     nRBC 0      Occult Blood UA  1+     Osmolality 275       275      pH, UA  8.0     Phosphorus       Platelets 320      POCT Glucose       Potassium 4.0      Total Protein 6.8      Protein, UA  Negative  Comment:  Recommend a 24 hour urine protein or a urine   protein/creatinine ratio if globulin induced proteinuria is  clinically suspected.       Protime       RBC 3.09      RBC, UA  5     RDW 16.9      Sodium 135       135      Sodium Urine Random  197  Comment:  The random urine reference ranges provided were established   for 24 hour urine collections.  No reference ranges exist for  random urine specimens.  Correlate clinically.       Specific [place], UA  1.010     Specimen UA  Urine, Clean Catch     Squam Epithel, UA  0     Urobilinogen, UA  Negative     WBC, UA  0     WBC 9.85            Significant Diagnostics:  I have reviewed all pertinent imaging results/findings within the past 24 hours.

## 2018-08-24 NOTE — PROGRESS NOTES
Patient's chart was reviewed by a stroke team provider.  EVD in place and patient on HTS  There is no new imaging to review.  Pending diagnostics to follow up on include: none.  For other recommendations please see our previous note completed on: 08/20/18      There are no new recommendations at this time. Will continue to follow. Discussed patient with staff. Please contact stroke team for any questions or concerns.             Leslie Rodríguez PA-C  Vascular Neurology  [phone]

## 2018-08-24 NOTE — PROGRESS NOTES
ICU Progress Note  Neurocritical Care    Admit Date: 8/10/2018  LOS: 14    CC: Subarachnoid hemorrhage from anterior communicating artery aneurysm    Code Status: Full Code     SUBJECTIVE:     Interval History/Significant Events:   WBC wnl  Afebrile  Cerebral vasospasm, increasing TCDs  Remains neurologically intact  evd open @ 10  H/H remains stable  Denies headache and neck stiffness      Medications:  Continuous Infusions:   Sodium Chloride 2% 150 mL/hr at 08/24/18 1000     Scheduled Meds:   ceFAZolin (ANCEF) IVPB  1 g Intravenous Q8H    famotidine  20 mg Oral BID    ferrous sulfate  325 mg Oral Daily    heparin (porcine)  5,000 Units Subcutaneous Q8H    niMODipine  30 mg Oral Q2H    polyethylene glycol  17 g Oral Daily    prenatal vits96-iron fum-folic  1 tablet Oral Daily    senna-docusate 8.6-50 mg  1 tablet Oral BID    sodium chloride 0.9%  10 mL Intravenous Q6H     PRN Meds:.sodium chloride, acetaminophen, acetaminophen, calcium gluconate, labetalol, magnesium oxide, magnesium oxide, oxyCODONE, potassium chloride 10%, potassium chloride 10%, potassium chloride 10%, potassium, sodium phosphates, potassium, sodium phosphates, potassium, sodium phosphates, Flushing PICC Protocol **AND** sodium chloride 0.9% **AND** sodium chloride 0.9%, sodium chloride 0.9%    OBJECTIVE:   Vital Signs (Most Recent):   Temp: 98.2 °F (36.8 °C) (08/24/18 0701)  Pulse: 65 (08/24/18 1000)  Resp: (!) 23 (08/24/18 1000)  BP: (!) 152/94 (08/24/18 1000)  SpO2: 100 % (08/24/18 1000)    Vital Signs (24h Range):   Temp:  [97.7 °F (36.5 °C)-98.5 °F (36.9 °C)] 98.2 °F (36.8 °C)  Pulse:  [59-98] 65  Resp:  [16-36] 23  SpO2:  [98 %-100 %] 100 %  BP: (116-172)/() 152/94    ICP/CPP (Last 24h):   ICP Mean (mmHg):  [3 mmHg-10 mmHg] 7 mmHg  CPP (mmHg calculated using NBP):  [] 112    I & O (Last 24h):     Intake/Output Summary (Last 24 hours) at 8/24/2018 1032  Last data filed at 8/24/2018 1000  Gross per 24 hour   Intake 5711  ml   Output 5997 ml   Net -227 ml     Physical Exam:  GA: Alert, comfortable, no acute distress.   HEENT: No scleral icterus or JVD.   Pulmonary: Clear to auscultation A/P/L. No wheezing, crackles, or rhonchi.  Cardiac: RRR S1 & S2 w/o rubs/murmurs/gallops.   Abdominal: Bowel sounds present x 4. No appreciable hepatosplenomegaly.  Skin: No jaundice, rashes, or visible lesions.  Neuro:     Alert and Oriented X4   no facial assymetry   Moves all four extremities, no drift   grossly no sensory deficits   FNS intact  Fine finger movements intactt        Lines/Drains/Airway:          PICC Double Lumen 08/13/18 1551 right brachial (Active)   Site Assessment Clean;Dry;Intact;No redness;No swelling 8/22/2018  7:01 AM   Lumen 1 Status Infusing 8/22/2018  7:01 AM   Lumen 2 Status Infusing 8/22/2018  7:01 AM   Length martha (cm) 36 cm 8/13/2018  3:50 PM   Current Exposed Catheter (cm) 0 cm 8/13/2018  3:50 PM   Extremity Circumference (cm) 31 cm 8/13/2018  3:50 PM   Dressing Type Transparent 8/22/2018  7:01 AM   Dressing Status Biopatch in place;Clean;Dry;Intact 8/22/2018  7:01 AM   Dressing Intervention Dressing reinforced 8/22/2018  7:01 AM   Dressing Change Due 08/27/18 8/22/2018  7:01 AM   Daily Line Review Performed 8/22/2018  7:01 AM             ICP/Ventriculostomy 08/11/18 0000 Ventricular drainage catheter with ICP microsensor Right Other (Comment) (Active)   Level of Ventriculostomy (cm above) 10 8/22/2018  3:03 AM   Status Open to drainage 8/22/2018  7:01 AM   Site Assessment Clean;Dry 8/22/2018  7:01 AM   Site Drainage No drainage 8/22/2018  7:01 AM   Waveform normal waveform 8/22/2018  7:01 AM   Output (mL) 5 mL 8/22/2018 11:01 AM   CSF Color clear 8/22/2018  7:01 AM   Dressing Status Biopatch in place;Clean;Dry;Intact 8/22/2018  7:01 AM   Interventions HOB degrees 8/22/2018  7:01 AM       Female External Urinary Catheter 08/20/18 0700 (Active)   Skin no redness;no breakdown 8/22/2018  7:01 AM   Tolerance no  signs/symptoms of discomfort 2018  7:01 AM   Suction Other 2018  7:01 AM   Date of last wick change 18  8:01 AM   Time of last wick change 0801 2018  8:01 AM   Output (mL) 200 mL 2018 10:01 AM     Nutrition/Tube Feeds (if NPO state why): po    Labs:  ABG: No results for input(s): PH, PO2, PCO2, HCO3, POCSATURATED, BE in the last 24 hours.  BMP:  Recent Labs   Lab  18   0323   NA  136  136   K  3.5   CL  109   CO2  19*   BUN  6   CREATININE  0.6   GLU  97   MG  1.7   PHOS  3.0     LFT:   Lab Results   Component Value Date    AST 13 2018    ALT 16 2018    ALKPHOS 75 2018    BILITOT 0.2 2018    ALBUMIN 2.5 (L) 2018    PROT 6.0 2018     CBC:   Lab Results   Component Value Date    WBC 6.98 2018    HGB 8.4 (L) 2018    HCT 26.4 (L) 2018    MCV 91 2018     2018     Microbiology x 7d:   Microbiology Results (last 7 days)     Procedure Component Value Units Date/Time    CSF culture [[id number]] Collected:  18 1354    Order Status:  Completed Specimen:  CSF (Spinal Fluid) from CSF Shunt Updated:  18 0738     CSF CULTURE No Growth     Gram Stain Result Rare WBC's      No organisms seen        Imaging:  Follow TVD's  I personally reviewed the above image.    ASSESSMENT/PLAN:     Active Hospital Problems    Diagnosis    *Subarachnoid hemorrhage from anterior communicating artery aneurysm     screening for malformation using ultrasonics    Subarachnoid hemorrhage    Cerebral artery vasospasm    Intracranial hypertension    [place] coma scale total score 9-12    Endotracheal tube present    Hyponatremia    Hypokalemia    Hypophosphatemia    JOSE C (acute kidney injury)    Pre-eclampsia in second trimester    Brain compression    Brain edema    Hypertension affecting pregnancy in second trimester    25 weeks gestation of pregnancy    PKD (polycystic kidney disease)     Needs baseline  P/C ratio.  Strong family history of renal abnormalities              Plan:  Follow exam  Talk to nsgy re EVD, may clump today  Follow TCDs  Maintain normo natremia and euvolemia  Follow Is and Os, +400ml yesterday  Cut down opiates for pain as it is better controlled    Ulises Ring MD  NCC

## 2018-08-24 NOTE — PROGRESS NOTES
[facility]  Neurosurgery  Progress Note    Subjective:     History of Present Illness: 32 y.o. female 24 weeks pregnant with a history of polycystic kidney disease who presents as transfer from [facility] for SAH. Patient found down by 6 yr old son. Last known normal 0500 today. CT at [facility] revealed SAH within the basal cisterns. US at [facility] with + fetal heart tones. Transferred for neuro evaluation.     Post-Op Info:  Procedure(s) (LRB):  Mri (magnetic resonance imaging) (N/A)   14 Days Post-Op     Interval History: NAEON. No drift this AM. Remains on 2%. TCDs elevated. EVD open at 10, ICP 4-10.     Medications:  Continuous Infusions:   Sodium Chloride 2% 150 mL/hr at 08/24/18 1111     Scheduled Meds:   ceFAZolin (ANCEF) IVPB  1 g Intravenous Q8H    famotidine  20 mg Oral BID    ferrous sulfate  325 mg Oral Daily    heparin (porcine)  5,000 Units Subcutaneous Q8H    niMODipine  30 mg Oral Q2H    polyethylene glycol  17 g Oral Daily    prenatal vits96-iron fum-folic  1 tablet Oral Daily    senna-docusate 8.6-50 mg  1 tablet Oral BID    sodium chloride 0.9%  10 mL Intravenous Q6H     PRN Meds:sodium chloride, acetaminophen, calcium gluconate, labetalol, magnesium oxide, magnesium oxide, oxyCODONE, potassium chloride 10%, potassium chloride 10%, potassium chloride 10%, potassium, sodium phosphates, potassium, sodium phosphates, potassium, sodium phosphates, Flushing PICC Protocol **AND** sodium chloride 0.9% **AND** sodium chloride 0.9%, sodium chloride 0.9%     Review of Systems  Objective:     Weight: 77.2 kg (170 lb 3.1 oz)  Body mass index is 25.13 kg/m².  Vital Signs (Most Recent):  Temp: 98.2 °F (36.8 °C) (08/24/18 0701)  Pulse: 63 (08/24/18 1101)  Resp: (!) 23 (08/24/18 1101)  BP: (!) 170/98 (08/24/18 1101)  SpO2: 100 % (08/24/18 1101) Vital Signs (24h Range):  Temp:  [97.7 °F (36.5 °C)-98.5 °F (36.9 °C)] 98.2 °F (36.8 °C)  Pulse:  [59-98] 63  Resp:  [16-36] 23  SpO2:  [98 %-100 %] 100 %  BP:  (116-172)/() 170/98     Date 08/24/18 0700 - 08/25/18 0659   Shift 6580-9380 1132-6531 1065-2580 24 Hour Total   INTAKE   P.O. 120   120   I.V.(mL/kg) 600(7.8)   600(7.8)   Shift Total(mL/kg) 720(9.3)   720(9.3)   OUTPUT   Urine(mL/kg/hr) 600   600   Drains 40   40   Shift Total(mL/kg) 640(8.3)   640(8.3)   Weight (kg) 77.2 77.2 77.2 77.2                        ICP/Ventriculostomy 08/11/18 0000 Ventricular drainage catheter with ICP microsensor Right Other (Comment) (Active)   Level of Ventriculostomy (cm above) 10 8/24/2018 11:01 AM   Status Open to drainage 8/24/2018 11:01 AM   Site Assessment Clean;Dry 8/24/2018 11:01 AM   Site Drainage Clear 8/24/2018 11:01 AM   Waveform normal waveform;A-waves 8/24/2018 11:01 AM   Output (mL) 3 mL 8/24/2018 11:01 AM   CSF Color clear 8/24/2018 11:01 AM   Dressing Status Biopatch in place;Clean;Dry;Intact 8/24/2018 11:01 AM   Interventions HOB degrees;bed controls locked;level adjusted per order 8/24/2018 11:01 AM       Female External Urinary Catheter 08/20/18 0700 (Active)   Skin perineum cleansed w/ soap and water 8/24/2018 11:01 AM   Tolerance no signs/symptoms of discomfort 8/24/2018 11:01 AM   Suction Other 8/24/2018 11:01 AM   Date of last wick change 08/24/18 8/24/2018 11:01 AM   Time of last wick change 1101 8/24/2018 11:01 AM   Output (mL) 250 mL 8/24/2018  8:41 AM       Neurosurgery Physical Exam   AOX3  speech fluent  EVD in place   PERRL, EOMI, face symm, tongue midline  HOBSON symm  Mild L upward drift appreciated on exam.     Significant Labs:  Recent Labs   Lab  08/23/18   0304   08/23/18   1736  08/24/18   0323  08/24/18   1117   GLU  107   --   80  97   --    NA  135*  135*   < >  135*  135*  136  136  134*   K  3.0*   --   4.0  3.5   --    CL  108   --   106  109   --    CO2  19*   --   21*  19*   --    BUN  5*   --   4*  6   --    CREATININE  0.6   --   0.5  0.6   --    CALCIUM  8.1*   --   9.0  8.4*   --    MG  1.8   --   1.6  1.7   --     < > = values  in this interval not displayed.     Recent Labs   Lab  08/23/18   0304  08/23/18   1736  08/24/18   0323   WBC  9.04  9.85  6.98   HGB  7.0*  9.0*  8.4*   HCT  23.1*  27.1*  26.4*   PLT  259  320  265     Recent Labs   Lab  08/23/18   0304  08/24/18   0323   INR  1.0  1.0     Microbiology Results (last 7 days)     Procedure Component Value Units Date/Time    CSF culture [[id number]] Collected:  08/18/18 1354    Order Status:  Completed Specimen:  CSF (Spinal Fluid) from CSF Shunt Updated:  08/23/18 0738     CSF CULTURE No Growth     Gram Stain Result Rare WBC's      No organisms seen        Recent Lab Results       08/24/18  1117 08/24/18  1117 08/24/18  0624 08/24/18  0323 08/23/18  1736      Immature Granulocytes    1.0      Immature Grans (Abs)    0.07  Comment:  Mild elevation in immature granulocytes is non specific and   can be seen in a variety of conditions including stress response,   acute inflammation, trauma and pregnancy. Correlation with other   laboratory and clinical findings is essential.        Albumin    2.5      Alkaline Phosphatase    75      ALT    16      Anion Gap    8      Appearance, UA          AST    13      Baso #    0.02      Basophil%    0.3      Bilirubin (UA)          Total Bilirubin    0.2  Comment:  For infants and newborns, interpretation of results should be based  on gestational age, weight and in agreement with clinical  observations.  Premature Infant recommended reference ranges:  Up to 24 hours.............<8.0 mg/dL  Up to 48 hours............<12.0 mg/dL  3-5 days..................<15.0 mg/dL  6-29 days.................<15.0 mg/dL        BUN, Bld    6      Calcium    8.4      Chloride    109      CO2    19      Color, UA          Creatinine    0.6      Differential Method    Automated      eGFR if     >60.0      eGFR if non     >60.0  Comment:  Calculation used to obtain the estimated glomerular filtration  rate (eGFR) is the CKD-EPI equation.          Eos #    0.1      Eosinophil%    1.6      Glucose    97      Glucose, UA          Gran # (ANC)    5.2      Gran%    74.4      Hematocrit    26.4      Hemoglobin    8.4      Coumadin Monitoring INR    1.0  Comment:  Coumadin Therapy:  2.0 - 3.0 for INR for all indicators except mechanical heart valves  and antiphospholipid syndromes which should use 2.5 - 3.5.        Ketones, UA          Leukocytes, UA          Lymph #    1.1      Lymph%    15.8      Magnesium    1.7      MCH    29.0      MCHC    31.8      MCV    91      Microscopic Comment          Mono #    0.5      Mono%    6.9      MPV    10.5      Nitrite, UA          nRBC    0      Occult Blood UA          Osmolality          pH, UA          Phosphorus    3.0      Platelets    265      POCT Glucose 114  91  89     Potassium    3.5      Total Protein    6.0      Protein, UA          Protime    10.5      RBC    2.90      RBC, UA          RDW    16.9      Sodium  134  136          136      Sodium Urine Random          Specific Gravity, UA          Specimen UA          Squam Epithel, UA          Urobilinogen, UA          WBC, UA          WBC    6.98                  08/23/18  1736 08/23/18  1652      Immature Granulocytes 0.9      Immature Grans (Abs) 0.09  Comment:  Mild elevation in immature granulocytes is non specific and   can be seen in a variety of conditions including stress response,   acute inflammation, trauma and pregnancy. Correlation with other   laboratory and clinical findings is essential.        Albumin 2.9      Alkaline Phosphatase 86      ALT 17      Anion Gap 8      Appearance, UA  Clear     AST 13      Baso # 0.02      Basophil% 0.2      Bilirubin (UA)  Negative     Total Bilirubin 0.4  Comment:  For infants and newborns, interpretation of results should be based  on gestational age, weight and in agreement with clinical  observations.  Premature Infant recommended reference ranges:  Up to 24 hours.............<8.0 mg/dL  Up to 48  hours............<12.0 mg/dL  3-5 days..................<15.0 mg/dL  6-29 days.................<15.0 mg/dL        BUN, Bld 4      Calcium 9.0      Chloride 106      CO2 21      Color, UA  Colorless     Creatinine 0.5      Differential Method Automated      eGFR if African American >60.0      eGFR if non  >60.0  Comment:  Calculation used to obtain the estimated glomerular filtration  rate (eGFR) is the CKD-EPI equation.         Eos # 0.1      Eosinophil% 0.8      Glucose 80      Glucose, UA  Negative     Gran # (ANC) 7.9      Gran% 80.0      Hematocrit 27.1      Hemoglobin 9.0      Coumadin Monitoring INR       Ketones, UA  Negative     Leukocytes, UA  Negative     Lymph # 1.1      Lymph% 11.5      Magnesium 1.6      MCH 29.1      MCHC 33.2      MCV 88      Microscopic Comment  SEE COMMENT  Comment:  Other formed elements not mentioned in the report are not   present in the microscopic examination.        Mono # 0.7      Mono% 6.6      MPV 10.0      Nitrite, UA  Negative     nRBC 0      Occult Blood UA  1+     Osmolality 275       275      pH, UA  8.0     Phosphorus       Platelets 320      POCT Glucose       Potassium 4.0      Total Protein 6.8      Protein, UA  Negative  Comment:  Recommend a 24 hour urine protein or a urine   protein/creatinine ratio if globulin induced proteinuria is  clinically suspected.       Protime       RBC 3.09      RBC, UA  5     RDW 16.9      Sodium 135       135      Sodium Urine Random  197  Comment:  The random urine reference ranges provided were established   for 24 hour urine collections.  No reference ranges exist for  random urine specimens.  Correlate clinically.       Specific [place], UA  1.010     Specimen UA  Urine, Clean Catch     Squam Epithel, UA  0     Urobilinogen, UA  Negative     WBC, UA  0     WBC 9.85            Significant Diagnostics:  I have reviewed all pertinent imaging results/findings within the past 24 hours.    Assessment/Plan:     *  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 female 24 weeks pregnant with history of polycystic kidney disease with [id number] SAH on 8/10, now s/p acom coiling 8/10. PBD/PCD 14.     NEURO  -Continue NCC  -q 1 hr neuro checks  -evd open to 10 and draining, monitor icps   -CSF profile reviewed from 8/19   -will keep EVD open at 10  -nimotop  -daily TCDs   -TCDs continue to be elevated; exam stable  -Keppra      CV  -permissive HTN  <220 as aneurysm secured    RESP  -stable on room air  -Aggressive pulmonary management  -IS @ bedside    FEN/GI  -cont 2%  -strict I/Os  -goal net even or positive  -Reg diet    ID  -ancef for drain ppx    PPX  -SQH  -famotidine for PUD ppx  -TEDs/SCDs    -further mgmt per ncc and obgyn.            Nayeli Sadler MD  Neurosurgery  [facility]

## 2018-08-24 NOTE — PLAN OF CARE
Problem: Patient Care Overview  Goal: Plan of Care Review  Outcome: Ongoing (interventions implemented as appropriate)  No acute events throughout the day, VS and assessment per flow sheet, patient progressing towards goal as tolerated. Neuro status unchanged per baseline, GCS 15. EVD remains open at 10. Medications given per MD orders. Pain management plan reviewed with pt. 2% gtt in place, trending Na labs. Plan of care reviewed with Sharon Grande and spouse, all concerns addressed. Emotional support provided. Will continue to monitor.

## 2018-08-24 NOTE — PLAN OF CARE
Problem: Patient Care Overview  Goal: Plan of Care Review  Outcome: Ongoing (interventions implemented as appropriate)  POC reviewed with pt at 0600. Pt verbalized understanding. Questions and concerns addressed. No acute events overnight. Pt progressing toward goals. 2% @ 150. EVD open @ 10.  Will continue to monitor. See flowsheets for full assessment and VS info

## 2018-08-25 LAB
ALBUMIN SERPL BCP-MCNC: 2.5 G/DL
ALP SERPL-CCNC: 75 U/L
ALT SERPL W/O P-5'-P-CCNC: 14 U/L
ANION GAP SERPL CALC-SCNC: 8 MMOL/L
AST SERPL-CCNC: 11 U/L
BASOPHILS # BLD AUTO: 0.01 K/UL
BASOPHILS NFR BLD: 0.2 %
BILIRUB SERPL-MCNC: 0.2 MG/DL
BUN SERPL-MCNC: 7 MG/DL
CALCIUM SERPL-MCNC: 8.5 MG/DL
CHLORIDE SERPL-SCNC: 107 MMOL/L
CO2 SERPL-SCNC: 21 MMOL/L
CREAT SERPL-MCNC: 0.6 MG/DL
DIFFERENTIAL METHOD: ABNORMAL
EOSINOPHIL # BLD AUTO: 0.1 K/UL
EOSINOPHIL NFR BLD: 1.4 %
ERYTHROCYTE [DISTWIDTH] IN BLOOD BY AUTOMATED COUNT: 16.9 %
EST. GFR  (AFRICAN AMERICAN): >60 ML/MIN/1.73 M^2
EST. GFR  (NON AFRICAN AMERICAN): >60 ML/MIN/1.73 M^2
GLUCOSE SERPL-MCNC: 94 MG/DL
HCT VFR BLD AUTO: 26.3 %
HGB BLD-MCNC: 8.6 G/DL
IMM GRANULOCYTES # BLD AUTO: 0.06 K/UL
IMM GRANULOCYTES NFR BLD AUTO: 0.9 %
INR PPP: 1
LYMPHOCYTES # BLD AUTO: 1.1 K/UL
LYMPHOCYTES NFR BLD: 16.5 %
MAGNESIUM SERPL-MCNC: 1.4 MG/DL
MAGNESIUM SERPL-MCNC: 1.6 MG/DL
MCH RBC QN AUTO: 29.3 PG
MCHC RBC AUTO-ENTMCNC: 32.7 G/DL
MCV RBC AUTO: 90 FL
MONOCYTES # BLD AUTO: 0.6 K/UL
MONOCYTES NFR BLD: 9 %
NEUTROPHILS # BLD AUTO: 4.6 K/UL
NEUTROPHILS NFR BLD: 72 %
NRBC BLD-RTO: 0 /100 WBC
PHOSPHATE SERPL-MCNC: 3.3 MG/DL
PLATELET # BLD AUTO: 297 K/UL
PMV BLD AUTO: 10.4 FL
POCT GLUCOSE: 103 MG/DL (ref 70–110)
POCT GLUCOSE: 89 MG/DL (ref 70–110)
POCT GLUCOSE: 89 MG/DL (ref 70–110)
POCT GLUCOSE: 90 MG/DL (ref 70–110)
POTASSIUM SERPL-SCNC: 3.6 MMOL/L
POTASSIUM SERPL-SCNC: 3.7 MMOL/L
PROT SERPL-MCNC: 6.1 G/DL
PROTHROMBIN TIME: 10.9 SEC
RBC # BLD AUTO: 2.94 M/UL
SODIUM SERPL-SCNC: 136 MMOL/L
SODIUM SERPL-SCNC: 138 MMOL/L
WBC # BLD AUTO: 6.36 K/UL

## 2018-08-25 PROCEDURE — 63600175 PHARM REV CODE 636 W HCPCS: Performed by: STUDENT IN AN ORGANIZED HEALTH CARE EDUCATION/TRAINING PROGRAM

## 2018-08-25 PROCEDURE — 85025 COMPLETE CBC W/AUTO DIFF WBC: CPT

## 2018-08-25 PROCEDURE — A4217 STERILE WATER/SALINE, 500 ML: HCPCS | Performed by: NURSE PRACTITIONER

## 2018-08-25 PROCEDURE — 80053 COMPREHEN METABOLIC PANEL: CPT

## 2018-08-25 PROCEDURE — 25000003 PHARM REV CODE 250: Performed by: STUDENT IN AN ORGANIZED HEALTH CARE EDUCATION/TRAINING PROGRAM

## 2018-08-25 PROCEDURE — 99232 SBSQ HOSP IP/OBS MODERATE 35: CPT | Mod: ,,, | Performed by: NEUROLOGICAL SURGERY

## 2018-08-25 PROCEDURE — 94761 N-INVAS EAR/PLS OXIMETRY MLT: CPT

## 2018-08-25 PROCEDURE — 25000003 PHARM REV CODE 250: Performed by: NURSE PRACTITIONER

## 2018-08-25 PROCEDURE — 25000003 PHARM REV CODE 250: Performed by: PSYCHIATRY & NEUROLOGY

## 2018-08-25 PROCEDURE — 63600175 PHARM REV CODE 636 W HCPCS: Performed by: PHYSICIAN ASSISTANT

## 2018-08-25 PROCEDURE — 84295 ASSAY OF SERUM SODIUM: CPT

## 2018-08-25 PROCEDURE — 63600175 PHARM REV CODE 636 W HCPCS: Performed by: NURSE PRACTITIONER

## 2018-08-25 PROCEDURE — 25000003 PHARM REV CODE 250: Performed by: PHYSICIAN ASSISTANT

## 2018-08-25 PROCEDURE — A4216 STERILE WATER/SALINE, 10 ML: HCPCS | Performed by: STUDENT IN AN ORGANIZED HEALTH CARE EDUCATION/TRAINING PROGRAM

## 2018-08-25 PROCEDURE — 84100 ASSAY OF PHOSPHORUS: CPT

## 2018-08-25 PROCEDURE — 85610 PROTHROMBIN TIME: CPT

## 2018-08-25 PROCEDURE — A4216 STERILE WATER/SALINE, 10 ML: HCPCS | Performed by: PSYCHIATRY & NEUROLOGY

## 2018-08-25 PROCEDURE — 99233 SBSQ HOSP IP/OBS HIGH 50: CPT | Mod: ,,, | Performed by: PSYCHIATRY & NEUROLOGY

## 2018-08-25 PROCEDURE — 83735 ASSAY OF MAGNESIUM: CPT

## 2018-08-25 PROCEDURE — 84132 ASSAY OF SERUM POTASSIUM: CPT

## 2018-08-25 PROCEDURE — 20000000 HC ICU ROOM

## 2018-08-25 PROCEDURE — 83735 ASSAY OF MAGNESIUM: CPT | Mod: 91

## 2018-08-25 RX ORDER — FAMOTIDINE 20 MG/1
20 TABLET, FILM COATED ORAL DAILY
Status: DISCONTINUED | OUTPATIENT
Start: 2018-08-26 | End: 2018-08-31 | Stop reason: HOSPADM

## 2018-08-25 RX ORDER — POTASSIUM CHLORIDE 29.8 MG/ML
80 INJECTION INTRAVENOUS
Status: DISCONTINUED | OUTPATIENT
Start: 2018-08-25 | End: 2018-08-27

## 2018-08-25 RX ORDER — POTASSIUM CHLORIDE 14.9 MG/ML
60 INJECTION INTRAVENOUS
Status: DISCONTINUED | OUTPATIENT
Start: 2018-08-25 | End: 2018-08-27

## 2018-08-25 RX ORDER — MAGNESIUM SULFATE HEPTAHYDRATE 40 MG/ML
2 INJECTION, SOLUTION INTRAVENOUS
Status: DISCONTINUED | OUTPATIENT
Start: 2018-08-25 | End: 2018-08-27

## 2018-08-25 RX ORDER — POTASSIUM CHLORIDE 29.8 MG/ML
40 INJECTION INTRAVENOUS
Status: DISCONTINUED | OUTPATIENT
Start: 2018-08-25 | End: 2018-08-27

## 2018-08-25 RX ORDER — MAGNESIUM SULFATE HEPTAHYDRATE 40 MG/ML
4 INJECTION, SOLUTION INTRAVENOUS
Status: DISCONTINUED | OUTPATIENT
Start: 2018-08-25 | End: 2018-08-27

## 2018-08-25 RX ORDER — FENTANYL CITRATE 50 UG/ML
25 INJECTION, SOLUTION INTRAMUSCULAR; INTRAVENOUS ONCE
Status: COMPLETED | OUTPATIENT
Start: 2018-08-25 | End: 2018-08-25

## 2018-08-25 RX ADMIN — SODIUM CHLORIDE: 234 INJECTION INTRAMUSCULAR; INTRAVENOUS; SUBCUTANEOUS at 08:08

## 2018-08-25 RX ADMIN — NIMODIPINE 30 MG: 30 CAPSULE, LIQUID FILLED ORAL at 02:08

## 2018-08-25 RX ADMIN — NIMODIPINE 30 MG: 30 CAPSULE, LIQUID FILLED ORAL at 12:08

## 2018-08-25 RX ADMIN — PRENATAL VIT W/ FE FUMARATE-FA TAB 27-0.8 MG 1 TABLET: 27-0.8 TAB at 08:08

## 2018-08-25 RX ADMIN — Medication 10 ML: at 05:08

## 2018-08-25 RX ADMIN — MAGNESIUM OXIDE TAB 400 MG (241.3 MG ELEMENTAL MG) 800 MG: 400 (241.3 MG) TAB at 05:08

## 2018-08-25 RX ADMIN — SODIUM CHLORIDE: 234 INJECTION INTRAMUSCULAR; INTRAVENOUS; SUBCUTANEOUS at 03:08

## 2018-08-25 RX ADMIN — FENTANYL CITRATE 25 MCG: 50 INJECTION INTRAMUSCULAR; INTRAVENOUS at 10:08

## 2018-08-25 RX ADMIN — Medication 10 ML: at 12:08

## 2018-08-25 RX ADMIN — FAMOTIDINE 20 MG: 20 TABLET ORAL at 08:08

## 2018-08-25 RX ADMIN — ACETAMINOPHEN 650 MG: 325 TABLET, FILM COATED ORAL at 05:08

## 2018-08-25 RX ADMIN — CEFAZOLIN 1 G: 1 INJECTION, POWDER, FOR SOLUTION INTRAMUSCULAR; INTRAVENOUS at 02:08

## 2018-08-25 RX ADMIN — ACETAMINOPHEN 650 MG: 325 TABLET, FILM COATED ORAL at 09:08

## 2018-08-25 RX ADMIN — OXYCODONE HYDROCHLORIDE 10 MG: 5 TABLET ORAL at 09:08

## 2018-08-25 RX ADMIN — NIMODIPINE 30 MG: 30 CAPSULE, LIQUID FILLED ORAL at 06:08

## 2018-08-25 RX ADMIN — NIMODIPINE 30 MG: 30 CAPSULE, LIQUID FILLED ORAL at 04:08

## 2018-08-25 RX ADMIN — ACETAMINOPHEN 650 MG: 325 TABLET, FILM COATED ORAL at 12:08

## 2018-08-25 RX ADMIN — OXYCODONE HYDROCHLORIDE 10 MG: 5 TABLET ORAL at 01:08

## 2018-08-25 RX ADMIN — NIMODIPINE 30 MG: 30 CAPSULE, LIQUID FILLED ORAL at 10:08

## 2018-08-25 RX ADMIN — POTASSIUM CHLORIDE 40 MEQ: 20 SOLUTION ORAL at 05:08

## 2018-08-25 RX ADMIN — MAGNESIUM OXIDE TAB 400 MG (241.3 MG ELEMENTAL MG) 800 MG: 400 (241.3 MG) TAB at 09:08

## 2018-08-25 RX ADMIN — HEPARIN SODIUM 5000 UNITS: 5000 INJECTION, SOLUTION INTRAVENOUS; SUBCUTANEOUS at 05:08

## 2018-08-25 RX ADMIN — FERROUS SULFATE TAB EC 325 MG (65 MG FE EQUIVALENT) 325 MG: 325 (65 FE) TABLET DELAYED RESPONSE at 08:08

## 2018-08-25 RX ADMIN — CEFAZOLIN 1 G: 1 INJECTION, POWDER, FOR SOLUTION INTRAMUSCULAR; INTRAVENOUS at 10:08

## 2018-08-25 RX ADMIN — OXYCODONE HYDROCHLORIDE 10 MG: 5 TABLET ORAL at 05:08

## 2018-08-25 RX ADMIN — Medication 10 ML: at 06:08

## 2018-08-25 RX ADMIN — Medication 10 ML: at 11:08

## 2018-08-25 RX ADMIN — NIMODIPINE 30 MG: 30 CAPSULE, LIQUID FILLED ORAL at 08:08

## 2018-08-25 RX ADMIN — HEPARIN SODIUM 5000 UNITS: 5000 INJECTION, SOLUTION INTRAVENOUS; SUBCUTANEOUS at 10:08

## 2018-08-25 RX ADMIN — CEFAZOLIN 1 G: 1 INJECTION, POWDER, FOR SOLUTION INTRAMUSCULAR; INTRAVENOUS at 05:08

## 2018-08-25 RX ADMIN — Medication 5 ML: at 05:08

## 2018-08-25 RX ADMIN — HEPARIN SODIUM 5000 UNITS: 5000 INJECTION, SOLUTION INTRAVENOUS; SUBCUTANEOUS at 02:08

## 2018-08-25 RX ADMIN — MAGNESIUM SULFATE IN WATER 4 G: 40 INJECTION, SOLUTION INTRAVENOUS at 08:08

## 2018-08-25 RX ADMIN — ACETAMINOPHEN 650 MG: 325 TABLET, FILM COATED ORAL at 01:08

## 2018-08-25 RX ADMIN — SODIUM CHLORIDE: 234 INJECTION INTRAMUSCULAR; INTRAVENOUS; SUBCUTANEOUS at 04:08

## 2018-08-25 RX ADMIN — Medication 5 ML: at 11:08

## 2018-08-25 NOTE — SUBJECTIVE & OBJECTIVE
Interval History: NAEON.    Medications:  Continuous Infusions:   Sodium Chloride 2% 100 mL/hr at 08/25/18 1600     Scheduled Meds:   ceFAZolin (ANCEF) IVPB  1 g Intravenous Q8H    [START ON 8/26/2018] famotidine  20 mg Oral Daily    ferrous sulfate  325 mg Oral Daily    heparin (porcine)  5,000 Units Subcutaneous Q8H    niMODipine  30 mg Oral Q2H    polyethylene glycol  17 g Oral Daily    prenatal vits96-iron fum-folic  1 tablet Oral Daily    senna-docusate 8.6-50 mg  1 tablet Oral BID    sodium chloride 0.9%  10 mL Intravenous Q6H     PRN Meds:sodium chloride, acetaminophen, calcium gluconate, labetalol, magnesium oxide, magnesium oxide, oxyCODONE, potassium chloride 10%, potassium chloride 10%, potassium chloride 10%, potassium, sodium phosphates, potassium, sodium phosphates, potassium, sodium phosphates, Flushing PICC Protocol **AND** sodium chloride 0.9% **AND** sodium chloride 0.9%, sodium chloride 0.9%     Review of Systems    Objective:     Weight: 77.7 kg (171 lb 4.8 oz)  Body mass index is 25.3 kg/m².  Vital Signs (Most Recent):  Temp: 98.2 °F (36.8 °C) (08/25/18 1500)  Pulse: 83 (08/25/18 1600)  Resp: (!) 26 (08/25/18 1600)  BP: (!) 146/97 (08/25/18 1600)  SpO2: 100 % (08/25/18 1600) Vital Signs (24h Range):  Temp:  [97.9 °F (36.6 °C)-98.6 °F (37 °C)] 98.2 °F (36.8 °C)  Pulse:  [51-83] 83  Resp:  [14-28] 26  SpO2:  [99 %-100 %] 100 %  BP: (112-164)/() 146/97     Date 08/25/18 0700 - 08/26/18 0659   Shift 2359-0223 9942-3920 1228-9483 24 Hour Total   INTAKE   P.O. 620   620   I.V.(mL/kg) 975.8(12.6) 200(2.6)  1175.8(15.1)   Shift Total(mL/kg) 1595.8(20.5) 200(2.6)  1795.8(23.1)   OUTPUT   Urine(mL/kg/hr) 1200(1.9) 200  1400   Drains 29 0  29   Shift Total(mL/kg) 1229(15.8) 200(2.6)  1429(18.4)   Weight (kg) 77.7 77.7 77.7 77.7                        ICP/Ventriculostomy 08/11/18 0000 Ventricular drainage catheter with ICP microsensor Right Other (Comment) (Active)   Level of Ventriculostomy  (cm above) 10 8/24/2018 11:01 AM   Status Open to drainage 8/24/2018 11:01 AM   Site Assessment Clean;Dry 8/24/2018 11:01 AM   Site Drainage Clear 8/24/2018 11:01 AM   Waveform normal waveform;A-waves 8/24/2018 11:01 AM   Output (mL) 3 mL 8/24/2018 11:01 AM   CSF Color clear 8/24/2018 11:01 AM   Dressing Status Biopatch in place;Clean;Dry;Intact 8/24/2018 11:01 AM   Interventions HOB degrees;bed controls locked;level adjusted per order 8/24/2018 11:01 AM       Female External Urinary Catheter 08/20/18 0700 (Active)   Skin perineum cleansed w/ soap and water 8/24/2018 11:01 AM   Tolerance no signs/symptoms of discomfort 8/24/2018 11:01 AM   Suction Other 8/24/2018 11:01 AM   Date of last wick change 08/24/18 8/24/2018 11:01 AM   Time of last wick change 1101 8/24/2018 11:01 AM   Output (mL) 250 mL 8/24/2018  8:41 AM       Neurosurgery Physical Exam     AOX3  speech fluent  EVD in place   PERRL, EOMI, face symm, tongue midline  HOBSON symmetrically  No drift.     Significant Labs:  Recent Labs   Lab  08/23/18 1736 08/24/18 0323 08/25/18   0107  08/25/18   0417  08/25/18   1208   GLU  80  97   --   94   --    --    NA  135*  135*  136  136   < >  136  138  136   K  4.0  3.5   --   3.6   --   3.7   CL  106  109   --   107   --    --    CO2  21*  19*   --   21*   --    --    BUN  4*  6   --   7   --    --    CREATININE  0.5  0.6   --   0.6   --    --    CALCIUM  9.0  8.4*   --   8.5*   --    --    MG  1.6  1.7   --   1.6   --    --     < > = values in this interval not displayed.     Recent Labs   Lab  08/23/18 1736 08/24/18 0323 08/25/18   0107   WBC  9.85  6.98  6.36   HGB  9.0*  8.4*  8.6*   HCT  27.1*  26.4*  26.3*   PLT  320  265  297     Recent Labs   Lab  08/24/18   0323  08/25/18   0107   INR  1.0  1.0     Microbiology Results (last 7 days)     Procedure Component Value Units Date/Time    CSF culture [[id number]] Collected:  08/18/18 1354    Order Status:  Completed Specimen:  CSF (Spinal Fluid) from  CSF Shunt Updated:  08/23/18 0738     CSF CULTURE No Growth     Gram Stain Result Rare WBC's      No organisms seen        Recent Lab Results       08/25/18  1208 08/25/18  1206 08/25/18  0529 08/25/18  0417 08/25/18  0107      Immature Granulocytes     0.9     Immature Grans (Abs)     0.06  Comment:  Mild elevation in immature granulocytes is non specific and   can be seen in a variety of conditions including stress response,   acute inflammation, trauma and pregnancy. Correlation with other   laboratory and clinical findings is essential.       Albumin     2.5     Alkaline Phosphatase     75     ALT     14     Anion Gap     8     AST     11     Baso #     0.01     Basophil%     0.2     Total Bilirubin     0.2  Comment:  For infants and newborns, interpretation of results should be based  on gestational age, weight and in agreement with clinical  observations.  Premature Infant recommended reference ranges:  Up to 24 hours.............<8.0 mg/dL  Up to 48 hours............<12.0 mg/dL  3-5 days..................<15.0 mg/dL  6-29 days.................<15.0 mg/dL       BUN, Bld     7     Calcium     8.5     Chloride     107     CO2     21     Creatinine     0.6     Differential Method     Automated     eGFR if      >60.0     eGFR if non      >60.0  Comment:  Calculation used to obtain the estimated glomerular filtration  rate (eGFR) is the CKD-EPI equation.        Eos #     0.1     Eosinophil%     1.4     Glucose     94     Gran # (ANC)     4.6     Gran%     72.0     Hematocrit     26.3     Hemoglobin     8.6     Coumadin Monitoring INR     1.0  Comment:  Coumadin Therapy:  2.0 - 3.0 for INR for all indicators except mechanical heart valves  and antiphospholipid syndromes which should use 2.5 - 3.5.       Lymph #     1.1     Lymph%     16.5     Magnesium     1.6     MCH     29.3     MCHC     32.7     MCV     90     Mono #     0.6     Mono%     9.0     MPV     10.4     nRBC     0      Phosphorus     3.3     Platelets     297     POCT Glucose  103 89       Potassium 3.7    3.6     Total Protein     6.1     Protime     10.9     RBC     2.94     RDW     16.9     Sodium 136   138 136     WBC     6.36                 08/25/18  0102 08/24/18  2052 08/24/18  1704 08/24/18  1655      Immature Granulocytes         Immature Grans (Abs)         Albumin         Alkaline Phosphatase         ALT         Anion Gap         AST         Baso #         Basophil%         Total Bilirubin         BUN, Bld         Calcium         Chloride         CO2         Creatinine         Differential Method         eGFR if          eGFR if non          Eos #         Eosinophil%         Glucose         Gran # (ANC)         Gran%         Hematocrit         Hemoglobin         Coumadin Monitoring INR         Lymph #         Lymph%         Magnesium         MCH         MCHC         MCV         Mono #         Mono%         MPV         nRBC         Phosphorus         Platelets         POCT Glucose 89   98     Potassium         Total Protein         Protime         RBC         RDW         Sodium  139 135      WBC               Significant Diagnostics:  I have reviewed all pertinent imaging results/findings within the past 24 hours.

## 2018-08-25 NOTE — PROGRESS NOTES
ICU Progress Note  Neurocritical Care    Admit Date: 8/10/2018  LOS: 15    CC: Subarachnoid hemorrhage from anterior communicating artery aneurysm    Code Status: Full Code     SUBJECTIVE:       HPI:32 y.o female, approximately 5-6 months gravid. C/o acute onset AMS that began prior to arrival in ED.  Patient's aunt reports calling the patient's phone at 11:30 am this morning and reports the patient's 6 year old son answering the phone stating that the patient was lying on the floor, not able to get up. EMS reports upon their arrival, the patient was found on the floor, faced down, with her head against a wall. EMS reports the patient to be hypertensive and incontinent.   Pt has been non-verbal since arrival. Per family patient began complaining of a headache yesterday. Last time normal per  was 0500 today while on his way to work. Patient's aunt reports the patient has not received any prenatal care for this pregnancy.  No other history could be obtained at this time.  History is limited secondary to acuity of the patient's condition.  So last seen normal by adult at 5am. eleanro reports pt has been taking some OTC meds for headache. Pt's  reports pt not taking any regular medications at home.        Interval History/Significant Events:   Afebrile, no leukocytosis  Yesterday TCDs improved significantly, today mildly worsened compared to yesterday's study  Remains neurologically intact  evd open @ 20 today, NSGY plans to wean this weekend  H/H remains stable  Denies headache and neck stiffness      Medications:  Continuous Infusions:   Sodium Chloride 2% 100 mL/hr at 08/25/18 1600     Scheduled Meds:   ceFAZolin (ANCEF) IVPB  1 g Intravenous Q8H    [START ON 8/26/2018] famotidine  20 mg Oral Daily    ferrous sulfate  325 mg Oral Daily    heparin (porcine)  5,000 Units Subcutaneous Q8H    niMODipine  30 mg Oral Q2H    polyethylene glycol  17 g Oral Daily    prenatal vits96-iron fum-folic  1  tablet Oral Daily    senna-docusate 8.6-50 mg  1 tablet Oral BID    sodium chloride 0.9%  10 mL Intravenous Q6H     PRN Meds:.sodium chloride, acetaminophen, calcium gluconate, labetalol, magnesium oxide, magnesium oxide, oxyCODONE, potassium chloride 10%, potassium chloride 10%, potassium chloride 10%, potassium, sodium phosphates, potassium, sodium phosphates, potassium, sodium phosphates, Flushing PICC Protocol **AND** sodium chloride 0.9% **AND** sodium chloride 0.9%, sodium chloride 0.9%    OBJECTIVE:   Vital Signs (Most Recent):   Temp: 98.2 °F (36.8 °C) (08/25/18 1500)  Pulse: 83 (08/25/18 1600)  Resp: (!) 26 (08/25/18 1600)  BP: (!) 146/97 (08/25/18 1600)  SpO2: 100 % (08/25/18 1600)    Vital Signs (24h Range):   Temp:  [97.9 °F (36.6 °C)-98.6 °F (37 °C)] 98.2 °F (36.8 °C)  Pulse:  [51-83] 83  Resp:  [14-28] 26  SpO2:  [99 %-100 %] 100 %  BP: (112-164)/() 146/97    ICP/CPP (Last 24h):   ICP Mean (mmHg):  [3 mmHg-13 mmHg] 10 mmHg  CPP (mmHg calculated using NBP):  [] 106    I & O (Last 24h):     Intake/Output Summary (Last 24 hours) at 8/25/2018 1647  Last data filed at 8/25/2018 1600  Gross per 24 hour   Intake 5025.83 ml   Output 2901 ml   Net 2124.83 ml     Physical Exam:  GA: Alert, comfortable, no acute distress.   HEENT: No scleral icterus or JVD.   Pulmonary: Clear to auscultation A/P/L. No wheezing, crackles, or rhonchi.  Cardiac: RRR S1 & S2 w/o rubs/murmurs/gallops.   Abdominal: Bowel sounds present x 4. No appreciable hepatosplenomegaly.  Skin: No jaundice, rashes, or visible lesions.  Neuro:     Alert and Oriented X4   no facial assymetry   Moves all four extremities, no drift, equal strength b/l   No sensory deficits   Fine finger movements intact        Lines/Drains/Airway:          PICC Double Lumen 08/13/18 1551 right brachial (Active)   Site Assessment Clean;Dry;Intact;No redness;No swelling 8/22/2018  7:01 AM   Lumen 1 Status Infusing 8/22/2018  7:01 AM   Lumen 2 Status  Infusing 8/22/2018  7:01 AM   Length martha (cm) 36 cm 8/13/2018  3:50 PM   Current Exposed Catheter (cm) 0 cm 8/13/2018  3:50 PM   Extremity Circumference (cm) 31 cm 8/13/2018  3:50 PM   Dressing Type Transparent 8/22/2018  7:01 AM   Dressing Status Biopatch in place;Clean;Dry;Intact 8/22/2018  7:01 AM   Dressing Intervention Dressing reinforced 8/22/2018  7:01 AM   Dressing Change Due 08/27/18 8/22/2018  7:01 AM   Daily Line Review Performed 8/22/2018  7:01 AM             ICP/Ventriculostomy 08/11/18 0000 Ventricular drainage catheter with ICP microsensor Right Other (Comment) (Active)   Level of Ventriculostomy (cm above) 10 8/22/2018  3:03 AM   Status Open to drainage 8/22/2018  7:01 AM   Site Assessment Clean;Dry 8/22/2018  7:01 AM   Site Drainage No drainage 8/22/2018  7:01 AM   Waveform normal waveform 8/22/2018  7:01 AM   Output (mL) 5 mL 8/22/2018 11:01 AM   CSF Color clear 8/22/2018  7:01 AM   Dressing Status Biopatch in place;Clean;Dry;Intact 8/22/2018  7:01 AM   Interventions HOB degrees 8/22/2018  7:01 AM       Female External Urinary Catheter 08/20/18 0700 (Active)   Skin no redness;no breakdown 8/22/2018  7:01 AM   Tolerance no signs/symptoms of discomfort 8/22/2018  7:01 AM   Suction Other 8/22/2018  7:01 AM   Date of last wick change 08/22/18 8/22/2018  8:01 AM   Time of last wick change 0801 8/22/2018  8:01 AM   Output (mL) 200 mL 8/22/2018 10:01 AM     Nutrition/Tube Feeds (if NPO state why): po    Labs:  ABG: No results for input(s): PH, PO2, PCO2, HCO3, POCSATURATED, BE in the last 24 hours.  BMP:  Recent Labs   Lab  08/25/18   0107   08/25/18   1208   NA  136   < >  136   K  3.6   --   3.7   CL  107   --    --    CO2  21*   --    --    BUN  7   --    --    CREATININE  0.6   --    --    GLU  94   --    --    MG  1.6   --    --    PHOS  3.3   --    --     < > = values in this interval not displayed.     LFT:   Lab Results   Component Value Date    AST 11 08/25/2018    ALT 14 08/25/2018    ALKPHOS  75 2018    BILITOT 0.2 2018    ALBUMIN 2.5 (L) 2018    PROT 6.1 2018     CBC:   Lab Results   Component Value Date    WBC 6.36 2018    HGB 8.6 (L) 2018    HCT 26.3 (L) 2018    MCV 90 2018     2018     Microbiology x 7d:   Microbiology Results (last 7 days)     Procedure Component Value Units Date/Time    CSF culture [[id number]] Collected:  18 1354    Order Status:  Completed Specimen:  CSF (Spinal Fluid) from CSF Shunt Updated:  18 0738     CSF CULTURE No Growth     Gram Stain Result Rare WBC's      No organisms seen        Imaging:  Follow TVD's  I personally reviewed the above image.    ASSESSMENT/PLAN:     Active Hospital Problems    Diagnosis    *Subarachnoid hemorrhage from anterior communicating artery aneurysm     screening for malformation using ultrasonics    Subarachnoid hemorrhage    Cerebral artery vasospasm    Intracranial hypertension    Mongo coma scale total score 9-12    Endotracheal tube present    Hyponatremia    Hypokalemia    Hypophosphatemia    JOSE C (acute kidney injury)    Pre-eclampsia in second trimester    Brain compression    Brain edema    Hypertension affecting pregnancy in second trimester    25 weeks gestation of pregnancy    PKD (polycystic kidney disease)     Needs baseline P/C ratio.  Strong family history of renal abnormalities              Plan:  Follow exam  NSGY weaning EVD  Follow TCDs, may to dc soon  Maintain normo natremia and euvolemia  Follow Is and Os, +1650ml yesterday    Discussed and seen with Dr. Wooten.    Ulises Ring MD  NCC

## 2018-08-25 NOTE — PROGRESS NOTES
[facility]  Neurosurgery  Progress Note    Subjective:     History of Present Illness: 32 y.o. female 24 weeks pregnant with a history of polycystic kidney disease who presents as transfer from [facility] for SAH. Patient found down by 6 yr old son. Last known normal 0500 today. CT at [facility] revealed SAH within the basal cisterns. US at [facility] with + fetal heart tones. Transferred for neuro evaluation.     Post-Op Info:  Procedure(s) (LRB):  Mri (magnetic resonance imaging) (N/A)   15 Days Post-Op     Interval History: NAEON.    Medications:  Continuous Infusions:   Sodium Chloride 2% 100 mL/hr at 08/25/18 1600     Scheduled Meds:   ceFAZolin (ANCEF) IVPB  1 g Intravenous Q8H    [START ON 8/26/2018] famotidine  20 mg Oral Daily    ferrous sulfate  325 mg Oral Daily    heparin (porcine)  5,000 Units Subcutaneous Q8H    niMODipine  30 mg Oral Q2H    polyethylene glycol  17 g Oral Daily    prenatal vits96-iron fum-folic  1 tablet Oral Daily    senna-docusate 8.6-50 mg  1 tablet Oral BID    sodium chloride 0.9%  10 mL Intravenous Q6H     PRN Meds:sodium chloride, acetaminophen, calcium gluconate, labetalol, magnesium oxide, magnesium oxide, oxyCODONE, potassium chloride 10%, potassium chloride 10%, potassium chloride 10%, potassium, sodium phosphates, potassium, sodium phosphates, potassium, sodium phosphates, Flushing PICC Protocol **AND** sodium chloride 0.9% **AND** sodium chloride 0.9%, sodium chloride 0.9%     Review of Systems    Objective:     Weight: 77.7 kg (171 lb 4.8 oz)  Body mass index is 25.3 kg/m².  Vital Signs (Most Recent):  Temp: 98.2 °F (36.8 °C) (08/25/18 1500)  Pulse: 83 (08/25/18 1600)  Resp: (!) 26 (08/25/18 1600)  BP: (!) 146/97 (08/25/18 1600)  SpO2: 100 % (08/25/18 1600) Vital Signs (24h Range):  Temp:  [97.9 °F (36.6 °C)-98.6 °F (37 °C)] 98.2 °F (36.8 °C)  Pulse:  [51-83] 83  Resp:  [14-28] 26  SpO2:  [99 %-100 %] 100 %  BP: (112-164)/() 146/97     Date 08/25/18 0700 -  08/26/18 0659   Shift 2910-1348 7586-8109 7524-1770 24 Hour Total   INTAKE   P.O. 620   620   I.V.(mL/kg) 975.8(12.6) 200(2.6)  1175.8(15.1)   Shift Total(mL/kg) 1595.8(20.5) 200(2.6)  1795.8(23.1)   OUTPUT   Urine(mL/kg/hr) 1200(1.9) 200  1400   Drains 29 0  29   Shift Total(mL/kg) 1229(15.8) 200(2.6)  1429(18.4)   Weight (kg) 77.7 77.7 77.7 77.7                        ICP/Ventriculostomy 08/11/18 0000 Ventricular drainage catheter with ICP microsensor Right Other (Comment) (Active)   Level of Ventriculostomy (cm above) 10 8/24/2018 11:01 AM   Status Open to drainage 8/24/2018 11:01 AM   Site Assessment Clean;Dry 8/24/2018 11:01 AM   Site Drainage Clear 8/24/2018 11:01 AM   Waveform normal waveform;A-waves 8/24/2018 11:01 AM   Output (mL) 3 mL 8/24/2018 11:01 AM   CSF Color clear 8/24/2018 11:01 AM   Dressing Status Biopatch in place;Clean;Dry;Intact 8/24/2018 11:01 AM   Interventions HOB degrees;bed controls locked;level adjusted per order 8/24/2018 11:01 AM       Female External Urinary Catheter 08/20/18 0700 (Active)   Skin perineum cleansed w/ soap and water 8/24/2018 11:01 AM   Tolerance no signs/symptoms of discomfort 8/24/2018 11:01 AM   Suction Other 8/24/2018 11:01 AM   Date of last wick change 08/24/18 8/24/2018 11:01 AM   Time of last wick change 1101 8/24/2018 11:01 AM   Output (mL) 250 mL 8/24/2018  8:41 AM       Neurosurgery Physical Exam     AOX3  speech fluent  EVD in place   PERRL, EOMI, face symm, tongue midline  HOBSON symmetrically  No drift.     Significant Labs:  Recent Labs   Lab  08/23/18   1736  08/24/18   0323   08/25/18   0107  08/25/18   0417  08/25/18   1208   GLU  80  97   --   94   --    --    NA  135*  135*  136  136   < >  136  138  136   K  4.0  3.5   --   3.6   --   3.7   CL  106  109   --   107   --    --    CO2  21*  19*   --   21*   --    --    BUN  4*  6   --   7   --    --    CREATININE  0.5  0.6   --   0.6   --    --    CALCIUM  9.0  8.4*   --   8.5*   --    --    MG  1.6   1.7   --   1.6   --    --     < > = values in this interval not displayed.     Recent Labs   Lab  08/23/18   1736  08/24/18   0323  08/25/18   0107   WBC  9.85  6.98  6.36   HGB  9.0*  8.4*  8.6*   HCT  27.1*  26.4*  26.3*   PLT  320  265  297     Recent Labs   Lab  08/24/18   0323  08/25/18   0107   INR  1.0  1.0     Microbiology Results (last 7 days)     Procedure Component Value Units Date/Time    CSF culture [[id number]] Collected:  08/18/18 1354    Order Status:  Completed Specimen:  CSF (Spinal Fluid) from CSF Shunt Updated:  08/23/18 0738     CSF CULTURE No Growth     Gram Stain Result Rare WBC's      No organisms seen        Recent Lab Results       08/25/18  1208 08/25/18  1206 08/25/18  0529 08/25/18  0417 08/25/18  0107      Immature Granulocytes     0.9     Immature Grans (Abs)     0.06  Comment:  Mild elevation in immature granulocytes is non specific and   can be seen in a variety of conditions including stress response,   acute inflammation, trauma and pregnancy. Correlation with other   laboratory and clinical findings is essential.       Albumin     2.5     Alkaline Phosphatase     75     ALT     14     Anion Gap     8     AST     11     Baso #     0.01     Basophil%     0.2     Total Bilirubin     0.2  Comment:  For infants and newborns, interpretation of results should be based  on gestational age, weight and in agreement with clinical  observations.  Premature Infant recommended reference ranges:  Up to 24 hours.............<8.0 mg/dL  Up to 48 hours............<12.0 mg/dL  3-5 days..................<15.0 mg/dL  6-29 days.................<15.0 mg/dL       BUN, Bld     7     Calcium     8.5     Chloride     107     CO2     21     Creatinine     0.6     Differential Method     Automated     eGFR if      >60.0     eGFR if non      >60.0  Comment:  Calculation used to obtain the estimated glomerular filtration  rate (eGFR) is the CKD-EPI equation.        Eos #     0.1      Eosinophil%     1.4     Glucose     94     Gran # (ANC)     4.6     Gran%     72.0     Hematocrit     26.3     Hemoglobin     8.6     Coumadin Monitoring INR     1.0  Comment:  Coumadin Therapy:  2.0 - 3.0 for INR for all indicators except mechanical heart valves  and antiphospholipid syndromes which should use 2.5 - 3.5.       Lymph #     1.1     Lymph%     16.5     Magnesium     1.6     MCH     29.3     MCHC     32.7     MCV     90     Mono #     0.6     Mono%     9.0     MPV     10.4     nRBC     0     Phosphorus     3.3     Platelets     297     POCT Glucose  103 89       Potassium 3.7    3.6     Total Protein     6.1     Protime     10.9     RBC     2.94     RDW     16.9     Sodium 136   138 136     WBC     6.36                 08/25/18  0102 08/24/18  2052 08/24/18  1704 08/24/18  1655      Immature Granulocytes         Immature Grans (Abs)         Albumin         Alkaline Phosphatase         ALT         Anion Gap         AST         Baso #         Basophil%         Total Bilirubin         BUN, Bld         Calcium         Chloride         CO2         Creatinine         Differential Method         eGFR if          eGFR if non          Eos #         Eosinophil%         Glucose         Gran # (ANC)         Gran%         Hematocrit         Hemoglobin         Coumadin Monitoring INR         Lymph #         Lymph%         Magnesium         MCH         MCHC         MCV         Mono #         Mono%         MPV         nRBC         Phosphorus         Platelets         POCT Glucose 89   98     Potassium         Total Protein         Protime         RBC         RDW         Sodium  139 135      WBC               Significant Diagnostics:  I have reviewed all pertinent imaging results/findings within the past 24 hours.    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 female 24 weeks pregnant with history of polycystic kidney disease  with HH4F4 SAH on 8/10, now s/p acom coiling 8/10. PBD/PCD 14.     NEURO  -Continue NCC  -q 1 hr neuro checks  -evd open to 20 and draining, monitor icps   -CSF profile reviewed from 8/19   -weaning evd  -nimotop  -daily TCDs   -TCDs continue to be elevated; exam stable  -off of Keppra      CV  -permissive HTN  <220 as aneurysm secured    RESP  -stable on room air  -Aggressive pulmonary management  -IS @ bedside    FEN/GI  -cont 2%  -strict I/Os  -goal net even or positive  -Reg diet    ID  -ancef for drain ppx    PPX  -SQH  -famotidine for PUD ppx  -TEDs/SCDs    -further mgmt per ncc and obgyn.            Volodymyr Claudio MD  Neurosurgery  [facility]

## 2018-08-25 NOTE — ASSESSMENT & PLAN NOTE
32 y.o. female 24 weeks pregnant with history of polycystic kidney disease with HH4F4 SAH on 8/10, now s/p acom coiling 8/10. PBD/PCD 14.     NEURO  -Continue NCC  -q 1 hr neuro checks  -evd open to 20 and draining, monitor icps   -CSF profile reviewed from 8/19   -weaning evd  -nimotop  -daily TCDs   -TCDs continue to be elevated; exam stable  -off of Keppra      CV  -permissive HTN  <220 as aneurysm secured    RESP  -stable on room air  -Aggressive pulmonary management  -IS @ bedside    FEN/GI  -cont 2%  -strict I/Os  -goal net even or positive  -Reg diet    ID  -ancef for drain ppx    PPX  -SQH  -famotidine for PUD ppx  -TEDs/SCDs    -further mgmt per ncc and obgyn.

## 2018-08-25 NOTE — PLAN OF CARE
Problem: Patient Care Overview  Goal: Plan of Care Review  Outcome: Ongoing (interventions implemented as appropriate)  POC reviewed with pt and family at 1400. Pt verbalized understanding. Questions and concerns addressed. No acute events today. Pt progressing toward goals. Will continue to monitor. See flowsheets for full assessment and VS info.     Decreased 2% NaCl rate to 100ml/hr.  Will stop TCDs after today.  Continuing tylenol and oxycodone Q4 for pain control.  EVD raised to 20 per neurosurgery. ICPs stable.

## 2018-08-26 PROBLEM — Z97.8 ENDOTRACHEAL TUBE PRESENT: Status: RESOLVED | Noted: 2018-08-13 | Resolved: 2018-08-26

## 2018-08-26 PROBLEM — N17.9 AKI (ACUTE KIDNEY INJURY): Status: RESOLVED | Noted: 2018-08-11 | Resolved: 2018-08-26

## 2018-08-26 PROBLEM — R40.2420 GLASGOW COMA SCALE TOTAL SCORE 9-12: Status: RESOLVED | Noted: 2018-08-13 | Resolved: 2018-08-26

## 2018-08-26 PROBLEM — O14.92 PRE-ECLAMPSIA IN SECOND TRIMESTER: Status: RESOLVED | Noted: 2018-08-11 | Resolved: 2018-08-26

## 2018-08-26 LAB
ALBUMIN SERPL BCP-MCNC: 2.2 G/DL
ALP SERPL-CCNC: 68 U/L
ALT SERPL W/O P-5'-P-CCNC: 12 U/L
ANION GAP SERPL CALC-SCNC: 6 MMOL/L
AST SERPL-CCNC: 9 U/L
BASOPHILS # BLD AUTO: 0.01 K/UL
BASOPHILS NFR BLD: 0.2 %
BILIRUB SERPL-MCNC: 0.2 MG/DL
BUN SERPL-MCNC: 7 MG/DL
CALCIUM SERPL-MCNC: 7.5 MG/DL
CHLORIDE SERPL-SCNC: 120 MMOL/L
CO2 SERPL-SCNC: 21 MMOL/L
CREAT SERPL-MCNC: 0.5 MG/DL
DIFFERENTIAL METHOD: ABNORMAL
EOSINOPHIL # BLD AUTO: 0.1 K/UL
EOSINOPHIL NFR BLD: 0.9 %
ERYTHROCYTE [DISTWIDTH] IN BLOOD BY AUTOMATED COUNT: 17 %
EST. GFR  (AFRICAN AMERICAN): >60 ML/MIN/1.73 M^2
EST. GFR  (NON AFRICAN AMERICAN): >60 ML/MIN/1.73 M^2
GLUCOSE SERPL-MCNC: 101 MG/DL
HCT VFR BLD AUTO: 24.8 %
HGB BLD-MCNC: 7.7 G/DL
IMM GRANULOCYTES # BLD AUTO: 0.05 K/UL
IMM GRANULOCYTES NFR BLD AUTO: 0.9 %
INR PPP: 1
LYMPHOCYTES # BLD AUTO: 1 K/UL
LYMPHOCYTES NFR BLD: 16.7 %
MAGNESIUM SERPL-MCNC: 1.7 MG/DL
MAGNESIUM SERPL-MCNC: 2 MG/DL
MCH RBC QN AUTO: 28.1 PG
MCHC RBC AUTO-ENTMCNC: 31 G/DL
MCV RBC AUTO: 91 FL
MONOCYTES # BLD AUTO: 0.4 K/UL
MONOCYTES NFR BLD: 7.6 %
NEUTROPHILS # BLD AUTO: 4.3 K/UL
NEUTROPHILS NFR BLD: 73.7 %
NRBC BLD-RTO: 0 /100 WBC
PHOSPHATE SERPL-MCNC: 2.7 MG/DL
PHOSPHATE SERPL-MCNC: 3.4 MG/DL
PLATELET # BLD AUTO: 271 K/UL
PMV BLD AUTO: 9.9 FL
POCT GLUCOSE: 92 MG/DL (ref 70–110)
POTASSIUM SERPL-SCNC: 3.1 MMOL/L
POTASSIUM SERPL-SCNC: 4 MMOL/L
PROT SERPL-MCNC: 5.4 G/DL
PROTHROMBIN TIME: 10.7 SEC
RBC # BLD AUTO: 2.74 M/UL
SODIUM SERPL-SCNC: 134 MMOL/L
SODIUM SERPL-SCNC: 134 MMOL/L
SODIUM SERPL-SCNC: 136 MMOL/L
SODIUM SERPL-SCNC: 147 MMOL/L
SODIUM SERPL-SCNC: 147 MMOL/L
WBC # BLD AUTO: 5.76 K/UL

## 2018-08-26 PROCEDURE — 99233 SBSQ HOSP IP/OBS HIGH 50: CPT | Mod: ,,, | Performed by: PSYCHIATRY & NEUROLOGY

## 2018-08-26 PROCEDURE — 84100 ASSAY OF PHOSPHORUS: CPT

## 2018-08-26 PROCEDURE — 25000003 PHARM REV CODE 250: Performed by: PHYSICIAN ASSISTANT

## 2018-08-26 PROCEDURE — A4216 STERILE WATER/SALINE, 10 ML: HCPCS | Performed by: PSYCHIATRY & NEUROLOGY

## 2018-08-26 PROCEDURE — 84295 ASSAY OF SERUM SODIUM: CPT

## 2018-08-26 PROCEDURE — 63600175 PHARM REV CODE 636 W HCPCS: Performed by: STUDENT IN AN ORGANIZED HEALTH CARE EDUCATION/TRAINING PROGRAM

## 2018-08-26 PROCEDURE — 25000003 PHARM REV CODE 250: Performed by: STUDENT IN AN ORGANIZED HEALTH CARE EDUCATION/TRAINING PROGRAM

## 2018-08-26 PROCEDURE — 83735 ASSAY OF MAGNESIUM: CPT

## 2018-08-26 PROCEDURE — 20000000 HC ICU ROOM

## 2018-08-26 PROCEDURE — 85610 PROTHROMBIN TIME: CPT

## 2018-08-26 PROCEDURE — 25000003 PHARM REV CODE 250: Performed by: NURSE PRACTITIONER

## 2018-08-26 PROCEDURE — 99232 SBSQ HOSP IP/OBS MODERATE 35: CPT | Mod: ,,, | Performed by: NEUROLOGICAL SURGERY

## 2018-08-26 PROCEDURE — 80053 COMPREHEN METABOLIC PANEL: CPT

## 2018-08-26 PROCEDURE — 85025 COMPLETE CBC W/AUTO DIFF WBC: CPT

## 2018-08-26 PROCEDURE — 63600175 PHARM REV CODE 636 W HCPCS: Performed by: NURSE PRACTITIONER

## 2018-08-26 PROCEDURE — 63600175 PHARM REV CODE 636 W HCPCS: Performed by: PSYCHIATRY & NEUROLOGY

## 2018-08-26 PROCEDURE — 84100 ASSAY OF PHOSPHORUS: CPT | Mod: 91

## 2018-08-26 PROCEDURE — A4217 STERILE WATER/SALINE, 500 ML: HCPCS | Performed by: NURSE PRACTITIONER

## 2018-08-26 PROCEDURE — 83735 ASSAY OF MAGNESIUM: CPT | Mod: 91

## 2018-08-26 PROCEDURE — 25000003 PHARM REV CODE 250: Performed by: PSYCHIATRY & NEUROLOGY

## 2018-08-26 PROCEDURE — 63600175 PHARM REV CODE 636 W HCPCS

## 2018-08-26 PROCEDURE — 84295 ASSAY OF SERUM SODIUM: CPT | Mod: 91

## 2018-08-26 PROCEDURE — 63600175 PHARM REV CODE 636 W HCPCS: Performed by: PHYSICIAN ASSISTANT

## 2018-08-26 PROCEDURE — A4217 STERILE WATER/SALINE, 500 ML: HCPCS | Performed by: PSYCHIATRY & NEUROLOGY

## 2018-08-26 PROCEDURE — 84132 ASSAY OF SERUM POTASSIUM: CPT

## 2018-08-26 RX ORDER — MORPHINE SULFATE 2 MG/ML
1 INJECTION, SOLUTION INTRAMUSCULAR; INTRAVENOUS ONCE
Status: COMPLETED | OUTPATIENT
Start: 2018-08-26 | End: 2018-08-26

## 2018-08-26 RX ORDER — FENTANYL CITRATE 50 UG/ML
25 INJECTION, SOLUTION INTRAMUSCULAR; INTRAVENOUS ONCE
Status: COMPLETED | OUTPATIENT
Start: 2018-08-26 | End: 2018-08-26

## 2018-08-26 RX ORDER — HYDROMORPHONE HYDROCHLORIDE 2 MG/ML
1 INJECTION, SOLUTION INTRAMUSCULAR; INTRAVENOUS; SUBCUTANEOUS ONCE
Status: DISCONTINUED | OUTPATIENT
Start: 2018-08-26 | End: 2018-08-26

## 2018-08-26 RX ORDER — SODIUM CHLORIDE 9 MG/ML
INJECTION, SOLUTION INTRAVENOUS CONTINUOUS
Status: DISCONTINUED | OUTPATIENT
Start: 2018-08-26 | End: 2018-08-26

## 2018-08-26 RX ORDER — FENTANYL CITRATE 50 UG/ML
INJECTION, SOLUTION INTRAMUSCULAR; INTRAVENOUS
Status: COMPLETED
Start: 2018-08-26 | End: 2018-08-26

## 2018-08-26 RX ADMIN — ACETAMINOPHEN 650 MG: 325 TABLET, FILM COATED ORAL at 11:08

## 2018-08-26 RX ADMIN — POTASSIUM CHLORIDE 40 MEQ: 400 INJECTION, SOLUTION INTRAVENOUS at 01:08

## 2018-08-26 RX ADMIN — SODIUM CHLORIDE: 0.9 INJECTION, SOLUTION INTRAVENOUS at 10:08

## 2018-08-26 RX ADMIN — MAGNESIUM SULFATE IN WATER 2 G: 40 INJECTION, SOLUTION INTRAVENOUS at 05:08

## 2018-08-26 RX ADMIN — OXYCODONE HYDROCHLORIDE 10 MG: 5 TABLET ORAL at 05:08

## 2018-08-26 RX ADMIN — NIMODIPINE 30 MG: 30 CAPSULE, LIQUID FILLED ORAL at 04:08

## 2018-08-26 RX ADMIN — OXYCODONE HYDROCHLORIDE 10 MG: 5 TABLET ORAL at 01:08

## 2018-08-26 RX ADMIN — Medication 1 MG: at 11:08

## 2018-08-26 RX ADMIN — OXYCODONE HYDROCHLORIDE 10 MG: 5 TABLET ORAL at 09:08

## 2018-08-26 RX ADMIN — FENTANYL CITRATE 25 MCG: 50 INJECTION INTRAMUSCULAR; INTRAVENOUS at 11:08

## 2018-08-26 RX ADMIN — Medication 10 ML: at 12:08

## 2018-08-26 RX ADMIN — NIMODIPINE 30 MG: 30 CAPSULE, LIQUID FILLED ORAL at 10:08

## 2018-08-26 RX ADMIN — NIMODIPINE 30 MG: 30 CAPSULE, LIQUID FILLED ORAL at 12:08

## 2018-08-26 RX ADMIN — FAMOTIDINE 20 MG: 20 TABLET ORAL at 08:08

## 2018-08-26 RX ADMIN — SODIUM PHOSPHATE, MONOBASIC, MONOHYDRATE 15 MMOL: 276; 142 INJECTION, SOLUTION INTRAVENOUS at 08:08

## 2018-08-26 RX ADMIN — SODIUM CHLORIDE: 234 INJECTION INTRAMUSCULAR; INTRAVENOUS; SUBCUTANEOUS at 01:08

## 2018-08-26 RX ADMIN — NIMODIPINE 30 MG: 30 CAPSULE, LIQUID FILLED ORAL at 06:08

## 2018-08-26 RX ADMIN — SODIUM CHLORIDE TAB 1 GM 2 G: 1 TAB at 08:08

## 2018-08-26 RX ADMIN — Medication 10 ML: at 11:08

## 2018-08-26 RX ADMIN — NIMODIPINE 30 MG: 30 CAPSULE, LIQUID FILLED ORAL at 08:08

## 2018-08-26 RX ADMIN — ACETAMINOPHEN 650 MG: 325 TABLET, FILM COATED ORAL at 06:08

## 2018-08-26 RX ADMIN — HEPARIN SODIUM 5000 UNITS: 5000 INJECTION, SOLUTION INTRAVENOUS; SUBCUTANEOUS at 09:08

## 2018-08-26 RX ADMIN — PRENATAL VIT W/ FE FUMARATE-FA TAB 27-0.8 MG 1 TABLET: 27-0.8 TAB at 08:08

## 2018-08-26 RX ADMIN — FERROUS SULFATE TAB EC 325 MG (65 MG FE EQUIVALENT) 325 MG: 325 (65 FE) TABLET DELAYED RESPONSE at 08:08

## 2018-08-26 RX ADMIN — HEPARIN SODIUM 5000 UNITS: 5000 INJECTION, SOLUTION INTRAVENOUS; SUBCUTANEOUS at 05:08

## 2018-08-26 RX ADMIN — NIMODIPINE 30 MG: 30 CAPSULE, LIQUID FILLED ORAL at 02:08

## 2018-08-26 RX ADMIN — FENTANYL CITRATE 25 MCG: 50 INJECTION, SOLUTION INTRAMUSCULAR; INTRAVENOUS at 11:08

## 2018-08-26 RX ADMIN — CEFAZOLIN 1 G: 1 INJECTION, POWDER, FOR SOLUTION INTRAMUSCULAR; INTRAVENOUS at 02:08

## 2018-08-26 RX ADMIN — NIMODIPINE 30 MG: 30 CAPSULE, LIQUID FILLED ORAL at 09:08

## 2018-08-26 RX ADMIN — ACETAMINOPHEN 650 MG: 325 TABLET, FILM COATED ORAL at 02:08

## 2018-08-26 RX ADMIN — SENNOSIDES AND DOCUSATE SODIUM 1 TABLET: 8.6; 5 TABLET ORAL at 08:08

## 2018-08-26 RX ADMIN — Medication 10 ML: at 05:08

## 2018-08-26 RX ADMIN — NIMODIPINE 30 MG: 30 CAPSULE, LIQUID FILLED ORAL at 11:08

## 2018-08-26 RX ADMIN — HEPARIN SODIUM 5000 UNITS: 5000 INJECTION, SOLUTION INTRAVENOUS; SUBCUTANEOUS at 02:08

## 2018-08-26 RX ADMIN — Medication 10 ML: at 06:08

## 2018-08-26 RX ADMIN — CEFAZOLIN 1 G: 1 INJECTION, POWDER, FOR SOLUTION INTRAMUSCULAR; INTRAVENOUS at 05:08

## 2018-08-26 RX ADMIN — CEFAZOLIN 1 G: 1 INJECTION, POWDER, FOR SOLUTION INTRAMUSCULAR; INTRAVENOUS at 09:08

## 2018-08-26 RX ADMIN — POTASSIUM CHLORIDE 60 MEQ: 200 INJECTION, SOLUTION INTRAVENOUS at 05:08

## 2018-08-26 NOTE — PLAN OF CARE
Problem: Patient Care Overview  Goal: Plan of Care Review  Outcome: Ongoing (interventions implemented as appropriate)  POC reviewed with pt and spouse at 0500. Pt and spouse verbalized understanding. Questions and concerns addressed. No acute events overnight. Pt progressing toward goals. Will continue to monitor. See flowsheets for full assessment and VS info

## 2018-08-26 NOTE — SUBJECTIVE & OBJECTIVE
Interval History: NAEON.    Medications:  Continuous Infusions:   sodium chloride 0.9% 50 mL/hr at 08/26/18 1005     Scheduled Meds:   ceFAZolin (ANCEF) IVPB  1 g Intravenous Q8H    famotidine  20 mg Oral Daily    ferrous sulfate  325 mg Oral Daily    heparin (porcine)  5,000 Units Subcutaneous Q8H    niMODipine  30 mg Oral Q2H    polyethylene glycol  17 g Oral Daily    prenatal vits96-iron fum-folic  1 tablet Oral Daily    senna-docusate 8.6-50 mg  1 tablet Oral BID    sodium chloride 0.9%  10 mL Intravenous Q6H     PRN Meds:sodium chloride, acetaminophen, calcium gluconate IVPB, calcium gluconate IVPB, calcium gluconate IVPB, labetalol, magnesium sulfate IVPB, magnesium sulfate IVPB, oxyCODONE, potassium chloride in water **AND** potassium chloride in water **AND** potassium chloride in water, Flushing PICC Protocol **AND** sodium chloride 0.9% **AND** sodium chloride 0.9%, sodium chloride 0.9%, sodium phosphate IVPB, sodium phosphate IVPB, sodium phosphate IVPB     Review of Systems    Objective:     Weight: 78.5 kg (173 lb 1 oz)  Body mass index is 25.56 kg/m².  Vital Signs (Most Recent):  Temp: 98.6 °F (37 °C) (08/26/18 0700)  Pulse: 60 (08/26/18 1000)  Resp: (!) 22 (08/26/18 1000)  BP: (!) 178/83 (08/26/18 1000)  SpO2: 100 % (08/26/18 1000) Vital Signs (24h Range):  Temp:  [98.2 °F (36.8 °C)-98.8 °F (37.1 °C)] 98.6 °F (37 °C)  Pulse:  [56-83] 60  Resp:  [14-26] 22  SpO2:  [98 %-100 %] 100 %  BP: (112-178)/(60-99) 178/83     Date 08/26/18 0700 - 08/27/18 0659   Shift 3787-6861 9686-7035 5745-1847 24 Hour Total   INTAKE   P.O. 350   350   I.V.(mL/kg) 145.8(1.9)   145.8(1.9)   IV Piggyback 350   350   Shift Total(mL/kg) 845.8(10.8)   845.8(10.8)   OUTPUT   Urine(mL/kg/hr) 1250   1250   Drains 4   4   Shift Total(mL/kg) 1254(16)   1254(16)   Weight (kg) 78.5 78.5 78.5 78.5                        ICP/Ventriculostomy 08/11/18 0000 Ventricular drainage catheter with ICP microsensor Right Other (Comment)  (Active)   Level of Ventriculostomy (cm above) 10 8/24/2018 11:01 AM   Status Open to drainage 8/24/2018 11:01 AM   Site Assessment Clean;Dry 8/24/2018 11:01 AM   Site Drainage Clear 8/24/2018 11:01 AM   Waveform normal waveform;A-waves 8/24/2018 11:01 AM   Output (mL) 3 mL 8/24/2018 11:01 AM   CSF Color clear 8/24/2018 11:01 AM   Dressing Status Biopatch in place;Clean;Dry;Intact 8/24/2018 11:01 AM   Interventions HOB degrees;bed controls locked;level adjusted per order 8/24/2018 11:01 AM       Female External Urinary Catheter 08/20/18 0700 (Active)   Skin perineum cleansed w/ soap and water 8/24/2018 11:01 AM   Tolerance no signs/symptoms of discomfort 8/24/2018 11:01 AM   Suction Other 8/24/2018 11:01 AM   Date of last wick change 08/24/18 8/24/2018 11:01 AM   Time of last wick change 1101 8/24/2018 11:01 AM   Output (mL) 250 mL 8/24/2018  8:41 AM       Neurosurgery Physical Exam     AOX3  speech fluent  EVD in place   PERRL, EOMI, face symm, tongue midline  HOBSON symmetrically  No drift.     Significant Labs:  Recent Labs   Lab  08/25/18   0107 08/25/18   1208  08/25/18   1806  08/25/18 2047  08/26/18   0303   GLU  94   --    --    --    --   101   NA  136   < >  136   --   136  147*  147*   K  3.6   --   3.7   --    --   3.1*   CL  107   --    --    --    --   120*   CO2  21*   --    --    --    --   21*   BUN  7   --    --    --    --   7   CREATININE  0.6   --    --    --    --   0.5   CALCIUM  8.5*   --    --    --    --   7.5*   MG  1.6   --    --   1.4*   --   1.7    < > = values in this interval not displayed.     Recent Labs   Lab  08/25/18 0107 08/26/18   0303   WBC  6.36  5.76   HGB  8.6*  7.7*   HCT  26.3*  24.8*   PLT  297  271     Recent Labs   Lab  08/25/18   0107  08/26/18   0303   INR  1.0  1.0     Microbiology Results (last 7 days)     Procedure Component Value Units Date/Time    CSF culture [[id number]] Collected:  08/18/18 1354    Order Status:  Completed Specimen:  CSF (Spinal Fluid)  from CSF Shunt Updated:  08/23/18 0738     CSF CULTURE No Growth     Gram Stain Result Rare WBC's      No organisms seen        Recent Lab Results       08/26/18  0555 08/26/18  0303 08/25/18  2047 08/25/18  1806 08/25/18  1806      Immature Granulocytes  0.9        Immature Grans (Abs)  0.05  Comment:  Mild elevation in immature granulocytes is non specific and   can be seen in a variety of conditions including stress response,   acute inflammation, trauma and pregnancy. Correlation with other   laboratory and clinical findings is essential.          Albumin  2.2        Alkaline Phosphatase  68        ALT  12        Anion Gap  6        AST  9        Baso #  0.01        Basophil%  0.2        Total Bilirubin  0.2  Comment:  For infants and newborns, interpretation of results should be based  on gestational age, weight and in agreement with clinical  observations.  Premature Infant recommended reference ranges:  Up to 24 hours.............<8.0 mg/dL  Up to 48 hours............<12.0 mg/dL  3-5 days..................<15.0 mg/dL  6-29 days.................<15.0 mg/dL          BUN, Bld  7        Calcium  7.5        Chloride  120        CO2  21        Creatinine  0.5        Differential Method  Automated        eGFR if   >60.0        eGFR if non   >60.0  Comment:  Calculation used to obtain the estimated glomerular filtration  rate (eGFR) is the CKD-EPI equation.           Eos #  0.1        Eosinophil%  0.9        Glucose  101        Gran # (ANC)  4.3        Gran%  73.7        Hematocrit  24.8        Hemoglobin  7.7        Coumadin Monitoring INR  1.0  Comment:  Coumadin Therapy:  2.0 - 3.0 for INR for all indicators except mechanical heart valves  and antiphospholipid syndromes which should use 2.5 - 3.5.          Lymph #  1.0        Lymph%  16.7        Magnesium  1.7   1.4     MCH  28.1        MCHC  31.0        MCV  91        Mono #  0.4        Mono%  7.6        MPV  9.9        nRBC  0         Phosphorus  2.7        Platelets  271        POCT Glucose 92   90      Potassium  3.1        Total Protein  5.4        Protime  10.7        RBC  2.74        RDW  17.0        Sodium  147 136         147        WBC  5.76                    08/25/18  1208 08/25/18  1206      Immature Granulocytes       Immature Grans (Abs)       Albumin       Alkaline Phosphatase       ALT       Anion Gap       AST       Baso #       Basophil%       Total Bilirubin       BUN, Bld       Calcium       Chloride       CO2       Creatinine       Differential Method       eGFR if        eGFR if non        Eos #       Eosinophil%       Glucose       Gran # (ANC)       Gran%       Hematocrit       Hemoglobin       Coumadin Monitoring INR       Lymph #       Lymph%       Magnesium       MCH       MCHC       MCV       Mono #       Mono%       MPV       nRBC       Phosphorus       Platelets       POCT Glucose  103     Potassium 3.7      Total Protein       Protime       RBC       RDW       Sodium 136      WBC             Significant Diagnostics:  I have reviewed all pertinent imaging results/findings within the past 24 hours.

## 2018-08-26 NOTE — PROGRESS NOTES
[facility]  Neurocritical Care  Progress Note    Admit Date: 8/10/2018  Service Date: 08/26/2018  Length of Stay: 16    Subjective:     Chief Complaint: Subarachnoid hemorrhage from anterior communicating artery aneurysm    History of Present Illness: Per earlier notes:   32 y.o female, approximately 5-6 months gravid. C/o acute onset AMS that began prior to arrival in ED.  Patient's aunt reports calling the patient's phone at 11:30 am this morning and reports the patient's 6 year old son answering the phone stating that the patient was lying on the floor, not able to get up. EMS reports upon their arrival, the patient was found on the floor, faced down, with her head against a wall. EMS reports the patient to be hypertensive and incontinent.   Pt has been non-verbal since arrival. Per family patient began complaining of a headache yesterday. Last time normal per  was 0500 today while on his way to work. Patient's aunt reports the patient has not received any prenatal care for this pregnancy.  No other history could be obtained at this time.  History is limited secondary to acuity of the patient's condition.  So last seen normal by adult at 5am. eleanor reports pt has been taking some OTC meds for headache. Pt's  reports pt not taking any regular medications at home.     Hospital Course: 8/10: pt admitted to [facility] for SAH. CT, MRI, MRA ordered and results pending   8/11: post angio with coil acom, EVD at 10, wean prop to extubate, MRI once stable, Urine studies for hyponatremia, TCDs, check Mg q 4  8/12: Mg gtt stopped over night, fluid boluses, pain control  8/16: PBD 6: +620mL fluid overnight.  Elevated peak velocity on TCD, Angio in AM.   8/19: PBD 9: +547mL, Na 134, increase 2% to 45/hour, decrease NS to 110/hour. TCDs decreased yesterday from 8/17, monitor today. SBP < 180  8/20: PBD 10: +1.3L, transfused 1 unit pRBC last night, Na 135 put on 2% 100mL/hour this morning. Monitor  TCDs. Moderate neck pain; No BM for 4 days.  8/21: PBD 11: + 1.3L last 24 hours, Na 136 on 2% NS at 125mL/hour. TCDs from yesterday remain elevated, will follow exam. Phenylephrine gtt to maintain -220.   8/23: PBD 13, -1.1L last 24 hours, will give 1 unit of blood. TCDs remain elevated, will follow up with neurosurgery on plan to clamp EVD, currently open at 10.   8/26: Hgb slightly down overnight.  Headache decreased this morning, but increased in the afternoon. EVD clamped.    Interval History:    Hgb slightly down overnight.  Headache decreased this morning, but increased in the afternoon. EVD clamped.  Afebrile, BP well-controlled.      Review of Systems   Constitutional: Negative for fever.   Respiratory: Negative for shortness of breath.    Neurological: Negative for weakness and numbness.   Psychiatric/Behavioral: Negative for confusion.         Objective:     Vitals:  Temp: 98.5 °F (36.9 °C)  Pulse: (!) 58  Rhythm: sinus bradycardia  BP: (!) 146/68  MAP (mmHg): 98  ICP Mean (mmHg): 8 mmHg  Resp: 17  SpO2: 100 %  O2 Device (Oxygen Therapy): room air    Temp  Min: 98.3 °F (36.8 °C)  Max: 98.8 °F (37.1 °C)  Pulse  Min: 52  Max: 82  BP  Min: 126/85  Max: 178/83  MAP (mmHg)  Min: 98  Max: 126  ICP Mean (mmHg)  Min: 5 mmHg  Max: 13 mmHg  Resp  Min: 14  Max: 25  SpO2  Min: 98 %  Max: 100 %    08/25 0701 - 08/26 0700  In: 5185.8 [P.O.:2220; I.V.:2565.8]  Out: 4301 [Urine:4250; Drains:51]   Unmeasured Output  Urine Occurrence: 1  Stool Occurrence: 0  Emesis Occurrence: 1  Pad Count: 1(pt urinated while having BM)       Physical Exam   Constitutional: She is oriented to person, place, and time. She appears well-developed and well-nourished. No distress.   HENT:   Head: Normocephalic.   Eyes: Conjunctivae are normal.   Cardiovascular: Normal rate, regular rhythm and normal heart sounds. Exam reveals no friction rub.   No murmur heard.  Pulmonary/Chest: Effort normal and breath sounds normal. No respiratory  distress. She has no wheezes.   Abdominal: Soft. Bowel sounds are normal. She exhibits no distension. There is no tenderness.   Musculoskeletal: She exhibits no edema.   Neurological: She is alert and oriented to person, place, and time.   Skin: Skin is warm and dry.     E4V5M6    alert, follows simple commands    PERRL  EOMI  VFI  L Lower facial droop  Facial sensation to light touch intact  Hearing symmetric  SCM, trapezius symmetric  Tongue protrudes midline    5/5 in BUE and BLE    2+ b/l biceps, brachioradialis    Sensation to light touch intact throughout    Finger/nose intact b/l      Medications:  Continuous  Sodium Chloride 2% Last Rate: 25 mL/hr at 08/26/18 1700   Scheduled  ceFAZolin (ANCEF) IVPB 1 g Q8H   famotidine 20 mg Daily   ferrous sulfate 325 mg Daily   heparin (porcine) 5,000 Units Q8H   niMODipine 30 mg Q2H   polyethylene glycol 17 g Daily   prenatal vits96-iron fum-folic 1 tablet Daily   senna-docusate 8.6-50 mg 1 tablet BID   sodium chloride 0.9% 10 mL Q6H   PRN  sodium chloride  Q24H PRN   acetaminophen 650 mg Q4H PRN   calcium gluconate IVPB 1 g PRN   calcium gluconate IVPB 1 g PRN   calcium gluconate IVPB 1 g PRN   labetalol 10 mg Q4H PRN   magnesium sulfate IVPB 2 g PRN   magnesium sulfate IVPB 4 g PRN   oxyCODONE 10 mg Q4H PRN   potassium chloride in water 40 mEq PRN   And     potassium chloride in water 60 mEq PRN   And     potassium chloride in water 80 mEq PRN   sodium chloride 0.9% 10 mL PRN   sodium chloride 0.9% 5 mL PRN   sodium phosphate IVPB 15 mmol PRN   sodium phosphate IVPB 20.01 mmol PRN   sodium phosphate IVPB 30 mmol PRN     Today I personally reviewed pertinent medications, lines/drains/airways, imaging, cardiology, lab results, microbiology results, notably:    Diet  Diet Adult Regular (IDDSI Level 7)  Diet Adult Regular (IDDSI Level 7)    Recent Results (from the past 24 hour(s))   Magnesium    Collection Time: 08/25/18  6:06 PM   Result Value Ref Range    Magnesium 1.4  (L) 1.6 - 2.6 mg/dL   POCT glucose    Collection Time: 08/25/18  6:06 PM   Result Value Ref Range    POCT Glucose 90 70 - 110 mg/dL   Sodium    Collection Time: 08/25/18  8:47 PM   Result Value Ref Range    Sodium 136 136 - 145 mmol/L   Comprehensive metabolic panel    Collection Time: 08/26/18  3:03 AM   Result Value Ref Range    Sodium 147 (H) 136 - 145 mmol/L    Potassium 3.1 (L) 3.5 - 5.1 mmol/L    Chloride 120 (H) 95 - 110 mmol/L    CO2 21 (L) 23 - 29 mmol/L    Glucose 101 70 - 110 mg/dL    BUN, Bld 7 6 - 20 mg/dL    Creatinine 0.5 0.5 - 1.4 mg/dL    Calcium 7.5 (L) 8.7 - 10.5 mg/dL    Total Protein 5.4 (L) 6.0 - 8.4 g/dL    Albumin 2.2 (L) 3.5 - 5.2 g/dL    Total Bilirubin 0.2 0.1 - 1.0 mg/dL    Alkaline Phosphatase 68 55 - 135 U/L    AST 9 (L) 10 - 40 U/L    ALT 12 10 - 44 U/L    Anion Gap 6 (L) 8 - 16 mmol/L    eGFR if African American >60.0 >60 mL/min/1.73 m^2    eGFR if non African American >60.0 >60 mL/min/1.73 m^2   CBC auto differential    Collection Time: 08/26/18  3:03 AM   Result Value Ref Range    WBC 5.76 3.90 - 12.70 K/uL    RBC 2.74 (L) 4.00 - 5.40 M/uL    Hemoglobin 7.7 (L) 12.0 - 16.0 g/dL    Hematocrit 24.8 (L) 37.0 - 48.5 %    MCV 91 82 - 98 fL    MCH 28.1 27.0 - 31.0 pg    MCHC 31.0 (L) 32.0 - 36.0 g/dL    RDW 17.0 (H) 11.5 - 14.5 %    Platelets 271 150 - 350 K/uL    MPV 9.9 9.2 - 12.9 fL    Immature Granulocytes 0.9 (H) 0.0 - 0.5 %    Gran # (ANC) 4.3 1.8 - 7.7 K/uL    Immature Grans (Abs) 0.05 (H) 0.00 - 0.04 K/uL    Lymph # 1.0 1.0 - 4.8 K/uL    Mono # 0.4 0.3 - 1.0 K/uL    Eos # 0.1 0.0 - 0.5 K/uL    Baso # 0.01 0.00 - 0.20 K/uL    nRBC 0 0 /100 WBC    Gran% 73.7 (H) 38.0 - 73.0 %    Lymph% 16.7 (L) 18.0 - 48.0 %    Mono% 7.6 4.0 - 15.0 %    Eosinophil% 0.9 0.0 - 8.0 %    Basophil% 0.2 0.0 - 1.9 %    Differential Method Automated    Magnesium    Collection Time: 08/26/18  3:03 AM   Result Value Ref Range    Magnesium 1.7 1.6 - 2.6 mg/dL   Phosphorus    Collection Time: 08/26/18  3:03 AM  "  Result Value Ref Range    Phosphorus 2.7 2.7 - 4.5 mg/dL   Protime-INR    Collection Time: 08/26/18  3:03 AM   Result Value Ref Range    Prothrombin Time 10.7 9.0 - 12.5 sec    INR 1.0 0.8 - 1.2   Sodium    Collection Time: 08/26/18  3:03 AM   Result Value Ref Range    Sodium 147 (H) 136 - 145 mmol/L   POCT glucose    Collection Time: 08/26/18  5:55 AM   Result Value Ref Range    POCT Glucose 92 70 - 110 mg/dL   Sodium    Collection Time: 08/26/18 11:50 AM   Result Value Ref Range    Sodium 134 (L) 136 - 145 mmol/L   Potassium    Collection Time: 08/26/18 11:50 AM   Result Value Ref Range    Potassium 4.0 3.5 - 5.1 mmol/L   Magnesium    Collection Time: 08/26/18 11:50 AM   Result Value Ref Range    Magnesium 2.0 1.6 - 2.6 mg/dL       Microbiology Results (last 7 days)     Procedure Component Value Units Date/Time    CSF culture [[id number]] Collected:  08/18/18 1354    Order Status:  Completed Specimen:  CSF (Spinal Fluid) from CSF Shunt Updated:  08/23/18 0738     CSF CULTURE No Growth     Gram Stain Result Rare WBC's      No organisms seen          Imaging  8/24/18 TCD: Per report,  "Significant interval improvement in the vasospasm noted on prior exams with mild vasospasm in the middle cerebral and anterior cerebral arteries."    Assessment/Plan:     Neuro   * Subarachnoid hemorrhage from anterior communicating artery aneurysm    HH4F4 SAH.  S/p coiling.  -TCDs, normonatremia, permissive HTN  -Analgesia PRN  -Maintain Euvolemia  -EVD clamped.  F/u MRI 8/27          Cerebral artery vasospasm    2/2 SAH  -nimodipine  -TCDs, normonatremia, permissive HTN        Brain edema    2/2 SAH  -EVD         Brain compression    2/2 SAH  -EVD            Renal/   JOSE C (acute kidney injury)    Resolved.        Obstetric   25 weeks gestation of pregnancy    limited prenatal care  Continue to monitor            Prophylaxis:  Venous Thromboembolism: mechanical chemical  Stress Ulcer: NA  Ventilator Pneumonia: not applicable "     Activity Orders          None        Full Code    Dharmesh Capone MD  Neurocritical Care  [facility]

## 2018-08-26 NOTE — PLAN OF CARE
Problem: Patient Care Overview  Goal: Plan of Care Review  Outcome: Ongoing (interventions implemented as appropriate)  POC reviewed with pt and family at 1400. Pt verbalized understanding. Questions and concerns addressed. No acute events today. Pt progressing toward goals. Will continue to monitor. See flowsheets for full assessment and VS info.     EVD clamped today. MRI tonight. Possible EVD removal tomorrow.  One time dose of 25mcg fentanyl given for breakthrough headache pain.

## 2018-08-26 NOTE — ASSESSMENT & PLAN NOTE
32 y.o. female 24 weeks pregnant with history of polycystic kidney disease with HH4F4 SAH on 8/10, now s/p acom coiling 8/10. PBD/PCD 16.     NEURO  -Continue NCC  -q 1 hr neuro checks  -EVD clamped, CTH ordered for tmrw   -CSF 8/19, 8/26 sent  -nimotop  -daily TCDs   -TCDs continue to be elevated; exam stable  -off of Keppra      CV  -permissive HTN  <220 as aneurysm secured    RESP  -stable on room air  -Aggressive pulmonary management  -IS @ bedside    FEN/GI  -cont 2%  -strict I/Os  -goal net even or positive  -Reg diet    ID  -ancef for drain ppx    PPX  -SQH  -famotidine for PUD ppx  -TEDs/SCDs    -further mgmt per ncc and obgyn.

## 2018-08-26 NOTE — PROGRESS NOTES
NCC notified, IV potassium and magnesium infusing throughout shift. 0300 lab values as follows: K+= 3.1, magnesium= 1.7, phos=2.7. Magnesium redraw scheduled for 0400, potassium redraw scheduled for 0700. Ordered per Socrates Alfaro MD to replace electrolytes based on 0300 labs. Will continue to monitor closely and replace appropriately.    NCC notified, 0400 sodium=147. Goal= 134-145 Ordered to decrease 2% infusion to 50 mL/hr. Will continue to monitor closely.

## 2018-08-26 NOTE — PROGRESS NOTES
NCC notified. Pt c/o 10/10 throbbing head pain. When asked, pt states head pain is not the worst headache she has ever had. Neuro status otherwise unchanged. Last dose of oxycodone 10 mg administered at 2100. Unable to give tylenol at this time due to daily maximum dose of 3000 mg. Per Dr. Socrates Alfaro MD administer one time dose of 25 mcg of fentanyl at this time. Will administer medication and continue to monitor closely.

## 2018-08-26 NOTE — SUBJECTIVE & OBJECTIVE
Interval History:    Hgb slightly down overnight.  Headache decreased this morning, but increased in the afternoon. EVD clamped.  Afebrile, BP well-controlled.      Review of Systems   Constitutional: Negative for fever.   Respiratory: Negative for shortness of breath.    Neurological: Negative for weakness and numbness.   Psychiatric/Behavioral: Negative for confusion.         Objective:     Vitals:  Temp: 98.5 °F (36.9 °C)  Pulse: (!) 58  Rhythm: sinus bradycardia  BP: (!) 146/68  MAP (mmHg): 98  ICP Mean (mmHg): 8 mmHg  Resp: 17  SpO2: 100 %  O2 Device (Oxygen Therapy): room air    Temp  Min: 98.3 °F (36.8 °C)  Max: 98.8 °F (37.1 °C)  Pulse  Min: 52  Max: 82  BP  Min: 126/85  Max: 178/83  MAP (mmHg)  Min: 98  Max: 126  ICP Mean (mmHg)  Min: 5 mmHg  Max: 13 mmHg  Resp  Min: 14  Max: 25  SpO2  Min: 98 %  Max: 100 %    08/25 0701 - 08/26 0700  In: 5185.8 [P.O.:2220; I.V.:2565.8]  Out: 4301 [Urine:4250; Drains:51]   Unmeasured Output  Urine Occurrence: 1  Stool Occurrence: 0  Emesis Occurrence: 1  Pad Count: 1(pt urinated while having BM)       Physical Exam   Constitutional: She is oriented to person, place, and time. She appears well-developed and well-nourished. No distress.   HENT:   Head: Normocephalic.   Eyes: Conjunctivae are normal.   Cardiovascular: Normal rate, regular rhythm and normal heart sounds. Exam reveals no friction rub.   No murmur heard.  Pulmonary/Chest: Effort normal and breath sounds normal. No respiratory distress. She has no wheezes.   Abdominal: Soft. Bowel sounds are normal. She exhibits no distension. There is no tenderness.   Musculoskeletal: She exhibits no edema.   Neurological: She is alert and oriented to person, place, and time.   Skin: Skin is warm and dry.     [id number]    alert, follows simple commands    PERRL  EOMI  VFI  L Lower facial droop  Facial sensation to light touch intact  Hearing symmetric  SCM, trapezius symmetric  Tongue protrudes midline    5/5 in BUE and BLE    2+  b/l biceps, brachioradialis    Sensation to light touch intact throughout    Finger/nose intact b/l      Medications:  Continuous  Sodium Chloride 2% Last Rate: 25 mL/hr at 08/26/18 1700   Scheduled  ceFAZolin (ANCEF) IVPB 1 g Q8H   famotidine 20 mg Daily   ferrous sulfate 325 mg Daily   heparin (porcine) 5,000 Units Q8H   niMODipine 30 mg Q2H   polyethylene glycol 17 g Daily   prenatal vits96-iron fum-folic 1 tablet Daily   senna-docusate 8.6-50 mg 1 tablet BID   sodium chloride 0.9% 10 mL Q6H   PRN  sodium chloride  Q24H PRN   acetaminophen 650 mg Q4H PRN   calcium gluconate IVPB 1 g PRN   calcium gluconate IVPB 1 g PRN   calcium gluconate IVPB 1 g PRN   labetalol 10 mg Q4H PRN   magnesium sulfate IVPB 2 g PRN   magnesium sulfate IVPB 4 g PRN   oxyCODONE 10 mg Q4H PRN   potassium chloride in water 40 mEq PRN   And     potassium chloride in water 60 mEq PRN   And     potassium chloride in water 80 mEq PRN   sodium chloride 0.9% 10 mL PRN   sodium chloride 0.9% 5 mL PRN   sodium phosphate IVPB 15 mmol PRN   sodium phosphate IVPB 20.01 mmol PRN   sodium phosphate IVPB 30 mmol PRN     Today I personally reviewed pertinent medications, lines/drains/airways, imaging, cardiology, lab results, microbiology results, notably:    Diet  Diet Adult Regular (IDDSI Level 7)  Diet Adult Regular (IDDSI Level 7)    Recent Results (from the past 24 hour(s))   Magnesium    Collection Time: 08/25/18  6:06 PM   Result Value Ref Range    Magnesium 1.4 (L) 1.6 - 2.6 mg/dL   POCT glucose    Collection Time: 08/25/18  6:06 PM   Result Value Ref Range    POCT Glucose 90 70 - 110 mg/dL   Sodium    Collection Time: 08/25/18  8:47 PM   Result Value Ref Range    Sodium 136 136 - 145 mmol/L   Comprehensive metabolic panel    Collection Time: 08/26/18  3:03 AM   Result Value Ref Range    Sodium 147 (H) 136 - 145 mmol/L    Potassium 3.1 (L) 3.5 - 5.1 mmol/L    Chloride 120 (H) 95 - 110 mmol/L    CO2 21 (L) 23 - 29 mmol/L    Glucose 101 70 - 110  mg/dL    BUN, Bld 7 6 - 20 mg/dL    Creatinine 0.5 0.5 - 1.4 mg/dL    Calcium 7.5 (L) 8.7 - 10.5 mg/dL    Total Protein 5.4 (L) 6.0 - 8.4 g/dL    Albumin 2.2 (L) 3.5 - 5.2 g/dL    Total Bilirubin 0.2 0.1 - 1.0 mg/dL    Alkaline Phosphatase 68 55 - 135 U/L    AST 9 (L) 10 - 40 U/L    ALT 12 10 - 44 U/L    Anion Gap 6 (L) 8 - 16 mmol/L    eGFR if African American >60.0 >60 mL/min/1.73 m^2    eGFR if non African American >60.0 >60 mL/min/1.73 m^2   CBC auto differential    Collection Time: 08/26/18  3:03 AM   Result Value Ref Range    WBC 5.76 3.90 - 12.70 K/uL    RBC 2.74 (L) 4.00 - 5.40 M/uL    Hemoglobin 7.7 (L) 12.0 - 16.0 g/dL    Hematocrit 24.8 (L) 37.0 - 48.5 %    MCV 91 82 - 98 fL    MCH 28.1 27.0 - 31.0 pg    MCHC 31.0 (L) 32.0 - 36.0 g/dL    RDW 17.0 (H) 11.5 - 14.5 %    Platelets 271 150 - 350 K/uL    MPV 9.9 9.2 - 12.9 fL    Immature Granulocytes 0.9 (H) 0.0 - 0.5 %    Gran # (ANC) 4.3 1.8 - 7.7 K/uL    Immature Grans (Abs) 0.05 (H) 0.00 - 0.04 K/uL    Lymph # 1.0 1.0 - 4.8 K/uL    Mono # 0.4 0.3 - 1.0 K/uL    Eos # 0.1 0.0 - 0.5 K/uL    Baso # 0.01 0.00 - 0.20 K/uL    nRBC 0 0 /100 WBC    Gran% 73.7 (H) 38.0 - 73.0 %    Lymph% 16.7 (L) 18.0 - 48.0 %    Mono% 7.6 4.0 - 15.0 %    Eosinophil% 0.9 0.0 - 8.0 %    Basophil% 0.2 0.0 - 1.9 %    Differential Method Automated    Magnesium    Collection Time: 08/26/18  3:03 AM   Result Value Ref Range    Magnesium 1.7 1.6 - 2.6 mg/dL   Phosphorus    Collection Time: 08/26/18  3:03 AM   Result Value Ref Range    Phosphorus 2.7 2.7 - 4.5 mg/dL   Protime-INR    Collection Time: 08/26/18  3:03 AM   Result Value Ref Range    Prothrombin Time 10.7 9.0 - 12.5 sec    INR 1.0 0.8 - 1.2   Sodium    Collection Time: 08/26/18  3:03 AM   Result Value Ref Range    Sodium 147 (H) 136 - 145 mmol/L   POCT glucose    Collection Time: 08/26/18  5:55 AM   Result Value Ref Range    POCT Glucose 92 70 - 110 mg/dL   Sodium    Collection Time: 08/26/18 11:50 AM   Result Value Ref Range     "Sodium 134 (L) 136 - 145 mmol/L   Potassium    Collection Time: 08/26/18 11:50 AM   Result Value Ref Range    Potassium 4.0 3.5 - 5.1 mmol/L   Magnesium    Collection Time: 08/26/18 11:50 AM   Result Value Ref Range    Magnesium 2.0 1.6 - 2.6 mg/dL       Microbiology Results (last 7 days)     Procedure Component Value Units Date/Time    CSF culture [[id number]] Collected:  08/18/18 1354    Order Status:  Completed Specimen:  CSF (Spinal Fluid) from CSF Shunt Updated:  08/23/18 0738     CSF CULTURE No Growth     Gram Stain Result Rare WBC's      No organisms seen          Imaging  8/24/18 TCD: Per report,  "Significant interval improvement in the vasospasm noted on prior exams with mild vasospasm in the middle cerebral and anterior cerebral arteries."  "

## 2018-08-26 NOTE — ASSESSMENT & PLAN NOTE
HH4F4 SAH.  S/p coiling.  -TCDs, normonatremia, permissive HTN  -Analgesia PRN  -Maintain Euvolemia  -EVD clamped.  F/u MRI 8/27

## 2018-08-26 NOTE — PROGRESS NOTES
[facility]  Neurosurgery  Progress Note    Subjective:     History of Present Illness: 32 y.o. female 24 weeks pregnant with a history of polycystic kidney disease who presents as transfer from [facility] for SAH. Patient found down by 6 yr old son. Last known normal 0500 today. CT at [facility] revealed SAH within the basal cisterns. US at [facility] with + fetal heart tones. Transferred for neuro evaluation.     Post-Op Info:  Procedure(s) (LRB):  Mri (magnetic resonance imaging) (N/A)   16 Days Post-Op     Interval History: NAEON.    Medications:  Continuous Infusions:   sodium chloride 0.9% 50 mL/hr at 08/26/18 1005     Scheduled Meds:   ceFAZolin (ANCEF) IVPB  1 g Intravenous Q8H    famotidine  20 mg Oral Daily    ferrous sulfate  325 mg Oral Daily    heparin (porcine)  5,000 Units Subcutaneous Q8H    niMODipine  30 mg Oral Q2H    polyethylene glycol  17 g Oral Daily    prenatal vits96-iron fum-folic  1 tablet Oral Daily    senna-docusate 8.6-50 mg  1 tablet Oral BID    sodium chloride 0.9%  10 mL Intravenous Q6H     PRN Meds:sodium chloride, acetaminophen, calcium gluconate IVPB, calcium gluconate IVPB, calcium gluconate IVPB, labetalol, magnesium sulfate IVPB, magnesium sulfate IVPB, oxyCODONE, potassium chloride in water **AND** potassium chloride in water **AND** potassium chloride in water, Flushing PICC Protocol **AND** sodium chloride 0.9% **AND** sodium chloride 0.9%, sodium chloride 0.9%, sodium phosphate IVPB, sodium phosphate IVPB, sodium phosphate IVPB     Review of Systems    Objective:     Weight: 78.5 kg (173 lb 1 oz)  Body mass index is 25.56 kg/m².  Vital Signs (Most Recent):  Temp: 98.6 °F (37 °C) (08/26/18 0700)  Pulse: 60 (08/26/18 1000)  Resp: (!) 22 (08/26/18 1000)  BP: (!) 178/83 (08/26/18 1000)  SpO2: 100 % (08/26/18 1000) Vital Signs (24h Range):  Temp:  [98.2 °F (36.8 °C)-98.8 °F (37.1 °C)] 98.6 °F (37 °C)  Pulse:  [56-83] 60  Resp:  [14-26] 22  SpO2:  [98 %-100 %] 100 %  BP:  (112-178)/(60-99) 178/83     Date 08/26/18 0700 - 08/27/18 0659   Shift 0493-1413 8262-1495 5219-8572 24 Hour Total   INTAKE   P.O. 350   350   I.V.(mL/kg) 145.8(1.9)   145.8(1.9)   IV Piggyback 350   350   Shift Total(mL/kg) 845.8(10.8)   845.8(10.8)   OUTPUT   Urine(mL/kg/hr) 1250   1250   Drains 4   4   Shift Total(mL/kg) 1254(16)   1254(16)   Weight (kg) 78.5 78.5 78.5 78.5                        ICP/Ventriculostomy 08/11/18 0000 Ventricular drainage catheter with ICP microsensor Right Other (Comment) (Active)   Level of Ventriculostomy (cm above) 10 8/24/2018 11:01 AM   Status Open to drainage 8/24/2018 11:01 AM   Site Assessment Clean;Dry 8/24/2018 11:01 AM   Site Drainage Clear 8/24/2018 11:01 AM   Waveform normal waveform;A-waves 8/24/2018 11:01 AM   Output (mL) 3 mL 8/24/2018 11:01 AM   CSF Color clear 8/24/2018 11:01 AM   Dressing Status Biopatch in place;Clean;Dry;Intact 8/24/2018 11:01 AM   Interventions HOB degrees;bed controls locked;level adjusted per order 8/24/2018 11:01 AM       Female External Urinary Catheter 08/20/18 0700 (Active)   Skin perineum cleansed w/ soap and water 8/24/2018 11:01 AM   Tolerance no signs/symptoms of discomfort 8/24/2018 11:01 AM   Suction Other 8/24/2018 11:01 AM   Date of last wick change 08/24/18 8/24/2018 11:01 AM   Time of last wick change 1101 8/24/2018 11:01 AM   Output (mL) 250 mL 8/24/2018  8:41 AM       Neurosurgery Physical Exam     AOX3  speech fluent  EVD in place   PERRL, EOMI, face symm, tongue midline  HOBSON symmetrically  No drift.     Significant Labs:  Recent Labs   Lab  08/25/18   0107   08/25/18   1208  08/25/18   1806  08/25/18 2047 08/26/18   0303   GLU  94   --    --    --    --   101   NA  136   < >  136   --   136  147*  147*   K  3.6   --   3.7   --    --   3.1*   CL  107   --    --    --    --   120*   CO2  21*   --    --    --    --   21*   BUN  7   --    --    --    --   7   CREATININE  0.6   --    --    --    --   0.5   CALCIUM  8.5*   --     --    --    --   7.5*   MG  1.6   --    --   1.4*   --   1.7    < > = values in this interval not displayed.     Recent Labs   Lab  08/25/18   0107  08/26/18   0303   WBC  6.36  5.76   HGB  8.6*  7.7*   HCT  26.3*  24.8*   PLT  297  271     Recent Labs   Lab  08/25/18   0107  08/26/18   0303   INR  1.0  1.0     Microbiology Results (last 7 days)     Procedure Component Value Units Date/Time    CSF culture [[id number]] Collected:  08/18/18 1354    Order Status:  Completed Specimen:  CSF (Spinal Fluid) from CSF Shunt Updated:  08/23/18 0738     CSF CULTURE No Growth     Gram Stain Result Rare WBC's      No organisms seen        Recent Lab Results       08/26/18  0555 08/26/18  0303 08/25/18  2047 08/25/18  1806 08/25/18  1806      Immature Granulocytes  0.9        Immature Grans (Abs)  0.05  Comment:  Mild elevation in immature granulocytes is non specific and   can be seen in a variety of conditions including stress response,   acute inflammation, trauma and pregnancy. Correlation with other   laboratory and clinical findings is essential.          Albumin  2.2        Alkaline Phosphatase  68        ALT  12        Anion Gap  6        AST  9        Baso #  0.01        Basophil%  0.2        Total Bilirubin  0.2  Comment:  For infants and newborns, interpretation of results should be based  on gestational age, weight and in agreement with clinical  observations.  Premature Infant recommended reference ranges:  Up to 24 hours.............<8.0 mg/dL  Up to 48 hours............<12.0 mg/dL  3-5 days..................<15.0 mg/dL  6-29 days.................<15.0 mg/dL          BUN, Bld  7        Calcium  7.5        Chloride  120        CO2  21        Creatinine  0.5        Differential Method  Automated        eGFR if   >60.0        eGFR if non   >60.0  Comment:  Calculation used to obtain the estimated glomerular filtration  rate (eGFR) is the CKD-EPI equation.           Eos #  0.1         Eosinophil%  0.9        Glucose  101        Gran # (ANC)  4.3        Gran%  73.7        Hematocrit  24.8        Hemoglobin  7.7        Coumadin Monitoring INR  1.0  Comment:  Coumadin Therapy:  2.0 - 3.0 for INR for all indicators except mechanical heart valves  and antiphospholipid syndromes which should use 2.5 - 3.5.          Lymph #  1.0        Lymph%  16.7        Magnesium  1.7   1.4     MCH  28.1        MCHC  31.0        MCV  91        Mono #  0.4        Mono%  7.6        MPV  9.9        nRBC  0        Phosphorus  2.7        Platelets  271        POCT Glucose 92   90      Potassium  3.1        Total Protein  5.4        Protime  10.7        RBC  2.74        RDW  17.0        Sodium  147 136         147        WBC  5.76                    08/25/18  1208 08/25/18  1206      Immature Granulocytes       Immature Grans (Abs)       Albumin       Alkaline Phosphatase       ALT       Anion Gap       AST       Baso #       Basophil%       Total Bilirubin       BUN, Bld       Calcium       Chloride       CO2       Creatinine       Differential Method       eGFR if        eGFR if non        Eos #       Eosinophil%       Glucose       Gran # (ANC)       Gran%       Hematocrit       Hemoglobin       Coumadin Monitoring INR       Lymph #       Lymph%       Magnesium       MCH       MCHC       MCV       Mono #       Mono%       MPV       nRBC       Phosphorus       Platelets       POCT Glucose  103     Potassium 3.7      Total Protein       Protime       RBC       RDW       Sodium 136      WBC             Significant Diagnostics:  I have reviewed all pertinent imaging results/findings within the past 24 hours.    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 female 24 weeks pregnant with history of polycystic kidney disease with HH4F4 SAH on 8/10, now s/p acom coiling 8/10. PBD/PCD 16.     NEURO  -Continue NCC  -q 1 hr neuro checks  -EVD  clamped, CTH ordered for tmrw   -CSF 8/19, 8/26 sent  -nimotop  -daily TCDs   -TCDs continue to be elevated; exam stable  -off of Keppra      CV  -permissive HTN  <220 as aneurysm secured    RESP  -stable on room air  -Aggressive pulmonary management  -IS @ bedside    FEN/GI  -cont 2%  -strict I/Os  -goal net even or positive  -Reg diet    ID  -ancef for drain ppx    PPX  -SQH  -famotidine for PUD ppx  -TEDs/SCDs    -further mgmt per ncc and obgyn.            Volodymyr Claudio MD  Neurosurgery  [facility]

## 2018-08-26 NOTE — PROGRESS NOTES
Sodium level at 1150 was 134, down from 147 overnight. Switching back to 2% NaCl at 50/hr (from 0.9% NaCl at 50/hr). Will recheck sodium at 1700.

## 2018-08-27 LAB
ALBUMIN SERPL BCP-MCNC: 2.4 G/DL
ALP SERPL-CCNC: 74 U/L
ALT SERPL W/O P-5'-P-CCNC: 11 U/L
ANION GAP SERPL CALC-SCNC: 6 MMOL/L
AST SERPL-CCNC: 10 U/L
BASOPHILS # BLD AUTO: 0.01 K/UL
BASOPHILS NFR BLD: 0.2 %
BILIRUB SERPL-MCNC: 0.2 MG/DL
BUN SERPL-MCNC: 10 MG/DL
CALCIUM SERPL-MCNC: 8.5 MG/DL
CHLORIDE SERPL-SCNC: 105 MMOL/L
CO2 SERPL-SCNC: 23 MMOL/L
CREAT SERPL-MCNC: 0.6 MG/DL
DIFFERENTIAL METHOD: ABNORMAL
EOSINOPHIL # BLD AUTO: 0.1 K/UL
EOSINOPHIL NFR BLD: 1.2 %
ERYTHROCYTE [DISTWIDTH] IN BLOOD BY AUTOMATED COUNT: 17.2 %
EST. GFR  (AFRICAN AMERICAN): >60 ML/MIN/1.73 M^2
EST. GFR  (NON AFRICAN AMERICAN): >60 ML/MIN/1.73 M^2
GLUCOSE SERPL-MCNC: 79 MG/DL
HCT VFR BLD AUTO: 26.8 %
HGB BLD-MCNC: 8.7 G/DL
IMM GRANULOCYTES # BLD AUTO: 0.04 K/UL
IMM GRANULOCYTES NFR BLD AUTO: 0.7 %
INR PPP: 1
LYMPHOCYTES # BLD AUTO: 1.3 K/UL
LYMPHOCYTES NFR BLD: 21.9 %
MAGNESIUM SERPL-MCNC: 1.7 MG/DL
MCH RBC QN AUTO: 29.2 PG
MCHC RBC AUTO-ENTMCNC: 32.5 G/DL
MCV RBC AUTO: 90 FL
MONOCYTES # BLD AUTO: 0.5 K/UL
MONOCYTES NFR BLD: 9 %
NEUTROPHILS # BLD AUTO: 3.9 K/UL
NEUTROPHILS NFR BLD: 67 %
NRBC BLD-RTO: 0 /100 WBC
PHOSPHATE SERPL-MCNC: 3.8 MG/DL
PLATELET # BLD AUTO: 318 K/UL
PMV BLD AUTO: 10.6 FL
POTASSIUM SERPL-SCNC: 3.9 MMOL/L
PROT SERPL-MCNC: 6 G/DL
PROTHROMBIN TIME: 10.4 SEC
RBC # BLD AUTO: 2.98 M/UL
SODIUM SERPL-SCNC: 134 MMOL/L
SODIUM SERPL-SCNC: 134 MMOL/L
SODIUM SERPL-SCNC: 135 MMOL/L
SODIUM SERPL-SCNC: 136 MMOL/L
WBC # BLD AUTO: 5.81 K/UL

## 2018-08-27 PROCEDURE — 84295 ASSAY OF SERUM SODIUM: CPT | Mod: 91

## 2018-08-27 PROCEDURE — 99233 SBSQ HOSP IP/OBS HIGH 50: CPT | Mod: ,,, | Performed by: PHYSICIAN ASSISTANT

## 2018-08-27 PROCEDURE — 25000003 PHARM REV CODE 250: Performed by: PSYCHIATRY & NEUROLOGY

## 2018-08-27 PROCEDURE — 25000003 PHARM REV CODE 250: Performed by: STUDENT IN AN ORGANIZED HEALTH CARE EDUCATION/TRAINING PROGRAM

## 2018-08-27 PROCEDURE — 25000003 PHARM REV CODE 250: Performed by: PHYSICIAN ASSISTANT

## 2018-08-27 PROCEDURE — 20000000 HC ICU ROOM

## 2018-08-27 PROCEDURE — 83735 ASSAY OF MAGNESIUM: CPT

## 2018-08-27 PROCEDURE — 80053 COMPREHEN METABOLIC PANEL: CPT

## 2018-08-27 PROCEDURE — 63600175 PHARM REV CODE 636 W HCPCS: Performed by: STUDENT IN AN ORGANIZED HEALTH CARE EDUCATION/TRAINING PROGRAM

## 2018-08-27 PROCEDURE — 63600175 PHARM REV CODE 636 W HCPCS: Performed by: PSYCHIATRY & NEUROLOGY

## 2018-08-27 PROCEDURE — A4216 STERILE WATER/SALINE, 10 ML: HCPCS | Performed by: PSYCHIATRY & NEUROLOGY

## 2018-08-27 PROCEDURE — 63600175 PHARM REV CODE 636 W HCPCS: Performed by: NURSE PRACTITIONER

## 2018-08-27 PROCEDURE — 99232 SBSQ HOSP IP/OBS MODERATE 35: CPT | Mod: ,,, | Performed by: NEUROLOGICAL SURGERY

## 2018-08-27 PROCEDURE — 99233 SBSQ HOSP IP/OBS HIGH 50: CPT | Mod: ,,, | Performed by: PSYCHIATRY & NEUROLOGY

## 2018-08-27 PROCEDURE — 63600175 PHARM REV CODE 636 W HCPCS: Performed by: PHYSICIAN ASSISTANT

## 2018-08-27 PROCEDURE — 84100 ASSAY OF PHOSPHORUS: CPT

## 2018-08-27 PROCEDURE — 85610 PROTHROMBIN TIME: CPT

## 2018-08-27 PROCEDURE — 85025 COMPLETE CBC W/AUTO DIFF WBC: CPT

## 2018-08-27 PROCEDURE — 25000003 PHARM REV CODE 250: Performed by: NURSE PRACTITIONER

## 2018-08-27 RX ORDER — MORPHINE SULFATE 2 MG/ML
1 INJECTION, SOLUTION INTRAMUSCULAR; INTRAVENOUS ONCE
Status: COMPLETED | OUTPATIENT
Start: 2018-08-27 | End: 2018-08-27

## 2018-08-27 RX ORDER — SODIUM,POTASSIUM PHOSPHATES 280-250MG
2 POWDER IN PACKET (EA) ORAL
Status: DISCONTINUED | OUTPATIENT
Start: 2018-08-27 | End: 2018-08-31 | Stop reason: HOSPADM

## 2018-08-27 RX ORDER — LANOLIN ALCOHOL/MO/W.PET/CERES
800 CREAM (GRAM) TOPICAL
Status: DISCONTINUED | OUTPATIENT
Start: 2018-08-27 | End: 2018-08-31 | Stop reason: HOSPADM

## 2018-08-27 RX ORDER — LIDOCAINE HYDROCHLORIDE 5 MG/ML
10 INJECTION, SOLUTION INFILTRATION; INTRAVENOUS ONCE
Status: DISCONTINUED | OUTPATIENT
Start: 2018-08-27 | End: 2018-08-27

## 2018-08-27 RX ORDER — POTASSIUM CHLORIDE 20 MEQ/15ML
40 SOLUTION ORAL
Status: DISCONTINUED | OUTPATIENT
Start: 2018-08-27 | End: 2018-08-31 | Stop reason: HOSPADM

## 2018-08-27 RX ORDER — LABETALOL HYDROCHLORIDE 5 MG/ML
10 INJECTION, SOLUTION INTRAVENOUS EVERY 4 HOURS PRN
Status: DISCONTINUED | OUTPATIENT
Start: 2018-08-27 | End: 2018-08-31 | Stop reason: HOSPADM

## 2018-08-27 RX ORDER — LIDOCAINE HYDROCHLORIDE 10 MG/ML
1 INJECTION INFILTRATION; PERINEURAL ONCE
Status: COMPLETED | OUTPATIENT
Start: 2018-08-27 | End: 2018-08-27

## 2018-08-27 RX ORDER — POTASSIUM CHLORIDE 20 MEQ/15ML
60 SOLUTION ORAL
Status: DISCONTINUED | OUTPATIENT
Start: 2018-08-27 | End: 2018-08-31 | Stop reason: HOSPADM

## 2018-08-27 RX ADMIN — Medication 10 ML: at 12:08

## 2018-08-27 RX ADMIN — CEFAZOLIN 1 G: 1 INJECTION, POWDER, FOR SOLUTION INTRAMUSCULAR; INTRAVENOUS at 01:08

## 2018-08-27 RX ADMIN — Medication 1 MG: at 09:08

## 2018-08-27 RX ADMIN — CEFAZOLIN 1 G: 1 INJECTION, POWDER, FOR SOLUTION INTRAMUSCULAR; INTRAVENOUS at 06:08

## 2018-08-27 RX ADMIN — ACETAMINOPHEN 650 MG: 325 TABLET, FILM COATED ORAL at 06:08

## 2018-08-27 RX ADMIN — NIMODIPINE 30 MG: 30 CAPSULE, LIQUID FILLED ORAL at 06:08

## 2018-08-27 RX ADMIN — NIMODIPINE 30 MG: 30 CAPSULE, LIQUID FILLED ORAL at 11:08

## 2018-08-27 RX ADMIN — SODIUM CHLORIDE TAB 1 GM 2 G: 1 TAB at 03:08

## 2018-08-27 RX ADMIN — NIMODIPINE 30 MG: 30 CAPSULE, LIQUID FILLED ORAL at 04:08

## 2018-08-27 RX ADMIN — HEPARIN SODIUM 5000 UNITS: 5000 INJECTION, SOLUTION INTRAVENOUS; SUBCUTANEOUS at 01:08

## 2018-08-27 RX ADMIN — MAGNESIUM SULFATE IN WATER 2 G: 40 INJECTION, SOLUTION INTRAVENOUS at 06:08

## 2018-08-27 RX ADMIN — SENNOSIDES AND DOCUSATE SODIUM 1 TABLET: 8.6; 5 TABLET ORAL at 08:08

## 2018-08-27 RX ADMIN — NIMODIPINE 30 MG: 30 CAPSULE, LIQUID FILLED ORAL at 10:08

## 2018-08-27 RX ADMIN — NIMODIPINE 30 MG: 30 CAPSULE, LIQUID FILLED ORAL at 02:08

## 2018-08-27 RX ADMIN — FAMOTIDINE 20 MG: 20 TABLET ORAL at 08:08

## 2018-08-27 RX ADMIN — HEPARIN SODIUM 5000 UNITS: 5000 INJECTION, SOLUTION INTRAVENOUS; SUBCUTANEOUS at 06:08

## 2018-08-27 RX ADMIN — Medication 10 ML: at 06:08

## 2018-08-27 RX ADMIN — OXYCODONE HYDROCHLORIDE 10 MG: 5 TABLET ORAL at 08:08

## 2018-08-27 RX ADMIN — LIDOCAINE HYDROCHLORIDE 1 ML: 10 INJECTION, SOLUTION INFILTRATION; PERINEURAL at 05:08

## 2018-08-27 RX ADMIN — OXYCODONE HYDROCHLORIDE 10 MG: 5 TABLET ORAL at 03:08

## 2018-08-27 RX ADMIN — HEPARIN SODIUM 5000 UNITS: 5000 INJECTION, SOLUTION INTRAVENOUS; SUBCUTANEOUS at 10:08

## 2018-08-27 RX ADMIN — SODIUM CHLORIDE TAB 1 GM 2 G: 1 TAB at 08:08

## 2018-08-27 RX ADMIN — NIMODIPINE 30 MG: 30 CAPSULE, LIQUID FILLED ORAL at 09:08

## 2018-08-27 RX ADMIN — CEFAZOLIN 1 G: 1 INJECTION, POWDER, FOR SOLUTION INTRAMUSCULAR; INTRAVENOUS at 10:08

## 2018-08-27 RX ADMIN — FERROUS SULFATE TAB EC 325 MG (65 MG FE EQUIVALENT) 325 MG: 325 (65 FE) TABLET DELAYED RESPONSE at 08:08

## 2018-08-27 RX ADMIN — OXYCODONE HYDROCHLORIDE 10 MG: 5 TABLET ORAL at 11:08

## 2018-08-27 RX ADMIN — NIMODIPINE 30 MG: 30 CAPSULE, LIQUID FILLED ORAL at 07:08

## 2018-08-27 RX ADMIN — Medication 10 ML: at 11:08

## 2018-08-27 RX ADMIN — NIMODIPINE 30 MG: 30 CAPSULE, LIQUID FILLED ORAL at 08:08

## 2018-08-27 RX ADMIN — PRENATAL VIT W/ FE FUMARATE-FA TAB 27-0.8 MG 1 TABLET: 27-0.8 TAB at 08:08

## 2018-08-27 RX ADMIN — ACETAMINOPHEN 650 MG: 325 TABLET, FILM COATED ORAL at 01:08

## 2018-08-27 RX ADMIN — NIMODIPINE 30 MG: 30 CAPSULE, LIQUID FILLED ORAL at 01:08

## 2018-08-27 RX ADMIN — OXYCODONE HYDROCHLORIDE 10 MG: 5 TABLET ORAL at 07:08

## 2018-08-27 NOTE — SUBJECTIVE & OBJECTIVE
Interval History: NAEON. MRI stable, will pull EVD today    Medications:  Continuous Infusions:    Scheduled Meds:   ceFAZolin (ANCEF) IVPB  1 g Intravenous Q8H    famotidine  20 mg Oral Daily    ferrous sulfate  325 mg Oral Daily    heparin (porcine)  5,000 Units Subcutaneous Q8H    niMODipine  30 mg Oral Q2H    polyethylene glycol  17 g Oral Daily    prenatal vits96-iron fum-folic  1 tablet Oral Daily    senna-docusate 8.6-50 mg  1 tablet Oral BID    sodium chloride 0.9%  10 mL Intravenous Q6H    sodium chloride  2 g Oral TID     PRN Meds:sodium chloride, acetaminophen, calcium gluconate IVPB, calcium gluconate IVPB, calcium gluconate IVPB, labetalol, magnesium sulfate IVPB, magnesium sulfate IVPB, oxyCODONE, potassium chloride in water **AND** potassium chloride in water **AND** potassium chloride in water, Flushing PICC Protocol **AND** sodium chloride 0.9% **AND** sodium chloride 0.9%, sodium chloride 0.9%, sodium phosphate IVPB, sodium phosphate IVPB, sodium phosphate IVPB     Review of Systems    Objective:     Weight: 76.5 kg (168 lb 10.4 oz)  Body mass index is 24.91 kg/m².  Vital Signs (Most Recent):  Temp: 98.8 °F (37.1 °C) (08/27/18 0305)  Pulse: 62 (08/27/18 0605)  Resp: 20 (08/27/18 0605)  BP: (!) 165/83 (08/27/18 0605)  SpO2: 99 % (08/27/18 0605) Vital Signs (24h Range):  Temp:  [98.2 °F (36.8 °C)-99 °F (37.2 °C)] 98.8 °F (37.1 °C)  Pulse:  [52-82] 62  Resp:  [13-25] 20  SpO2:  [99 %-100 %] 99 %  BP: (137-182)/(62-90) 165/83                           ICP/Ventriculostomy 08/11/18 0000 Ventricular drainage catheter with ICP microsensor Right Other (Comment) (Active)   Level of Ventriculostomy (cm above) 10 8/24/2018 11:01 AM   Status Open to drainage 8/24/2018 11:01 AM   Site Assessment Clean;Dry 8/24/2018 11:01 AM   Site Drainage Clear 8/24/2018 11:01 AM   Waveform normal waveform;A-waves 8/24/2018 11:01 AM   Output (mL) 3 mL 8/24/2018 11:01 AM   CSF Color clear 8/24/2018 11:01 AM   Dressing  Status Biopatch in place;Clean;Dry;Intact 8/24/2018 11:01 AM   Interventions HOB degrees;bed controls locked;level adjusted per order 8/24/2018 11:01 AM       Female External Urinary Catheter 08/20/18 0700 (Active)   Skin perineum cleansed w/ soap and water 8/24/2018 11:01 AM   Tolerance no signs/symptoms of discomfort 8/24/2018 11:01 AM   Suction Other 8/24/2018 11:01 AM   Date of last wick change 08/24/18 8/24/2018 11:01 AM   Time of last wick change 1101 8/24/2018 11:01 AM   Output (mL) 250 mL 8/24/2018  8:41 AM       Neurosurgery Physical Exam     AOX3  speech fluent  EVD in place   PERRL, EOMI, face symm, tongue midline  HOBSON symmetrically  No drift.     Significant Labs:  Recent Labs   Lab  08/26/18   0303  08/26/18   1150  08/26/18 1757 08/26/18 2022 08/27/18   0236   GLU  101   --    --    --   79   NA  147*  147*  134*  134*  136  134*  134*   K  3.1*  4.0   --    --   3.9   CL  120*   --    --    --   105   CO2  21*   --    --    --   23   BUN  7   --    --    --   10   CREATININE  0.5   --    --    --   0.6   CALCIUM  7.5*   --    --    --   8.5*   MG  1.7  2.0   --    --   1.7     Recent Labs   Lab  08/26/18 0303 08/27/18   0236   WBC  5.76  5.81   HGB  7.7*  8.7*   HCT  24.8*  26.8*   PLT  271  318     Recent Labs   Lab  08/26/18   0303  08/27/18   0236   INR  1.0  1.0     Microbiology Results (last 7 days)     Procedure Component Value Units Date/Time    CSF culture [[id number]] Collected:  08/18/18 1354    Order Status:  Completed Specimen:  CSF (Spinal Fluid) from CSF Shunt Updated:  08/23/18 0738     CSF CULTURE No Growth     Gram Stain Result Rare WBC's      No organisms seen        Recent Lab Results       08/27/18  0236 08/26/18 2022 08/26/18 1757 08/26/18  1150      Immature Granulocytes 0.7        Immature Grans (Abs) 0.04  Comment:  Mild elevation in immature granulocytes is non specific and   can be seen in a variety of conditions including stress response,   acute inflammation,  trauma and pregnancy. Correlation with other   laboratory and clinical findings is essential.          Albumin 2.4        Alkaline Phosphatase 74        ALT 11        Anion Gap 6        AST 10        Baso # 0.01        Basophil% 0.2        Total Bilirubin 0.2  Comment:  For infants and newborns, interpretation of results should be based  on gestational age, weight and in agreement with clinical  observations.  Premature Infant recommended reference ranges:  Up to 24 hours.............<8.0 mg/dL  Up to 48 hours............<12.0 mg/dL  3-5 days..................<15.0 mg/dL  6-29 days.................<15.0 mg/dL          BUN, Bld 10        Calcium 8.5        Chloride 105        CO2 23        Creatinine 0.6        Differential Method Automated        eGFR if  >60.0        eGFR if non  >60.0  Comment:  Calculation used to obtain the estimated glomerular filtration  rate (eGFR) is the CKD-EPI equation.           Eos # 0.1        Eosinophil% 1.2        Glucose 79        Gran # (ANC) 3.9        Gran% 67.0        Hematocrit 26.8        Hemoglobin 8.7        Coumadin Monitoring INR 1.0  Comment:  Coumadin Therapy:  2.0 - 3.0 for INR for all indicators except mechanical heart valves  and antiphospholipid syndromes which should use 2.5 - 3.5.          Lymph # 1.3        Lymph% 21.9        Magnesium 1.7   2.0     MCH 29.2        MCHC 32.5        MCV 90        Mono # 0.5        Mono% 9.0        MPV 10.6        nRBC 0        Phosphorus 3.8  3.4      Platelets 318        Potassium 3.9   4.0     Total Protein 6.0        Protime 10.4        RBC 2.98        RDW 17.2        Sodium 134 136 134 134      134        WBC 5.81              Significant Diagnostics:  I have reviewed all pertinent imaging results/findings within the past 24 hours.

## 2018-08-27 NOTE — PROGRESS NOTES
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  Imaging identified an anterior communicating aneurysm which was successfully coiled 8/10.  On 8/16, patient developed vasospasm and was taken to IR and treated with verapamil.  She continues with mild and moderate vasospasm, but has no clinical deficits.  She does report significant headache and stiff neck.  She is receiving nimodipine and daily TCDs with close monitoring.  TCDs elevated but overall stable     Antithrombotics for secondary stroke prevention: none SAH     Statins for secondary stroke prevention and hyperlipidemia, if present: SAH     Aggressive risk factor modification: polycytsic kidney disease, HTN      Rehab efforts: Occupational Therapy, PT/OT/SLP to evaluate and treat when appropriate     Diagnostics ordered/pending: Daily TCDs    VTE prophylaxis: SCDs    BP parameters: SAH: Secured aneurysm, no target, increase BP to prevent vasospasm if needed             Cerebral artery vasospasm    -Monitor daily TCD's  -Nimotop  -Continues with vasospasm; no clinical deterioration  -close monitoring          25 weeks gestation of pregnancy    Per [facility] recommendations        Hypertension affecting pregnancy in second trimester    Risk factor for stroke  -Patient with continued vasospasm requiring increased SBP  -Close monitoring  - bp systolic 137-193/62-89        PKD (polycystic kidney disease)    Risk factor for aneurysm              33 y/o female found down @~24w5d admitted for management of subarachnoid hemmorhage likely due to ruptured aneurysm  Had coiling done ACOM 8-10-18  8-16-18 to IR for cerebral angiogram for vasospasm and had IV Verapamil instilled    8-19-18 with headache TCD's better yeaterday  8/20:  Continues with HA worse with positional movement.  No neurological deficits.  TCDs for today pending.  Yesterday demonstrated multiple areas of mild and moderate vasospasm with no clinical deterioration.  NIH remains 0.    8/21: Continues with headache and stiff neck.  No new neuro symptoms.  TCDs ratios improved some, but continues with vasospasm with no clinical deterioration.  On radha drip to keep -220.  8/27/18 - MRI showing area of restricted diffusion in R Basal ganglia, when considering the neuro exam on Ms [place], suspect this is related to artifact from cathether     STROKE DOCUMENTATION        NIH Scale:  1a. Level Of Consciousness: 0-->Alert: keenly responsive  1b. LOC Questions: 0-->Answers both questions correctly  1c. LOC Commands: 0-->Performs both tasks correctly  2. Best Gaze: 0-->Normal  3. Visual: 0-->No visual loss  4. Facial Palsy: 0-->Normal symmetrical movements  5a. Motor Arm, Left: 0-->No drift: limb holds 90 (or 45) degrees for full 10 secs  5b. Motor Arm, Right: 0-->No drift: limb holds 90 (or 45) degrees for full 10 secs  6a. Motor Leg, Left: 0-->No drift: leg holds 30 degree position for full 5 secs  6b. Motor Leg, Right: 0-->No drift: leg holds 30 degree position for full 5 secs  7. Limb Ataxia: 0-->Absent  8. Sensory: 0-->Normal: no sensory loss  9. Best Language: 0-->No aphasia: normal  10. Dysarthria: 0-->Normal  11. Extinction and Inattention (formerly Neglect): 0-->No abnormality  Total (NIH Stroke Scale): 0       Modified Bibb Score: 1  Henderson Coma Scale:    ABCD2 Score:    [id number] Score:   HAS -BLED Score:   ICH Score:   Hunt & Collins Classification:Grade III     Hemorrhagic change of an Ischemic Stroke: Does this patient have an ischemic stroke with hemorrhagic changes? No     Neurologic Chief Complaint: SAH    Subjective:     Interval History: Patient is seen for follow-up neurological assessment and treatment  recommendations:  MRI showing area of restricted diffusion in R Basal ganglia, when considering the neuro exam on Ms [place], suspect this is related to artifact from cathether     Complains of headache, no new neuro deficits  Given oxy q 4 and tylenol q 4 and one dose morphine overnight  neurosurg with plans to remove evd today     HPI, Past Medical, Family, and Social History remains the same as documented in the initial encounter. Just c/o headache     Review of Systems   Constitutional: Negative for fever.   HENT: Negative for trouble swallowing.    Eyes: Positive for photophobia.   Neurological: Positive for headaches. Negative for speech difficulty and weakness.     Scheduled Meds:   ceFAZolin (ANCEF) IVPB  1 g Intravenous Q8H    famotidine  20 mg Oral Daily    heparin (porcine)  5,000 Units Subcutaneous Q8H    lidocaine HCL 10 mg/ml (1%)  1 mL Other Once    niMODipine  30 mg Oral Q2H    polyethylene glycol  17 g Oral Daily    prenatal vits96-iron fum-folic  1 tablet Oral Daily    senna-docusate 8.6-50 mg  1 tablet Oral BID    sodium chloride 0.9%  10 mL Intravenous Q6H    sodium chloride  2 g Oral TID     Continuous Infusions:    PRN Meds:sodium chloride, acetaminophen, labetalol, magnesium oxide, magnesium oxide, oxyCODONE, potassium chloride 10%, potassium chloride 10%, potassium chloride 10%, potassium, sodium phosphates, potassium, sodium phosphates, potassium, sodium phosphates, Flushing PICC Protocol **AND** sodium chloride 0.9% **AND** sodium chloride 0.9%, sodium chloride 0.9%    Objective:     Vital Signs (Most Recent):  Temp: 98.7 °F (37.1 °C) (08/27/18 1502)  Pulse: 90 (08/27/18 1602)  Resp: 20 (08/27/18 1602)  BP: (!) 153/74 (08/27/18 1602)  SpO2: 100 % (08/27/18 1602)  BP Location: Left leg    Vital Signs Range (Last 24H):  Temp:  [98.2 °F (36.8 °C)-99 °F (37.2 °C)]   Pulse:  [58-90]   Resp:  [13-24]   BP: (137-193)/(62-89)   SpO2:  [99 %-100 %]   BP Location: Left leg    Physical Exam    Constitutional: She appears well-developed and well-nourished.   Pregnant   Pulmonary/Chest: No respiratory distress.   Skin: Skin is warm and dry.   Psychiatric: She has a normal mood and affect. Her behavior is normal.   Nursing note and vitals reviewed.  EVD present     Neurological Exam:   LOC: alert  Attention Span: Good   Language: No aphasia  Articulation: No dysarthria  Orientation: Person, Place, Time   Visual Fields: Full  EOM (CN III, IV, VI): Full/intact  Pupils (CN II, III): PERRL  Facial Movement (CN VII): Symmetric facial expression    Motor: Arm left  Normal 5/5  Leg left  Normal 5/5  Arm right  Normal 5/5  Leg right Normal 5/5  Sensation: Intact to light touch, temperature and vibration  Tone: Normal tone throughout    Laboratory:  CMP:   Recent Labs   Lab  08/27/18   0236 08/27/18   1243   CALCIUM  8.5*   --    ALBUMIN  2.4*   --    PROT  6.0   --    NA  134*  134*  136   K  3.9   --    CO2  23   --    CL  105   --    BUN  10   --    CREATININE  0.6   --    ALKPHOS  74   --    ALT  11   --    AST  10   --    BILITOT  0.2   --      CBC:   Recent Labs   Lab  08/27/18 0236   WBC  5.81   RBC  2.98*   HGB  8.7*   HCT  26.8*   PLT  318   MCV  90   MCH  29.2   MCHC  32.5     Lipid Panel: No results for input(s): CHOL, LDLCALC, HDL, TRIG in the last 168 hours.  Coagulation:   Recent Labs   Lab  08/27/18 0236   INR  1.0     Platelet Aggregation Study: No results for input(s): PLTAGG, PLTAGINTERP, PLTAGREGLACO, ADPPLTAGGREG in the last 168 hours.  Hgb A1C: No results for input(s): HGBA1C in the last 168 hours.  TSH: No results for input(s): TSH in the last 168 hours.    Diagnostic Results     Brain Imaging   CT Head w/o contrast 8-10-18 results:    1. Extensive subarachnoid hemorrhage, most likely from an aneurysm rupture.  It is difficult to determine the site of the aneurysm rupture on this study due to the diffuse nature of the subarachnoid hemorrhage throughout the posterior fossa as well as the  basilar cisternal spaces.  2. Slight increase in the ventricular size when compared to the previous study suggestive of a developing hydrocephalus.    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  Successful coil embolization of this aneurysm.     Cardiac Imaging   2D Echo 8-11-18 results:    1 - Eccentric hypertrophy.     2 - Normal left ventricular systolic function (EF 60-65%).     3 - No wall motion abnormalities.     4 - Normal left ventricular diastolic function.     5 - Normal right ventricular systolic function .     6 - The estimated PA systolic pressure is 31 mmHg.     7 - Trivial pericardial effusion.     MRI 8/27/18 -   Interval operative change with right frontal ventricular catheter placement.  There is slight reduced distension of the lateral and 3rd ventricles compared to prior which remain mildly prominent.    Previous intraventricular and subarachnoid hemorrhage overall less apparent possibly related to differences in technique.  There is no definite significant new hemorrhage.    There is a punctate focus of diffusion restriction in the right basal ganglia which may be artifactual from adjacent coursing ventricular catheter however small area of recent infarction not excluded.  Clinical correlation and follow-up advised.      RODRIGUE Belle  Comprehensive Stroke Center  Department of Vascular Neurology   [facility]

## 2018-08-27 NOTE — PROGRESS NOTES
NCC notified, pt c/o HA 9/10. Neuro exam otherwise unchanged. Per Conner Russell, NP administer 1 mg of morphine. Will continue to monitor closely.

## 2018-08-27 NOTE — PROGRESS NOTES
0330 pt transported to MRI with RN x1 and PCT x1 on continuous portable monitoring. Ambu bag at bedside. VSS throughout scan and travel.    0400 pt returned to and reoriented to room. Call light within reach. Will continue to monitor

## 2018-08-27 NOTE — ASSESSMENT & PLAN NOTE
HH4F4 SAH.  S/p coiling.  -TCDs, normonatremia, permissive HTN  -Analgesia PRN  -Maintain Euvolemia  -EVD clamped.  F/u MRI 8/27 with stable blood products and ventricular system, possible new R BG infarct, however asymptomatic

## 2018-08-27 NOTE — PROGRESS NOTES
[facility]  Neurosurgery  Progress Note    Subjective:     History of Present Illness: 32 y.o. female 24 weeks pregnant with a history of polycystic kidney disease who presents as transfer from [facility] for SAH. Patient found down by 6 yr old son. Last known normal 0500 today. CT at [facility] revealed SAH within the basal cisterns. US at [facility] with + fetal heart tones. Transferred for neuro evaluation.     Post-Op Info:  Procedure(s) (LRB):  Mri (magnetic resonance imaging) (N/A)   17 Days Post-Op     Interval History: NAEON. MRI stable, will pull EVD today    Medications:  Continuous Infusions:    Scheduled Meds:   ceFAZolin (ANCEF) IVPB  1 g Intravenous Q8H    famotidine  20 mg Oral Daily    ferrous sulfate  325 mg Oral Daily    heparin (porcine)  5,000 Units Subcutaneous Q8H    niMODipine  30 mg Oral Q2H    polyethylene glycol  17 g Oral Daily    prenatal vits96-iron fum-folic  1 tablet Oral Daily    senna-docusate 8.6-50 mg  1 tablet Oral BID    sodium chloride 0.9%  10 mL Intravenous Q6H    sodium chloride  2 g Oral TID     PRN Meds:sodium chloride, acetaminophen, calcium gluconate IVPB, calcium gluconate IVPB, calcium gluconate IVPB, labetalol, magnesium sulfate IVPB, magnesium sulfate IVPB, oxyCODONE, potassium chloride in water **AND** potassium chloride in water **AND** potassium chloride in water, Flushing PICC Protocol **AND** sodium chloride 0.9% **AND** sodium chloride 0.9%, sodium chloride 0.9%, sodium phosphate IVPB, sodium phosphate IVPB, sodium phosphate IVPB     Review of Systems    Objective:     Weight: 76.5 kg (168 lb 10.4 oz)  Body mass index is 24.91 kg/m².  Vital Signs (Most Recent):  Temp: 98.8 °F (37.1 °C) (08/27/18 0305)  Pulse: 62 (08/27/18 0605)  Resp: 20 (08/27/18 0605)  BP: (!) 165/83 (08/27/18 0605)  SpO2: 99 % (08/27/18 0605) Vital Signs (24h Range):  Temp:  [98.2 °F (36.8 °C)-99 °F (37.2 °C)] 98.8 °F (37.1 °C)  Pulse:  [52-82] 62  Resp:  [13-25] 20  SpO2:  [99 %-100  %] 99 %  BP: (137-182)/(62-90) 165/83                           ICP/Ventriculostomy 08/11/18 0000 Ventricular drainage catheter with ICP microsensor Right Other (Comment) (Active)   Level of Ventriculostomy (cm above) 10 8/24/2018 11:01 AM   Status Open to drainage 8/24/2018 11:01 AM   Site Assessment Clean;Dry 8/24/2018 11:01 AM   Site Drainage Clear 8/24/2018 11:01 AM   Waveform normal waveform;A-waves 8/24/2018 11:01 AM   Output (mL) 3 mL 8/24/2018 11:01 AM   CSF Color clear 8/24/2018 11:01 AM   Dressing Status Biopatch in place;Clean;Dry;Intact 8/24/2018 11:01 AM   Interventions HOB degrees;bed controls locked;level adjusted per order 8/24/2018 11:01 AM       Female External Urinary Catheter 08/20/18 0700 (Active)   Skin perineum cleansed w/ soap and water 8/24/2018 11:01 AM   Tolerance no signs/symptoms of discomfort 8/24/2018 11:01 AM   Suction Other 8/24/2018 11:01 AM   Date of last wick change 08/24/18 8/24/2018 11:01 AM   Time of last wick change 1101 8/24/2018 11:01 AM   Output (mL) 250 mL 8/24/2018  8:41 AM       Neurosurgery Physical Exam     AOX3  speech fluent  EVD in place   PERRL, EOMI, face symm, tongue midline  HOBSON symmetrically  No drift.     Significant Labs:  Recent Labs   Lab  08/26/18   0303  08/26/18   1150  08/26/18   1757  08/26/18 2022 08/27/18   0236   GLU  101   --    --    --   79   NA  147*  147*  134*  134*  136  134*  134*   K  3.1*  4.0   --    --   3.9   CL  120*   --    --    --   105   CO2  21*   --    --    --   23   BUN  7   --    --    --   10   CREATININE  0.5   --    --    --   0.6   CALCIUM  7.5*   --    --    --   8.5*   MG  1.7  2.0   --    --   1.7     Recent Labs   Lab  08/26/18   0303  08/27/18   0236   WBC  5.76  5.81   HGB  7.7*  8.7*   HCT  24.8*  26.8*   PLT  271  318     Recent Labs   Lab  08/26/18   0303  08/27/18   0236   INR  1.0  1.0     Microbiology Results (last 7 days)     Procedure Component Value Units Date/Time    CSF culture [[id number]] Collected:   08/18/18 1354    Order Status:  Completed Specimen:  CSF (Spinal Fluid) from CSF Shunt Updated:  08/23/18 0738     CSF CULTURE No Growth     Gram Stain Result Rare WBC's      No organisms seen        Recent Lab Results       08/27/18  0236 08/26/18 2022 08/26/18  1757 08/26/18  1150      Immature Granulocytes 0.7        Immature Grans (Abs) 0.04  Comment:  Mild elevation in immature granulocytes is non specific and   can be seen in a variety of conditions including stress response,   acute inflammation, trauma and pregnancy. Correlation with other   laboratory and clinical findings is essential.          Albumin 2.4        Alkaline Phosphatase 74        ALT 11        Anion Gap 6        AST 10        Baso # 0.01        Basophil% 0.2        Total Bilirubin 0.2  Comment:  For infants and newborns, interpretation of results should be based  on gestational age, weight and in agreement with clinical  observations.  Premature Infant recommended reference ranges:  Up to 24 hours.............<8.0 mg/dL  Up to 48 hours............<12.0 mg/dL  3-5 days..................<15.0 mg/dL  6-29 days.................<15.0 mg/dL          BUN, Bld 10        Calcium 8.5        Chloride 105        CO2 23        Creatinine 0.6        Differential Method Automated        eGFR if  >60.0        eGFR if non  >60.0  Comment:  Calculation used to obtain the estimated glomerular filtration  rate (eGFR) is the CKD-EPI equation.           Eos # 0.1        Eosinophil% 1.2        Glucose 79        Gran # (ANC) 3.9        Gran% 67.0        Hematocrit 26.8        Hemoglobin 8.7        Coumadin Monitoring INR 1.0  Comment:  Coumadin Therapy:  2.0 - 3.0 for INR for all indicators except mechanical heart valves  and antiphospholipid syndromes which should use 2.5 - 3.5.          Lymph # 1.3        Lymph% 21.9        Magnesium 1.7   2.0     MCH 29.2        MCHC 32.5        MCV 90        Mono # 0.5        Mono% 9.0        MPV  10.6        nRBC 0        Phosphorus 3.8  3.4      Platelets 318        Potassium 3.9   4.0     Total Protein 6.0        Protime 10.4        RBC 2.98        RDW 17.2        Sodium 134 136 134 134      134        WBC 5.81              Significant Diagnostics:  I have reviewed all pertinent imaging results/findings within the past 24 hours.    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 female 24 weeks pregnant with history of polycystic kidney disease with HH4F4 SAH on 8/10, now s/p acom coiling 8/10. PBD/PCD 16.     NEURO  -Continue NCC  -q 1 hr neuro checks  -EVD clamped, MRI stable 24 hours after clamp  -Will pull EVD today   -CSF 8/19, 8/26 sent  -nimotop  -daily TCDs   -TCDs continue to be elevated; exam stable  -off of Keppra      CV  -permissive HTN  <220 as aneurysm secured    RESP  -stable on room air  -Aggressive pulmonary management  -IS @ bedside    FEN/GI  -Na tabs  -strict I/Os  -goal net even or positive  -Reg diet    ID  -ancef for drain ppx    PPX  -SQH  -famotidine for PUD ppx  -TEDs/SCDs    -further mgmt per ncc and obgyn.            Nacho Sanders MD  Neurosurgery  [facility]

## 2018-08-27 NOTE — ASSESSMENT & PLAN NOTE
-Monitor daily TCD's  -Nimotop  -Continues with vasospasm; no clinical deterioration  -close monitoring

## 2018-08-27 NOTE — PLAN OF CARE
Problem: Patient Care Overview  Goal: Plan of Care Review  Outcome: Ongoing (interventions implemented as appropriate)  POC reviewed with pt at 0500. Pt verbalized understanding. Questions and concerns addressed. No acute events overnight. Pt progressing toward goals. Will continue to monitor. See flowsheets for full assessment and VS info

## 2018-08-27 NOTE — ASSESSMENT & PLAN NOTE
Risk factor for stroke  -Patient with continued vasospasm requiring increased SBP  -Close monitoring  - bp systolic 137-193/62-89

## 2018-08-27 NOTE — PLAN OF CARE
08/27/18 1017   Discharge Reassessment   Assessment Type Discharge Planning Reassessment   Provided patient/caregiver education on the expected discharge date and the discharge plan No   Do you have any problems affording any of your prescribed medications? No   Discharge Plan A Rehab   Discharge Plan B Home with family   Patient choice form signed by patient/caregiver N/A   Can the patient answer the patient profile reliably? Yes, cognitively intact   How does the patient rate their overall health at the present time? Fair   Describe the patient's ability to walk at the present time. Does not walk or unable to take any steps at all   How often would a person be available to care for the patient? Whenever needed   Number of comorbid conditions (as recorded on the chart) Two   During the past month, has the patient often been bothered by feeling down, depressed or hopeless? No   During the past month, has the patient often been bothered by little interest or pleasure in doing things? No       PT/OT ordered today per MD for re-evaluation  EVD to be pulled today per NS    Penelope Canseco RN, CCRN-K, [facility]  Neuro-Critical Care   [phone]

## 2018-08-27 NOTE — PROGRESS NOTES
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 C/o acute onset AMS that began prior to arrival in ED.  Patient's aunt reports calling the patient's phone at 11:30 am this morning and reports the patient's 6 year old son answering the phone stating that the patient was lying on the floor, not able to get up. EMS reports upon their arrival, the patient was found on the floor, faced down, with her head against a wall. EMS reports the patient to be hypertensive and incontinent.   Pt has been non-verbal since arrival. Per family patient began complaining of a headache yesterday. Last time normal per  was 0500 today while on his way to work. Patient's aunt reports the patient has not received any prenatal care for this pregnancy.  No other history could be obtained at this time.  History is limited secondary to acuity of the patient's condition.  So last seen normal by adult at 5am. eleanor reports pt has been taking some OTC meds for headache. Pt's  reports pt not taking any regular medications at home.     Hospital Course: 8/10: pt admitted to [facility] for SAH. CT, MRI, MRA ordered and results pending   8/11: post angio with coil acom, EVD at 10, wean prop to extubate, MRI once stable, Urine studies for hyponatremia, TCDs, check Mg q 4  8/12: Mg gtt stopped over night, fluid boluses, pain control  8/16: PBD 6: +620mL fluid overnight.  Elevated peak velocity on TCD, Angio in AM.   8/19: PBD 9: +547mL, Na 134, increase 2% to 45/hour, decrease NS to 110/hour. TCDs decreased yesterday from 8/17, monitor today. SBP < 180  8/20: PBD 10: +1.3L, transfused 1 unit pRBC last night, Na 135 put on 2% 100mL/hour this morning. Monitor  TCDs. Moderate neck pain; No BM for 4 days.  8/21: PBD 11: + 1.3L last 24 hours, Na 136 on 2% NS at 125mL/hour. TCDs from yesterday remain elevated, will follow exam. Phenylephrine gtt to maintain -220.   8/23: PBD 13, -1.1L last 24 hours, will give 1 unit of blood. TCDs remain elevated, will follow up with neurosurgery on plan to clamp EVD, currently open at 10.   8/26: Hgb slightly down overnight.  Headache decreased this morning, but increased in the afternoon. EVD clamped.    Interval History:naeon , possible removal of EVD today     Review of Systems Constitutional: Denies fevers or chills.  Pulmonary: Denies shortness of breath or cough.  Cardiology: Denies chest pain or palpitations.  GI: Denies abdominal pain or constipation.  Neurologic: Denies new weakness, headache, or paresthesias.    Vitals:   Temp: 98.7 °F (37.1 °C)  Pulse: 90  Rhythm: normal sinus rhythm  BP: (!) 153/74  MAP (mmHg): 105  ICP Mean (mmHg): 1 mmHg  Resp: 20  SpO2: 100 %  O2 Device (Oxygen Therapy): room air    Temp  Min: 98.2 °F (36.8 °C)  Max: 99 °F (37.2 °C)  Pulse  Min: 58  Max: 90  BP  Min: 137/62  Max: 193/89  MAP (mmHg)  Min: 89  Max: 128  ICP Mean (mmHg)  Min: 1 mmHg  Max: 12 mmHg  Resp  Min: 13  Max: 24  SpO2  Min: 99 %  Max: 100 %    08/26 0701 - 08/27 0700  In: 2827.9 [P.O.:2100; I.V.:477.9]  Out: 3054 [Urine:3050; Drains:4]   Unmeasured Output  Urine Occurrence: 1  Stool Occurrence: 1  Emesis Occurrence: 1  Pad Count: 2     Examination:   Constitutional: Well-nourished and -developed. No apparent distress.   Eyes: Conjunctiva clear, anicteric. Lids no lesions.  Head/Ears/Nose/Mouth/Throat/Neck: Moist mucous membranes. External ears, nose atraumatic. EVD in place  Cardiovascular: Regular rhythm. No murmurs. No leg edema.  Respiratory: Comfortable respirations. Clear to auscultation.  Gastrointestinal: No hernia. Soft, nondistended, nontender. + bowel sounds.    Neurologic:  -GCS [id number]  -Alert. Oriented to person, place,  and time. Speech fluent. Follows commands.  -Cranial nerves intact  -Motor 5/5 x all 4, no drift noted  -Sensation intact       Medications:   Continuous Scheduled  ceFAZolin (ANCEF) IVPB 1 g Q8H   famotidine 20 mg Daily   heparin (porcine) 5,000 Units Q8H   niMODipine 30 mg Q2H   polyethylene glycol 17 g Daily   prenatal vits96-iron fum-folic 1 tablet Daily   senna-docusate 8.6-50 mg 1 tablet BID   sodium chloride 0.9% 10 mL Q6H   sodium chloride 2 g TID   PRN  sodium chloride  Q24H PRN   acetaminophen 650 mg Q4H PRN   labetalol 10 mg Q4H PRN   magnesium oxide 800 mg PRN   magnesium oxide 800 mg PRN   oxyCODONE 10 mg Q4H PRN   potassium chloride 10% 40 mEq PRN   potassium chloride 10% 40 mEq PRN   potassium chloride 10% 60 mEq PRN   potassium, sodium phosphates 2 packet PRN   potassium, sodium phosphates 2 packet PRN   potassium, sodium phosphates 2 packet PRN   sodium chloride 0.9% 10 mL PRN   sodium chloride 0.9% 5 mL PRN      Today I independently reviewed pertinent medications, lines/drains/airways, imaging, lab results, notably:   MRI brain  ISTAT: No results for input(s): PH, PCO2, PO2, POCSATURATED, HCO3, BE, POCNA, POCK, POCTCO2, POCGLU, POCICA, POCLAC, SAMPLE in the last 24 hours.   Chem: Recent Labs   Lab  08/27/18   0236  08/27/18   1243   NA  134*  134*  136   K  3.9   --    CL  105   --    CO2  23   --    GLU  79   --    BUN  10   --    CREATININE  0.6   --    ESTGFRAFRICA  >60.0   --    EGFRNONAA  >60.0   --    CALCIUM  8.5*   --    MG  1.7   --    PHOS  3.8   --    ANIONGAP  6*   --    PROT  6.0   --    ALBUMIN  2.4*   --    BILITOT  0.2   --    ALKPHOS  74   --    AST  10   --    ALT  11   --      Heme: Recent Labs   Lab  08/27/18   0236   WBC  5.81   HGB  8.7*   HCT  26.8*   PLT  318   INR  1.0     Endo: No results for input(s): POCTGLUCOSE in the last 24 hours.   Assessment/Plan:     Neuro   * Subarachnoid hemorrhage from anterior communicating artery aneurysm    HH4F4 SAH.  S/p coiling.  -TCDs,  normonatremia, permissive HTN  -Analgesia PRN  -Maintain Euvolemia  -EVD clamped.  F/u MRI 8/27 with stable blood products and ventricular system, possible new R BG infarct, however asymptomatic          Cerebral artery vasospasm    2/2 SAH  -nimodipine  -TCDs, eunatremia, permissive HTN        Brain edema    2/2 SAH  -EVD         Brain compression    2/2 SAH  -EVD            Renal/   Hyponatremia    --salt tabs  --monitor sodiums and UOP        JOSE C (acute kidney injury)-resolved as of 8/26/2018    Resolved.            Prophylaxis:  Venous Thromboembolism: mechanical chemical  Stress Ulcer: H2B  Ventilator Pneumonia: not applicable     Activity Orders          None        Full Code    Michelle Mendez PA-C  Neurocritical Care  [facility]

## 2018-08-27 NOTE — SUBJECTIVE & OBJECTIVE
Neurologic Chief Complaint: SAH    Subjective:     Interval History: Patient is seen for follow-up neurological assessment and treatment recommendations:  MRI showing area of restricted diffusion in R Basal ganglia, when considering the neuro exam on Ms [place], suspect this is related to artifact from cathether     Complains of headache, no new neuro deficits  Given oxy q 4 and tylenol q 4 and one dose morphine overnight  neurosurg with plans to remove evd today     HPI, Past Medical, Family, and Social History remains the same as documented in the initial encounter. Just c/o headache     Review of Systems   Constitutional: Negative for fever.   HENT: Negative for trouble swallowing.    Eyes: Positive for photophobia.   Neurological: Positive for headaches. Negative for speech difficulty and weakness.     Scheduled Meds:   ceFAZolin (ANCEF) IVPB  1 g Intravenous Q8H    famotidine  20 mg Oral Daily    heparin (porcine)  5,000 Units Subcutaneous Q8H    lidocaine HCL 10 mg/ml (1%)  1 mL Other Once    niMODipine  30 mg Oral Q2H    polyethylene glycol  17 g Oral Daily    prenatal vits96-iron fum-folic  1 tablet Oral Daily    senna-docusate 8.6-50 mg  1 tablet Oral BID    sodium chloride 0.9%  10 mL Intravenous Q6H    sodium chloride  2 g Oral TID     Continuous Infusions:    PRN Meds:sodium chloride, acetaminophen, labetalol, magnesium oxide, magnesium oxide, oxyCODONE, potassium chloride 10%, potassium chloride 10%, potassium chloride 10%, potassium, sodium phosphates, potassium, sodium phosphates, potassium, sodium phosphates, Flushing PICC Protocol **AND** sodium chloride 0.9% **AND** sodium chloride 0.9%, sodium chloride 0.9%    Objective:     Vital Signs (Most Recent):  Temp: 98.7 °F (37.1 °C) (08/27/18 1502)  Pulse: 90 (08/27/18 1602)  Resp: 20 (08/27/18 1602)  BP: (!) 153/74 (08/27/18 1602)  SpO2: 100 % (08/27/18 1602)  BP Location: Left leg    Vital Signs Range (Last 24H):  Temp:  [98.2 °F (36.8 °C)-99 °F  (37.2 °C)]   Pulse:  [58-90]   Resp:  [13-24]   BP: (137-193)/(62-89)   SpO2:  [99 %-100 %]   BP Location: Left leg    Physical Exam   Constitutional: She appears well-developed and well-nourished.   Pregnant   Pulmonary/Chest: No respiratory distress.   Skin: Skin is warm and dry.   Psychiatric: She has a normal mood and affect. Her behavior is normal.   Nursing note and vitals reviewed.  EVD present     Neurological Exam:   LOC: alert  Attention Span: Good   Language: No aphasia  Articulation: No dysarthria  Orientation: Person, Place, Time   Visual Fields: Full  EOM (CN III, IV, VI): Full/intact  Pupils (CN II, III): PERRL  Facial Movement (CN VII): Symmetric facial expression    Motor: Arm left  Normal 5/5  Leg left  Normal 5/5  Arm right  Normal 5/5  Leg right Normal 5/5  Sensation: Intact to light touch, temperature and vibration  Tone: Normal tone throughout    Laboratory:  CMP:   Recent Labs   Lab  08/27/18 0236 08/27/18   1243   CALCIUM  8.5*   --    ALBUMIN  2.4*   --    PROT  6.0   --    NA  134*  134*  136   K  3.9   --    CO2  23   --    CL  105   --    BUN  10   --    CREATININE  0.6   --    ALKPHOS  74   --    ALT  11   --    AST  10   --    BILITOT  0.2   --      CBC:   Recent Labs   Lab  08/27/18 0236   WBC  5.81   RBC  2.98*   HGB  8.7*   HCT  26.8*   PLT  318   MCV  90   MCH  29.2   MCHC  32.5     Lipid Panel: No results for input(s): CHOL, LDLCALC, HDL, TRIG in the last 168 hours.  Coagulation:   Recent Labs   Lab  08/27/18 0236   INR  1.0     Platelet Aggregation Study: No results for input(s): PLTAGG, PLTAGINTERP, PLTAGREGLACO, ADPPLTAGGREG in the last 168 hours.  Hgb A1C: No results for input(s): HGBA1C in the last 168 hours.  TSH: No results for input(s): TSH in the last 168 hours.    Diagnostic Results     Brain Imaging   CT Head w/o contrast 8-10-18 results:    1. Extensive subarachnoid hemorrhage, most likely from an aneurysm rupture.  It is difficult to determine the site of the  aneurysm rupture on this study due to the diffuse nature of the subarachnoid hemorrhage throughout the posterior fossa as well as the basilar cisternal spaces.  2. Slight increase in the ventricular size when compared to the previous study suggestive of a developing hydrocephalus.    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  Successful coil embolization of this aneurysm.     Cardiac Imaging   2D Echo 8-11-18 results:    1 - Eccentric hypertrophy.     2 - Normal left ventricular systolic function (EF 60-65%).     3 - No wall motion abnormalities.     4 - Normal left ventricular diastolic function.     5 - Normal right ventricular systolic function .     6 - The estimated PA systolic pressure is 31 mmHg.     7 - Trivial pericardial effusion.     MRI 8/27/18 -   Interval operative change with right frontal ventricular catheter placement.  There is slight reduced distension of the lateral and 3rd ventricles compared to prior which remain mildly prominent.    Previous intraventricular and subarachnoid hemorrhage overall less apparent possibly related to differences in technique.  There is no definite significant new hemorrhage.    There is a punctate focus of diffusion restriction in the right basal ganglia which may be artifactual from adjacent coursing ventricular catheter however small area of recent infarction not excluded.  Clinical correlation and follow-up advised.

## 2018-08-27 NOTE — ASSESSMENT & PLAN NOTE
32 y.o. female 24 weeks pregnant with history of polycystic kidney disease with HH4F4 SAH on 8/10, now s/p acom coiling 8/10. PBD/PCD 16.     NEURO  -Continue NCC  -q 1 hr neuro checks  -EVD clamped, MRI stable 24 hours after clamp  -Will pull EVD today   -CSF 8/19, 8/26 sent  -nimotop  -daily TCDs   -TCDs continue to be elevated; exam stable  -off of Keppra      CV  -permissive HTN  <220 as aneurysm secured    RESP  -stable on room air  -Aggressive pulmonary management  -IS @ bedside    FEN/GI  -Na tabs  -strict I/Os  -goal net even or positive  -Reg diet    ID  -ancef for drain ppx    PPX  -SQH  -famotidine for PUD ppx  -TEDs/SCDs    -further mgmt per ncc and obgyn.

## 2018-08-27 NOTE — ADDENDUM NOTE
Addendum  created 08/27/18 1228 by Juliocesar Hernandez MD    Intraprocedure Blocks edited, Sign clinical note

## 2018-08-27 NOTE — ASSESSMENT & PLAN NOTE
Patient is a 32 year old female with medical history of HTN, Renal disorder, polycystic kidney disease who was transferred from [facility] with diffuse SAH.  Imaging identified an anterior communicating aneurysm which was successfully coiled 8/10.  On 8/16, patient developed vasospasm and was taken to IR and treated with verapamil.  She continues with mild and moderate vasospasm, but has no clinical deficits.  She does report significant headache and stiff neck.  She is receiving nimodipine and daily TCDs with close monitoring.  TCDs elevated but overall stable     Antithrombotics for secondary stroke prevention: none SAH     Statins for secondary stroke prevention and hyperlipidemia, if present: SAH     Aggressive risk factor modification: polycytsic kidney disease, HTN      Rehab efforts: Occupational Therapy, PT/OT/SLP to evaluate and treat when appropriate     Diagnostics ordered/pending: Daily TCDs    VTE prophylaxis: SCDs    BP parameters: SAH: Secured aneurysm, no target, increase BP to prevent vasospasm if needed

## 2018-08-28 LAB
ALBUMIN SERPL BCP-MCNC: 2.5 G/DL
ALP SERPL-CCNC: 74 U/L
ALT SERPL W/O P-5'-P-CCNC: 12 U/L
ANION GAP SERPL CALC-SCNC: 5 MMOL/L
AST SERPL-CCNC: 12 U/L
BASOPHILS # BLD AUTO: 0.01 K/UL
BASOPHILS NFR BLD: 0.1 %
BILIRUB SERPL-MCNC: 0.2 MG/DL
BUN SERPL-MCNC: 13 MG/DL
CALCIUM SERPL-MCNC: 8.8 MG/DL
CHLORIDE SERPL-SCNC: 104 MMOL/L
CO2 SERPL-SCNC: 25 MMOL/L
CREAT SERPL-MCNC: 0.6 MG/DL
DIFFERENTIAL METHOD: ABNORMAL
EOSINOPHIL # BLD AUTO: 0 K/UL
EOSINOPHIL NFR BLD: 0.6 %
ERYTHROCYTE [DISTWIDTH] IN BLOOD BY AUTOMATED COUNT: 17.2 %
EST. GFR  (AFRICAN AMERICAN): >60 ML/MIN/1.73 M^2
EST. GFR  (NON AFRICAN AMERICAN): >60 ML/MIN/1.73 M^2
GLUCOSE SERPL-MCNC: 97 MG/DL
HCT VFR BLD AUTO: 27.3 %
HGB BLD-MCNC: 8.8 G/DL
IMM GRANULOCYTES # BLD AUTO: 0.05 K/UL
IMM GRANULOCYTES NFR BLD AUTO: 0.7 %
INR PPP: 1.1
LYMPHOCYTES # BLD AUTO: 1.3 K/UL
LYMPHOCYTES NFR BLD: 18 %
MAGNESIUM SERPL-MCNC: 1.7 MG/DL
MCH RBC QN AUTO: 28.9 PG
MCHC RBC AUTO-ENTMCNC: 32.2 G/DL
MCV RBC AUTO: 90 FL
MONOCYTES # BLD AUTO: 0.6 K/UL
MONOCYTES NFR BLD: 8 %
NEUTROPHILS # BLD AUTO: 5.1 K/UL
NEUTROPHILS NFR BLD: 72.6 %
NRBC BLD-RTO: 0 /100 WBC
PHOSPHATE SERPL-MCNC: 3.3 MG/DL
PLATELET # BLD AUTO: 310 K/UL
PMV BLD AUTO: 10.2 FL
POTASSIUM SERPL-SCNC: 3.6 MMOL/L
POTASSIUM SERPL-SCNC: 3.7 MMOL/L
PROT SERPL-MCNC: 6.2 G/DL
PROTHROMBIN TIME: 10.9 SEC
RBC # BLD AUTO: 3.04 M/UL
SODIUM SERPL-SCNC: 134 MMOL/L
SODIUM SERPL-SCNC: 135 MMOL/L
WBC # BLD AUTO: 6.99 K/UL

## 2018-08-28 PROCEDURE — 25000003 PHARM REV CODE 250: Performed by: PSYCHIATRY & NEUROLOGY

## 2018-08-28 PROCEDURE — 25000003 PHARM REV CODE 250: Performed by: STUDENT IN AN ORGANIZED HEALTH CARE EDUCATION/TRAINING PROGRAM

## 2018-08-28 PROCEDURE — 97110 THERAPEUTIC EXERCISES: CPT

## 2018-08-28 PROCEDURE — 25000003 PHARM REV CODE 250: Performed by: PHYSICIAN ASSISTANT

## 2018-08-28 PROCEDURE — 99232 SBSQ HOSP IP/OBS MODERATE 35: CPT | Mod: ,,, | Performed by: NEUROLOGICAL SURGERY

## 2018-08-28 PROCEDURE — A4216 STERILE WATER/SALINE, 10 ML: HCPCS | Performed by: PSYCHIATRY & NEUROLOGY

## 2018-08-28 PROCEDURE — 84100 ASSAY OF PHOSPHORUS: CPT

## 2018-08-28 PROCEDURE — 85025 COMPLETE CBC W/AUTO DIFF WBC: CPT

## 2018-08-28 PROCEDURE — 99232 SBSQ HOSP IP/OBS MODERATE 35: CPT | Mod: ,,, | Performed by: PHYSICIAN ASSISTANT

## 2018-08-28 PROCEDURE — 97164 PT RE-EVAL EST PLAN CARE: CPT

## 2018-08-28 PROCEDURE — 84295 ASSAY OF SERUM SODIUM: CPT | Mod: 91

## 2018-08-28 PROCEDURE — 63600175 PHARM REV CODE 636 W HCPCS: Performed by: STUDENT IN AN ORGANIZED HEALTH CARE EDUCATION/TRAINING PROGRAM

## 2018-08-28 PROCEDURE — 63600175 PHARM REV CODE 636 W HCPCS: Performed by: PHYSICIAN ASSISTANT

## 2018-08-28 PROCEDURE — 85610 PROTHROMBIN TIME: CPT

## 2018-08-28 PROCEDURE — 94761 N-INVAS EAR/PLS OXIMETRY MLT: CPT

## 2018-08-28 PROCEDURE — 83735 ASSAY OF MAGNESIUM: CPT

## 2018-08-28 PROCEDURE — 84132 ASSAY OF SERUM POTASSIUM: CPT

## 2018-08-28 PROCEDURE — 20000000 HC ICU ROOM

## 2018-08-28 PROCEDURE — 97168 OT RE-EVAL EST PLAN CARE: CPT

## 2018-08-28 PROCEDURE — 80053 COMPREHEN METABOLIC PANEL: CPT

## 2018-08-28 PROCEDURE — 25000003 PHARM REV CODE 250: Performed by: NURSE PRACTITIONER

## 2018-08-28 RX ADMIN — NIMODIPINE 30 MG: 30 CAPSULE, LIQUID FILLED ORAL at 04:08

## 2018-08-28 RX ADMIN — Medication 10 ML: at 05:08

## 2018-08-28 RX ADMIN — OXYCODONE HYDROCHLORIDE 10 MG: 5 TABLET ORAL at 07:08

## 2018-08-28 RX ADMIN — SODIUM CHLORIDE TAB 1 GM 2 G: 1 TAB at 02:08

## 2018-08-28 RX ADMIN — HEPARIN SODIUM 5000 UNITS: 5000 INJECTION, SOLUTION INTRAVENOUS; SUBCUTANEOUS at 02:08

## 2018-08-28 RX ADMIN — ACETAMINOPHEN 650 MG: 325 TABLET, FILM COATED ORAL at 10:08

## 2018-08-28 RX ADMIN — ACETAMINOPHEN 650 MG: 325 TABLET, FILM COATED ORAL at 05:08

## 2018-08-28 RX ADMIN — NIMODIPINE 30 MG: 30 CAPSULE, LIQUID FILLED ORAL at 12:08

## 2018-08-28 RX ADMIN — POTASSIUM CHLORIDE 40 MEQ: 20 SOLUTION ORAL at 04:08

## 2018-08-28 RX ADMIN — OXYCODONE HYDROCHLORIDE 10 MG: 5 TABLET ORAL at 06:08

## 2018-08-28 RX ADMIN — NIMODIPINE 30 MG: 30 CAPSULE, LIQUID FILLED ORAL at 02:08

## 2018-08-28 RX ADMIN — NIMODIPINE 30 MG: 30 CAPSULE, LIQUID FILLED ORAL at 10:08

## 2018-08-28 RX ADMIN — OXYCODONE HYDROCHLORIDE 10 MG: 5 TABLET ORAL at 02:08

## 2018-08-28 RX ADMIN — NIMODIPINE 30 MG: 30 CAPSULE, LIQUID FILLED ORAL at 06:08

## 2018-08-28 RX ADMIN — NIMODIPINE 30 MG: 30 CAPSULE, LIQUID FILLED ORAL at 08:08

## 2018-08-28 RX ADMIN — HEPARIN SODIUM 5000 UNITS: 5000 INJECTION, SOLUTION INTRAVENOUS; SUBCUTANEOUS at 05:08

## 2018-08-28 RX ADMIN — Medication 800 MG: at 04:08

## 2018-08-28 RX ADMIN — NIMODIPINE 30 MG: 30 CAPSULE, LIQUID FILLED ORAL at 07:08

## 2018-08-28 RX ADMIN — ACETAMINOPHEN 650 MG: 325 TABLET, FILM COATED ORAL at 08:08

## 2018-08-28 RX ADMIN — Medication 800 MG: at 08:08

## 2018-08-28 RX ADMIN — CEFAZOLIN 1 G: 1 INJECTION, POWDER, FOR SOLUTION INTRAMUSCULAR; INTRAVENOUS at 05:08

## 2018-08-28 RX ADMIN — HEPARIN SODIUM 5000 UNITS: 5000 INJECTION, SOLUTION INTRAVENOUS; SUBCUTANEOUS at 10:08

## 2018-08-28 RX ADMIN — SENNOSIDES AND DOCUSATE SODIUM 1 TABLET: 8.6; 5 TABLET ORAL at 08:08

## 2018-08-28 RX ADMIN — PRENATAL VIT W/ FE FUMARATE-FA TAB 27-0.8 MG 1 TABLET: 27-0.8 TAB at 08:08

## 2018-08-28 RX ADMIN — OXYCODONE HYDROCHLORIDE 10 MG: 5 TABLET ORAL at 01:08

## 2018-08-28 RX ADMIN — SODIUM CHLORIDE TAB 1 GM 2 G: 1 TAB at 09:08

## 2018-08-28 RX ADMIN — Medication 10 ML: at 11:08

## 2018-08-28 RX ADMIN — FAMOTIDINE 20 MG: 20 TABLET ORAL at 08:08

## 2018-08-28 RX ADMIN — SODIUM CHLORIDE TAB 1 GM 2 G: 1 TAB at 08:08

## 2018-08-28 NOTE — PROGRESS NOTES
" [facility]  Adult Nutrition  Progress Note    SUMMARY       Recommendations    Recommendation/Intervention:   Continue Regular diet and Boost TID.   Pt once again encouraged to consume >75% of meals and Boost as pt requires additional 340kcal/day given she is in her 2nd trimester pregnancy.     RD to monitor.    Goals: Pt to receive nutrition by RD follow up  Nutrition Goal Status: goal met  Communication of RD Recs: reviewed with RN    Reason for Assessment    Reason for Assessment: RD follow-up  Diagnosis: hemorrhage  Relevant Medical History: HTN, PKD  Interdisciplinary Rounds: attended  General Information Comments: Pt continues to have sub-optimal intake for 2nd trimester pregnancy. Pt eating 25-50% of meals, drinking Boost occassionally. Like vanilla, however chocolate sent on tray. Pt once again encouraged to eat enough to provide additional calories for pregnancy. Pt c/o headache hindering optimal intake.  Nutrition Discharge Planning: adequate po intake for optimal nutrition    Nutrition Risk Screen    Nutrition Risk Screen: other (see comments)(faith)    Nutrition/Diet History    Do you have any cultural, spiritual, Christianity conflicts, given your current situation?: None  Factors Affecting Nutritional Intake: pain    Anthropometrics    Temp: 98.6 °F (37 °C)  Height Method: Estimated  Height: 5' 9" (175.3 cm)  Height (inches): 69 in  Weight Method: Bed Scale  Weight: 76.5 kg (168 lb 10.4 oz)  Weight (lb): 168.65 lb  Ideal Body Weight (IBW), Female: 145 lb  % Ideal Body Weight, Female (lb): 103.45 lb  BMI (Calculated): 22.2  BMI Grade: 18.5-24.9 - normal       Lab/Procedures/Meds    Pertinent Labs Reviewed: reviewed  Pertinent Labs Comments: noted  Pertinent Medications Reviewed: reviewed  Pertinent Medications Comments: prenatal vitamin    Physical Findings/Assessment    Overall Physical Appearance: lethargic, nourished  Skin: abrasion    Estimated/Assessed Needs    Weight Used For " Calorie Calculations: 68 kg (149 lb 14.6 oz)  Energy Calorie Requirements (kcal): 2163  Energy Need Method: Woodson-St Jeor(PAL 1.25 + 340calories (2nd trimester))  Protein Requirements: 82-95g(1.2-1.4g/kg)  Weight Used For Protein Calculations: 68 kg (149 lb 14.6 oz)  Fluid Requirements (mL): 1mL/kcal or per MD     RDA Method (mL): 2163         Nutrition Prescription Ordered    Current Diet Order: Regular  Oral Nutrition Supplement: Boost TID    Evaluation of Received Nutrient/Fluid Intake    IV Fluid (mL): 0  % Intake of Estimated Energy Needs: 25 - 50 %  % Meal Intake: 25 - 50 %    Nutrition Risk    Level of Risk/Frequency of Follow-up: (f/u 1x/week)     Assessment and Plan    Nutrition Problem  Inadequate energy intake     Related to (etiology):   NPO     Signs and Symptoms (as evidenced by):   Pt receiving <85% EEN and EPN.      Nutrition Diagnosis Status:   Resolved           Monitor and Evaluation    Food and Nutrient Intake: energy intake, food and beverage intake  Food and Nutrient Adminstration: diet order  Anthropometric Measurements: weight, weight change, body mass index  Biochemical Data, Medical Tests and Procedures: electrolyte and renal panel, gastrointestinal profile, glucose/endocrine profile, inflammatory profile, lipid profile  Nutrition-Focused Physical Findings: overall appearance     Nutrition Follow-Up    RD Follow-up?: Yes

## 2018-08-28 NOTE — SUBJECTIVE & OBJECTIVE
Neurologic Chief Complaint: SAH    Subjective:     Interval History: Patient is seen for follow-up neurological assessment and treatment recommendations:  Doing well.  HA and stiff neck improving.  Able to move around in bed more comfortably.  Plan for stepdown.  EVD removed 8/27.         HPI, Past Medical, Family, and Social History remains the same as documented in the initial encounter. Just c/o headache     Review of Systems   Constitutional: Negative for fever.   HENT: Negative for trouble swallowing.    Eyes: Positive for photophobia.   Neurological: Positive for headaches. Negative for speech difficulty and weakness.     Scheduled Meds:   famotidine  20 mg Oral Daily    heparin (porcine)  5,000 Units Subcutaneous Q8H    niMODipine  30 mg Oral Q2H    polyethylene glycol  17 g Oral Daily    prenatal vits96-iron fum-folic  1 tablet Oral Daily    senna-docusate 8.6-50 mg  1 tablet Oral BID    sodium chloride 0.9%  10 mL Intravenous Q6H    sodium chloride  2 g Oral TID     Continuous Infusions:    PRN Meds:sodium chloride, acetaminophen, labetalol, magnesium oxide, magnesium oxide, oxyCODONE, potassium chloride 10%, potassium chloride 10%, potassium chloride 10%, potassium, sodium phosphates, potassium, sodium phosphates, potassium, sodium phosphates, Flushing PICC Protocol **AND** sodium chloride 0.9% **AND** sodium chloride 0.9%, sodium chloride 0.9%    Objective:     Vital Signs (Most Recent):  Temp: 98.6 °F (37 °C) (08/28/18 0701)  Pulse: (!) 58 (08/28/18 1000)  Resp: 17 (08/28/18 1000)  BP: (!) 145/75 (08/28/18 1000)  SpO2: 100 % (08/28/18 1000)  BP Location: Left leg    Vital Signs Range (Last 24H):  Temp:  [98.4 °F (36.9 °C)-98.8 °F (37.1 °C)]   Pulse:  [57-90]   Resp:  [14-24]   BP: (122-193)/(62-99)   SpO2:  [98 %-100 %]   BP Location: Left leg    Physical Exam   Constitutional: She appears well-developed and well-nourished.   Pregnant   Pulmonary/Chest: No respiratory distress.   Skin: Skin is  warm and dry.   Psychiatric: She has a normal mood and affect. Her behavior is normal.   Nursing note and vitals reviewed.  EVD present     Neurological Exam:   LOC: alert  Attention Span: Good   Language: No aphasia  Articulation: No dysarthria  Orientation: Person, Place, Time   Visual Fields: Full  EOM (CN III, IV, VI): Full/intact  Pupils (CN II, III): PERRL  Facial Movement (CN VII): Symmetric facial expression    Motor: Arm left  Normal 5/5  Leg left  Normal 5/5  Arm right  Normal 5/5  Leg right Normal 5/5  Sensation: Intact to light touch, temperature and vibration  Tone: Normal tone throughout    Laboratory:  CMP:   Recent Labs   Lab  08/28/18   0208   CALCIUM  8.8   ALBUMIN  2.5*   PROT  6.2   NA  134*  134*   K  3.6   CO2  25   CL  104   BUN  13   CREATININE  0.6   ALKPHOS  74   ALT  12   AST  12   BILITOT  0.2     CBC:   Recent Labs   Lab  08/28/18   0208   WBC  6.99   RBC  3.04*   HGB  8.8*   HCT  27.3*   PLT  310   MCV  90   MCH  28.9   MCHC  32.2     Lipid Panel: No results for input(s): CHOL, LDLCALC, HDL, TRIG in the last 168 hours.  Coagulation:   Recent Labs   Lab  08/28/18   0208   INR  1.1     Platelet Aggregation Study: No results for input(s): PLTAGG, PLTAGINTERP, PLTAGREGLACO, ADPPLTAGGREG in the last 168 hours.  Hgb A1C: No results for input(s): HGBA1C in the last 168 hours.  TSH: No results for input(s): TSH in the last 168 hours.    Diagnostic Results     Brain Imaging   CT Head w/o contrast 8-10-18 results:    1. Extensive subarachnoid hemorrhage, most likely from an aneurysm rupture.  It is difficult to determine the site of the aneurysm rupture on this study due to the diffuse nature of the subarachnoid hemorrhage throughout the posterior fossa as well as the basilar cisternal spaces.  2. Slight increase in the ventricular size when compared to the previous study suggestive of a developing hydrocephalus.    Vessel Imaging   Cerebral angiogram 8-16-18 results:  IV Verapamil instilled due  to vasospasm    Cerebral angiogram 8-10-18 results:  2.3 x 2.7 x 2.0 mm anterior communicating artery aneurysm with Villafuerte's excrescence.  Successful coil embolization of this aneurysm.     Cardiac Imaging   2D Echo 8-11-18 results:    1 - Eccentric hypertrophy.     2 - Normal left ventricular systolic function (EF 60-65%).     3 - No wall motion abnormalities.     4 - Normal left ventricular diastolic function.     5 - Normal right ventricular systolic function .     6 - The estimated PA systolic pressure is 31 mmHg.     7 - Trivial pericardial effusion.     MRI 8/27/18 -   Interval operative change with right frontal ventricular catheter placement.  There is slight reduced distension of the lateral and 3rd ventricles compared to prior which remain mildly prominent.    Previous intraventricular and subarachnoid hemorrhage overall less apparent possibly related to differences in technique.  There is no definite significant new hemorrhage.    There is a punctate focus of diffusion restriction in the right basal ganglia which may be artifactual from adjacent coursing ventricular catheter however small area of recent infarction not excluded.  Clinical correlation and follow-up advised.

## 2018-08-28 NOTE — PROGRESS NOTES
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  Imaging identified an anterior communicating aneurysm which was successfully coiled 8/10.  On 8/16, patient developed vasospasm and was taken to IR and treated with verapamil.  She continues with mild and moderate vasospasm, but has no clinical deficits.  Reported HA and stiff neck improving.  She is receiving nimodipine and daily TCDs with close monitoring.  TCDs elevated but she remains asymptomatic.  EVD removed 8/27.    Antithrombotics for secondary stroke prevention: none SAH     Statins for secondary stroke prevention and hyperlipidemia, if present: SAH     Aggressive risk factor modification: polycytsic kidney disease, HTN      Rehab efforts: Occupational Therapy, PT/OT/SLP to evaluate and treat when appropriate     Diagnostics ordered/pending: Daily TCDs    VTE prophylaxis: SCDs    BP parameters: SAH: Secured aneurysm, no target, increase BP to prevent vasospasm if needed             Cerebral artery vasospasm    -Monitor daily TCD's  -Nimotop  -close monitoring          25 weeks gestation of pregnancy    Per [facility] recommendations        Hypertension affecting pregnancy in second trimester    Risk factor for stroke  -Close monitoring          PKD (polycystic kidney disease)    Risk factor for aneurysm              33 y/o female found down @~24w5d admitted for management of subarachnoid hemmorhage likely due to ruptured aneurysm  Had coiling done ACOM 8-10-18  8-16-18 to IR for cerebral angiogram for vasospasm and had IV Verapamil instilled   8-19-18 with headache TCD's better yeaterday  8/20:  Continues with HA worse with positional movement.  No neurological  deficits.  TCDs for today pending.  Yesterday demonstrated multiple areas of mild and moderate vasospasm with no clinical deterioration.  NIH remains 0.    8/21: Continues with headache and stiff neck.  No new neuro symptoms.  TCDs ratios improved some, but continues with vasospasm with no clinical deterioration.  On radha drip to keep -220.  8/27/18 - MRI showing area of restricted diffusion in R Basal ganglia, when considering the neuro exam on Ms Verdi, suspect this is related to artifact from cathether   8/28:  Doing well.  HA and stiff neck improving.  Able to move around in bed more comfortably.  EVD removed 8/27.  Plan for stepdown.     STROKE DOCUMENTATION        NIH Scale:  1a. Level Of Consciousness: 0-->Alert: keenly responsive  1b. LOC Questions: 0-->Answers both questions correctly  1c. LOC Commands: 0-->Performs both tasks correctly  2. Best Gaze: 0-->Normal  3. Visual: 0-->No visual loss  4. Facial Palsy: 0-->Normal symmetrical movements  5a. Motor Arm, Left: 0-->No drift: limb holds 90 (or 45) degrees for full 10 secs  5b. Motor Arm, Right: 0-->No drift: limb holds 90 (or 45) degrees for full 10 secs  6a. Motor Leg, Left: 0-->No drift: leg holds 30 degree position for full 5 secs  6b. Motor Leg, Right: 0-->No drift: leg holds 30 degree position for full 5 secs  7. Limb Ataxia: 0-->Absent  8. Sensory: 0-->Normal: no sensory loss  9. Best Language: 0-->No aphasia: normal  10. Dysarthria: 0-->Normal  11. Extinction and Inattention (formerly Neglect): 0-->No abnormality  Total (NIH Stroke Scale): 0       Modified [place]    [place] Coma Scale:    ABCD2 Score:    [id number] Score:   HAS -BLED Score:   ICH Score:   Hunt & Collins Classification:      Hemorrhagic change of an Ischemic Stroke: Does this patient have an ischemic stroke with hemorrhagic changes? No     Neurologic Chief Complaint: SAH    Subjective:     Interval History: Patient is seen for follow-up neurological assessment and treatment  recommendations:  Doing well.  HA and stiff neck improving.  Able to move around in bed more comfortably.  Plan for stepdown.  EVD removed 8/27.         HPI, Past Medical, Family, and Social History remains the same as documented in the initial encounter. Just c/o headache     Review of Systems   Constitutional: Negative for fever.   HENT: Negative for trouble swallowing.    Eyes: Positive for photophobia.   Neurological: Positive for headaches. Negative for speech difficulty and weakness.     Scheduled Meds:   famotidine  20 mg Oral Daily    heparin (porcine)  5,000 Units Subcutaneous Q8H    niMODipine  30 mg Oral Q2H    polyethylene glycol  17 g Oral Daily    prenatal vits96-iron fum-folic  1 tablet Oral Daily    senna-docusate 8.6-50 mg  1 tablet Oral BID    sodium chloride 0.9%  10 mL Intravenous Q6H    sodium chloride  2 g Oral TID     Continuous Infusions:    PRN Meds:sodium chloride, acetaminophen, labetalol, magnesium oxide, magnesium oxide, oxyCODONE, potassium chloride 10%, potassium chloride 10%, potassium chloride 10%, potassium, sodium phosphates, potassium, sodium phosphates, potassium, sodium phosphates, Flushing PICC Protocol **AND** sodium chloride 0.9% **AND** sodium chloride 0.9%, sodium chloride 0.9%    Objective:     Vital Signs (Most Recent):  Temp: 98.6 °F (37 °C) (08/28/18 0701)  Pulse: (!) 58 (08/28/18 1000)  Resp: 17 (08/28/18 1000)  BP: (!) 145/75 (08/28/18 1000)  SpO2: 100 % (08/28/18 1000)  BP Location: Left leg    Vital Signs Range (Last 24H):  Temp:  [98.4 °F (36.9 °C)-98.8 °F (37.1 °C)]   Pulse:  [57-90]   Resp:  [14-24]   BP: (122-193)/(62-99)   SpO2:  [98 %-100 %]   BP Location: Left leg    Physical Exam   Constitutional: She appears well-developed and well-nourished.   Pregnant   Pulmonary/Chest: No respiratory distress.   Skin: Skin is warm and dry.   Psychiatric: She has a normal mood and affect. Her behavior is normal.   Nursing note and vitals reviewed.  EVD present      Neurological Exam:   LOC: alert  Attention Span: Good   Language: No aphasia  Articulation: No dysarthria  Orientation: Person, Place, Time   Visual Fields: Full  EOM (CN III, IV, VI): Full/intact  Pupils (CN II, III): PERRL  Facial Movement (CN VII): Symmetric facial expression    Motor: Arm left  Normal 5/5  Leg left  Normal 5/5  Arm right  Normal 5/5  Leg right Normal 5/5  Sensation: Intact to light touch, temperature and vibration  Tone: Normal tone throughout    Laboratory:  CMP:   Recent Labs   Lab  08/28/18 0208   CALCIUM  8.8   ALBUMIN  2.5*   PROT  6.2   NA  134*  134*   K  3.6   CO2  25   CL  104   BUN  13   CREATININE  0.6   ALKPHOS  74   ALT  12   AST  12   BILITOT  0.2     CBC:   Recent Labs   Lab  08/28/18 0208   WBC  6.99   RBC  3.04*   HGB  8.8*   HCT  27.3*   PLT  310   MCV  90   MCH  28.9   MCHC  32.2     Lipid Panel: No results for input(s): CHOL, LDLCALC, HDL, TRIG in the last 168 hours.  Coagulation:   Recent Labs   Lab  08/28/18 0208   INR  1.1     Platelet Aggregation Study: No results for input(s): PLTAGG, PLTAGINTERP, PLTAGREGLACO, ADPPLTAGGREG in the last 168 hours.  Hgb A1C: No results for input(s): HGBA1C in the last 168 hours.  TSH: No results for input(s): TSH in the last 168 hours.    Diagnostic Results     Brain Imaging   CT Head w/o contrast 8-10-18 results:    1. Extensive subarachnoid hemorrhage, most likely from an aneurysm rupture.  It is difficult to determine the site of the aneurysm rupture on this study due to the diffuse nature of the subarachnoid hemorrhage throughout the posterior fossa as well as the basilar cisternal spaces.  2. Slight increase in the ventricular size when compared to the previous study suggestive of a developing hydrocephalus.    Vessel Imaging   Cerebral angiogram 8-16-18 results:  IV Verapamil instilled due to vasospasm    Cerebral angiogram 8-10-18 results:  2.3 x 2.7 x 2.0 mm anterior communicating artery aneurysm with Villafuerte's  excrescence.  Successful coil embolization of this aneurysm.     Cardiac Imaging   2D Echo 8-11-18 results:    1 - Eccentric hypertrophy.     2 - Normal left ventricular systolic function (EF 60-65%).     3 - No wall motion abnormalities.     4 - Normal left ventricular diastolic function.     5 - Normal right ventricular systolic function .     6 - The estimated PA systolic pressure is 31 mmHg.     7 - Trivial pericardial effusion.     MRI 8/27/18 -   Interval operative change with right frontal ventricular catheter placement.  There is slight reduced distension of the lateral and 3rd ventricles compared to prior which remain mildly prominent.    Previous intraventricular and subarachnoid hemorrhage overall less apparent possibly related to differences in technique.  There is no definite significant new hemorrhage.    There is a punctate focus of diffusion restriction in the right basal ganglia which may be artifactual from adjacent coursing ventricular catheter however small area of recent infarction not excluded.  Clinical correlation and follow-up advised.      Gladis Matt NP  [facility]  Department of Vascular Neurology   [facility]

## 2018-08-28 NOTE — PT/OT/SLP RE-EVAL
"Physical Therapy Re-evaluation    Patient Name:  Sharon Grande   MRN:  [id number]    Recommendations:     Discharge Recommendations:  rehabilitation facility(pending further gait assessment and continued progress)   Discharge Equipment Recommendations: (TBD pending progress)   Barriers to discharge: Decreased caregiver support    Assessment:     Sharon Grande is a 32 y.o. female admitted with a medical diagnosis of Subarachnoid hemorrhage from anterior communicating artery aneurysm; s/p EVD removal 8/27/18. She presents with the following impairments/functional limitations:  impaired endurance, impaired functional mobilty, gait instability, impaired balance, pain, and impaired cardiopulmonary response to activity. 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 Pt displays mild gait instability, impaired higher level dynamic standing balance and endurance deficits impacting participation in mobility, ADLs and role as caregiver. Recommending pt discharge to inpatient rehab facility pending further gait assessment and continued progress, as pt may progress quickly.     Rehab Prognosis:  good; patient would benefit from acute skilled PT services to address these deficits and reach maximum level of function.      Recent Surgery: Procedure(s) (LRB):  Mri (magnetic resonance imaging) (N/A) 18 Days Post-Op    Plan:     During this hospitalization, patient to be seen 4 x/week to address the above listed problems via gait training, therapeutic activities, therapeutic exercises, neuromuscular re-education  · Plan of Care Expires:  09/27/18   Plan of Care Reviewed with: patient    Subjective     Communicated with RN prior to session.  Patient found sitting up in chair upon PT entry to room. Pt alert and agreeable to evaluation.      Patient comments/goals: "my legs feel a little heavy and weak"   Pain/Comfort:  · Pain Rating 1: 0/10  · Pain Rating " Post-Intervention 1: 0/10    Patients cultural, spiritual, Judaism conflicts given the current situation: no known conflicts      Objective:     Patient found with: blood pressure cuff, pulse ox (continuous), peripheral IV, telemetry     General Precautions: Standard, fall   Orthopedic Precautions:N/A   Braces: N/A     Exams:  · Gross Motor Coordination:  WFL  · Postural Exam:  Patient presented with the following abnormalities:    · -       Rounded shoulders  · Skin Integrity/Edema:      · -       Skin integrity: Visible skin intact  · -       Edema: None noted  · RLE ROM: WFL  · RLE Strength: 5/5  · LLE ROM: WFL  · LLE Strength: 4-/5    Functional Mobility:       Bed Mobility  N/T 2/2 pt sitting up in chair upon PT arrival      Transfers · Sit <> Stand: CGA from bedside chair x 2 trials with no AD; SBA from bedside commode x 1 trial with no AD        Gait  · Distance: ~10 ft. + 10 ft; pt also performed marching in place x 30 sec trial with no AD, no UE support and CGA. (Overall gait distance limited 2/2 pt requesting to eat lunch and hold ambulation in hallway until step-down to floor)   · Assistance level: no AD, 1 HHA provided and CGA    · Gait Deviations: decreased step length, decreased carolynn, increased postural sway           Therapeutic Activities and Exercises:  Pt educated on:  - role of PT and POC/goals for therapy  - safety with mobility  - therapy recommendations   - increasing OOB activity with RN assistance to improve endurance   - seated therex including ankle pumps, LAQ and marches     Pt verbalized understanding and expressed no further concerns/questions.     Patient left up in chair with all lines intact, call button in reach, RN notified and visitor present.      AM-PAC 6 CLICK MOBILITY  Total Score:19       GOALS:   Multidisciplinary Problems     Physical Therapy Goals        Problem: Physical Therapy Goal    Goal Priority Disciplines Outcome Goal Variances Interventions   Physical Therapy  Goal     PT, PT/OT Ongoing (interventions implemented as appropriate)     Description:  Goals to be met by: 2018     Patient will increase functional independence with mobility by performin. Supine to sit with supervision with HOB flat  2. Sit to supine with supervision with HOB flat  3. Sit to stand transfer with supervision without AD.   4. Gait  x 150 ft with supervision without AD.   5. Lower extremity exercise program x15 reps per handout, with assistance as needed  6. Pt will perform dynamic standing balance activities x 3 minutes with supervision to reduce fall risk   7. Pt will ascend/descend 5 stairs with handrail as needed with supervision                         History:     Past Medical History:   Diagnosis Date    Anemia     Hypertension     since     Polycystic kidney disease     Polycystic kidney disease     Renal disorder     Since childhood        Past Surgical History:   Procedure Laterality Date    DILATION AND CURETTAGE OF UTERUS             Time Tracking:     PT Received On: 18  PT Start Time: 1253     PT Stop Time: 1307  PT Total Time (min): 14 min     Billable Minutes: Re-eval 14 min    Sonja Narvaez PT, DPT   2018  Pager: [phone]

## 2018-08-28 NOTE — PLAN OF CARE
Problem: Patient Care Overview  Goal: Plan of Care Review  Outcome: Ongoing (interventions implemented as appropriate)  POC reviewed with Ms. Sharon Grande at 0330. Patient verbalized understanding and all questions and concerns addressed. No acute events overnight. Transfer to [facility] pending. Patient progressing toward goals. Will continue to monitor. See flowsheets for full assessment and VS info.

## 2018-08-28 NOTE — PT/OT/SLP RE-EVAL
"Occupational Therapy   Re-evaluation    Name: Sharon Grande  MRN: [id number]  Admitting Diagnosis:  Subarachnoid hemorrhage from anterior communicating artery aneurysm 18 Days Post-Op   EVD removed 8/27/2018    Recommendations:     Discharge Recommendations: rehabilitation facility  Discharge Equipment Recommendations:  (TBD at next level or with progression in care)  Barriers to discharge:  Decreased caregiver support    History:     Past Medical History:   Diagnosis Date    Anemia     Hypertension     since 2012    Polycystic kidney disease     Polycystic kidney disease     Renal disorder     Since childhood        Past Surgical History:   Procedure Laterality Date    DILATION AND CURETTAGE OF UTERUS      2013       Subjective     History from initial eval:   Occupational Profile:  Living Environment: Pt lives with spouse and 5 dependent children (ages 15,10,6,3,2) in split level home with 0 DIMITRI. Bed/bath on 2nd level with ~14 DIMITRI and R hand rial. Tub/shower combo.   Previous level of function: full time mother. Active and indep. Completing all home mgmt tasks and activities. Requires no DME/assistance for ADLs/self care/mobility.   *Pt reported increased R side weakness, more noted in LE leading up to incident   Roles and Routines: wife, mother, care taker to self and children, home and community dweller,    Equipment Owned:  none  Assistance upon Discharge: yes, good family support; however, spouse works during the day    Chief Complaint: "My legs just feel so heavy, like they are going to give out"  Patient/Family stated goals: none reported  Communicated with: RN (Mattie) prior to session. Pt's Aunt at bedside. Pt agreeable to therapy session, reported "I  Pain/Comfort:  · Pain Rating 1: 0/10  · Pain Addressed 1: Pre-medicate for activity  · Pain Rating Post-Intervention 1: 0/10    Objective:     Patient found with: BP,PICC, SCDs, bed alarm     General Precautions: Standard, aspiration, fall (Pt 6 months " pregnant)  Orthopedic Precautions:N/A   Braces:   N/A    Occupational Performance:    Bed Mobility:  HOB elevated  · Patient completed Rolling/Turning to Right with contact guard assistance and with side rail  · Patient completed Supine to Sit with contact guard assistance and with side rail    Functional Mobility/Transfers:  · Patient completed Sit <> Stand Transfer with minimum assistance  with  no assistive device   · x2 trials from EOB  · Required blocking for B knees  · Patient completed Bed <> Chair Transfer using Step Transfer technique with minimum assistance with no assistive device  · Functional mobility for ~5 steps to chair with min(A) and no AD    Activities of Daily Living:  · Feeding:  modified independence with set up  · Grooming: unable to complete in standing 2/2 fatigue; requires set up and supervision while seated in chair    · Upper Body Dressing: minimum assistance EOB to don gown as jacket  · Lower Body Dressing: contact guard assistance and set up EOB for donning B socks    Cognitive/Visual Perceptual:  Cognitive/Psychosocial Skills:     -       Oriented to: Person, Place and Situation   -       Follows Commands/attention:Follows multistep  commands  -       Communication: clear/fluent  -       Memory: No Deficits noted  -       Safety awareness/insight to disability: intact   -       Mood/Affect/Coping skills/emotional control: Cooperative and Flat and Blunted Affect  Visual/Perceptual:      -Intact  tracking functionally    Physical Exam:  Balance:    -       Min(A) for static standing task  Postural examination/scapula alignment:    -       Rounded shoulders  -       Forward head  -       Posterior pelvic tilt  Skin integrity: Visible skin intact  Edema:  None noted  Sensation:    -       Intact  light/touch B UE    Motor Planning:    -       Intact B UE  Dominant hand:    -       R  Upper Extremity Range of Motion:     -       Right Upper Extremity: WFL    -       Left Upper Extremity:  WFL      Upper Extremity Strength:    -       Right Upper Extremity: 4+/5  -       Left Upper Extremity: 4+/5     Strength:    -       Right Upper Extremity: WFL    -       Left Upper Extremity: WFL    Fine Motor Coordination:    -       Intact    Gross motor coordination:   WFL    Patient left up in chair with all lines intact, call button in reach and RN notified    [facility] 6 Click:  [facility] Total Score: 19     Body mass index is 24.91 kg/m².    Vitals:    08/28/18 1100   BP: (!) 190/113   Pulse: 75   Resp: 19   Temp: 98.4 °F (36.9 °C)       Treatment & Education:  -Pt alert and agreeable to therapy  Re eval; re edu on OT role in care and POC for acute setting  -BP following OT session while up in chair: 179/93   -Communication board updated; questions/concerns addressed within OT scope of practice  -no family at bedside for education     Therapeutic Exercise: chest press/B LE marching/Hip flex   Modality Used: rolled towel for B UE for endurance training   # of Sets: 2  # of Reps: 15  Endurance Level: fair- (required rest breaks)  Where Completed: seated in chair for core facilitation     Education:    Assessment:     Sharon Grande is a 32 y.o. female with a medical diagnosis of Subarachnoid hemorrhage from anterior communicating artery aneurysm.  She presents with overall decline in functional endurance and strength. She is unable to return to primarily caregiver role at this time 2/2 deficits. She demo impaired balance, impaired functional mobility and overall decline in functional indep from her baseline. She would best benefit from Rehab at this time pending progression in care.  Performance deficits affecting function are impaired endurance, weakness, gait instability, impaired balance, impaired self care skills, impaired functional mobilty, pain, impaired cardiopulmonary response to activity.      Rehab Prognosis:  Good; patient would benefit from acute skilled OT services to address these deficits  and reach maximum level of function.       Plan:     Patient to be seen 4 x/week to address the above listed problems via self-care/home management, therapeutic activities, therapeutic exercises, neuromuscular re-education  · Plan of Care Expires: 09/10/18  · Plan of Care Reviewed with: patient    This Plan of care has been discussed with the patient who was involved in its development and understands and is in agreement with the identified goals and treatment plan    GOALS:   Multidisciplinary Problems     Occupational Therapy Goals        Problem: Occupational Therapy Goal    Goal Priority Disciplines Outcome Interventions   Occupational Therapy Goal     OT, PT/OT Ongoing (interventions implemented as appropriate)    Description:  Goals to be met by: 9/4    UE Dressing while standing with Set-up Assistance and Stand-by Assistance.  LE Dressing (socks, brief) with Contact Guard Assistance.  Grooming while standing with Contact Guard Assistance.  Stand pivot transfers with Contact Guard Assistance.  Functional dynamic standing task to simulate home return/ with SBA.   Functional mobility at short household distance for ADL task with min(A) and vitals WFL.  Upper extremity exercise program x15 reps per handout, with assistance as needed.                     Time Tracking:     OT Date of Treatment: 08/28/18  OT Start Time: 1044  OT Stop Time: 1109  OT Total Time (min): 25 min    Billable Minutes:Re-eval 15  Therapeutic Exercise 10    GARRET White  8/28/2018

## 2018-08-28 NOTE — PLAN OF CARE
Problem: Patient Care Overview  Goal: Plan of Care Review  Plan of care reviewed with patient and family at 1400. Patient verbalized understanding. All questions and concerns addressed today. Patient remains free from injury and falls. No acute events today. Patient is progressing towards goals. Will continue to monitor. See flowsheets for full assessments and vitals throughout shift.    -Patient up in chair for most of shift  -Patient experienced dull continuous headache throughout most of day. PRN APAP and Oxy administered  -Magnesium (1.7) and Potassium (3.6) replaced this morning, redraw this afternoon per order set  -Bedside commode in patients room, instructed to call for assistance  -Stroke education done at bedside today

## 2018-08-28 NOTE — PLAN OF CARE
Problem: Physical Therapy Goal  Goal: Physical Therapy Goal  Goals to be met by: 2018     Patient will increase functional independence with mobility by performin. Supine to sit with supervision with HOB flat  2. Sit to supine with supervision with HOB flat  3. Sit to stand transfer with supervision without AD.   4. Gait  x 150 ft with supervision without AD.   5. Lower extremity exercise program x15 reps per handout, with assistance as needed  6. Pt will perform dynamic standing balance activities x 3 minutes with supervision to reduce fall risk   7. Pt will ascend/descend 5 stairs with handrail as needed with supervision       Outcome: Ongoing (interventions implemented as appropriate)  PT re-evaluation completed- see note for details. Goals established and POC initiated.     Sonja Narvaez PT, DPT   2018  Pager: [phone]

## 2018-08-28 NOTE — ASSESSMENT & PLAN NOTE
Patient is a 32 year old female with medical history of HTN, Renal disorder, polycystic kidney disease who was transferred from [facility] with diffuse SAH.  Imaging identified an anterior communicating aneurysm which was successfully coiled 8/10.  On 8/16, patient developed vasospasm and was taken to IR and treated with verapamil.  She continues with mild and moderate vasospasm, but has no clinical deficits.  Reported HA and stiff neck improving.  She is receiving nimodipine and daily TCDs with close monitoring.  TCDs elevated but she remains asymptomatic.  EVD removed 8/27.    Antithrombotics for secondary stroke prevention: none SAH     Statins for secondary stroke prevention and hyperlipidemia, if present: SAH     Aggressive risk factor modification: polycytsic kidney disease, HTN      Rehab efforts: Occupational Therapy, PT/OT/SLP to evaluate and treat when appropriate     Diagnostics ordered/pending: Daily TCDs    VTE prophylaxis: SCDs    BP parameters: SAH: Secured aneurysm, no target, increase BP to prevent vasospasm if needed

## 2018-08-28 NOTE — PLAN OF CARE
Problem: Occupational Therapy Goal  Goal: Occupational Therapy Goal  Goals to be met by: 9/4    UE Dressing while standing with Set-up Assistance and Stand-by Assistance.  LE Dressing (socks, brief) with Contact Guard Assistance.  Grooming while standing with Contact Guard Assistance.  Stand pivot transfers with Contact Guard Assistance.  Functional dynamic standing task to simulate home return/ with SBA.   Functional mobility at short household distance for ADL task with min(A) and vitals WFL.  Upper extremity exercise program x15 reps per handout, with assistance as needed.   Outcome: Ongoing (interventions implemented as appropriate)  Re eval completed. Rec Rehab at this time. Will follow up 4x/w at this time. GARRET White 8/28/2018

## 2018-08-29 LAB
ALBUMIN SERPL BCP-MCNC: 2.4 G/DL
ALP SERPL-CCNC: 67 U/L
ALT SERPL W/O P-5'-P-CCNC: 13 U/L
ANION GAP SERPL CALC-SCNC: 7 MMOL/L
AST SERPL-CCNC: 12 U/L
BASOPHILS # BLD AUTO: 0.01 K/UL
BASOPHILS NFR BLD: 0.2 %
BILIRUB SERPL-MCNC: 0.2 MG/DL
BUN SERPL-MCNC: 11 MG/DL
CALCIUM SERPL-MCNC: 9 MG/DL
CHLORIDE SERPL-SCNC: 106 MMOL/L
CO2 SERPL-SCNC: 24 MMOL/L
CREAT SERPL-MCNC: 0.6 MG/DL
DIFFERENTIAL METHOD: ABNORMAL
EOSINOPHIL # BLD AUTO: 0.1 K/UL
EOSINOPHIL NFR BLD: 1 %
ERYTHROCYTE [DISTWIDTH] IN BLOOD BY AUTOMATED COUNT: 17.3 %
EST. GFR  (AFRICAN AMERICAN): >60 ML/MIN/1.73 M^2
EST. GFR  (NON AFRICAN AMERICAN): >60 ML/MIN/1.73 M^2
GLUCOSE SERPL-MCNC: 83 MG/DL
HCT VFR BLD AUTO: 26 %
HGB BLD-MCNC: 8.5 G/DL
IMM GRANULOCYTES # BLD AUTO: 0.06 K/UL
IMM GRANULOCYTES NFR BLD AUTO: 1 %
INR PPP: 1.1
LYMPHOCYTES # BLD AUTO: 1.4 K/UL
LYMPHOCYTES NFR BLD: 22.4 %
MAGNESIUM SERPL-MCNC: 1.4 MG/DL
MCH RBC QN AUTO: 29.4 PG
MCHC RBC AUTO-ENTMCNC: 32.7 G/DL
MCV RBC AUTO: 90 FL
MONOCYTES # BLD AUTO: 0.6 K/UL
MONOCYTES NFR BLD: 9.4 %
NEUTROPHILS # BLD AUTO: 4.1 K/UL
NEUTROPHILS NFR BLD: 66 %
NRBC BLD-RTO: 0 /100 WBC
PHOSPHATE SERPL-MCNC: 4 MG/DL
PLATELET # BLD AUTO: 290 K/UL
PMV BLD AUTO: 10.6 FL
POTASSIUM SERPL-SCNC: 4 MMOL/L
PROT SERPL-MCNC: 5.9 G/DL
PROTHROMBIN TIME: 11 SEC
RBC # BLD AUTO: 2.89 M/UL
SODIUM SERPL-SCNC: 134 MMOL/L
SODIUM SERPL-SCNC: 137 MMOL/L
WBC # BLD AUTO: 6.25 K/UL

## 2018-08-29 PROCEDURE — 84100 ASSAY OF PHOSPHORUS: CPT

## 2018-08-29 PROCEDURE — 25000003 PHARM REV CODE 250: Performed by: STUDENT IN AN ORGANIZED HEALTH CARE EDUCATION/TRAINING PROGRAM

## 2018-08-29 PROCEDURE — 63600175 PHARM REV CODE 636 W HCPCS: Performed by: PHYSICIAN ASSISTANT

## 2018-08-29 PROCEDURE — 97530 THERAPEUTIC ACTIVITIES: CPT

## 2018-08-29 PROCEDURE — 25000003 PHARM REV CODE 250: Performed by: PSYCHIATRY & NEUROLOGY

## 2018-08-29 PROCEDURE — 99233 SBSQ HOSP IP/OBS HIGH 50: CPT | Mod: ,,, | Performed by: PHYSICIAN ASSISTANT

## 2018-08-29 PROCEDURE — A4216 STERILE WATER/SALINE, 10 ML: HCPCS | Performed by: PSYCHIATRY & NEUROLOGY

## 2018-08-29 PROCEDURE — 25000003 PHARM REV CODE 250: Performed by: PHYSICIAN ASSISTANT

## 2018-08-29 PROCEDURE — 99232 SBSQ HOSP IP/OBS MODERATE 35: CPT | Mod: ,,, | Performed by: PSYCHIATRY & NEUROLOGY

## 2018-08-29 PROCEDURE — 85610 PROTHROMBIN TIME: CPT

## 2018-08-29 PROCEDURE — 25000003 PHARM REV CODE 250: Performed by: NURSE PRACTITIONER

## 2018-08-29 PROCEDURE — 80053 COMPREHEN METABOLIC PANEL: CPT

## 2018-08-29 PROCEDURE — 84295 ASSAY OF SERUM SODIUM: CPT | Mod: 91

## 2018-08-29 PROCEDURE — 94761 N-INVAS EAR/PLS OXIMETRY MLT: CPT

## 2018-08-29 PROCEDURE — 20600001 HC STEP DOWN PRIVATE ROOM

## 2018-08-29 PROCEDURE — 99232 SBSQ HOSP IP/OBS MODERATE 35: CPT | Mod: ,,, | Performed by: NEUROLOGICAL SURGERY

## 2018-08-29 PROCEDURE — 36415 COLL VENOUS BLD VENIPUNCTURE: CPT

## 2018-08-29 PROCEDURE — 83735 ASSAY OF MAGNESIUM: CPT

## 2018-08-29 PROCEDURE — 85025 COMPLETE CBC W/AUTO DIFF WBC: CPT

## 2018-08-29 RX ORDER — OXYCODONE HYDROCHLORIDE 5 MG/1
5 TABLET ORAL EVERY 6 HOURS PRN
Status: DISCONTINUED | OUTPATIENT
Start: 2018-08-29 | End: 2018-08-30

## 2018-08-29 RX ADMIN — NIMODIPINE 30 MG: 30 CAPSULE, LIQUID FILLED ORAL at 04:08

## 2018-08-29 RX ADMIN — MAGNESIUM OXIDE TAB 400 MG (241.3 MG ELEMENTAL MG) 800 MG: 400 (241.3 MG) TAB at 06:08

## 2018-08-29 RX ADMIN — Medication 10 ML: at 06:08

## 2018-08-29 RX ADMIN — MAGNESIUM OXIDE TAB 400 MG (241.3 MG ELEMENTAL MG) 800 MG: 400 (241.3 MG) TAB at 09:08

## 2018-08-29 RX ADMIN — NIMODIPINE 30 MG: 30 CAPSULE, LIQUID FILLED ORAL at 09:08

## 2018-08-29 RX ADMIN — Medication 10 ML: at 05:08

## 2018-08-29 RX ADMIN — HEPARIN SODIUM 5000 UNITS: 5000 INJECTION, SOLUTION INTRAVENOUS; SUBCUTANEOUS at 02:08

## 2018-08-29 RX ADMIN — OXYCODONE HYDROCHLORIDE 5 MG: 5 TABLET ORAL at 12:08

## 2018-08-29 RX ADMIN — HEPARIN SODIUM 5000 UNITS: 5000 INJECTION, SOLUTION INTRAVENOUS; SUBCUTANEOUS at 06:08

## 2018-08-29 RX ADMIN — OXYCODONE HYDROCHLORIDE 5 MG: 5 TABLET ORAL at 06:08

## 2018-08-29 RX ADMIN — FAMOTIDINE 20 MG: 20 TABLET ORAL at 09:08

## 2018-08-29 RX ADMIN — NIMODIPINE 30 MG: 30 CAPSULE, LIQUID FILLED ORAL at 06:08

## 2018-08-29 RX ADMIN — NIMODIPINE 30 MG: 30 CAPSULE, LIQUID FILLED ORAL at 12:08

## 2018-08-29 RX ADMIN — NIMODIPINE 30 MG: 30 CAPSULE, LIQUID FILLED ORAL at 05:08

## 2018-08-29 RX ADMIN — NIMODIPINE 30 MG: 30 CAPSULE, LIQUID FILLED ORAL at 01:08

## 2018-08-29 RX ADMIN — Medication 10 ML: at 12:08

## 2018-08-29 RX ADMIN — ACETAMINOPHEN 650 MG: 325 TABLET, FILM COATED ORAL at 04:08

## 2018-08-29 RX ADMIN — HEPARIN SODIUM 5000 UNITS: 5000 INJECTION, SOLUTION INTRAVENOUS; SUBCUTANEOUS at 09:08

## 2018-08-29 RX ADMIN — NIMODIPINE 30 MG: 30 CAPSULE, LIQUID FILLED ORAL at 07:08

## 2018-08-29 RX ADMIN — OXYCODONE HYDROCHLORIDE 10 MG: 5 TABLET ORAL at 06:08

## 2018-08-29 RX ADMIN — OXYCODONE HYDROCHLORIDE 10 MG: 5 TABLET ORAL at 09:08

## 2018-08-29 RX ADMIN — PRENATAL VIT W/ FE FUMARATE-FA TAB 27-0.8 MG 1 TABLET: 27-0.8 TAB at 09:08

## 2018-08-29 RX ADMIN — ACETAMINOPHEN 650 MG: 325 TABLET, FILM COATED ORAL at 07:08

## 2018-08-29 RX ADMIN — ACETAMINOPHEN 650 MG: 325 TABLET, FILM COATED ORAL at 09:08

## 2018-08-29 RX ADMIN — ACETAMINOPHEN 650 MG: 325 TABLET, FILM COATED ORAL at 05:08

## 2018-08-29 RX ADMIN — NIMODIPINE 30 MG: 30 CAPSULE, LIQUID FILLED ORAL at 02:08

## 2018-08-29 RX ADMIN — SODIUM CHLORIDE TAB 1 GM 2 G: 1 TAB at 02:08

## 2018-08-29 RX ADMIN — NIMODIPINE 30 MG: 30 CAPSULE, LIQUID FILLED ORAL at 11:08

## 2018-08-29 RX ADMIN — OXYCODONE HYDROCHLORIDE 10 MG: 5 TABLET ORAL at 01:08

## 2018-08-29 RX ADMIN — SODIUM CHLORIDE TAB 1 GM 2 G: 1 TAB at 09:08

## 2018-08-29 RX ADMIN — ACETAMINOPHEN 650 MG: 325 TABLET, FILM COATED ORAL at 02:08

## 2018-08-29 RX ADMIN — Medication 10 ML: at 11:08

## 2018-08-29 NOTE — SUBJECTIVE & OBJECTIVE
Interval History: NAEON s/p EVD pull yesterday, exam stable, will TTF today on NSGY    Medications:  Continuous Infusions:    Scheduled Meds:   famotidine  20 mg Oral Daily    heparin (porcine)  5,000 Units Subcutaneous Q8H    niMODipine  30 mg Oral Q2H    polyethylene glycol  17 g Oral Daily    prenatal vits96-iron fum-folic  1 tablet Oral Daily    senna-docusate 8.6-50 mg  1 tablet Oral BID    sodium chloride 0.9%  10 mL Intravenous Q6H    sodium chloride  2 g Oral TID     PRN Meds:sodium chloride, acetaminophen, labetalol, magnesium oxide, magnesium oxide, oxyCODONE, potassium chloride 10%, potassium chloride 10%, potassium chloride 10%, potassium, sodium phosphates, potassium, sodium phosphates, potassium, sodium phosphates, Flushing PICC Protocol **AND** sodium chloride 0.9% **AND** sodium chloride 0.9%, sodium chloride 0.9%     Review of Systems    Objective:     Weight: 76.5 kg (168 lb 10.4 oz)  Body mass index is 24.91 kg/m².  Vital Signs (Most Recent):  Temp: 98.2 °F (36.8 °C) (08/28/18 1500)  Pulse: 72 (08/28/18 1800)  Resp: 19 (08/28/18 1800)  BP: (!) 141/86 (08/28/18 1800)  SpO2: 100 % (08/28/18 1800) Vital Signs (24h Range):  Temp:  [98.2 °F (36.8 °C)-98.8 °F (37.1 °C)] 98.2 °F (36.8 °C)  Pulse:  [57-88] 72  Resp:  [14-32] 19  SpO2:  [98 %-100 %] 100 %  BP: (122-190)/() 141/86     Date 08/28/18 0700 - 08/29/18 0659   Shift 7135-5337 5932-5525 2922-0168 24 Hour Total   INTAKE   P.O. 240   240   Shift Total(mL/kg) 240(3.1)   240(3.1)   OUTPUT   Shift Total(mL/kg)       Weight (kg) 76.5 76.5 76.5 76.5                        ICP/Ventriculostomy 08/11/18 0000 Ventricular drainage catheter with ICP microsensor Right Other (Comment) (Active)   Level of Ventriculostomy (cm above) 10 8/24/2018 11:01 AM   Status Open to drainage 8/24/2018 11:01 AM   Site Assessment Clean;Dry 8/24/2018 11:01 AM   Site Drainage Clear 8/24/2018 11:01 AM   Waveform normal waveform;A-waves 8/24/2018 11:01 AM   Output  (mL) 3 mL 8/24/2018 11:01 AM   CSF Color clear 8/24/2018 11:01 AM   Dressing Status Biopatch in place;Clean;Dry;Intact 8/24/2018 11:01 AM   Interventions HOB degrees;bed controls locked;level adjusted per order 8/24/2018 11:01 AM       Female External Urinary Catheter 08/20/18 0700 (Active)   Skin perineum cleansed w/ soap and water 8/24/2018 11:01 AM   Tolerance no signs/symptoms of discomfort 8/24/2018 11:01 AM   Suction Other 8/24/2018 11:01 AM   Date of last wick change 08/24/18 8/24/2018 11:01 AM   Time of last wick change 1101 8/24/2018 11:01 AM   Output (mL) 250 mL 8/24/2018  8:41 AM       Neurosurgery Physical Exam     AOX3, GCS 15  speech fluent  PERRL, EOMI, face symm, tongue midline  HOBSON symmetrically  No drift    Significant Labs:  Recent Labs   Lab  08/27/18 0236 08/27/18   1957  08/28/18 0208 08/28/18   1211   GLU  79   --    --   97   --    NA  134*  134*   < >  135*  134*  134*  134*   K  3.9   --    --   3.6  3.7   CL  105   --    --   104   --    CO2  23   --    --   25   --    BUN  10   --    --   13   --    CREATININE  0.6   --    --   0.6   --    CALCIUM  8.5*   --    --   8.8   --    MG  1.7   --    --   1.7   --     < > = values in this interval not displayed.     Recent Labs   Lab  08/27/18 0236 08/28/18 0208   WBC  5.81  6.99   HGB  8.7*  8.8*   HCT  26.8*  27.3*   PLT  318  310     Recent Labs   Lab  08/27/18 0236 08/28/18 0208   INR  1.0  1.1     Microbiology Results (last 7 days)     Procedure Component Value Units Date/Time    CSF culture [[id number]] Collected:  08/18/18 1354    Order Status:  Completed Specimen:  CSF (Spinal Fluid) from CSF Shunt Updated:  08/23/18 07     CSF CULTURE No Growth     Gram Stain Result Rare WBC's      No organisms seen        Significant Diagnostics:  I have reviewed all pertinent imaging results/findings within the past 24 hours.

## 2018-08-29 NOTE — PROGRESS NOTES
[facility]  Neurosurgery  Progress Note    Subjective:     History of Present Illness: 32 y.o. female 24 weeks pregnant with a history of polycystic kidney disease who presents as transfer from [facility] for SAH. Patient found down by 6 yr old son. Last known normal 0500 today. CT at [facility] revealed SAH within the basal cisterns. US at [facility] with + fetal heart tones. Transferred for neuro evaluation.     Post-Op Info:  Procedure(s) (LRB):  Mri (magnetic resonance imaging) (N/A)   18 Days Post-Op     Interval History: NAEON s/p EVD pull yesterday, exam stable, will TTF today on NSGY    Medications:  Continuous Infusions:    Scheduled Meds:   famotidine  20 mg Oral Daily    heparin (porcine)  5,000 Units Subcutaneous Q8H    niMODipine  30 mg Oral Q2H    polyethylene glycol  17 g Oral Daily    prenatal vits96-iron fum-folic  1 tablet Oral Daily    senna-docusate 8.6-50 mg  1 tablet Oral BID    sodium chloride 0.9%  10 mL Intravenous Q6H    sodium chloride  2 g Oral TID     PRN Meds:sodium chloride, acetaminophen, labetalol, magnesium oxide, magnesium oxide, oxyCODONE, potassium chloride 10%, potassium chloride 10%, potassium chloride 10%, potassium, sodium phosphates, potassium, sodium phosphates, potassium, sodium phosphates, Flushing PICC Protocol **AND** sodium chloride 0.9% **AND** sodium chloride 0.9%, sodium chloride 0.9%     Review of Systems    Objective:     Weight: 76.5 kg (168 lb 10.4 oz)  Body mass index is 24.91 kg/m².  Vital Signs (Most Recent):  Temp: 98.2 °F (36.8 °C) (08/28/18 1500)  Pulse: 72 (08/28/18 1800)  Resp: 19 (08/28/18 1800)  BP: (!) 141/86 (08/28/18 1800)  SpO2: 100 % (08/28/18 1800) Vital Signs (24h Range):  Temp:  [98.2 °F (36.8 °C)-98.8 °F (37.1 °C)] 98.2 °F (36.8 °C)  Pulse:  [57-88] 72  Resp:  [14-32] 19  SpO2:  [98 %-100 %] 100 %  BP: (122-190)/() 141/86     Date 08/28/18 0700 - 08/29/18 0659   Shift 3259-3087 2332-2768 7067-6632 24 Hour Total   INTAKE   P.O. 240    240   Shift Total(mL/kg) 240(3.1)   240(3.1)   OUTPUT   Shift Total(mL/kg)       Weight (kg) 76.5 76.5 76.5 76.5                        ICP/Ventriculostomy 08/11/18 0000 Ventricular drainage catheter with ICP microsensor Right Other (Comment) (Active)   Level of Ventriculostomy (cm above) 10 8/24/2018 11:01 AM   Status Open to drainage 8/24/2018 11:01 AM   Site Assessment Clean;Dry 8/24/2018 11:01 AM   Site Drainage Clear 8/24/2018 11:01 AM   Waveform normal waveform;A-waves 8/24/2018 11:01 AM   Output (mL) 3 mL 8/24/2018 11:01 AM   CSF Color clear 8/24/2018 11:01 AM   Dressing Status Biopatch in place;Clean;Dry;Intact 8/24/2018 11:01 AM   Interventions HOB degrees;bed controls locked;level adjusted per order 8/24/2018 11:01 AM       Female External Urinary Catheter 08/20/18 0700 (Active)   Skin perineum cleansed w/ soap and water 8/24/2018 11:01 AM   Tolerance no signs/symptoms of discomfort 8/24/2018 11:01 AM   Suction Other 8/24/2018 11:01 AM   Date of last wick change 08/24/18 8/24/2018 11:01 AM   Time of last wick change 1101 8/24/2018 11:01 AM   Output (mL) 250 mL 8/24/2018  8:41 AM       Neurosurgery Physical Exam     AOX3, GCS 15  speech fluent  PERRL, EOMI, face symm, tongue midline  HOBSON symmetrically  No drift    Significant Labs:  Recent Labs   Lab  08/27/18   0236   08/27/18   1957  08/28/18   0208  08/28/18   1211   GLU  79   --    --   97   --    NA  134*  134*   < >  135*  134*  134*  134*   K  3.9   --    --   3.6  3.7   CL  105   --    --   104   --    CO2  23   --    --   25   --    BUN  10   --    --   13   --    CREATININE  0.6   --    --   0.6   --    CALCIUM  8.5*   --    --   8.8   --    MG  1.7   --    --   1.7   --     < > = values in this interval not displayed.     Recent Labs   Lab  08/27/18   0236  08/28/18   0208   WBC  5.81  6.99   HGB  8.7*  8.8*   HCT  26.8*  27.3*   PLT  318  310     Recent Labs   Lab  08/27/18   0236  08/28/18   0208   INR  1.0  1.1     Microbiology Results  (last 7 days)     Procedure Component Value Units Date/Time    CSF culture [[id number]] Collected:  08/18/18 8514    Order Status:  Completed Specimen:  CSF (Spinal Fluid) from CSF Shunt Updated:  08/23/18 0738     CSF CULTURE No Growth     Gram Stain Result Rare WBC's      No organisms seen        Significant Diagnostics:  I have reviewed all pertinent imaging results/findings within the past 24 hours.    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 female 24 weeks pregnant with history of polycystic kidney disease with HH4F4 SAH on 8/10, now s/p acom coiling 8/10. PBD/PCD 16.     NEURO  -Continue NCC   -q 1 hr neuro checks while in unit   -Patient stable for TTF  -nimotop X21d  -May stop daily TCDs  -off of Keppra    CV  -permissive HTN  <220 as aneurysm secured    RESP  -stable on room air  -Aggressive pulmonary management  -IS @ bedside    FEN/GI  -Na tabs  -strict I/Os  -goal net even or positive  -Reg diet    Heme/ID:  -CBC daily, transfuse PRN    PPX  -SQH  -famotidine for PUD ppx  -TEDs/SCDs    -further mgmt per ncc and obgyn.            Josue Thomas MD  Neurosurgery  [facility]

## 2018-08-29 NOTE — PROGRESS NOTES
[facility]  Neurocritical Care  Progress Note    Admit Date: 8/10/2018  Service Date: 8/28/2018  Length of Stay: 19    Subjective:     Chief Complaint: Subarachnoid hemorrhage from anterior communicating artery aneurysm    History of Present Illness: Per earlier notes:   32 y.o female, approximately 5-6 months gravid. C/o acute onset AMS that began prior to arrival in ED.  Patient's aunt reports calling the patient's phone at 11:30 am this morning and reports the patient's 6 year old son answering the phone stating that the patient was lying on the floor, not able to get up. EMS reports upon their arrival, the patient was found on the floor, faced down, with her head against a wall. EMS reports the patient to be hypertensive and incontinent.   Pt has been non-verbal since arrival. Per family patient began complaining of a headache yesterday. Last time normal per  was 0500 today while on his way to work. Patient's aunt reports the patient has not received any prenatal care for this pregnancy.  No other history could be obtained at this time.  History is limited secondary to acuity of the patient's condition.  So last seen normal by adult at 5am. eleanor reports pt has been taking some OTC meds for headache. Pt's  reports pt not taking any regular medications at home.     Hospital Course: 8/10: pt admitted to [facility] for SAH. CT, MRI, MRA ordered and results pending   8/11: post angio with coil acom, EVD at 10, wean prop to extubate, MRI once stable, Urine studies for hyponatremia, TCDs, check Mg q 4  8/12: Mg gtt stopped over night, fluid boluses, pain control  8/16: PBD 6: +620mL fluid overnight.  Elevated peak velocity on TCD, Angio in AM.   8/19: PBD 9: +547mL, Na 134, increase 2% to 45/hour, decrease NS to 110/hour. TCDs decreased yesterday from 8/17, monitor today. SBP < 180  8/20: PBD 10: +1.3L, transfused 1 unit pRBC last night, Na 135 put on 2% 100mL/hour this morning. Monitor  TCDs. Moderate neck pain; No BM for 4 days.  8/21: PBD 11: + 1.3L last 24 hours, Na 136 on 2% NS at 125mL/hour. TCDs from yesterday remain elevated, will follow exam. Phenylephrine gtt to maintain -220.   8/23: PBD 13, -1.1L last 24 hours, will give 1 unit of blood. TCDs remain elevated, will follow up with neurosurgery on plan to clamp EVD, currently open at 10.   8/26: Hgb slightly down overnight.  Headache decreased this morning, but increased in the afternoon. EVD clamped.    Interval History:naeon EVD removed. CT with stable vents. Exam unchanged. TTF under Nsgy    Review of Systems Constitutional: Denies fevers or chills.  Pulmonary: Denies shortness of breath or cough.  Cardiology: Denies chest pain or palpitations.  GI: Denies abdominal pain or constipation.  Neurologic: Denies new weakness, headache, or paresthesias.    Vitals:   Temp: 98.3 °F (36.8 °C)  Pulse: 68  Rhythm: normal sinus rhythm  BP: (!) 141/83  MAP (mmHg): 107  Resp: 17  SpO2: 100 %  O2 Device (Oxygen Therapy): room air    Temp  Min: 98.2 °F (36.8 °C)  Max: 98.9 °F (37.2 °C)  Pulse  Min: 58  Max: 84  BP  Min: 104/56  Max: 190/113  MAP (mmHg)  Min: 74  Max: 145  Resp  Min: 16  Max: 33  SpO2  Min: 99 %  Max: 100 %    08/28 0701 - 08/29 0700  In: 720 [P.O.:720]  Out: -    Unmeasured Output  Urine Occurrence: 1  Stool Occurrence: 0  Emesis Occurrence: 1  Pad Count: 2     Examination:   Constitutional: Well-nourished and -developed. No apparent distress.   Eyes: Conjunctiva clear, anicteric. Lids no lesions.  Head/Ears/Nose/Mouth/Throat/Neck: Moist mucous membranes. External ears, nose atraumatic. EVD in place  Cardiovascular: Regular rhythm. No murmurs. No leg edema.  Respiratory: Comfortable respirations. Clear to auscultation.  Gastrointestinal: No hernia. Soft, nondistended, nontender. + bowel sounds.    Neurologic:  -GCS [id number]  -Alert. Oriented to person, place, and time. Speech fluent. Follows commands.  -Cranial nerves intact  -Motor  5/5 x all 4, no drift noted  -Sensation intact       Medications:   Continuous Scheduled    famotidine 20 mg Daily   heparin (porcine) 5,000 Units Q8H   niMODipine 30 mg Q2H   polyethylene glycol 17 g Daily   prenatal vits96-iron fum-folic 1 tablet Daily   senna-docusate 8.6-50 mg 1 tablet BID   sodium chloride 0.9% 10 mL Q6H   sodium chloride 2 g TID   PRN    sodium chloride  Q24H PRN   acetaminophen 650 mg Q4H PRN   labetalol 10 mg Q4H PRN   magnesium oxide 800 mg PRN   magnesium oxide 800 mg PRN   oxyCODONE 10 mg Q4H PRN   potassium chloride 10% 40 mEq PRN   potassium chloride 10% 40 mEq PRN   potassium chloride 10% 60 mEq PRN   potassium, sodium phosphates 2 packet PRN   potassium, sodium phosphates 2 packet PRN   potassium, sodium phosphates 2 packet PRN   sodium chloride 0.9% 10 mL PRN   sodium chloride 0.9% 5 mL PRN      Today I independently reviewed pertinent medications, lines/drains/airways, imaging, lab results, notably:   MRI brain  ISTAT: No results for input(s): PH, PCO2, PO2, POCSATURATED, HCO3, BE, POCNA, POCK, POCTCO2, POCGLU, POCICA, POCLAC, SAMPLE in the last 24 hours.   Chem:   Recent Labs   Lab  08/29/18 0318   NA  137  137   K  4.0   CL  106   CO2  24   GLU  83   BUN  11   CREATININE  0.6   ESTGFRAFRICA  >60.0   EGFRNONAA  >60.0   CALCIUM  9.0   MG  1.4*   PHOS  4.0   ANIONGAP  7*   PROT  5.9*   ALBUMIN  2.4*   BILITOT  0.2   ALKPHOS  67   AST  12   ALT  13     Heme:   Recent Labs   Lab  08/29/18 0318   WBC  6.25   HGB  8.5*   HCT  26.0*   PLT  290   INR  1.1     Endo: No results for input(s): POCTGLUCOSE in the last 24 hours.   Assessment/Plan:         Neuro   * Subarachnoid hemorrhage from anterior communicating artery aneurysm     HH4F4 SAH.  S/p coiling.  -TCDs, normonatremia, permissive HTN  -Analgesia PRN  -Maintain Euvolemia  -EVD clamped.  F/u MRI 8/27 with stable blood products and ventricular system, possible new R BG infarct, however asymptomatic             Cerebral artery  vasospasm     2/2 SAH  -nimodipine  -TCDs, eunatremia, permissive HTN          Brain edema     2/2 SAH  -EVD           Brain compression     2/2 SAH  -EVD                Renal/   Hyponatremia     --salt tabs  --monitor sodiums and UOP          JOSE C (acute kidney injury)-resolved as of 8/26/2018     Resolved.              Prophylaxis:  Venous Thromboembolism: mechanical chemical  Stress Ulcer: H2B  Ventilator Pneumonia: not applicable          Activity Orders            None          Full Code     Michelle Mendez PA-C  Neurocritical Care  [facility]

## 2018-08-29 NOTE — SUBJECTIVE & OBJECTIVE
Interval History: NAEON, exam stable, TTF orders in and pending transfer, also pending dispo to [facility]    Medications:  Continuous Infusions:    Scheduled Meds:   famotidine  20 mg Oral Daily    heparin (porcine)  5,000 Units Subcutaneous Q8H    niMODipine  30 mg Oral Q2H    polyethylene glycol  17 g Oral Daily    prenatal vits96-iron fum-folic  1 tablet Oral Daily    senna-docusate 8.6-50 mg  1 tablet Oral BID    sodium chloride 0.9%  10 mL Intravenous Q6H    sodium chloride  2 g Oral TID     PRN Meds:sodium chloride, acetaminophen, labetalol, magnesium oxide, magnesium oxide, oxyCODONE, potassium chloride 10%, potassium chloride 10%, potassium chloride 10%, potassium, sodium phosphates, potassium, sodium phosphates, potassium, sodium phosphates, Flushing PICC Protocol **AND** sodium chloride 0.9% **AND** sodium chloride 0.9%, sodium chloride 0.9%     Review of Systems    Objective:     Weight: 77.5 kg (170 lb 13.7 oz)  Body mass index is 25.23 kg/m².  Vital Signs (Most Recent):  Temp: (P) 98.7 °F (37.1 °C) (08/29/18 1500)  Pulse: 64 (08/29/18 1700)  Resp: 20 (08/29/18 1700)  BP: (!) 154/93 (08/29/18 1700)  SpO2: 100 % (08/29/18 1700) Vital Signs (24h Range):  Temp:  [98.2 °F (36.8 °C)-98.9 °F (37.2 °C)] (P) 98.7 °F (37.1 °C)  Pulse:  [57-87] 64  Resp:  [13-33] 20  SpO2:  [99 %-100 %] 100 %  BP: (104-177)/() 154/93     Date 08/29/18 0700 - 08/30/18 0659   Shift 9890-7572 8971-0030 2615-5377 24 Hour Total   INTAKE   Shift Total(mL/kg)       OUTPUT   Urine(mL/kg/hr) 200(0.3)   200   Shift Total(mL/kg) 200(2.6)   200(2.6)   Weight (kg) 77.5 77.5 77.5 77.5                        ICP/Ventriculostomy 08/11/18 0000 Ventricular drainage catheter with ICP microsensor Right Other (Comment) (Active)   Level of Ventriculostomy (cm above) 10 8/24/2018 11:01 AM   Status Open to drainage 8/24/2018 11:01 AM   Site Assessment Clean;Dry 8/24/2018 11:01 AM   Site Drainage Clear 8/24/2018 11:01 AM   Waveform normal  waveform;A-waves 8/24/2018 11:01 AM   Output (mL) 3 mL 8/24/2018 11:01 AM   CSF Color clear 8/24/2018 11:01 AM   Dressing Status Biopatch in place;Clean;Dry;Intact 8/24/2018 11:01 AM   Interventions HOB degrees;bed controls locked;level adjusted per order 8/24/2018 11:01 AM       Female External Urinary Catheter 08/20/18 0700 (Active)   Skin perineum cleansed w/ soap and water 8/24/2018 11:01 AM   Tolerance no signs/symptoms of discomfort 8/24/2018 11:01 AM   Suction Other 8/24/2018 11:01 AM   Date of last wick change 08/24/18 8/24/2018 11:01 AM   Time of last wick change 1101 8/24/2018 11:01 AM   Output (mL) 250 mL 8/24/2018  8:41 AM       Neurosurgery Physical Exam     AOX3, GCS 15  speech fluent  PERRL, EOMI, face symm, tongue midline  HOBSON symmetrically  No drift    Significant Labs:  Recent Labs   Lab  08/28/18   0208 08/28/18   1211  08/28/18   1931  08/29/18 0318  08/29/18   1116   GLU  97   --    --   83   --    NA  134*  134*  134*  135*  137  137  134*   K  3.6  3.7   --   4.0   --    CL  104   --    --   106   --    CO2  25   --    --   24   --    BUN  13   --    --   11   --    CREATININE  0.6   --    --   0.6   --    CALCIUM  8.8   --    --   9.0   --    MG  1.7   --    --   1.4*   --      Recent Labs   Lab  08/28/18 0208 08/29/18 0318   WBC  6.99  6.25   HGB  8.8*  8.5*   HCT  27.3*  26.0*   PLT  310  290     Recent Labs   Lab  08/28/18 0208 08/29/18 0318   INR  1.1  1.1     Microbiology Results (last 7 days)     Procedure Component Value Units Date/Time    CSF culture [[id number]] Collected:  08/18/18 1354    Order Status:  Completed Specimen:  CSF (Spinal Fluid) from CSF Shunt Updated:  08/23/18 0713     CSF CULTURE No Growth     Gram Stain Result Rare WBC's      No organisms seen        Significant Diagnostics:  I have reviewed all pertinent imaging results/findings within the past 24 hours.

## 2018-08-29 NOTE — ASSESSMENT & PLAN NOTE
Patient is a 32 year old female with medical history of HTN, Renal disorder, polycystic kidney disease who was transferred from [facility] with diffuse SAH.  Imaging identified an anterior communicating aneurysm which was successfully coiled 8/10.  On 8/16, patient developed vasospasm and was taken to IR and treated with verapamil.  She continues with mild and moderate vasospasm, but has no clinical deficits.  Reported HA and stiff neck improving.  She is receiving nimodipine and daily TCDs with close monitoring.  TCDs elevated but she remains asymptomatic.  EVD removed 8/27. Continuing Nimotop X 21 days. Course to be completed on 8/31.     Antithrombotics for secondary stroke prevention: none SAH     Statins for secondary stroke prevention and hyperlipidemia, if present: SAH     Aggressive risk factor modification: polycytsic kidney disease, HTN      Rehab efforts: Occupational Therapy, PT/OT/SLP to evaluate and treat when appropriate     Diagnostics ordered/pending: Daily TCDs    VTE prophylaxis: SCDs    BP parameters: SAH: Secured aneurysm, no target, increase BP to prevent vasospasm if needed

## 2018-08-29 NOTE — PLAN OF CARE
Problem: Occupational Therapy Goal  Goal: Occupational Therapy Goal  Goals to be met by: 9/4    UE Dressing while standing with Set-up Assistance and Stand-by Assistance. MET EOB to don gown as jacket  LE Dressing (socks, brief) with Contact Guard Assistance. Met for socks  *revised: set up and supervision for pants, brief.   Grooming while standing with Contact Guard Assistance. Progressing  Stand pivot transfers with Contact Guard Assistance. MET  *revised with supervision   Functional dynamic standing task to simulate home return/ with SBA.   Functional mobility at short household distance for ADL task with min(A) and vitals WFL.  Upper extremity exercise program x15 reps per handout, with assistance as needed.    Outcome: Ongoing (interventions implemented as appropriate)  Goals remain appropriate. GARRET White 8/29/2018

## 2018-08-29 NOTE — PT/OT/SLP PROGRESS
Occupational Therapy   Treatment    Name: Sharon Grande  MRN: [id number]  Admitting Diagnosis:  Subarachnoid hemorrhage from anterior communicating artery aneurysm  19 Days Post-Op    Recommendations:     Discharge Recommendations: rehabilitation facility  Discharge Equipment Recommendations:  (TBD at next level or with progression in care)  Barriers to discharge:  Decreased caregiver support    Subjective     Communicated with: RN prior to session. Pt agreeable to therapy session.   Pain/Comfort:  · Pain Rating 1: 0/10  · Pain Addressed 1: Pre-medicate for activity  · Pain Rating Post-Intervention 1: 0/10    Patients cultural, spiritual, Congregation conflicts given the current situation: None    Objective:     Patient found with: bed alarm, blood pressure cuff,telemetry, peripheral IV  General Precautions: Standard, aspiration, fall, seizure  Orthopedic Precautions:N/A   Braces: N/A     Occupational Performance:    Bed Mobility:    · Patient completed Supine to Sit with modified independence, with side rail and requires HOB elevated     Functional Mobility/Transfers:  · Patient completed Sit <> Stand Transfer with contact guard assistance  with  no assistive device   · Patient completed Bed <> Chair Transfer using Step Transfer technique with contact guard assistance with no assistive device  · Functional Mobility: CGA-min(A) for ~10 feet to/from bathroom for standing grooming  · Mild L LOB; requires cues for re gaining balance control    Activities of Daily Living:  · Grooming:   · minimum assistance standing at sink for ~3 min duration ; requiring L UE support at sink; L lateral lean  · Requesting return to sitting 2/2 fatigue  · Set up and SBA while seated in chair for application of lotion to B LE  · Upper Body Dressing: set up and SBA while seated EOB to don gown as jacket    · Lower Body Dressing: contact guard assistance set up while seated in chair for doff/don B socks in 4 point position    Patient left up in  chair with all lines intact, call button in reach, RN notified and neurosx MD and PA present    [facility] 6 Click:  [facility] Total Score: 19     Vitals:    08/29/18 1000   BP:    Pulse: 64   Resp: 16   Temp:        Treatment & Education:  -Pt alert and oriented x4; agreeable to therapy session  -OT to open shade in room   -Communication board updated; questions/concerns addressed within OT scope of practice  -no family at bedside for education     Therapeutic Exercise: chest press and B LE marching/hip flex   Modality Used: rolled towel for B UE chest press; completed for endurance training  # of Sets: 2  # of Reps: 15  Endurance Level: fair  Where Completed: seated in bedside chair  *required rest breaks to complete     Education:    Assessment:     Sharon Grande is a 32 y.o. female with a medical diagnosis of Subarachnoid hemorrhage from anterior communicating artery aneurysm.  She presents with overall decline in endurance and decline in dynamic balance at this time. She is unable to safety return to roles and routines at this time. She would best benefit from Rehab prior to return home for best safety and functional outcomes with progression in care.  Performance deficits affecting function are impaired endurance, weakness, gait instability, impaired balance, impaired self care skills, impaired functional mobilty, pain, impaired cardiopulmonary response to activity.      Rehab Prognosis:  Good; patient would benefit from acute skilled OT services to address these deficits and reach maximum level of function.       Plan:     Patient to be seen 4 x/week to address the above listed problems via self-care/home management, therapeutic activities, therapeutic exercises, neuromuscular re-education  · Plan of Care Expires: 09/10/18  · Plan of Care Reviewed with: patient    This Plan of care has been discussed with the patient who was involved in its development and understands and is in agreement with the identified goals and  treatment plan    GOALS:   Multidisciplinary Problems     Occupational Therapy Goals        Problem: Occupational Therapy Goal    Goal Priority Disciplines Outcome Interventions   Occupational Therapy Goal     OT, PT/OT Ongoing (interventions implemented as appropriate)    Description:  Goals to be met by: 9/4    UE Dressing while standing with Set-up Assistance and Stand-by Assistance. MET EOB to don gown as jacket  LE Dressing (socks, brief) with Contact Guard Assistance. Met for socks  *revised: set up and supervision for pants, brief.   Grooming while standing with Contact Guard Assistance. Progressing  Stand pivot transfers with Contact Guard Assistance. MET  *revised with supervision   Functional dynamic standing task to simulate home return/ with SBA.   Functional mobility at short household distance for ADL task with min(A) and vitals WFL.  Upper extremity exercise program x15 reps per handout, with assistance as needed.                      Time Tracking:     OT Date of Treatment: 08/29/18  OT Start Time: 0910  OT Stop Time: 0927  OT Total Time (min): 17 min    Billable Minutes:Therapeutic Activity 17    GARRET White  8/29/2018

## 2018-08-29 NOTE — PROGRESS NOTES
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  Imaging identified an anterior communicating aneurysm which was successfully coiled 8/10.  On 8/16, patient developed vasospasm and was taken to IR and treated with verapamil.  She continues with mild and moderate vasospasm, but has no clinical deficits.  Reported HA and stiff neck improving.  She is receiving nimodipine and daily TCDs with close monitoring.  TCDs elevated but she remains asymptomatic.      EVD removed 8/27. Continuing Nimotop X 21 days. Course to be completed on 8/31. Last TCD that could be reviewed was completed on 8/25. Patient okay to step down per neurosurgery. We will sign off. Please let us know if there are any changes in TCDs or further concerns.     Antithrombotics for secondary stroke prevention: none SAH     Statins for secondary stroke prevention and hyperlipidemia, if present: SAH     Aggressive risk factor modification: polycytsic kidney disease, HTN      Rehab efforts: Occupational Therapy, PT/OT/SLP to evaluate and treat when appropriate     Diagnostics ordered/pending: Daily TCDs    VTE prophylaxis: SCDs    BP parameters: SAH: Secured aneurysm, no target, increase BP to prevent vasospasm if needed             Cerebral artery vasospasm    -Monitor daily TCD's  -Nimotop  -close monitoring          25 weeks gestation of pregnancy    Per [facility] recommendations        Hypertension affecting pregnancy in second trimester    Risk factor for stroke  -Close monitoring  - Currently on no antihypertensive medications           PKD (polycystic kidney disease)    Risk factor for aneurysm              31 y/o female found down @~24w5d admitted for  management of subarachnoid hemmorhage likely due to ruptured aneurysm  Had coiling done ACOM 8-10-18  8-16-18 to IR for cerebral angiogram for vasospasm and had IV Verapamil instilled   8-19-18 with headache TCD's better yeaterday  8/20:  Continues with HA worse with positional movement.  No neurological deficits.  TCDs for today pending.  Yesterday demonstrated multiple areas of mild and moderate vasospasm with no clinical deterioration.  NIH remains 0.    8/21: Continues with headache and stiff neck.  No new neuro symptoms.  TCDs ratios improved some, but continues with vasospasm with no clinical deterioration.  On radha drip to keep -220.  8/27/18 - MRI showing area of restricted diffusion in R Basal ganglia, when considering the neuro exam on [place], suspect this is related to artifact from cathether   8/28:  Doing well.  HA and stiff neck improving.  Able to move around in bed more comfortably.  EVD removed 8/27.  Plan for stepdown.   8/29: Per neurosurgery note patient okay to step down. Continuing Nimotop, to be completed on 8/31. Doing well on exam today.     STROKE DOCUMENTATION        NIH Scale:  1a. Level Of Consciousness: 0-->Alert: keenly responsive  1b. LOC Questions: 0-->Answers both questions correctly  1c. LOC Commands: 0-->Performs both tasks correctly  2. Best Gaze: 0-->Normal  3. Visual: 0-->No visual loss  4. Facial Palsy: 0-->Normal symmetrical movements  5a. Motor Arm, Left: 0-->No drift: limb holds 90 (or 45) degrees for full 10 secs  5b. Motor Arm, Right: 0-->No drift: limb holds 90 (or 45) degrees for full 10 secs  6a. Motor Leg, Left: 0-->No drift: leg holds 30 degree position for full 5 secs  6b. Motor Leg, Right: 0-->No drift: leg holds 30 degree position for full 5 secs  7. Limb Ataxia: 0-->Absent  8. Sensory: 0-->Normal: no sensory loss  9. Best Language: 0-->No aphasia: normal  10. Dysarthria: 0-->Normal  11. Extinction and Inattention (formerly Neglect): 0-->No  abnormality  Total (NIH Stroke Scale): 0       Modified [place] Score: 1  [place] Coma Scale:    ABCD2 Score:    [id number] Score:   HAS -BLED Score:   ICH Score:   Hunt & Collins Classification:Grade III     Hemorrhagic change of an Ischemic Stroke: Does this patient have an ischemic stroke with hemorrhagic changes? No     Neurologic Chief Complaint: SAH    Subjective:     Interval History: Patient is seen for follow-up neurological assessment and treatment recommendations:     Per neurosurgery note patient okay to step down. Continuing Nimotop, to be completed on 8/31. Doing well on exam today.     HPI, Past Medical, Family, and Social History remains the same as documented in the initial encounter. Just c/o headache     Review of Systems   Constitutional: Negative for fever.   HENT: Negative for trouble swallowing.    Respiratory: Negative for shortness of breath.    Cardiovascular: Negative for chest pain.   Neurological: Positive for headaches. Negative for speech difficulty and weakness.     Scheduled Meds:   famotidine  20 mg Oral Daily    heparin (porcine)  5,000 Units Subcutaneous Q8H    niMODipine  30 mg Oral Q2H    polyethylene glycol  17 g Oral Daily    prenatal vits96-iron fum-folic  1 tablet Oral Daily    senna-docusate 8.6-50 mg  1 tablet Oral BID    sodium chloride 0.9%  10 mL Intravenous Q6H    sodium chloride  2 g Oral TID     Continuous Infusions:    PRN Meds:sodium chloride, acetaminophen, labetalol, magnesium oxide, magnesium oxide, oxyCODONE, potassium chloride 10%, potassium chloride 10%, potassium chloride 10%, potassium, sodium phosphates, potassium, sodium phosphates, potassium, sodium phosphates, Flushing PICC Protocol **AND** sodium chloride 0.9% **AND** sodium chloride 0.9%, sodium chloride 0.9%    Objective:     Vital Signs (Most Recent):  Temp: 98.2 °F (36.8 °C) (08/29/18 1100)  Pulse: 64 (08/29/18 1200)  Resp: 20 (08/29/18 1200)  BP: (!) 146/79 (08/29/18 1100)  SpO2: 100 %  (08/29/18 1200)  BP Location: Left arm    Vital Signs Range (Last 24H):  Temp:  [98.2 °F (36.8 °C)-98.9 °F (37.2 °C)]   Pulse:  [58-84]   Resp:  [13-33]   BP: (104-154)/()   SpO2:  [99 %-100 %]   BP Location: Left arm    Physical Exam   Constitutional: She appears well-developed and well-nourished.   Pregnant   Pulmonary/Chest: No respiratory distress.   Skin: Skin is warm and dry.   Psychiatric: She has a normal mood and affect. Her behavior is normal.   Nursing note and vitals reviewed.  EVD removed 8/27    Neurological Exam:   LOC: alert  Attention Span: Good   Language: No aphasia  Articulation: No dysarthria  Orientation: Person, Place, Time   Visual Fields: Full  EOM (CN III, IV, VI): Full/intact  Pupils (CN II, III): PERRL  Facial Movement (CN VII): Symmetric facial expression    Motor: Arm left  Normal 5/5  Leg left  Normal 5/5  Arm right  Normal 5/5  Leg right Normal 5/5  Sensation: Intact to light touch, temperature and vibration  Tone: Normal tone throughout    Laboratory:  CMP:   Recent Labs   Lab  08/29/18 0318 08/29/18   1116   CALCIUM  9.0   --    ALBUMIN  2.4*   --    PROT  5.9*   --    NA  137  137  134*   K  4.0   --    CO2  24   --    CL  106   --    BUN  11   --    CREATININE  0.6   --    ALKPHOS  67   --    ALT  13   --    AST  12   --    BILITOT  0.2   --      CBC:   Recent Labs   Lab  08/29/18 0318   WBC  6.25   RBC  2.89*   HGB  8.5*   HCT  26.0*   PLT  290   MCV  90   MCH  29.4   MCHC  32.7     Lipid Panel: No results for input(s): CHOL, LDLCALC, HDL, TRIG in the last 168 hours.  Coagulation:   Recent Labs   Lab  08/29/18 0318   INR  1.1     Platelet Aggregation Study: No results for input(s): PLTAGG, PLTAGINTERP, PLTAGREGLACO, ADPPLTAGGREG in the last 168 hours.  Hgb A1C: No results for input(s): HGBA1C in the last 168 hours.  TSH: No results for input(s): TSH in the last 168 hours.    Diagnostic Results     Brain Imaging   CT Head w/o contrast 8-10-18 results:    1. Extensive  subarachnoid hemorrhage, most likely from an aneurysm rupture.  It is difficult to determine the site of the aneurysm rupture on this study due to the diffuse nature of the subarachnoid hemorrhage throughout the posterior fossa as well as the basilar cisternal spaces.  2. Slight increase in the ventricular size when compared to the previous study suggestive of a developing hydrocephalus.    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  Successful coil embolization of this aneurysm.     Cardiac Imaging   2D Echo 8-11-18 results:    1 - Eccentric hypertrophy.     2 - Normal left ventricular systolic function (EF 60-65%).     3 - No wall motion abnormalities.     4 - Normal left ventricular diastolic function.     5 - Normal right ventricular systolic function .     6 - The estimated PA systolic pressure is 31 mmHg.     7 - Trivial pericardial effusion.     MRI 8/27/18 -   Interval operative change with right frontal ventricular catheter placement.  There is slight reduced distension of the lateral and 3rd ventricles compared to prior which remain mildly prominent.    Previous intraventricular and subarachnoid hemorrhage overall less apparent possibly related to differences in technique.  There is no definite significant new hemorrhage.    There is a punctate focus of diffusion restriction in the right basal ganglia which may be artifactual from adjacent coursing ventricular catheter however small area of recent infarction not excluded.  Clinical correlation and follow-up advised.      Steffany Crawford NP  [facility]  Department of Vascular Neurology   [facility]

## 2018-08-29 NOTE — ASSESSMENT & PLAN NOTE
32 y.o. female 26 weeks pregnant with history of polycystic kidney disease with HH4F4 SAH on 8/10, now s/p acom coiling 8/10.    NEURO   -Continue NCC   -q 1 hr neuro checks while in unit   -Patient stable for TTF (orders in)  -nimotop X21d  -No more daily TCDs  -off of Keppra    CV  -permissive HTN  <220 as aneurysm secured    RESP  -stable on room air  -Aggressive pulmonary management  -IS @ bedside    FEN/GI  -Na tabs  -strict I/Os  -goal net even or positive  -Reg diet    Heme/ID:  -CBC daily, transfuse PRN    PPX  -SQH  -famotidine for PUD ppx  -TEDs/SCDs    -further mgmt per ncc and obgyn.

## 2018-08-29 NOTE — ASSESSMENT & PLAN NOTE
32 y.o. female 24 weeks pregnant with history of polycystic kidney disease with HH4F4 SAH on 8/10, now s/p acom coiling 8/10. PBD/PCD 16.     NEURO  -Continue NCC   -q 1 hr neuro checks while in unit   -Patient stable for TTF  -nimotop X21d  -May stop daily TCDs  -off of Keppra    CV  -permissive HTN  <220 as aneurysm secured    RESP  -stable on room air  -Aggressive pulmonary management  -IS @ bedside    FEN/GI  -Na tabs  -strict I/Os  -goal net even or positive  -Reg diet    Heme/ID:  -CBC daily, transfuse PRN    PPX  -SQH  -famotidine for PUD ppx  -TEDs/SCDs    -further mgmt per ncc and obgyn.

## 2018-08-29 NOTE — PROGRESS NOTES
[facility]  Neurosurgery  Progress Note    Subjective:     History of Present Illness: 32 y.o. female 24 weeks pregnant with a history of polycystic kidney disease who presents as transfer from [facility] for SAH. Patient found down by 6 yr old son. Last known normal 0500 today. CT at [facility] revealed SAH within the basal cisterns. US at [facility] with + fetal heart tones. Transferred for neuro evaluation.     Post-Op Info:  Procedure(s) (LRB):  Mri (magnetic resonance imaging) (N/A)   19 Days Post-Op     Interval History: NAEON, exam stable, TTF orders in and pending transfer, also pending dispo to [facility]    Medications:  Continuous Infusions:    Scheduled Meds:   famotidine  20 mg Oral Daily    heparin (porcine)  5,000 Units Subcutaneous Q8H    niMODipine  30 mg Oral Q2H    polyethylene glycol  17 g Oral Daily    prenatal vits96-iron fum-folic  1 tablet Oral Daily    senna-docusate 8.6-50 mg  1 tablet Oral BID    sodium chloride 0.9%  10 mL Intravenous Q6H    sodium chloride  2 g Oral TID     PRN Meds:sodium chloride, acetaminophen, labetalol, magnesium oxide, magnesium oxide, oxyCODONE, potassium chloride 10%, potassium chloride 10%, potassium chloride 10%, potassium, sodium phosphates, potassium, sodium phosphates, potassium, sodium phosphates, Flushing PICC Protocol **AND** sodium chloride 0.9% **AND** sodium chloride 0.9%, sodium chloride 0.9%     Review of Systems    Objective:     Weight: 77.5 kg (170 lb 13.7 oz)  Body mass index is 25.23 kg/m².  Vital Signs (Most Recent):  Temp: (P) 98.7 °F (37.1 °C) (08/29/18 1500)  Pulse: 64 (08/29/18 1700)  Resp: 20 (08/29/18 1700)  BP: (!) 154/93 (08/29/18 1700)  SpO2: 100 % (08/29/18 1700) Vital Signs (24h Range):  Temp:  [98.2 °F (36.8 °C)-98.9 °F (37.2 °C)] (P) 98.7 °F (37.1 °C)  Pulse:  [57-87] 64  Resp:  [13-33] 20  SpO2:  [99 %-100 %] 100 %  BP: (104-177)/() 154/93     Date 08/29/18 0700 - 08/30/18 0659   Shift 4535-6158 1133-9135 4384-4392 24 Hour  Total   INTAKE   Shift Total(mL/kg)       OUTPUT   Urine(mL/kg/hr) 200(0.3)   200   Shift Total(mL/kg) 200(2.6)   200(2.6)   Weight (kg) 77.5 77.5 77.5 77.5                        ICP/Ventriculostomy 08/11/18 0000 Ventricular drainage catheter with ICP microsensor Right Other (Comment) (Active)   Level of Ventriculostomy (cm above) 10 8/24/2018 11:01 AM   Status Open to drainage 8/24/2018 11:01 AM   Site Assessment Clean;Dry 8/24/2018 11:01 AM   Site Drainage Clear 8/24/2018 11:01 AM   Waveform normal waveform;A-waves 8/24/2018 11:01 AM   Output (mL) 3 mL 8/24/2018 11:01 AM   CSF Color clear 8/24/2018 11:01 AM   Dressing Status Biopatch in place;Clean;Dry;Intact 8/24/2018 11:01 AM   Interventions HOB degrees;bed controls locked;level adjusted per order 8/24/2018 11:01 AM       Female External Urinary Catheter 08/20/18 0700 (Active)   Skin perineum cleansed w/ soap and water 8/24/2018 11:01 AM   Tolerance no signs/symptoms of discomfort 8/24/2018 11:01 AM   Suction Other 8/24/2018 11:01 AM   Date of last wick change 08/24/18 8/24/2018 11:01 AM   Time of last wick change 1101 8/24/2018 11:01 AM   Output (mL) 250 mL 8/24/2018  8:41 AM       Neurosurgery Physical Exam     AOX3, GCS 15  speech fluent  PERRL, EOMI, face symm, tongue midline  HOBSON symmetrically  No drift    Significant Labs:  Recent Labs   Lab  08/28/18   0208  08/28/18   1211  08/28/18   1931  08/29/18   0318  08/29/18   1116   GLU  97   --    --   83   --    NA  134*  134*  134*  135*  137  137  134*   K  3.6  3.7   --   4.0   --    CL  104   --    --   106   --    CO2  25   --    --   24   --    BUN  13   --    --   11   --    CREATININE  0.6   --    --   0.6   --    CALCIUM  8.8   --    --   9.0   --    MG  1.7   --    --   1.4*   --      Recent Labs   Lab  08/28/18 0208  08/29/18 0318   WBC  6.99  6.25   HGB  8.8*  8.5*   HCT  27.3*  26.0*   PLT  310  290     Recent Labs   Lab  08/28/18 0208 08/29/18 0318   INR  1.1  1.1     Microbiology  Results (last 7 days)     Procedure Component Value Units Date/Time    CSF culture [[id number]] Collected:  08/18/18 1354    Order Status:  Completed Specimen:  CSF (Spinal Fluid) from CSF Shunt Updated:  08/23/18 0738     CSF CULTURE No Growth     Gram Stain Result Rare WBC's      No organisms seen        Significant Diagnostics:  I have reviewed all pertinent imaging results/findings within the past 24 hours.    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 female 26 weeks pregnant with history of polycystic kidney disease with HH4F4 SAH on 8/10, now s/p acom coiling 8/10.    NEURO   -Continue NCC   -q 1 hr neuro checks while in unit   -Patient stable for TTF (orders in)  -nimotop X21d  -No more daily TCDs  -off of Keppra    CV  -permissive HTN  <220 as aneurysm secured    RESP  -stable on room air  -Aggressive pulmonary management  -IS @ bedside    FEN/GI  -Na tabs  -strict I/Os  -goal net even or positive  -Reg diet    Heme/ID:  -CBC daily, transfuse PRN    PPX  -SQH  -famotidine for PUD ppx  -TEDs/SCDs    -further mgmt per ncc and obgyn.            Josue Thomas MD  Neurosurgery  [facility]

## 2018-08-30 LAB
ALBUMIN SERPL BCP-MCNC: 2.4 G/DL
ALP SERPL-CCNC: 72 U/L
ALT SERPL W/O P-5'-P-CCNC: 11 U/L
ANION GAP SERPL CALC-SCNC: 8 MMOL/L
AST SERPL-CCNC: 10 U/L
BASOPHILS # BLD AUTO: 0.02 K/UL
BASOPHILS NFR BLD: 0.3 %
BILIRUB SERPL-MCNC: 0.2 MG/DL
BUN SERPL-MCNC: 14 MG/DL
CALCIUM SERPL-MCNC: 9 MG/DL
CHLORIDE SERPL-SCNC: 105 MMOL/L
CO2 SERPL-SCNC: 23 MMOL/L
CREAT SERPL-MCNC: 0.6 MG/DL
DIFFERENTIAL METHOD: ABNORMAL
EOSINOPHIL # BLD AUTO: 0.1 K/UL
EOSINOPHIL NFR BLD: 0.9 %
ERYTHROCYTE [DISTWIDTH] IN BLOOD BY AUTOMATED COUNT: 17.5 %
EST. GFR  (AFRICAN AMERICAN): >60 ML/MIN/1.73 M^2
EST. GFR  (NON AFRICAN AMERICAN): >60 ML/MIN/1.73 M^2
GLUCOSE SERPL-MCNC: 81 MG/DL
HCT VFR BLD AUTO: 26.9 %
HGB BLD-MCNC: 8.7 G/DL
IMM GRANULOCYTES # BLD AUTO: 0.05 K/UL
IMM GRANULOCYTES NFR BLD AUTO: 0.8 %
INR PPP: 1
LYMPHOCYTES # BLD AUTO: 1.3 K/UL
LYMPHOCYTES NFR BLD: 20.5 %
MAGNESIUM SERPL-MCNC: 1.8 MG/DL
MCH RBC QN AUTO: 29.1 PG
MCHC RBC AUTO-ENTMCNC: 32.3 G/DL
MCV RBC AUTO: 90 FL
MONOCYTES # BLD AUTO: 0.6 K/UL
MONOCYTES NFR BLD: 9.5 %
NEUTROPHILS # BLD AUTO: 4.5 K/UL
NEUTROPHILS NFR BLD: 68 %
NRBC BLD-RTO: 0 /100 WBC
PHOSPHATE SERPL-MCNC: 3.2 MG/DL
PLATELET # BLD AUTO: 300 K/UL
PMV BLD AUTO: 10.9 FL
POTASSIUM SERPL-SCNC: 4.1 MMOL/L
PROT SERPL-MCNC: 6.1 G/DL
PROTHROMBIN TIME: 10.6 SEC
RBC # BLD AUTO: 2.99 M/UL
SODIUM SERPL-SCNC: 134 MMOL/L
SODIUM SERPL-SCNC: 136 MMOL/L
SODIUM SERPL-SCNC: 136 MMOL/L
SODIUM SERPL-SCNC: 137 MMOL/L
WBC # BLD AUTO: 6.55 K/UL

## 2018-08-30 PROCEDURE — 85610 PROTHROMBIN TIME: CPT

## 2018-08-30 PROCEDURE — 93005 ELECTROCARDIOGRAM TRACING: CPT

## 2018-08-30 PROCEDURE — 84295 ASSAY OF SERUM SODIUM: CPT | Mod: 91

## 2018-08-30 PROCEDURE — 25000003 PHARM REV CODE 250: Performed by: PHYSICIAN ASSISTANT

## 2018-08-30 PROCEDURE — 99232 SBSQ HOSP IP/OBS MODERATE 35: CPT | Mod: 25,,, | Performed by: OBSTETRICS & GYNECOLOGY

## 2018-08-30 PROCEDURE — 36415 COLL VENOUS BLD VENIPUNCTURE: CPT

## 2018-08-30 PROCEDURE — 84100 ASSAY OF PHOSPHORUS: CPT

## 2018-08-30 PROCEDURE — 59025 FETAL NON-STRESS TEST: CPT | Mod: 26,,, | Performed by: OBSTETRICS & GYNECOLOGY

## 2018-08-30 PROCEDURE — 63600175 PHARM REV CODE 636 W HCPCS: Performed by: PHYSICIAN ASSISTANT

## 2018-08-30 PROCEDURE — A4216 STERILE WATER/SALINE, 10 ML: HCPCS | Performed by: PSYCHIATRY & NEUROLOGY

## 2018-08-30 PROCEDURE — 25000003 PHARM REV CODE 250: Performed by: STUDENT IN AN ORGANIZED HEALTH CARE EDUCATION/TRAINING PROGRAM

## 2018-08-30 PROCEDURE — 97116 GAIT TRAINING THERAPY: CPT

## 2018-08-30 PROCEDURE — 83735 ASSAY OF MAGNESIUM: CPT

## 2018-08-30 PROCEDURE — 25000003 PHARM REV CODE 250: Performed by: PSYCHIATRY & NEUROLOGY

## 2018-08-30 PROCEDURE — 80053 COMPREHEN METABOLIC PANEL: CPT

## 2018-08-30 PROCEDURE — 20600001 HC STEP DOWN PRIVATE ROOM

## 2018-08-30 PROCEDURE — 25000003 PHARM REV CODE 250: Performed by: NURSE PRACTITIONER

## 2018-08-30 PROCEDURE — 85025 COMPLETE CBC W/AUTO DIFF WBC: CPT

## 2018-08-30 PROCEDURE — 93010 ELECTROCARDIOGRAM REPORT: CPT | Mod: ,,, | Performed by: INTERNAL MEDICINE

## 2018-08-30 RX ADMIN — NIMODIPINE 30 MG: 30 CAPSULE, LIQUID FILLED ORAL at 03:08

## 2018-08-30 RX ADMIN — Medication 10 ML: at 01:08

## 2018-08-30 RX ADMIN — NIMODIPINE 30 MG: 30 CAPSULE, LIQUID FILLED ORAL at 10:08

## 2018-08-30 RX ADMIN — HEPARIN SODIUM 5000 UNITS: 5000 INJECTION, SOLUTION INTRAVENOUS; SUBCUTANEOUS at 03:08

## 2018-08-30 RX ADMIN — NIMODIPINE 30 MG: 30 CAPSULE, LIQUID FILLED ORAL at 01:08

## 2018-08-30 RX ADMIN — Medication 10 ML: at 06:08

## 2018-08-30 RX ADMIN — ACETAMINOPHEN 650 MG: 325 TABLET, FILM COATED ORAL at 04:08

## 2018-08-30 RX ADMIN — NIMODIPINE 30 MG: 30 CAPSULE, LIQUID FILLED ORAL at 12:08

## 2018-08-30 RX ADMIN — ACETAMINOPHEN 325 MG: 325 TABLET, FILM COATED ORAL at 09:08

## 2018-08-30 RX ADMIN — NIMODIPINE 30 MG: 30 CAPSULE, LIQUID FILLED ORAL at 02:08

## 2018-08-30 RX ADMIN — SODIUM CHLORIDE TAB 1 GM 2 G: 1 TAB at 03:08

## 2018-08-30 RX ADMIN — ACETAMINOPHEN 650 MG: 325 TABLET, FILM COATED ORAL at 09:08

## 2018-08-30 RX ADMIN — FAMOTIDINE 20 MG: 20 TABLET ORAL at 09:08

## 2018-08-30 RX ADMIN — NIMODIPINE 30 MG: 30 CAPSULE, LIQUID FILLED ORAL at 06:08

## 2018-08-30 RX ADMIN — SODIUM CHLORIDE TAB 1 GM 2 G: 1 TAB at 09:08

## 2018-08-30 RX ADMIN — OXYCODONE HYDROCHLORIDE 5 MG: 5 TABLET ORAL at 12:08

## 2018-08-30 RX ADMIN — NIMODIPINE 30 MG: 30 CAPSULE, LIQUID FILLED ORAL at 08:08

## 2018-08-30 RX ADMIN — PRENATAL VIT W/ FE FUMARATE-FA TAB 27-0.8 MG 1 TABLET: 27-0.8 TAB at 09:08

## 2018-08-30 RX ADMIN — NIMODIPINE 30 MG: 30 CAPSULE, LIQUID FILLED ORAL at 11:08

## 2018-08-30 RX ADMIN — HEPARIN SODIUM 5000 UNITS: 5000 INJECTION, SOLUTION INTRAVENOUS; SUBCUTANEOUS at 09:08

## 2018-08-30 RX ADMIN — Medication 10 ML: at 12:08

## 2018-08-30 RX ADMIN — HEPARIN SODIUM 5000 UNITS: 5000 INJECTION, SOLUTION INTRAVENOUS; SUBCUTANEOUS at 06:08

## 2018-08-30 RX ADMIN — NIMODIPINE 30 MG: 30 CAPSULE, LIQUID FILLED ORAL at 04:08

## 2018-08-30 NOTE — PROGRESS NOTES
"MFM Follow Up  Progress Note    S: Patient reports she feels well. She has a mild HA on and off, but says "It's nothing like it was." She has had no vaginal bleeding, leakage of fluids, or contractions. She reports active fetal movement consistently. She says she had not established prenatal care, but has seen a Dr. Dragan Owens at [facility] and would like to see him again. Also discussed follow-up with MFM and likely recommendation for felnikolai at [facility]. She was amenable.     O:  Temp:  [97.9 °F (36.6 °C)-98.7 °F (37.1 °C)] 97.9 °F (36.6 °C)  Pulse:  [57-93] 59  Resp:  [15-20] 20  SpO2:  [98 %-100 %] 99 %  BP: (116-177)/(64-95) 116/64    Gen/Neuro: Awake and alert, appropriate interactions, no obvious neurological deficits  CV: RRR, no murmur  Respiratory: Lungs CTA bilaterally  Abdomen: Soft, nontender. Gravid. Size c/w gestational age  Extremities: No edema or cyanosis, intact pulses bilaterally    A/P:  Per primary team, patient stable and would be able to tolerate surgical intervention for fetal indications.   NST today was reactive and reassuring  Patient was given bleeding/leakage of fluid precautions and counseled on kick counts  If patient stays in house, will continue routine PNC with weekly visits, however anticipate discharge this week  Will arrange follow-up for her to establish prenatal care near her home on the [facility], and will recommend MFM follow up  Discussed with patient recommendation for delivery at [facility] when indicated    Virgilio Gracia M.D.  Obstetrics & Gynecology  PGY-2  "

## 2018-08-30 NOTE — SUBJECTIVE & OBJECTIVE
Medications:  Continuous Infusions:  Scheduled Meds:   famotidine  20 mg Oral Daily    heparin (porcine)  5,000 Units Subcutaneous Q8H    niMODipine  30 mg Oral Q2H    polyethylene glycol  17 g Oral Daily    prenatal vits96-iron fum-folic  1 tablet Oral Daily    senna-docusate 8.6-50 mg  1 tablet Oral BID    sodium chloride 0.9%  10 mL Intravenous Q6H    sodium chloride  2 g Oral TID     PRN Meds:sodium chloride, acetaminophen, labetalol, magnesium oxide, magnesium oxide, oxyCODONE, potassium chloride 10%, potassium chloride 10%, potassium chloride 10%, potassium, sodium phosphates, potassium, sodium phosphates, potassium, sodium phosphates, Flushing PICC Protocol **AND** sodium chloride 0.9% **AND** sodium chloride 0.9%, sodium chloride 0.9%     Review of Systems  Objective:     Weight: 78.3 kg (172 lb 9.9 oz)  Body mass index is 25.49 kg/m².  Vital Signs (Most Recent):  Temp: 97.4 °F (36.3 °C) (08/30/18 1141)  Pulse: 65 (08/30/18 1531)  Resp: 20 (08/30/18 1141)  BP: 126/76 (08/30/18 1141)  SpO2: 96 % (08/30/18 1141) Vital Signs (24h Range):  Temp:  [97.4 °F (36.3 °C)-98.6 °F (37 °C)] 97.4 °F (36.3 °C)  Pulse:  [59-93] 65  Resp:  [18-20] 20  SpO2:  [96 %-100 %] 96 %  BP: (116-154)/(64-93) 126/76                           Physical Exam:    Eyes: Pupils are equal, round, and reactive to light. EOM are normal.     Cardiovascular: Normal rate.     Abdominal: Soft.     Psych/Behavior: She is alert. She is oriented to person, place, and time.     Musculoskeletal:        Neck: Tone is normal.        Back: Tone is normal.        Right Upper Extremities: Muscle strength is 5/5. Tone is normal.        Left Upper Extremities: Muscle strength is 5/5. Tone is normal.       Right Lower Extremities: Muscle strength is 5/5. Tone is normal.        Left Lower Extremities: Muscle strength is 5/5. Tone is normal.     Neurological:        Coordination: She has normal finger to nose coordination.        DTRs: Tricep reflexes  are 2+ on the right side and 2+ on the left side. Bicep reflexes are 2+ on the right side and 2+ on the left side. Brachioradialis reflexes are 2+ on the right side and 2+ on the left side. Patellar reflexes are 2+ on the right side and 2+ on the left side. Achilles reflexes are 2+ on the right side and 2+ on the left side. She displays no Babinski's sign on the right side. She displays no Babinski's sign on the left side.        Cranial nerves: Cranial nerve(s) II, III, IV, V, VI, VII, VIII, IX, X, XI and XII are intact.   EVD wound c/d/i, groin c/d/i  2+ dorsalis pedis pulses bilaterally       Significant Labs:  Recent Labs   Lab  08/29/18 0318 08/29/18 2126 08/30/18 0347 08/30/18   1108   GLU  83   --    --   81   --    NA  137  137   < >  137  136  136  134*   K  4.0   --    --   4.1   --    CL  106   --    --   105   --    CO2  24   --    --   23   --    BUN  11   --    --   14   --    CREATININE  0.6   --    --   0.6   --    CALCIUM  9.0   --    --   9.0   --    MG  1.4*   --    --   1.8   --     < > = values in this interval not displayed.     Recent Labs   Lab  08/29/18 0318 08/30/18 0347   WBC  6.25  6.55   HGB  8.5*  8.7*   HCT  26.0*  26.9*   PLT  290  300     Recent Labs   Lab  08/29/18 0318 08/30/18 0347   INR  1.1  1.0     Microbiology Results (last 7 days)     ** No results found for the last 168 hours. **        CMP:   Recent Labs   Lab  08/29/18 0318 08/29/18 2126 08/30/18 0347 08/30/18   1108   GLU  83   --    --   81   --    CALCIUM  9.0   --    --   9.0   --    ALBUMIN  2.4*   --    --   2.4*   --    PROT  5.9*   --    --   6.1   --    NA  137  137   < >  137  136  136  134*   K  4.0   --    --   4.1   --    CO2  24   --    --   23   --    CL  106   --    --   105   --    BUN  11   --    --   14   --    CREATININE  0.6   --    --   0.6   --    ALKPHOS  67   --    --   72   --    ALT  13   --    --   11   --    AST  12   --    --   10   --    BILITOT  0.2   --     --   0.2   --     < > = values in this interval not displayed.       Significant Diagnostics:  CT: No results found in the last 24 hours.  MRI: No results found in the last 24 hours.

## 2018-08-30 NOTE — ASSESSMENT & PLAN NOTE
32 y.o. female 26 weeks pregnant with history of polycystic kidney disease with HH4F4 SAH on 8/10, now s/p acom coiling 8/10.    NEURO   -floor status, neurochecks in accordance with unit protocol  -continue nimotop X21d    CV/RESP  -permissive hypertenion  -stable on room air  -Aggressive pulmonary management  -IS @ bedside    FEN/GI  -Na tabs  -strict I/Os  -goal net even or positive  -Reg diet    Heme/ID:  -CBC daily, transfuse PRN    PPX  -SQH  -famotidine for PUD ppx  -TEDs/SCDs    -further mgmt per obgyn.

## 2018-08-30 NOTE — PROGRESS NOTES
Pt transferred via wheelchair to NSU bed 741 A. Connected to tele box. VS stable. No signs of distess. Family has pt's belongings.

## 2018-08-30 NOTE — PROGRESS NOTES
POC reviewed with pt and family at 1400. Pt verbalized understanding. Questions and concerns addressed. No acute events today. Pt PICC removed. Pt to be transferred per order. Pt neuro and hemodynamically stable throughout the day. Pt requiring constant pain control. Pt progressing toward goals. Will continue to monitor. See flowsheets for full assessment and VS info.

## 2018-08-30 NOTE — PLAN OF CARE
Problem: Patient Care Overview  Goal: Plan of Care Review  Outcome: Ongoing (interventions implemented as appropriate)  Plan of care reviewed with patient upon arrival to floor, patient demonstrated understanding. ARELY SMYTH, neuro status stable. Patient ambulated well to bathroom, received medications per schedule, and remained free from falls, pain, or harm from shift. O2 and suction at bedside, bed alarm active, bed in lowest position, call bell in reach. Will continue to monitor.

## 2018-08-30 NOTE — PT/OT/SLP PROGRESS
Physical Therapy Treatment    Patient Name:  Sharon Grande   MRN:  [id number]    Recommendations:     Discharge Recommendations:  rehabilitation facility(pending progress)   Discharge Equipment Recommendations: shower chair   Barriers to discharge: None    Assessment:     Sharon Grande is a 32 y.o. 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  She presents with the following impairments/functional limitations:  weakness, impaired endurance, gait instability, impaired functional mobilty, impaired balance, impaired cardiopulmonary response to activity. Pt is demonstrating progress towards goals; however, she continues to present with significant endurance deficits and impaired standing balance. Although pt reports having strong family support, she has 5 dependent children to care for at home and will need to improve endurance, balance, and gait stability in order to return to PLOF. Pt would benefit from continued PT intervention to address listed deficits and maximize return to PLOF.     Rehab Prognosis:  good; patient would benefit from acute skilled PT services to address these deficits and reach maximum level of function.      Recent Surgery: Procedure(s) (LRB):  Mri (magnetic resonance imaging) (N/A) 20 Days Post-Op    Plan:     During this hospitalization, patient to be seen 4 x/week to address the above listed problems via gait training, therapeutic activities, therapeutic exercises, neuromuscular re-education  · Plan of Care Expires:  09/27/18   Plan of Care Reviewed with: patient    Subjective     Communicated with RN prior to session.  Patient found sitting up on couch upon PT entry to room, agreeable to treatment.  Pt expressed that she feels she will have necessary support upon discharge to assist with taking care of her and her children. Pt stated that she plans to stay with her grandmother or mother temporarily upon discharge, and stay on  "1st level of home (to avoid stairs); all of her children will be in school/day-care during the day, so she would like to discharge home and receive further therapy rather than going to rehab.     Chief Complaint: weakness   Patient comments/goals: "I want to go home, they told me I was ready."   Pain/Comfort:  · Pain Rating 1: 0/10  · Pain Rating Post-Intervention 1: 0/10    Patients cultural, spiritual, Sikhism conflicts given the current situation: no conflicts    Objective:     Patient found with: peripheral IV, telemetry     General Precautions: Standard, fall   Orthopedic Precautions:N/A   Braces: N/A     Functional Mobility:       Bed Mobility  N/T this visit      Transfers · Sit <> Stand: SBA from bedside chair x 2 trials, no AD        Gait  · Distance: ~130 ft total   · Assistance level: trial of ~75 ft with no AD, with pt reaching for UE support via handrail and requiring CGA 2/2 instability; pt performed trial of 55 ft. With SPC and SBA for balance   · Gait Deviations: decreased step length, decreased gait speed, decreased stride length, narrow base of support, increased lateral sway       Stairs Pt refused trial of stairs training 2/2 fatigue           AM-PAC 6 CLICK MOBILITY  Turning over in bed (including adjusting bedclothes, sheets and blankets)?: 4  Sitting down on and standing up from a chair with arms (e.g., wheelchair, bedside commode, etc.): 3  Moving from lying on back to sitting on the side of the bed?: 3  Moving to and from a bed to a chair (including a wheelchair)?: 3  Need to walk in hospital room?: 3  Climbing 3-5 steps with a railing?: 3  Basic Mobility Total Score: 19       Therapeutic Activities and Exercises:  Pt educated on:   - safety with mobility and tips to reduce fall risk   - therapy recommendations  - difference between rehab vs. Home health vs. OP therapy services   - seated therex recommendations     Therapist facilitated progression of gait training to improve gait " stability, endurance, and independence with functional ambulation. Trial of gait performed without AD and with SPC. Pt relying on UE support via handrail when not using AD, demonstrating significantly reduced gait speed and short step length. Displays mild improvement in gait stability with SPC use, but continues to display decreased gait speed and decreased step length. Pt able to perform trial of balance activities including picking up object off the floor (marker) with SBA, and performing static standing with eyes closed x 30 sec, no UE support with SBA. Additional activities (i.e. Stairs and therex) limited 2/2 pt fatigue.     Patient left up in chair with all lines intact, call button in reach and RN notified.    GOALS:   Multidisciplinary Problems     Physical Therapy Goals        Problem: Physical Therapy Goal    Goal Priority Disciplines Outcome Goal Variances Interventions   Physical Therapy Goal     PT, PT/OT Ongoing (interventions implemented as appropriate)     Description:  Goals to be met by: 2018     Patient will increase functional independence with mobility by performin. Supine to sit with supervision with HOB flat  2. Sit to supine with supervision with HOB flat  3. Sit to stand transfer with supervision without AD.   4. Gait  x 150 ft with supervision without AD.   5. Lower extremity exercise program x15 reps per handout, with assistance as needed  6. Pt will perform dynamic standing balance activities x 3 minutes with supervision to reduce fall risk   7. Pt will ascend/descend 5 stairs with handrail as needed with supervision                         Time Tracking:     PT Received On: 18  PT Start Time: 0954     PT Stop Time: 1010  PT Total Time (min): 16 min     Billable Minutes: Gait Training 16 min    Treatment Type: Treatment  PT/PTA: PT     PTA Visit Number: 0     Sonja Narvaez PT, DPT   2018  Pager: [phone]

## 2018-08-30 NOTE — PROGRESS NOTES
[facility]  Neurosurgery  Progress Note    Subjective:     History of Present Illness: 32 y.o. female 24 weeks pregnant with a history of polycystic kidney disease who presents as transfer from [facility] for SAH. Patient found down by 6 yr old son. Last known normal 0500 today. CT at [facility] revealed SAH within the basal cisterns. US at [facility] with + fetal heart tones. Transferred for neuro evaluation.      8/30: AFVSS, NAEON, labs wnl, exam intact    Post-Op Info:  Procedure(s) (LRB):  Mri (magnetic resonance imaging) (N/A)   20 Days Post-Op         Medications:  Continuous Infusions:  Scheduled Meds:   famotidine  20 mg Oral Daily    heparin (porcine)  5,000 Units Subcutaneous Q8H    niMODipine  30 mg Oral Q2H    polyethylene glycol  17 g Oral Daily    prenatal vits96-iron fum-folic  1 tablet Oral Daily    senna-docusate 8.6-50 mg  1 tablet Oral BID    sodium chloride 0.9%  10 mL Intravenous Q6H    sodium chloride  2 g Oral TID     PRN Meds:sodium chloride, acetaminophen, labetalol, magnesium oxide, magnesium oxide, oxyCODONE, potassium chloride 10%, potassium chloride 10%, potassium chloride 10%, potassium, sodium phosphates, potassium, sodium phosphates, potassium, sodium phosphates, Flushing PICC Protocol **AND** sodium chloride 0.9% **AND** sodium chloride 0.9%, sodium chloride 0.9%     Review of Systems  Objective:     Weight: 78.3 kg (172 lb 9.9 oz)  Body mass index is 25.49 kg/m².  Vital Signs (Most Recent):  Temp: 97.4 °F (36.3 °C) (08/30/18 1141)  Pulse: 65 (08/30/18 1531)  Resp: 20 (08/30/18 1141)  BP: 126/76 (08/30/18 1141)  SpO2: 96 % (08/30/18 1141) Vital Signs (24h Range):  Temp:  [97.4 °F (36.3 °C)-98.6 °F (37 °C)] 97.4 °F (36.3 °C)  Pulse:  [59-93] 65  Resp:  [18-20] 20  SpO2:  [96 %-100 %] 96 %  BP: (116-154)/(64-93) 126/76                           Physical Exam:    Eyes: Pupils are equal, round, and reactive to light. EOM are normal.     Cardiovascular: Normal rate.     Abdominal:  Soft.     Psych/Behavior: She is alert. She is oriented to person, place, and time.     Musculoskeletal:        Neck: Tone is normal.        Back: Tone is normal.        Right Upper Extremities: Muscle strength is 5/5. Tone is normal.        Left Upper Extremities: Muscle strength is 5/5. Tone is normal.       Right Lower Extremities: Muscle strength is 5/5. Tone is normal.        Left Lower Extremities: Muscle strength is 5/5. Tone is normal.     Neurological:        Coordination: She has normal finger to nose coordination.        DTRs: Tricep reflexes are 2+ on the right side and 2+ on the left side. Bicep reflexes are 2+ on the right side and 2+ on the left side. Brachioradialis reflexes are 2+ on the right side and 2+ on the left side. Patellar reflexes are 2+ on the right side and 2+ on the left side. Achilles reflexes are 2+ on the right side and 2+ on the left side. She displays no Babinski's sign on the right side. She displays no Babinski's sign on the left side.        Cranial nerves: Cranial nerve(s) II, III, IV, V, VI, VII, VIII, IX, X, XI and XII are intact.   EVD wound c/d/i, groin c/d/i  2+ dorsalis pedis pulses bilaterally       Significant Labs:  Recent Labs   Lab  08/29/18 0318 08/29/18 2126 08/30/18 0347 08/30/18   1108   GLU  83   --    --   81   --    NA  137  137   < >  137  136  136  134*   K  4.0   --    --   4.1   --    CL  106   --    --   105   --    CO2  24   --    --   23   --    BUN  11   --    --   14   --    CREATININE  0.6   --    --   0.6   --    CALCIUM  9.0   --    --   9.0   --    MG  1.4*   --    --   1.8   --     < > = values in this interval not displayed.     Recent Labs   Lab  08/29/18 0318 08/30/18 0347   WBC  6.25  6.55   HGB  8.5*  8.7*   HCT  26.0*  26.9*   PLT  290  300     Recent Labs   Lab  08/29/18 0318 08/30/18 0347   INR  1.1  1.0     Microbiology Results (last 7 days)     ** No results found for the last 168 hours. **        CMP:   Recent  Labs   Lab  08/29/18   0318   08/29/18   2126  08/30/18   0347  08/30/18   1108   GLU  83   --    --   81   --    CALCIUM  9.0   --    --   9.0   --    ALBUMIN  2.4*   --    --   2.4*   --    PROT  5.9*   --    --   6.1   --    NA  137  137   < >  137  136  136  134*   K  4.0   --    --   4.1   --    CO2  24   --    --   23   --    CL  106   --    --   105   --    BUN  11   --    --   14   --    CREATININE  0.6   --    --   0.6   --    ALKPHOS  67   --    --   72   --    ALT  13   --    --   11   --    AST  12   --    --   10   --    BILITOT  0.2   --    --   0.2   --     < > = values in this interval not displayed.       Significant Diagnostics:  CT: No results found in the last 24 hours.  MRI: No results found in the last 24 hours.    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 female 26 weeks pregnant with history of polycystic kidney disease with HH4F4 SAH on 8/10, now s/p acom coiling 8/10.    NEURO   -floor status, neurochecks in accordance with unit protocol  -continue nimotop X21d    CV/RESP  -permissive hypertenion  -stable on room air  -Aggressive pulmonary management  -IS @ bedside    FEN/GI  -Na tabs  -strict I/Os  -goal net even or positive  -Reg diet    Heme/ID:  -CBC daily, transfuse PRN    PPX  -SQH  -famotidine for PUD ppx  -TEDs/SCDs    -further mgmt per obgyn.            Jason Lezama MD  Neurosurgery  [facility]

## 2018-08-30 NOTE — PROGRESS NOTES
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 C/o acute onset AMS that began prior to arrival in ED.  Patient's aunt reports calling the patient's phone at 11:30 am this morning and reports the patient's 6 year old son answering the phone stating that the patient was lying on the floor, not able to get up. EMS reports upon their arrival, the patient was found on the floor, faced down, with her head against a wall. EMS reports the patient to be hypertensive and incontinent.   Pt has been non-verbal since arrival. Per family patient began complaining of a headache yesterday. Last time normal per  was 0500 today while on his way to work. Patient's aunt reports the patient has not received any prenatal care for this pregnancy.  No other history could be obtained at this time.  History is limited secondary to acuity of the patient's condition.  So last seen normal by adult at 5am. eleanor reports pt has been taking some OTC meds for headache. Pt's  reports pt not taking any regular medications at home.     Hospital Course: 8/10: pt admitted to [facility] for SAH. CT, MRI, MRA ordered and results pending   8/11: post angio with coil acom, EVD at 10, wean prop to extubate, MRI once stable, Urine studies for hyponatremia, TCDs, check Mg q 4  8/12: Mg gtt stopped over night, fluid boluses, pain control  8/16: PBD 6: +620mL fluid overnight.  Elevated peak velocity on TCD, Angio in AM.   8/19: PBD 9: +547mL, Na 134, increase 2% to 45/hour, decrease NS to 110/hour. TCDs decreased yesterday from 8/17, monitor today. SBP < 180  8/20: PBD 10: +1.3L, transfused 1 unit pRBC last night, Na 135 put on 2% 100mL/hour this morning. Monitor  TCDs. Moderate neck pain; No BM for 4 days.  8/21: PBD 11: + 1.3L last 24 hours, Na 136 on 2% NS at 125mL/hour. TCDs from yesterday remain elevated, will follow exam. Phenylephrine gtt to maintain -220.   8/23: PBD 13, -1.1L last 24 hours, will give 1 unit of blood. TCDs remain elevated, will follow up with neurosurgery on plan to clamp EVD, currently open at 10.   8/26: Hgb slightly down overnight.  Headache decreased this morning, but increased in the afternoon. EVD clamped.    Interval History:naeon EVD removed. CT with stable vents. Exam unchanged. TTF under Nsgy    Review of Systems Constitutional: Denies fevers or chills.  Pulmonary: Denies shortness of breath or cough.  Cardiology: Denies chest pain or palpitations.  GI: Denies abdominal pain or constipation.  Neurologic: Denies new weakness, headache, or paresthesias.    Vitals:   Temp: 98.6 °F (37 °C)  Pulse: 70  Rhythm: normal sinus rhythm  BP: (!) 151/86  MAP (mmHg): 112  Resp: 18  SpO2: 98 %  O2 Device (Oxygen Therapy): room air    Temp  Min: 98 °F (36.7 °C)  Max: 98.7 °F (37.1 °C)  Pulse  Min: 57  Max: 93  BP  Min: 104/56  Max: 177/95  MAP (mmHg)  Min: 74  Max: 126  Resp  Min: 13  Max: 27  SpO2  Min: 98 %  Max: 100 %    08/28 0701 - 08/29 0700  In: 720 [P.O.:720]  Out: -    Unmeasured Output  Urine Occurrence: 1  Stool Occurrence: 0  Emesis Occurrence: 1  Pad Count: 2     Examination:   Constitutional: Well-nourished and -developed. No apparent distress.   Eyes: Conjunctiva clear, anicteric. Lids no lesions.  Head/Ears/Nose/Mouth/Throat/Neck: Moist mucous membranes. External ears, nose atraumatic. EVD in place  Cardiovascular: Regular rhythm. No murmurs. No leg edema.  Respiratory: Comfortable respirations. Clear to auscultation.  Gastrointestinal: No hernia. Soft, nondistended, nontender. + bowel sounds.    Neurologic:  -GCS [id number]  -Alert. Oriented to person, place, and time. Speech fluent. Follows commands.  -Cranial nerves intact  -Motor 5/5 x  all 4, no drift noted  -Sensation intact       Medications:   Continuous Scheduled    famotidine 20 mg Daily   heparin (porcine) 5,000 Units Q8H   niMODipine 30 mg Q2H   polyethylene glycol 17 g Daily   prenatal vits96-iron fum-folic 1 tablet Daily   senna-docusate 8.6-50 mg 1 tablet BID   sodium chloride 0.9% 10 mL Q6H   sodium chloride 2 g TID   PRN    sodium chloride  Q24H PRN   acetaminophen 650 mg Q4H PRN   labetalol 10 mg Q4H PRN   magnesium oxide 800 mg PRN   magnesium oxide 800 mg PRN   oxyCODONE 5 mg Q6H PRN   potassium chloride 10% 40 mEq PRN   potassium chloride 10% 40 mEq PRN   potassium chloride 10% 60 mEq PRN   potassium, sodium phosphates 2 packet PRN   potassium, sodium phosphates 2 packet PRN   potassium, sodium phosphates 2 packet PRN   sodium chloride 0.9% 10 mL PRN   sodium chloride 0.9% 5 mL PRN      Today I independently reviewed pertinent medications, lines/drains/airways, imaging, lab results, notably:   MRI brain  ISTAT: No results for input(s): PH, PCO2, PO2, POCSATURATED, HCO3, BE, POCNA, POCK, POCTCO2, POCGLU, POCICA, POCLAC, SAMPLE in the last 24 hours.   Chem:   Recent Labs   Lab  08/29/18 0318 08/29/18 2126   NA  137  137   < >  137   K  4.0   --    --    CL  106   --    --    CO2  24   --    --    GLU  83   --    --    BUN  11   --    --    CREATININE  0.6   --    --    ESTGFRAFRICA  >60.0   --    --    EGFRNONAA  >60.0   --    --    CALCIUM  9.0   --    --    MG  1.4*   --    --    PHOS  4.0   --    --    ANIONGAP  7*   --    --    PROT  5.9*   --    --    ALBUMIN  2.4*   --    --    BILITOT  0.2   --    --    ALKPHOS  67   --    --    AST  12   --    --    ALT  13   --    --     < > = values in this interval not displayed.     Heme:   Recent Labs   Lab  08/29/18 0318   WBC  6.25   HGB  8.5*   HCT  26.0*   PLT  290   INR  1.1     Endo: No results for input(s): POCTGLUCOSE in the last 24 hours.   Assessment/Plan:         Neuro   * Subarachnoid hemorrhage from anterior  communicating artery aneurysm     HH4F4 SAH.  S/p coiling.  -TCDs, normonatremia, permissive HTN  -Analgesia PRN  -Maintain Euvolemia  -EVD clamped.  F/u MRI 8/27 with stable blood products and ventricular system, possible new R BG infarct, however asymptomatic             Cerebral artery vasospasm     2/2 SAH  -nimodipine  -TCDs, eunatremia, permissive HTN          Brain edema     2/2 SAH  -EVD           Brain compression     2/2 SAH  -EVD                Renal/   Hyponatremia     --salt tabs  --monitor sodiums and UOP          JOSE C (acute kidney injury)-resolved as of 8/26/2018     Resolved.              Prophylaxis:  Venous Thromboembolism: mechanical chemical  Stress Ulcer: H2B  Ventilator Pneumonia: not applicable          Activity Orders            None          Full Code     Michelle Mendez PA-C  Neurocritical Care  [facility]

## 2018-08-30 NOTE — NURSING
Patient arrived to 741 via wheelchair. No acute distress noted, VSS, neuro status stable. O2 and suction at bedside, bed alarm active, bed in lowest position, call bell in reach. Will continue to monitor.

## 2018-08-30 NOTE — PLAN OF CARE
Planned discharge is home with family - Plan (A) or Rehab - Plan (B).     08/30/18 1209   Discharge Reassessment   Assessment Type Discharge Planning Reassessment   Provided patient/caregiver education on the expected discharge date and the discharge plan No   Do you have any problems affording any of your prescribed medications? No   Discharge Plan A Home with family   Discharge Plan B Rehab   Patient choice form signed by patient/caregiver N/A   Can the patient answer the patient profile reliably? Yes, cognitively intact   How does the patient rate their overall health at the present time? Fair   Describe the patient's ability to walk at the present time. Minor restrictions or changes   How often would a person be available to care for the patient? Whenever needed   Number of comorbid conditions (as recorded on the chart) Two   During the past month, has the patient often been bothered by feeling down, depressed or hopeless? No   During the past month, has the patient often been bothered by little interest or pleasure in doing things? No

## 2018-08-30 NOTE — PLAN OF CARE
Problem: Physical Therapy Goal  Goal: Physical Therapy Goal  Goals to be met by: 2018     Patient will increase functional independence with mobility by performin. Supine to sit with supervision with HOB flat  2. Sit to supine with supervision with HOB flat  3. Sit to stand transfer with supervision without AD.   4. Gait  x 150 ft with supervision without AD.   5. Lower extremity exercise program x15 reps per handout, with assistance as needed  6. Pt will perform dynamic standing balance activities x 3 minutes with supervision to reduce fall risk   7. Pt will ascend/descend 5 stairs with handrail as needed with supervision        Outcome: Ongoing (interventions implemented as appropriate)  Pt progressing towards goals; continue current POC.     Sonja Narvaez, PT, DPT   2018  Pager: [phone]

## 2018-08-31 VITALS
WEIGHT: 172.63 LBS | HEIGHT: 69 IN | DIASTOLIC BLOOD PRESSURE: 95 MMHG | HEART RATE: 73 BPM | OXYGEN SATURATION: 98 % | SYSTOLIC BLOOD PRESSURE: 136 MMHG | TEMPERATURE: 98 F | BODY MASS INDEX: 25.57 KG/M2 | RESPIRATION RATE: 18 BRPM

## 2018-08-31 LAB
ALBUMIN SERPL BCP-MCNC: 2.7 G/DL
ALP SERPL-CCNC: 72 U/L
ALT SERPL W/O P-5'-P-CCNC: 10 U/L
ANION GAP SERPL CALC-SCNC: 8 MMOL/L
AST SERPL-CCNC: 9 U/L
BASOPHILS # BLD AUTO: 0.03 K/UL
BASOPHILS NFR BLD: 0.5 %
BILIRUB SERPL-MCNC: 0.2 MG/DL
BUN SERPL-MCNC: 13 MG/DL
CALCIUM SERPL-MCNC: 8.9 MG/DL
CHLORIDE SERPL-SCNC: 103 MMOL/L
CO2 SERPL-SCNC: 24 MMOL/L
CREAT SERPL-MCNC: 0.7 MG/DL
DIFFERENTIAL METHOD: ABNORMAL
EOSINOPHIL # BLD AUTO: 0 K/UL
EOSINOPHIL NFR BLD: 0.7 %
ERYTHROCYTE [DISTWIDTH] IN BLOOD BY AUTOMATED COUNT: 17.8 %
EST. GFR  (AFRICAN AMERICAN): >60 ML/MIN/1.73 M^2
EST. GFR  (NON AFRICAN AMERICAN): >60 ML/MIN/1.73 M^2
GLUCOSE SERPL-MCNC: 100 MG/DL
HCT VFR BLD AUTO: 27.6 %
HGB BLD-MCNC: 8.5 G/DL
IMM GRANULOCYTES # BLD AUTO: 0.04 K/UL
IMM GRANULOCYTES NFR BLD AUTO: 0.7 %
INR PPP: 1
LYMPHOCYTES # BLD AUTO: 1.3 K/UL
LYMPHOCYTES NFR BLD: 20.9 %
MAGNESIUM SERPL-MCNC: 1.5 MG/DL
MCH RBC QN AUTO: 28.4 PG
MCHC RBC AUTO-ENTMCNC: 30.8 G/DL
MCV RBC AUTO: 92 FL
MONOCYTES # BLD AUTO: 0.5 K/UL
MONOCYTES NFR BLD: 9 %
NEUTROPHILS # BLD AUTO: 4.1 K/UL
NEUTROPHILS NFR BLD: 68.2 %
NRBC BLD-RTO: 0 /100 WBC
PHOSPHATE SERPL-MCNC: 4.1 MG/DL
PLATELET # BLD AUTO: 297 K/UL
PMV BLD AUTO: 10.5 FL
POTASSIUM SERPL-SCNC: 3.6 MMOL/L
PROT SERPL-MCNC: 6.5 G/DL
PROTHROMBIN TIME: 10.8 SEC
RBC # BLD AUTO: 2.99 M/UL
SODIUM SERPL-SCNC: 134 MMOL/L
SODIUM SERPL-SCNC: 135 MMOL/L
SODIUM SERPL-SCNC: 135 MMOL/L
WBC # BLD AUTO: 6.03 K/UL

## 2018-08-31 PROCEDURE — 25000003 PHARM REV CODE 250: Performed by: STUDENT IN AN ORGANIZED HEALTH CARE EDUCATION/TRAINING PROGRAM

## 2018-08-31 PROCEDURE — 84100 ASSAY OF PHOSPHORUS: CPT

## 2018-08-31 PROCEDURE — 83735 ASSAY OF MAGNESIUM: CPT

## 2018-08-31 PROCEDURE — 25000003 PHARM REV CODE 250: Performed by: PSYCHIATRY & NEUROLOGY

## 2018-08-31 PROCEDURE — A4216 STERILE WATER/SALINE, 10 ML: HCPCS | Performed by: PSYCHIATRY & NEUROLOGY

## 2018-08-31 PROCEDURE — 25000003 PHARM REV CODE 250: Performed by: PHYSICIAN ASSISTANT

## 2018-08-31 PROCEDURE — 85610 PROTHROMBIN TIME: CPT

## 2018-08-31 PROCEDURE — 25000003 PHARM REV CODE 250: Performed by: NURSE PRACTITIONER

## 2018-08-31 PROCEDURE — 63600175 PHARM REV CODE 636 W HCPCS: Performed by: PHYSICIAN ASSISTANT

## 2018-08-31 PROCEDURE — 99233 SBSQ HOSP IP/OBS HIGH 50: CPT | Mod: ,,, | Performed by: PHYSICIAN ASSISTANT

## 2018-08-31 PROCEDURE — 36415 COLL VENOUS BLD VENIPUNCTURE: CPT

## 2018-08-31 PROCEDURE — 80053 COMPREHEN METABOLIC PANEL: CPT

## 2018-08-31 PROCEDURE — 97530 THERAPEUTIC ACTIVITIES: CPT

## 2018-08-31 PROCEDURE — 84295 ASSAY OF SERUM SODIUM: CPT

## 2018-08-31 PROCEDURE — 85025 COMPLETE CBC W/AUTO DIFF WBC: CPT

## 2018-08-31 RX ORDER — ACETAMINOPHEN 325 MG/1
650 TABLET ORAL EVERY 4 HOURS PRN
Refills: 0 | Status: ON HOLD | COMMUNITY
Start: 2018-08-31 | End: 2018-10-03 | Stop reason: HOSPADM

## 2018-08-31 RX ORDER — NIMODIPINE 30 MG/1
30 CAPSULE, LIQUID FILLED ORAL
Qty: 36 CAPSULE | Refills: 0 | Status: ON HOLD | OUTPATIENT
Start: 2018-08-31 | End: 2018-10-03 | Stop reason: HOSPADM

## 2018-08-31 RX ADMIN — NIMODIPINE 30 MG: 30 CAPSULE, LIQUID FILLED ORAL at 09:08

## 2018-08-31 RX ADMIN — PRENATAL VIT W/ FE FUMARATE-FA TAB 27-0.8 MG 1 TABLET: 27-0.8 TAB at 09:08

## 2018-08-31 RX ADMIN — SODIUM CHLORIDE TAB 1 GM 2 G: 1 TAB at 09:08

## 2018-08-31 RX ADMIN — Medication 10 ML: at 06:08

## 2018-08-31 RX ADMIN — Medication 10 ML: at 12:08

## 2018-08-31 RX ADMIN — NIMODIPINE 30 MG: 30 CAPSULE, LIQUID FILLED ORAL at 02:08

## 2018-08-31 RX ADMIN — ACETAMINOPHEN 650 MG: 325 TABLET, FILM COATED ORAL at 09:08

## 2018-08-31 RX ADMIN — NIMODIPINE 30 MG: 30 CAPSULE, LIQUID FILLED ORAL at 01:08

## 2018-08-31 RX ADMIN — NIMODIPINE 30 MG: 30 CAPSULE, LIQUID FILLED ORAL at 04:08

## 2018-08-31 RX ADMIN — NIMODIPINE 30 MG: 30 CAPSULE, LIQUID FILLED ORAL at 12:08

## 2018-08-31 RX ADMIN — Medication 10 ML: at 11:08

## 2018-08-31 RX ADMIN — NIMODIPINE 30 MG: 30 CAPSULE, LIQUID FILLED ORAL at 05:08

## 2018-08-31 RX ADMIN — ACETAMINOPHEN 650 MG: 325 TABLET, FILM COATED ORAL at 04:08

## 2018-08-31 RX ADMIN — FAMOTIDINE 20 MG: 20 TABLET ORAL at 09:08

## 2018-08-31 RX ADMIN — ACETAMINOPHEN 650 MG: 325 TABLET, FILM COATED ORAL at 01:08

## 2018-08-31 RX ADMIN — NIMODIPINE 30 MG: 30 CAPSULE, LIQUID FILLED ORAL at 11:08

## 2018-08-31 RX ADMIN — HEPARIN SODIUM 5000 UNITS: 5000 INJECTION, SOLUTION INTRAVENOUS; SUBCUTANEOUS at 05:08

## 2018-08-31 NOTE — PLAN OF CARE
Problem: Patient Care Overview  Goal: Plan of Care Review  Outcome: Ongoing (interventions implemented as appropriate)  Plan of care reviewed with patient, demonstrated understanding. ARELY SMYTH, neuro status stable. Patient remained free from falls, pain, or harm. O2 and suction at bedside, bed alarm active, bed in lowest position, call bell in reach. Will continue to monitor.

## 2018-08-31 NOTE — PT/OT/SLP PROGRESS
"Occupational Therapy   Treatment    Name: Sharon Grande  MRN: [id number]  Admitting Diagnosis:  Subarachnoid hemorrhage from anterior communicating artery aneurysm  21 Days Post-Op    Recommendations:     Discharge Recommendations: rehabilitation facility(Pt requesting to return home with outpatient therapies)  Discharge Equipment Recommendations:  bedside commode, cane, straight  Barriers to discharge:  Decreased caregiver support    Subjective     Communicated with: RN prior to session. Pt's significant other sleeping at bedside. Pt reported "I'm going to go home with outpatient. I want to be with my kids"  Pain/Comfort:  · Pain Rating 1: 0/10  · Pain Addressed 1: Pre-medicate for activity  · Pain Rating Post-Intervention 1: 0/10    Patients cultural, spiritual, Gnosticist conflicts given the current situation: None    Objective:     Patient found with: telemetry    General Precautions: Standard, fall, seizure   Orthopedic Precautions:N/A   Braces: N/A     Occupational Performance:    Bed Mobility:    · Mod(I) with side rail; HOB flat    Functional Mobility/Transfers:  · Patient completed Sit <> Stand Transfer with supervision  with  no assistive device   · Patient completed Bed <> Chair Transfer using Step Transfer technique with supervision with no assistive device  · Patient completed Toilet Transfer Step Transfer technique with supervision with  bedside commode placed over room toilet for ergonomics and B UE support  · Patient completed Tub Transfer Step Transfer technique with stand by assistance with no AD  · Functional Mobility: 100 ft with SBA; no AD  · 1 standing rest break completed     Activities of Daily Living:  · Grooming: supervision standing at sink for hand hygiene following toileting  · Upper Body Dressing: supervision to don gown while seated EOB  · Lower Body Dressing: set up seated EOB to don B socks in 4 point position    · Toileting: modified [place] perineal care    Patient left up in " chair with all lines intact, call button in reach, RN notified and spouse present    [facility] 6 Click:  [facility] Total Score: 22    Treatment & Education:  -Pt alert and agreeable to therapy session; oriented x4  -Demo improvement in overall affect and motivation this session  -Communication board updated; questions/concerns addressed within OT scope of practice for best safety with return home  -Discussed importance of supervision/someone to assist as needed upon d/c home; discussed importance of mobility for healing process  -Discussed home concerns and needed for DME on this date    *Pt safe to be up with nursing staff at this time    Education:    Assessment:     Sharon Grande is a 32 y.o. female with a medical diagnosis of Subarachnoid hemorrhage from anterior communicating artery aneurysm.  She presents with overall decline in her functional endurance at this time. She demo improvement in tolerance for activity on this date and overall affect.  Performance deficits affecting function are impaired endurance, weakness, gait instability, impaired balance, impaired self care skills, impaired functional mobilty, pain, impaired cardiopulmonary response to activity.      Rehab Prognosis:  Good; patient would benefit from acute skilled OT services to address these deficits and reach maximum level of function.       Plan:     Patient to be seen 4 x/week to address the above listed problems via self-care/home management, therapeutic activities, therapeutic exercises, neuromuscular re-education  · Plan of Care Expires: 09/10/18  · Plan of Care Reviewed with: patient    This Plan of care has been discussed with the patient who was involved in its development and understands and is in agreement with the identified goals and treatment plan    GOALS:   Multidisciplinary Problems     Occupational Therapy Goals        Problem: Occupational Therapy Goal    Goal Priority Disciplines Outcome Interventions   Occupational Therapy Goal      OT, PT/OT Ongoing (interventions implemented as appropriate)    Description:  Goals to be met by: 9/4    UE Dressing while standing with Set-up Assistance and Stand-by Assistance. MET EOB to don gown as jacket  LE Dressing (socks, brief) with Contact Guard Assistance. Met for socks  *revised: set up and supervision for pants, brief.   Grooming while standing with Contact Guard Assistance. Met  Stand pivot transfers with Contact Guard Assistance. MET  *revised with supervision. Met 8/31  Functional dynamic standing task to simulate home return/ with SBA.   Functional mobility at short household distance for ADL task with min(A) and vitals WFL. Met with SBA 8/31  Upper extremity exercise program x15 reps per handout, with assistance as needed.                       Time Tracking:     OT Date of Treatment: 08/31/18  OT Start Time: 1018  OT Stop Time: 1032  OT Total Time (min): 14 min    Billable Minutes:Therapeutic Activity 14    GARRET White  8/31/2018

## 2018-08-31 NOTE — SUBJECTIVE & OBJECTIVE
Interval History: HA significantly improved since yesterday and overall since admission. Patient requesting to be discharged on opioids for her headaches in case she needs it. Denies any seizure activity, focal weakness, or blurry vision. She states she feels a lot better after shower yesterday. Tolerating diet and voiding appropriately. Plan for DC home with nimodipine, salt tabs, outpt therapy, support cane, and bedside commode. Patient will return to clinic in 2 weeks for EVD staple removal and 6 weeks with Dr. Bo. She will follow up with OBGYN within 1 week of dc.        Medications:  Continuous Infusions:  Scheduled Meds:   famotidine  20 mg Oral Daily    heparin (porcine)  5,000 Units Subcutaneous Q8H    niMODipine  30 mg Oral Q2H    polyethylene glycol  17 g Oral Daily    prenatal vits96-iron fum-folic  1 tablet Oral Daily    senna-docusate 8.6-50 mg  1 tablet Oral BID    sodium chloride 0.9%  10 mL Intravenous Q6H    sodium chloride  2 g Oral TID     PRN Meds:sodium chloride, acetaminophen, labetalol, magnesium oxide, magnesium oxide, potassium chloride 10%, potassium chloride 10%, potassium chloride 10%, potassium, sodium phosphates, potassium, sodium phosphates, potassium, sodium phosphates, Flushing PICC Protocol **AND** sodium chloride 0.9% **AND** sodium chloride 0.9%, sodium chloride 0.9%     Review of Systems  Objective:     Weight: 78.3 kg (172 lb 9.9 oz)  Body mass index is 25.49 kg/m².  Vital Signs (Most Recent):  Temp: 97.8 °F (36.6 °C) (08/31/18 1111)  Pulse: 73 (08/31/18 1121)  Resp: 18 (08/31/18 1111)  BP: (!) 136/95 (08/31/18 1111)  SpO2: 98 % (08/31/18 1111) Vital Signs (24h Range):  Temp:  [96.8 °F (36 °C)-98.8 °F (37.1 °C)] 97.8 °F (36.6 °C)  Pulse:  [55-87] 73  Resp:  [16-18] 18  SpO2:  [95 %-99 %] 98 %  BP: (113-137)/(62-95) 136/95     Date 08/31/18 0700 - 09/01/18 0659   Shift 4023-2422 4673-1479 2071-4336 24 Hour Total   INTAKE   P.O. 180   180   Shift Total(mL/kg) 180(2.3)    180(2.3)   OUTPUT   Shift Total(mL/kg)       Weight (kg) 78.3 78.3 78.3 78.3                        Neurosurgery Physical Exam   Vital signs: reviewed above.   Constitutional: well-developed, no acute distress  Cardiovascular: regular rate and rhythm  Resp: normal respirations, not labored, no accessory muscles used  Abdomen: soft, non-distended, not tender to palpation  Pulses: palpable distal pulses   Skin: warm, dry and intact, no rashes  Neurological  GCS: Motor: 6/Verbal: 5/Eyes: 4 GCS Total: 15  Mental Status: Awake, Alert, Oriented x 4  Follows commands   Head: normocephalic, atraumatic  PERRL, EOMI,   Facial expression symmetric, tongue midline, shoulder shrug symmetric  Moves all extremities with good strength 5/5  No drift or dysmetria.   EVD incision c/d/i. Staples intact.         Significant Labs:  Recent Labs   Lab  08/30/18 0347 08/30/18 2008 08/31/18 0419 08/31/18   1215   GLU  81   --    --   100   --    NA  136  136   < >  137  135*  135*  134*   K  4.1   --    --   3.6   --    CL  105   --    --   103   --    CO2  23   --    --   24   --    BUN  14   --    --   13   --    CREATININE  0.6   --    --   0.7   --    CALCIUM  9.0   --    --   8.9   --    MG  1.8   --    --   1.5*   --     < > = values in this interval not displayed.     Recent Labs   Lab  08/30/18 0347 08/31/18 0419   WBC  6.55  6.03   HGB  8.7*  8.5*   HCT  26.9*  27.6*   PLT  300  297     Recent Labs   Lab  08/30/18 0347 08/31/18 0419   INR  1.0  1.0     Microbiology Results (last 7 days)     ** No results found for the last 168 hours. **            Significant Diagnostics:  None new

## 2018-08-31 NOTE — PLAN OF CARE
Problem: Occupational Therapy Goal  Goal: Occupational Therapy Goal  Goals to be met by: 9/4    UE Dressing while standing with Set-up Assistance and Stand-by Assistance. MET EOB to don gown as jacket  LE Dressing (socks, brief) with Contact Guard Assistance. Met for socks  *revised: set up and supervision for pants, brief.   Grooming while standing with Contact Guard Assistance. Met  Stand pivot transfers with Contact Guard Assistance. MET  *revised with supervision. Met 8/31  Functional dynamic standing task to simulate home return/ with SBA.   Functional mobility at short household distance for ADL task with min(A) and vitals WFL. Met with SBA 8/31  Upper extremity exercise program x15 reps per handout, with assistance as needed.     Outcome: Ongoing (interventions implemented as appropriate)  Goals remain appropriate. GARRET White 8/31/2018

## 2018-08-31 NOTE — PT/OT/SLP PROGRESS
"     Physical Therapy  Pt Not Seen    Patient Name:  Sharon Grande   MRN:  [id number]      1:27 pm     Patient not seen today secondary to  Patient unwilling to participate(Pt refused tx session stating "I don't feel like doing that right now, my head is hurting"  PTA offerred to perform stairs since pt was being d/c'd home, however, she declined stating "I will be staying in a room downstairs and I only have to go upstairs for the bathroom" and she reports feeling confident she'll be able to manage).     Anisa Del Cid, PTA  8/31/2018      "

## 2018-08-31 NOTE — ASSESSMENT & PLAN NOTE
32 y.o. female 26 weeks pregnant with history of polycystic kidney disease with HH4F4 SAH on 8/10, now s/p acom coiling 8/10 with Dr. Bo. .    -Patient neurostable on exam with continued improvements in headaches.   -continue nimotop X21d with expected end date 9/3/18  -Continue salt tabs  -SQH/TEDs/SCDs for DVTP  -famotidine for PUD ppx  -tylenol PRN pain   -Therapy recommending home with outpt therapy, support cane, and bedside commode.     Dispo: DC home today with 3 day course of nimotop, salt tabs, and DME. She is to resume home HTN meds when she completes nimotop. Patient will return to clinic in 2 weeks for EVD staple removal and 6 weeks with Dr. Bo. She will follow up with OBGYN within 1 week of dc.      Discussed with Dr. Bo

## 2018-08-31 NOTE — DISCHARGE INSTRUCTIONS
Please follow ONLY the instructions that are checked below.    Activity Restrictions:  [x]  Return to work will be determined on an individual basis.  [x]  No lifting greater than 10 pounds.  [x]  Increase ambulation over the next 2 weeks so that you are walking 2 miles per day at 2 weeks post-operatively.  [x]  Walk on paved surfaces only. It is okay to walk up and down stairs while holding onto a side rail.      Discharge Medication/Follow-up:  [x]  Please refer to discharge medication reconciliation form.  [x]  Do not take ANY non-steroidal anti-inflammatory drugs (NSAIDS), including the following: ibuprofen, naprosyn, Aleve, Advil, Indocin, Mobic, or Celebrex for:  [x]  4 weeks  [x]  Prescriptions for appropriate medication will be given upon discharge.   [x]  Pain control:  OYC tylenol            [x]  Other:  Sodium chloride, nimodipine (for 3 days)        [x]  Take docusate (Colace 100 mg): take one capsule a day as needed for constipation. You can get this over the counter.  [x]  Follow-up appointment:  [x]  10-14 days post-op for wound check by nurse  [x]  6 weeks with MD:  [x]  An appointment will be mailed to you.    Wound Care:  [x]  No bandage required. Keep your incision open to the air.  [x]  You may shower on the 2nd day after your surgery. Have the force of water hit you opposite from the incision. Pat the incision dry after your shower; do not scrub the incision.  [x]  You cannot take a bath until 8 weeks after surgery.  [x]  Apply bacitracin to incision twice a day for  14  more days.    Call your doctor or go to the Emergency Room for any signs of infection, including: increased redness, drainage, pain, or fever (temperature ?101.5 for 24 hours). Call your doctor or go to the Emergency Room if there are any localized neurological changes; problems with speech, vision, numbness, tingling, weakness, or severe headache; or for other concerns.    Special Instructions:  []  No use of tobacco  products.  []  Diet: Please eat a regular diet as tolerated.  []  Other diet:              Specific physician instructions:  Do not resume home blood pressure meds until finished with 3 day course of nimodipine. Please follow up with OBGYN doctor within 1 week of hospital discharge.         If you have any questions about this form, please call [phone].    Form No. 75174 (Revised 10/31/2013)

## 2018-08-31 NOTE — DISCHARGE SUMMARY
[facility]  Neurosurgery  Discharge Summary      Patient Name: Sharon Grande  MRN: [id number]  Admission Date: 8/10/2018  Hospital Length of Stay: 21 days  Discharge Date and Time:  08/31/2018 1:33 PM  Attending Physician: Salo Montelongo MD   Discharging Provider: Bryan Reagan PA-C  Primary Care Provider: Roman Archibald MD    HPI:   32 y.o. female 24 weeks pregnant with a history of polycystic kidney disease who presents as transfer from [facility] for SAH. Patient found down by 6 yr old son. Last known normal 0500 today. CT at [facility] revealed SAH within the basal cisterns. US at [facility] with + fetal heart tones. Transferred for neuro evaluation.     Procedure(s) (LRB):  Mri (magnetic resonance imaging) (N/A)     Hospital Course: 08/10/2018 Cerebral angio  8/11: EVD placed overnight, acom aneurysm coiled.  8/12: Extubated yesterday  8/13: stable neuro exam  8/14: NAEON, neuro exam stable  8/15: NAEON, stable neuro exam, started on 2% yesterday. ICP 4-7.   8/16: NAEON. To angio today for IV verapamil.  8/17: NAEON. Angio yesterday revealed moderate vaspospasm, treated with IV verapamil. EVD@10, ICP 6-13.   8/18: NAEON. Exam stable. Remains on 2% for Na goals >135  8/19: NAEON. Exam stable. Remains on 2%. TCDs elevated today, will give pRBCs and albumin to expand blood volume  8/23: NAEON. Mild L upward drift this AM. Remains on 2%. TCDs remain elevated, will bolus 1L NS this AM.   8/24: NAEON. No drift this AM. Remains on 2%. TCDs elevated. EVD open at 10, ICP 4-10.   8/25: raised evd to 20  8/26: clamp evd today, cth tmrw  8/27: MRI stable, will pull EVD today  8/28: NAEON, exam stable, TTF today on NSGY  8/29: NAEON, exam stable, TTF orders in today, pending bed opening, PT/OT reccs for IPR  8/30: AFVSS, NAEON, labs wnl, exam intact  8/31: HA significantly improved since yesterday and overall since admission. Patient requesting to be discharged on opioids for her headaches in case she needs it.  Denies any seizure activity, focal weakness, or blurry vision. She states she feels a lot better after shower yesterday. Tolerating diet and voiding appropriately. Plan for DC home with nimodipine, salt tabs, outpt therapy, support cane, and bedside commode. Patient will return to clinic in 2 weeks for EVD staple removal and 6 weeks with Dr. Bo. She will follow up with OBGYN within 1 week of dc.      Consults:   Consults (From admission, onward)        Status Ordering Provider     Inpatient consult to Maternal Fetal Medicine  Once     Provider:  (Not yet assigned)    Acknowledged NILAM BOSCH     Inpatient consult to Neuro ICU  Once     Provider:  (Not yet assigned)    Completed NILAM BOSCH     Inpatient consult to Neurosurgery  Once     Provider:  (Not yet assigned)    Completed JASEN VELEZ     Inpatient consult to Physical Medicine Rehab  Once     Provider:  (Not yet assigned)    Completed JASEN VELEZ     Inpatient consult to PICC team ([facility])  Once     Provider:  (Not yet assigned)    Completed DONG PAREDES     Inpatient consult to Registered Dietitian/Nutritionist  Once     Provider:  (Not yet assigned)    Completed JASEN VELEZ     Inpatient consult to Social Work/Case Management  Once     Provider:  (Not yet assigned)    Acknowledged JASEN VELEZ     Inpatient consult to Vascular (Stroke) Neurology  Once     Provider:  (Not yet assigned)    Completed JASEN VELEZ            Pending Diagnostic Studies:     Procedure Component Value Units Date/Time    2D echo only [[id number]]     Order Status:  Sent Lab Status:  No result     Potassium [[id number]] Collected:  08/15/18 0842    Order Status:  Sent Lab Status:  In process Updated:  08/15/18 0842    Specimen:  Blood     Sodium [[id number]] Collected:  08/22/18 2159    Order Status:  Sent Lab Status:  In process Updated:  08/22/18 2159    Specimen:  Blood         Final Active  Diagnoses:    Diagnosis Date Noted POA    PRINCIPAL PROBLEM:  Subarachnoid hemorrhage from anterior communicating artery aneurysm [I60.2] 08/10/2018 Yes    Subarachnoid hemorrhage [I60.9]  Yes     screening for malformation using ultrasonics [Z36.3]  Not Applicable    Cerebral artery vasospasm [I67.848] 08/15/2018 No    Intracranial hypertension [G93.2] 2018 Yes    Hyponatremia [E87.1] 2018 Yes    Hypokalemia [E87.6] 2018 Yes    Hypophosphatemia [E83.39] 2018 Yes    Brain compression [G93.5] 2018 Yes    Brain edema [G93.6] 2018 Yes    Hypertension affecting pregnancy in second trimester [O16.2] 08/10/2018 Yes    25 weeks gestation of pregnancy [Z3A.25] 08/10/2018 Not Applicable    PKD (polycystic kidney disease) [Q61.3] 2015 Not Applicable      Problems Resolved During this Admission:    Diagnosis Date Noted Date Resolved POA    Marion coma scale total score 9-12 [R40.2420] 2018 Yes    Endotracheal tube present [Z97.8] 2018 Yes    Metabolic encephalopathy [G93.41] 2018 Yes    Aphasia [R47.01] 2018 Yes    JOSE C (acute kidney injury) [N17.9] 2018 Yes    Pre-eclampsia in second trimester [O14.92] 2018 Yes    Hypokalemia [E87.6] 08/10/2018 2018 Yes    Leukocytosis [D72.829] 08/10/2018 2018 Yes      Discharged Condition: good    Disposition: Home or Self Care    Follow Up:  Follow-up Information     Roman Archibald MD.    Specialty:  Obstetrics  Contact information:  [address]  [address]  [facility] [id number] [phone]             Nannette العراقي RN In 2 weeks.    Why:  hospital follow up, For wound re-check, For staple removal           Salo Montelongo MD In 6 weeks.    Specialty:  Neurosurgery  Why:  hospital follow up  Contact information:  [address] [place] [address]  [phone]                 Patient  "Instructions:   -complete course of nimotop. Once done, resume home anti-htn meds.  -follow up with OBGYN 1 week from hospital [place].        BATH/SHOWER CHAIR FOR HOME USE     Order Specific Question Answer Comments   Height: 5' 9" (1.753 m)    Weight: 78.3 kg (172 lb 9.9 oz)    Does patient have medical equipment at home? none    Length of need (1-99 months): 99    Type: With back      COMMODE FOR HOME USE     Order Specific Question Answer Comments   Type: Standard    Height: 5' 9" (1.753 m)    Weight: 78.3 kg (172 lb 9.9 oz)    Does patient have medical equipment at home? none    Length of need (1-99 months): 99      CANE FOR HOME USE     Order Specific Question Answer Comments   Type of Cane: Straight    Height: 5' 9" (1.753 m)    Weight: 78.3 kg (172 lb 9.9 oz)    Does patient have medical equipment at home? none    Length of need (1-99 months): 99    Please check all that apply: Patient's condition impairs ambulation.    Please check all that apply: Patient is unable to safely ambulate without equipment.      Ambulatory Referral to Physical/Occupational Therapy   Referral Priority: Routine Referral Type: Physical Medicine   Referral Reason: Specialty Services Required   Requested Specialty: Physical Therapy   Number of Visits Requested: 1     Medications:  Reconciled Home Medications:      Medication List      START taking these medications    acetaminophen 325 MG tablet  Commonly known as:  TYLENOL  Take 2 tablets (650 mg total) by mouth every 4 (four) hours as needed.     niMODipine 30 MG Cap  Commonly known as:  NIMOTOP  Take 1 capsule (30 mg total) by mouth every 2 (two) hours. for 3 days     sodium chloride 1 gram tablet  Take 2 tablets (2 g total) by mouth 3 (three) times daily.        CONTINUE taking these medications    ondansetron 4 MG tablet  Commonly known as:  ZOFRAN  Take 2 tablets (8 mg total) by mouth every 6 (six) hours as needed for Nausea.     potassium chloride SA 20 MEQ tablet  Commonly known " as:  K-DUR,KLOR-CON  Take 1 tablet (20 mEq total) by mouth once daily.     prenat.vits,robert,min-iron-folic Tab  Commonly known as:  PRENATAL VITAMIN  Take 1 tablet by mouth once daily.        STOP taking these medications    ibuprofen 600 MG tablet  Commonly known as:  ADVIL,MOTRIN     naproxen 500 MG tablet  Commonly known as:  NAPROSYN     NIFEdipine 30 MG (OSM) 24 hr tablet  Commonly known as:  PROCARDIA-XL            Bryan Reagan PA-C  Neurosurgery  [facility]

## 2018-08-31 NOTE — NURSING
PT STABLE FOR D/C, VSS. IV REMOVED. D/C INSTRUCTIONS GIVEN TO PATIENT AND VERBALIZED D/C INSTRUCTIONS WITH PATIENT AND FAMILY. ALL QUESTIONS ANSWERED.

## 2018-08-31 NOTE — PLAN OF CARE
NEETU following for DC needs. NEETU in communication with CM.    NEETU spoke to Jennie at [facility]. Jennie stated that she spoke to the patient and the equipment will be delivered to the patient's home today.     NEETU notified nurse of the above.     Marian Mccord, NEETU  [facility]  [id number]

## 2018-08-31 NOTE — PLAN OF CARE
SW following for DC needs. SW in communication with CM.    The patient wants to go hmoe with outpatient therapy. No other needs.     Marian Mccord LMSW  [facility]  [id number]

## 2018-09-11 ENCOUNTER — TELEPHONE (OUTPATIENT)
Dept: NEUROSURGERY | Facility: HOSPITAL | Age: 33
End: 2018-09-11

## 2018-09-11 NOTE — TELEPHONE ENCOUNTER
Spoke with patient. Risk factors to patient for stroke discussed with teach back method implemented. Patient verbalized understanding of discharge instructions and medications. Follow up appointment discussed and pt stated she will call later to schedule an appt. Warm signs were discussed with teach back method implemented. Instructed patient to seek medical help via 911 if new symptoms occur. Patient relayed no new questions or comments at this time.

## 2018-09-11 NOTE — PT/OT/SLP DISCHARGE
Occupational Therapy Discharge Summary    Sharon Grande  MRN: [id number]   Principal Problem: Subarachnoid hemorrhage from anterior communicating artery aneurysm      Patient Discharged from acute Occupational Therapy on 8/31/2018.  Please refer to prior OT note dated 8/31/2018 for functional status.    Assessment:      Goals partially met.    Objective:     GOALS:   Multidisciplinary Problems     Occupational Therapy Goals     Not on file          Multidisciplinary Problems (Resolved)        Problem: Occupational Therapy Goal    Goal Priority Disciplines Outcome Interventions   Occupational Therapy Goal   (Resolved)     OT, PT/OT Outcome(s) achieved    Description:  Goals to be met by: 9/4    UE Dressing while standing with Set-up Assistance and Stand-by Assistance. MET EOB to don gown as jacket  LE Dressing (socks, brief) with Contact Guard Assistance. Met for socks  *revised: set up and supervision for pants, brief.   Grooming while standing with Contact Guard Assistance. Met  Stand pivot transfers with Contact Guard Assistance. MET  *revised with supervision. Met 8/31  Functional dynamic standing task to simulate home return/ with SBA.   Functional mobility at short household distance for ADL task with min(A) and vitals WFL. Met with SBA 8/31  Upper extremity exercise program x15 reps per handout, with assistance as needed.                       Reasons for Discontinuation of Therapy Services  Transfer to alternate level of care.      Plan:     Patient Discharged to: Outpatient Therapy Services recommended; DME recommended     GARRET White  9/11/2018

## 2018-09-13 ENCOUNTER — CLINICAL SUPPORT (OUTPATIENT)
Dept: NEUROSURGERY | Facility: CLINIC | Age: 33
End: 2018-09-13
Payer: MEDICAID

## 2018-09-13 ENCOUNTER — TELEPHONE (OUTPATIENT)
Dept: NEUROSURGERY | Facility: CLINIC | Age: 33
End: 2018-09-13

## 2018-09-13 VITALS
WEIGHT: 160.13 LBS | SYSTOLIC BLOOD PRESSURE: 140 MMHG | HEART RATE: 70 BPM | DIASTOLIC BLOOD PRESSURE: 92 MMHG | BODY MASS INDEX: 23.64 KG/M2 | TEMPERATURE: 99 F

## 2018-09-13 DIAGNOSIS — I67.1 ANTERIOR CEREBRAL ANEURYSM: Primary | ICD-10-CM

## 2018-09-13 PROCEDURE — 99999 PR PBB SHADOW E&M-EST. PATIENT-LVL III: CPT | Mod: PBBFAC,,,

## 2018-09-13 PROCEDURE — 99213 OFFICE O/P EST LOW 20 MIN: CPT | Mod: PBBFAC

## 2018-09-13 NOTE — PROGRESS NOTES
Neurosurgery Wound Check    Ms Sharon Grande is a pleasant 31 y/o female who is 2 weeks s/p EVD. (For complete diagnosis and procedure, see Operative note.)  Patient presents with an unsteady gait using a cane. Ms Grande is pregnant. She complains of frequent headaches for which she takes Tylenol and Exedrin. Counseled on taking no more than 4 tylenol per day. Reminded that headaches are to be expected after the bleed.    Discussed with Dr Bo. He wanted her to see Maternal Fetal Health to advise on what she can take for headaches. Communicated this to pt. Dr Bo also ordered an MRI brain done, as the patient cannot have a CT 2/2 to pregnancy. MRI ordered and scheduled for next day.     Upon inspection, incision above hairline is CDI, edges completely approximated, without drainage or discoloration. Four staples removed. Pt tolerated well.    Encouraged to call If she has any questions or concerns.        Nannette العراقي RN  Neurosurgery

## 2018-09-14 ENCOUNTER — HOSPITAL ENCOUNTER (OUTPATIENT)
Dept: RADIOLOGY | Facility: HOSPITAL | Age: 33
Discharge: HOME | End: 2018-09-14
Attending: NEUROLOGICAL SURGERY
Payer: MEDICAID

## 2018-09-14 DIAGNOSIS — I67.1 ANTERIOR CEREBRAL ANEURYSM: ICD-10-CM

## 2018-09-14 PROCEDURE — 70551 MRI BRAIN STEM W/O DYE: CPT | Mod: 26,,, | Performed by: RADIOLOGY

## 2018-09-14 PROCEDURE — 70551 MRI BRAIN STEM W/O DYE: CPT | Mod: TC

## 2018-09-18 ENCOUNTER — CLINICAL SUPPORT (OUTPATIENT)
Dept: REHABILITATION | Facility: HOSPITAL | Age: 33
End: 2018-09-18
Attending: PHYSICIAN ASSISTANT
Payer: MEDICAID

## 2018-09-18 DIAGNOSIS — R26.9 ABNORMALITY OF GAIT AS LATE EFFECT OF CEREBROVASCULAR ACCIDENT (CVA): ICD-10-CM

## 2018-09-18 DIAGNOSIS — I69.398 ABNORMALITY OF GAIT AS LATE EFFECT OF CEREBROVASCULAR ACCIDENT (CVA): ICD-10-CM

## 2018-09-18 DIAGNOSIS — R53.83 FATIGUE, UNSPECIFIED TYPE: ICD-10-CM

## 2018-09-18 DIAGNOSIS — I60.9 SUBARACHNOID HEMORRHAGE: Primary | ICD-10-CM

## 2018-09-18 PROCEDURE — 97163 PT EVAL HIGH COMPLEX 45 MIN: CPT | Mod: PO

## 2018-09-18 NOTE — PLAN OF CARE
OUTPATIENT NEUROLOGICAL REHABILITATION  PHYSICAL THERAPY EVALUATION    Name: Sharon PINTO [place]  Clinic Number: [id number]    Diagnosis:   Encounter Diagnoses   Name Primary?    Subarachnoid hemorrhage Yes    Abnormality of gait as late effect of cerebrovascular accident (CVA)     Fatigue, unspecified type      Physician: Bryan Reagan PA-C  Treatment Orders: PT Eval and Treat  Past Medical History:   Diagnosis Date    Anemia     Hypertension     since 2012    Polycystic kidney disease     Polycystic kidney disease     Renal disorder     Since childhood        Evaluation Date: 09/19/2018  Visit #: 1  Plan of care expiration: 11/12/18  Precautions: PREGNANT, Fall risk, HTN risk    History     Medical Diagnosis:  Diagnosis   I60.2 (ICD-10-CM) - Subarachnoid hemorrhage from anterior communicating artery aneurysm   O16.2 (ICD-10-CM) - Hypertension affecting pregnancy in second trimester     PT Diagnosis:   Encounter Diagnoses   Name Primary?    Subarachnoid hemorrhage Yes    Abnormality of gait as late effect of cerebrovascular accident (CVA)     Fatigue, unspecified type        History of Present Illness: Sharon PINTO is a 32 y.o. female that presents to [facility] secondary to subarachnoid hemorrhage 8/10/18. She is also 25-30wks pregnant at current (pt unsure of exact week and stated she had an upcoming ultrasound to confirm). This is her 6th child and reports all prior pregnancies were free of complications.     Chief complaints:  1. Fatigue  2. Walking with SPC - feels unsteady at times  3. Legs giving out and weaker just standing - L side feels weaker  4. Has to use motorized cart at grocery store    Falls in the past year: none  Prior Therapy: Acute care PT  Nutrition:  Normal  Social History/Support systems: Lives with  and 5 kids, (15 y/o, 10 y/o, 5y/o, 4y/o, 2y/o)  Place of Residence (Steps/Adaptations/Levels): flight of stairs, has a rail and needs cane  Previous  functional status includes: Independent  Current functional status:  Using SPC for all gait with notable unsteadiness  Exercise routine prior to onset: none  DME owned:  Straight cane, BSC, nothing in tub - has to sit in the tub but prefers tub anyway - has help to get in/out  Work/Job description includes:  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       Subjective   Pt stated goals: To get back to normal as much as possible.    Family present/states: presented alone  Pain: 5/10 LBP and lower stomach pain, worse since CVA - also gets L LE pains    Objective     Patient's mobility presenting to therapy evaluation: walks with device  - Follows commands: 100% of time   - Speech: no deficits     Mental status: alert, oriented to person, place, and time, memory impaired   Appearance: Casually dressed  Behavior:  calm  Attention Span and Concentration:  Normal    Dominant hand:  right     Posture Alignment in sitting: increased lordosis when seated unsupported on EOM, transitioned to chair with back support for majority of the eval.  Posture Alignment in standing: leans R over SPC, excessive lumbar lordosis    Sensation: Light Touch: Impaired: L LE and UE tingling entire limbs - intermittent         Tone: gets spasms in L LE      Visual/Auditory: denies changes     BP: 140/97 10:51 AM     Coordination:   - fine motor:  Impaired: L  - UE coordination:  Impaired: L  - LE coordination:   Impaired: L    ROM:   UPPER EXTREMITY--AROM/PROM  (R) UE: WNLs  (L) UE: WNLs         RANGE OF MOTION--LOWER EXTREMITIES  (R) LE Hip: normal   Knee: normal   Ankle: normal    (L) LE: Hip: normal   Knee: normal   Ankle: normal    Strength: manual muscle test grades below   Upper Extremity Strength  (R) UE  (L) UE    Shoulder Flexion: 5/5 Shoulder Flexion: 4/5   Shoulder Abduction: 5/5 Shoulder Abduction: 4/5   Shoulder Extension: 5/5 Shoulder Extension:   4/5   Elbow Flexion: 4/5 Elbow Flexion: 4-/5   Elbow Extension: 4-/5 Elbow Extension: 3+/5    Wrist Flexion: 4/5 Wrist Flexion: 3+/5   Wrist Extension: 4/5 Wrist Extension: 3+/5   : 70lbs : 20lbs     Lower Extremity Strength  Right LE  Left LE    Hip Flexion: 4-/5 Hip Flexion: 4-/5   Hip Extension:  4/5 Hip Extension: 4/5   Hip Abduction: 4-/5 Hip Abduction: 3+/5   Hip Adduction: 4-/5 Hip Adduction 3+/5   Knee Extension: 4/5 Knee Extension: 4-/5   Knee Flexion: 4/5 Knee Flexion: 3+/5   Ankle Dorsiflexion: 4/5 Ankle Dorsiflexion: 3+/5     Abdominal Strength:  <3/5 observed - not tested d/t pregnancy - testing position contraindicated    Flexibility: intact     Evaluation 09/19/2018   Single Limb Stance R LE 6s  (<10 sec = HIGH FALL RISK)   Single Limb Stance L LE 2s  (<10 sec = HIGH FALL RISK)   30 second Chair Rise 3 completed with arms       GAIT ASSESSMENT  - AD used: Straight Cane  - Assistance: supervision / Niraj  - Distance: home and limited community distances    Observed Gait Deviations:  Sharon PINTO displays the following deviations with ambulation:  Abnormal, decreased carolynn, increased time in double stance, decreased step length and decreased weight-shifting ability , asymmetrical step lengths, R leaning over SPC, pathway deviations >24inches over 200'.      Impairments contributing to deviations/impairments: impaired balance, impaired coordination, abnormal muscle tone, impaired postural control, impaired sensory feedback and decreased strength    Endurance Deficit: severe    Functional Mobility (Bed mobility, transfers)  Bed mobility:  Mod I  Supine to sit:  Mod I  Sit to supine:  Mod I  Sit to stand:   Mod I  Stand pivot:    Mod I  Transfers to bed:  Mod I  Transfers to toilet: Mod I  Transfers to shower / tub:   Jenny for tub from family  Car transfers:   Mod I   Wheelchair mobility:  N/a  Driving: not currently driving       Written Home Exercises Provided: See pt instructions 9/18/18.  Pt demo good understanding of the education provided.  demonstrated good return demonstration of  activities.     Education provided re:role of PT, goals for PT, scheduling - pt verbalized understanding. Discussed insurance limitations with pt.     Sharon PINTO verbalized good understanding of education provided.   Pt has no cultural, educational or language barriers to learning provided.      Assessment   This is a 32 y.o. female referred to outpatient physical therapy and presents with a medical diagnosis of subarachnoid hemorrhage which occurred 8/10/18. She has a complicating medical history in that she is currently in her late second trimester of pregnancy. Per pt and chart review, fetal health is currently intact. Patient presents with L side weakness and paresthesias which is affecting her gait and balance. She is ambulating with a SPC but has abnormal pathway deviation and fatigues very quickly. She also has pregancy related LBP which has worsened since the SAH. PT is warranted to address core strength, L side strength and balance safety. Therapy and exercises will have to be within limits of safe intensity, BP and positions modified for her pregnancy. I am also recommending outpatient OT services for her L UE weakness and fine motor deficits and outpatient ST for memory. Request sent to referring provider for those referrals. The patient informed me at the end of her session that she prefers our [facility] clinic for all future therapy so she will be transferred to [facility] for all further care.     Pt rehab potential is Good. Pt will benefit from continuing skilled outpatient physical therapy to address the deficits listed below in the problem list, provide pt/family education and to maximize pt's level of independence in the home and community environment.     History  Co-morbidities and personal factors that may impact the plan of care Examination  Body Structures and Functions, activity limitations and participation restrictions that may impact the plan of care    Clinical Presentation    Co-morbidities:   excessive commute time/distance, HTN, transportation assistance required and pregnancy        Personal Factors:   education level Body Regions:   back  lower extremities  upper extremities  trunk    Body Systems:    gross symmetry  gross coordinated movement  balance  gait  transfers  transitions  motor control  blood pressure  edema            Participation Restrictions:   pregnancy related modifications needed     Activity limitations:   Mobility  lifting and carrying objects  fine hand use (grasping/picking up)  walking  using transportation (bus, train, plane, car)  driving (bike, car, motorcycle)    Self care  washing oneself (bathing, drying, washing hands)  caring for body parts (brushing teeth, shaving, grooming)  toileting  dressing  looking after one's health    Domestic Life  shopping  cooking  doing house work (cleaning house, washing dishes, laundry)  assisting others    Life Areas  employment    Community and Social Life  community life  recreation and leisure  [employer] and spirituality         unstable clinical presentation with unpredictable characteristics                      high   high  high Decision Making/ Complexity Score:  high       Pt's spiritual, cultural and educational needs considered and pt agreeable to plan of care and goals as stated below:     GOALS:   Short term goals: 4 weeks, pt agrees to goals set.  1. Pt will improve LE MMT score on L side to 4/5 or better for all motions.   2. Pt will improve SLS on R to at least 10s to indicate decreased fall risk.   3. Pt will ambulate with AD as needed with pathway deviations <24 inches over 200'.     Long term goals: 8 weeks, pt agrees to goals set  4. Pt will improve LE MMT score on L side to 4/5 or better for all motions.   5. Pt will improve SLS on R to at least 10s to indicate decreased fall risk.   6. Pt will ambulate with AD as needed with pathway deviations <12 inches over 200'.   7. Pt will ambulate mod I x 200'  with no AD and no loss of balance.       Plan   Outpatient physical therapy 2 times weekly to include: Pt Education, Home Exercise Program, Therapeutic Exercises, Neuromuscular Re-education, Therapeutic Activities, Gait Training, Manual Therapy, and modalities(Ultrasound/Phonophoresis, Electrical Stimulation/TENS/Interferential and Moist Heat/Ice) PRN to achieve established goals. Pt may be seen by PTA as part of the rehabilitation team.     Cont PT for 8 weeks.     Kaylee South, PT  09/19/2018

## 2018-09-18 NOTE — PATIENT INSTRUCTIONS
KNEE: Extension, Long Arc Quads - Sitting        Raise leg until knee is straight. Hold 3-5 seconds.   Repeat 10 times. Do 2 sets. 1 sessions per day.    ANKLE: Dorsiflexion (Band)        Sit at edge of surface. Place band around top of foot. Keeping heel on floor, raise toes of banded foot. Hold 1-2 seconds.    10 reps per set, 3 sets per day     Copyright © [employer]. All rights reserved.       HIP: Abduction / External Rotation (Band)        Place band around knees. Lie on side with hips and knees bent. Raise top knee up, squeezing glutes. Keep feet together. Hold 2 seconds. Use green band. 10 reps per set, 3 sets per day     Copyright © [employer]. All rights reserved.

## 2018-09-19 PROBLEM — R53.83 FATIGUE: Status: ACTIVE | Noted: 2018-09-19

## 2018-09-19 PROBLEM — I69.398 ABNORMALITY OF GAIT AS LATE EFFECT OF CEREBROVASCULAR ACCIDENT (CVA): Status: ACTIVE | Noted: 2018-09-19

## 2018-09-19 PROBLEM — R26.9 ABNORMALITY OF GAIT AS LATE EFFECT OF CEREBROVASCULAR ACCIDENT (CVA): Status: ACTIVE | Noted: 2018-09-19

## 2018-09-20 ENCOUNTER — TELEPHONE (OUTPATIENT)
Dept: NEUROSURGERY | Facility: CLINIC | Age: 33
End: 2018-09-20

## 2018-09-20 DIAGNOSIS — R26.9 ABNORMALITY OF GAIT AS LATE EFFECT OF CEREBROVASCULAR ACCIDENT (CVA): ICD-10-CM

## 2018-09-20 DIAGNOSIS — I69.398 ABNORMALITY OF GAIT AS LATE EFFECT OF CEREBROVASCULAR ACCIDENT (CVA): ICD-10-CM

## 2018-09-20 DIAGNOSIS — I60.2 SUBARACHNOID HEMORRHAGE FROM ANTERIOR COMMUNICATING ARTERY ANEURYSM: Primary | ICD-10-CM

## 2018-09-20 DIAGNOSIS — I60.9 SUBARACHNOID HEMORRHAGE: ICD-10-CM

## 2018-09-20 NOTE — PT/OT/SLP DISCHARGE
Physical Therapy Discharge Summary    Name: Sharon Grande  MRN: [id number]   Principal Problem: Subarachnoid hemorrhage from anterior communicating artery aneurysm     Patient Discharged from acute Physical Therapy on 18.  Please refer to prior PT noted date on 18 for functional status.     Assessment:     Patient was discharged unexpectedly.  Information required to complete an accurate discharge summary is unknown.  Refer to therapy initial evaluation and last progress note for initial and most recent functional status and goal achievement.  Recommendations made may be found in medical record. Patient has not met goals.    Objective:     GOALS:   Multidisciplinary Problems     Physical Therapy Goals        Problem: Physical Therapy Goal    Goal Priority Disciplines Outcome Goal Variances Interventions   Physical Therapy Goal     PT, PT/OT Ongoing (interventions implemented as appropriate)     Description:  Goals to be met by: 2018     Patient will increase functional independence with mobility by performin. Supine to sit with supervision with HOB flat  2. Sit to supine with supervision with HOB flat  3. Sit to stand transfer with supervision without AD.   4. Gait  x 150 ft with supervision without AD.   5. Lower extremity exercise program x15 reps per handout, with assistance as needed  6. Pt will perform dynamic standing balance activities x 3 minutes with supervision to reduce fall risk   7. Pt will ascend/descend 5 stairs with handrail as needed with supervision                         Reasons for Discontinuation of Therapy Services  Transfer to alternate level of care.      Plan:     Patient Discharged to: Home with OP therapy referral; pt refused rehab.     Sonja Narvaez, PT  2018

## 2018-09-20 NOTE — TELEPHONE ENCOUNTER
----- Message from Salo Montelongo MD sent at 9/19/2018  6:46 PM CDT -----  Regarding: RE: OT and ST orders, blood pressure limitations question  BP I will leave to the OB guys, from the neuro-stand point, she does not have any blood pressure restrictions, but I am not sure with her being pregnant.     For the outpt pt orders, my office will take care of it.     Thanks    E    ----- Message -----  From: Kaylee South PT  Sent: 9/19/2018   4:06 PM  To: Salo Montelongo MD, #  Subject: OT and ST orders, blood pressure limitations#    Hello,     I evaluated Ms. Grande yesterday for physical therapy. I am recommended she also get orders for outpatient OT for left arm weakness and ST for memory deficits since CVA. She is agreeable to both. She is asking all of her therapy orders and care be sent to our [facility].     Also, are there any specific blood pressure limitations we need to be aware of during therapy considering her pregnancy? Typically with any standard patient anything over 200 systolic or 100 diastolic values we withhold exercise. However I know with pregnancy, 140/90 or higher is considered HTN / higher risk. At eval at rest she was 140/97.  Please advise so we keep her in safe ranges.     Thank you,   Kaylee South, PT

## 2018-09-27 ENCOUNTER — DOCUMENTATION ONLY (OUTPATIENT)
Dept: MATERNAL FETAL MEDICINE | Facility: OTHER | Age: 33
End: 2018-09-27

## 2018-09-27 ENCOUNTER — ANESTHESIA (OUTPATIENT)
Dept: OBSTETRICS AND GYNECOLOGY | Facility: OTHER | Age: 33
End: 2018-09-27
Payer: MEDICAID

## 2018-09-27 ENCOUNTER — ANESTHESIA EVENT (OUTPATIENT)
Dept: OBSTETRICS AND GYNECOLOGY | Facility: OTHER | Age: 33
End: 2018-09-27
Payer: MEDICAID

## 2018-09-27 ENCOUNTER — ANESTHESIA (OUTPATIENT)
Dept: OBSTETRICS AND GYNECOLOGY | Facility: OTHER | Age: 33
End: 2018-09-27

## 2018-09-27 ENCOUNTER — HOSPITAL ENCOUNTER (INPATIENT)
Facility: OTHER | Age: 33
LOS: 6 days | Discharge: HOME | End: 2018-10-03
Attending: EMERGENCY MEDICINE | Admitting: OBSTETRICS & GYNECOLOGY
Payer: MEDICAID

## 2018-09-27 DIAGNOSIS — R51.9 CHRONIC NONINTRACTABLE HEADACHE, UNSPECIFIED HEADACHE TYPE: ICD-10-CM

## 2018-09-27 DIAGNOSIS — I60.2 SUBARACHNOID HEMORRHAGE FROM ANTERIOR COMMUNICATING ARTERY ANEURYSM: ICD-10-CM

## 2018-09-27 DIAGNOSIS — Z3A.25 25 WEEKS GESTATION OF PREGNANCY: ICD-10-CM

## 2018-09-27 DIAGNOSIS — Z86.79 H/O SUBARACHNOID HEMORRHAGE: ICD-10-CM

## 2018-09-27 DIAGNOSIS — Z98.51 HISTORY OF BILATERAL TUBAL LIGATION: ICD-10-CM

## 2018-09-27 DIAGNOSIS — O14.10 PRE-ECLAMPSIA, SEVERE, ANTEPARTUM: ICD-10-CM

## 2018-09-27 DIAGNOSIS — O14.10 PRE-ECLAMPSIA, SEVERE: Primary | ICD-10-CM

## 2018-09-27 DIAGNOSIS — I60.9 SUBARACHNOID HEMORRHAGE: ICD-10-CM

## 2018-09-27 DIAGNOSIS — R51.9 HEADACHE: ICD-10-CM

## 2018-09-27 DIAGNOSIS — D62 ACUTE BLOOD LOSS ANEMIA: ICD-10-CM

## 2018-09-27 DIAGNOSIS — G89.29 CHRONIC NONINTRACTABLE HEADACHE, UNSPECIFIED HEADACHE TYPE: ICD-10-CM

## 2018-09-27 DIAGNOSIS — O14.13 SEVERE PRE-ECLAMPSIA IN THIRD TRIMESTER: ICD-10-CM

## 2018-09-27 DIAGNOSIS — O60.03 PRETERM LABOR IN THIRD TRIMESTER WITHOUT DELIVERY: ICD-10-CM

## 2018-09-27 DIAGNOSIS — Z3A.31 31 WEEKS GESTATION OF PREGNANCY: ICD-10-CM

## 2018-09-27 DIAGNOSIS — N71.9 ENDOMETRITIS: ICD-10-CM

## 2018-09-27 PROBLEM — G44.89 OTHER HEADACHE SYNDROME: Status: ACTIVE | Noted: 2018-09-27

## 2018-09-27 LAB
ABO + RH BLD: NORMAL
ALBUMIN SERPL BCP-MCNC: 2.6 G/DL
ALBUMIN SERPL BCP-MCNC: 2.9 G/DL
ALLENS TEST: ABNORMAL
ALLENS TEST: ABNORMAL
ALP SERPL-CCNC: 108 U/L
ALP SERPL-CCNC: 97 U/L
ALT SERPL W/O P-5'-P-CCNC: 5 U/L
ALT SERPL W/O P-5'-P-CCNC: 7 U/L
ANION GAP SERPL CALC-SCNC: 8 MMOL/L
ANION GAP SERPL CALC-SCNC: 9 MMOL/L
APTT BLDCRRT: 22.3 SEC
AST SERPL-CCNC: 10 U/L
AST SERPL-CCNC: 11 U/L
BACTERIA #/AREA URNS AUTO: ABNORMAL /HPF
BASOPHILS # BLD AUTO: 0.01 K/UL
BASOPHILS NFR BLD: 0.2 %
BILIRUB SERPL-MCNC: 0.2 MG/DL
BILIRUB SERPL-MCNC: 0.2 MG/DL
BILIRUB UR QL STRIP: NEGATIVE
BLD GP AB SCN CELLS X3 SERPL QL: NORMAL
BUN SERPL-MCNC: 10 MG/DL
BUN SERPL-MCNC: 9 MG/DL
CALCIUM SERPL-MCNC: 8.8 MG/DL
CALCIUM SERPL-MCNC: 9.1 MG/DL
CHLORIDE SERPL-SCNC: 104 MMOL/L
CHLORIDE SERPL-SCNC: 105 MMOL/L
CLARITY UR REFRACT.AUTO: CLEAR
CO2 SERPL-SCNC: 24 MMOL/L
CO2 SERPL-SCNC: 24 MMOL/L
COLOR UR AUTO: YELLOW
CREAT SERPL-MCNC: 0.7 MG/DL
CREAT SERPL-MCNC: 0.7 MG/DL
CREAT UR-MCNC: 138 MG/DL
DELSYS: ABNORMAL
DELSYS: ABNORMAL
DIFFERENTIAL METHOD: ABNORMAL
EOSINOPHIL # BLD AUTO: 0 K/UL
EOSINOPHIL NFR BLD: 0.6 %
ERYTHROCYTE [DISTWIDTH] IN BLOOD BY AUTOMATED COUNT: 14.9 %
EST. GFR  (AFRICAN AMERICAN): >60 ML/MIN/1.73 M^2
EST. GFR  (AFRICAN AMERICAN): >60 ML/MIN/1.73 M^2
EST. GFR  (NON AFRICAN AMERICAN): >60 ML/MIN/1.73 M^2
EST. GFR  (NON AFRICAN AMERICAN): >60 ML/MIN/1.73 M^2
FIBRINOGEN PPP-MCNC: 373 MG/DL
GLUCOSE SERPL-MCNC: 86 MG/DL
GLUCOSE SERPL-MCNC: 90 MG/DL
GLUCOSE UR QL STRIP: NEGATIVE
HCO3 UR-SCNC: 22.7 MMOL/L (ref 24–28)
HCO3 UR-SCNC: 27.1 MMOL/L (ref 24–28)
HCT VFR BLD AUTO: 29 %
HGB BLD-MCNC: 9.3 G/DL
HGB UR QL STRIP: ABNORMAL
HIV1+2 IGG SERPL QL IA.RAPID: NEGATIVE
IMM GRANULOCYTES # BLD AUTO: 0.02 K/UL
IMM GRANULOCYTES NFR BLD AUTO: 0.3 %
INR PPP: 0.9
KETONES UR QL STRIP: NEGATIVE
LEUKOCYTE ESTERASE UR QL STRIP: ABNORMAL
LYMPHOCYTES # BLD AUTO: 1.4 K/UL
LYMPHOCYTES NFR BLD: 21.2 %
MAGNESIUM SERPL-MCNC: 1.6 MG/DL
MCH RBC QN AUTO: 28.5 PG
MCHC RBC AUTO-ENTMCNC: 32.1 G/DL
MCV RBC AUTO: 89 FL
MICROSCOPIC COMMENT: ABNORMAL
MONOCYTES # BLD AUTO: 0.4 K/UL
MONOCYTES NFR BLD: 6.6 %
NEUTROPHILS # BLD AUTO: 4.5 K/UL
NEUTROPHILS NFR BLD: 71.1 %
NITRITE UR QL STRIP: NEGATIVE
NRBC BLD-RTO: 0 /100 WBC
PCO2 BLDA: 35.1 MMHG (ref 35–45)
PCO2 BLDA: 52 MMHG (ref 35–45)
PH SMN: 7.33 [PH] (ref 7.35–7.45)
PH SMN: 7.42 [PH] (ref 7.35–7.45)
PH UR STRIP: 6 [PH] (ref 5–8)
PHOSPHATE SERPL-MCNC: 2.3 MG/DL
PLATELET # BLD AUTO: 227 K/UL
PMV BLD AUTO: 10.8 FL
PO2 BLDA: 11 MMHG (ref 80–100)
PO2 BLDA: 23 MMHG (ref 80–100)
POC BE: -2 MMOL/L
POC BE: 1 MMOL/L
POC SATURATED O2: 42 % (ref 95–100)
POC SATURATED O2: 9 % (ref 95–100)
POTASSIUM SERPL-SCNC: 2.7 MMOL/L
POTASSIUM SERPL-SCNC: 3.1 MMOL/L
PROT SERPL-MCNC: 6.8 G/DL
PROT SERPL-MCNC: 7.5 G/DL
PROT UR QL STRIP: NEGATIVE
PROT UR-MCNC: 30 MG/DL
PROT/CREAT UR: 0.22 MG/G{CREAT}
PROTHROMBIN TIME: 10 SEC
RBC # BLD AUTO: 3.26 M/UL
RBC #/AREA URNS AUTO: 5 /HPF (ref 0–4)
SAMPLE: ABNORMAL
SAMPLE: ABNORMAL
SITE: ABNORMAL
SITE: ABNORMAL
SODIUM SERPL-SCNC: 137 MMOL/L
SODIUM SERPL-SCNC: 137 MMOL/L
SP GR UR STRIP: 1.01 (ref 1–1.03)
SQUAMOUS #/AREA URNS AUTO: 5 /HPF
URN SPEC COLLECT METH UR: ABNORMAL
UROBILINOGEN UR STRIP-ACNC: 2 EU/DL
WBC # BLD AUTO: 6.36 K/UL
WBC #/AREA URNS AUTO: 7 /HPF (ref 0–5)

## 2018-09-27 PROCEDURE — 25000003 PHARM REV CODE 250: Performed by: STUDENT IN AN ORGANIZED HEALTH CARE EDUCATION/TRAINING PROGRAM

## 2018-09-27 PROCEDURE — 96366 THER/PROPH/DIAG IV INF ADDON: CPT

## 2018-09-27 PROCEDURE — 96365 THER/PROPH/DIAG IV INF INIT: CPT

## 2018-09-27 PROCEDURE — 88307 TISSUE EXAM BY PATHOLOGIST: CPT | Mod: 26,,, | Performed by: PATHOLOGY

## 2018-09-27 PROCEDURE — 59514 CESAREAN DELIVERY ONLY: CPT | Mod: ,,, | Performed by: ANESTHESIOLOGY

## 2018-09-27 PROCEDURE — 20000000 HC ICU ROOM

## 2018-09-27 PROCEDURE — 63600175 PHARM REV CODE 636 W HCPCS: Performed by: STUDENT IN AN ORGANIZED HEALTH CARE EDUCATION/TRAINING PROGRAM

## 2018-09-27 PROCEDURE — 99900035 HC TECH TIME PER 15 MIN (STAT)

## 2018-09-27 PROCEDURE — 84100 ASSAY OF PHOSPHORUS: CPT

## 2018-09-27 PROCEDURE — 86592 SYPHILIS TEST NON-TREP QUAL: CPT

## 2018-09-27 PROCEDURE — 88302 TISSUE EXAM BY PATHOLOGIST: CPT | Mod: 26,,, | Performed by: PATHOLOGY

## 2018-09-27 PROCEDURE — 96368 THER/DIAG CONCURRENT INF: CPT

## 2018-09-27 PROCEDURE — 27200710 HC EPIDURAL INFUSION PUMP SET: Performed by: STUDENT IN AN ORGANIZED HEALTH CARE EDUCATION/TRAINING PROGRAM

## 2018-09-27 PROCEDURE — 59514 CESAREAN DELIVERY ONLY: CPT | Mod: AT,,, | Performed by: OBSTETRICS & GYNECOLOGY

## 2018-09-27 PROCEDURE — 51702 INSERT TEMP BLADDER CATH: CPT

## 2018-09-27 PROCEDURE — 99221 1ST HOSP IP/OBS SF/LOW 40: CPT | Mod: 25,,, | Performed by: OBSTETRICS & GYNECOLOGY

## 2018-09-27 PROCEDURE — 59025 FETAL NON-STRESS TEST: CPT | Mod: 26,77,, | Performed by: OBSTETRICS & GYNECOLOGY

## 2018-09-27 PROCEDURE — 85025 COMPLETE CBC W/AUTO DIFF WBC: CPT

## 2018-09-27 PROCEDURE — 99285 EMERGENCY DEPT VISIT HI MDM: CPT | Mod: 25,,, | Performed by: OBSTETRICS & GYNECOLOGY

## 2018-09-27 PROCEDURE — 63600175 PHARM REV CODE 636 W HCPCS: Performed by: OBSTETRICS & GYNECOLOGY

## 2018-09-27 PROCEDURE — 96375 TX/PRO/DX INJ NEW DRUG ADDON: CPT

## 2018-09-27 PROCEDURE — 86703 HIV-1/HIV-2 1 RESULT ANTBDY: CPT

## 2018-09-27 PROCEDURE — 85730 THROMBOPLASTIN TIME PARTIAL: CPT

## 2018-09-27 PROCEDURE — 86803 HEPATITIS C AB TEST: CPT

## 2018-09-27 PROCEDURE — 99231 SBSQ HOSP IP/OBS SF/LOW 25: CPT | Mod: ,,, | Performed by: PHYSICIAN ASSISTANT

## 2018-09-27 PROCEDURE — 86703 HIV-1/HIV-2 1 RESULT ANTBDY: CPT | Mod: 91

## 2018-09-27 PROCEDURE — 25000003 PHARM REV CODE 250

## 2018-09-27 PROCEDURE — 81001 URINALYSIS AUTO W/SCOPE: CPT

## 2018-09-27 PROCEDURE — 36000680 HC C/S TUBAL LIGATION LEVEL I

## 2018-09-27 PROCEDURE — 0UB70ZZ EXCISION OF BILATERAL FALLOPIAN TUBES, OPEN APPROACH: ICD-10-PCS | Performed by: OBSTETRICS & GYNECOLOGY

## 2018-09-27 PROCEDURE — 63600175 PHARM REV CODE 636 W HCPCS: Performed by: EMERGENCY MEDICINE

## 2018-09-27 PROCEDURE — 96376 TX/PRO/DX INJ SAME DRUG ADON: CPT

## 2018-09-27 PROCEDURE — 85384 FIBRINOGEN ACTIVITY: CPT

## 2018-09-27 PROCEDURE — 37000009 HC ANESTHESIA EA ADD 15 MINS: Performed by: OBSTETRICS & GYNECOLOGY

## 2018-09-27 PROCEDURE — 37000008 HC ANESTHESIA 1ST 15 MINUTES: Performed by: OBSTETRICS & GYNECOLOGY

## 2018-09-27 PROCEDURE — 82803 BLOOD GASES ANY COMBINATION: CPT

## 2018-09-27 PROCEDURE — 83735 ASSAY OF MAGNESIUM: CPT

## 2018-09-27 PROCEDURE — 27800517 HC TRAY,EPIDURAL-CONTINUOUS: Performed by: STUDENT IN AN ORGANIZED HEALTH CARE EDUCATION/TRAINING PROGRAM

## 2018-09-27 PROCEDURE — 88302 TISSUE EXAM BY PATHOLOGIST: CPT | Performed by: PATHOLOGY

## 2018-09-27 PROCEDURE — 84156 ASSAY OF PROTEIN URINE: CPT

## 2018-09-27 PROCEDURE — 96372 THER/PROPH/DIAG INJ SC/IM: CPT

## 2018-09-27 PROCEDURE — 59025 FETAL NON-STRESS TEST: CPT | Mod: 26,,, | Performed by: OBSTETRICS & GYNECOLOGY

## 2018-09-27 PROCEDURE — 87086 URINE CULTURE/COLONY COUNT: CPT

## 2018-09-27 PROCEDURE — 86901 BLOOD TYPING SEROLOGIC RH(D): CPT

## 2018-09-27 PROCEDURE — 4A0HXCZ MEASUREMENT OF PRODUCTS OF CONCEPTION, CARDIAC RATE, EXTERNAL APPROACH: ICD-10-PCS | Performed by: OBSTETRICS & GYNECOLOGY

## 2018-09-27 PROCEDURE — 80053 COMPREHEN METABOLIC PANEL: CPT | Mod: 91

## 2018-09-27 PROCEDURE — 99285 EMERGENCY DEPT VISIT HI MDM: CPT | Mod: ,,, | Performed by: EMERGENCY MEDICINE

## 2018-09-27 PROCEDURE — 85610 PROTHROMBIN TIME: CPT

## 2018-09-27 PROCEDURE — 25000003 PHARM REV CODE 250: Performed by: EMERGENCY MEDICINE

## 2018-09-27 PROCEDURE — 36000685 HC CESAREAN SECTION LEVEL I

## 2018-09-27 PROCEDURE — 99285 EMERGENCY DEPT VISIT HI MDM: CPT | Mod: 25

## 2018-09-27 PROCEDURE — 80053 COMPREHEN METABOLIC PANEL: CPT

## 2018-09-27 PROCEDURE — 58611 LIGATE OVIDUCT(S) ADD-ON: CPT | Mod: ,,, | Performed by: OBSTETRICS & GYNECOLOGY

## 2018-09-27 RX ORDER — MORPHINE SULFATE 10 MG/ML
INJECTION, SOLUTION INTRAMUSCULAR; INTRAVENOUS
Status: DISCONTINUED | OUTPATIENT
Start: 2018-09-27 | End: 2018-09-28

## 2018-09-27 RX ORDER — HYDROCODONE BITARTRATE AND ACETAMINOPHEN 7.5; 325 MG/1; MG/1
1 TABLET ORAL EVERY 4 HOURS PRN
Status: DISCONTINUED | OUTPATIENT
Start: 2018-09-28 | End: 2018-09-28

## 2018-09-27 RX ORDER — IBUPROFEN 600 MG/1
600 TABLET ORAL EVERY 6 HOURS
Status: DISCONTINUED | OUTPATIENT
Start: 2018-09-28 | End: 2018-09-28

## 2018-09-27 RX ORDER — NIFEDIPINE 30 MG/1
30 TABLET, EXTENDED RELEASE ORAL DAILY
Status: DISCONTINUED | OUTPATIENT
Start: 2018-09-27 | End: 2018-09-27

## 2018-09-27 RX ORDER — SODIUM CITRATE AND CITRIC ACID MONOHYDRATE 334; 500 MG/5ML; MG/5ML
SOLUTION ORAL
Status: COMPLETED
Start: 2018-09-27 | End: 2018-09-27

## 2018-09-27 RX ORDER — MAGNESIUM SULFATE HEPTAHYDRATE 40 MG/ML
6 INJECTION, SOLUTION INTRAVENOUS ONCE
Status: COMPLETED | OUTPATIENT
Start: 2018-09-27 | End: 2018-09-27

## 2018-09-27 RX ORDER — DIPHENHYDRAMINE HCL 25 MG
25 CAPSULE ORAL EVERY 4 HOURS PRN
Status: DISCONTINUED | OUTPATIENT
Start: 2018-09-27 | End: 2018-09-27

## 2018-09-27 RX ORDER — POTASSIUM CHLORIDE 7.45 MG/ML
10 INJECTION INTRAVENOUS
Status: COMPLETED | OUTPATIENT
Start: 2018-09-27 | End: 2018-09-27

## 2018-09-27 RX ORDER — FENTANYL CITRATE 50 UG/ML
INJECTION, SOLUTION INTRAMUSCULAR; INTRAVENOUS
Status: DISCONTINUED | OUTPATIENT
Start: 2018-09-27 | End: 2018-09-28

## 2018-09-27 RX ORDER — ADHESIVE BANDAGE
30 BANDAGE TOPICAL 2 TIMES DAILY PRN
Status: DISCONTINUED | OUTPATIENT
Start: 2018-09-28 | End: 2018-10-03 | Stop reason: HOSPADM

## 2018-09-27 RX ORDER — CEFAZOLIN SODIUM 2 G/50ML
2 SOLUTION INTRAVENOUS
Status: DISCONTINUED | OUTPATIENT
Start: 2018-09-27 | End: 2018-09-27

## 2018-09-27 RX ORDER — ACETAMINOPHEN 325 MG/1
650 TABLET ORAL EVERY 6 HOURS
Status: COMPLETED | OUTPATIENT
Start: 2018-09-28 | End: 2018-09-28

## 2018-09-27 RX ORDER — AMOXICILLIN 250 MG
1 CAPSULE ORAL NIGHTLY PRN
Status: DISCONTINUED | OUTPATIENT
Start: 2018-09-27 | End: 2018-10-03 | Stop reason: HOSPADM

## 2018-09-27 RX ORDER — SODIUM,POTASSIUM PHOSPHATES 280-250MG
1 POWDER IN PACKET (EA) ORAL EVERY 4 HOURS
Status: COMPLETED | OUTPATIENT
Start: 2018-09-27 | End: 2018-09-28

## 2018-09-27 RX ORDER — SODIUM CHLORIDE, SODIUM LACTATE, POTASSIUM CHLORIDE, CALCIUM CHLORIDE 600; 310; 30; 20 MG/100ML; MG/100ML; MG/100ML; MG/100ML
INJECTION, SOLUTION INTRAVENOUS CONTINUOUS
Status: DISCONTINUED | OUTPATIENT
Start: 2018-09-27 | End: 2018-09-27

## 2018-09-27 RX ORDER — MUPIROCIN 20 MG/G
OINTMENT TOPICAL
Status: DISCONTINUED | OUTPATIENT
Start: 2018-09-27 | End: 2018-09-28

## 2018-09-27 RX ORDER — BUPIVACAINE HYDROCHLORIDE 7.5 MG/ML
INJECTION, SOLUTION INTRASPINAL
Status: DISCONTINUED | OUTPATIENT
Start: 2018-09-27 | End: 2018-09-28

## 2018-09-27 RX ORDER — CALCIUM GLUCONATE 98 MG/ML
1 INJECTION, SOLUTION INTRAVENOUS
Status: DISCONTINUED | OUTPATIENT
Start: 2018-09-27 | End: 2018-10-03 | Stop reason: HOSPADM

## 2018-09-27 RX ORDER — BETAMETHASONE SODIUM PHOSPHATE AND BETAMETHASONE ACETATE 3; 3 MG/ML; MG/ML
12 INJECTION, SUSPENSION INTRA-ARTICULAR; INTRALESIONAL; INTRAMUSCULAR; SOFT TISSUE ONCE
Status: DISCONTINUED | OUTPATIENT
Start: 2018-09-27 | End: 2018-09-27

## 2018-09-27 RX ORDER — ACETAMINOPHEN 10 MG/ML
1000 INJECTION, SOLUTION INTRAVENOUS ONCE
Status: COMPLETED | OUTPATIENT
Start: 2018-09-27 | End: 2018-09-27

## 2018-09-27 RX ORDER — METOCLOPRAMIDE HYDROCHLORIDE 5 MG/ML
10 INJECTION INTRAMUSCULAR; INTRAVENOUS
Status: COMPLETED | OUTPATIENT
Start: 2018-09-27 | End: 2018-09-27

## 2018-09-27 RX ORDER — ONDANSETRON HYDROCHLORIDE 2 MG/ML
INJECTION, SOLUTION INTRAMUSCULAR; INTRAVENOUS
Status: DISCONTINUED | OUTPATIENT
Start: 2018-09-27 | End: 2018-09-28

## 2018-09-27 RX ORDER — SODIUM CHLORIDE, SODIUM LACTATE, POTASSIUM CHLORIDE, CALCIUM CHLORIDE 600; 310; 30; 20 MG/100ML; MG/100ML; MG/100ML; MG/100ML
1000 INJECTION, SOLUTION INTRAVENOUS CONTINUOUS
Status: DISCONTINUED | OUTPATIENT
Start: 2018-09-27 | End: 2018-09-27

## 2018-09-27 RX ORDER — LABETALOL HYDROCHLORIDE 5 MG/ML
20 INJECTION, SOLUTION INTRAVENOUS ONCE
Status: COMPLETED | OUTPATIENT
Start: 2018-09-27 | End: 2018-09-27

## 2018-09-27 RX ORDER — OXYCODONE HYDROCHLORIDE 5 MG/1
5 TABLET ORAL EVERY 4 HOURS PRN
Status: ACTIVE | OUTPATIENT
Start: 2018-09-27 | End: 2018-09-28

## 2018-09-27 RX ORDER — ONDANSETRON 8 MG/1
8 TABLET, ORALLY DISINTEGRATING ORAL EVERY 8 HOURS PRN
Status: DISCONTINUED | OUTPATIENT
Start: 2018-09-27 | End: 2018-10-03 | Stop reason: HOSPADM

## 2018-09-27 RX ORDER — POTASSIUM CHLORIDE 20 MEQ/1
60 TABLET, EXTENDED RELEASE ORAL ONCE
Status: COMPLETED | OUTPATIENT
Start: 2018-09-27 | End: 2018-09-27

## 2018-09-27 RX ORDER — CARBOPROST TROMETHAMINE 250 UG/ML
250 INJECTION, SOLUTION INTRAMUSCULAR
Status: DISCONTINUED | OUTPATIENT
Start: 2018-09-27 | End: 2018-09-28

## 2018-09-27 RX ORDER — PHENYLEPHRINE HYDROCHLORIDE 10 MG/ML
INJECTION INTRAVENOUS
Status: DISCONTINUED | OUTPATIENT
Start: 2018-09-27 | End: 2018-09-28

## 2018-09-27 RX ORDER — MUPIROCIN 20 MG/G
1 OINTMENT TOPICAL 2 TIMES DAILY
Status: DISPENSED | OUTPATIENT
Start: 2018-09-27 | End: 2018-10-02

## 2018-09-27 RX ORDER — LABETALOL HYDROCHLORIDE 5 MG/ML
40 INJECTION, SOLUTION INTRAVENOUS ONCE
Status: COMPLETED | OUTPATIENT
Start: 2018-09-27 | End: 2018-09-27

## 2018-09-27 RX ORDER — OXYCODONE HYDROCHLORIDE 5 MG/1
10 TABLET ORAL EVERY 4 HOURS PRN
Status: DISPENSED | OUTPATIENT
Start: 2018-09-27 | End: 2018-09-28

## 2018-09-27 RX ORDER — BISACODYL 10 MG
10 SUPPOSITORY, RECTAL RECTAL ONCE AS NEEDED
Status: ACTIVE | OUTPATIENT
Start: 2018-09-27 | End: 2018-09-27

## 2018-09-27 RX ORDER — MISOPROSTOL 200 UG/1
800 TABLET ORAL
Status: DISCONTINUED | OUTPATIENT
Start: 2018-09-27 | End: 2018-09-28

## 2018-09-27 RX ORDER — MAGNESIUM SULFATE HEPTAHYDRATE 40 MG/ML
1 INJECTION, SOLUTION INTRAVENOUS CONTINUOUS
Status: DISCONTINUED | OUTPATIENT
Start: 2018-09-27 | End: 2018-09-28

## 2018-09-27 RX ORDER — SODIUM CHLORIDE, SODIUM LACTATE, POTASSIUM CHLORIDE, CALCIUM CHLORIDE 600; 310; 30; 20 MG/100ML; MG/100ML; MG/100ML; MG/100ML
INJECTION, SOLUTION INTRAVENOUS CONTINUOUS PRN
Status: DISCONTINUED | OUTPATIENT
Start: 2018-09-27 | End: 2018-09-28

## 2018-09-27 RX ORDER — DIPHENHYDRAMINE HCL 25 MG
25 CAPSULE ORAL EVERY 4 HOURS PRN
Status: DISCONTINUED | OUTPATIENT
Start: 2018-09-27 | End: 2018-10-03 | Stop reason: HOSPADM

## 2018-09-27 RX ORDER — HYDROCODONE BITARTRATE AND ACETAMINOPHEN 5; 325 MG/1; MG/1
1 TABLET ORAL EVERY 4 HOURS PRN
Status: DISCONTINUED | OUTPATIENT
Start: 2018-09-28 | End: 2018-09-28

## 2018-09-27 RX ORDER — SODIUM CITRATE AND CITRIC ACID MONOHYDRATE 334; 500 MG/5ML; MG/5ML
30 SOLUTION ORAL
Status: DISCONTINUED | OUTPATIENT
Start: 2018-09-27 | End: 2018-09-28

## 2018-09-27 RX ORDER — HYDROCORTISONE 25 MG/G
CREAM TOPICAL 3 TIMES DAILY PRN
Status: DISCONTINUED | OUTPATIENT
Start: 2018-09-27 | End: 2018-10-03 | Stop reason: HOSPADM

## 2018-09-27 RX ORDER — OXYTOCIN/RINGER'S LACTATE 20/1000 ML
41.65 PLASTIC BAG, INJECTION (ML) INTRAVENOUS CONTINUOUS
Status: DISCONTINUED | OUTPATIENT
Start: 2018-09-27 | End: 2018-09-28

## 2018-09-27 RX ORDER — KETOROLAC TROMETHAMINE 30 MG/ML
30 INJECTION, SOLUTION INTRAMUSCULAR; INTRAVENOUS EVERY 6 HOURS
Status: COMPLETED | OUTPATIENT
Start: 2018-09-28 | End: 2018-09-28

## 2018-09-27 RX ORDER — DOCUSATE SODIUM 100 MG/1
200 CAPSULE, LIQUID FILLED ORAL 2 TIMES DAILY
Status: DISCONTINUED | OUTPATIENT
Start: 2018-09-27 | End: 2018-10-03 | Stop reason: HOSPADM

## 2018-09-27 RX ORDER — FAMOTIDINE 10 MG/ML
20 INJECTION INTRAVENOUS
Status: DISCONTINUED | OUTPATIENT
Start: 2018-09-27 | End: 2018-09-28

## 2018-09-27 RX ORDER — SIMETHICONE 80 MG
1 TABLET,CHEWABLE ORAL EVERY 6 HOURS PRN
Status: DISCONTINUED | OUTPATIENT
Start: 2018-09-27 | End: 2018-09-29

## 2018-09-27 RX ORDER — KETOROLAC TROMETHAMINE 30 MG/ML
INJECTION, SOLUTION INTRAMUSCULAR; INTRAVENOUS
Status: DISCONTINUED | OUTPATIENT
Start: 2018-09-27 | End: 2018-09-28

## 2018-09-27 RX ORDER — OXYTOCIN 10 [USP'U]/ML
INJECTION, SOLUTION INTRAMUSCULAR; INTRAVENOUS
Status: DISCONTINUED | OUTPATIENT
Start: 2018-09-27 | End: 2018-09-28

## 2018-09-27 RX ORDER — NIFEDIPINE 30 MG/1
30 TABLET, FILM COATED, EXTENDED RELEASE ORAL DAILY
Status: ON HOLD | COMMUNITY
End: 2018-09-28 | Stop reason: CLARIF

## 2018-09-27 RX ORDER — HYDROMORPHONE HYDROCHLORIDE 1 MG/ML
0.5 INJECTION, SOLUTION INTRAMUSCULAR; INTRAVENOUS; SUBCUTANEOUS ONCE
Status: COMPLETED | OUTPATIENT
Start: 2018-09-27 | End: 2018-09-27

## 2018-09-27 RX ORDER — BETAMETHASONE SODIUM PHOSPHATE AND BETAMETHASONE ACETATE 3; 3 MG/ML; MG/ML
12 INJECTION, SUSPENSION INTRA-ARTICULAR; INTRALESIONAL; INTRAMUSCULAR; SOFT TISSUE DAILY
Status: DISCONTINUED | OUTPATIENT
Start: 2018-09-27 | End: 2018-09-27

## 2018-09-27 RX ORDER — POTASSIUM CHLORIDE 20 MEQ/1
40 TABLET, EXTENDED RELEASE ORAL
Status: COMPLETED | OUTPATIENT
Start: 2018-09-27 | End: 2018-09-27

## 2018-09-27 RX ORDER — ONDANSETRON 2 MG/ML
4 INJECTION INTRAMUSCULAR; INTRAVENOUS EVERY 8 HOURS PRN
Status: DISCONTINUED | OUTPATIENT
Start: 2018-09-27 | End: 2018-09-27

## 2018-09-27 RX ADMIN — LABETALOL HYDROCHLORIDE 20 MG: 5 INJECTION, SOLUTION INTRAVENOUS at 10:09

## 2018-09-27 RX ADMIN — POTASSIUM CHLORIDE 10 MEQ: 7.46 INJECTION, SOLUTION INTRAVENOUS at 12:09

## 2018-09-27 RX ADMIN — MUPIROCIN 1 G: 20 OINTMENT TOPICAL at 08:09

## 2018-09-27 RX ADMIN — PHENYLEPHRINE HYDROCHLORIDE 50 MCG: 10 INJECTION INTRAVENOUS at 07:09

## 2018-09-27 RX ADMIN — LABETALOL HYDROCHLORIDE 40 MG: 5 INJECTION, SOLUTION INTRAVENOUS at 04:09

## 2018-09-27 RX ADMIN — DEXTROSE 2 G: 50 INJECTION, SOLUTION INTRAVENOUS at 06:09

## 2018-09-27 RX ADMIN — PHENYLEPHRINE HYDROCHLORIDE 50 MCG: 10 INJECTION INTRAVENOUS at 06:09

## 2018-09-27 RX ADMIN — LABETALOL HYDROCHLORIDE 20 MG: 5 INJECTION, SOLUTION INTRAVENOUS at 11:09

## 2018-09-27 RX ADMIN — DEXTROSE 5 MILLION UNITS: 50 INJECTION, SOLUTION INTRAVENOUS at 05:09

## 2018-09-27 RX ADMIN — KETOROLAC TROMETHAMINE 30 MG: 30 INJECTION, SOLUTION INTRAMUSCULAR; INTRAVENOUS at 07:09

## 2018-09-27 RX ADMIN — MAGNESIUM SULFATE HEPTAHYDRATE 2 G/HR: 40 INJECTION, SOLUTION INTRAVENOUS at 05:09

## 2018-09-27 RX ADMIN — BUPIVACAINE HYDROCHLORIDE IN DEXTROSE 1.2 ML: 7.5 INJECTION, SOLUTION SUBARACHNOID at 06:09

## 2018-09-27 RX ADMIN — Medication 41.65 MILLI-UNITS/MIN: at 08:09

## 2018-09-27 RX ADMIN — METOCLOPRAMIDE 10 MG: 5 INJECTION, SOLUTION INTRAMUSCULAR; INTRAVENOUS at 12:09

## 2018-09-27 RX ADMIN — BETAMETHASONE SODIUM PHOSPHATE AND BETAMETHASONE ACETATE 12 MG: 3; 3 INJECTION, SUSPENSION INTRA-ARTICULAR; INTRALESIONAL; INTRAMUSCULAR at 05:09

## 2018-09-27 RX ADMIN — OXYTOCIN 2 UNITS: 10 INJECTION, SOLUTION INTRAMUSCULAR; INTRAVENOUS at 07:09

## 2018-09-27 RX ADMIN — MORPHINE SULFATE 0.15 MG: 10 INJECTION INTRAMUSCULAR; INTRAVENOUS; SUBCUTANEOUS at 06:09

## 2018-09-27 RX ADMIN — POTASSIUM CHLORIDE 60 MEQ: 1500 TABLET, EXTENDED RELEASE ORAL at 08:09

## 2018-09-27 RX ADMIN — HYDROMORPHONE HYDROCHLORIDE 0.5 MG: 1 INJECTION, SOLUTION INTRAMUSCULAR; INTRAVENOUS; SUBCUTANEOUS at 10:09

## 2018-09-27 RX ADMIN — MAGNESIUM SULFATE HEPTAHYDRATE 2 G/HR: 40 INJECTION, SOLUTION INTRAVENOUS at 06:09

## 2018-09-27 RX ADMIN — ONDANSETRON 4 MG: 2 INJECTION, SOLUTION INTRAMUSCULAR; INTRAVENOUS at 07:09

## 2018-09-27 RX ADMIN — MAGNESIUM SULFATE HEPTAHYDRATE 6 G: 40 INJECTION, SOLUTION INTRAVENOUS at 05:09

## 2018-09-27 RX ADMIN — ACETAMINOPHEN 1000 MG: 10 INJECTION, SOLUTION INTRAVENOUS at 08:09

## 2018-09-27 RX ADMIN — SODIUM CITRATE AND CITRIC ACID MONOHYDRATE 30 ML: 500; 334 SOLUTION ORAL at 05:09

## 2018-09-27 RX ADMIN — NIFEDIPINE 30 MG: 30 TABLET, FILM COATED, EXTENDED RELEASE ORAL at 11:09

## 2018-09-27 RX ADMIN — POTASSIUM CHLORIDE 40 MEQ: 1500 TABLET, EXTENDED RELEASE ORAL at 12:09

## 2018-09-27 RX ADMIN — SODIUM CHLORIDE, SODIUM LACTATE, POTASSIUM CHLORIDE, AND CALCIUM CHLORIDE: 600; 310; 30; 20 INJECTION, SOLUTION INTRAVENOUS at 06:09

## 2018-09-27 RX ADMIN — OXYCODONE HYDROCHLORIDE 10 MG: 5 TABLET ORAL at 09:09

## 2018-09-27 RX ADMIN — FENTANYL CITRATE 10 MCG: 50 INJECTION, SOLUTION INTRAMUSCULAR; INTRAVENOUS at 06:09

## 2018-09-27 RX ADMIN — DOCUSATE SODIUM 200 MG: 100 CAPSULE, LIQUID FILLED ORAL at 08:09

## 2018-09-27 RX ADMIN — LABETALOL HYDROCHLORIDE 20 MG: 5 INJECTION, SOLUTION INTRAVENOUS at 04:09

## 2018-09-27 NOTE — ASSESSMENT & PLAN NOTE
Pt is 31 y/o female, 30 wks pregnant, with hx of HH4F4 SAH on 8/10, now s/p acom coiling 8/10 who presents with possibly contractions and  labor.    -Pt neurologically stable  -Last MRI done 18 stable, acom aneursym secured s/p coiling  -Pt reports intermittent headaches since discharge last month. Headache today unchanged from previous. No other clinical signs of intracranial hemorrhage.   -Pt stable for transfer to [facility].   -SBP<160  -Potassium replaced in ED  -Pt may go forward with vaginal delivery from neurosurgical standpoint    Rounded on pt with Dr. Pedraza.

## 2018-09-27 NOTE — ASSESSMENT & PLAN NOTE
- Patient s/p SAH with coil embolization and EVD in August 2018  - S/p BMZ series at that time  - Goal for BP is to remain below 160/100  - ICU consulted, on board. Will follow in the post-op period. Patient to go to ICU for frequent neuro checks  - Coags wnl  - No symptoms, neurologically stable at this time; cleared prior to transport to [facility] by neurosurgery

## 2018-09-27 NOTE — ASSESSMENT & PLAN NOTE
Pt is 33 y/o female, 30 wks pregnant, with hx of HH4F4 SAH on 8/10, now s/p acom coiling 8/10 who presents with possibly contractions and  labor.    -Pt neurologically stable  -Last MRI done 18 stable, acom aneursym secured s/p coiling  -Pt reports intermittent headaches since discharge last month. Headache today unchanged from previous. No other clinical signs of intracranial hemorrhage.   -Pt stable for transfer to [facility].   -SBP<160  -Potassium replaced in ED  -Pt may go forward with vaginal delivery from neurosurgical standpoint    Rounded on pt with Dr. Pedraza.

## 2018-09-27 NOTE — ANESTHESIA PREPROCEDURE EVALUATION
[facility]  Anesthesia Pre-Operative Evaluation         Patient Name: Sharon Grande  YOB: 1985  MRN: [id number]    SUBJECTIVE:     Pre-operative evaluation for Procedure(s) (LRB):   SECTION (N/A)     2018    Sharon Grande is a 32 y.o. female w/ a significant PMHx of PCKD, severe gestation htn and recent SAH    Patient now presents for the above procedure(s).      LDA:        Peripheral IV - Single Lumen 18 1225 Right Antecubital (Active)   Site Assessment Clean;Dry;Intact 2018  1:55 PM   Line Status Blood return noted;Flushed 2018  1:55 PM   Dressing Intervention New dressing 2018  1:55 PM   Number of days: 0            Peripheral IV - Single Lumen 18 1655 Left Antecubital (Active)   Site Assessment Clean;Intact;Dry;No redness;No swelling 2018  5:19 PM   Line Status Infusing 2018  5:19 PM   Dressing Status Clean;Dry;Intact 2018  5:19 PM   Number of days: 0       Prev airway: None documented.    Drips:    lactated ringers      magnesium sulfate in water         Patient Active Problem List   Diagnosis    PKD (polycystic kidney disease)    Pregnant and not yet delivered in second trimester    Hypertension affecting pregnancy in second trimester    Subarachnoid hemorrhage from anterior communicating artery aneurysm    25 weeks gestation of pregnancy    Hypophosphatemia    Brain compression    Brain edema    Intracranial hypertension    Hyponatremia    Hypokalemia    Cerebral artery vasospasm     screening for malformation using ultrasonics    Subarachnoid hemorrhage    Abnormality of gait as late effect of cerebrovascular accident (CVA)    Fatigue    Other headache syndrome    Headache       Review of patient's allergies indicates:  No Known Allergies    Current Inpatient Medications:   betamethasone acetate-betamethasone sodium phosphate  12 mg Intramuscular Once    betamethasone acetate-betamethasone  sodium phosphate  12 mg Intramuscular Daily    citric acid-sodium citrate 500-334 mg/5 ml        magnesium sulfate in water  6 g Intravenous Once    NIFEdipine  30 mg Oral Daily    pencillin G potassium IVPB  5 Million Units Intravenous Once    Followed by    pencillin G potassium IVPB  2.5 Million Units Intravenous Q4H       No current facility-administered medications on file prior to encounter.      Current Outpatient Medications on File Prior to Encounter   Medication Sig Dispense Refill    NIFEdipine (ADALAT CC) 30 MG TbSR Take 30 mg by mouth once daily.      acetaminophen (TYLENOL) 325 MG tablet Take 2 tablets (650 mg total) by mouth every 4 (four) hours as needed.  0    niMODipine (NIMOTOP) 30 MG Cap Take 1 capsule (30 mg total) by mouth every 2 (two) hours. for 3 days 36 capsule 0    ondansetron (ZOFRAN) 4 MG tablet Take 2 tablets (8 mg total) by mouth every 6 (six) hours as needed for Nausea. 12 tablet 0    potassium chloride SA (K-DUR,KLOR-CON) 20 MEQ tablet Take 1 tablet (20 mEq total) by mouth once daily. 6 tablet 0    prenatal multivit-Ca-min-Fe-FA (PRENATAL VITAMIN) Tab Take 1 tablet by mouth once daily. 30 each 11    sodium chloride 1 gram tablet Take 2 tablets (2 g total) by mouth 3 (three) times daily.         Past Surgical History:   Procedure Laterality Date    ANGIOGRAM-CEREBRAL N/A 8/16/2018    Performed by [facility]na Surgeon at [facility]    CEREBRAL ANGIOGRAM N/A 8/16/2018    Procedure: ANGIOGRAM-CEREBRAL;  Surgeon: Jayna Surgeon;  Location: [facility];  Service: Anesthesiology;  Laterality: N/A;    DILATION AND CURETTAGE OF UTERUS      2013    DILATION AND CURETTAGE, UTERUS N/A 7/15/2013    [facility]    Mri (magnetic resonance imaging) N/A 8/10/2018    Performed by [facility]na Surgeon at [facility]       Social History     Socioeconomic History    Marital status: Single     Spouse name: Not on file    Number of children: Not on file    Years of education:  Not on file    Highest education level: Not on file   Social Needs    Financial resource strain: Not on file    Food insecurity - worry: Not on file    Food insecurity - inability: Not on file    Transportation needs - medical: Not on file    Transportation needs - non-medical: Not on file   Occupational History    Not on file   Tobacco Use    Smoking status: Never Smoker    Smokeless tobacco: Never Used   Substance and Sexual Activity    Alcohol use: Yes     Comment: occasional    Drug use: No    Sexual activity: Yes     Partners: Male     Birth control/protection: None   Other Topics Concern    Not on file   Social History Narrative    Not on file       OBJECTIVE:     Vital Signs Range (Last 24H):  Temp:  [36.9 °C (98.4 °F)-37.6 °C (99.7 °F)]   Pulse:  [65-83]   Resp:  [16-18]   BP: (139-179)/()   SpO2:  [99 %-100 %]       Significant Labs:  Lab Results   Component Value Date    WBC 6.36 09/27/2018    HGB 9.3 (L) 09/27/2018    HCT 29.0 (L) 09/27/2018     09/27/2018    CHOL 242 (H) 08/10/2018    TRIG 116 08/10/2018    HDL 62 08/10/2018    ALT 5 (L) 09/27/2018    AST 10 09/27/2018     09/27/2018    K 2.7 (LL) 09/27/2018     09/27/2018    CREATININE 0.7 09/27/2018    BUN 9 09/27/2018    CO2 24 09/27/2018    TSH 1.082 08/10/2018    INR 1.0 08/31/2018    HGBA1C 5.0 04/23/2015       Diagnostic Studies: No relevant studies.    EKG: No recent studies available.    2D ECHO:  Results for orders placed or performed during the hospital encounter of 08/10/18   Echo doppler color flow   Result Value Ref Range    EF 60 55 - 65    Diastolic Dysfunction No     Est. PA Systolic Pressure 30.88     Pericardial Effusion TRIVIAL          ASSESSMENT/PLAN:       Anesthesia Evaluation    I have reviewed the Patient Summary Reports.    I have reviewed the Nursing Notes.   I have reviewed the Medications.     Review of Systems  Anesthesia Hx:  No problems with previous Anesthesia  History of prior  surgery of interest to airway management or planning: Denies Family Hx of Anesthesia complications.   Denies Personal Hx of Anesthesia complications.   Hematology/Oncology:  Hematology Normal   Oncology Normal     EENT/Dental:EENT/Dental Normal   Cardiovascular:   Hypertension    Pulmonary:  Pulmonary Normal    Renal/:   Chronic Renal Disease (polycystic kidny disease)    Hepatic/GI:  Hepatic/GI Normal    Musculoskeletal:  Musculoskeletal Normal    Neurological:   Neuromuscular Disease, (hx of cerebral artery vasospasm) Headaches    Endocrine:  Endocrine Normal    Dermatological:  Skin Normal        Physical Exam  General:  Well nourished    Airway/Jaw/Neck:  Airway Findings: Mouth Opening: Normal Tongue: Normal  General Airway Assessment: Adult  Mallampati: III  Improves to II with phonation.  TM Distance: Normal, at least 6 cm  Jaw/Neck Findings:     Eyes/Ears/Nose:  EYES/EARS/NOSE FINDINGS: Normal   Dental:  Dental Findings: In tact   Chest/Lungs:  Chest/Lungs Clear    Heart/Vascular:  Heart Findings: Normal    Abdomen:  Abdomen Findings: Normal      Mental Status:  Mental Status Findings: Normal        Anesthesia Plan  Type of Anesthesia, risks & benefits discussed:  Anesthesia Type:  CSE, epidural, general, spinal  Patient's Preference:   Intra-op Monitoring Plan: standard ASA monitors  Intra-op Monitoring Plan Comments:   Post Op Pain Control Plan: per primary service following discharge from PACU, IV/PO Opioids PRN and multimodal analgesia  Post Op Pain Control Plan Comments:   Induction:   IV  Beta Blocker:  Patient is on a Beta-Blocker and has received one dose within the past 24 hours (No further documentation required).       Informed Consent: Patient understands risks and agrees with Anesthesia plan.  Questions answered. Anesthesia consent signed with patient.  ASA Score: 3     Day of Surgery Review of History & Physical:        Anesthesia Plan Notes: Labetalol in [place]        Ready For Surgery From  Anesthesia Perspective.

## 2018-09-27 NOTE — ED NOTES
Pt transported to [facility] per [facility]. Pt stable. Pt's belongings with pt. Pt alert. Mother at the bedside.

## 2018-09-27 NOTE — ED NOTES
The patient is awake, alert and cooperative with a calm affect, patient is aware of environment. Airway is open and patent, respirations are spontaneous, normal respiratory effort and rate noted, skin warm and dry, moves all extremities well, appearance: no apparent distress noted. Side rails x 2. Call bell within reach. Will continue to monitor. Pt updated on the current status.  Pt updated on the transfer status.

## 2018-09-27 NOTE — ED PROVIDER NOTES
Encounter Date: 2018       History     Chief Complaint   Patient presents with    Abdominal Pain     abd pain x 2 days. headaches off and on. 30 WEEKS PREGNANT     Sharon Grande is a 32 y.o. [id number] at 31w4d with history significant for HTN, polycystic kidney disease, and subarachnoid hemorrhage this pregnancy s/p coil embolization and drain placement who presented to the [facility] ED with complaints of contractions/back pains every few minutes. The pain started 2 days ago and worsened last night, occurring more frequently and more painfully, prompting her to come in.     She has a history of CHTN and is on procardia 30XL daily. She did not take her medication this morning and in the ED had severely elevated blood pressures. Her procardia was given to her and subsequent blood pressures were 140s systolic. She was seen by neurosurgery due to mild headache and was cleared as neurologically stable and OK for transport to [facility].    In the [facility], patient reported contractions had spaced out to q20-30 minutes and were less painful. Patient denies vaginal bleeding or LOF and reports good fetal movement.    Since her subarachnoid hemorrhage, patient has had intermittent mild headaches; she had a headache in the [facility] ED and denies one currently. She also reports having RUQ pain at times but states it is more in the area just below her right breast and not abdominal. She denies any abdominal pain or RUQ pain currently.          Review of patient's allergies indicates:  No Known Allergies  Past Medical History:   Diagnosis Date    Anemia     Hypertension     since     Polycystic kidney disease     Polycystic kidney disease     Renal disorder     Since childhood      Past Surgical History:   Procedure Laterality Date    ANGIOGRAM-CEREBRAL N/A 2018    Performed by [facility] Surgeon at [facility]    CEREBRAL ANGIOGRAM N/A 2018    Procedure: ANGIOGRAM-CEREBRAL;  Surgeon: Jayna Surgeon;  Location:  [facility];  Service: Anesthesiology;  Laterality: N/A;    DILATION AND CURETTAGE OF UTERUS      2013    DILATION AND CURETTAGE, UTERUS N/A 7/15/2013    Performed by [facility]tan [facility] at [facility] OR    Mri (magnetic resonance imaging) N/A 8/10/2018    Performed by [facility] Surgeon at [facility]     Family History   Problem Relation Age of Onset    Hypertension Mother     Polycystic kidney disease Mother     Hypertension Paternal Grandmother     Polycystic kidney disease Daughter      Social History     Tobacco Use    Smoking status: Never Smoker    Smokeless tobacco: Never Used   Substance Use Topics    Alcohol use: Yes     Comment: occasional    Drug use: No     Review of Systems   Constitutional: Negative for activity change, appetite change, chills and fever.   HENT: Negative for congestion, facial swelling and hearing loss.    Eyes: Negative for photophobia, pain and visual disturbance.   Respiratory: Negative for chest tightness and shortness of breath.    Cardiovascular: Negative for chest pain and palpitations.   Gastrointestinal: Negative for abdominal pain, constipation, diarrhea and nausea.   Genitourinary: Negative for dysuria, hematuria, vaginal bleeding and vaginal discharge.   Musculoskeletal: Positive for back pain (occasional ctx).   Neurological: Negative for seizures, light-headedness and headaches (none currently).   Psychiatric/Behavioral: The patient is not nervous/anxious.        Physical Exam     Initial Vitals [09/27/18 1030]   BP Pulse Resp Temp SpO2   (!) 174/104 71 18 98.6 °F (37 °C) 100 %      MAP       --         Physical Exam    Vitals reviewed.  Constitutional: She appears well-developed and well-nourished. She is not diaphoretic. No distress.   HENT:   Head: Normocephalic and atraumatic.   Eyes: Conjunctivae and EOM are normal. Right eye exhibits no discharge.   Neck: Normal range of motion. Neck supple.   Cardiovascular: Normal rate, regular rhythm and normal heart sounds.    Pulmonary/Chest: Breath sounds normal. No respiratory distress. She has no wheezes. She has no rhonchi. She has no rales.   Abdominal: Soft. There is no tenderness (no RUQ tenderness). There is no rebound and no guarding.   Gravid    Neurological: She is alert and oriented to person, place, and time. She has normal reflexes.   Skin: Skin is warm and dry. No rash noted. No erythema.   Psychiatric: She has a normal mood and affect. Her behavior is normal. Judgment and thought content normal.     OB LABOR EXAM:   Pre-Term Labor: Yes.   Membranes ruptured: No.   Method: Sterile vaginal exam per MD.   Vaginal Bleeding: none present.   Engagement: ballotable/floating.   Dilatation: 3.   Station: -3.   Effacement: 70%.   Amniotic Fluid Color: no fluid.           ED Course   Fetal non-stress test  Date/Time: 9/27/2018 6:39 PM  Performed by: Sushma K Reddy, MD  Authorized by: Avril Vance DO     Nonstress Test:     Variability:  6-25 BPM    Decelerations:  None    Accelerations:  15 bpm    Acoustic Stimulator: No      Baseline:  135    Contractions:  Irregular  Biophysical Profile:     Nonstress Test Interpretation: reactive      Overall Impression:  Reassuring        Labs Reviewed   CBC W/ AUTO DIFFERENTIAL - Abnormal; Notable for the following components:       Result Value    RBC 3.26 (*)     Hemoglobin 9.3 (*)     Hematocrit 29.0 (*)     RDW 14.9 (*)     All other components within normal limits   COMPREHENSIVE METABOLIC PANEL - Abnormal; Notable for the following components:    Potassium 2.7 (*)     Albumin 2.6 (*)     ALT 5 (*)     All other components within normal limits   URINALYSIS, REFLEX TO URINE CULTURE - Abnormal; Notable for the following components:    Occult Blood UA 1+ (*)     Leukocytes, UA 3+ (*)     All other components within normal limits    Narrative:     Preferred Collection Type->Urine, Clean Catch   PROTEIN / CREATININE RATIO, URINE - Abnormal; Notable for the following components:     Protein, Urine Random 30 (*)     Prot/Creat Ratio, Ur 0.22 (*)     All other components within normal limits   URINALYSIS MICROSCOPIC - Abnormal; Notable for the following components:    RBC, UA 5 (*)     WBC, UA 7 (*)     All other components within normal limits    Narrative:     Preferred Collection Type->Urine, Clean Catch   APTT   PROTIME-INR   FIBRINOGEN   TYPE & SCREEN          Imaging Results          MRI Brain Without Contrast (No Result on File)                  Medical Decision Making:   ED Management:  * Pre-eclampsia, severe    - h/o CHTN on Procardia 30XL.   - now with severe range blood pressures in [place]              - s/p labetalol 20/40 in [place] with subsequent mild range pressures  - PCR elevated at baseline, 0.22 here  - Other pre-eclampsia labs wnl  - No symptoms  - Given h/o aneurysm/SAH, will proceed with delivery via CS  - Discussed with patient who is in agreement with plan  - Desires BTL as well, consents signed  - Anesthesia and NICU informed  - ICU informed as pt will require ICU post-op for frequent neuro checks in the post-op period  - Consents signed  - Fetal presentation = vertex on bedside u/s  - Coags drawn, wnl  - Third trimester labs pending  - To OR for primary  delivery     Subarachnoid hemorrhage    - Patient s/p SAH with coil embolization and EVD in 2018  - S/p BMZ series at that time  - Goal for BP is to remain below 160/100  - ICU consulted, on board. Will follow in the post-op period. Patient to go to ICU for frequent neuro checks  - Coags wnl  - No symptoms, neurologically stable at this time; cleared prior to transport to [facility] by neurosurgery        Hypokalemia    - K of 2.7 at [facility] ED  - replacement initiated PO and IV  - 3.1 in [place]  - will monitor and replace as needed following delivery                    Attending Attestation:   Physician Attestation Statement for Resident:  As the supervising MD   Physician Attestation Statement: I have  personally seen and examined this patient.   I agree with the above history. -:   As the supervising MD I agree with the above PE.    As the supervising MD I agree with the above treatment, course, plan, and disposition.  I was personally present during the critical portions of the procedure(s) performed by the resident and was immediately available in the ED to provide services and assistance as needed during the entire procedure.  I have reviewed and agree with the residents interpretation of the following: lab data and rhythm strips.  I have reviewed the following: old records at this facility.                    ED Course as of Sep 27 1836   Thu Sep 27, 2018   1606 I evaluated Ms. Grande and discussed plan with Dr. Dutta.  Pt presents at 3 wga here for contraction pain.  She has a recent history of subarachnoid bleed due to ruptured aneurism.  She continues to have chronic HA's after this.  She presented this morning to [facility] ED with contractions, and initially found to be 1cm by OB resident.  She had severe range BP's at that time.   She was then cleared by neurosurgery, and BP's brought under control with her procardia - which she had not taken prior to coming to the hospital.   She continued to contract and was transferred here to r/o  labor.   Her cervix is now 3/70/-3  Fetal status 130, reactive for GA, no decels  No contractions seen but she is on her side  Pre-e labs drawn at [facility] negative, PC ratio 0.22.    Will review chart for optimal BP control.   Will also need to review optimal birth plan - would she be ok to push?  []    Ms. Grande was seen by Dr. Arzola.  She will be admitted to labor and delivery and delivered by CS  BP to stay below 160/100  [HU]      ED Course User Index  [HU] Avril Vance DO     Clinical Impression:   The primary encounter diagnosis was  labor in third trimester without delivery. Diagnoses of Headache, Chronic nonintractable headache,  unspecified headache type, Subarachnoid hemorrhage from anterior communicating artery aneurysm, 25 weeks gestation of pregnancy, Subarachnoid hemorrhage, Severe pre-eclampsia in third trimester, Pre-eclampsia, severe, antepartum, H/O subarachnoid hemorrhage, and 31 weeks gestation of pregnancy were also pertinent to this visit.      Disposition:   Disposition: Admitted  Condition: Stable                        Sushma K Reddy, MD  Resident  09/27/18 1841       Avril Vance DO  09/28/18 0649

## 2018-09-27 NOTE — ASSESSMENT & PLAN NOTE
- K of 2.7 at [facility] ED  - replacement initiated PO and IV  - 3.1 in [place]  - will monitor and replace as needed following delivery

## 2018-09-27 NOTE — SUBJECTIVE & OBJECTIVE
Obstetric History       T2      L5     SAB0   TAB0   Ectopic0   Multiple0   Live Births5       # Outcome Date GA Lbr [place]/2nd Weight Sex Delivery Anes PTL Lv   7 Current            6  11/10/16 36w1d  2.77 kg (6 lb 1.7 oz) F Vag-Spont EPI Y PRITESH      Name: WILFRED MCMILLAN      Apgar1:  8                Apgar5: 9   5  09/12/15 36w2d / 00:42 2.657 kg (5 lb 13.7 oz) M Vag-Vacuum EPI Y PRITESH      Apgar1:  9                Apgar5: 9   4 AB 2013     SAB      3     1.446 kg (3 lb 3 oz) M Vag-Spont   PRITESH      Complications:  premature rupture of membranes (PPROM) delivered, current hospitalization   2 Term    3.402 kg (7 lb 8 oz) F Vag-Spont   PRITESH   1 Term    3.175 kg (7 lb) F Vag-Spont   PRITESH      Obstetric Comments   G3 - PPROM at 24 wks, expectantly managed until 28 wks   G4 - SAB ~ 6 wks s/p suction D&C     Past Medical History:   Diagnosis Date    Anemia     Hypertension     since     Polycystic kidney disease     Polycystic kidney disease     Renal disorder     Since childhood      Past Surgical History:   Procedure Laterality Date    ANGIOGRAM-CEREBRAL N/A 2018    Performed by [facility] Surgeon at [facility]    CEREBRAL ANGIOGRAM N/A 2018    Procedure: ANGIOGRAM-CEREBRAL;  Surgeon: Jayna Surgeon;  Location: [facility];  Service: Anesthesiology;  Laterality: N/A;    DILATION AND CURETTAGE OF UTERUS          DILATION AND CURETTAGE, UTERUS N/A 7/15/2013    Performed by [facility] at [facility] OR    Mri (magnetic resonance imaging) N/A 8/10/2018    Performed by Jayna Surgeon at [facility]         (Not in a hospital admission)    Review of patient's allergies indicates:  No Known Allergies     Family History     Problem Relation (Age of Onset)    Hypertension Mother, Paternal Grandmother    Polycystic kidney disease Mother, Daughter        Tobacco Use    Smoking status: Never Smoker    Smokeless tobacco: Never Used   Substance and Sexual  Activity    Alcohol use: Yes     Comment: occasional    Drug use: No    Sexual activity: Yes     Partners: Male     Birth control/protection: None     Review of Systems   Constitutional: Negative for chills and fever.   Eyes: Negative for visual disturbance.   Respiratory: Negative for shortness of breath.    Cardiovascular: Negative for chest pain and palpitations.   Gastrointestinal: Negative for abdominal pain, diarrhea, nausea and vomiting.   Genitourinary: Negative for dysuria, hematuria, vaginal bleeding and vaginal discharge.   Musculoskeletal: Negative for back pain.   Skin:  Negative for rash.   Neurological: Negative for syncope and headaches.   Psychiatric/Behavioral: Negative for depression. The patient is not nervous/anxious.       Objective:     Vital Signs (Most Recent):  Temp: 99.7 °F (37.6 °C) (09/27/18 1555)  Pulse: 77 (09/27/18 1752)  Resp: 16 (09/27/18 1554)  BP: (!) 152/97 (09/27/18 1752)  SpO2: 99 % (09/27/18 1554) Vital Signs (24h Range):  Temp:  [98.4 °F (36.9 °C)-99.7 °F (37.6 °C)] 99.7 °F (37.6 °C)  Pulse:  [65-83] 77  Resp:  [16-18] 16  SpO2:  [99 %-100 %] 99 %  BP: (139-179)/() 152/97     Weight: 74.8 kg (165 lb)  Body mass index is 24.37 kg/m².    FHT: 135, mod btbv, + accels, - decels. Cat 1 (reassuring)  TOCO:  irregular    Physical Exam:   Constitutional: She is oriented to person, place, and time. She appears well-developed and well-nourished. No distress.    HENT:   Head: Normocephalic and atraumatic.    Eyes: EOM are normal.    Neck: Normal range of motion.    Cardiovascular: Normal rate, regular rhythm and normal heart sounds.     Pulmonary/Chest: Effort normal and breath sounds normal. No respiratory distress. She has no wheezes. She has no rales.        Abdominal: Soft. There is no tenderness. There is no rebound and no guarding.   No RUQ pain                 Neurological: She is alert and oriented to person, place, and time. She has normal reflexes.    Skin: Skin is  warm and dry. She is not diaphoretic.    Psychiatric: She has a normal mood and affect. Her behavior is normal. Judgment and thought content normal.       Cervix:  Dilation:  3  Effacement:  70%  Station: -3  Presentation: Vertex     Significant Labs:  Lab Results   Component Value Date    GROUPTRH A POS 08/23/2018    HEPBSAG Negative 08/11/2018       CBC:   Recent Labs   Lab  09/27/18   1045   WBC  6.36   RBC  3.26*   HGB  9.3*   HCT  29.0*   PLT  227   MCV  89   MCH  28.5   MCHC  32.1     CMP:   Recent Labs   Lab  09/27/18   1655   GLU  86   CALCIUM  9.1   ALBUMIN  2.9*   PROT  7.5   NA  137   K  3.1*   CO2  24   CL  104   BUN  10   CREATININE  0.7   ALKPHOS  108   ALT  7*   AST  11   BILITOT  0.2     Recent Labs   Lab  09/27/18   1045   COLORU  Yellow   SPECGRAV  1.015   PHUR  6.0   PROTEINUA  Negative   BACTERIA  Occasional   NITRITE  Negative   LEUKOCYTESUR  3+*   UROBILINOGEN  2.0     I have personallly reviewed all pertinent lab results from the last 24 hours.

## 2018-09-27 NOTE — ED PROVIDER NOTES
Encounter Date: 2018    SCRIBE #1 NOTE: I, Ca Fragoso , am scribing for, and in the presence of,  Dr. Valdez . I have scribed the following portions of the note - Other sections scribed: COSMO TEE, Chart Review .       History     Chief Complaint   Patient presents with    Abdominal Pain     abd pain x 2 days. headaches off and on. 30 WEEKS PREGNANT     The patient is a 32 y.o. female with PMHx of HTN and polycystic kidney disease, who presents to the ED for abdominal pain and vaginal discharge. Patient is 30 weeks pregnant. She states her abdominal and back pain started two nights ago. Last night around 10 pm, she states her pain started to feel like contractions and felt like the baby was coming. Having similar feeling today. Patient states she still feels the baby moving. Does not know how often the contractions are coming. She states she has to use the restroom every 5 minutes. Denies vaginal bleeding.       The history is provided by the patient.     Review of patient's allergies indicates:  No Known Allergies  Past Medical History:   Diagnosis Date    Anemia     Hypertension     since     Polycystic kidney disease     Polycystic kidney disease     Renal disorder     Since childhood      Past Surgical History:   Procedure Laterality Date    ANGIOGRAM-CEREBRAL N/A 2018    Performed by Jayna Surgeon at [facility]    CEREBRAL ANGIOGRAM N/A 2018    Procedure: ANGIOGRAM-CEREBRAL;  Surgeon: Jayna Surgeon;  Location: [facility];  Service: Anesthesiology;  Laterality: N/A;     SECTION N/A 2018    Procedure:  SECTION;  Surgeon: Obed Soto MD;  Location: [facility] L&D;  Service: OB/GYN;  Laterality: N/A;     SECTION N/A 2018    Performed by Obed Soto MD at [facility] L&D    DILATION AND CURETTAGE OF UTERUS          DILATION AND CURETTAGE, UTERUS N/A 7/15/2013    Performed by M[facility]ail [facility]tan [facility] at [facility] OR    Mri (magnetic resonance imaging) N/A 8/10/2018     Performed by Jayna Surgeon at [facility]     Family History   Problem Relation Age of Onset    Hypertension Mother     Polycystic kidney disease Mother     Hypertension Paternal Grandmother     Polycystic kidney disease Daughter      Social History     Tobacco Use    Smoking status: Never Smoker    Smokeless tobacco: Never Used   Substance Use Topics    Alcohol use: Yes     Comment: occasional    Drug use: No     Review of Systems   Constitutional: Negative for fever.   HENT: Negative for sore throat.    Respiratory: Negative for shortness of breath.    Cardiovascular: Negative for chest pain.   Gastrointestinal: Positive for abdominal pain. Negative for nausea.   Genitourinary: Positive for vaginal discharge. Negative for dysuria.   Musculoskeletal: Positive for back pain.   Skin: Negative for rash.   Neurological: Negative for weakness.   Hematological: Does not bruise/bleed easily.       Physical Exam     Initial Vitals [09/27/18 1030]   BP Pulse Resp Temp SpO2   (!) 174/104 71 18 98.6 °F (37 °C) 100 %      MAP       --         Physical Exam    Nursing note and vitals reviewed.  Constitutional: She appears well-developed and well-nourished. She is not diaphoretic. No distress.   HENT:   Mouth/Throat: Oropharynx is clear and moist.   Eyes: Conjunctivae are normal. Pupils are equal, round, and reactive to light.   Neck: Neck supple.   Cardiovascular: Normal rate and regular rhythm.   Pulmonary/Chest: Breath sounds normal.   Abdominal: Soft. She exhibits distension.   Gravid uterus, nontender    Genitourinary:   Genitourinary Comments: Cervix not dialated   Musculoskeletal: She exhibits no edema.   Neurological: She is alert and oriented to person, place, and time.   Skin: Skin is warm and dry. No pallor.   Psychiatric: She has a normal mood and affect.         ED Course   Procedures  Labs Reviewed   CBC W/ AUTO DIFFERENTIAL - Abnormal; Notable for the following components:       Result Value    RBC 3.26  (*)     Hemoglobin 9.3 (*)     Hematocrit 29.0 (*)     RDW 14.9 (*)     All other components within normal limits   COMPREHENSIVE METABOLIC PANEL - Abnormal; Notable for the following components:    Potassium 2.7 (*)     Albumin 2.6 (*)     ALT 5 (*)     All other components within normal limits   URINALYSIS, REFLEX TO URINE CULTURE - Abnormal; Notable for the following components:    Occult Blood UA 1+ (*)     Leukocytes, UA 3+ (*)     All other components within normal limits    Narrative:     Preferred Collection Type->Urine, Clean Catch   PROTEIN / CREATININE RATIO, URINE - Abnormal; Notable for the following components:    Protein, Urine Random 30 (*)     Prot/Creat Ratio, Ur 0.22 (*)     All other components within normal limits   URINALYSIS MICROSCOPIC - Abnormal; Notable for the following components:    RBC, UA 5 (*)     WBC, UA 7 (*)     All other components within normal limits    Narrative:     Preferred Collection Type->Urine, Clean Catch   FIBRINOGEN - Abnormal; Notable for the following components:    Fibrinogen 373 (*)     All other components within normal limits   COMPREHENSIVE METABOLIC PANEL - Abnormal; Notable for the following components:    Potassium 3.1 (*)     Albumin 2.9 (*)     ALT 7 (*)     All other components within normal limits   CULTURE, URINE   APTT   PROTIME-INR   HIV 1 / 2 ANTIBODY   HEPATITIS C ANTIBODY   RPR   RAPID HIV          Imaging Results    None          Medical Decision Making:   History:   Old Medical Records: I decided to obtain old medical records.  Old Records Summarized: records from clinic visits.       <> Summary of Records: Patient was recently admitted for subarachnoid hemorrhage. She was 24 weeks pregnant at the time. She was admitted 8/10-.   Initial Assessment:   33 yo f, , 30 WGA, recent SAH with admit to neuro ICU, here with abd pain, possibly c/w contractions  No loss of fluid or VB  Bedside sono good fetal movement and FH    Pelvic exam: cervix not  dilated    Pt with mild HA that has been present since SAH, no acute change.  BP elevated on arrival (170s systolic) but pt with HTN and did  Not take procardia today      Differential Diagnosis:   Concern for  labor  Neuro issues seem stable  ED Management:  OB at [facility] called for transfer and they requested neurosurgery evaluation given HA and recent SAH  NSG initially requesting MRI brain prior to transfer but when pt in MRI machine, felt uncomfortable and refused test  NSG has re-evaluated pt, feel that she's stable for transfer and that MRI brain not needed at this time    Pt given procardia 30 mg for her BP on arrival and repeat BPs have been 140s systolic  K 2.7 - IV and PO K ordered for repletion    2:02 PM  Repeat bedside sono, pt still with good fetal hear tones  OB has accepted transfer            Scribe Attestation:   Scribe #1: I performed the above scribed service and the documentation accurately describes the services I performed. I attest to the accuracy of the note.    I, Dr. Lyn Valdez, personally performed the services described in this documentation. All medical record entries made by the scribe were at my direction and in my presence.  I have reviewed the chart and agree that the record reflects my personal performance and is accurate and complete. Lyn Valdez MD.  2:03 PM 10/01/2018            ED Course as of Oct 01 0722   Thu Sep 27, 2018   1606 I evaluated Ms. Grande and discussed plan with Dr. Dutta.  Pt presents at 3 wga here for contraction pain.  She has a recent history of subarachnoid bleed due to ruptured aneurism.  She continues to have chronic HA's after this.  She presented this morning to [facility] ED with contractions, and initially found to be 1cm by OB resident.  She had severe range BP's at that time.   She was then cleared by neurosurgery, and BP's brought under control with her procardia - which she had not taken prior to coming to the hospital.   She  continued to contract and was transferred here to r/o  labor.   Her cervix is now 3/70/-3  Fetal status 130, reactive for GA, no decels  No contractions seen but she is on her side  Pre-e labs drawn at [facility] negative, PC ratio 0.22.    Will review chart for optimal BP control.   Will also need to review optimal birth plan - would she be ok to push?  [HU]   1820 Ms. Grande was seen by Dr. Arzola.  She will be admitted to labor and delivery and delivered by CS  BP to stay below 160/100  [HU]      ED Course User Index  [HU] Avril aVnce DO     Clinical Impression:   The primary encounter diagnosis was  labor in third trimester without delivery. 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                             Lyn Valdez MD  10/01/18 7244

## 2018-09-27 NOTE — PROGRESS NOTES
Dr. Jimenes briefly evaluated patient who was 1/50/-3 cm and did not appear in active labor.  Dr. Jimenes indicated patient had headache-reported slightly different than prior headache but has headaches on regular basis. No true RUQ pain-some breast discomfort.  Dr. Jimenes reported bp improved to 150s over 100s post meds. She advised ED on criteria to treat bp as did I and discussed that magnesium sulfate and BMZ may be needed.  FHT obtained per ED doctor and she did bedside scan and was checking on obtaining fetal monitor  Charge nurse on L and D is aware.  Anesthesia notified by residents.  I spoke to neurocritical care attending who recommended that I speak to neurosurgery to clear patient from ED before transferring.  I spoke to neurosurgery on call. They will evaluate the patient ASAP and assess her imaging to see if completely coiled and if vaginal delivery could be attempted. Per neurosurgery need BP under 160 over 100 and there is not a low threshold for BP.  Preeclampsia labs are pending.  If patient cannot be quickly transferred to [facility], will need to send nursing to [facility] for fetal monitoring.

## 2018-09-27 NOTE — SUBJECTIVE & OBJECTIVE
(Not in a hospital admission)    Review of patient's allergies indicates:  No Known Allergies    Past Medical History:   Diagnosis Date    Anemia     Hypertension     since 2012    Polycystic kidney disease     Polycystic kidney disease     Renal disorder     Since childhood      Past Surgical History:   Procedure Laterality Date    ANGIOGRAM-CEREBRAL N/A 8/16/2018    Performed by Jayna Surgeon at [facility]    CEREBRAL ANGIOGRAM N/A 8/16/2018    Procedure: ANGIOGRAM-CEREBRAL;  Surgeon: Jayna Surgeon;  Location: [facility];  Service: Anesthesiology;  Laterality: N/A;    DILATION AND CURETTAGE OF UTERUS      2013    DILATION AND CURETTAGE, UTERUS N/A 7/15/2013    Performed by [facility]tan [facility] at [facility] OR    Mri (magnetic resonance imaging) N/A 8/10/2018    Performed by Jayna Surgeon at [facility]     Family History     Problem Relation (Age of Onset)    Hypertension Mother, Paternal Grandmother    Polycystic kidney disease Mother, Daughter        Tobacco Use    Smoking status: Never Smoker    Smokeless tobacco: Never Used   Substance and Sexual Activity    Alcohol use: Yes     Comment: occasional    Drug use: No    Sexual activity: Yes     Partners: Male     Birth control/protection: None     Review of Systems  Objective:     Weight: 74.8 kg (165 lb)  Body mass index is 24.37 kg/m².  Vital Signs (Most Recent):  Temp: 99.7 °F (37.6 °C) (09/27/18 1555)  Pulse: 74 (09/27/18 1554)  Resp: 16 (09/27/18 1554)  BP: (!) 139/94 (09/27/18 1550)  SpO2: 99 % (09/27/18 1554) Vital Signs (24h Range):  Temp:  [98.4 °F (36.9 °C)-99.7 °F (37.6 °C)] 99.7 °F (37.6 °C)  Pulse:  [65-74] 74  Resp:  [16-18] 16  SpO2:  [99 %-100 %] 99 %  BP: (139-174)/() 139/94                           Neurosurgery Physical Exam  General: well developed, well nourished, no distress.   Head: normocephalic, atraumatic  Neurologic: Alert and oriented. Thought content appropriate.  GCS: Motor: 6/Verbal: 5/Eyes: 4 GCS Total: 15  Mental  Status: Awake, Alert, Oriented x 4  Language: No aphasia  Speech: No dysarthria  Cranial nerves: face symmetric, tongue midline, CN II-XII grossly intact.   Eyes: pupils equal, round, reactive to light with accomodation, EOMI.   Pulmonary: normal respirations, no signs of respiratory distress  Sensory: intact to light touch throughout    Motor Strength:Moves all extremities spontaneously with good tone.  Full strength upper and lower extremities. No abnormal movements seen.     Strength  Deltoids Triceps Biceps Wrist Extension Wrist Flexion Hand    Upper: R 5/5 5/5 5/5 5/5 5/5 5/5    L 5/5 5/5 5/5 5/5 5/5 5/5     Iliopsoas Quadriceps Knee  Flexion Tibialis  anterior Gastro- cnemius EHL   Lower: R 5/5 5/5 5/5 5/5 5/5 5/5    L 5/5 5/5 5/5 5/5 5/5 5/5     DTR's - 2 + and symmetric in UE and LE  Pronator Drift: no drift noted  Finger-to-nose: Intact bilaterally  Marie: absent  Clonus: absent  Babinski: absent  Pulses: 2+ and symmetric radial and dorsalis pedis.  Skin: Skin is warm, dry and intact.      Significant Labs:  Recent Labs   Lab  09/27/18   1045   GLU  90   NA  137   K  2.7*   CL  105   CO2  24   BUN  9   CREATININE  0.7   CALCIUM  8.8     Recent Labs   Lab  09/27/18   1045   WBC  6.36   HGB  9.3*   HCT  29.0*   PLT  227     No results for input(s): LABPT, INR, APTT in the last 48 hours.  Microbiology Results (last 7 days)     ** No results found for the last 168 hours. **        Recent Lab Results       09/27/18  1140 09/27/18  1045      Immature Granulocytes  0.3     Immature Grans (Abs)  0.02  Comment:  Mild elevation in immature granulocytes is non specific and   can be seen in a variety of conditions including stress response,   acute inflammation, trauma and pregnancy. Correlation with other   laboratory and clinical findings is essential.       Albumin  2.6     Alkaline Phosphatase  97     ALT  5     Anion Gap  8     Appearance, UA  Clear     AST  10     Bacteria, UA  Occasional     Baso #  0.01      Basophil%  0.2     Bilirubin (UA)  Negative     Total Bilirubin  0.2  Comment:  For infants and newborns, interpretation of results should be based  on gestational age, weight and in agreement with clinical  observations.  Premature Infant recommended reference ranges:  Up to 24 hours.............<8.0 mg/dL  Up to 48 hours............<12.0 mg/dL  3-5 days..................<15.0 mg/dL  6-29 days.................<15.0 mg/dL       BUN, Bld  9     Calcium  8.8     Chloride  105     CO2  24     Color, UA  Yellow     Creatinine  0.7     Creatinine, Random Ur 138.0  Comment:  The random urine reference ranges provided were established   for 24 hour urine collections.  No reference ranges exist for  random urine specimens.  Correlate clinically.        Differential Method  Automated     eGFR if African American  >60.0     eGFR if non   >60.0  Comment:  Calculation used to obtain the estimated glomerular filtration  rate (eGFR) is the CKD-EPI equation.        Eos #  0.0     Eosinophil%  0.6     Glucose  90     Glucose, UA  Negative     Gran # (ANC)  4.5     Gran%  71.1     Hematocrit  29.0     Hemoglobin  9.3     Ketones, UA  Negative     Leukocytes, UA  3+     Lymph #  1.4     Lymph%  21.2     MCH  28.5     MCHC  32.1     MCV  89     Microscopic Comment  SEE COMMENT  Comment:  Other formed elements not mentioned in the report are not   present in the microscopic examination.        Mono #  0.4     Mono%  6.6     MPV  10.8     Nitrite, UA  Negative     nRBC  0     Occult Blood UA  1+     pH, UA  6.0     Platelets  227     Potassium  2.7  Comment:  *Critical value -  Results called to and read back by:georgi roque     Prot/Creat Ratio, Ur 0.22      Total Protein  6.8     Protein, UA  Negative  Comment:  Recommend a 24 hour urine protein or a urine   protein/creatinine ratio if globulin induced proteinuria is  clinically suspected.       Protein, Urine Random 30  Comment:  The random urine reference ranges  provided were established   for 24 hour urine collections.  No reference ranges exist for  random urine specimens.  Correlate clinically.        RBC  3.26     RBC, UA  5     RDW  14.9     Sodium  137     Specific Gravity, UA  1.015     Specimen UA  Urine, Clean Catch     Squam Epithel, UA  5     Urobilinogen, UA  2.0     WBC, UA  7     WBC  6.36         All pertinent labs from the last 24 hours have been reviewed.    Significant Diagnostics:  No new imaging

## 2018-09-27 NOTE — HOSPITAL COURSE
2018 - patient seen at [facility] ED with complaints of contractions and elevated blood pressures. Home procardia given in ED. Patient found to be 1.5cm dilated and cleared by neurosurgery for transport to [facility] for w/u of threatened PTL and pre-eclampsia. In the [facility], patient was found to be 3cm dilated and then proceeded to have severe range blood pressures requiring IV antihypertensives. Mag sulfate was started for seizure prophylaxis. BMZ x 1 was given in [facility]. Plan was made to deliver via  section given severely elevated blood pressures in a patient with h/o aneurysm and SAH. Patient desires BTL, consents signed for CS and BTL. Underwent uncomplicated  delivery. After delivery, had severe range pressures requiring labetalol 20 x 2. Procardia was increased to 60XL to start in AM.  2018 - POD#1 s/p 1LTCS.  Doing well this morning. Continued on magnesium sulfate for seizure prophylaxis. Patient is starting to meet post-op goals. Her pain is well-controlled with her pain medication. She has not yet eaten breakfast this morning but denies n/v. Vaginal bleeding is minimal. She is ambulating and voiding via thomas an adequate amount. She denies headaches, chest pain, SOB, RUQ pain, or vision changes. Stable for step down to floor. Will monitor blood pressures closely.  2018 POD #2. MgSO4 discontinued after 24 hours. BPs well controlled overnight without additional intervention. Last elevated at noon yesterday. Overnight, she complained of incisional pain that required additional PO medications and IV dilaudid this AM. UOP was adequate through the MgSO4 therapy window. No issues this AM. Denies HA, vision changes, RUQ/epigastric pain, SOB/CP.   2018 POD#3. S/p Mag x24 hours. Yesterday, diagnosed with endometritis. Amp/Gent/Clinda started. Pt continues to take pain medication every 4 hours, but states she is feeling much better. Repeat CBC shows stable h/H.  10/01/2018 POD#4 s/p  1LTCS. Continued on antibiotics for abdominal tenderness, no fevers. Will continue through 48 hours of abx. States her pain improved following abx but is worse this morning. States she believes it may be due to not taking any pain medications through the night as she slept throughout the night without waking up. Denies fevers or chills overnight. Otherwise meeting post-op goals (ambulating, tolerating regular diet without n/v, voiding spontaneously, passing flatus, vaginal bleeding minimal). Blood pressures elevated nearing 160 systolic, in 90s diastolic. Procardia 30XL given in PM, AM dose increased to 90XL.  10/02/2018 POD#5 s/p 1LTCS. BP monitoring continues. Abx discontinued yesterday morning after 48 hours. Patient never developed fever. No leukocytosis. Pain is improved this morning and controlled on her pain medications. Vaginal bleeding minimal. Passing flatus. Voiding spontaneously. Tolerating regular diet without n/v. Ambulating independently. Overall doing better. Will monitor blood pressures on new dose of procardia 90XL.  10/03/2018 POD#6 s/p 1LTCS/BTL. Blood pressures improved on procardia 90XL. Patient doing well post-operatively and meeting all post-op goals. Denies any symptoms of pre-eclampsia including headache. She is stable for discharge today. Precautions given for return including post-operative precautions and pre-eclampsia precautions. Patient to follow up in [facility] clinic next week, message sent to [facility] clinic to have patient scheduled for follow up for BP check.

## 2018-09-27 NOTE — ED NOTES
Patient identifiers verified and correct for Sharon Grande.   LOC: The patient is awake, alert and aware of environment with an appropriate affect, the patient is oriented x 3 and speaking appropriately.   APPEARANCE: Patient appears comfortable and in no acute distress, patient is clean and well groomed.  SKIN: The skin is warm and dry, color consistent with ethnicity, patient has normal skin turgor and moist mucus membranes, skin intact, no breakdown or bruising noted.   MUSCULOSKELETAL: Patient moving all extremities spontaneously, no swelling noted.  RESPIRATORY: Airway is open and patent, respirations are spontaneous, patient has a normal effort and rate, no accessory muscle use noted, pt placed on continuous pulse ox with O2 sats noted at 97% on room air.  CARDIAC: Pt placed on cardiac monitor. Patient has a normal rate and regular rhythm, no edema noted, capillary refill < 3 seconds.   GASTRO: Soft and non tender to palpation, no distention noted, normoactive bowel sounds present in all four quadrants. Pt states she has been constipated x2 days. Pt is 30 weeks pregnant.  : Pt denies any pain or frequency with urination.  NEURO: Pt opens eyes spontaneously, behavior appropriate to situation, follows commands, facial expression symmetrical, bilateral hand grasp equal and even, purposeful motor response noted, normal sensation in all extremities when touched with a finger.  Fetal pulses doppler verified at 150 BPM.

## 2018-09-27 NOTE — PROGRESS NOTES
Spoke to neurosurgery at approximately 1215-attending Dr. Livingston spoke with neurosurgeon who did case and they felt stable for transfer and could deliver vaginally and valsalva if bp controlled; he did not want imaging but MRI was ordered  Spoke with Dr. Valdez-she indicated neurosurgery pa wanted MRI-she will check with neurosurgery PA, they are replacing potassium-bp now 140s over 80s

## 2018-09-27 NOTE — HPI
Pt is 33 y/o female who presents to [facility] ED for abdominal pain and vaginal discharge. Pt is 30 wks pregnant and believes she is having contractions. Pt has hx of polycystic kidney disease and with HH4F4 SAH on 8/10, now s/p acom coiling 8/10. Pt complaining of headache upon arrival. NSGY consulted to evaluate pt prior to transfer to [facility] for  labor. Pt reports headache is consistent with her usual headaches that she has intermittently since discharged to rehab in August. She denies nausea, vomiting, dizziness, photophobia or vision changes. Denies weakness or paresthesias. Last MRI done 18 stable with aneurysm secured.

## 2018-09-27 NOTE — ED NOTES
The patient is awake, alert and cooperative with a calm affect, patient is aware of environment. Airway is open and patent, respirations are spontaneous, normal respiratory effort and rate noted, skin warm and dry, moves all extremities well, appearance: no apparent distress noted. Side rails x 2. Call bell within reach. Will continue to monitor. Pt updated on the current status.

## 2018-09-27 NOTE — ASSESSMENT & PLAN NOTE
- h/o CHTN on Procardia 30XL.   - now with severe range blood pressures in [facility]   - s/p labetalol 20/40 in [facility] with subsequent mild range pressures  - PCR elevated at baseline, 0.22 here  - Other pre-eclampsia labs wnl  - No symptoms  - Given h/o aneurysm/SAH, will proceed with delivery via CS  - Discussed with patient who is in agreement with plan  - Desires BTL as well, consents signed  - Anesthesia and NICU informed  - ICU informed as pt will require ICU post-op for frequent neuro checks in the post-op period  - Consents signed  - Fetal presentation = vertex on bedside u/s  - Coags drawn, wnl  - Third trimester labs pending  - To OR for primary  delivery

## 2018-09-27 NOTE — PROGRESS NOTES
Spoke to Dr. Valdez.  MRI was done and neurosurgery attending coming to evaluate patient.  They did cath urine p/c-they will check with lab why not showing collected.  They will recheck FHT with ultrasound.  Our residents will order dose of BMZ.  BP mild range pre MRI-Dr. valdez will confirm still mild-hold on magnesium currently.

## 2018-09-27 NOTE — CONSULTS
[facility]  Neurosurgery  Consult Note    Consults  Subjective:     Chief Complaint/Reason for Admission: abdominal pain    History of Present Illness: Pt is 31 y/o female who presents to [facility] ED for abdominal pain and vaginal discharge. Pt is 30 wks pregnant and believes she is having contractions. Pt has hx of polycystic kidney disease and with HH4F4 SAH on 8/10, now s/p acom coiling 8/10. Pt complaining of headache upon arrival. NSGY consulted to evaluate pt prior to transfer to [facility] for  labor. Pt reports headache is consistent with her usual headaches that she has intermittently since discharged to rehab in August. She denies nausea, vomiting, dizziness, photophobia or vision changes. Denies weakness or paresthesias. Last MRI done 18 stable with aneurysm secured.      (Not in a hospital admission)    Review of patient's allergies indicates:  No Known Allergies    Past Medical History:   Diagnosis Date    Anemia     Hypertension     since     Polycystic kidney disease     Polycystic kidney disease     Renal disorder     Since childhood      Past Surgical History:   Procedure Laterality Date    ANGIOGRAM-CEREBRAL N/A 2018    Performed by [facility]na Surgeon at [facility]    CEREBRAL ANGIOGRAM N/A 2018    Procedure: ANGIOGRAM-CEREBRAL;  Surgeon: Jayna Surgeon;  Location: [facility];  Service: Anesthesiology;  Laterality: N/A;    DILATION AND CURETTAGE OF UTERUS          DILATION AND CURETTAGE, UTERUS N/A 7/15/2013    Performed by [facility]ail [facility]tan [facility] at [facility] OR    Mri (magnetic resonance imaging) N/A 8/10/2018    Performed by Jayna Surgeon at [facility]     Family History     Problem Relation (Age of Onset)    Hypertension Mother, Paternal Grandmother    Polycystic kidney disease Mother, Daughter        Tobacco Use    Smoking status: Never Smoker    Smokeless tobacco: Never Used   Substance and Sexual Activity    Alcohol use: Yes     Comment: occasional     Drug use: No    Sexual activity: Yes     Partners: Male     Birth control/protection: None     Review of Systems  Objective:     Weight: 74.8 kg (165 lb)  Body mass index is 24.37 kg/m².  Vital Signs (Most Recent):  Temp: 99.7 °F (37.6 °C) (09/27/18 1555)  Pulse: 74 (09/27/18 1554)  Resp: 16 (09/27/18 1554)  BP: (!) 139/94 (09/27/18 1550)  SpO2: 99 % (09/27/18 1554) Vital Signs (24h Range):  Temp:  [98.4 °F (36.9 °C)-99.7 °F (37.6 °C)] 99.7 °F (37.6 °C)  Pulse:  [65-74] 74  Resp:  [16-18] 16  SpO2:  [99 %-100 %] 99 %  BP: (139-174)/() 139/94                           Neurosurgery Physical Exam  General: well developed, well nourished, no distress.   Head: normocephalic, atraumatic  Neurologic: Alert and oriented. Thought content appropriate.  GCS: Motor: 6/Verbal: 5/Eyes: 4 GCS Total: 15  Mental Status: Awake, Alert, Oriented x 4  Language: No aphasia  Speech: No dysarthria  Cranial nerves: face symmetric, tongue midline, CN II-XII grossly intact.   Eyes: pupils equal, round, reactive to light with accomodation, EOMI.   Pulmonary: normal respirations, no signs of respiratory distress  Sensory: intact to light touch throughout    Motor Strength:Moves all extremities spontaneously with good tone.  Full strength upper and lower extremities. No abnormal movements seen.     Strength  Deltoids Triceps Biceps Wrist Extension Wrist Flexion Hand    Upper: R 5/5 5/5 5/5 5/5 5/5 5/5    L 5/5 5/5 5/5 5/5 5/5 5/5     Iliopsoas Quadriceps Knee  Flexion Tibialis  anterior Gastro- cnemius EHL   Lower: R 5/5 5/5 5/5 5/5 5/5 5/5    L 5/5 5/5 5/5 5/5 5/5 5/5     DTR's - 2 + and symmetric in UE and LE  Pronator Drift: no drift noted  Finger-to-nose: Intact bilaterally  Marie: absent  Clonus: absent  Babinski: absent  Pulses: 2+ and symmetric radial and dorsalis pedis.  Skin: Skin is warm, dry and intact.      Significant Labs:  Recent Labs   Lab  09/27/18   1045   GLU  90   NA  137   K  2.7*   CL  105   CO2  24   BUN   9   CREATININE  0.7   CALCIUM  8.8     Recent Labs   Lab  09/27/18   1045   WBC  6.36   HGB  9.3*   HCT  29.0*   PLT  227     No results for input(s): LABPT, INR, APTT in the last 48 hours.  Microbiology Results (last 7 days)     ** No results found for the last 168 hours. **        Recent Lab Results       09/27/18  1140 09/27/18  1045      Immature Granulocytes  0.3     Immature Grans (Abs)  0.02  Comment:  Mild elevation in immature granulocytes is non specific and   can be seen in a variety of conditions including stress response,   acute inflammation, trauma and pregnancy. Correlation with other   laboratory and clinical findings is essential.       Albumin  2.6     Alkaline Phosphatase  97     ALT  5     Anion Gap  8     Appearance, UA  Clear     AST  10     Bacteria, UA  Occasional     Baso #  0.01     Basophil%  0.2     Bilirubin (UA)  Negative     Total Bilirubin  0.2  Comment:  For infants and newborns, interpretation of results should be based  on gestational age, weight and in agreement with clinical  observations.  Premature Infant recommended reference ranges:  Up to 24 hours.............<8.0 mg/dL  Up to 48 hours............<12.0 mg/dL  3-5 days..................<15.0 mg/dL  6-29 days.................<15.0 mg/dL       BUN, Bld  9     Calcium  8.8     Chloride  105     CO2  24     Color, UA  Yellow     Creatinine  0.7     Creatinine, Random Ur 138.0  Comment:  The random urine reference ranges provided were established   for 24 hour urine collections.  No reference ranges exist for  random urine specimens.  Correlate clinically.        Differential Method  Automated     eGFR if African American  >60.0     eGFR if non   >60.0  Comment:  Calculation used to obtain the estimated glomerular filtration  rate (eGFR) is the CKD-EPI equation.        Eos #  0.0     Eosinophil%  0.6     Glucose  90     Glucose, UA  Negative     Gran # (ANC)  4.5     Gran%  71.1     Hematocrit  29.0     Hemoglobin   9.3     Ketones, UA  Negative     Leukocytes, UA  3+     Lymph #  1.4     Lymph%  21.2     MCH  28.5     MCHC  32.1     MCV  89     Microscopic Comment  SEE COMMENT  Comment:  Other formed elements not mentioned in the report are not   present in the microscopic examination.        Mono #  0.4     Mono%  6.6     MPV  10.8     Nitrite, UA  Negative     nRBC  0     Occult Blood UA  1+     pH, UA  6.0     Platelets  227     Potassium  2.7  Comment:  *Critical value -  Results called to and read back by:georgi roque     Prot/Creat Ratio, Ur 0.22      Total Protein  6.8     Protein, UA  Negative  Comment:  Recommend a 24 hour urine protein or a urine   protein/creatinine ratio if globulin induced proteinuria is  clinically suspected.       Protein, Urine Random 30  Comment:  The random urine reference ranges provided were established   for 24 hour urine collections.  No reference ranges exist for  random urine specimens.  Correlate clinically.        RBC  3.26     RBC, UA  5     RDW  14.9     Sodium  137     Specific Gravity, UA  1.015     Specimen UA  Urine, Clean Catch     Squam Epithel, UA  5     Urobilinogen, UA  2.0     WBC, UA  7     WBC  6.36         All pertinent labs from the last 24 hours have been reviewed.    Significant Diagnostics:  No new imaging    Assessment/Plan:     Other headache syndrome    Pt is 33 y/o female, 30 wks pregnant, with hx of HH4F4 SAH on 8/10, now s/p acom coiling 8/10 who presents with possibly contractions and  labor.    -Pt neurologically stable  -Last MRI done 18 stable, acom aneursym secured s/p coiling  -Pt reports intermittent headaches since discharge last month. Headache today unchanged from previous. No other clinical signs of intracranial hemorrhage.   -Pt stable for transfer to [facility].   -SBP<160  -Potassium replaced in ED  -Pt may go forward with vaginal delivery from neurosurgical standpoint    Rounded on pt with Dr. Pedraza.             Thank you for  your consult. I will follow-up with patient. Please contact us if you have any additional questions.    Melodie Gonzalez PA-C  Neurosurgery  [facility]

## 2018-09-27 NOTE — HPI
Sharon Grande is a 32 y.o. [id number] at 31w4d with history significant for HTN, polycystic kidney disease, and subarachnoid hemorrhage this pregnancy s/p coil embolization and drain placement who presented to the [facility] ED with complaints of contractions/back pains every few minutes. The pain started 2 days ago and worsened last night, occurring more frequently and more painfully, prompting her to come in.   She has a history of CHTN and is on procardia 30XL daily. She did not take her medication this morning and in the ED had severely elevated blood pressures. Her procardia was given to her and subsequent blood pressures were 140s systolic. She was seen by neurosurgery who cleared her as neurologically stable and OK for transport to [facility].  Since her subarachnoid hemorrhage, patient has had intermittent mild headaches; she had a headache in the [facility] ED and denies one currently. She also reports having RUQ pain at times but states it is more in the area just below her right breast and not abdominal. She denies any abdominal pain or RUQ pain currently.  In the [facility], patient reported contractions had spaced out to q20-30 minutes and were less painful. Patient denies vaginal bleeding or LOF and reports good fetal movement. SVE was performed and pt was found to be 3/70/-3. Patient then proceeded to have severely elevated blood pressures that were sustained requiring IV labetalol 20 and then 40.  Patient denies headaches, chest pain, SOB, RUQ pain, or vision changes at this time.  She has received a course of BMZ on - during her prior admission.

## 2018-09-27 NOTE — ED TRIAGE NOTES
Pt reports lower abdominal pain that radiates to the back x2 days. Pt is . Pt reports white discharge and is unknown if she has lost her mucous plug. Pt reports nausea without emesis.

## 2018-09-27 NOTE — H&P
[facility]  Obstetrics  History & Physical    Patient Name: Sharon Grande  MRN: [id number]  Admission Date: 2018  Primary Care Provider: Roman Archibald MD    Subjective:     Principal Problem:Pre-eclampsia, severe    History of Present Illness:  Sharon Grande is a 32 y.o. [id number] at 31w4d with history significant for HTN, polycystic kidney disease, and subarachnoid hemorrhage this pregnancy s/p coil embolization and drain placement who presented to the [facility] ED with complaints of contractions/back pains every few minutes. The pain started 2 days ago and worsened last night, occurring more frequently and more painfully, prompting her to come in.   She has a history of CHTN and is on procardia 30XL daily. She did not take her medication this morning and in the ED had severely elevated blood pressures. Her procardia was given to her and subsequent blood pressures were 140s systolic. She was seen by neurosurgery who cleared her as neurologically stable and OK for transport to [facility].  Since her subarachnoid hemorrhage, patient has had intermittent mild headaches; she had a headache in the [facility] ED and denies one currently. She also reports having RUQ pain at times but states it is more in the area just below her right breast and not abdominal. She denies any abdominal pain or RUQ pain currently.  In the [facility], patient reported contractions had spaced out to q20-30 minutes and were less painful. Patient denies vaginal bleeding or LOF and reports good fetal movement. SVE was performed and pt was found to be 3/70/-3. Patient then proceeded to have severely elevated blood pressures that were sustained requiring IV labetalol 20 and then 40.  Patient denies headaches, chest pain, SOB, RUQ pain, or vision changes at this time.  She has received a course of BMZ on - during her prior admission.      Obstetric History       T2      L5     SAB0   TAB0   Ectopic0    Multiple0   Live Births5       # Outcome Date GA Lbr Newton/2nd Weight Sex Delivery Anes PTL Lv   7 Current            6  11/10/16 36w1d  2.77 kg (6 lb 1.7 oz) F Vag-Spont EPI Y PRITESH      Name: HIPOLITO, GIRL SONJA PINTO      Apgar1:  8                Apgar5: 9   5  09/12/15 36w2d / 00:42 2.657 kg (5 lb 13.7 oz) M Vag-Vacuum EPI Y PRITESH      Apgar1:  9                Apgar5: 9   4 AB      SAB      3     1.446 kg (3 lb 3 oz) M Vag-Spont   PRITESH      Complications:  premature rupture of membranes (PPROM) delivered, current hospitalization   2 Term    3.402 kg (7 lb 8 oz) F Vag-Spont   PRITESH   1 Term    3.175 kg (7 lb) F Vag-Spont   PRITESH      Obstetric Comments   G3 - PPROM at 24 wks, expectantly managed until 28 wks   G4 - SAB ~ 6 wks s/p suction D&C     Past Medical History:   Diagnosis Date    Anemia     Hypertension     since     Polycystic kidney disease     Polycystic kidney disease     Renal disorder     Since childhood      Past Surgical History:   Procedure Laterality Date    ANGIOGRAM-CEREBRAL N/A 2018    Performed by [facility] Surgeon at [facility]    CEREBRAL ANGIOGRAM N/A 2018    Procedure: ANGIOGRAM-CEREBRAL;  Surgeon: Jayna Surgeon;  Location: [facility];  Service: Anesthesiology;  Laterality: N/A;    DILATION AND CURETTAGE OF UTERUS          DILATION AND CURETTAGE, UTERUS N/A 7/15/2013    Performed by [facility]ail [facility]tan MD at [facility] OR    Mri (magnetic resonance imaging) N/A 8/10/2018    Performed by Jayna Surgeon at [facility]         (Not in a hospital admission)    Review of patient's allergies indicates:  No Known Allergies     Family History     Problem Relation (Age of Onset)    Hypertension Mother, Paternal Grandmother    Polycystic kidney disease Mother, Daughter        Tobacco Use    Smoking status: Never Smoker    Smokeless tobacco: Never Used   Substance and Sexual Activity    Alcohol use: Yes     Comment: occasional    Drug use: No    Sexual  activity: Yes     Partners: Male     Birth control/protection: None     Review of Systems   Constitutional: Negative for chills and fever.   Eyes: Negative for visual disturbance.   Respiratory: Negative for shortness of breath.    Cardiovascular: Negative for chest pain and palpitations.   Gastrointestinal: Negative for abdominal pain, diarrhea, nausea and vomiting.   Genitourinary: Negative for dysuria, hematuria, vaginal bleeding and vaginal discharge.   Musculoskeletal: Negative for back pain.   Skin:  Negative for rash.   Neurological: Negative for syncope and headaches.   Psychiatric/Behavioral: Negative for depression. The patient is not nervous/anxious.       Objective:     Vital Signs (Most Recent):  Temp: 99.7 °F (37.6 °C) (09/27/18 1555)  Pulse: 77 (09/27/18 1752)  Resp: 16 (09/27/18 1554)  BP: (!) 152/97 (09/27/18 1752)  SpO2: 99 % (09/27/18 1554) Vital Signs (24h Range):  Temp:  [98.4 °F (36.9 °C)-99.7 °F (37.6 °C)] 99.7 °F (37.6 °C)  Pulse:  [65-83] 77  Resp:  [16-18] 16  SpO2:  [99 %-100 %] 99 %  BP: (139-179)/() 152/97     Weight: 74.8 kg (165 lb)  Body mass index is 24.37 kg/m².    FHT: 135, mod btbv, + accels, - decels. Cat 1 (reassuring)  TOCO:  irregular    Physical Exam:   Constitutional: She is oriented to person, place, and time. She appears well-developed and well-nourished. No distress.    HENT:   Head: Normocephalic and atraumatic.    Eyes: EOM are normal.    Neck: Normal range of motion.    Cardiovascular: Normal rate, regular rhythm and normal heart sounds.     Pulmonary/Chest: Effort normal and breath sounds normal. No respiratory distress. She has no wheezes. She has no rales.        Abdominal: Soft. There is no tenderness. There is no rebound and no guarding.   No RUQ pain                 Neurological: She is alert and oriented to person, place, and time. She has normal reflexes.    Skin: Skin is warm and dry. She is not diaphoretic.    Psychiatric: She has a normal mood and  affect. Her behavior is normal. Judgment and thought content normal.       Cervix:  Dilation:  3  Effacement:  70%  Station: -3  Presentation: Vertex     Significant Labs:  Lab Results   Component Value Date    GROUPTRH A POS 2018    HEPBSAG Negative 2018       CBC:   Recent Labs   Lab  18   1045   WBC  6.36   RBC  3.26*   HGB  9.3*   HCT  29.0*   PLT  227   MCV  89   MCH  28.5   MCHC  32.1     CMP:   Recent Labs   Lab  18   1655   GLU  86   CALCIUM  9.1   ALBUMIN  2.9*   PROT  7.5   NA  137   K  3.1*   CO2  24   CL  104   BUN  10   CREATININE  0.7   ALKPHOS  108   ALT  7*   AST  11   BILITOT  0.2     Recent Labs   Lab  18   1045   COLORU  Yellow   SPECGRAV  1.015   PHUR  6.0   PROTEINUA  Negative   BACTERIA  Occasional   NITRITE  Negative   LEUKOCYTESUR  3+*   UROBILINOGEN  2.0     I have personallly reviewed all pertinent lab results from the last 24 hours.    Assessment/Plan:     32 y.o. female  at Unknown for:    * Pre-eclampsia, severe    - h/o CHTN on Procardia 30XL.   - now with severe range blood pressures in [facility]   - s/p labetalol 20/40 in [facility] with subsequent mild range pressures  - PCR elevated at baseline, 0.22 here  - Other pre-eclampsia labs wnl  - No symptoms  - Given h/o aneurysm/SAH, will proceed with delivery via CS  - Discussed with patient who is in agreement with plan  - Desires BTL as well, consents signed  - Anesthesia and NICU informed  - ICU informed as pt will require ICU post-op for frequent neuro checks in the post-op period  - Consents signed  - Fetal presentation = vertex on bedside u/s  - Coags drawn, wnl  - Third trimester labs pending  - To OR for primary  delivery        Subarachnoid hemorrhage    - Patient s/p SAH with coil embolization and EVD in 2018  - S/p BMZ series at that time  - Goal for BP is to remain below 160/100  - ICU consulted, on board. Will follow in the post-op period. Patient to go to ICU for frequent neuro  checks  - Coags wnl  - No symptoms, neurologically stable at this time; cleared prior to transport to [facility] by neurosurgery        Hypokalemia    - K of 2.7 at main campus ED  - replacement initiated PO and IV  - 3.1 in [facility]  - will monitor and replace as needed following delivery            Sushma K Reddy, MD  Obstetrics  [facility]    I have seen and examined the patient and agree with the resident note.  Patient had aneursym coil placed and had EVD placed and removed in August. She was discharged home and followed up with neuro and PT but not with OB. She has been taking Procardia XL daily. She did not take this am. When given in ED bp mild range. Patient with known CHTN. Has had headaches intermittently since procedure. Did not have HA on arrival to [facility]. Had right pain in breast area at times but not today. K being replaced. Presented for contractions and cervix was 1cm -changed to 3cm by time arrived at [facility]. Urine suspicious for possible UTI-culture sent and will treat.  Patient cleared by neurosurgery-did not need imaging and they felt could deliver vaginal. Wanted bp less than 160 over 100 and can use IV Labetolol for BP control and no low ceiling for BP. I spoke to neurosurgery attending.  Patient developed sustained severe range BP. Given her recent surgery, sustained severe bp, and PTL, recommend delivery. Patient required 2 doses of IV Labetolol. ICU consulted in the event the nicardipine gtt noted. I spoke to Dr. Bush. Coags sent.  Discussed with patient given severe sustained bp with recent brain aneursym, recommend csection for delivery. Patient aware of risks, benefits, and alternatives of the procedure. Risks of prematurity were reviewed with the patient. Patient received bmz last admit and one dose today. Magnesium sulfate started for seizure prophylaxis and for neuroprotection. Patient desires BTL even if demise of  baby. Aware of the risks, benefits, and  alternatives of this procedure.  Patient indicated her pediatricians are aware of her history of PKD and she is aware of risk to her child.    Addendum: In 2015, noted positive hep c ab and negative viral load. Patient did not mention. Staff to ask when able and recheck labs and third trimester HIV and RPR. Residents notifying staff and nursery  Patient also GBS unknown    NST 130s and reactive for GA. Yarnell irregular.    Discussed with Dr. Soto.

## 2018-09-28 LAB
ALBUMIN SERPL BCP-MCNC: 2.2 G/DL
ALP SERPL-CCNC: 82 U/L
ALT SERPL W/O P-5'-P-CCNC: 6 U/L
ANION GAP SERPL CALC-SCNC: 11 MMOL/L
AST SERPL-CCNC: 9 U/L
BASOPHILS # BLD AUTO: 0.01 K/UL
BASOPHILS NFR BLD: 0.1 %
BILIRUB SERPL-MCNC: 0.2 MG/DL
BUN SERPL-MCNC: 7 MG/DL
CALCIUM SERPL-MCNC: 7.4 MG/DL
CHLORIDE SERPL-SCNC: 103 MMOL/L
CO2 SERPL-SCNC: 19 MMOL/L
CREAT SERPL-MCNC: 0.7 MG/DL
DIFFERENTIAL METHOD: ABNORMAL
EOSINOPHIL # BLD AUTO: 0 K/UL
EOSINOPHIL NFR BLD: 0 %
ERYTHROCYTE [DISTWIDTH] IN BLOOD BY AUTOMATED COUNT: 15 %
EST. GFR  (AFRICAN AMERICAN): >60 ML/MIN/1.73 M^2
EST. GFR  (NON AFRICAN AMERICAN): >60 ML/MIN/1.73 M^2
GLUCOSE SERPL-MCNC: 202 MG/DL
HCT VFR BLD AUTO: 25.4 %
HCV AB SERPL QL IA: ABNORMAL
HGB BLD-MCNC: 8.2 G/DL
HIV 1+2 AB+HIV1 P24 AG SERPL QL IA: NEGATIVE
LYMPHOCYTES # BLD AUTO: 1.1 K/UL
LYMPHOCYTES NFR BLD: 7.7 %
MAGNESIUM SERPL-MCNC: 4.8 MG/DL
MAGNESIUM SERPL-MCNC: 5.7 MG/DL
MCH RBC QN AUTO: 28.1 PG
MCHC RBC AUTO-ENTMCNC: 32.3 G/DL
MCV RBC AUTO: 87 FL
MONOCYTES # BLD AUTO: 0.5 K/UL
MONOCYTES NFR BLD: 3.5 %
NEUTROPHILS # BLD AUTO: 13.1 K/UL
NEUTROPHILS NFR BLD: 88.5 %
PHOSPHATE SERPL-MCNC: 2.2 MG/DL
PLATELET # BLD AUTO: 283 K/UL
PMV BLD AUTO: 11.1 FL
POTASSIUM SERPL-SCNC: 3.7 MMOL/L
PROT SERPL-MCNC: 5.8 G/DL
RBC # BLD AUTO: 2.92 M/UL
RPR SER QL: NORMAL
SODIUM SERPL-SCNC: 133 MMOL/L
WBC # BLD AUTO: 14.8 K/UL

## 2018-09-28 PROCEDURE — 25000003 PHARM REV CODE 250: Performed by: STUDENT IN AN ORGANIZED HEALTH CARE EDUCATION/TRAINING PROGRAM

## 2018-09-28 PROCEDURE — 83735 ASSAY OF MAGNESIUM: CPT | Mod: 91

## 2018-09-28 PROCEDURE — 11000001 HC ACUTE MED/SURG PRIVATE ROOM

## 2018-09-28 PROCEDURE — 85025 COMPLETE CBC W/AUTO DIFF WBC: CPT

## 2018-09-28 PROCEDURE — 36415 COLL VENOUS BLD VENIPUNCTURE: CPT

## 2018-09-28 PROCEDURE — 83735 ASSAY OF MAGNESIUM: CPT

## 2018-09-28 PROCEDURE — 99231 SBSQ HOSP IP/OBS SF/LOW 25: CPT | Mod: ,,, | Performed by: OBSTETRICS & GYNECOLOGY

## 2018-09-28 PROCEDURE — 84100 ASSAY OF PHOSPHORUS: CPT

## 2018-09-28 PROCEDURE — 87522 HEPATITIS C REVRS TRNSCRPJ: CPT

## 2018-09-28 PROCEDURE — 63600175 PHARM REV CODE 636 W HCPCS: Performed by: STUDENT IN AN ORGANIZED HEALTH CARE EDUCATION/TRAINING PROGRAM

## 2018-09-28 PROCEDURE — 80053 COMPREHEN METABOLIC PANEL: CPT

## 2018-09-28 PROCEDURE — 99232 SBSQ HOSP IP/OBS MODERATE 35: CPT | Mod: ,,, | Performed by: INTERNAL MEDICINE

## 2018-09-28 RX ORDER — OXYCODONE HYDROCHLORIDE 5 MG/1
10 TABLET ORAL EVERY 6 HOURS PRN
Status: DISCONTINUED | OUTPATIENT
Start: 2018-09-29 | End: 2018-09-29

## 2018-09-28 RX ORDER — DIPHENHYDRAMINE HYDROCHLORIDE 50 MG/ML
25 INJECTION INTRAMUSCULAR; INTRAVENOUS NIGHTLY PRN
Status: DISCONTINUED | OUTPATIENT
Start: 2018-09-28 | End: 2018-09-28

## 2018-09-28 RX ORDER — POTASSIUM CHLORIDE 20 MEQ/1
40 TABLET, EXTENDED RELEASE ORAL ONCE
Status: COMPLETED | OUTPATIENT
Start: 2018-09-28 | End: 2018-09-28

## 2018-09-28 RX ORDER — ACETAMINOPHEN 325 MG/1
650 TABLET ORAL EVERY 6 HOURS
Status: DISCONTINUED | OUTPATIENT
Start: 2018-09-28 | End: 2018-10-03 | Stop reason: HOSPADM

## 2018-09-28 RX ORDER — HYDROXYZINE HYDROCHLORIDE 25 MG/1
25 TABLET, FILM COATED ORAL ONCE
Status: COMPLETED | OUTPATIENT
Start: 2018-09-28 | End: 2018-09-28

## 2018-09-28 RX ORDER — NIFEDIPINE 90 MG/1
60 TABLET, EXTENDED RELEASE ORAL DAILY
Status: ON HOLD | COMMUNITY
End: 2018-10-03 | Stop reason: HOSPADM

## 2018-09-28 RX ORDER — OXYCODONE HYDROCHLORIDE 5 MG/1
5 TABLET ORAL EVERY 4 HOURS PRN
Status: DISCONTINUED | OUTPATIENT
Start: 2018-09-29 | End: 2018-09-28

## 2018-09-28 RX ORDER — OXYCODONE HYDROCHLORIDE 5 MG/1
5 TABLET ORAL EVERY 6 HOURS PRN
Status: DISCONTINUED | OUTPATIENT
Start: 2018-09-29 | End: 2018-09-29

## 2018-09-28 RX ORDER — MORPHINE SULFATE 2 MG/ML
1 INJECTION, SOLUTION INTRAMUSCULAR; INTRAVENOUS EVERY 4 HOURS PRN
Status: DISCONTINUED | OUTPATIENT
Start: 2018-09-28 | End: 2018-09-29

## 2018-09-28 RX ORDER — DIPHENHYDRAMINE HYDROCHLORIDE 50 MG/ML
12.5 INJECTION INTRAMUSCULAR; INTRAVENOUS EVERY 8 HOURS PRN
Status: DISCONTINUED | OUTPATIENT
Start: 2018-09-28 | End: 2018-10-03 | Stop reason: HOSPADM

## 2018-09-28 RX ORDER — OXYCODONE HYDROCHLORIDE 5 MG/1
10 TABLET ORAL EVERY 4 HOURS PRN
Status: DISCONTINUED | OUTPATIENT
Start: 2018-09-29 | End: 2018-09-28

## 2018-09-28 RX ORDER — NIFEDIPINE 30 MG/1
60 TABLET, EXTENDED RELEASE ORAL DAILY
Status: DISCONTINUED | OUTPATIENT
Start: 2018-09-28 | End: 2018-09-30

## 2018-09-28 RX ORDER — NIFEDIPINE 30 MG/1
60 TABLET, EXTENDED RELEASE ORAL DAILY
Status: DISCONTINUED | OUTPATIENT
Start: 2018-09-29 | End: 2018-09-28

## 2018-09-28 RX ADMIN — NIFEDIPINE 60 MG: 30 TABLET, FILM COATED, EXTENDED RELEASE ORAL at 09:09

## 2018-09-28 RX ADMIN — OXYCODONE HYDROCHLORIDE 10 MG: 5 TABLET ORAL at 02:09

## 2018-09-28 RX ADMIN — MUPIROCIN 1 G: 20 OINTMENT TOPICAL at 08:09

## 2018-09-28 RX ADMIN — KETOROLAC TROMETHAMINE 30 MG: 30 INJECTION, SOLUTION INTRAMUSCULAR at 11:09

## 2018-09-28 RX ADMIN — DIPHENHYDRAMINE HYDROCHLORIDE 12.5 MG: 50 INJECTION, SOLUTION INTRAMUSCULAR; INTRAVENOUS at 10:09

## 2018-09-28 RX ADMIN — ACETAMINOPHEN 650 MG: 325 TABLET, FILM COATED ORAL at 05:09

## 2018-09-28 RX ADMIN — POTASSIUM & SODIUM PHOSPHATES POWDER PACK 280-160-250 MG 1 PACKET: 280-160-250 PACK at 09:09

## 2018-09-28 RX ADMIN — DOCUSATE SODIUM 200 MG: 100 CAPSULE, LIQUID FILLED ORAL at 08:09

## 2018-09-28 RX ADMIN — OXYCODONE HYDROCHLORIDE 10 MG: 5 TABLET ORAL at 06:09

## 2018-09-28 RX ADMIN — POTASSIUM & SODIUM PHOSPHATES POWDER PACK 280-160-250 MG 1 PACKET: 280-160-250 PACK at 12:09

## 2018-09-28 RX ADMIN — POTASSIUM CHLORIDE 40 MEQ: 1500 TABLET, EXTENDED RELEASE ORAL at 09:09

## 2018-09-28 RX ADMIN — KETOROLAC TROMETHAMINE 30 MG: 30 INJECTION, SOLUTION INTRAMUSCULAR at 12:09

## 2018-09-28 RX ADMIN — HYDROCODONE BITARTRATE AND ACETAMINOPHEN 1 TABLET: 7.5; 325 TABLET ORAL at 10:09

## 2018-09-28 RX ADMIN — DIPHENHYDRAMINE HYDROCHLORIDE 25 MG: 25 CAPSULE ORAL at 12:09

## 2018-09-28 RX ADMIN — OXYCODONE HYDROCHLORIDE 10 MG: 5 TABLET ORAL at 08:09

## 2018-09-28 RX ADMIN — POTASSIUM & SODIUM PHOSPHATES POWDER PACK 280-160-250 MG 1 PACKET: 280-160-250 PACK at 02:09

## 2018-09-28 RX ADMIN — HYDROXYZINE HYDROCHLORIDE 25 MG: 25 TABLET, FILM COATED ORAL at 04:09

## 2018-09-28 RX ADMIN — ACETAMINOPHEN 650 MG: 325 TABLET, FILM COATED ORAL at 12:09

## 2018-09-28 RX ADMIN — MAGNESIUM SULFATE HEPTAHYDRATE 2 G/HR: 40 INJECTION, SOLUTION INTRAVENOUS at 09:09

## 2018-09-28 RX ADMIN — KETOROLAC TROMETHAMINE 30 MG: 30 INJECTION, SOLUTION INTRAMUSCULAR at 06:09

## 2018-09-28 RX ADMIN — MORPHINE SULFATE 1 MG: 2 INJECTION, SOLUTION INTRAMUSCULAR; INTRAVENOUS at 05:09

## 2018-09-28 RX ADMIN — ACETAMINOPHEN 650 MG: 325 TABLET, FILM COATED ORAL at 11:09

## 2018-09-28 RX ADMIN — OXYCODONE HYDROCHLORIDE 10 MG: 5 TABLET ORAL at 03:09

## 2018-09-28 RX ADMIN — POTASSIUM & SODIUM PHOSPHATES POWDER PACK 280-160-250 MG 1 PACKET: 280-160-250 PACK at 05:09

## 2018-09-28 NOTE — ANESTHESIA POSTPROCEDURE EVALUATION
"Anesthesia Post Evaluation    Patient: Sharon Grande    Procedure(s) Performed: Procedure(s) (LRB):   SECTION (N/A)    Final Anesthesia Type: CSE  Patient location during evaluation: labor & delivery  Patient participation: Yes- Able to Participate  Level of consciousness: awake and alert  Post-procedure vital signs: reviewed and stable  Pain management: adequate  Airway patency: patent  PONV status at discharge: No PONV  Anesthetic complications: no      Cardiovascular status: blood pressure returned to baseline and stable  Respiratory status: room air, unassisted and spontaneous ventilation  Hydration status: euvolemic  Follow-up not needed.        Visit Vitals  /79   Pulse 72   Temp 36.9 °C (98.5 °F) (Oral)   Resp 14   Ht 5' 9" (1.753 m)   Wt 74.8 kg (165 lb)   LMP 2018   SpO2 100%   Breastfeeding? No   BMI 24.37 kg/m²       Pain/Chicho Score: Pain Assessment Performed: Yes (2018 12:00 PM)  Presence of Pain: denies (2018  5:05 AM)  Pain Rating Prior to Med Admin: 5 (2018 11:42 AM)  Pain Rating Post Med Admin: 2 (2018 11:42 AM)        "

## 2018-09-28 NOTE — ASSESSMENT & PLAN NOTE
- Patient s/p SAH with coil embolization and EVD in August 2018  - Goal for BP is to remain below 160/100  - ICU consulted, on board. Will follow in the post-op period. Patient to go to ICU for frequent neuro checks  - Coags wnl  - No symptoms, neurologically stable at this time; cleared prior to transport to [facility] by neurosurgery

## 2018-09-28 NOTE — ASSESSMENT & PLAN NOTE
Postpartum care:  - Patient doing well. Continue routine management and advances.  - Continue IV>PO pain meds. Pain well controlled.  - Encourage ambulation.   - Heme: Pre Delivery h/h 9.2/29 --> Post Delivery h/h 8.2/25.4  - Contraception - s/p BTL  - Lactation - The patient is not breastfeeding.   - Rh Status - pos

## 2018-09-28 NOTE — PROGRESS NOTES
Patient arrived in ICU-normal bp-spoke with the ICU fellow who is seeing her. She will replace k.  EICU will be available if questions regarding BP but they will call ob resident regarding bp.

## 2018-09-28 NOTE — SUBJECTIVE & OBJECTIVE
Hospital course: 2018 - patient seen at [facility] ED with complaints of contractions and elevated blood pressures. Home procardia given in ED. Patient found to be 1.5cm dilated and cleared by neurosurgery for transport to [facility] for w/u of threatened PTL and pre-eclampsia. In the [place], patient was found to be 3cm dilated and then proceeded to have severe range blood pressures requiring IV antihypertensives. Mag sulfate was started for seizure prophylaxis. BMZ x 1 was given in [place]. Plan was made to deliver via  section given severely elevated blood pressures in a patient with h/o aneurysm and SAH. Patient desires BTL, consents signed for CS and BTL. Underwent uncomplicated  delivery. After delivery, had severe range pressures requiring labetalol 20 x 2. Procardia was increased to 60XL to start in AM.  2018 - POD#1 s/p 1LTCS.  Doing well this morning. Continued on magnesium sulfate for seizure prophylaxis. Patient is starting to meet post-op goals. Her pain is well-controlled with her pain medication. She has not yet eaten breakfast this morning but denies n/v. Vaginal bleeding is minimal. She is ambulating and voiding via thomas an adequate amount. She denies headaches, chest pain, SOB, RUQ pain, or vision changes. Stable for step down to floor. Will monitor blood pressures closely.    Interval History: POD#1 s/p 1LTCS.  Doing well this morning. Continued on magnesium sulfate for seizure prophylaxis. Patient is starting to meet post-op goals. Her pain is well-controlled with her pain medication. She has not yet eaten breakfast this morning but denies n/v. Vaginal bleeding is minimal. She is ambulating and voiding via thomas an adequate amount. She denies headaches, chest pain, SOB, RUQ pain, or vision changes. Stable for step down to floor. Will monitor blood pressures closely.    Objective:     Vital Signs (Most Recent):  Temp: 98.2 °F (36.8 °C) (18 0705)  Pulse: 70 (18  1100)  Resp: 18 (09/28/18 1100)  BP: (!) 140/91 (09/28/18 1100)  SpO2: 100 % (09/28/18 1100) Vital Signs (24h Range):  Temp:  [97.6 °F (36.4 °C)-99.7 °F (37.6 °C)] 98.2 °F (36.8 °C)  Pulse:  [58-84] 70  Resp:  [12-20] 18  SpO2:  [98 %-100 %] 100 %  BP: (116-179)/() 140/91     Weight: 74.8 kg (165 lb)  Body mass index is 24.37 kg/m².      Intake/Output Summary (Last 24 hours) at 9/28/2018 1138  Last data filed at 9/28/2018 0900  Gross per 24 hour   Intake 2258.33 ml   Output 3750 ml   Net -1491.67 ml       Significant Labs:  Lab Results   Component Value Date    GROUPTRH A POS 09/27/2018    HEPBSAG Negative 08/11/2018     Recent Labs   Lab  09/28/18   0338   HGB  8.2*   HCT  25.4*       CBC:   Recent Labs   Lab  09/28/18   0338   WBC  14.80*   RBC  2.92*   HGB  8.2*   HCT  25.4*   PLT  283   MCV  87   MCH  28.1   MCHC  32.3     CMP:   Recent Labs   Lab  09/28/18   0338   GLU  202*   CALCIUM  7.4*   ALBUMIN  2.2*   PROT  5.8*   NA  133*   K  3.7   CO2  19*   CL  103   BUN  7   CREATININE  0.7   ALKPHOS  82   ALT  6*   AST  9*   BILITOT  0.2     I have personallly reviewed all pertinent lab results from the last 24 hours.    Physical Exam:   Constitutional: She is oriented to person, place, and time. She appears well-developed and well-nourished. No distress.    HENT:   Head: Normocephalic and atraumatic.    Eyes: EOM are normal.    Neck: Normal range of motion.    Cardiovascular: Normal rate, regular rhythm and normal heart sounds.     Pulmonary/Chest: Effort normal and breath sounds normal. No respiratory distress. She has no wheezes. She has no rales.        Abdominal: Soft. She exhibits abdominal incision (Bandage in place, small amt of bloody discharge in bottom right quadrant, same amount as yesterday). There is tenderness (mild, appropriate). There is no rebound and no guarding.   Fundus firm below umbilicus             Musculoskeletal:   No leg swelling or tenderness. Teds/SCDs in place.        Neurological: She is alert and oriented to person, place, and time. She has normal reflexes.    Skin: Skin is warm and dry. She is not diaphoretic.    Psychiatric: She has a normal mood and affect. Her behavior is normal. Judgment and thought content normal.

## 2018-09-28 NOTE — NURSING
Unable to obtain deep tendon reflex response.  Pt appears more drowsy than this morning.  Dr. Alberts notified, she stated to turn Mg gtt down to 1g/hr and that they will come see pt.  Will continue to monitor.

## 2018-09-28 NOTE — PLAN OF CARE
Problem: Patient Care Overview  Goal: Plan of Care Review  Outcome: Ongoing (interventions implemented as appropriate)  Mrs. Grande is currently resting, VSS, NAD. Pain well controlled via current PRN regimen. Mg drip continued overnight, no s/s mag toxicity present. Pt c/o inability to sleep; PRN benadryl administered with minimal affect, MD notified. One time dose hydroxyzine given with moderate affect. Urine output adequate via thomas. Abdominal dressing intact with small amount of drainage noted; area marked. Tolerating PO intake without difficulty. Pt viewing infant on nicview via monitor at bedside. Up to date with POC. Will monitor.

## 2018-09-28 NOTE — PROGRESS NOTES
To bedside for assessment.   POD#1 s/p 1LTCS w/ BTL on IV MgSO4 for seizure ppx 2/2 cHTN w/ siPreE w/ SF (BP). Patient also with h/o SAH this pregnancy requiring coiling, polycystic kidney disease, and h/o pos Hep C Ab w/ neg titer.   Patient resting comfortably in bed. She denies HA, RUQ pain, visual changes, SOB, or CP. She reports abdominal pain has improved. Her biggest complaint is insomnia.       Temp:  [97.6 °F (36.4 °C)-99.7 °F (37.6 °C)] 98 °F (36.7 °C)  Pulse:  [58-84] 78  Resp:  [12-20] 18  SpO2:  [98 %-100 %] 100 %  BP: (116-179)/() 137/83    Gen: AAF in NAD  Pulm: CTAB  Card: RRR  Abd: Soft, appropriately TTP, incision with mild shadowing in lower aspect of bandage, within outlined area  Ext: 1+ patellar reflexes bilaterally, SCDs in place    UOP: 2670cc/10hrs    A/P   - Has required labetalol IV 20/20 since admission to ICU to keep BP <160/100  - BP now within range  - Plan to start procardia 60 XL this AM, need to evaluate BP at time of administration   - No signs/sxs of MgSO4 toxicity at this time  - Continue close maternal monitoring including UOP and BP    Edyta Bustillos MD  Ob/Gyn PGY-3

## 2018-09-28 NOTE — EICU
Brief new admit note:  32 yr old female with hx of HTN, Polycystic kidney disease, recent coiling of SAH in this pregnancy under went c section for her severe pre eclampsia. Post op in ICU for neuro, BP monitoring. Potassium replaced.    Camera Eval:  Talking to family.  Comfortable.  156/68, HR 67, 100%.    Data:  Reviewed.  7.41/35.  Creat normal.    Hg 9.3    Plan:  - continue care  - neuro wise looks normal  - on oxytocin/IV mag  - seizure precautions  - neuro surgery eval in am.

## 2018-09-28 NOTE — ASSESSMENT & PLAN NOTE
- h/o CHTN on Procardia 30XL at home   - increased to 60XL 9/28   - severe range blood pressures in [facility] requiring labetalol 20/40  - PCR elevated at baseline, 0.22 here  - Other pre-eclampsia labs wnl  - No symptoms  - Magnesium sulfate started for seizure prophylaxis  - Given h/o aneurysm/SAH, decision was made to go forward with delivery via 1LTCS/BTL  - Now in ICU for close monitoring postpartum. Stable for stepdown today.

## 2018-09-28 NOTE — PLAN OF CARE
"Copied from baby's NICU chart:    SOCIAL WORK DISCHARGE PLANNING ASSESSMENT     Sw completed discharge planning assessment with pt's mother in mother's room ICU 5.  Pt's mother was easily engaged. Education on the role of  was provided. Emotional support provided throughout assessment.        Legal Name: Marcela Null                         :  2018         Patient Active Problem List   Diagnosis    Prematurity, 1,500-1,749 grams, 31-32 completed weeks    Acute respiratory distress in  with surfactant disorder    At risk for sepsis            Birth [facility]           IQRA: 2017     Birth Weight:   1.53 kg (3 lb 6 oz)                   Birth Length: 43 cm                 Gestational age: 31 weeks 4 days          Apgars    Living status:  Living  Apgars:   1 min.:   5 min.:   10 min.:   15 min.:   20 min.:     Skin color:   0  1  1        Heart rate:   2  2  2        Reflex irritability:   1  1  2        Muscle tone:   1  1  2        Respiratory effort:   2  2  2        Total:   6  7  9                  Mother: Sharon Grande (Missy),  1985, 30 y/o  Address: [address] [place] [address], [place] [address]  Phone: [phone]  Employer: unemployed                        Job Title: n/a  Education: 12 th grade--sometimes forgetful due to aneurysm in 2018.        Father: Justin Null,  1986, 31 y/o  Address: same as above  Phone: [phone]  Employer: unemployed-full time welding student at [employer]  Job Title: n/a  Education:  currently at [employer] working on welding certificate  Signed Birth Certificate: Yes; parents have been together for 18 years.     Support person(s): Sharon Edwards (m.great gm) [phone] and Steffany Roy (m. Great aunt)     Sibling(s): Amayiah (14), Anayiah (10), Justin (6-born at 28 weeks), Bennie (boy-3), Paula (1)     Spiritual Affiliation: Yes  Non-Islam     Commercial Insurance Coverage: No     [facility] IfOnly " Plan (formerly [place] Medicaid): Primary: Yes Secondary: No   [facility]      Pediatrician: Dr. Banuchi-[facility]       Nutrition: Formula               Breast Pump:              N/A               WIC:              Mom not certified; however will apply for         Essential Items: (includes car seat, crib/bassinet/pack-n-play, clothing, bottles, diapers, etc.)  Plans to acquire by discharge      Transportation: Personal vehicle, Family/friends, Public and Medicaid transportation      Education: Information given on CPR classes and Physician/NNP daily rounds.      Resources Given: WIC, [facility]        Potential Eligibility for SSI Benefits: No     Potential Discharge Needs:  Early Steps         Melinda Bhatia LCSW     [facility]  [email or web address]     (phone) [phone] or  Vce. [phone]  (fax) [phone]

## 2018-09-28 NOTE — NURSING
OB team notified of pt's current BP.  Per Dr. Bustillos, give the scheduled 60mg nifedipine as ordered.  Wctm.

## 2018-09-28 NOTE — TRANSFER OF CARE
"Anesthesia Transfer of Care Note    Patient: Sharon Grande    Procedure(s) Performed: Procedure(s) (LRB):   SECTION (N/A)    Patient location: ICU    Anesthesia Type: CSE    Transport from OR: Transported from OR on room air with adequate spontaneous ventilation    Post pain: adequate analgesia    Post assessment: no apparent anesthetic complications    Post vital signs: stable    Level of consciousness: awake, alert and oriented    Nausea/Vomiting: nausea and vomiting    Complications: none          Last vitals:   Visit Vitals  BP (!) 152/102   Pulse 84   Temp 37.6 °C (99.7 °F) (Temporal)   Resp 16   Ht 5' 9" (1.753 m)   Wt 74.8 kg (165 lb)   LMP 2018   SpO2 100%   Breastfeeding? Unknown   BMI 24.37 kg/m²     "

## 2018-09-28 NOTE — NURSING
Per Dr. Dutta, Ok to d/c thomas if pt is able to get up to toilet.  Pt successfully got out of bed and is sitting in chair independently.  Thomas d/c'd without complication.  Pt educated on use of hat to measure urine; verbalized understanding.  Will continue to monitor.

## 2018-09-28 NOTE — PROGRESS NOTES
To bedside for assessment.   POD#1 s/p 1LTCS w/ BTL on IV MgSO4 for seizure ppx 2/2 cHTN w/ siPreE w/ SF (BP). Patient also with h/o SAH this pregnancy requiring coiling, polycystic kidney disease, and h/o pos Hep C Ab w/ neg titer.   Patient resting comfortably in bed. She denies HA, RUQ pain, visual changes, SOB, or CP. She reports lower abdominal pain at location of incision. She denies any known history of Hep C or other hepatitis. She denies IV drug use. She reports several tattoos, not all done professionally. She states some were done by people who came to her house. She has never been treated for hepatitis.     Temp:  [97.6 °F (36.4 °C)-99.7 °F (37.6 °C)] 97.6 °F (36.4 °C)  Pulse:  [58-84] 76  Resp:  [13-20] 20  SpO2:  [99 %-100 %] 100 %  BP: (124-179)/() 137/89    Gen: AAF in NAD  Pulm: CTAB  Card: RRR  Abd: Soft, appropriately TTP, incision with mild shadowing in lower aspect of bandage, within outlined area  Ext: 1+ patellar reflexes bilaterally, SCDs in place    UOP: 1300cc/4hrs    A/P   - Has required labetalol IV 20/20 since admission to ICU to keep BP <160/100  - BP now within range  - Will increase procardia to 60 XL, to start tomorrow AM  - No signs/sxs of MgSO4 toxicity at this time  - Continue close maternal monitoring including UOP and BP    Edyta Bustillos MD  Ob/Gyn PGY-3

## 2018-09-28 NOTE — ASSESSMENT & PLAN NOTE
- K of 2.7 at [facility] ED  - replacement initiated PO and IV  - 3.1 in [facility], now 3.7

## 2018-09-28 NOTE — L&D DELIVERY NOTE
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 Primary  Section via Pfannenstiel Incision  2. Bilateral tubal ligation via Jose Technique            Description of the Findings of the Procedure:   1. Single viable female infant in vertex presentation  2. Normal uterine contour with normal bilateral fallopian tubes and ovaries apart from multiple small (< 1 cm) uterine fibroids  3. Excellent hemostasis noted at conclusion of the case    Complications: No    Blood Loss: 655 mL     PreOp CBC:   Lab Results   Component Value Date    WBC 14.80 (H) 2018    HGB 8.2 (L) 2018    HCT 25.4 (L) 2018    MCV 87 2018     2018       Procedure Details   The patient was seen in the Holding Room. The risks, benefits, complications, treatment options, and expected outcomes were discussed with the patient.  The patient concurred with the proposed plan, giving informed consent.  The site of surgery properly noted/marked. The patient was taken to Operating Room, identified as Sharon Grande and the procedure verified as Primary  Delivery. A Time Out was held and the above information confirmed.    After induction of anesthesia, the patient was prepped and draped in the usual sterile manner while placed in a dorsal supine position with a left lateral tilt.  A thomas  catheter was also placed per nursing. Preoperative antibiotics were administered and an allis test was performed yielding adequate anesthesia.  A Pfannenstiel incision was made and carried down through the subcutaneous tissue to the fascia. Fascial incision was made and extended transversely. The fascia was grasped with Kocher clamps and  from the underlying rectus tissue superiorly and inferiorly. The peritoneum was identified, found to be free of adherent bowel and entered with hemostats and Valerio scissors. Peritoneal incision was extended sharply superiorly and inferiorly with the Valerio scissors. The vesico-uterine peritoneum was identified and the bladder blade was inserted to keep the bladder out of the operative field. A low transverse uterine incision was made with the knife and extended with finger fracture. The amniotic sac was not ruptured with hysterotomy and the infant was noted to be in vertex position. The fetal head was brought to the incision and elevated out of the pelvis. The patient delivered a single viable female infant without difficulty. Of note, the infant was delivered en-caul and the amniotic sac was ruptured with hemostats after delivery.  Infant had Apgar scores of 6/7 at one and five minutes respectively. After the umbilical cord was clamped and cut cord blood was obtained for evaluation. The placenta was removed intact and appeared normal and was sent for pathology. The uterus was exteriorized. The uterine outline, tubes and ovaries appeared normal apart from multiple small (<1 cm) uterine fibroids. The uterine incision was closed with running locked sutures of 0-Chromic. A second layer of running and locking 0-Chromic was placed over the hysterotomy. Two figure of eight sutures were placed over areas of persistent oozing at the hysterotomy. Excellent hemostasis was then observed. Attention was then turned to the bilateral tubal ligation. The left fallopian tube was identified  and grasped with the [place]. The left tube was then ligated with the [place] technique. Good hemostasis was noted. Attention was then turned to the right fallopian tube which was ligated similarly with the Jose technique. Again, good hemostasis was noted. The excised portions of the tube were sent for pathology. The uterus was returned to the abdominal cavity. Incision was reinspected and good hemostasis was again noted. The abdominal cavity was irrigated to remove clots. The fascia was then reapproximated with running sutures of #1 PDS. The skin was reapproximated with 4-0 Monocryl.    Instrument, sponge, and needle counts were correct prior the abdominal closure and at the conclusion of the case.     Patient tolerated procedure well and Dr. Soto discussed with family that pt was stable and in good condition after the procedure.        Specimens:   Specimen (12h ago, onward)    None          Condition: Good    Disposition: ICU - Patient stable.    Attestation: Good         Delivery Information for  Girl Sharon Grande    Birth information:  YOB: 2017   Time of birth: 7:06 PM   Sex: female   Head Delivery Date/Time: 2018  7:06 PM   Delivery type: , Low Transverse   Gestational Age: <None>    Delivery Providers    Delivering clinician:  Obed Soto MD   Provider Role    Sandra Barrett MD Resident    Lang Garcia RN Registered Nurse    Lizet Forrester, [facility] Surgical Tech    Ray Ervin, RN Registered Nurse            Measurements    Weight:    Length:           Apgars    Living status:  Living  Apgars:   1 min.:   5 min.:   10 min.:   15 min.:   20 min.:     Skin color:   0  1  1      Heart rate:   2  2  2      Reflex irritability:   1  1  2      Muscle tone:   1  1  2      Respiratory effort:   2  2  2      Total:   6  7  9             Operative Delivery    Forceps attempted?:  No  Vacuum extractor attempted?:  No         Shoulder Dystocia    Shoulder dystocia  present?:  No           Presentation    Presentation:  Vertex  Position:  Middle Occiput           Interventions/Resuscitation    Method:  NICU Attended       Cord    Vessels:  3 vessels  Complications:  None  Delayed Cord Clamping?:  No  Cord Clamped Date/Time:  2018  7:07 PM  Cord Blood Disposition:  Sent with Baby  Gases Sent?:  Yes  Stem Cell Collection (by MD):  No       Placenta    Placenta delivery date/time:  2018 190  Placenta removal:  Manual removal  Placenta appearance:  Intact  Placenta disposition:  lab           Labor Events:       labor: Yes     Labor Onset Date/Time:         Dilation Complete Date/Time:         Start Pushing Date/Time:       Rupture Date/Time:              Rupture type:           Fluid Amount:        Fluid Color:        Fluid Odor:        Membrane Status (PeriCalm):        Rupture Date/Time (PeriCalm):        Fluid Amount (PeriCalm):        Fluid Color (PeriCalm):         steroids: Full Course     Antibiotics given for GBS: Yes     Induction:       Indications for induction:        Augmentation:       Indications for augmentation:       Labor complications:       Additional complications:          Cervical ripening:                     Delivery:      Episiotomy: None     Indication for Episiotomy:       Perineal Lacerations: None Repaired:      Periurethral Laceration: none Repaired:     Labial Laceration: none Repaired:     Sulcus Laceration: none Repaired:     Vaginal Laceration: No Repaired:     Cervical Laceration: No Repaired:     Repair suture:       Repair # of packets:       Vaginal delivery QBL (mL): 0      QBL (mL): 655     Combined Blood Loss (mL): 655     Vaginal Sweep Performed:       Surgicount Correct:         Other providers:       Anesthesia    Method:  Spinal          Details (if applicable):  Trial of Labor No    Categorization: Primary    Priority:     Indications for : Other (Add Comments)    Incision Type: low transverse     Additional  information:  Forceps:    Vacuum:    Breech:    Observed anomalies    Other (Comments):

## 2018-09-28 NOTE — PROGRESS NOTES
Pulmonary & Critical Care Medicine Progress Note    Subjective:   No overnight events. Afebrile. Vitals stable.    Vital Signs:   Vitals:    09/28/18 0800   BP: (!) 145/83   Pulse: 74   Resp: 15   Temp:      Fluid Balance:     Intake/Output Summary (Last 24 hours) at 9/28/2018 0855  Last data filed at 9/28/2018 0832  Gross per 24 hour   Intake 2138.33 ml   Output 3750 ml   Net -1611.67 ml     Physical Exam:   General: Sitting up comfortably, NAD  HEENT: NC/AT, PERRL, no scleral icterus, moist mucous membranes, no pharyngeal erythema or exudates  Neck: Supple without JVD, trachea midline  Cardiac: S1S2 auscultated, RRR, no murmurs/rubs/gallops  Resp: Normal inspection. Symmetric chest rise. Auscultation clear bilaterally with no increased work of breathing. Good inspiratory effort. No wheezes/rhonchi/crackles.  Abd: Soft, NT/ND, +BS, no HSM  Ext: No edema, no cyanosis, no clubbing, 2+ pulses present  Skin: Warm and dry, no rashes, no lesions, no jaundice  Neuro: AAOx3, CN II-XII grossly intact, no focal deficits  Psych: Appropriate mood and affect, cooperative & interactive    Laboratory Studies:   Recent Labs   Lab  09/27/18 1924   PH  7.419   PCO2  35.1   PO2  23*   HCO3  22.7*   POCSATURATED  42*   BE  -2     Recent Labs   Lab  09/28/18   0338   WBC  14.80*   RBC  2.92*   HGB  8.2*   HCT  25.4*   PLT  283   MCV  87   MCH  28.1   MCHC  32.3     Recent Labs   Lab  09/28/18   0338   NA  133*   K  3.7   CL  103   CO2  19*   BUN  7   CREATININE  0.7   MG  4.8*     Microbiology Data:   Microbiology Results (last 7 days)     Procedure Component Value Units Date/Time    Urine culture [[id number]] Collected:  09/27/18 1045    Order Status:  No result Specimen:  Urine Updated:  09/27/18 2023    Urine culture [[id number]] Collected:  09/27/18 1045    Order Status:  Canceled Specimen:  Urine Updated:  09/27/18 1841    Urine culture High Risk **CANNOT BE ORDERED STAT** [[id number]]     Order Status:  Completed Specimen:  Urine,  Catheterized          Chest Imaging:   No new imaging.     Infusions:     magnesium sulfate in water 2 g/hr (18 1454)     Scheduled Medications:    acetaminophen  650 mg Oral Q6H    docusate sodium  200 mg Oral BID    ibuprofen  600 mg Oral Q6H    ketorolac  30 mg Intravenous Q6H    mupirocin  1 g Nasal BID    NIFEdipine  60 mg Oral Daily    potassium, sodium phosphates  1 packet Oral Q4H     PRN Medications:   calcium gluconate, carboprost, citric acid-sodium citrate 500-334 mg/5 ml, diphenhydrAMINE, famotidine (PF), HYDROcodone-acetaminophen, HYDROcodone-acetaminophen, hydrocortisone, lanolin, magnesium hydroxide 400 mg/5 ml, measles, mumps and rubella vaccine, miSOPROStol, mupirocin, ondansetron, oxyCODONE, oxyCODONE, promethazine (PHENERGAN) IVPB, senna-docusate 8.6-50 mg, simethicone, DIPH,PERTUS (ADACEL),TETANUS PF VAC (ADULT)    Assessment & Plan:   Patient Active Problem List   Diagnosis    PKD (polycystic kidney disease)    Pregnant and not yet delivered in second trimester    Hypertension affecting pregnancy in second trimester    Subarachnoid hemorrhage from anterior communicating artery aneurysm    25 weeks gestation of pregnancy    Hypophosphatemia    Brain compression    Brain edema    Intracranial hypertension    Hyponatremia    Hypokalemia    Cerebral artery vasospasm     screening for malformation using ultrasonics    Subarachnoid hemorrhage    Abnormality of gait as late effect of cerebrovascular accident (CVA)    Fatigue    Other headache syndrome    Headache    Pre-eclampsia, severe     labor in third trimester without delivery     delivery delivered    History of bilateral tubal ligation     This is a case of pre-eclampsia requiring  in the setting of recent SAH and diagnosed HTN 2/2 to polycystic kidney disease prior to pregnancy. Patient's pressures have been ranging between -160 after coming out of the OR during the  night. Labetalol pushes have been used to maintain blood pressures <160/100. Magnesium sulfate drip ongoing per ob/gyn, but will be stopped today. SBP this morning in the 120's-140's.     -Restarting Nifedipine 24 hr this morning at increased dose of 60 mg daily.  -Continue Labetalol PRN doses to help maintain BP.  -K+ 40 mg given this morning.     DVT ppx: SCDs  Code status: Full code    Thank you for involving us in the care of this patient. We will sign off. Please call with any questions.    Lakshmi Bush MD  [facility] P[facility]M Fellow, PGY 5  [facility]  Pager: [phone]

## 2018-09-28 NOTE — CONSULTS
Pulmonary & Critical Care Medicine   Consultation Note    Reason for Consultation: Severe pre-eclampsia, h/o subarachnoid hemorrhage    HPI: Ms. Grande is a 33 yo F  currently 30 weeks pregnant with a PMH of HTN and polycystic kidney disease presented to [facility] today for lower abdominal pain radiating to the back x2 days and nausea. Patient was also noted to have HAs with elevated BP on monitor. Patient was started on magnesium sulfate and transferred to [facility] for induction and delivery.    Patient had recent admission at [facility] from 8/10 -  for subarachnoid hemorrhage. Patient was intubated, extubated, aneurysm was coiled, and EVD was placed.    Prior to her transfer from [facility] to [facility] today, neurosurgery was consulted - they evaluated the patient and stated patient was okay for delivery if BP stays below 160.    When patient arrived here, her BP was in the 160's. Patient was given Labetalol x2 and BP dropped into the 150's. Patient was taken to the OR for  with spinal anesthesia.     Patient was then brought to the ICU with SBP in the 120's. No complaints of HA, chest pain, SOB, abdominal pain, N/V.    Past Medical History:   Diagnosis Date    Anemia     Hypertension     since     Polycystic kidney disease     Polycystic kidney disease     Renal disorder     Since childhood      Past Surgical History:   Procedure Laterality Date    ANGIOGRAM-CEREBRAL N/A 2018    Performed by [facility] at [facility]    CEREBRAL ANGIOGRAM N/A 2018    Procedure: ANGIOGRAM-CEREBRAL;  Surgeon: Jayna Del Angel;  Location: [facility];  Service: Anesthesiology;  Laterality: N/A;    DILATION AND CURETTAGE OF UTERUS          DILATION AND CURETTAGE, UTERUS N/A 7/15/2013    Performed by [facility]ail [facility]tan [facility] at [facility] OR    Mri (magnetic resonance imaging) N/A 8/10/2018    Performed by Jayna Surgeon at [facility]     Social History:   Social History     Socioeconomic History    Marital  status: Single     Spouse name: Not on file    Number of children: Not on file    Years of education: Not on file    Highest education level: Not on file   Social Needs    Financial resource strain: Not on file    Food insecurity - worry: Not on file    Food insecurity - inability: Not on file    Transportation needs - medical: Not on file    Transportation needs - non-medical: Not on file   Occupational History    Not on file   Tobacco Use    Smoking status: Never Smoker    Smokeless tobacco: Never Used   Substance and Sexual Activity    Alcohol use: Yes     Comment: occasional    Drug use: No    Sexual activity: Yes     Partners: Male     Birth control/protection: None   Other Topics Concern    Not on file   Social History Narrative    Not on file     Family History   Problem Relation Age of Onset    Hypertension Mother     Polycystic kidney disease Mother     Hypertension Paternal Grandmother     Polycystic kidney disease Daughter      Drug Allergies:   Review of patient's allergies indicates:  No Known Allergies    Current Infusions:   lactated ringers      magnesium sulfate in water 2 g/hr (09/27/18 1738)     Scheduled Medications:      PRN Medications:   calcium gluconate, carboprost, ceFAZolin (ANCEF) IVPB, citric acid-sodium citrate 500-334 mg/5 ml, lactated ringers, miSOPROStol    Review of Systems:   A comprehensive 12-point review of systems was performed, and is negative except for those items mentioned above in the HPI section of this note.     Vital Signs:    Vitals:    09/27/18 1807   BP: (!) 152/102   Pulse: 84   Resp:    Temp:      Fluid Balance:     Intake/Output Summary (Last 24 hours) at 9/27/2018 1919  Last data filed at 9/27/2018 1833  Gross per 24 hour   Intake 145.83 ml   Output --   Net 145.83 ml     Physical Exam:   General: Lying down comfortably, NAD  HEENT: NC/AT, PERRL, EOMI, no scleral icterus, moist mucous membranes, no pharyngeal erythema or exudates  Neck:  Supple without JVD, trachea midline  Cardiac: S1S2 auscultated, RRR, no murmurs/rubs/gallops  Resp: Normal inspection. Symmetric chest rise. Auscultation clear bilaterally with no increased work of breathing. Good inspiratory effort. No wheezes/rhonchi/crackles.  Abd: Soft, NT/ND, +BS, no HSM  Ext: No edema, no cyanosis, no clubbing, 2+ pulses present  Skin: Warm and dry, no rashes, no lesions, no jaundice  Neuro: AAOx3, CN II-XII grossly intact, no focal deficits  Psych: Appropriate mood and affect, cooperative & interactive    Personal Review and Summary of Prior Diagnostics    Laboratory Studies:   No results for input(s): PH, PCO2, PO2, HCO3, POCSATURATED, BE in the last 24 hours.  Recent Labs   Lab  18   1045   WBC  6.36   RBC  3.26*   HGB  9.3*   HCT  29.0*   PLT  227   MCV  89   MCH  28.5   MCHC  32.1     Recent Labs   Lab  18   1655   NA  137   K  3.1*   CL  104   CO2  24   BUN  10   CREATININE  0.7   MG  1.6     Microbiology Data:   Microbiology Results (last 7 days)     Procedure Component Value Units Date/Time    Urine culture [[id number]] Collected:  18 1045    Order Status:  No result Specimen:  Urine Updated:  18 184    Urine culture High Risk **CANNOT BE ORDERED STAT** [[id number]]     Order Status:  Completed Specimen:  Urine, Catheterized         Summary of Chest Imaging Personally Reviewed:   None    2D Echo: 2018- LVH, LVEF 55-60%    PFT's: None on file    Impression & Recommendations    This is a case of pre-eclampsia requiring  in the setting of recent SAH and diagnosed HTN prior to pregnancy. Patient's pressures have been ranging between -160 after coming out of the OR. Labetalol pushes have been used to maintain blood pressures <160/100. Magnesium sulfate drip ongoing per ob/gyn.    -Will re-start Nifedipine 24 hr tomorrow morning at increased dose of 60 mg.  -Continue Labetalol PRN doses to help maintain BP.  -K+ replacements given.  -BMP and Mag  to be checked with morning labs.    DVT ppx: SCDs  Code status: Full code    Thank you for involving us in the care of this patient. We will continue to follow along. Please call with any questions.    Lakshmi Bush MD  [facility] P[facility]M Fellow, PGY 5  [facility]  Pager: [phone]

## 2018-09-28 NOTE — PLAN OF CARE
18 1010   Discharge Assessment   Assessment Type Discharge Planning Assessment   Confirmed/corrected address and phone number on facesheet? Yes   Assessment information obtained from? Patient   Prior to hospitilization cognitive status: Alert/Oriented   Prior to hospitalization functional status: Independent  (would stay with her grandmother during the day when her kids are in school.)   Current cognitive status: Alert/Oriented   Current Functional Status: Needs Assistance   Lives With child(traci), dependent  (14,10,6,3, 2 y/o)   Equipment Currently Used at Home walker, standard;bedside commode   Do you have any problems affording any of your prescribed medications? No   Is the patient taking medications as prescribed? yes   Does the patient have transportation home? Yes   Transportation Available family or friend will provide   Discharge Plan A Home       Introduced self to pt and explained sw role. Pt had an aneurysm in April and has required some assistance since d/c. Pt mostly independent. Has a walker and bedside commode at home for assistance. Pt stays with her grandmother during the day while her kids are at school so she is not alone with her 2 y/o. Pt inquired about assistance through a personal care attendant for when  d/c's. Pt informed she can apply for the medicaid waiver but the wait list is multiple years long. Encouraged pt to apply now despite lengthy wait list. Pt is in the process of applying herself for ss disability. Pt does not have any anticipated d/c needs at this time. SW will cont to follow and assess for d/c needs. Emotional support provided throughout visit.     PCP:Pt uses Dr. Archibald (OB) at [facility] as PCP. Will help pt get a PCP.  Pharmacy:[facility] inside of [facility]    Melinda Bhatia LCSW    [facility]  [email or web address]    (phone) [phone] or  Ext. [phone]  (fax) [phone]

## 2018-09-28 NOTE — NURSING
Dr. Alberts notified of Mg level of 5.7. Ok to keep Mg gtt at 1g/hr.  Pt in NAD, VSS. Pt less drowsy after eating.  Wctm.

## 2018-09-28 NOTE — ANESTHESIA PROCEDURE NOTES
CSE    Patient location during procedure: OR  Start time: 9/27/2018 7:15 PM  Timeout: 9/27/2018 7:14 PM  End time: 9/27/2018 7:15 PM  Staffing  Anesthesiologist: Lamberto Vallecillo MD  Resident/CRNA: Riana Lemus MD  Performed: resident/CRNA   Preanesthetic Checklist  Completed: patient identified, site marked, surgical consent, pre-op evaluation, timeout performed, IV checked, risks and benefits discussed and monitors and equipment checked  CSE  Patient position: sitting  Prep: ChloraPrep  Patient monitoring: cardiac monitor, heart rate, continuous pulse ox, continuous capnometry and frequent blood pressure checks  Approach: midline  Spinal Needle  Needle type: Kassy   Needle gauge: 25 G  Needle length: 5 in  Epidural Needle  Injection technique: SURJIT air  Needle type: Tuohy   Needle gauge: 18 G  Needle length: 3.5 in  Needle insertion depth: 5 cm  Location: L3-4  Needle localization: anatomical landmarks  Catheter  Catheter type: [email or web address]  Catheter size: 19 G  Catheter at skin depth: 9 cm  Test dose: lidocaine 1.5% with Epi 1-to-200,000 and negative  Additional Documentation: incremental injection, negative test dose and no paresthesia on injection  Assessment  Sensory level: T4   Dermatomal levels determined by pinch or prick  Intrathecal Medications:  administered: primary anesthetic mcg of

## 2018-09-28 NOTE — NURSING
Pt has scheduled ibuprofen to start tonight and pt states she does not take this due to her kidney disease.  Dr. Catsro notified.

## 2018-09-28 NOTE — NURSING TRANSFER
Nursing Transfer Note      9/28/2018     Transfer To: 390    Transfer via wheelchair    Transfer with Mg gtt infusing @ 1g/hr    Transported by RN    Medicines sent: yes    Chart send with patient: Yes    Patient reassessed at: 9/28/18 at 1810    Upon arrival to floor: patient oriented to room, call bell in reach and bed in lowest position    Significant other at bedside.  Pt in NAD.  Report given in [facility] [place].

## 2018-09-29 ENCOUNTER — ANESTHESIA EVENT (OUTPATIENT)
Dept: OBSTETRICS AND GYNECOLOGY | Facility: OTHER | Age: 33
End: 2018-09-29

## 2018-09-29 PROBLEM — N71.9 ENDOMETRITIS: Status: ACTIVE | Noted: 2018-09-29

## 2018-09-29 PROBLEM — D62 ACUTE BLOOD LOSS ANEMIA: Status: ACTIVE | Noted: 2018-09-29

## 2018-09-29 LAB
ALBUMIN SERPL BCP-MCNC: 2.3 G/DL
ALP SERPL-CCNC: 74 U/L
ALT SERPL W/O P-5'-P-CCNC: 7 U/L
ANION GAP SERPL CALC-SCNC: 6 MMOL/L
AST SERPL-CCNC: 9 U/L
BACTERIA UR CULT: NORMAL
BACTERIA UR CULT: NORMAL
BASOPHILS # BLD AUTO: 0.01 K/UL
BASOPHILS # BLD AUTO: 0.02 K/UL
BASOPHILS NFR BLD: 0.1 %
BASOPHILS NFR BLD: 0.2 %
BILIRUB SERPL-MCNC: 0.1 MG/DL
BUN SERPL-MCNC: 10 MG/DL
CALCIUM SERPL-MCNC: 7.8 MG/DL
CHLORIDE SERPL-SCNC: 109 MMOL/L
CO2 SERPL-SCNC: 24 MMOL/L
CREAT SERPL-MCNC: 0.7 MG/DL
DIFFERENTIAL METHOD: ABNORMAL
DIFFERENTIAL METHOD: ABNORMAL
EOSINOPHIL # BLD AUTO: 0 K/UL
EOSINOPHIL # BLD AUTO: 0 K/UL
EOSINOPHIL NFR BLD: 0.1 %
EOSINOPHIL NFR BLD: 0.2 %
ERYTHROCYTE [DISTWIDTH] IN BLOOD BY AUTOMATED COUNT: 15.7 %
ERYTHROCYTE [DISTWIDTH] IN BLOOD BY AUTOMATED COUNT: 15.9 %
EST. GFR  (AFRICAN AMERICAN): >60 ML/MIN/1.73 M^2
EST. GFR  (NON AFRICAN AMERICAN): >60 ML/MIN/1.73 M^2
GLUCOSE SERPL-MCNC: 82 MG/DL
HCT VFR BLD AUTO: 21.2 %
HCT VFR BLD AUTO: 23.1 %
HGB BLD-MCNC: 6.8 G/DL
HGB BLD-MCNC: 7.5 G/DL
LYMPHOCYTES # BLD AUTO: 2 K/UL
LYMPHOCYTES # BLD AUTO: 2.2 K/UL
LYMPHOCYTES NFR BLD: 17.9 %
LYMPHOCYTES NFR BLD: 19.5 %
MAGNESIUM SERPL-MCNC: 2.9 MG/DL
MCH RBC QN AUTO: 28.2 PG
MCH RBC QN AUTO: 28.6 PG
MCHC RBC AUTO-ENTMCNC: 32.1 G/DL
MCHC RBC AUTO-ENTMCNC: 32.5 G/DL
MCV RBC AUTO: 88 FL
MCV RBC AUTO: 88 FL
MONOCYTES # BLD AUTO: 0.8 K/UL
MONOCYTES # BLD AUTO: 0.8 K/UL
MONOCYTES NFR BLD: 6.2 %
MONOCYTES NFR BLD: 7.6 %
NEUTROPHILS # BLD AUTO: 7.6 K/UL
NEUTROPHILS # BLD AUTO: 9.3 K/UL
NEUTROPHILS NFR BLD: 72.2 %
NEUTROPHILS NFR BLD: 75 %
PHOSPHATE SERPL-MCNC: 3.1 MG/DL
PLATELET # BLD AUTO: 210 K/UL
PLATELET # BLD AUTO: 239 K/UL
PMV BLD AUTO: 9.8 FL
PMV BLD AUTO: 9.9 FL
POTASSIUM SERPL-SCNC: 4.8 MMOL/L
PROT SERPL-MCNC: 5.8 G/DL
RBC # BLD AUTO: 2.41 M/UL
RBC # BLD AUTO: 2.62 M/UL
SODIUM SERPL-SCNC: 139 MMOL/L
WBC # BLD AUTO: 10.48 K/UL
WBC # BLD AUTO: 12.37 K/UL

## 2018-09-29 PROCEDURE — 25000003 PHARM REV CODE 250: Performed by: STUDENT IN AN ORGANIZED HEALTH CARE EDUCATION/TRAINING PROGRAM

## 2018-09-29 PROCEDURE — 84100 ASSAY OF PHOSPHORUS: CPT

## 2018-09-29 PROCEDURE — 80053 COMPREHEN METABOLIC PANEL: CPT

## 2018-09-29 PROCEDURE — 99231 SBSQ HOSP IP/OBS SF/LOW 25: CPT | Mod: ,,, | Performed by: OBSTETRICS & GYNECOLOGY

## 2018-09-29 PROCEDURE — S0077 INJECTION, CLINDAMYCIN PHOSP: HCPCS | Performed by: STUDENT IN AN ORGANIZED HEALTH CARE EDUCATION/TRAINING PROGRAM

## 2018-09-29 PROCEDURE — 85025 COMPLETE CBC W/AUTO DIFF WBC: CPT

## 2018-09-29 PROCEDURE — 63600175 PHARM REV CODE 636 W HCPCS: Performed by: STUDENT IN AN ORGANIZED HEALTH CARE EDUCATION/TRAINING PROGRAM

## 2018-09-29 PROCEDURE — 36415 COLL VENOUS BLD VENIPUNCTURE: CPT

## 2018-09-29 PROCEDURE — 83735 ASSAY OF MAGNESIUM: CPT

## 2018-09-29 PROCEDURE — 11000001 HC ACUTE MED/SURG PRIVATE ROOM

## 2018-09-29 RX ORDER — ZOLPIDEM TARTRATE 5 MG/1
5 TABLET ORAL ONCE
Status: COMPLETED | OUTPATIENT
Start: 2018-09-29 | End: 2018-09-29

## 2018-09-29 RX ORDER — HYDROMORPHONE HYDROCHLORIDE 2 MG/ML
INJECTION, SOLUTION INTRAMUSCULAR; INTRAVENOUS; SUBCUTANEOUS
Status: DISPENSED
Start: 2018-09-29 | End: 2018-09-29

## 2018-09-29 RX ORDER — OXYCODONE HYDROCHLORIDE 5 MG/1
5 TABLET ORAL EVERY 4 HOURS PRN
Status: DISCONTINUED | OUTPATIENT
Start: 2018-09-29 | End: 2018-10-03 | Stop reason: HOSPADM

## 2018-09-29 RX ORDER — CLINDAMYCIN PHOSPHATE 900 MG/50ML
900 INJECTION, SOLUTION INTRAVENOUS
Status: COMPLETED | OUTPATIENT
Start: 2018-09-29 | End: 2018-10-01

## 2018-09-29 RX ORDER — OXYCODONE HYDROCHLORIDE 5 MG/1
10 TABLET ORAL EVERY 4 HOURS PRN
Status: DISCONTINUED | OUTPATIENT
Start: 2018-09-29 | End: 2018-10-03 | Stop reason: HOSPADM

## 2018-09-29 RX ORDER — SIMETHICONE 80 MG
1 TABLET,CHEWABLE ORAL EVERY 6 HOURS
Status: DISCONTINUED | OUTPATIENT
Start: 2018-09-29 | End: 2018-10-03 | Stop reason: HOSPADM

## 2018-09-29 RX ORDER — ZOLPIDEM TARTRATE 5 MG/1
5 TABLET ORAL NIGHTLY PRN
Status: DISCONTINUED | OUTPATIENT
Start: 2018-09-29 | End: 2018-10-01

## 2018-09-29 RX ORDER — CLINDAMYCIN PHOSPHATE 900 MG/50ML
900 INJECTION, SOLUTION INTRAVENOUS
Status: DISCONTINUED | OUTPATIENT
Start: 2018-09-29 | End: 2018-09-29

## 2018-09-29 RX ORDER — HYDROMORPHONE HYDROCHLORIDE 2 MG/ML
0.5 INJECTION, SOLUTION INTRAMUSCULAR; INTRAVENOUS; SUBCUTANEOUS
Status: DISCONTINUED | OUTPATIENT
Start: 2018-09-29 | End: 2018-10-01

## 2018-09-29 RX ORDER — HYDROMORPHONE HYDROCHLORIDE 2 MG/ML
0.5 INJECTION, SOLUTION INTRAMUSCULAR; INTRAVENOUS; SUBCUTANEOUS EVERY 4 HOURS PRN
Status: DISCONTINUED | OUTPATIENT
Start: 2018-09-29 | End: 2018-09-29

## 2018-09-29 RX ORDER — HYDROMORPHONE HYDROCHLORIDE 2 MG/ML
0.5 INJECTION, SOLUTION INTRAMUSCULAR; INTRAVENOUS; SUBCUTANEOUS ONCE
Status: COMPLETED | OUTPATIENT
Start: 2018-09-29 | End: 2018-09-29

## 2018-09-29 RX ADMIN — SIMETHICONE CHEW TAB 80 MG 80 MG: 80 TABLET ORAL at 11:09

## 2018-09-29 RX ADMIN — OXYCODONE HYDROCHLORIDE 10 MG: 5 TABLET ORAL at 12:09

## 2018-09-29 RX ADMIN — OXYCODONE HYDROCHLORIDE 10 MG: 5 TABLET ORAL at 07:09

## 2018-09-29 RX ADMIN — ACETAMINOPHEN 650 MG: 325 TABLET, FILM COATED ORAL at 06:09

## 2018-09-29 RX ADMIN — ACETAMINOPHEN 650 MG: 325 TABLET, FILM COATED ORAL at 11:09

## 2018-09-29 RX ADMIN — CLINDAMYCIN IN 5 PERCENT DEXTROSE 900 MG: 18 INJECTION, SOLUTION INTRAVENOUS at 10:09

## 2018-09-29 RX ADMIN — DOCUSATE SODIUM 200 MG: 100 CAPSULE, LIQUID FILLED ORAL at 08:09

## 2018-09-29 RX ADMIN — ZOLPIDEM TARTRATE 5 MG: 5 TABLET, FILM COATED ORAL at 10:09

## 2018-09-29 RX ADMIN — AMPICILLIN SODIUM 2 G: 2 INJECTION, POWDER, FOR SOLUTION INTRAMUSCULAR; INTRAVENOUS at 11:09

## 2018-09-29 RX ADMIN — SIMETHICONE CHEW TAB 80 MG 80 MG: 80 TABLET ORAL at 05:09

## 2018-09-29 RX ADMIN — NIFEDIPINE 60 MG: 30 TABLET, FILM COATED, EXTENDED RELEASE ORAL at 10:09

## 2018-09-29 RX ADMIN — SIMETHICONE CHEW TAB 80 MG 80 MG: 80 TABLET ORAL at 04:09

## 2018-09-29 RX ADMIN — MUPIROCIN 1 G: 20 OINTMENT TOPICAL at 10:09

## 2018-09-29 RX ADMIN — OXYCODONE HYDROCHLORIDE 5 MG: 5 TABLET ORAL at 12:09

## 2018-09-29 RX ADMIN — HYDROMORPHONE HYDROCHLORIDE 0.5 MG: 2 INJECTION, SOLUTION INTRAMUSCULAR; INTRAVENOUS; SUBCUTANEOUS at 07:09

## 2018-09-29 RX ADMIN — HYDROMORPHONE HYDROCHLORIDE 0.5 MG: 2 INJECTION, SOLUTION INTRAMUSCULAR; INTRAVENOUS; SUBCUTANEOUS at 05:09

## 2018-09-29 RX ADMIN — DOCUSATE SODIUM 200 MG: 100 CAPSULE, LIQUID FILLED ORAL at 10:09

## 2018-09-29 RX ADMIN — GENTAMICIN SULFATE 348 MG: 40 INJECTION, SOLUTION INTRAMUSCULAR; INTRAVENOUS at 11:09

## 2018-09-29 RX ADMIN — AMPICILLIN SODIUM 2 G: 2 INJECTION, POWDER, FOR SOLUTION INTRAMUSCULAR; INTRAVENOUS at 05:09

## 2018-09-29 RX ADMIN — HYDROMORPHONE HYDROCHLORIDE 0.5 MG: 2 INJECTION, SOLUTION INTRAMUSCULAR; INTRAVENOUS; SUBCUTANEOUS at 02:09

## 2018-09-29 RX ADMIN — ZOLPIDEM TARTRATE 5 MG: 5 TABLET, FILM COATED ORAL at 08:09

## 2018-09-29 RX ADMIN — OXYCODONE HYDROCHLORIDE 10 MG: 5 TABLET ORAL at 08:09

## 2018-09-29 RX ADMIN — ONDANSETRON 8 MG: 8 TABLET, ORALLY DISINTEGRATING ORAL at 04:09

## 2018-09-29 RX ADMIN — OXYCODONE HYDROCHLORIDE 10 MG: 5 TABLET ORAL at 04:09

## 2018-09-29 RX ADMIN — HYDROMORPHONE HYDROCHLORIDE 0.5 MG: 2 INJECTION, SOLUTION INTRAMUSCULAR; INTRAVENOUS; SUBCUTANEOUS at 11:09

## 2018-09-29 RX ADMIN — CLINDAMYCIN IN 5 PERCENT DEXTROSE 900 MG: 18 INJECTION, SOLUTION INTRAVENOUS at 05:09

## 2018-09-29 RX ADMIN — HYDROMORPHONE HYDROCHLORIDE 0.5 MG: 2 INJECTION, SOLUTION INTRAMUSCULAR; INTRAVENOUS; SUBCUTANEOUS at 04:09

## 2018-09-29 NOTE — SUBJECTIVE & OBJECTIVE
Interval:  POD #2. MgSO4 discontinued after 24 hours. BPs well controlled overnight without additional intervention. Last elevated at noon yesterday. Overnight, she complained of incisional pain that required additional PO medications and IV dilaudid this AM. UOP was adequate through the MgSO4 therapy window. No issues this AM. Denies HA, vision changes, RUQ/epigastric pain, SOB/CP.     Objective:     Vital Signs (Most Recent):  Temp: 98.7 °F (37.1 °C) (09/29/18 0015)  Pulse: 82 (09/29/18 0015)  Resp: 18 (09/29/18 0015)  BP: 132/82 (09/29/18 0015)  SpO2: 100 % (09/29/18 0015) Vital Signs (24h Range):  Temp:  [97.8 °F (36.6 °C)-98.7 °F (37.1 °C)] 98.7 °F (37.1 °C)  Pulse:  [64-94] 82  Resp:  [14-20] 18  SpO2:  [100 %] 100 %  BP: (119-151)/(74-95) 132/82     Weight: 74.8 kg (165 lb)  Body mass index is 24.37 kg/m².      Intake/Output Summary (Last 24 hours) at 9/29/2018 0402  Last data filed at 9/28/2018 2030  Gross per 24 hour   Intake 600 ml   Output 2055 ml   Net -1455 ml     Significant Labs:  Recent Lab Results       09/28/18  1146      Magnesium 5.7  Comment:  Magnesium critical result(s) called and verbal readback obtained   from Estela Muñoz RN at 1245.  , 09/28/2018 12:46           Physical Exam:   Constitutional: She is oriented to person, place, and time. She appears well-developed and well-nourished. No distress.    HENT:   Head: Normocephalic and atraumatic.    Eyes: Conjunctivae and EOM are normal.    Neck: Normal range of motion. Neck supple.    Cardiovascular: Normal rate, regular rhythm and normal heart sounds.     Pulmonary/Chest: Effort normal and breath sounds normal. No respiratory distress. She has no wheezes. She has no rales.        Abdominal: Soft. Bowel sounds are normal. She exhibits distension (moderate distention) and abdominal incision (Bandage removed, incision c/d/i without erythema or induration). There is tenderness (moderate TTP throughout  ). There is no rebound and no guarding.    Fundus firm below umbilicus     Genitourinary:   Genitourinary Comments: deferred           Musculoskeletal: Normal range of motion and moves all extremeties. She exhibits no edema.   No leg swelling or tenderness. Teds/SCDs in place.       Neurological: She is alert and oriented to person, place, and time. She has normal reflexes. She displays normal reflexes.    Skin: Skin is warm and dry. She is not diaphoretic.    Psychiatric: She has a normal mood and affect. Her behavior is normal. Judgment and thought content normal.

## 2018-09-29 NOTE — PROGRESS NOTES
[facility]  Obstetrics  Antepartum Progress Note    Patient Name: Sharon Grande  MRN: [id number]  Admission Date: 2018  Hospital Length of Stay: 2 days  Attending Physician: No att. providers found  Primary Care Provider: Roman Archibald MD    Subjective:     Principal Problem:Pre-eclampsia, severe    HPI:  Sharon Grande is a 32 y.o. [id number] at 31w4d with history significant for HTN, polycystic kidney disease, and subarachnoid hemorrhage this pregnancy s/p coil embolization and drain placement who presented to the [facility] ED with complaints of contractions/back pains every few minutes. The pain started 2 days ago and worsened last night, occurring more frequently and more painfully, prompting her to come in.   She has a history of CHTN and is on procardia 30XL daily. She did not take her medication this morning and in the ED had severely elevated blood pressures. Her procardia was given to her and subsequent blood pressures were 140s systolic. She was seen by neurosurgery who cleared her as neurologically stable and OK for transport to [facility].  Since her subarachnoid hemorrhage, patient has had intermittent mild headaches; she had a headache in the [facility] ED and denies one currently. She also reports having RUQ pain at times but states it is more in the area just below her right breast and not abdominal. She denies any abdominal pain or RUQ pain currently.  In the [facility], patient reported contractions had spaced out to q20-30 minutes and were less painful. Patient denies vaginal bleeding or LOF and reports good fetal movement. SVE was performed and pt was found to be 3/70/-3. Patient then proceeded to have severely elevated blood pressures that were sustained requiring IV labetalol 20 and then 40.  Patient denies headaches, chest pain, SOB, RUQ pain, or vision changes at this time.  She has received a course of BMZ on - during her prior admission.    Hospital  Course:  2018 - patient seen at [facility] ED with complaints of contractions and elevated blood pressures. Home procardia given in ED. Patient found to be 1.5cm dilated and cleared by neurosurgery for transport to [facility] for w/u of threatened PTL and pre-eclampsia. In the [facility], patient was found to be 3cm dilated and then proceeded to have severe range blood pressures requiring IV antihypertensives. Mag sulfate was started for seizure prophylaxis. BMZ x 1 was given in [facility]. Plan was made to deliver via  section given severely elevated blood pressures in a patient with h/o aneurysm and SAH. Patient desires BTL, consents signed for CS and BTL. Underwent uncomplicated  delivery. After delivery, had severe range pressures requiring labetalol 20 x 2. Procardia was increased to 60XL to start in AM.  2018 - POD#1 s/p 1LTCS.  Doing well this morning. Continued on magnesium sulfate for seizure prophylaxis. Patient is starting to meet post-op goals. Her pain is well-controlled with her pain medication. She has not yet eaten breakfast this morning but denies n/v. Vaginal bleeding is minimal. She is ambulating and voiding via thomas an adequate amount. She denies headaches, chest pain, SOB, RUQ pain, or vision changes. Stable for step down to floor. Will monitor blood pressures closely.  2018 POD #2. MgSO4 discontinued after 24 hours. BPs well controlled overnight without additional intervention. Last elevated at noon yesterday. Overnight, she complained of incisional pain that required additional PO medications and IV dilaudid this AM. UOP was adequate through the MgSO4 therapy window. No issues this AM. Denies HA, vision changes, RUQ/epigastric pain, SOB/CP.     Interval:  POD #2. MgSO4 discontinued after 24 hours. BPs well controlled overnight without additional intervention. Last elevated at noon yesterday. Overnight, she complained of incisional pain that required additional PO  medications and IV dilaudid this AM. UOP was adequate through the MgSO4 therapy window. No issues this AM. Denies HA, vision changes, RUQ/epigastric pain, SOB/CP.     Objective:     Vital Signs (Most Recent):  Temp: 98.7 °F (37.1 °C) (09/29/18 0015)  Pulse: 82 (09/29/18 0015)  Resp: 18 (09/29/18 0015)  BP: 132/82 (09/29/18 0015)  SpO2: 100 % (09/29/18 0015) Vital Signs (24h Range):  Temp:  [97.8 °F (36.6 °C)-98.7 °F (37.1 °C)] 98.7 °F (37.1 °C)  Pulse:  [64-94] 82  Resp:  [14-20] 18  SpO2:  [100 %] 100 %  BP: (119-151)/(74-95) 132/82     Weight: 74.8 kg (165 lb)  Body mass index is 24.37 kg/m².      Intake/Output Summary (Last 24 hours) at 9/29/2018 0402  Last data filed at 9/28/2018 2030  Gross per 24 hour   Intake 600 ml   Output 2055 ml   Net -1455 ml     Significant Labs:  Recent Lab Results       09/28/18  1146      Magnesium 5.7  Comment:  Magnesium critical result(s) called and verbal readback obtained   from Estela Muñoz RN at 1245.  , 09/28/2018 12:46           Physical Exam:   Constitutional: She is oriented to person, place, and time. She appears well-developed and well-nourished. No distress.    HENT:   Head: Normocephalic and atraumatic.    Eyes: Conjunctivae and EOM are normal.    Neck: Normal range of motion. Neck supple.    Cardiovascular: Normal rate, regular rhythm and normal heart sounds.     Pulmonary/Chest: Effort normal and breath sounds normal. No respiratory distress. She has no wheezes. She has no rales.        Abdominal: Soft. Bowel sounds are normal. She exhibits distension (moderate distention) and abdominal incision (Bandage removed, incision c/d/i without erythema or induration). There is tenderness (moderate TTP throughout  ). There is no rebound and no guarding.   Fundus firm below umbilicus     Genitourinary:   Genitourinary Comments: deferred           Musculoskeletal: Normal range of motion and moves all extremeties. She exhibits no edema.   No leg swelling or tenderness. Teds/SCDs  in place.       Neurological: She is alert and oriented to person, place, and time. She has normal reflexes. She displays normal reflexes.    Skin: Skin is warm and dry. She is not diaphoretic.    Psychiatric: She has a normal mood and affect. Her behavior is normal. Judgment and thought content normal.       Recent Labs   Lab  18   1045  18   0338  18   0542   WBC  6.36  14.80*  10.48   HGB  9.3*  8.2*  6.8*   HCT  29.0*  25.4*  21.2*   MCV  89  87  88   PLT  227  283  210       Recent Labs   Lab  18   1655  18   0338  18   1146  18   0542   NA  137  133*   --   139   K  3.1*  3.7   --   4.8   CL  104  103   --   109   CO2  24  19*   --   24   BUN  10  7   --   10   CREATININE  0.7  0.7   --   0.7   GLU  86  202*   --   82   PROT  7.5  5.8*   --   5.8*   BILITOT  0.2  0.2   --   0.1   ALKPHOS  108  82   --   74   ALT  7*  6*   --   7*   AST  11  9*   --   9*   MG  1.6  4.8*  5.7*  2.9*   PHOS  2.3*  2.2*   --   3.1        Assessment/Plan:     32 y.o. female  at Unknown for:    * Pre-eclampsia, severe    - h/o CHTN on Procardia 30XL at home   - increased to 60XL    - medication tolerated well with good BP control   - No symptoms  - s/p Magnesium sulfate for seizure prophylaxis   - BP: (126-151)/(74-95) 133/80     - UOP adequate  - Stable since stepdown from ICU on           Acute blood loss anemia    - H/h 9.3/29 > 8.2/25.4 > 6.8/21.2 this AM  - vitals are stable and normal  - EBL from LTCS was 650 cc  - will reassess patient later this AM and monitor for symptoms and/or need for transfusion        History of bilateral tubal ligation    - at the time of LTCS         delivery delivered    Postpartum care:  - Patient doing well. Continue routine management and advances.  - Continue IV>PO pain meds. Pain well controlled.  - Encourage ambulation.   - Heme: Pre Delivery h/h 9.2/ --> Post Delivery h/h 8.2/.4  - Contraception - s/p BTL  - Lactation - The  patient is not breastfeeding.   - Rh Status - pos        Subarachnoid hemorrhage    - Patient s/p SAH with coil embolization and EVD in August 2018  - Goal for BP is to remain below 160/100  - ICU consulted, on board. Will follow in the post-op period. Patient to go to ICU for frequent neuro checks  - Coags wnl  - No symptoms, neurologically stable at this time; cleared prior to transport to [facility] by neurosurgery        Hypokalemia    - K 3.1 > 3.7  - AM labs pending              Lisa Angelo MD  Obstetrics [facility]

## 2018-09-29 NOTE — ASSESSMENT & PLAN NOTE
- Patient s/p SAH with coil embolization and EVD in August 2018  - Goal for BP is to remain below 160/100  - ICU consulted, on board. Will follow in the post-op period. Patient to go to ICU for frequent neuro checks  - Coags wnl  - No symptoms, neurologically stable at this time; cleared prior to transport to [facility] by neurosurgery

## 2018-09-29 NOTE — PLAN OF CARE
Problem: Patient Care Overview  Goal: Plan of Care Review  Outcome: Ongoing (interventions implemented as appropriate)  Pt c/o incisional pain unrelieved by pain meds through out the night. Pt was able to get a couple hours of sleep. Hydromorphone 0.5mg given at 0500 per MD. VSS, afebrile. Pt visited baby in NICU this evening. Discussed plan of care, all questions answered and pt verbalized understanding.

## 2018-09-29 NOTE — ASSESSMENT & PLAN NOTE
- H/h 9.3/29 > 8.2/25.4 > 6.8/21.2 this AM  - vitals are stable and normal  - EBL from LTCS was 650 cc  - will reassess patient later this AM and monitor for symptoms and/or need for transfusion

## 2018-09-29 NOTE — ASSESSMENT & PLAN NOTE
- h/o CHTN on Procardia 30XL at home   - increased to 60XL 9/28   - medication tolerated well with good BP control   - No symptoms  - s/p Magnesium sulfate for seizure prophylaxis   - BP: (126-151)/(74-95) 133/80     - UOP adequate  - Stable since stepdown from ICU on 9/28

## 2018-09-29 NOTE — NURSING
Admin Diphenhydramine 12.5 and Norco 7.5-325 at 2255. Pt still c/o pain 10/10 and insomnia. Notified MD, ordered Oxycodone IR 5 to help with pain. Admin. to pt, will continue to monitor.

## 2018-09-30 LAB
ALBUMIN SERPL BCP-MCNC: 2.5 G/DL
ALP SERPL-CCNC: 88 U/L
ALT SERPL W/O P-5'-P-CCNC: 7 U/L
ANION GAP SERPL CALC-SCNC: 10 MMOL/L
AST SERPL-CCNC: 10 U/L
BASOPHILS # BLD AUTO: 0.01 K/UL
BASOPHILS NFR BLD: 0.1 %
BILIRUB SERPL-MCNC: 0.2 MG/DL
BUN SERPL-MCNC: 9 MG/DL
CALCIUM SERPL-MCNC: 8.8 MG/DL
CHLORIDE SERPL-SCNC: 105 MMOL/L
CO2 SERPL-SCNC: 24 MMOL/L
CREAT SERPL-MCNC: 0.7 MG/DL
DIFFERENTIAL METHOD: ABNORMAL
EOSINOPHIL # BLD AUTO: 0.1 K/UL
EOSINOPHIL NFR BLD: 0.5 %
ERYTHROCYTE [DISTWIDTH] IN BLOOD BY AUTOMATED COUNT: 15.9 %
EST. GFR  (AFRICAN AMERICAN): >60 ML/MIN/1.73 M^2
EST. GFR  (NON AFRICAN AMERICAN): >60 ML/MIN/1.73 M^2
GLUCOSE SERPL-MCNC: 67 MG/DL
HCT VFR BLD AUTO: 23.2 %
HGB BLD-MCNC: 7.3 G/DL
LYMPHOCYTES # BLD AUTO: 2.2 K/UL
LYMPHOCYTES NFR BLD: 21.7 %
MAGNESIUM SERPL-MCNC: 1.7 MG/DL
MCH RBC QN AUTO: 28.1 PG
MCHC RBC AUTO-ENTMCNC: 31.5 G/DL
MCV RBC AUTO: 89 FL
MONOCYTES # BLD AUTO: 0.7 K/UL
MONOCYTES NFR BLD: 7.4 %
NEUTROPHILS # BLD AUTO: 6.9 K/UL
NEUTROPHILS NFR BLD: 69.5 %
PLATELET # BLD AUTO: 239 K/UL
PMV BLD AUTO: 10.8 FL
POTASSIUM SERPL-SCNC: 3.6 MMOL/L
PROT SERPL-MCNC: 6.7 G/DL
RBC # BLD AUTO: 2.6 M/UL
SODIUM SERPL-SCNC: 139 MMOL/L
WBC # BLD AUTO: 9.96 K/UL

## 2018-09-30 PROCEDURE — 99231 SBSQ HOSP IP/OBS SF/LOW 25: CPT | Mod: ,,, | Performed by: OBSTETRICS & GYNECOLOGY

## 2018-09-30 PROCEDURE — 25000003 PHARM REV CODE 250: Performed by: STUDENT IN AN ORGANIZED HEALTH CARE EDUCATION/TRAINING PROGRAM

## 2018-09-30 PROCEDURE — 63600175 PHARM REV CODE 636 W HCPCS: Performed by: STUDENT IN AN ORGANIZED HEALTH CARE EDUCATION/TRAINING PROGRAM

## 2018-09-30 PROCEDURE — S0077 INJECTION, CLINDAMYCIN PHOSP: HCPCS | Performed by: STUDENT IN AN ORGANIZED HEALTH CARE EDUCATION/TRAINING PROGRAM

## 2018-09-30 PROCEDURE — 11000001 HC ACUTE MED/SURG PRIVATE ROOM

## 2018-09-30 PROCEDURE — 36415 COLL VENOUS BLD VENIPUNCTURE: CPT

## 2018-09-30 PROCEDURE — 85025 COMPLETE CBC W/AUTO DIFF WBC: CPT

## 2018-09-30 PROCEDURE — 83735 ASSAY OF MAGNESIUM: CPT

## 2018-09-30 PROCEDURE — 80053 COMPREHEN METABOLIC PANEL: CPT

## 2018-09-30 RX ORDER — LABETALOL HYDROCHLORIDE 5 MG/ML
20 INJECTION, SOLUTION INTRAVENOUS ONCE
Status: DISCONTINUED | OUTPATIENT
Start: 2018-09-30 | End: 2018-09-30

## 2018-09-30 RX ORDER — ZOLPIDEM TARTRATE 5 MG/1
5 TABLET ORAL ONCE
Status: COMPLETED | OUTPATIENT
Start: 2018-09-30 | End: 2018-09-30

## 2018-09-30 RX ORDER — NIFEDIPINE 30 MG/1
60 TABLET, EXTENDED RELEASE ORAL DAILY
Status: DISCONTINUED | OUTPATIENT
Start: 2018-10-01 | End: 2018-10-01

## 2018-09-30 RX ORDER — NIFEDIPINE 10 MG/1
10 CAPSULE ORAL ONCE
Status: DISCONTINUED | OUTPATIENT
Start: 2018-09-30 | End: 2018-09-30

## 2018-09-30 RX ORDER — NIFEDIPINE 30 MG/1
90 TABLET, EXTENDED RELEASE ORAL DAILY
Status: DISCONTINUED | OUTPATIENT
Start: 2018-10-01 | End: 2018-09-30

## 2018-09-30 RX ADMIN — OXYCODONE HYDROCHLORIDE 10 MG: 5 TABLET ORAL at 03:09

## 2018-09-30 RX ADMIN — HYDROMORPHONE HYDROCHLORIDE 0.5 MG: 2 INJECTION, SOLUTION INTRAMUSCULAR; INTRAVENOUS; SUBCUTANEOUS at 11:09

## 2018-09-30 RX ADMIN — AMPICILLIN SODIUM 2 G: 2 INJECTION, POWDER, FOR SOLUTION INTRAMUSCULAR; INTRAVENOUS at 05:09

## 2018-09-30 RX ADMIN — OXYCODONE HYDROCHLORIDE 10 MG: 5 TABLET ORAL at 07:09

## 2018-09-30 RX ADMIN — OXYCODONE HYDROCHLORIDE 10 MG: 5 TABLET ORAL at 12:09

## 2018-09-30 RX ADMIN — ACETAMINOPHEN 650 MG: 325 TABLET, FILM COATED ORAL at 05:09

## 2018-09-30 RX ADMIN — SIMETHICONE CHEW TAB 80 MG 80 MG: 80 TABLET ORAL at 11:09

## 2018-09-30 RX ADMIN — HYDROMORPHONE HYDROCHLORIDE 0.5 MG: 2 INJECTION, SOLUTION INTRAMUSCULAR; INTRAVENOUS; SUBCUTANEOUS at 04:09

## 2018-09-30 RX ADMIN — OXYCODONE HYDROCHLORIDE 10 MG: 5 TABLET ORAL at 11:09

## 2018-09-30 RX ADMIN — ACETAMINOPHEN 650 MG: 325 TABLET, FILM COATED ORAL at 07:09

## 2018-09-30 RX ADMIN — OXYCODONE HYDROCHLORIDE 10 MG: 5 TABLET ORAL at 04:09

## 2018-09-30 RX ADMIN — CLINDAMYCIN IN 5 PERCENT DEXTROSE 900 MG: 18 INJECTION, SOLUTION INTRAVENOUS at 05:09

## 2018-09-30 RX ADMIN — HYDROMORPHONE HYDROCHLORIDE 0.5 MG: 2 INJECTION, SOLUTION INTRAMUSCULAR; INTRAVENOUS; SUBCUTANEOUS at 07:09

## 2018-09-30 RX ADMIN — ZOLPIDEM TARTRATE 5 MG: 5 TABLET, FILM COATED ORAL at 08:09

## 2018-09-30 RX ADMIN — AMPICILLIN SODIUM 2 G: 2 INJECTION, POWDER, FOR SOLUTION INTRAMUSCULAR; INTRAVENOUS at 12:09

## 2018-09-30 RX ADMIN — ZOLPIDEM TARTRATE 5 MG: 5 TABLET, FILM COATED ORAL at 09:09

## 2018-09-30 RX ADMIN — SIMETHICONE CHEW TAB 80 MG 80 MG: 80 TABLET ORAL at 05:09

## 2018-09-30 RX ADMIN — HYDROMORPHONE HYDROCHLORIDE 0.5 MG: 2 INJECTION, SOLUTION INTRAMUSCULAR; INTRAVENOUS; SUBCUTANEOUS at 03:09

## 2018-09-30 RX ADMIN — CLINDAMYCIN IN 5 PERCENT DEXTROSE 900 MG: 18 INJECTION, SOLUTION INTRAVENOUS at 02:09

## 2018-09-30 RX ADMIN — AMPICILLIN SODIUM 2 G: 2 INJECTION, POWDER, FOR SOLUTION INTRAMUSCULAR; INTRAVENOUS at 06:09

## 2018-09-30 RX ADMIN — HYDROMORPHONE HYDROCHLORIDE 0.5 MG: 2 INJECTION, SOLUTION INTRAMUSCULAR; INTRAVENOUS; SUBCUTANEOUS at 12:09

## 2018-09-30 RX ADMIN — DOCUSATE SODIUM 200 MG: 100 CAPSULE, LIQUID FILLED ORAL at 09:09

## 2018-09-30 RX ADMIN — NIFEDIPINE 60 MG: 30 TABLET, FILM COATED, EXTENDED RELEASE ORAL at 09:09

## 2018-09-30 RX ADMIN — GENTAMICIN SULFATE 348 MG: 40 INJECTION, SOLUTION INTRAMUSCULAR; INTRAVENOUS at 11:09

## 2018-09-30 RX ADMIN — DOCUSATE SODIUM 200 MG: 100 CAPSULE, LIQUID FILLED ORAL at 08:09

## 2018-09-30 RX ADMIN — ACETAMINOPHEN 650 MG: 325 TABLET, FILM COATED ORAL at 11:09

## 2018-09-30 RX ADMIN — CLINDAMYCIN IN 5 PERCENT DEXTROSE 900 MG: 18 INJECTION, SOLUTION INTRAVENOUS at 09:09

## 2018-09-30 NOTE — ASSESSMENT & PLAN NOTE
- h/o CHTN on Procardia 30XL at home   - increased to 60XL 9/28   - medication tolerated well with good BP control   - No symptoms  - s/p Magnesium sulfate for seizure prophylaxis   - BP: (139-151)/(82-97) 151/89  - Stable since stepdown from ICU on 9/28

## 2018-09-30 NOTE — ASSESSMENT & PLAN NOTE
- Patient s/p SAH with coil embolization and EVD in August 2018  - Goal for BP is to remain below 160/100  - Pt stepped down from ICU psotpartum  - Coags wnl  - No symptoms, neurologically stable at this time; cleared prior to transport to [facility] by neurosurgery

## 2018-09-30 NOTE — PROGRESS NOTES
[facility]  Obstetrics  Antepartum Progress Note    Patient Name: Sharon Grande  MRN: [id number]  Admission Date: 2018  Hospital Length of Stay: 3 days  Attending Physician: No att. providers found  Primary Care Provider: Roman Archibald MD    Subjective:     Principal Problem:Pre-eclampsia, severe    HPI:  Sharon Grande is a 32 y.o. [id number] at 31w4d with history significant for HTN, polycystic kidney disease, and subarachnoid hemorrhage this pregnancy s/p coil embolization and drain placement who presented to the [facility] ED with complaints of contractions/back pains every few minutes. The pain started 2 days ago and worsened last night, occurring more frequently and more painfully, prompting her to come in.   She has a history of CHTN and is on procardia 30XL daily. She did not take her medication this morning and in the ED had severely elevated blood pressures. Her procardia was given to her and subsequent blood pressures were 140s systolic. She was seen by neurosurgery who cleared her as neurologically stable and OK for transport to [facility].  Since her subarachnoid hemorrhage, patient has had intermittent mild headaches; she had a headache in the [facility] ED and denies one currently. She also reports having RUQ pain at times but states it is more in the area just below her right breast and not abdominal. She denies any abdominal pain or RUQ pain currently.  In the [facility], patient reported contractions had spaced out to q20-30 minutes and were less painful. Patient denies vaginal bleeding or LOF and reports good fetal movement. SVE was performed and pt was found to be 3/70/-3. Patient then proceeded to have severely elevated blood pressures that were sustained requiring IV labetalol 20 and then 40.  Patient denies headaches, chest pain, SOB, RUQ pain, or vision changes at this time.  She has received a course of BMZ on - during her prior admission.    Hospital  Course:  2018 - patient seen at [facility] ED with complaints of contractions and elevated blood pressures. Home procardia given in ED. Patient found to be 1.5cm dilated and cleared by neurosurgery for transport to [facility] for w/u of threatened PTL and pre-eclampsia. In the [facility], patient was found to be 3cm dilated and then proceeded to have severe range blood pressures requiring IV antihypertensives. Mag sulfate was started for seizure prophylaxis. BMZ x 1 was given in [facility]. Plan was made to deliver via  section given severely elevated blood pressures in a patient with h/o aneurysm and SAH. Patient desires BTL, consents signed for CS and BTL. Underwent uncomplicated  delivery. After delivery, had severe range pressures requiring labetalol 20 x 2. Procardia was increased to 60XL to start in AM.  2018 - POD#1 s/p 1LTCS.  Doing well this morning. Continued on magnesium sulfate for seizure prophylaxis. Patient is starting to meet post-op goals. Her pain is well-controlled with her pain medication. She has not yet eaten breakfast this morning but denies n/v. Vaginal bleeding is minimal. She is ambulating and voiding via thomas an adequate amount. She denies headaches, chest pain, SOB, RUQ pain, or vision changes. Stable for step down to floor. Will monitor blood pressures closely.  2018 POD #2. MgSO4 discontinued after 24 hours. BPs well controlled overnight without additional intervention. Last elevated at noon yesterday. Overnight, she complained of incisional pain that required additional PO medications and IV dilaudid this AM. UOP was adequate through the MgSO4 therapy window. No issues this AM. Denies HA, vision changes, RUQ/epigastric pain, SOB/CP.   2018 POD#3. S/p Mag x24 hours. Yesterday, diagnosed with endometritis. Amp/Gent/Clinda started. Pt continues to take pain medication every 4 hours, but states she is feeling much better. Repeat CBC shows stable  h/H.    Interval:    POD#3. S/p Mag x24 hours. Yesterday, diagnosed with endometritis. Amp/Gent/Clinda started. Pt continues to take pain medication every 4 hours, but states she is feeling much better. No issues this AM. Denies fevers, chills, weakness, dizziness, HA, vision changes, RUQ/epigastric pain, SOB/CP. bleeding is mild.      Objective:     Vital Signs (Most Recent):  Temp: 97 °F (36.1 °C) (09/30/18 0405)  Pulse: 83 (09/30/18 0405)  Resp: 16 (09/30/18 0405)  BP: (!) 151/89 (09/30/18 0405)  SpO2: 100 % (09/30/18 0405) Vital Signs (24h Range):  Temp:  [97 °F (36.1 °C)-97.7 °F (36.5 °C)] 97 °F (36.1 °C)  Pulse:  [70-86] 83  Resp:  [16-18] 16  SpO2:  [99 %-100 %] 100 %  BP: (139-151)/(82-97) 151/89     Weight: 74.8 kg (165 lb)  Body mass index is 24.37 kg/m².    No intake or output data in the 24 hours ending 09/30/18 0734  Significant Labs:  Recent Lab Results       09/30/18  0422 09/29/18  1355      Albumin 2.5      Alkaline Phosphatase 88      ALT 7      Anion Gap 10      AST 10      Baso # 0.01 0.01     Basophil% 0.1 0.1     Total Bilirubin 0.2  Comment:  For infants and newborns, interpretation of results should be based  on gestational age, weight and in agreement with clinical  observations.  Premature Infant recommended reference ranges:  Up to 24 hours.............<8.0 mg/dL  Up to 48 hours............<12.0 mg/dL  3-5 days..................<15.0 mg/dL  6-29 days.................<15.0 mg/dL        BUN, Bld 9      Calcium 8.8      Chloride 105      CO2 24      Creatinine 0.7      Differential Method Automated Automated     eGFR if  >60      eGFR if non  >60  Comment:  Calculation used to obtain the estimated glomerular filtration  rate (eGFR) is the CKD-EPI equation.         Eos # 0.1 0.0     Eosinophil% 0.5 0.2     Glucose 67      Gran # (ANC) 6.9 9.3     Gran% 69.5 75.0     Hematocrit 23.2 23.1     Hemoglobin 7.3 7.5     Lymph # 2.2 2.2     Lymph% 21.7 17.9      Magnesium 1.7      MCH 28.1 28.6     MCHC 31.5 32.5     MCV 89 88     Mono # 0.7 0.8     Mono% 7.4 6.2     MPV 10.8 9.9     Platelets 239 239     Potassium 3.6      Total Protein 6.7      RBC 2.60 2.62     RDW 15.9 15.9     Sodium 139      WBC 9.96 12.37         Physical Exam:   Constitutional: She is oriented to person, place, and time. She appears well-developed and well-nourished. No distress.    HENT:   Head: Normocephalic and atraumatic.    Eyes: Conjunctivae and EOM are normal.    Neck: Normal range of motion. Neck supple.    Cardiovascular: Normal rate, regular rhythm and normal heart sounds.     Pulmonary/Chest: Effort normal and breath sounds normal. No respiratory distress. She has no wheezes. She has no rales.        Abdominal: Soft. Bowel sounds are normal. She exhibits abdominal incision (incision c/d/i without erythema or induration). She exhibits no distension (moderate distention). There is tenderness (improved fundal tenderness). There is no rebound and no guarding.   Fundus firm below umbilicus     Genitourinary:   Genitourinary Comments: deferred           Musculoskeletal: Normal range of motion and moves all extremeties. She exhibits no edema.   No leg swelling or tenderness. Teds/SCDs in place.       Neurological: She is alert and oriented to person, place, and time. She has normal reflexes. She displays normal reflexes.    Skin: Skin is warm and dry. She is not diaphoretic.    Psychiatric: She has a normal mood and affect. Her behavior is normal. Judgment and thought content normal.       Assessment/Plan:     32 y.o. female  at Unknown for:    * Pre-eclampsia, severe    - h/o CHTN on Procardia 30XL at home   - increased to 60XL    - medication tolerated well with good BP control   - No symptoms  - s/p Magnesium sulfate for seizure prophylaxis   - BP: (139-151)/(82-97) 151/89  - Stable since stepdown from ICU on           Endometritis    - Afebrile, no leukocytosis. However,  fundal tenderness improved since initiation of antibiotics.  - Continue Amp/Gent/Clinda for 48 hours        Acute blood loss anemia    Post Delivery h/h 8.2/25.4>6.8/21.2>7.5/23> This morning, stable at 7.3/23.3  - vitals are stable and normal  - EBL from LTCS was 650 cc  - continue to monitor        History of bilateral tubal ligation    - at the time of LTCS         delivery delivered    Postpartum care:  - Patient doing well. Continue routine management and advances.  - Pt on Oxy IR, scheduled tylenol and PRN dilaudid. Taking meds every 4 hours. Discussed possible de-escalation to an NSAID + norco today as h/h stable. Pt amenable.  - Encourage ambulation.   - Heme: Pre Delivery h/h 9.2/29 --> Post Delivery h/h 8.2/25.4>6.8/21.2>7.5/23> This morning, stable at 7.3/23.3  - Contraception - s/p BTL  - Lactation - The patient is not breastfeeding.   - Rh Status - pos        Subarachnoid hemorrhage    - Patient s/p SAH with coil embolization and EVD in 2018  - Goal for BP is to remain below 160/100  - Pt stepped down from ICU psotpartum  - Coags wnl  - No symptoms, neurologically stable at this time; cleared prior to transport to [facility] by neurosurgery        Hypokalemia    - K 3.1 > 3.7 > 3.6              Araceli Alberts MD  Obstetrics  [facility]

## 2018-09-30 NOTE — ASSESSMENT & PLAN NOTE
Postpartum care:  - Patient doing well. Continue routine management and advances.  - Pt on Oxy IR, scheduled tylenol and PRN dilaudid. Taking meds every 4 hours. Discussed possible de-escalation to an NSAID + norco today as h/h stable. Pt amenable.  - Encourage ambulation.   - Heme: Pre Delivery h/h 9.2/29 --> Post Delivery h/h 8.2/25.4>6.8/21.2>7.5/23> This morning, stable at 7.3/23.3  - Contraception - s/p BTL  - Lactation - The patient is not breastfeeding.   - Rh Status - pos

## 2018-09-30 NOTE — ASSESSMENT & PLAN NOTE
Post Delivery h/h 8.2/25.4>6.8/21.2>7.5/23> This morning, stable at 7.3/23.3  - vitals are stable and normal  - EBL from LTCS was 650 cc  - continue to monitor

## 2018-09-30 NOTE — PROGRESS NOTES
"FOB escorted out of building by [facility] and [facility] security because he was wanted for questioning.  Pt states, "I don't even know what I am feeling right now" when asked if she is ok.  Pt's 5 grown children at bedside accompanied by patient's mother.  House supervisor and charge nurse aware of incident.    "

## 2018-09-30 NOTE — ASSESSMENT & PLAN NOTE
- Afebrile, no leukocytosis. However, fundal tenderness improved since initiation of antibiotics.  - Continue Amp/Gent/Clinda for 48 hours

## 2018-09-30 NOTE — SUBJECTIVE & OBJECTIVE
Interval:    POD#3. S/p Mag x24 hours. Yesterday, diagnosed with endometritis. Amp/Gent/Clinda started. Pt continues to take pain medication every 4 hours, but states she is feeling much better. No issues this AM. Denies fevers, chills, weakness, dizziness, HA, vision changes, RUQ/epigastric pain, SOB/CP. bleeding is mild.      Objective:     Vital Signs (Most Recent):  Temp: 97 °F (36.1 °C) (09/30/18 0405)  Pulse: 83 (09/30/18 0405)  Resp: 16 (09/30/18 0405)  BP: (!) 151/89 (09/30/18 0405)  SpO2: 100 % (09/30/18 0405) Vital Signs (24h Range):  Temp:  [97 °F (36.1 °C)-97.7 °F (36.5 °C)] 97 °F (36.1 °C)  Pulse:  [70-86] 83  Resp:  [16-18] 16  SpO2:  [99 %-100 %] 100 %  BP: (139-151)/(82-97) 151/89     Weight: 74.8 kg (165 lb)  Body mass index is 24.37 kg/m².    No intake or output data in the 24 hours ending 09/30/18 0734  Significant Labs:  Recent Lab Results       09/30/18  0422 09/29/18  1355      Albumin 2.5      Alkaline Phosphatase 88      ALT 7      Anion Gap 10      AST 10      Baso # 0.01 0.01     Basophil% 0.1 0.1     Total Bilirubin 0.2  Comment:  For infants and newborns, interpretation of results should be based  on gestational age, weight and in agreement with clinical  observations.  Premature Infant recommended reference ranges:  Up to 24 hours.............<8.0 mg/dL  Up to 48 hours............<12.0 mg/dL  3-5 days..................<15.0 mg/dL  6-29 days.................<15.0 mg/dL        BUN, Bld 9      Calcium 8.8      Chloride 105      CO2 24      Creatinine 0.7      Differential Method Automated Automated     eGFR if  >60      eGFR if non  >60  Comment:  Calculation used to obtain the estimated glomerular filtration  rate (eGFR) is the CKD-EPI equation.         Eos # 0.1 0.0     Eosinophil% 0.5 0.2     Glucose 67      Gran # (ANC) 6.9 9.3     Gran% 69.5 75.0     Hematocrit 23.2 23.1     Hemoglobin 7.3 7.5     Lymph # 2.2 2.2     Lymph% 21.7 17.9     Magnesium 1.7       MCH 28.1 28.6     MCHC 31.5 32.5     MCV 89 88     Mono # 0.7 0.8     Mono% 7.4 6.2     MPV 10.8 9.9     Platelets 239 239     Potassium 3.6      Total Protein 6.7      RBC 2.60 2.62     RDW 15.9 15.9     Sodium 139      WBC 9.96 12.37         Physical Exam:   Constitutional: She is oriented to person, place, and time. She appears well-developed and well-nourished. No distress.    HENT:   Head: Normocephalic and atraumatic.    Eyes: Conjunctivae and EOM are normal.    Neck: Normal range of motion. Neck supple.    Cardiovascular: Normal rate, regular rhythm and normal heart sounds.     Pulmonary/Chest: Effort normal and breath sounds normal. No respiratory distress. She has no wheezes. She has no rales.        Abdominal: Soft. Bowel sounds are normal. She exhibits abdominal incision (incision c/d/i without erythema or induration). She exhibits no distension (moderate distention). There is tenderness (improved fundal tenderness). There is no rebound and no guarding.   Fundus firm below umbilicus     Genitourinary:   Genitourinary Comments: deferred           Musculoskeletal: Normal range of motion and moves all extremeties. She exhibits no edema.   No leg swelling or tenderness. Teds/SCDs in place.       Neurological: She is alert and oriented to person, place, and time. She has normal reflexes. She displays normal reflexes.    Skin: Skin is warm and dry. She is not diaphoretic.    Psychiatric: She has a normal mood and affect. Her behavior is normal. Judgment and thought content normal.

## 2018-09-30 NOTE — PROGRESS NOTES
Pt went to NICU via wheelchair with her mother.  Pt instructed to come back to antepartum if her incisional pain becomes too intense. Pt verbalized understanding.

## 2018-10-01 LAB
ALBUMIN SERPL BCP-MCNC: 2.3 G/DL
ALP SERPL-CCNC: 88 U/L
ALT SERPL W/O P-5'-P-CCNC: 13 U/L
ANION GAP SERPL CALC-SCNC: 10 MMOL/L
AST SERPL-CCNC: 16 U/L
BASOPHILS # BLD AUTO: 0.01 K/UL
BASOPHILS NFR BLD: 0.1 %
BILIRUB SERPL-MCNC: 0.2 MG/DL
BUN SERPL-MCNC: 10 MG/DL
CALCIUM SERPL-MCNC: 8.5 MG/DL
CHLORIDE SERPL-SCNC: 102 MMOL/L
CO2 SERPL-SCNC: 27 MMOL/L
CREAT SERPL-MCNC: 0.7 MG/DL
DIFFERENTIAL METHOD: ABNORMAL
EOSINOPHIL # BLD AUTO: 0.1 K/UL
EOSINOPHIL NFR BLD: 0.7 %
ERYTHROCYTE [DISTWIDTH] IN BLOOD BY AUTOMATED COUNT: 15.4 %
EST. GFR  (AFRICAN AMERICAN): >60 ML/MIN/1.73 M^2
EST. GFR  (NON AFRICAN AMERICAN): >60 ML/MIN/1.73 M^2
GLUCOSE SERPL-MCNC: 75 MG/DL
HCT VFR BLD AUTO: 21.8 %
HCV LOG: <1.08 LOG (10) IU/ML
HCV RNA QUANT PCR: <12 IU/ML
HCV, QUALITATIVE: NOT DETECTED IU/ML
HGB BLD-MCNC: 6.9 G/DL
LYMPHOCYTES # BLD AUTO: 2.1 K/UL
LYMPHOCYTES NFR BLD: 31.3 %
MAGNESIUM SERPL-MCNC: 1.6 MG/DL
MCH RBC QN AUTO: 27.8 PG
MCHC RBC AUTO-ENTMCNC: 31.7 G/DL
MCV RBC AUTO: 88 FL
MONOCYTES # BLD AUTO: 0.6 K/UL
MONOCYTES NFR BLD: 8.9 %
NEUTROPHILS # BLD AUTO: 3.9 K/UL
NEUTROPHILS NFR BLD: 58.1 %
PLATELET # BLD AUTO: 248 K/UL
PMV BLD AUTO: 10.3 FL
POTASSIUM SERPL-SCNC: 3.3 MMOL/L
PROT SERPL-MCNC: 6.2 G/DL
RBC # BLD AUTO: 2.48 M/UL
SODIUM SERPL-SCNC: 139 MMOL/L
WBC # BLD AUTO: 6.71 K/UL

## 2018-10-01 PROCEDURE — 99232 SBSQ HOSP IP/OBS MODERATE 35: CPT | Mod: ,,, | Performed by: OBSTETRICS & GYNECOLOGY

## 2018-10-01 PROCEDURE — 63600175 PHARM REV CODE 636 W HCPCS: Performed by: STUDENT IN AN ORGANIZED HEALTH CARE EDUCATION/TRAINING PROGRAM

## 2018-10-01 PROCEDURE — 83735 ASSAY OF MAGNESIUM: CPT

## 2018-10-01 PROCEDURE — 36415 COLL VENOUS BLD VENIPUNCTURE: CPT

## 2018-10-01 PROCEDURE — S0077 INJECTION, CLINDAMYCIN PHOSP: HCPCS | Performed by: STUDENT IN AN ORGANIZED HEALTH CARE EDUCATION/TRAINING PROGRAM

## 2018-10-01 PROCEDURE — 25000003 PHARM REV CODE 250: Performed by: STUDENT IN AN ORGANIZED HEALTH CARE EDUCATION/TRAINING PROGRAM

## 2018-10-01 PROCEDURE — 80053 COMPREHEN METABOLIC PANEL: CPT

## 2018-10-01 PROCEDURE — 85025 COMPLETE CBC W/AUTO DIFF WBC: CPT

## 2018-10-01 PROCEDURE — 11000001 HC ACUTE MED/SURG PRIVATE ROOM

## 2018-10-01 RX ORDER — POTASSIUM CHLORIDE 20 MEQ/1
40 TABLET, EXTENDED RELEASE ORAL ONCE
Status: COMPLETED | OUTPATIENT
Start: 2018-10-01 | End: 2018-10-01

## 2018-10-01 RX ORDER — HYDROMORPHONE HYDROCHLORIDE 2 MG/ML
0.5 INJECTION, SOLUTION INTRAMUSCULAR; INTRAVENOUS; SUBCUTANEOUS EVERY 6 HOURS PRN
Status: DISCONTINUED | OUTPATIENT
Start: 2018-10-01 | End: 2018-10-01

## 2018-10-01 RX ORDER — ZOLPIDEM TARTRATE 5 MG/1
5 TABLET ORAL ONCE
Status: COMPLETED | OUTPATIENT
Start: 2018-10-01 | End: 2018-10-01

## 2018-10-01 RX ORDER — NIFEDIPINE 30 MG/1
30 TABLET, EXTENDED RELEASE ORAL ONCE
Status: COMPLETED | OUTPATIENT
Start: 2018-10-01 | End: 2018-10-01

## 2018-10-01 RX ORDER — NIFEDIPINE 30 MG/1
90 TABLET, EXTENDED RELEASE ORAL DAILY
Status: DISCONTINUED | OUTPATIENT
Start: 2018-10-02 | End: 2018-10-03 | Stop reason: HOSPADM

## 2018-10-01 RX ORDER — ZOLPIDEM TARTRATE 5 MG/1
10 TABLET ORAL NIGHTLY PRN
Status: DISCONTINUED | OUTPATIENT
Start: 2018-10-02 | End: 2018-10-03 | Stop reason: HOSPADM

## 2018-10-01 RX ORDER — HYDROMORPHONE HYDROCHLORIDE 2 MG/ML
0.5 INJECTION, SOLUTION INTRAMUSCULAR; INTRAVENOUS; SUBCUTANEOUS EVERY 6 HOURS PRN
Status: DISCONTINUED | OUTPATIENT
Start: 2018-10-01 | End: 2018-10-02

## 2018-10-01 RX ADMIN — SIMETHICONE CHEW TAB 80 MG 80 MG: 80 TABLET ORAL at 11:10

## 2018-10-01 RX ADMIN — AMPICILLIN SODIUM 2 G: 2 INJECTION, POWDER, FOR SOLUTION INTRAMUSCULAR; INTRAVENOUS at 06:10

## 2018-10-01 RX ADMIN — DOCUSATE SODIUM 200 MG: 100 CAPSULE, LIQUID FILLED ORAL at 08:10

## 2018-10-01 RX ADMIN — CLINDAMYCIN IN 5 PERCENT DEXTROSE 900 MG: 18 INJECTION, SOLUTION INTRAVENOUS at 02:10

## 2018-10-01 RX ADMIN — OXYCODONE HYDROCHLORIDE 10 MG: 5 TABLET ORAL at 07:10

## 2018-10-01 RX ADMIN — OXYCODONE HYDROCHLORIDE 10 MG: 5 TABLET ORAL at 12:10

## 2018-10-01 RX ADMIN — DOCUSATE SODIUM 200 MG: 100 CAPSULE, LIQUID FILLED ORAL at 09:10

## 2018-10-01 RX ADMIN — ACETAMINOPHEN 650 MG: 325 TABLET, FILM COATED ORAL at 12:10

## 2018-10-01 RX ADMIN — HYDROMORPHONE HYDROCHLORIDE 0.5 MG: 2 INJECTION, SOLUTION INTRAMUSCULAR; INTRAVENOUS; SUBCUTANEOUS at 04:10

## 2018-10-01 RX ADMIN — NIFEDIPINE 30 MG: 30 TABLET, FILM COATED, EXTENDED RELEASE ORAL at 03:10

## 2018-10-01 RX ADMIN — SIMETHICONE CHEW TAB 80 MG 80 MG: 80 TABLET ORAL at 12:10

## 2018-10-01 RX ADMIN — OXYCODONE HYDROCHLORIDE 10 MG: 5 TABLET ORAL at 03:10

## 2018-10-01 RX ADMIN — AMPICILLIN SODIUM 2 G: 2 INJECTION, POWDER, FOR SOLUTION INTRAMUSCULAR; INTRAVENOUS at 12:10

## 2018-10-01 RX ADMIN — SIMETHICONE CHEW TAB 80 MG 80 MG: 80 TABLET ORAL at 06:10

## 2018-10-01 RX ADMIN — ACETAMINOPHEN 650 MG: 325 TABLET, FILM COATED ORAL at 06:10

## 2018-10-01 RX ADMIN — OXYCODONE HYDROCHLORIDE 10 MG: 5 TABLET ORAL at 11:10

## 2018-10-01 RX ADMIN — ZOLPIDEM TARTRATE 5 MG: 5 TABLET, FILM COATED ORAL at 09:10

## 2018-10-01 RX ADMIN — CLINDAMYCIN IN 5 PERCENT DEXTROSE 900 MG: 18 INJECTION, SOLUTION INTRAVENOUS at 10:10

## 2018-10-01 RX ADMIN — ACETAMINOPHEN 650 MG: 325 TABLET, FILM COATED ORAL at 11:10

## 2018-10-01 RX ADMIN — HYDROMORPHONE HYDROCHLORIDE 0.5 MG: 2 INJECTION, SOLUTION INTRAMUSCULAR; INTRAVENOUS; SUBCUTANEOUS at 06:10

## 2018-10-01 RX ADMIN — HYDROMORPHONE HYDROCHLORIDE 0.5 MG: 2 INJECTION, SOLUTION INTRAMUSCULAR; INTRAVENOUS; SUBCUTANEOUS at 09:10

## 2018-10-01 RX ADMIN — OXYCODONE HYDROCHLORIDE 10 MG: 5 TABLET ORAL at 06:10

## 2018-10-01 RX ADMIN — POTASSIUM CHLORIDE 40 MEQ: 1500 TABLET, EXTENDED RELEASE ORAL at 12:10

## 2018-10-01 RX ADMIN — NIFEDIPINE 60 MG: 30 TABLET, FILM COATED, EXTENDED RELEASE ORAL at 09:10

## 2018-10-01 RX ADMIN — OXYCODONE HYDROCHLORIDE 10 MG: 5 TABLET ORAL at 10:10

## 2018-10-01 NOTE — SUBJECTIVE & OBJECTIVE
Interval:  POD#4 s/p 1LTCS. Continued on antibiotics for abdominal tenderness, no fevers. Will continue through 48 hours of abx. States her pain improved following abx but is worse this morning. States she believes it may be due to not taking any pain medications through the night as she slept throughout the night without waking up. Denies fevers or chills overnight. Otherwise meeting post-op goals (ambulating, tolerating regular diet without n/v, voiding spontaneously, passing flatus, vaginal bleeding minimal).       Objective:     Vital Signs (Most Recent):  Temp: 97.9 °F (36.6 °C) (10/01/18 0404)  Pulse: 70 (10/01/18 0406)  Resp: 18 (09/30/18 1952)  BP: (!) 156/93 (10/01/18 0406)  SpO2: 99 % (10/01/18 0404) Vital Signs (24h Range):  Temp:  [97.5 °F (36.4 °C)-99.1 °F (37.3 °C)] 97.9 °F (36.6 °C)  Pulse:  [] 70  Resp:  [16-18] 18  SpO2:  [98 %-100 %] 99 %  BP: (127-165)/() 156/93     Weight: 74.8 kg (165 lb)  Body mass index is 24.37 kg/m².      Intake/Output Summary (Last 24 hours) at 10/1/2018 0729  Last data filed at 10/1/2018 0300  Gross per 24 hour   Intake 2795.83 ml   Output 1400 ml   Net 1395.83 ml     Significant Labs:  Recent Lab Results       10/01/18  0454      Albumin 2.3     Alkaline Phosphatase 88     ALT 13     Anion Gap 10     AST 16     Baso # 0.01     Basophil% 0.1     Total Bilirubin 0.2  Comment:  For infants and newborns, interpretation of results should be based  on gestational age, weight and in agreement with clinical  observations.  Premature Infant recommended reference ranges:  Up to 24 hours.............<8.0 mg/dL  Up to 48 hours............<12.0 mg/dL  3-5 days..................<15.0 mg/dL  6-29 days.................<15.0 mg/dL       BUN, Bld 10     Calcium 8.5     Chloride 102     CO2 27     Creatinine 0.7     Differential Method Automated     eGFR if  >60     eGFR if non  >60  Comment:  Calculation used to obtain the estimated glomerular  filtration  rate (eGFR) is the CKD-EPI equation.        Eos # 0.1     Eosinophil% 0.7     Glucose 75     Gran # (ANC) 3.9     Gran% 58.1     Hematocrit 21.8     Hemoglobin 6.9     Lymph # 2.1     Lymph% 31.3     Magnesium 1.6     MCH 27.8     MCHC 31.7     MCV 88     Mono # 0.6     Mono% 8.9     MPV 10.3     Platelets 248     Potassium 3.3     Total Protein 6.2     RBC 2.48     RDW 15.4     Sodium 139     WBC 6.71         Physical Exam:   Constitutional: She is oriented to person, place, and time. She appears well-developed and well-nourished. No distress.    HENT:   Head: Normocephalic and atraumatic.    Eyes: Conjunctivae and EOM are normal.    Neck: Normal range of motion. Neck supple.    Cardiovascular: Normal rate, regular rhythm and normal heart sounds.     Pulmonary/Chest: Effort normal and breath sounds normal. No respiratory distress. She has no wheezes. She has no rales.        Abdominal: Soft. Bowel sounds are normal. She exhibits abdominal incision (incision c/d/i without erythema or induration). She exhibits no distension. There is tenderness (fundal tenderness). There is no rebound and no guarding.   Fundus firm below umbilicus     Genitourinary:   Genitourinary Comments: deferred           Musculoskeletal: Normal range of motion and moves all extremeties. She exhibits no edema.   No leg swelling or tenderness. Teds/SCDs not place. Encouraged pt to use when in bed.       Neurological: She is alert and oriented to person, place, and time. She has normal reflexes. She displays normal reflexes.    Skin: Skin is warm and dry. She is not diaphoretic.    Psychiatric: She has a normal mood and affect. Her behavior is normal. Judgment and thought content normal.

## 2018-10-01 NOTE — ASSESSMENT & PLAN NOTE
Postpartum care:  - Patient doing well. Continue routine management and advances.  - Pt on Oxy IR, scheduled tylenol and PRN dilaudid. Taking meds every 4 hours. Discussed possible de-escalation to an NSAID + norco today as h/h stable. Pt amenable.  - Encourage ambulation.   - Heme: Pre Delivery h/h 9.2/29 --> Post Delivery h/h 8.2/25.4>6.8/21.2>7.5/23>7.3/23.3>6.9/21.8. See acute blood loss anemia problem.  - Contraception - s/p BTL  - Lactation - The patient is not breastfeeding.   - Rh Status - pos

## 2018-10-01 NOTE — PLAN OF CARE
Pt aware the sw would be finding her a PCP since she does not have one and adding it to her AVS for time of d/c. Provided pt with contact info for [facility] for PCP services. Pt is to make appointment to establish services asa.    [facility]  [address] / [place] [address]   Monday-Thursday 7:30am - 5:00pm, Friday 7:30am - 1:00pm   Tel: [phone]        Melinda Bhatia LCSW    [facility]  [email or web address]    (phone) [phone] or  [place]. [phone]  (fax) [phone]

## 2018-10-01 NOTE — PLAN OF CARE
"F/u with pt today after report that DALTON was in custodial. Pt reported he was arrested on charge of suspicion for arson but he "did not do it." Pt is feeling more stressed about this. DALTON had court hearing today. Pt reported his sister will likely bail him out of custodial. Pt reported increased pain at the moment but believes it is related to gas. Pt nurse notified. Pt was on the phone with her counselor, "Jessica." Jessica would like to speak to sw. Raghu left her contact info with pt per pt request.     Will cont to f/u with pt as needed while inpatient.     No anticipated d/c needs.     Melinda Bhatia LCSW    [facility]  [email or web address]    (phone) [phone] or  [place]. [phone]  (fax) [phone]        "

## 2018-10-01 NOTE — ASSESSMENT & PLAN NOTE
- h/o CHTN on Procardia 30XL at home   - increased to 60XL 9/28    - medication tolerated well with good BP control   - isolated severe range pressures yesterday evening, improved without intervention. Will continue current dose of Procardia and increase if blood pressures are elevated  - No symptoms  - s/p Magnesium sulfate for seizure prophylaxis   - BP: (127-165)/() 143/84  - Stable since stepdown from ICU on 9/28

## 2018-10-01 NOTE — ASSESSMENT & PLAN NOTE
Post Delivery h/h 8.2/25.4>6.8/21.2>7.5/23> 6.9/21.8  - vitals are stable and normal  - EBL from LTCS was 650 cc  - reports only light vaginal bleeding  - continue to monitor

## 2018-10-01 NOTE — PROGRESS NOTES
[facility]  Obstetrics  Antepartum Progress Note    Patient Name: Sharon Grande  MRN: [id number]  Admission Date: 2018  Hospital Length of Stay: 4 days  Attending Physician: No att. providers found  Primary Care Provider: Roman Archibald MD    Subjective:     Principal Problem:Pre-eclampsia, severe    HPI:  Sharon Grande is a 32 y.o. [id number] at 31w4d with history significant for HTN, polycystic kidney disease, and subarachnoid hemorrhage this pregnancy s/p coil embolization and drain placement who presented to the [facility] ED with complaints of contractions/back pains every few minutes. The pain started 2 days ago and worsened last night, occurring more frequently and more painfully, prompting her to come in.   She has a history of CHTN and is on procardia 30XL daily. She did not take her medication this morning and in the ED had severely elevated blood pressures. Her procardia was given to her and subsequent blood pressures were 140s systolic. She was seen by neurosurgery who cleared her as neurologically stable and OK for transport to [facility].  Since her subarachnoid hemorrhage, patient has had intermittent mild headaches; she had a headache in the [facility] ED and denies one currently. She also reports having RUQ pain at times but states it is more in the area just below her right breast and not abdominal. She denies any abdominal pain or RUQ pain currently.  In the [facility], patient reported contractions had spaced out to q20-30 minutes and were less painful. Patient denies vaginal bleeding or LOF and reports good fetal movement. SVE was performed and pt was found to be 3/70/-3. Patient then proceeded to have severely elevated blood pressures that were sustained requiring IV labetalol 20 and then 40.  Patient denies headaches, chest pain, SOB, RUQ pain, or vision changes at this time.  She has received a course of BMZ on - during her prior admission.    Hospital  Course:  2018 - patient seen at [facility] ED with complaints of contractions and elevated blood pressures. Home procardia given in ED. Patient found to be 1.5cm dilated and cleared by neurosurgery for transport to [facility] for w/u of threatened PTL and pre-eclampsia. In the [facility], patient was found to be 3cm dilated and then proceeded to have severe range blood pressures requiring IV antihypertensives. Mag sulfate was started for seizure prophylaxis. BMZ x 1 was given in [facility]. Plan was made to deliver via  section given severely elevated blood pressures in a patient with h/o aneurysm and SAH. Patient desires BTL, consents signed for CS and BTL. Underwent uncomplicated  delivery. After delivery, had severe range pressures requiring labetalol 20 x 2. Procardia was increased to 60XL to start in AM.  2018 - POD#1 s/p 1LTCS.  Doing well this morning. Continued on magnesium sulfate for seizure prophylaxis. Patient is starting to meet post-op goals. Her pain is well-controlled with her pain medication. She has not yet eaten breakfast this morning but denies n/v. Vaginal bleeding is minimal. She is ambulating and voiding via thomas an adequate amount. She denies headaches, chest pain, SOB, RUQ pain, or vision changes. Stable for step down to floor. Will monitor blood pressures closely.  2018 POD #2. MgSO4 discontinued after 24 hours. BPs well controlled overnight without additional intervention. Last elevated at noon yesterday. Overnight, she complained of incisional pain that required additional PO medications and IV dilaudid this AM. UOP was adequate through the MgSO4 therapy window. No issues this AM. Denies HA, vision changes, RUQ/epigastric pain, SOB/CP.   2018 POD#3. S/p Mag x24 hours. Yesterday, diagnosed with endometritis. Amp/Gent/Clinda started. Pt continues to take pain medication every 4 hours, but states she is feeling much better. Repeat CBC shows stable h/H.  10/01/2018  POD#4 s/p 1LTCS. Continued on antibiotics for abdominal tenderness, no fevers. Will continue through 48 hours of abx. States her pain improved following abx but is worse this morning. States she believes it may be due to not taking any pain medications through the night as she slept throughout the night without waking up. Denies fevers or chills overnight. Otherwise meeting post-op goals (ambulating, tolerating regular diet without n/v, voiding spontaneously, passing flatus, vaginal bleeding minimal).     Interval:  POD#4 s/p 1LTCS. Continued on antibiotics for abdominal tenderness, no fevers. Will continue through 48 hours of abx. States her pain improved following abx but is worse this morning. States she believes it may be due to not taking any pain medications through the night as she slept throughout the night without waking up. Denies fevers or chills overnight. Otherwise meeting post-op goals (ambulating, tolerating regular diet without n/v, voiding spontaneously, passing flatus, vaginal bleeding minimal).       Objective:     Vital Signs (Most Recent):  Temp: 97.9 °F (36.6 °C) (10/01/18 0404)  Pulse: 70 (10/01/18 0406)  Resp: 18 (09/30/18 1952)  BP: (!) 156/93 (10/01/18 0406)  SpO2: 99 % (10/01/18 0404) Vital Signs (24h Range):  Temp:  [97.5 °F (36.4 °C)-99.1 °F (37.3 °C)] 97.9 °F (36.6 °C)  Pulse:  [] 70  Resp:  [16-18] 18  SpO2:  [98 %-100 %] 99 %  BP: (127-165)/() 156/93     Weight: 74.8 kg (165 lb)  Body mass index is 24.37 kg/m².      Intake/Output Summary (Last 24 hours) at 10/1/2018 0729  Last data filed at 10/1/2018 0300  Gross per 24 hour   Intake 2795.83 ml   Output 1400 ml   Net 1395.83 ml     Significant Labs:  Recent Lab Results       10/01/18  0454      Albumin 2.3     Alkaline Phosphatase 88     ALT 13     Anion Gap 10     AST 16     Baso # 0.01     Basophil% 0.1     Total Bilirubin 0.2  Comment:  For infants and newborns, interpretation of results should be based  on gestational  age, weight and in agreement with clinical  observations.  Premature Infant recommended reference ranges:  Up to 24 hours.............<8.0 mg/dL  Up to 48 hours............<12.0 mg/dL  3-5 days..................<15.0 mg/dL  6-29 days.................<15.0 mg/dL       BUN, Bld 10     Calcium 8.5     Chloride 102     CO2 27     Creatinine 0.7     Differential Method Automated     eGFR if  >60     eGFR if non  >60  Comment:  Calculation used to obtain the estimated glomerular filtration  rate (eGFR) is the CKD-EPI equation.        Eos # 0.1     Eosinophil% 0.7     Glucose 75     Gran # (ANC) 3.9     Gran% 58.1     Hematocrit 21.8     Hemoglobin 6.9     Lymph # 2.1     Lymph% 31.3     Magnesium 1.6     MCH 27.8     MCHC 31.7     MCV 88     Mono # 0.6     Mono% 8.9     MPV 10.3     Platelets 248     Potassium 3.3     Total Protein 6.2     RBC 2.48     RDW 15.4     Sodium 139     WBC 6.71         Physical Exam:   Constitutional: She is oriented to person, place, and time. She appears well-developed and well-nourished. No distress.    HENT:   Head: Normocephalic and atraumatic.    Eyes: Conjunctivae and EOM are normal.    Neck: Normal range of motion. Neck supple.    Cardiovascular: Normal rate, regular rhythm and normal heart sounds.     Pulmonary/Chest: Effort normal and breath sounds normal. No respiratory distress. She has no wheezes. She has no rales.        Abdominal: Soft. Bowel sounds are normal. She exhibits abdominal incision (incision c/d/i without erythema or induration). She exhibits no distension. There is tenderness (fundal tenderness). There is no rebound and no guarding.   Fundus firm below umbilicus     Genitourinary:   Genitourinary Comments: deferred           Musculoskeletal: Normal range of motion and moves all extremeties. She exhibits no edema.   No leg swelling or tenderness. Teds/SCDs not place. Encouraged pt to use when in bed.       Neurological: She is alert and  oriented to person, place, and time. She has normal reflexes. She displays normal reflexes.    Skin: Skin is warm and dry. She is not diaphoretic.    Psychiatric: She has a normal mood and affect. Her behavior is normal. Judgment and thought content normal.       Assessment/Plan:     32 y.o. female  at Unknown for:    * Pre-eclampsia, severe    - h/o CHTN on Procardia 30XL at home   - increased to 60XL     - medication tolerated well with good BP control   - isolated severe range pressures yesterday evening, improved without intervention. Will continue current dose of Procardia and increase if blood pressures are elevated  - No symptoms  - s/p Magnesium sulfate for seizure prophylaxis   - BP: (127-165)/() 143/84  - Stable since stepdown from ICU on         Endometritis    - Afebrile, no leukocytosis. Fundal tenderness improved since initiation of antibiotics.  - Continue Amp/Gent/Clinda for 48 hours (d/c this AM)        Acute blood loss anemia    Post Delivery h/h 8.2/25.4>6.8/21.2>7.5/23> 6.9/21.8  - vitals are stable and normal  - EBL from LTCS was 650 cc  - reports only light vaginal bleeding  - continue to monitor        History of bilateral tubal ligation    - at the time of LTCS         delivery delivered    Postpartum care:  - Patient doing well. Continue routine management and advances.  - Pt on Oxy IR, scheduled tylenol and PRN dilaudid. Taking meds every 4 hours. Discussed possible de-escalation to an NSAID + norco today as h/h stable. Pt amenable.  - Encourage ambulation.   - Heme: Pre Delivery h/h 9./ --> Post Delivery h/h 8.2/25.4>6.8/21.2>7.5/23>7.3/23.3>6.9/21.8. See acute blood loss anemia problem.  - Contraception - s/p BTL  - Lactation - The patient is not breastfeeding.   - Rh Status - pos        Subarachnoid hemorrhage    - Patient s/p SAH with coil embolization and EVD in 2018  - Goal for BP is to remain below 160/100  - Pt stepped down from ICU  psotpartum  - Coags wnl  - No symptoms, neurologically stable at this time; cleared prior to transport to [facility] by neurosurgery        Hypokalemia    - K 3.1 > 3.7 > 3.6>3.3  - will replace today              Sushma K Reddy, MD  Obstetrics  [facility]

## 2018-10-01 NOTE — ASSESSMENT & PLAN NOTE
- Patient s/p SAH with coil embolization and EVD in August 2018  - Goal for BP is to remain below 160/100  - Pt stepped down from ICU psotpartum  - Coags wnl  - No symptoms, neurologically stable at this time; cleared prior to transport to [facility] by neurosurgery

## 2018-10-01 NOTE — ASSESSMENT & PLAN NOTE
- Afebrile, no leukocytosis. Fundal tenderness improved since initiation of antibiotics.  - Continue Amp/Gent/Clinda for 48 hours (d/c this AM)

## 2018-10-02 LAB
ALBUMIN SERPL BCP-MCNC: 2.4 G/DL
ALP SERPL-CCNC: 77 U/L
ALT SERPL W/O P-5'-P-CCNC: 13 U/L
ANION GAP SERPL CALC-SCNC: 10 MMOL/L
AST SERPL-CCNC: 13 U/L
BASOPHILS # BLD AUTO: 0.01 K/UL
BASOPHILS NFR BLD: 0.2 %
BILIRUB SERPL-MCNC: 0.3 MG/DL
BUN SERPL-MCNC: 10 MG/DL
CALCIUM SERPL-MCNC: 9 MG/DL
CHLORIDE SERPL-SCNC: 103 MMOL/L
CO2 SERPL-SCNC: 27 MMOL/L
CREAT SERPL-MCNC: 0.7 MG/DL
DIFFERENTIAL METHOD: ABNORMAL
EOSINOPHIL # BLD AUTO: 0.1 K/UL
EOSINOPHIL NFR BLD: 1.4 %
ERYTHROCYTE [DISTWIDTH] IN BLOOD BY AUTOMATED COUNT: 15.3 %
EST. GFR  (AFRICAN AMERICAN): >60 ML/MIN/1.73 M^2
EST. GFR  (NON AFRICAN AMERICAN): >60 ML/MIN/1.73 M^2
GLUCOSE SERPL-MCNC: 88 MG/DL
HCT VFR BLD AUTO: 22.5 %
HGB BLD-MCNC: 7.2 G/DL
LYMPHOCYTES # BLD AUTO: 2 K/UL
LYMPHOCYTES NFR BLD: 30.4 %
MCH RBC QN AUTO: 28 PG
MCHC RBC AUTO-ENTMCNC: 32 G/DL
MCV RBC AUTO: 88 FL
MONOCYTES # BLD AUTO: 0.5 K/UL
MONOCYTES NFR BLD: 7.5 %
NEUTROPHILS # BLD AUTO: 4 K/UL
NEUTROPHILS NFR BLD: 59.9 %
PLATELET # BLD AUTO: 268 K/UL
PMV BLD AUTO: 9.7 FL
POTASSIUM SERPL-SCNC: 3.3 MMOL/L
PROT SERPL-MCNC: 6.4 G/DL
RBC # BLD AUTO: 2.57 M/UL
SODIUM SERPL-SCNC: 140 MMOL/L
WBC # BLD AUTO: 6.64 K/UL

## 2018-10-02 PROCEDURE — 80053 COMPREHEN METABOLIC PANEL: CPT

## 2018-10-02 PROCEDURE — 36415 COLL VENOUS BLD VENIPUNCTURE: CPT

## 2018-10-02 PROCEDURE — 25000003 PHARM REV CODE 250: Performed by: STUDENT IN AN ORGANIZED HEALTH CARE EDUCATION/TRAINING PROGRAM

## 2018-10-02 PROCEDURE — 11000001 HC ACUTE MED/SURG PRIVATE ROOM

## 2018-10-02 PROCEDURE — 99232 SBSQ HOSP IP/OBS MODERATE 35: CPT | Mod: ,,, | Performed by: OBSTETRICS & GYNECOLOGY

## 2018-10-02 PROCEDURE — 85025 COMPLETE CBC W/AUTO DIFF WBC: CPT

## 2018-10-02 RX ORDER — POTASSIUM CHLORIDE 20 MEQ/1
40 TABLET, EXTENDED RELEASE ORAL ONCE
Status: COMPLETED | OUTPATIENT
Start: 2018-10-02 | End: 2018-10-02

## 2018-10-02 RX ADMIN — STANDARDIZED SENNA CONCENTRATE AND DOCUSATE SODIUM 1 TABLET: 8.6; 5 TABLET, FILM COATED ORAL at 06:10

## 2018-10-02 RX ADMIN — OXYCODONE HYDROCHLORIDE 10 MG: 5 TABLET ORAL at 04:10

## 2018-10-02 RX ADMIN — POTASSIUM CHLORIDE 40 MEQ: 1500 TABLET, EXTENDED RELEASE ORAL at 08:10

## 2018-10-02 RX ADMIN — OXYCODONE HYDROCHLORIDE 5 MG: 5 TABLET ORAL at 09:10

## 2018-10-02 RX ADMIN — OXYCODONE HYDROCHLORIDE 10 MG: 5 TABLET ORAL at 08:10

## 2018-10-02 RX ADMIN — OXYCODONE HYDROCHLORIDE 10 MG: 5 TABLET ORAL at 12:10

## 2018-10-02 RX ADMIN — SIMETHICONE CHEW TAB 80 MG 80 MG: 80 TABLET ORAL at 11:10

## 2018-10-02 RX ADMIN — DOCUSATE SODIUM 200 MG: 100 CAPSULE, LIQUID FILLED ORAL at 09:10

## 2018-10-02 RX ADMIN — SIMETHICONE CHEW TAB 80 MG 80 MG: 80 TABLET ORAL at 06:10

## 2018-10-02 RX ADMIN — ACETAMINOPHEN 650 MG: 325 TABLET, FILM COATED ORAL at 06:10

## 2018-10-02 RX ADMIN — ACETAMINOPHEN 650 MG: 325 TABLET, FILM COATED ORAL at 05:10

## 2018-10-02 RX ADMIN — SIMETHICONE CHEW TAB 80 MG 80 MG: 80 TABLET ORAL at 05:10

## 2018-10-02 RX ADMIN — ZOLPIDEM TARTRATE 10 MG: 5 TABLET, FILM COATED ORAL at 09:10

## 2018-10-02 RX ADMIN — NIFEDIPINE 90 MG: 30 TABLET, FILM COATED, EXTENDED RELEASE ORAL at 09:10

## 2018-10-02 RX ADMIN — ACETAMINOPHEN 650 MG: 325 TABLET, FILM COATED ORAL at 11:10

## 2018-10-02 NOTE — ASSESSMENT & PLAN NOTE
- h/o CHTN on Procardia 30XL at home   - increased to 60XL 9/28 and to 90XL 10/02 after additional 30XL given on 10/01.   - medication tolerated well with good BP control at this time  - Will continue current dose of Procardia and increase or add secondary agent if blood pressures are elevated  - No symptoms of pre-eclampsia at this time.  - s/p Magnesium sulfate for seizure prophylaxis   Temp:  [97.3 °F (36.3 °C)-98.2 °F (36.8 °C)] 97.3 °F (36.3 °C)  Pulse:  [] 82  Resp:  [18] 18  SpO2:  [18 %-100 %] 100 %  BP: (139-158)/(80-93) 149/91  - Stable since stepdown from ICU on 9/28  - now s/p 1LTCS for severe range pressures in the setting of h/o subarachnoid hemorrhage with coil placement this pregnancy

## 2018-10-02 NOTE — ASSESSMENT & PLAN NOTE
Post Delivery h/h 8.2/25.4>6.8/21.2>7.5/23> 6.9/21.8  - vitals are stable and normal  - patient asymptomatic with no dizziness/lightheadeness/chest pain or SOB  - EBL from LTCS was 650 cc  - reports only light vaginal bleeding  - continue to monitor

## 2018-10-02 NOTE — ASSESSMENT & PLAN NOTE
- Afebrile, no leukocytosis. Fundal tenderness improved since initiation of antibiotics.  - s/p Amp/Gent/Clinda for 48 hours

## 2018-10-02 NOTE — PROGRESS NOTES
[facility]  Obstetrics  Postpartum Progress Note    Patient Name: Sharon Grande  MRN: [id number]  Admission Date: 9/27/2018  Hospital Length of Stay: 5 days  Attending Physician: No att. providers found  Primary Care Provider: Roman Archibald MD    Subjective:     Principal Problem:Pre-eclampsia, severe    Interval:  POD#5 s/p 1LTCS. BP monitoring continues. Abx discontinued yesterday morning after 48 hours. Patient never developed fever. Pain is improved this morning and controlled on her current pain medications. Vaginal bleeding minimal. Passing flatus. Voiding spontaneously. Tolerating regular diet without n/v. Ambulating independently. Overall improving.  She denies headache, chest pain, SOB, RUQ pain, or vision changes.     Objective:     Vital Signs (Most Recent):  Temp: 97.3 °F (36.3 °C) (10/01/18 1927)  Pulse: 82 (10/01/18 1951)  Resp: 18 (10/01/18 1927)  BP: (!) 149/91 (10/01/18 1951)  SpO2: 100 % (10/01/18 1951) Vital Signs (24h Range):  Temp:  [97.3 °F (36.3 °C)-98.2 °F (36.8 °C)] 97.3 °F (36.3 °C)  Pulse:  [] 82  Resp:  [18] 18  SpO2:  [18 %-100 %] 100 %  BP: (139-158)/(80-93) 149/91     Weight: 74.8 kg (165 lb)  Body mass index is 24.37 kg/m².      Intake/Output Summary (Last 24 hours) at 10/1/2018 2206  Last data filed at 10/1/2018 1800  Gross per 24 hour   Intake 3995.83 ml   Output 2300 ml   Net 1695.83 ml     Significant Labs:  Recent Lab Results       10/01/18  0454      Albumin 2.3     Alkaline Phosphatase 88     ALT 13     Anion Gap 10     AST 16     Baso # 0.01     Basophil% 0.1     Total Bilirubin 0.2  Comment:  For infants and newborns, interpretation of results should be based  on gestational age, weight and in agreement with clinical  observations.  Premature Infant recommended reference ranges:  Up to 24 hours.............<8.0 mg/dL  Up to 48 hours............<12.0 mg/dL  3-5 days..................<15.0 mg/dL  6-29 days.................<15.0 mg/dL       BUN, Bld  10     Calcium 8.5     Chloride 102     CO2 27     Creatinine 0.7     Differential Method Automated     eGFR if  >60     eGFR if non  >60  Comment:  Calculation used to obtain the estimated glomerular filtration  rate (eGFR) is the CKD-EPI equation.        Eos # 0.1     Eosinophil% 0.7     Glucose 75     Gran # (ANC) 3.9     Gran% 58.1     Hematocrit 21.8     Hemoglobin 6.9     Lymph # 2.1     Lymph% 31.3     Magnesium 1.6     MCH 27.8     MCHC 31.7     MCV 88     Mono # 0.6     Mono% 8.9     MPV 10.3     Platelets 248     Potassium 3.3     Total Protein 6.2     RBC 2.48     RDW 15.4     Sodium 139     WBC 6.71         Physical Exam:   Constitutional: She is oriented to person, place, and time. She appears well-developed and well-nourished. No distress.    HENT:   Head: Normocephalic and atraumatic.    Eyes: Conjunctivae and EOM are normal.    Neck: Normal range of motion. Neck supple.    Cardiovascular: Normal rate, regular rhythm and normal heart sounds.     Pulmonary/Chest: Effort normal and breath sounds normal. No respiratory distress. She has no wheezes. She has no rales.        Abdominal: Soft. Bowel sounds are normal. She exhibits abdominal incision (incision c/d/i without erythema or induration. mild kashif-incisional swelling.). She exhibits no distension. There is tenderness (mild, appropriate). There is no rebound and no guarding.   Fundus firm below umbilicus     Genitourinary:   Genitourinary Comments: deferred           Musculoskeletal: Normal range of motion and moves all extremeties. She exhibits no edema.   No leg swelling or tenderness. Teds/SCDs not place. Encouraged pt to use when in bed.       Neurological: She is alert and oriented to person, place, and time. She has normal reflexes. She displays normal reflexes.    Skin: Skin is warm and dry. She is not diaphoretic.    Psychiatric: She has a normal mood and affect. Her behavior is normal. Judgment and thought  content normal.       Assessment/Plan:     32 y.o. female  for:    * Pre-eclampsia, severe    - h/o CHTN on Procardia 30XL at home   - increased to 60XL  and to 90XL 10/02 after additional 30XL given on 10/01.   - medication tolerated well with good BP control at this time  - Will continue current dose of Procardia and increase or add secondary agent if blood pressures are elevated  - No symptoms of pre-eclampsia at this time.  - s/p Magnesium sulfate for seizure prophylaxis   Temp:  [97.3 °F (36.3 °C)-98.2 °F (36.8 °C)] 97.3 °F (36.3 °C)  Pulse:  [] 82  Resp:  [18] 18  SpO2:  [18 %-100 %] 100 %  BP: (139-158)/(80-93) 149/91  - Stable since stepdown from ICU on   - now s/p 1LTCS for severe range pressures in the setting of h/o subarachnoid hemorrhage with coil placement this pregnancy        Endometritis    - Afebrile, no leukocytosis. Fundal tenderness improved since initiation of antibiotics.  - s/p Amp/Gent/Clinda for 48 hours        Acute blood loss anemia    Post Delivery h/h 8.2/25.4>6.8/21.2>7.5/23> 6.9/21.8  - vitals are stable and normal  - patient asymptomatic with no dizziness/lightheadeness/chest pain or SOB  - EBL from LTCS was 650 cc  - reports only light vaginal bleeding  - continue to monitor        History of bilateral tubal ligation    - at the time of LTCS         delivery delivered    Postpartum care:  - Patient doing well. Continue routine management and advances.  - Pt on Oxy IR, scheduled tylenol. Consider change to ibuprofen/norco.  - Encourage ambulation.   - Heme: Pre Delivery h/h 9./ --> Post Delivery h/h 8.2/25.4> 6.8/21.2> 7.5/23> 7.3/23.3> 6.9/21.8. See acute blood loss anemia problem.  - Contraception - s/p BTL  - Lactation - The patient is not breastfeeding.   - Rh Status - pos        Subarachnoid hemorrhage    - Patient s/p SAH with coil embolization and EVD in 2018  - Goal for BP is to remain below 160/100  - Pt stepped down from ICU  psotpartum  - Coags wnl  - No symptoms, neurologically stable at this time; cleared prior to transport to [facility] by neurosurgery        Hypokalemia    - K 3.1 > 3.7 > 3.6 > 3.3 > 3.3  - replaced yesterday. Will replace again this AM.            Disposition: As patient meets milestones, will plan to discharge tomorrow if BP and pain are well-controlled.    Sushma K Reddy, MD  Obstetrics  [facility]

## 2018-10-02 NOTE — SUBJECTIVE & OBJECTIVE
Interval:  POD#5 s/p 1LTCS. BP monitoring continues. Abx discontinued yesterday morning after 48 hours. Patient never developed fever. Pain is improved this morning and controlled on her current pain medications. Vaginal bleeding minimal. Passing flatus. Voiding spontaneously. Tolerating regular diet without n/v. Ambulating independently. Overall improving.  She denies headache, chest pain, SOB, RUQ pain, or vision changes.     Objective:     Vital Signs (Most Recent):  Temp: 97.3 °F (36.3 °C) (10/01/18 1927)  Pulse: 82 (10/01/18 1951)  Resp: 18 (10/01/18 1927)  BP: (!) 149/91 (10/01/18 1951)  SpO2: 100 % (10/01/18 1951) Vital Signs (24h Range):  Temp:  [97.3 °F (36.3 °C)-98.2 °F (36.8 °C)] 97.3 °F (36.3 °C)  Pulse:  [] 82  Resp:  [18] 18  SpO2:  [18 %-100 %] 100 %  BP: (139-158)/(80-93) 149/91     Weight: 74.8 kg (165 lb)  Body mass index is 24.37 kg/m².      Intake/Output Summary (Last 24 hours) at 10/1/2018 2206  Last data filed at 10/1/2018 1800  Gross per 24 hour   Intake 3995.83 ml   Output 2300 ml   Net 1695.83 ml     Significant Labs:  Recent Lab Results       10/01/18  0454      Albumin 2.3     Alkaline Phosphatase 88     ALT 13     Anion Gap 10     AST 16     Baso # 0.01     Basophil% 0.1     Total Bilirubin 0.2  Comment:  For infants and newborns, interpretation of results should be based  on gestational age, weight and in agreement with clinical  observations.  Premature Infant recommended reference ranges:  Up to 24 hours.............<8.0 mg/dL  Up to 48 hours............<12.0 mg/dL  3-5 days..................<15.0 mg/dL  6-29 days.................<15.0 mg/dL       BUN, Bld 10     Calcium 8.5     Chloride 102     CO2 27     Creatinine 0.7     Differential Method Automated     eGFR if  >60     eGFR if non  >60  Comment:  Calculation used to obtain the estimated glomerular filtration  rate (eGFR) is the CKD-EPI equation.        Eos # 0.1     Eosinophil% 0.7      Glucose 75     Gran # (ANC) 3.9     Gran% 58.1     Hematocrit 21.8     Hemoglobin 6.9     Lymph # 2.1     Lymph% 31.3     Magnesium 1.6     MCH 27.8     MCHC 31.7     MCV 88     Mono # 0.6     Mono% 8.9     MPV 10.3     Platelets 248     Potassium 3.3     Total Protein 6.2     RBC 2.48     RDW 15.4     Sodium 139     WBC 6.71         Physical Exam:   Constitutional: She is oriented to person, place, and time. She appears well-developed and well-nourished. No distress.    HENT:   Head: Normocephalic and atraumatic.    Eyes: Conjunctivae and EOM are normal.    Neck: Normal range of motion. Neck supple.    Cardiovascular: Normal rate, regular rhythm and normal heart sounds.     Pulmonary/Chest: Effort normal and breath sounds normal. No respiratory distress. She has no wheezes. She has no rales.        Abdominal: Soft. Bowel sounds are normal. She exhibits abdominal incision (incision c/d/i without erythema or induration. mild kashif-incisional swelling.). She exhibits no distension. There is tenderness (mild, appropriate). There is no rebound and no guarding.   Fundus firm below umbilicus     Genitourinary:   Genitourinary Comments: deferred           Musculoskeletal: Normal range of motion and moves all extremeties. She exhibits no edema.   No leg swelling or tenderness. Teds/SCDs not place. Encouraged pt to use when in bed.       Neurological: She is alert and oriented to person, place, and time. She has normal reflexes. She displays normal reflexes.    Skin: Skin is warm and dry. She is not diaphoretic.    Psychiatric: She has a normal mood and affect. Her behavior is normal. Judgment and thought content normal.

## 2018-10-02 NOTE — ASSESSMENT & PLAN NOTE
Postpartum care:  - Patient doing well. Continue routine management and advances.  - Pt on Oxy IR, scheduled tylenol. Consider change to ibuprofen/norco.  - Encourage ambulation.   - Heme: Pre Delivery h/h 9.2/29 --> Post Delivery h/h 8.2/25.4> 6.8/21.2> 7.5/23> 7.3/23.3> 6.9/21.8. See acute blood loss anemia problem.  - Contraception - s/p BTL  - Lactation - The patient is not breastfeeding.   - Rh Status - pos

## 2018-10-03 ENCOUNTER — TELEPHONE (OUTPATIENT)
Dept: MATERNAL FETAL MEDICINE | Facility: CLINIC | Age: 33
End: 2018-10-03

## 2018-10-03 VITALS
RESPIRATION RATE: 16 BRPM | TEMPERATURE: 98 F | HEART RATE: 85 BPM | HEIGHT: 69 IN | WEIGHT: 165 LBS | BODY MASS INDEX: 24.44 KG/M2 | DIASTOLIC BLOOD PRESSURE: 90 MMHG | OXYGEN SATURATION: 100 % | SYSTOLIC BLOOD PRESSURE: 152 MMHG

## 2018-10-03 PROBLEM — O60.03 PRETERM LABOR IN THIRD TRIMESTER WITHOUT DELIVERY: Status: RESOLVED | Noted: 2018-09-27 | Resolved: 2018-10-03

## 2018-10-03 PROBLEM — E87.6 HYPOKALEMIA: Status: RESOLVED | Noted: 2018-08-13 | Resolved: 2018-10-03

## 2018-10-03 PROBLEM — N71.9 ENDOMETRITIS: Status: RESOLVED | Noted: 2018-09-29 | Resolved: 2018-10-03

## 2018-10-03 LAB
ALBUMIN SERPL BCP-MCNC: 2.4 G/DL
ALP SERPL-CCNC: 72 U/L
ALT SERPL W/O P-5'-P-CCNC: 10 U/L
ANION GAP SERPL CALC-SCNC: 8 MMOL/L
AST SERPL-CCNC: 8 U/L
BASOPHILS # BLD AUTO: 0.02 K/UL
BASOPHILS NFR BLD: 0.3 %
BILIRUB SERPL-MCNC: 0.3 MG/DL
BUN SERPL-MCNC: 15 MG/DL
CALCIUM SERPL-MCNC: 8.9 MG/DL
CHLORIDE SERPL-SCNC: 104 MMOL/L
CO2 SERPL-SCNC: 28 MMOL/L
CREAT SERPL-MCNC: 0.7 MG/DL
DIFFERENTIAL METHOD: ABNORMAL
EOSINOPHIL # BLD AUTO: 0.1 K/UL
EOSINOPHIL NFR BLD: 1.2 %
ERYTHROCYTE [DISTWIDTH] IN BLOOD BY AUTOMATED COUNT: 15.3 %
EST. GFR  (AFRICAN AMERICAN): >60 ML/MIN/1.73 M^2
EST. GFR  (NON AFRICAN AMERICAN): >60 ML/MIN/1.73 M^2
GLUCOSE SERPL-MCNC: 84 MG/DL
HCT VFR BLD AUTO: 22.7 %
HGB BLD-MCNC: 7.1 G/DL
LYMPHOCYTES # BLD AUTO: 2 K/UL
LYMPHOCYTES NFR BLD: 28.1 %
MCH RBC QN AUTO: 27.7 PG
MCHC RBC AUTO-ENTMCNC: 31.3 G/DL
MCV RBC AUTO: 89 FL
MONOCYTES # BLD AUTO: 0.6 K/UL
MONOCYTES NFR BLD: 8 %
NEUTROPHILS # BLD AUTO: 4.5 K/UL
NEUTROPHILS NFR BLD: 62 %
PLATELET # BLD AUTO: 286 K/UL
PMV BLD AUTO: 9.6 FL
POTASSIUM SERPL-SCNC: 3.7 MMOL/L
PROT SERPL-MCNC: 6.4 G/DL
RBC # BLD AUTO: 2.56 M/UL
SODIUM SERPL-SCNC: 140 MMOL/L
WBC # BLD AUTO: 7.26 K/UL

## 2018-10-03 PROCEDURE — 36415 COLL VENOUS BLD VENIPUNCTURE: CPT

## 2018-10-03 PROCEDURE — 25000003 PHARM REV CODE 250: Performed by: STUDENT IN AN ORGANIZED HEALTH CARE EDUCATION/TRAINING PROGRAM

## 2018-10-03 PROCEDURE — 85025 COMPLETE CBC W/AUTO DIFF WBC: CPT

## 2018-10-03 PROCEDURE — 99238 HOSP IP/OBS DSCHRG MGMT 30/<: CPT | Mod: ,,, | Performed by: OBSTETRICS & GYNECOLOGY

## 2018-10-03 PROCEDURE — 80053 COMPREHEN METABOLIC PANEL: CPT

## 2018-10-03 RX ORDER — ACETAMINOPHEN 325 MG/1
650 TABLET ORAL EVERY 6 HOURS
Qty: 30 TABLET | Refills: 2 | Status: ON HOLD | OUTPATIENT
Start: 2018-10-03 | End: 2024-03-27 | Stop reason: HOSPADM

## 2018-10-03 RX ORDER — OXYCODONE HYDROCHLORIDE 5 MG/1
5 TABLET ORAL EVERY 4 HOURS PRN
Qty: 40 TABLET | Refills: 0 | Status: SHIPPED | OUTPATIENT
Start: 2018-10-03 | End: 2018-10-11 | Stop reason: SDUPTHER

## 2018-10-03 RX ORDER — NIFEDIPINE 90 MG/1
90 TABLET, EXTENDED RELEASE ORAL DAILY
Qty: 30 TABLET | Refills: 11 | Status: SHIPPED | OUTPATIENT
Start: 2018-10-04 | End: 2019-10-29 | Stop reason: SDUPTHER

## 2018-10-03 RX ORDER — SIMETHICONE 80 MG
80 TABLET,CHEWABLE ORAL EVERY 6 HOURS
Refills: 0 | COMMUNITY
Start: 2018-10-03 | End: 2022-03-12 | Stop reason: RX

## 2018-10-03 RX ORDER — ZOLPIDEM TARTRATE 5 MG/1
5 TABLET ORAL NIGHTLY PRN
Qty: 10 TABLET | Refills: 0 | Status: ON HOLD | OUTPATIENT
Start: 2018-10-03 | End: 2021-04-21 | Stop reason: HOSPADM

## 2018-10-03 RX ADMIN — OXYCODONE HYDROCHLORIDE 5 MG: 5 TABLET ORAL at 09:10

## 2018-10-03 RX ADMIN — NIFEDIPINE 90 MG: 30 TABLET, FILM COATED, EXTENDED RELEASE ORAL at 09:10

## 2018-10-03 RX ADMIN — DOCUSATE SODIUM 200 MG: 100 CAPSULE, LIQUID FILLED ORAL at 09:10

## 2018-10-03 RX ADMIN — ACETAMINOPHEN 650 MG: 325 TABLET, FILM COATED ORAL at 01:10

## 2018-10-03 RX ADMIN — SIMETHICONE CHEW TAB 80 MG 80 MG: 80 TABLET ORAL at 01:10

## 2018-10-03 RX ADMIN — SIMETHICONE CHEW TAB 80 MG 80 MG: 80 TABLET ORAL at 06:10

## 2018-10-03 RX ADMIN — ACETAMINOPHEN 650 MG: 325 TABLET, FILM COATED ORAL at 06:10

## 2018-10-03 NOTE — TELEPHONE ENCOUNTER
Received message from L&D Staff regarding setting up patient for a one week follow up blood pressure check.     Patient is still admitted to the hospital and did not answer when nurse phoned her. Appointment made for Friday 10/12/18 with Dr. Danita Floyd. Appointment slip to be placed in mail and should print on her discharge summary.     Message left to call [facility] back at her earliest convenience at [phone], if she has any questions or concerns.

## 2018-10-03 NOTE — PROGRESS NOTES
[facility]  Obstetrics  Postpartum Progress Note    Patient Name: Sharon Grande  MRN: [id number]  Admission Date: 9/27/2018  Hospital Length of Stay: 6 days  Attending Physician: No att. providers found  Primary Care Provider: Roman Archibald MD    Subjective:     Principal Problem:Pre-eclampsia, severe    Interval:  POD#6 s/p 1LTCS/BTL. Blood pressures improved on procardia 90XL. Pain is well-controlled on oral pain medications. Vaginal bleeding minimal. Passing flatus. Voiding spontaneously. Tolerating regular diet without n/v. Ambulating independently without difficulty.  She denies headache, chest pain, SOB, RUQ pain, or vision changes.   She denies any lightheadedness or dizziness with standing.     Objective:     Vital Signs (Most Recent):  Temp: 98.1 °F (36.7 °C) (10/02/18 1609)  Pulse: 80 (10/02/18 1609)  Resp: 18 (10/02/18 1609)  BP: 135/88 (10/02/18 1609)  SpO2: 100 % (10/02/18 1609) Vital Signs (24h Range):  Temp:  [97.8 °F (36.6 °C)-98.7 °F (37.1 °C)] 98.1 °F (36.7 °C)  Pulse:  [77-96] 80  Resp:  [18] 18  SpO2:  [100 %] 100 %  BP: (135-145)/(80-90) 135/88     Weight: 74.8 kg (165 lb)  Body mass index is 24.37 kg/m².      Intake/Output Summary (Last 24 hours) at 10/2/2018 2102  Last data filed at 10/1/2018 2326  Gross per 24 hour   Intake 500 ml   Output 600 ml   Net -100 ml     Significant Labs:  Recent Labs   Lab  10/03/18   0546   WBC  7.26   RBC  2.56*   HGB  7.1*   HCT  22.7*   PLT  286   MCV  89   MCH  27.7   MCHC  31.3*     Recent Labs   Lab  09/27/18   1655  09/28/18   0338   09/29/18   0542  09/30/18   0422  10/01/18   0454  10/02/18   0523  10/03/18   0546   NA  137  133*   --   139  139  139  140  140   K  3.1*  3.7   --   4.8  3.6  3.3*  3.3*  3.7   CL  104  103   --   109  105  102  103  104   CO2  24  19*   --   24  24  27  27  28   BUN  10  7   --   10  9  10  10  15   CREATININE  0.7  0.7   --   0.7  0.7  0.7  0.7  0.7   GLU  86  202*   --   82  67*  75  88  84   PROT   7.5  5.8*   --   5.8*  6.7  6.2  6.4  6.4   BILITOT  0.2  0.2   --   0.1  0.2  0.2  0.3  0.3   ALKPHOS  108  82   --   74  88  88  77  72   ALT  7*  6*   --   7*  7*  13  13  10   AST  11  9*   --   9*  10  16  13  8*   MG  1.6  4.8*   < >  2.9*  1.7  1.6   --    --    PHOS  2.3*  2.2*   --   3.1   --    --    --    --     < > = values in this interval not displayed.        Physical Exam:   Constitutional: She is oriented to person, place, and time. She appears well-developed and well-nourished. No distress.    HENT:   Head: Normocephalic and atraumatic.    Eyes: Conjunctivae and EOM are normal.    Neck: Normal range of motion. Neck supple.    Cardiovascular: Normal rate, regular rhythm and normal heart sounds.     Pulmonary/Chest: Effort normal and breath sounds normal. No respiratory distress. She has no wheezes. She has no rales.        Abdominal: Soft. Bowel sounds are normal. She exhibits abdominal incision (incision c/d/i without erythema or induration.). She exhibits no distension. There is tenderness (mild, appropriate). There is no rebound and no guarding.   Fundus firm below umbilicus     Genitourinary:   Genitourinary Comments: deferred           Musculoskeletal: Normal range of motion and moves all extremeties. She exhibits no edema.   No leg swelling or tenderness. Teds/SCDs not place. Encouraged pt to use when in bed.       Neurological: She is alert and oriented to person, place, and time. She has normal reflexes. She displays normal reflexes.    Skin: Skin is warm and dry. She is not diaphoretic.    Psychiatric: She has a normal mood and affect. Her behavior is normal. Judgment and thought content normal.       Assessment/Plan:     32 y.o. female  for:    * Pre-eclampsia, severe    - h/o CHTN on Procardia 30XL at home   - increased to 60XL  and to 90XL 10/02 after additional 30XL given on 10/01.   - medication tolerated well with good BP control at this time  - No symptoms of pre-eclampsia  at this time.  - s/p Magnesium sulfate for seizure prophylaxis   Temp:  [97.8 °F (36.6 °C)-98.5 °F (36.9 °C)] 98.1 °F (36.7 °C)  Pulse:  [74-90] 74  Resp:  [18] 18  SpO2:  [94 %-100 %] 97 %  BP: (135-143)/(80-92) 140/92  - Stable since stepdown from ICU on   - now s/p 1LTCS for severe range pressures in the setting of h/o subarachnoid hemorrhage with coil placement this pregnancy        Endometritis    - Afebrile, no leukocytosis. Fundal tenderness improved since initiation of antibiotics.  - s/p Amp/Gent/Clinda for 48 hours        Acute blood loss anemia    Post Delivery h/h 8.2/25.4>6.8/21.2>7.5/23> 6.9/21.8 > 7.1/22.7 - stable  - vitals are stable and normal  - patient asymptomatic with no dizziness/lightheadeness/chest pain or SOB  - EBL from LTCS was 650 cc  - reports only light vaginal bleeding  - continue to monitor        History of bilateral tubal ligation    - at the time of LTCS         delivery delivered    Postpartum care:  - Patient doing well. Continue routine management and advances.  - Pt on Oxy IR, scheduled tylenol. Consider change to ibuprofen/norco.  - Encourage ambulation.   - Heme: Pre Delivery h/h . --> Post Delivery h/h 8.2/25.4> 6.8/21.2> 7.5/23> 7.3/23.3> 6.9/21.8>7.1/22.7   - See acute blood loss anemia problem.  - Contraception - s/p BTL  - Lactation - The patient is not breastfeeding.   - Rh Status - pos        Subarachnoid hemorrhage    - Patient s/p SAH with coil embolization and EVD in 2018  - Goal for BP is to remain below 160/100  - Pt stepped down from ICU psotpartum  - Coags wnl  - No symptoms, neurologically stable at this time; cleared prior to transport to [facility] by neurosurgery        Hypokalemia    - K 3.1 > 3.7 > 3.6 > 3.3 > 3.3 > 3.7  - stable            Disposition: As patient meets milestones, will plan to discharge today.    Sushma K Reddy, MD  Obstetrics  [facility]

## 2018-10-03 NOTE — SUBJECTIVE & OBJECTIVE
Interval:  POD#6 s/p 1LTCS/BTL. Blood pressures improved on procardia 90XL. Pain is well-controlled on oral pain medications. Vaginal bleeding minimal. Passing flatus. Voiding spontaneously. Tolerating regular diet without n/v. Ambulating independently without difficulty.  She denies headache, chest pain, SOB, RUQ pain, or vision changes.   She denies any lightheadedness or dizziness with standing.     Objective:     Vital Signs (Most Recent):  Temp: 98.1 °F (36.7 °C) (10/02/18 1609)  Pulse: 80 (10/02/18 1609)  Resp: 18 (10/02/18 1609)  BP: 135/88 (10/02/18 1609)  SpO2: 100 % (10/02/18 1609) Vital Signs (24h Range):  Temp:  [97.8 °F (36.6 °C)-98.7 °F (37.1 °C)] 98.1 °F (36.7 °C)  Pulse:  [77-96] 80  Resp:  [18] 18  SpO2:  [100 %] 100 %  BP: (135-145)/(80-90) 135/88     Weight: 74.8 kg (165 lb)  Body mass index is 24.37 kg/m².      Intake/Output Summary (Last 24 hours) at 10/2/2018 2102  Last data filed at 10/1/2018 2326  Gross per 24 hour   Intake 500 ml   Output 600 ml   Net -100 ml     Significant Labs:  Recent Lab Results       10/02/18  0523      Albumin 2.4     Alkaline Phosphatase 77     ALT 13     Anion Gap 10     AST 13     Baso # 0.01     Basophil% 0.2     Total Bilirubin 0.3  Comment:  For infants and newborns, interpretation of results should be based  on gestational age, weight and in agreement with clinical  observations.  Premature Infant recommended reference ranges:  Up to 24 hours.............<8.0 mg/dL  Up to 48 hours............<12.0 mg/dL  3-5 days..................<15.0 mg/dL  6-29 days.................<15.0 mg/dL       BUN, Bld 10     Calcium 9.0     Chloride 103     CO2 27     Creatinine 0.7     Differential Method Automated     eGFR if  >60     eGFR if non  >60  Comment:  Calculation used to obtain the estimated glomerular filtration  rate (eGFR) is the CKD-EPI equation.        Eos # 0.1     Eosinophil% 1.4     Glucose 88     Gran # (ANC) 4.0     Gran% 59.9      Hematocrit 22.5     Hemoglobin 7.2     Lymph # 2.0     Lymph% 30.4     MCH 28.0     MCHC 32.0     MCV 88     Mono # 0.5     Mono% 7.5     MPV 9.7     Platelets 268     Potassium 3.3     Total Protein 6.4     RBC 2.57     RDW 15.3     Sodium 140     WBC 6.64         Physical Exam:   Constitutional: She is oriented to person, place, and time. She appears well-developed and well-nourished. No distress.    HENT:   Head: Normocephalic and atraumatic.    Eyes: Conjunctivae and EOM are normal.    Neck: Normal range of motion. Neck supple.    Cardiovascular: Normal rate, regular rhythm and normal heart sounds.     Pulmonary/Chest: Effort normal and breath sounds normal. No respiratory distress. She has no wheezes. She has no rales.        Abdominal: Soft. Bowel sounds are normal. She exhibits abdominal incision (incision c/d/i without erythema or induration.). She exhibits no distension. There is tenderness (mild, appropriate). There is no rebound and no guarding.   Fundus firm below umbilicus     Genitourinary:   Genitourinary Comments: deferred           Musculoskeletal: Normal range of motion and moves all extremeties. She exhibits no edema.   No leg swelling or tenderness. Teds/SCDs not place. Encouraged pt to use when in bed.       Neurological: She is alert and oriented to person, place, and time. She has normal reflexes. She displays normal reflexes.    Skin: Skin is warm and dry. She is not diaphoretic.    Psychiatric: She has a normal mood and affect. Her behavior is normal. Judgment and thought content normal.

## 2018-10-03 NOTE — PLAN OF CARE
Problem: Patient Care Overview  Goal: Plan of Care Review  Vital signs stable, incision healing, pain relieved c po medication, baby in NICU--visits frequently; formula feeding baby; discharge instructions given--postpartum care, breast care,  care; follow up visits c OB and neuro scheduled; discharged to home c significant other

## 2018-10-03 NOTE — PLAN OF CARE
"Problem: Patient Care Overview  Goal: Plan of Care Review  Outcome: Ongoing (interventions implemented as appropriate)  VSS this shift. Pain controlled with PRN and scheduled oral pain medication. Gas/constipaiton pain controlled with scheduled and PRN medication, pt states gas pains are "getting better".  Emphasized importance for pt to walk around unit to decrease muscle stiffness and pain, pt verbalized understanding. Pt wearing abdominal binder, incision open to air, and is c/d/i.  Pt able to sleep during shift after administration of 10 mg ambien PO.  Pt visited NICU via w/c with family member.       "

## 2018-10-03 NOTE — PLAN OF CARE
Met with pt today prior to d/c. Pt reported she is feeling much better and is ready to go home. Discussed scheduling PCP appt for pt with [facility]. Pt was in agreement with this plan. Pt significant other will be providing transportation home. Pt aware that her PCP appt will be listed in her d/c paperwork. No further d/c planning needs.     [facility] with Dr. Fabiola Hernandez on 10/9/18 at 10:30 am to establish a PCP.  Appointment date: 10/9/18  Appointment time: 10:30 am    Melinda Bhatia LCSW    [facility]  [email or web address]    (phone) [phone] or  [address]. [id number]  (fax) [phone]

## 2018-10-03 NOTE — ASSESSMENT & PLAN NOTE
Post Delivery h/h 8.2/25.4>6.8/21.2>7.5/23> 6.9/21.8 > 7.1/22.7 - stable  - vitals are stable and normal  - patient asymptomatic with no dizziness/lightheadeness/chest pain or SOB  - EBL from LTCS was 650 cc  - reports only light vaginal bleeding  - continue to monitor

## 2018-10-03 NOTE — DISCHARGE INSTRUCTIONS
Call or come into labor and delivery if you are experiencing the following:  - Headaches not relieved by tylenol  - Vision changes (blurriness, spots)  - Chest pain  - Shortness of breath  - Pain in your upper abdomen  - Severe, uncontrolled pain  - Signs of infection in the incision (drainage that is bloody or smells foul, redness around the incision, fevers or chills)    Phone number for Labor and Delivery: [phone]    Review postpartum booklet-- Caring for Yourself After Delivery,  Incision Care, Managing Non-Nursing Engorgement

## 2018-10-03 NOTE — DISCHARGE SUMMARY
[facility]  Obstetrics  Discharge Summary      Follow-up with MFM on Friday (with Dr. Floyd). [phone]. (4th floor [facility]).    Patient Name: Sharon Grande  MRN: [id number]  Admission Date: 2018  Hospital Length of Stay: 6 days  Discharge Date and Time:  10/03/2018 10:47 AM  Attending Physician: No att. providers found   Discharging Provider: Sushma K Reddy, MD  Primary Care Provider: Roman Archibald MD    HPI: Sharon Grande is a 32 y.o. [id number] at 31w4d with history significant for HTN, polycystic kidney disease, and subarachnoid hemorrhage this pregnancy s/p coil embolization and drain placement who presented to the [facility] ED with complaints of contractions/back pains every few minutes. The pain started 2 days ago and worsened last night, occurring more frequently and more painfully, prompting her to come in.   She has a history of CHTN and is on procardia 30XL daily. She did not take her medication this morning and in the ED had severely elevated blood pressures. Her procardia was given to her and subsequent blood pressures were 140s systolic. She was seen by neurosurgery who cleared her as neurologically stable and OK for transport to [facility].  Since her subarachnoid hemorrhage, patient has had intermittent mild headaches; she had a headache in the [facility] ED and denies one currently. She also reports having RUQ pain at times but states it is more in the area just below her right breast and not abdominal. She denies any abdominal pain or RUQ pain currently.  In the [facility], patient reported contractions had spaced out to q20-30 minutes and were less painful. Patient denies vaginal bleeding or LOF and reports good fetal movement. SVE was performed and pt was found to be 3/70/-3. Patient then proceeded to have severely elevated blood pressures that were sustained requiring IV labetalol 20 and then 40.  Patient denies headaches, chest pain, SOB, RUQ  pain, or vision changes at this time.  She has received a course of BMZ on - during her prior admission.    Procedure(s) (LRB):   SECTION (N/A)     Hospital Course:   2018 - patient seen at [facility] ED with complaints of contractions and elevated blood pressures. Home procardia given in ED. Patient found to be 1.5cm dilated and cleared by neurosurgery for transport to [facility] for w/u of threatened PTL and pre-eclampsia. In the [place], patient was found to be 3cm dilated and then proceeded to have severe range blood pressures requiring IV antihypertensives. Mag sulfate was started for seizure prophylaxis. BMZ x 1 was given in [place]. Plan was made to deliver via  section given severely elevated blood pressures in a patient with h/o aneurysm and SAH. Patient desires BTL, consents signed for CS and BTL. Underwent uncomplicated  delivery. After delivery, had severe range pressures requiring labetalol 20 x 2. Procardia was increased to 60XL to start in AM.  2018 - POD#1 s/p 1LTCS.  Doing well this morning. Continued on magnesium sulfate for seizure prophylaxis. Patient is starting to meet post-op goals. Her pain is well-controlled with her pain medication. She has not yet eaten breakfast this morning but denies n/v. Vaginal bleeding is minimal. She is ambulating and voiding via thomas an adequate amount. She denies headaches, chest pain, SOB, RUQ pain, or vision changes. Stable for step down to floor. Will monitor blood pressures closely.  2018 POD #2. MgSO4 discontinued after 24 hours. BPs well controlled overnight without additional intervention. Last elevated at noon yesterday. Overnight, she complained of incisional pain that required additional PO medications and IV dilaudid this AM. UOP was adequate through the MgSO4 therapy window. No issues this AM. Denies HA, vision changes, RUQ/epigastric pain, SOB/CP.   2018 POD#3. S/p Mag x24 hours. Yesterday, diagnosed with  endometritis. Amp/Gent/Clinda started. Pt continues to take pain medication every 4 hours, but states she is feeling much better. Repeat CBC shows stable h/H.  10/01/2018 POD#4 s/p 1LTCS. Continued on antibiotics for abdominal tenderness, no fevers. Will continue through 48 hours of abx. States her pain improved following abx but is worse this morning. States she believes it may be due to not taking any pain medications through the night as she slept throughout the night without waking up. Denies fevers or chills overnight. Otherwise meeting post-op goals (ambulating, tolerating regular diet without n/v, voiding spontaneously, passing flatus, vaginal bleeding minimal). Blood pressures elevated nearing 160 systolic, in 90s diastolic. Procardia 30XL given in PM, AM dose increased to 90XL.  10/02/2018 POD#5 s/p 1LTCS. BP monitoring continues. Abx discontinued yesterday morning after 48 hours. Patient never developed fever. No leukocytosis. Pain is improved this morning and controlled on her pain medications. Vaginal bleeding minimal. Passing flatus. Voiding spontaneously. Tolerating regular diet without n/v. Ambulating independently. Overall doing better. Will monitor blood pressures on new dose of procardia 90XL.  10/03/2018 POD#6 s/p 1LTCS/BTL. Blood pressures improved on procardia 90XL. Patient doing well post-operatively and meeting all post-op goals. Denies any symptoms of pre-eclampsia including headache. She is stable for discharge today. Precautions given for return including post-operative precautions and pre-eclampsia precautions. Patient to follow up in [facility] clinic next week, message sent to [facility] clinic to have patient scheduled for follow up for BP check.    Consults (From admission, onward)        Status Ordering Provider     Inpatient consult to Critical Care Medicine  Once     Provider:  Lakshmi Bush MD    Completed YELENA DICKENS     Inpatient consult to Neurosurgery  Once     Provider:  (Not yet  assigned)    LOBITO Arteaga          Final Active Diagnoses:    Diagnosis Date Noted POA    PRINCIPAL PROBLEM:  Pre-eclampsia, severe [O14.10] 2018 Yes    Acute blood loss anemia [D62] 2018 No    Other headache syndrome [G44.89] 2018 Yes     delivery delivered [O82] 2018 No    History of bilateral tubal ligation [Z98.51] 2018 Not Applicable    Subarachnoid hemorrhage [I60.9]  Yes      Problems Resolved During this Admission:    Diagnosis Date Noted Date Resolved POA    Endometritis [N71.9] 2018 10/03/2018 Yes     labor in third trimester without delivery [O60.03] 2018 10/03/2018 Yes    Hypokalemia [E87.6] 2018 10/03/2018 Yes        Labs:   CMP   Recent Labs   Lab  10/02/18   0523  10/03/18   0546   NA  140  140   K  3.3*  3.7   CL  103  104   CO2  27  28   GLU  88  84   BUN  10  15   CREATININE  0.7  0.7   CALCIUM  9.0  8.9   PROT  6.4  6.4   ALBUMIN  2.4*  2.4*   BILITOT  0.3  0.3   ALKPHOS  77  72   AST  13  8*   ALT  13  10   ANIONGAP  10  8   ESTGFRAFRICA  >60  >60   EGFRNONAA  >60  >60    and CBC   Recent Labs   Lab  10/02/18   0523  10/03/18   0546   WBC  6.64  7.26   HGB  7.2*  7.1*   HCT  22.5*  22.7*   PLT  268  286       Feeding Method: bottle (infant is in NICU, patient is not pumping)    Immunizations     Date Immunization Status Dose Route/Site Given by    18 MMR Incomplete 0.5 mL Subcutaneous/Left deltoid     18 Tdap Incomplete 0.5 mL Intramuscular/Left deltoid           Delivery:    Episiotomy: None   Lacerations: None   Repair suture:     Repair # of packets:     Blood loss (ml): 0     Birth information:  YOB: 2017   Time of birth: 7:06 PM   Sex: female   Delivery type: , Low Transverse   Gestational Age: 31w4d    Delivery Clinician:      Other providers:       Additional  information:  Forceps:    Vacuum:    Breech:    Observed anomalies      Living?:            APGARS  One minute Five minutes Ten minutes   Skin color:         Heart rate:         Grimace:         Muscle tone:         Breathing:         Totals: 6  7  9      Placenta: Delivered:       appearance    Pending Diagnostic Studies:     None          Discharged Condition: good    Disposition: Home or Self Care    Follow Up:  Follow-up Information     [facility]. Go on 10/9/2018.    Why:  For Primary Care Physician  Contact information:  [facility]  [address] / [place] [address]   Tel: [phone]    Appointment date: 10/9/18  Appointment time: 10:30 am           [facility]aternal [facility]etal [facility] In 1 week.    Specialty:  Maternal and Fetal Medicine  Why:  BP check  Contact information:  [address]  [facility] [address] [phone]  Additional information:  4th floor               Patient Instructions:      Other restrictions (specify):   Order Comments: Pelvic rest until follow up visit. Nothing in vagina -no sex, tampons, douching, etc.    No baths (showers only) for 6 weeks     Call MD for:  temperature >100.4     Call MD for:  persistent nausea and vomiting or diarrhea     Call MD for:  severe uncontrolled pain     Call MD for:  redness, tenderness, or signs of infection (pain, swelling, redness, odor or green/yellow discharge around incision site)     Call MD for:  difficulty breathing or increased cough     Call MD for:  severe persistent headache     Call MD for:  worsening rash     Call MD for:  persistent dizziness, light-headedness, or visual disturbances     Call MD for:  increased confusion or weakness     Call MD for:   Order Comments: Vaginal bleeding soaking 1-2 pads per hour for 2 or more hours     Medications:  Current Discharge Medication List      START taking these medications    Details   oxyCODONE (ROXICODONE) 5 MG immediate release tablet Take 1 tablet (5 mg total) by mouth every 4 (four) hours as needed.  Qty: 40 tablet,  Refills: 0      simethicone (MYLICON) 80 MG chewable tablet Take 1 tablet (80 mg total) by mouth every 6 (six) hours.  Refills: 0      zolpidem (AMBIEN) 5 MG Tab Take 1 tablet (5 mg total) by mouth nightly as needed.  Qty: 10 tablet, Refills: 0         CONTINUE these medications which have CHANGED    Details   acetaminophen (TYLENOL) 325 MG tablet Take 2 tablets (650 mg total) by mouth every 6 (six) hours.  Qty: 30 tablet, Refills: 2      NIFEdipine (PROCARDIA-XL) 90 MG (OSM) 24 hr tablet Take 1 tablet (90 mg total) by mouth once daily.  Qty: 30 tablet, Refills: 11         CONTINUE these medications which have NOT CHANGED    Details   ondansetron (ZOFRAN) 4 MG tablet Take 2 tablets (8 mg total) by mouth every 6 (six) hours as needed for Nausea.  Qty: 12 tablet, Refills: 0      potassium chloride SA (K-DUR,KLOR-CON) 20 MEQ tablet Take 1 tablet (20 mEq total) by mouth once daily.  Qty: 6 tablet, Refills: 0         STOP taking these medications       niMODipine (NIMOTOP) 30 MG Cap Comments:   Reason for Stopping:         prenatal multivit-Ca-min-Fe-FA (PRENATAL VITAMIN) Tab Comments:   Reason for Stopping:         sodium chloride 1 gram tablet Comments:   Reason for Stopping:               Sushma K Reddy, MD  Obstetrics  [facility]

## 2018-10-03 NOTE — ASSESSMENT & PLAN NOTE
Postpartum care:  - Patient doing well. Continue routine management and advances.  - Pt on Oxy IR, scheduled tylenol. Consider change to ibuprofen/norco.  - Encourage ambulation.   - Heme: Pre Delivery h/h 9.2/29 --> Post Delivery h/h 8.2/25.4> 6.8/21.2> 7.5/23> 7.3/23.3> 6.9/21.8>7.1/22.7   - See acute blood loss anemia problem.  - Contraception - s/p BTL  - Lactation - The patient is not breastfeeding.   - Rh Status - pos

## 2018-10-03 NOTE — ASSESSMENT & PLAN NOTE
- h/o CHTN on Procardia 30XL at home   - increased to 60XL 9/28 and to 90XL 10/02 after additional 30XL given on 10/01.   - medication tolerated well with good BP control at this time  - No symptoms of pre-eclampsia at this time.  - s/p Magnesium sulfate for seizure prophylaxis   Temp:  [97.8 °F (36.6 °C)-98.5 °F (36.9 °C)] 98.1 °F (36.7 °C)  Pulse:  [74-90] 74  Resp:  [18] 18  SpO2:  [94 %-100 %] 97 %  BP: (135-143)/(80-92) 140/92  - Stable since stepdown from ICU on 9/28  - now s/p 1LTCS for severe range pressures in the setting of h/o subarachnoid hemorrhage with coil placement this pregnancy

## 2018-10-03 NOTE — TELEPHONE ENCOUNTER
----- Message from Sushma K Reddy, MD sent at 10/3/2018 10:45 AM CDT -----  Dear [facility] staff,  Please have Ms. Grande come into clinic next week for a blood pressure check.  Thank you!  Sushma Reddy  PGY-2, OBGYN

## 2018-10-11 ENCOUNTER — HOSPITAL ENCOUNTER (EMERGENCY)
Facility: OTHER | Age: 33
Discharge: HOME | End: 2018-10-11
Attending: OBSTETRICS & GYNECOLOGY
Payer: MEDICAID

## 2018-10-11 ENCOUNTER — OFFICE VISIT (OUTPATIENT)
Dept: NEUROSURGERY | Facility: CLINIC | Age: 33
End: 2018-10-11
Payer: MEDICAID

## 2018-10-11 VITALS
SYSTOLIC BLOOD PRESSURE: 151 MMHG | BODY MASS INDEX: 21.27 KG/M2 | WEIGHT: 144 LBS | DIASTOLIC BLOOD PRESSURE: 107 MMHG | TEMPERATURE: 99 F | HEART RATE: 71 BPM

## 2018-10-11 VITALS
DIASTOLIC BLOOD PRESSURE: 98 MMHG | TEMPERATURE: 99 F | HEART RATE: 63 BPM | RESPIRATION RATE: 18 BRPM | SYSTOLIC BLOOD PRESSURE: 137 MMHG | OXYGEN SATURATION: 100 %

## 2018-10-11 DIAGNOSIS — I60.2 SUBARACHNOID HEMORRHAGE FROM ANTERIOR COMMUNICATING ARTERY ANEURYSM: ICD-10-CM

## 2018-10-11 DIAGNOSIS — L76.82 OTHER POSTOPERATIVE COMPLICATION OF SUBCUTANEOUS TISSUE: ICD-10-CM

## 2018-10-11 DIAGNOSIS — Z98.890 S/P COIL EMBOLIZATION OF CEREBRAL ANEURYSM: Primary | ICD-10-CM

## 2018-10-11 DIAGNOSIS — O14.13 SEVERE PRE-ECLAMPSIA IN THIRD TRIMESTER: Primary | ICD-10-CM

## 2018-10-11 DIAGNOSIS — T14.8XXA HEMATOMA: ICD-10-CM

## 2018-10-11 LAB
ALBUMIN SERPL BCP-MCNC: 3.6 G/DL
ALP SERPL-CCNC: 81 U/L
ALT SERPL W/O P-5'-P-CCNC: 7 U/L
ANION GAP SERPL CALC-SCNC: 11 MMOL/L
AST SERPL-CCNC: 13 U/L
BASOPHILS # BLD AUTO: 0.02 K/UL
BASOPHILS NFR BLD: 0.3 %
BILIRUB SERPL-MCNC: 0.5 MG/DL
BUN SERPL-MCNC: 14 MG/DL
CALCIUM SERPL-MCNC: 9.6 MG/DL
CHLORIDE SERPL-SCNC: 103 MMOL/L
CO2 SERPL-SCNC: 29 MMOL/L
CREAT SERPL-MCNC: 0.9 MG/DL
DIFFERENTIAL METHOD: ABNORMAL
EOSINOPHIL # BLD AUTO: 0.1 K/UL
EOSINOPHIL NFR BLD: 1.2 %
ERYTHROCYTE [DISTWIDTH] IN BLOOD BY AUTOMATED COUNT: 15.3 %
EST. GFR  (AFRICAN AMERICAN): >60 ML/MIN/1.73 M^2
EST. GFR  (NON AFRICAN AMERICAN): >60 ML/MIN/1.73 M^2
GLUCOSE SERPL-MCNC: 94 MG/DL
HCT VFR BLD AUTO: 32.4 %
HGB BLD-MCNC: 10.1 G/DL
LYMPHOCYTES # BLD AUTO: 1.4 K/UL
LYMPHOCYTES NFR BLD: 23.9 %
MCH RBC QN AUTO: 27.8 PG
MCHC RBC AUTO-ENTMCNC: 31.2 G/DL
MCV RBC AUTO: 89 FL
MONOCYTES # BLD AUTO: 0.3 K/UL
MONOCYTES NFR BLD: 5.7 %
NEUTROPHILS # BLD AUTO: 4.1 K/UL
NEUTROPHILS NFR BLD: 68.6 %
PLATELET # BLD AUTO: 471 K/UL
PMV BLD AUTO: 10 FL
POTASSIUM SERPL-SCNC: 3.2 MMOL/L
PROT SERPL-MCNC: 8.6 G/DL
RBC # BLD AUTO: 3.63 M/UL
SODIUM SERPL-SCNC: 143 MMOL/L
WBC # BLD AUTO: 5.93 K/UL

## 2018-10-11 PROCEDURE — 25000003 PHARM REV CODE 250: Performed by: STUDENT IN AN ORGANIZED HEALTH CARE EDUCATION/TRAINING PROGRAM

## 2018-10-11 PROCEDURE — 99213 OFFICE O/P EST LOW 20 MIN: CPT | Mod: S$PBB,,, | Performed by: NEUROLOGICAL SURGERY

## 2018-10-11 PROCEDURE — 99284 EMERGENCY DEPT VISIT MOD MDM: CPT | Mod: ,,, | Performed by: OBSTETRICS & GYNECOLOGY

## 2018-10-11 PROCEDURE — 25000003 PHARM REV CODE 250: Performed by: OBSTETRICS & GYNECOLOGY

## 2018-10-11 PROCEDURE — 99999 PR PBB SHADOW E&M-EST. PATIENT-LVL III: CPT | Mod: PBBFAC,,, | Performed by: NEUROLOGICAL SURGERY

## 2018-10-11 PROCEDURE — 99284 EMERGENCY DEPT VISIT MOD MDM: CPT | Mod: 25,27

## 2018-10-11 PROCEDURE — 96374 THER/PROPH/DIAG INJ IV PUSH: CPT

## 2018-10-11 PROCEDURE — 80053 COMPREHEN METABOLIC PANEL: CPT

## 2018-10-11 PROCEDURE — 85025 COMPLETE CBC W/AUTO DIFF WBC: CPT

## 2018-10-11 PROCEDURE — 99213 OFFICE O/P EST LOW 20 MIN: CPT | Mod: PBBFAC,25 | Performed by: NEUROLOGICAL SURGERY

## 2018-10-11 RX ORDER — LABETALOL HYDROCHLORIDE 5 MG/ML
20 INJECTION, SOLUTION INTRAVENOUS ONCE
Status: COMPLETED | OUTPATIENT
Start: 2018-10-11 | End: 2018-10-11

## 2018-10-11 RX ORDER — LABETALOL 200 MG/1
200 TABLET, FILM COATED ORAL ONCE
Status: COMPLETED | OUTPATIENT
Start: 2018-10-11 | End: 2018-10-11

## 2018-10-11 RX ORDER — LABETALOL 200 MG/1
200 TABLET, FILM COATED ORAL 2 TIMES DAILY
Qty: 60 TABLET | Refills: 11 | Status: SHIPPED | OUTPATIENT
Start: 2018-10-11 | End: 2019-10-29 | Stop reason: SDUPTHER

## 2018-10-11 RX ORDER — OXYCODONE HYDROCHLORIDE 5 MG/1
10 TABLET ORAL ONCE
Status: COMPLETED | OUTPATIENT
Start: 2018-10-11 | End: 2018-10-11

## 2018-10-11 RX ORDER — OXYCODONE HYDROCHLORIDE 5 MG/1
5 TABLET ORAL EVERY 4 HOURS PRN
Qty: 15 TABLET | Refills: 0 | Status: SHIPPED | OUTPATIENT
Start: 2018-10-11 | End: 2022-03-12 | Stop reason: RX

## 2018-10-11 RX ORDER — POTASSIUM CHLORIDE 20 MEQ/1
40 TABLET, EXTENDED RELEASE ORAL ONCE
Status: COMPLETED | OUTPATIENT
Start: 2018-10-11 | End: 2018-10-11

## 2018-10-11 RX ADMIN — POTASSIUM CHLORIDE 40 MEQ: 1500 TABLET, EXTENDED RELEASE ORAL at 04:10

## 2018-10-11 RX ADMIN — LABETALOL HYDROCHLORIDE 200 MG: 200 TABLET, FILM COATED ORAL at 06:10

## 2018-10-11 RX ADMIN — LABETALOL HYDROCHLORIDE 20 MG: 5 INJECTION, SOLUTION INTRAVENOUS at 03:10

## 2018-10-11 RX ADMIN — OXYCODONE HYDROCHLORIDE 10 MG: 5 TABLET ORAL at 02:10

## 2018-10-11 NOTE — DISCHARGE INSTRUCTIONS
Return to OB ED:    1. Vision changes  2. Pain under your right breast  3. Headache not relived by Tylenol  4. Shortness of breath  5. Foul smelling drainage from incision  6. Fever greater than 100.4

## 2018-10-11 NOTE — ED PROVIDER NOTES
"Encounter Date: 10/11/2018       History     Chief Complaint   Patient presents with    Wound Check     Sharon Grande is a 32 y.o. [id number] who is POD#14 and presents complaining of steady leakage from  incision since 2am today. She reports lying on her stomach last night to try to get pain relief when she woke up in a pool of "black" blood. She has not noticed any drainage until this morning. She states that she feels as if her incision has been "hard and puffy" since discharge and constantly causes her pain, although she does feel as if swelling around incision has decreased over the past few days. She says oxycodone IR helps somewhat, states she took the last of her pain pills last night. She denies n/v, fever, chills, SOB, diarrhea, and dysuria.    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 Patient takes Procardia 90XL daily.           Review of patient's allergies indicates:  No Known Allergies  Past Medical History:   Diagnosis Date    Anemia     Hypertension     since     Polycystic kidney disease     Polycystic kidney disease     Renal disorder     Since childhood      Past Surgical History:   Procedure Laterality Date    ANGIOGRAM-CEREBRAL N/A 2018    Performed by Jayna Surgeon at [facility]    CEREBRAL ANGIOGRAM N/A 2018    Procedure: ANGIOGRAM-CEREBRAL;  Surgeon: Jayna Surgeon;  Location: [facility];  Service: Anesthesiology;  Laterality: N/A;     SECTION N/A 2018    Procedure:  SECTION;  Surgeon: Obed Soto MD;  Location: [facility] L&D;  Service: OB/GYN;  Laterality: N/A;     SECTION N/A 2018    Performed by Obed Soto MD at [facility]    DILATION AND CURETTAGE OF UTERUS          DILATION AND CURETTAGE, UTERUS N/A 7/15/2013    Per[facility]    Mri (magnetic resonance imaging) N/A 8/10/2018    " Performed by Jayna Surgeon at [facility]     Family History   Problem Relation Age of Onset    Hypertension Mother     Polycystic kidney disease Mother     Hypertension Paternal Grandmother     Polycystic kidney disease Daughter      Social History     Tobacco Use    Smoking status: Never Smoker    Smokeless tobacco: Never Used   Substance Use Topics    Alcohol use: Yes     Comment: occasional    Drug use: No     Review of Systems   Constitutional: Negative for activity change, appetite change, chills, fatigue and fever.   HENT: Negative for congestion and rhinorrhea.    Eyes: Negative for visual disturbance.   Respiratory: Negative for cough, shortness of breath and wheezing.    Cardiovascular: Negative for chest pain and palpitations.   Gastrointestinal: Positive for abdominal pain (incisional pain). Negative for constipation, diarrhea, nausea and vomiting.   Genitourinary: Negative for dysuria, pelvic pain, vaginal bleeding and vaginal pain.   Musculoskeletal: Negative for back pain.   Skin: Positive for wound (dark bloody drainage from incision ). Negative for rash.   Neurological: Negative for dizziness, light-headedness and headaches.   Psychiatric/Behavioral: Negative for agitation.       Physical Exam     Initial Vitals   BP Pulse Resp Temp SpO2   10/11/18 1338 10/11/18 1338 10/11/18 1338 10/11/18 1338 10/11/18 1444   (!) 136/99 83 18 98.8 °F (37.1 °C) 98 %      MAP       --                Physical Exam    Vitals reviewed.  Constitutional: She appears well-developed and well-nourished. She is not diaphoretic. No distress.   HENT:   Head: Normocephalic and atraumatic.   Cardiovascular: Normal rate, regular rhythm and normal heart sounds.   Pulmonary/Chest: No respiratory distress.   Abdominal: Soft. She exhibits mass (Small 1cm opening to right of midline that slowly oozes dark blood, rest of incision appears clean, dry, and intact. Mass consistent with hematoma palpated around wound opening, about  8lyk1io.). She exhibits no distension. There is tenderness (very TTP around incision). There is no rebound.   Musculoskeletal: She exhibits no edema or tenderness.   Neurological: She is alert and oriented to person, place, and time.   Skin: Skin is warm and dry.   Psychiatric: She has a normal mood and affect. Her behavior is normal. Judgment and thought content normal.         ED Course   Procedures  Labs Reviewed   CBC W/ AUTO DIFFERENTIAL - Abnormal; Notable for the following components:       Result Value    RBC 3.63 (*)     Hemoglobin 10.1 (*)     Hematocrit 32.4 (*)     MCHC 31.2 (*)     RDW 15.3 (*)     Platelets 471 (*)     All other components within normal limits   COMPREHENSIVE METABOLIC PANEL - Abnormal; Notable for the following components:    Potassium 3.2 (*)     Total Protein 8.6 (*)     ALT 7 (*)     All other components within normal limits          Imaging Results          CT Abdomen Pelvis  Without Contrast (Final result)  Result time 10/11/18 16:40:37   Procedure changed from CT Chest Abdoment Pelvis Without Contrast (XPD)     Final result by Kym Torers MD (10/11/18 16:40:37)                 Impression:      Today's findings are most compatible with a predominantly right lower rectus hematoma extending into the subcutaneous soft tissues of the anterior pelvis.  Linear foci of hyperdensity within the bilateral lower rectus muscles are concerning for the deep inferior epigastric arteries as a source of the hemorrhage although this is in no a definitive from this noncontrast exam.    Known polycystic kidney and liver disease.      Electronically signed by: Kym Torres MD  Date:    10/11/2018  Time:    16:40             Narrative:    EXAMINATION:  CT ABDOMEN PELVIS WITHOUT CONTRAST    CLINICAL HISTORY:  POD#14 s/p c/s, concern for hematoma;    TECHNIQUE:  Low dose axial images, sagittal and coronal reformations were obtained from the lung bases to the pubic symphysis.  Oral contrast  was not administered.    COMPARISON:  08/10/2017    FINDINGS:  Lower chest: The visualized portions of the lungs, heart and pericardium show no abnormalities.    Sequela of polycystic kidney disease is evident in the bilateral enlarged kidneys with numerous cysts as well as the innumerable cysts throughout the enlarged liver.    There is abnormal expansion of the lower rectus muscles, predominantly affecting the right lower rectus muscle which communicates through the fascia into the subcutaneous tissues of the lower anterior pelvis.  With in the abnormal right rectus muscle, there is hyperdense material.  In the regions of the deep inferior epigastric arteries bilaterally, there is hyperdense material at the level of the lower pelvis.  A small foci of air is identified in the abnormal fluid collection in the subcutaneous tissues of the right lower pelvis.    It is difficult to assess the uterus and other pelvic structures secondary to the lack of contrast.  I do not detect any other abnormal areas of fluid in the abdomen or pelvis.  There is no free intraperitoneal air.    Gastrointestinal tract: There is marked stool retention throughout the colon.  Otherwise the unopacified colon is normal.  The small intestine is fluid-filled however no distension.  No sign of bowel obstruction.  The stomach is grossly normal.    There is suggestion of sludge within the gallbladder lumen.  Otherwise the gallbladder is normal.  Unopacified evaluation of the spleen, pancreas, adrenal glands, abdominal aorta, bladder and rectum are normal.    Osseous and soft tissue structures: The osseous structures are within normal limits.  The soft tissues outside of the lower pelvis are within normal limits.                                 Medical Decision Making:   ED Management:  - Afebrile, /99> 181/95> 167/104> 165/102, patient states that she took her Procardia 90XL today  - Labetalol 20mg IV > 140/89  - CBC and CMP WNL  - Physical  "exam consistent with draining hematoma, CT scan shows rectus hematoma with no evidence of abscess  - Will start patient on labetalol 200mg BID, first dose given in [place]  - Encouraged patient to follow-up with MFM tomorrow              Attending Attestation:   Physician Attestation Statement for Resident:  As the supervising MD   Physician Attestation Statement: I have personally seen and examined this patient.   I agree with the above history. -:   As the supervising MD I agree with the above PE.    As the supervising MD I agree with the above treatment, course, plan, and disposition.  I was personally present during the critical portions of the procedure(s) performed by the resident and was immediately available in the ED to provide services and assistance as needed during the entire procedure.  I have reviewed and agree with the residents interpretation of the following: x-rays and lab data.  I have reviewed the following: old records at this facility.                    ED Course as of Oct 12 0815   Thu Oct 11, 2018   1409 I evaluated Ms. Grande and discussed plan with Dr. Massey.  Pt presents with bleeding from CS incision since this morning.  She states it was much larger at that time.  She got in shower and it drained "a lot".  It is about 6x2 cm along length of CS incision.  There is dark blood that can be expressed. It is not purulant and not foul smelling. Likely large hematoma.  Will get CT scan to see how deep and ensure no evidence of infection  [HU]   1451 Pt now with several severe range BP's in a row.  She is in pain, and was just given oxycodone 10mg.   Will get pre-e labs and treat BP if it does not improve after pain meds  [HU]   1611 Pt had to be treated for BP's.  S/p labetalol 20 mg BP's now mild range.  Pre-e labs negative.   Will send down to CT   [HU]   1801 Case d/w Dr. Lazo.  OK to d/c home with labetalol 200mg bid.  Will give 1st dose here due to her pharmacy being closed.  Has follow up " appt w MFM tomorrow  There is unmeasured hematoma of rectus and possible extending into pelvis.  No evidence of abscess.  However, it is draining, so no further management at this time  [HU]      ED Course User Index  [HU] Avril Vance DO     Clinical Impression:   The primary encounter diagnosis was Severe pre-eclampsia in third trimester. Diagnoses of Hematoma and Other postoperative complication of subcutaneous tissue were also pertinent to this visit.                             Cherelle Massey MD  Resident  10/12/18 0816       Avril Vance DO  10/24/18 2024

## 2018-10-11 NOTE — PROGRESS NOTES
History & Physical    I, Juan Diego Leong, attest that this documentation has been prepared under the direction and in the presence of Salo Montelongo MD.    10/13/2018    Chief Complaint   Patient presents with    Follow-up       History of Present Illness:  Sharon Grande is a 32 y.o. 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 Patient presents for follow up evaluation.     Patient was discharged from the hospital on . Since then has undergone a  delivery without complication. Although today, she presents to clinic with a draining abdominal  wound.     From a neurologic standpoint, patient at this point complains of minimal headaches. She denies thunderclap headaches. She has recovered well from her hospitalizations. She denies photophobia or neck stiffness. She denies family history of aneurysms or unexplained deaths at a young age.     Review of patient's allergies indicates:  No Known Allergies    Current Outpatient Medications   Medication Sig Dispense Refill    acetaminophen (TYLENOL) 325 MG tablet Take 2 tablets (650 mg total) by mouth every 6 (six) hours. 30 tablet 2    NIFEdipine (PROCARDIA-XL) 90 MG (OSM) 24 hr tablet Take 1 tablet (90 mg total) by mouth once daily. 30 tablet 11    ondansetron (ZOFRAN) 4 MG tablet Take 2 tablets (8 mg total) by mouth every 6 (six) hours as needed for Nausea. 12 tablet 0    potassium chloride SA (K-DUR,KLOR-CON) 20 MEQ tablet Take 1 tablet (20 mEq total) by mouth once daily. 6 tablet 0    simethicone (MYLICON) 80 MG chewable tablet Take 1 tablet (80 mg total) by mouth every 6 (six) hours.  0    zolpidem (AMBIEN) 5 MG Tab Take 1 tablet (5 mg total) by mouth nightly as needed. 10 tablet 0    labetalol (NORMODYNE) 200 MG tablet Take 1 tablet (200 mg total) by mouth 2 (two) times daily. 60 tablet 11    oxyCODONE (ROXICODONE) 5 MG immediate release tablet  Take 1 tablet (5 mg total) by mouth every 4 (four) hours as needed. 15 tablet 0     No current facility-administered medications for this visit.        Past Medical History:   Diagnosis Date    Anemia     Hypertension     since     Polycystic kidney disease     Polycystic kidney disease     Renal disorder     Since childhood        Past Surgical History:   Procedure Laterality Date    ANGIOGRAM-CEREBRAL N/A 2018    Performed by [facility] at [facility]    CEREBRAL ANGIOGRAM N/A 2018    Procedure: ANGIOGRAM-CEREBRAL;  Surgeon: Jayna Surgeon;  Location: [facility];  Service: Anesthesiology;  Laterality: N/A;     SECTION N/A 2018    Procedure:  SECTION;  Surgeon: Obed Soto MD;  Location: [facility] L&D;  Service: OB/GYN;  Laterality: N/A;     SECTION N/A 2018    Performed by Obed Soto MD at [facility] L[facility]D    DILATION AND CURETTAGE OF UTERUS          DILATION AND CURETTAGE, UTERUS N/A 7/15/2013    [facility]    Mri (magnetic resonance imaging) N/A 8/10/2018    Performed by Jayna Surgeon at [facility]       Family History   Problem Relation Age of Onset    Hypertension Mother     Polycystic kidney disease Mother     Hypertension Paternal Grandmother     Polycystic kidney disease Daughter        Social History     Tobacco Use    Smoking status: Never Smoker    Smokeless tobacco: Never Used   Substance Use Topics    Alcohol use: Yes     Comment: occasional    Drug use: No        Review of Systems:  Review of Systems   Constitutional: Negative for activity change.   HENT: Negative for congestion.    Eyes: Negative for discharge.   Respiratory: Negative for apnea.    Cardiovascular: Negative for chest pain.   Gastrointestinal: Negative for abdominal distention.   Endocrine: Negative for cold intolerance.   Genitourinary: Negative for difficulty urinating.   Musculoskeletal: Negative for arthralgias.   Skin: Positive for  wound.   Neurological: Positive for headaches. Negative for dizziness and weakness.   Psychiatric/Behavioral: Negative for agitation.       Vital Signs (Most Recent)  Temp: 98.9 °F (37.2 °C) (10/11/18 1104)  Pulse: 71 (10/11/18 1104)  BP: (!) 151/107 (10/11/18 1104)     65.3 kg (144 lb)       Physical Exam:  Physical Exam:    Constitutional: She appears well-developed.     Eyes: Pupils are equal, round, and reactive to light. EOM are normal. Right eye exhibits no discharge. Left eye exhibits no discharge.     Abdominal: Soft.     Skin: Skin displays no rash on trunk and no rash on extremities.     Psych/Behavior: She is alert. She is oriented to person, place, and time.     Musculoskeletal:        Neck: There is no tenderness.        Back: Range of motion is full.        Right Upper Extremities: Range of motion is full. Muscle strength is 5/5. Tone is normal.        Left Upper Extremities: Range of motion is full. Muscle strength is 5/5. Tone is normal.       Right Lower Extremities: Range of motion is full. Muscle strength is 5/5. Tone is normal.        Left Lower Extremities: Range of motion is full. Muscle strength is 5/5. Tone is normal.     Neurological:        Coordination: She has a normal Romberg Test and normal tandem walking coordination.        Sensory: There is no sensory deficit in the trunk. There is no sensory deficit in the extremities.        DTRs: DTRs are DTRS NORMAL AND SYMMETRICnormal and symmetric. Tricep reflexes are 2+ on the right side and 2+ on the left side. Bicep reflexes are 2+ on the right side and 2+ on the left side. Brachioradialis reflexes are 2+ on the right side and 2+ on the left side. Patellar reflexes are 2+ on the right side and 2+ on the left side. Achilles reflexes are 2+ on the right side and 2+ on the left side. She displays no Babinski's sign on the right side. She displays no Babinski's sign on the left side.        Cranial nerves: Cranial nerve(s) II, III, IV, V, VI,  VII, VIII, IX, X, XI and XII are intact.      abdomen is covered with multiple pads.  Not examined, since she is going to the emergency room after my office visit.  Laboratory  CBC: Reviewed  CMP: Reviewed    Diagnostic Results:  MRI: Reviewed  Personally, and discussed them with the patient.    ASSESSMENT/PLAN:       ICD-10-CM ICD-9-CM   1. S/P coil embolization of cerebral aneurysm Z98.890 V45.89   2. Subarachnoid hemorrhage from anterior communicating artery aneurysm I60.2 430       PLAN:    1. s/p grade 2 SAH from a ruptured anterior communicating artery aneurysm  In the setting of polycystic kidney disease. Patient has done good recovery and at this time has minimal headaches and has delivered her baby via . Follow up MRI's do not show hydrocephalus. At this point, we will get a follow up angiogram in 2019 with Dr. Yi to make sure there is no aneurysm recurrence.     2. Patient is leaking from her abdominal  wound. She will go to the ER for this matter and follow up with her GYN.     3. I spent 35 minutes with the patient, 50% of time in counselingabout the above mentioned issues.        Sharon PINTO was seen today for follow-up.    Diagnoses and all orders for this visit:    S/P coil embolization of cerebral aneurysm    Subarachnoid hemorrhage from anterior communicating artery aneurysm  -     IR Angiogram Carotid Cerebral Bilateral; Future        I, Dr. Salo Montelongo, personally performed the services described in this documentation. All medical record entries made by the scribe were at my direction and in my presence.  I have reviewed the chart and agree that the record reflects my personal performance and is accurate and complete. Salo Montelongo MD.  7:57 AM 10/13/2018

## 2018-10-11 NOTE — LETTER
October 13, 2018      Roman Archibald MD  [address] [facility] [address]  [address]  [facility] [address]           [facility] - Neurosurgery 7th Fl  [address]  [place] [address]  Phone: [phone]          Patient: Sharon Grande   MR Number: [id number]   YOB: 1985   Date of Visit: 10/11/2018       Dear Dr. Roman Archibald:    Thank you for referring Sharon Grande to me for evaluation. Attached you will find relevant portions of my assessment and plan of care.    If you have questions, please do not hesitate to call me. I look forward to following Sharon Grande along with you.    Sincerely,    Salo Montelongo MD    Enclosure  CC:  No Recipients    If you would like to receive this communication electronically, please contact [email or web address] or [phone] to request more information on [facility] access.    For providers and/or their staff who would like to refer a patient to [facility], please contact us through our one-stop-shop provider referral line, [facility], at [phone].    If you feel you have received this communication in error or would no longer like to receive these types of communications, please e-mail [email or web address]

## 2018-10-12 ENCOUNTER — POSTPARTUM VISIT (OUTPATIENT)
Dept: MATERNAL FETAL MEDICINE | Facility: CLINIC | Age: 33
End: 2018-10-12
Payer: MEDICAID

## 2018-10-12 VITALS
WEIGHT: 149.25 LBS | DIASTOLIC BLOOD PRESSURE: 88 MMHG | SYSTOLIC BLOOD PRESSURE: 128 MMHG | BODY MASS INDEX: 22.04 KG/M2

## 2018-10-12 PROCEDURE — 99213 OFFICE O/P EST LOW 20 MIN: CPT | Mod: PBBFAC | Performed by: PEDIATRICS

## 2018-10-12 PROCEDURE — 99999 PR PBB SHADOW E&M-EST. PATIENT-LVL III: CPT | Mod: PBBFAC,,, | Performed by: PEDIATRICS

## 2018-10-12 PROCEDURE — 99213 OFFICE O/P EST LOW 20 MIN: CPT | Mod: S$PBB,,, | Performed by: PEDIATRICS

## 2018-10-13 PROBLEM — I67.848 CEREBRAL ARTERY VASOSPASM: Status: RESOLVED | Noted: 2018-08-15 | Resolved: 2018-10-13

## 2018-10-13 PROBLEM — G93.6 BRAIN EDEMA: Status: RESOLVED | Noted: 2018-08-11 | Resolved: 2018-10-13

## 2018-10-13 PROBLEM — G44.89 OTHER HEADACHE SYNDROME: Status: RESOLVED | Noted: 2018-09-27 | Resolved: 2018-10-13

## 2018-10-13 PROBLEM — G93.2 INTRACRANIAL HYPERTENSION: Status: RESOLVED | Noted: 2018-08-13 | Resolved: 2018-10-13

## 2018-10-13 PROBLEM — G93.5 BRAIN COMPRESSION: Status: RESOLVED | Noted: 2018-08-11 | Resolved: 2018-10-13

## 2018-10-13 PROBLEM — Z98.890 S/P COIL EMBOLIZATION OF CEREBRAL ANEURYSM: Status: ACTIVE | Noted: 2018-10-13

## 2018-10-16 ENCOUNTER — TELEPHONE (OUTPATIENT)
Dept: OBSTETRICS AND GYNECOLOGY | Facility: CLINIC | Age: 33
End: 2018-10-16

## 2018-10-16 NOTE — PROGRESS NOTES
MS. Grande is seen today postpartum status post primary C/S on 09/27/18.  She had severe pre-eclampsia with a subarachnoid hemorrhage.  She is currently on Procardia XL 90 mg po daily.      Ms. Grande has no complaints.  She states that she has some incisional discomfort but is taking little pain med.      PE  BP is 128/88.  P-94.  Abdomen is soft and nontender.  Incision is healing; no areas of inflammation, drainage or separation noted.    A&P:  1. Post C/S - normal post op course.  2. Hypertensive - normotensive.  She has BP cuff at home and will check BP 2 - 3 times daily.    Patient will return to Dr. Archibald for remainder of postpartum checks.      I spent 15 minutes with Ms. Grande in patient care and case management; greater than 50% was face to face.

## 2018-10-16 NOTE — TELEPHONE ENCOUNTER
Searched pt's media tab and there is no sterilization form to have Dr Soto sign.  Staff contacted L&D who confirmed no form was filled out by pt that they could find in chart.

## 2018-10-16 NOTE — TELEPHONE ENCOUNTER
----- Message from Beltran Olivas sent at 10/16/2018  2:56 PM CDT -----  Please fax sign consent sterilization form to [phone]

## 2019-01-29 ENCOUNTER — HOSPITAL ENCOUNTER (EMERGENCY)
Facility: HOSPITAL | Age: 34
Discharge: HOME | End: 2019-01-29
Attending: EMERGENCY MEDICINE
Payer: MEDICAID

## 2019-01-29 VITALS
SYSTOLIC BLOOD PRESSURE: 146 MMHG | OXYGEN SATURATION: 100 % | HEART RATE: 63 BPM | WEIGHT: 154 LBS | TEMPERATURE: 99 F | RESPIRATION RATE: 18 BRPM | DIASTOLIC BLOOD PRESSURE: 94 MMHG | HEIGHT: 69 IN | BODY MASS INDEX: 22.81 KG/M2

## 2019-01-29 DIAGNOSIS — E87.6 HYPOKALEMIA: Primary | ICD-10-CM

## 2019-01-29 DIAGNOSIS — R51.9 HEADACHE: ICD-10-CM

## 2019-01-29 LAB
ALBUMIN SERPL BCP-MCNC: 4.1 G/DL
ALP SERPL-CCNC: 45 U/L
ALT SERPL W/O P-5'-P-CCNC: 6 U/L
ANION GAP SERPL CALC-SCNC: 10 MMOL/L
AST SERPL-CCNC: 10 U/L
B-HCG UR QL: NEGATIVE
BASOPHILS # BLD AUTO: 0.04 K/UL
BASOPHILS NFR BLD: 0.7 %
BILIRUB SERPL-MCNC: 0.3 MG/DL
BUN SERPL-MCNC: 9 MG/DL
CALCIUM SERPL-MCNC: 9.6 MG/DL
CHLORIDE SERPL-SCNC: 101 MMOL/L
CO2 SERPL-SCNC: 29 MMOL/L
CREAT SERPL-MCNC: 0.8 MG/DL (ref 0.5–1.4)
CREAT SERPL-MCNC: 0.9 MG/DL
CTP QC/QA: YES
DIFFERENTIAL METHOD: ABNORMAL
EOSINOPHIL # BLD AUTO: 0 K/UL
EOSINOPHIL NFR BLD: 0.5 %
ERYTHROCYTE [DISTWIDTH] IN BLOOD BY AUTOMATED COUNT: 15.2 %
EST. GFR  (AFRICAN AMERICAN): >60 ML/MIN/1.73 M^2
EST. GFR  (NON AFRICAN AMERICAN): >60 ML/MIN/1.73 M^2
GLUCOSE SERPL-MCNC: 123 MG/DL
HCT VFR BLD AUTO: 33.6 %
HGB BLD-MCNC: 10.4 G/DL
IMM GRANULOCYTES # BLD AUTO: 0.01 K/UL
IMM GRANULOCYTES NFR BLD AUTO: 0.2 %
LYMPHOCYTES # BLD AUTO: 1.5 K/UL
LYMPHOCYTES NFR BLD: 25.8 %
MCH RBC QN AUTO: 25.6 PG
MCHC RBC AUTO-ENTMCNC: 31 G/DL
MCV RBC AUTO: 83 FL
MONOCYTES # BLD AUTO: 0.4 K/UL
MONOCYTES NFR BLD: 6 %
NEUTROPHILS # BLD AUTO: 3.9 K/UL
NEUTROPHILS NFR BLD: 66.8 %
NRBC BLD-RTO: 0 /100 WBC
PLATELET # BLD AUTO: 369 K/UL
PMV BLD AUTO: 11.4 FL
POTASSIUM SERPL-SCNC: 2.7 MMOL/L
PROT SERPL-MCNC: 8.6 G/DL
RBC # BLD AUTO: 4.07 M/UL
SAMPLE: NORMAL
SODIUM SERPL-SCNC: 140 MMOL/L
WBC # BLD AUTO: 5.81 K/UL

## 2019-01-29 PROCEDURE — 80053 COMPREHEN METABOLIC PANEL: CPT

## 2019-01-29 PROCEDURE — 99285 EMERGENCY DEPT VISIT HI MDM: CPT | Mod: 25

## 2019-01-29 PROCEDURE — 25000003 PHARM REV CODE 250: Performed by: EMERGENCY MEDICINE

## 2019-01-29 PROCEDURE — 82565 ASSAY OF CREATININE: CPT

## 2019-01-29 PROCEDURE — 99285 PR EMERGENCY DEPT VISIT,LEVEL V: ICD-10-PCS | Mod: ,,, | Performed by: EMERGENCY MEDICINE

## 2019-01-29 PROCEDURE — 96374 THER/PROPH/DIAG INJ IV PUSH: CPT

## 2019-01-29 PROCEDURE — 99900035 HC TECH TIME PER 15 MIN (STAT)

## 2019-01-29 PROCEDURE — 85025 COMPLETE CBC W/AUTO DIFF WBC: CPT

## 2019-01-29 PROCEDURE — 63600175 PHARM REV CODE 636 W HCPCS: Performed by: EMERGENCY MEDICINE

## 2019-01-29 PROCEDURE — 81025 URINE PREGNANCY TEST: CPT | Performed by: EMERGENCY MEDICINE

## 2019-01-29 PROCEDURE — 99285 EMERGENCY DEPT VISIT HI MDM: CPT | Mod: ,,, | Performed by: EMERGENCY MEDICINE

## 2019-01-29 RX ORDER — POTASSIUM CHLORIDE 20 MEQ/1
20 TABLET, EXTENDED RELEASE ORAL DAILY
Qty: 14 TABLET | Refills: 0 | Status: SHIPPED | OUTPATIENT
Start: 2019-01-29 | End: 2019-10-29 | Stop reason: SDUPTHER

## 2019-01-29 RX ORDER — POTASSIUM CHLORIDE 20 MEQ/1
40 TABLET, EXTENDED RELEASE ORAL
Status: COMPLETED | OUTPATIENT
Start: 2019-01-29 | End: 2019-01-29

## 2019-01-29 RX ORDER — OXYCODONE AND ACETAMINOPHEN 5; 325 MG/1; MG/1
1 TABLET ORAL
Status: COMPLETED | OUTPATIENT
Start: 2019-01-29 | End: 2019-01-29

## 2019-01-29 RX ORDER — PROCHLORPERAZINE EDISYLATE 5 MG/ML
10 INJECTION INTRAMUSCULAR; INTRAVENOUS
Status: COMPLETED | OUTPATIENT
Start: 2019-01-29 | End: 2019-01-29

## 2019-01-29 RX ADMIN — POTASSIUM CHLORIDE 40 MEQ: 1500 TABLET, EXTENDED RELEASE ORAL at 05:01

## 2019-01-29 RX ADMIN — PROCHLORPERAZINE EDISYLATE 10 MG: 5 INJECTION INTRAMUSCULAR; INTRAVENOUS at 03:01

## 2019-01-29 RX ADMIN — OXYCODONE HYDROCHLORIDE AND ACETAMINOPHEN 1 TABLET: 5; 325 TABLET ORAL at 05:01

## 2019-01-29 NOTE — ED PROVIDER NOTES
"Encounter Date: 2019    SCRIBE #1 NOTE: I, Brenden Trinidad, am scribing for, and in the presence of,  Dr. Lord. I have scribed the following portions of the note - Other sections scribed: HPI, ROS, PE, MDM.       History     Chief Complaint   Patient presents with    Headache     headache started this am. denies n/v.      The patient is a 32 y/o female with a history of subarachnoid hemorrhage from anterior communicating artery aneurysm (8/10/18), polycystic kidney disease, severe pre-eclampsia, and HTN who presents to the ED for evaluation of headache which started this morning. She reports waking up, possibly around 4:00 AM, with a "pounding" HA. As she went to check on her  baby, she felt lightheaded and weak and had to sit on steps before tending to her child. She denies fever, neck stiffness, and n/v. The worst pain is located in the posterior, inferior part of her head, and she has lesser pain in her forehead. NKDA. She is compliant with her antihypertensive medications. Last week she reports feeling a "pop" in her head while sleeping that made her jump out of bed, but she did not have any associating headaches.           Review of patient's allergies indicates:  No Known Allergies  Past Medical History:   Diagnosis Date    Anemia     Bipolar 1 disorder     Cerebral aneurysm     Hypertension     since     Polycystic kidney disease     Polycystic kidney disease     Pre-eclampsia     Renal disorder     Since childhood     SAH (subarachnoid hemorrhage)     Stroke      Past Surgical History:   Procedure Laterality Date    ANGIOGRAM-CEREBRAL N/A 2018    [facility]formed by [facility]     SECTION N/A 2018    Performed by [facility] MD at [facility] L[facility]D    DILATION AND CURETTAGE OF UTERUS          DILATION AND CURETTAGE, UTERUS N/A 7/15/2013    [facility]    Mri (magnetic resonance imaging) N/A 8/10/2018    [facility] by Jayna " [facility] at [facility]     Family History   Problem Relation Age of Onset    Hypertension Mother     Polycystic kidney disease Mother     Hypertension Paternal Grandmother     Polycystic kidney disease Daughter      Social History     Tobacco Use    Smoking status: Never Smoker    Smokeless tobacco: Never Used   Substance Use Topics    Alcohol use: Yes     Comment: occasional    Drug use: No     Review of Systems   Constitutional: Negative for fever.   HENT: Negative for sore throat.    Eyes: Negative for discharge.   Respiratory: Negative for cough.    Cardiovascular: Negative for chest pain.   Gastrointestinal: Negative for nausea and vomiting.   Genitourinary: Negative for dysuria.   Musculoskeletal: Negative for neck stiffness.   Skin: Negative for rash.   Neurological: Positive for weakness, light-headedness and headaches.   Psychiatric/Behavioral: Negative for behavioral problems and confusion.       Physical Exam     Initial Vitals [01/29/19 1434]   BP Pulse Resp Temp SpO2   (!) 179/107 68 18 98.6 °F (37 °C) 100 %      MAP       --         Physical Exam    Nursing note and vitals reviewed.  Constitutional: She appears well-developed and well-nourished. She is not diaphoretic. No distress.   HENT:   Head: Normocephalic and atraumatic.   Mouth/Throat: Oropharynx is clear and moist.   Eyes: Conjunctivae and EOM are normal. Pupils are equal, round, and reactive to light.   Neck: Normal range of motion. Neck supple. No JVD present.   Cardiovascular: Normal rate, regular rhythm and normal heart sounds.   No murmur heard.  Pulmonary/Chest: Breath sounds normal. No respiratory distress. She has no wheezes. She has no rhonchi. She has no rales.   Abdominal: Soft. Bowel sounds are normal. She exhibits no distension and no mass. There is no tenderness. There is no rebound and no guarding.   Musculoskeletal: Normal range of motion. She exhibits no edema or tenderness.   Neurological: She is alert and oriented to  person, place, and time. She has normal strength. No cranial nerve deficit or sensory deficit. GCS score is 15.   Skin: Skin is warm and dry. No rash noted.   Psychiatric: She has a normal mood and affect.         ED Course   Procedures  Labs Reviewed   CBC W/ AUTO DIFFERENTIAL - Abnormal; Notable for the following components:       Result Value    Hemoglobin 10.4 (*)     Hematocrit 33.6 (*)     MCH 25.6 (*)     MCHC 31.0 (*)     RDW 15.2 (*)     Platelets 369 (*)     All other components within normal limits   COMPREHENSIVE METABOLIC PANEL - Abnormal; Notable for the following components:    Potassium 2.7 (*)     Glucose 123 (*)     Total Protein 8.6 (*)     Alkaline Phosphatase 45 (*)     ALT 6 (*)     All other components within normal limits   POCT URINE PREGNANCY   ISTAT CREATININE          Imaging Results          MRI Brain Without Contrast (Final result)  Result time 01/29/19 18:00:23    Final result by Eleuterio Thomas MD (01/29/19 18:00:23)                 Impression:      No acute intracranial abnormality noting chronic changes as described above.    No acute arterial abnormalities noting prior coil embolization of anterior communicating artery aneurysm.      Electronically signed by: Eleuterio Thomas MD  Date:    01/29/2019  Time:    18:00             Narrative:    EXAMINATION:  MRA BRAIN WITHOUT CONTRAST; MRI BRAIN WITHOUT CONTRAST    CLINICAL HISTORY:  Headache, acute, norm neuro exam;Headache    TECHNIQUE:  Routine MRI brain without contrast and noncontrast MRA of the brain.  Multiplanar multisequence MR imaging of the brain was performed without contrast. Noncontrast 3D time-of-flight intracranial MR angiography was performed through the [place] [facility] [place] with 3D volume renderings and MIP reformatting.    COMPARISON:  09/14/2018    FINDINGS:  Intracranial Compartment:    Ventricles and sulci are normal in size for age without evidence of hydrocephalus. No extra-axial blood or fluid collections.    The  brain parenchyma appears unchanged from the prior study noting gradient susceptibility a long prior ventriculostomy catheter tract.  A few scattered T2 and FLAIR signal hyperintense foci are also seen within the supratentorial white matter which are unchanged from prior.  Susceptibility artifact from anterior communicating artery aneurysm clipping noted.  Previously described area of superficial siderosis along the posterior right parietal lobe was better identified on the prior study.  No mass lesion, acute hemorrhage, edema, or acute infarct.    Intracranial Arteries: No focal high-grade stenosis, occlusion, or aneurysm.    Skull/Extracranial Contents (limited evaluation): Bone marrow signal intensity is normal.                               MRA Brain without contrast (Final result)  Result time 01/29/19 18:00:23    Final result by Eleuterio Thomas MD (01/29/19 18:00:23)                 Impression:      No acute intracranial abnormality noting chronic changes as described above.    No acute arterial abnormalities noting prior coil embolization of anterior communicating artery aneurysm.      Electronically signed by: Eleuterio Thomas MD  Date:    01/29/2019  Time:    18:00             Narrative:    EXAMINATION:  MRA BRAIN WITHOUT CONTRAST; MRI BRAIN WITHOUT CONTRAST    CLINICAL HISTORY:  Headache, acute, norm neuro exam;Headache    TECHNIQUE:  Routine MRI brain without contrast and noncontrast MRA of the brain.  Multiplanar multisequence MR imaging of the brain was performed without contrast. Noncontrast 3D time-of-flight intracranial MR angiography was performed through the [place] [facility] [place] with 3D volume renderings and MIP reformatting.    COMPARISON:  09/14/2018    FINDINGS:  Intracranial Compartment:    Ventricles and sulci are normal in size for age without evidence of hydrocephalus. No extra-axial blood or fluid collections.    The brain parenchyma appears unchanged from the prior study noting gradient  susceptibility a long prior ventriculostomy catheter tract.  A few scattered T2 and FLAIR signal hyperintense foci are also seen within the supratentorial white matter which are unchanged from prior.  Susceptibility artifact from anterior communicating artery aneurysm clipping noted.  Previously described area of superficial siderosis along the posterior right parietal lobe was better identified on the prior study.  No mass lesion, acute hemorrhage, edema, or acute infarct.    Intracranial Arteries: No focal high-grade stenosis, occlusion, or aneurysm.    Skull/Extracranial Contents (limited evaluation): Bone marrow signal intensity is normal.                                 Medical Decision Making:   History:   Old Medical Records: I decided to obtain old medical records.  Clinical Tests:   Lab Tests: Ordered and Reviewed  Radiological Study: Ordered and Reviewed  ED Management:  Medical records were reviewed and subarachnoid hemorrhage in 8/2018 was noted. I discussed with Dr. Montero who conferred with Dr. Springer, they are on for vascular neurology. They recommend not doing a Head CT. They recommend MRI-Brain and MRA-Brain, both without contrast. If negative, they do not recommend a lumbar puncture. This should be sufficient.       I reviewed MRI findings.  No acute bleed, stroke, aneurysm coil is in the correct place.  Patient's headache is improved and she has a stable exam.  Neck is supple neuro intact. I believe this is a benign headache.  Will discharge home. will also replace potassium          Scribe Attestation:   Scribe #1: I performed the above scribed service and the documentation accurately describes the services I performed. I attest to the accuracy of the note.               Clinical Impression:   The primary encounter diagnosis was Hypokalemia. A diagnosis of Headache was also pertinent to this visit.      Disposition:   Disposition: Discharged  Condition: Stable                        Scotty BURROUGHS  MD Risa  01/30/19 0924

## 2019-01-29 NOTE — ED NOTES
Patient requesting potassium tablets instead of disintegrating tablets, also requesting PO pain medication. Dr Lord notified, states will adjust orders.

## 2019-01-29 NOTE — ED TRIAGE NOTES
Patient states headache onset this am, took blood pressure medication, Labetolol and Procardia at 0800, no OTC meds. H/O SAH, stroke in August.

## 2019-01-29 NOTE — ED NOTES
Patient identifiers verified and correct for Ms Grande  C/C: Headache, back of head, onset 0800  APPEARANCE: awake and alert in NAD.  SKIN: warm, dry and intact. No breakdown or bruising.  MUSCULOSKELETAL: Patient moving all extremities spontaneously, no obvious swelling or deformities noted. Ambulates independently.  RESPIRATORY: Denies shortness of breath.Respirations unlabored.   CARDIAC: Denies CP, 2+ distal pulses; no peripheral edema  ABDOMEN: S/ND/NT, Denies nausea  : voids spontaneously, denies difficulty  Neurologic: AAO x 4; follows commands equal strength in all extremities; denies numbness/tingling. Denies dizziness Positive light headed, Smile symmetrical, BUE  equal, strength equal, BLE strength equal, PERRL, negative drift.

## 2019-01-29 NOTE — ED NOTES
Patient identifiers verified verbally with patient and correct for Sharon Grande.    LOC/ APPEARANCE: The patient is AAOx4. Pt is speaking appropriately, no slurred speech. Continuous cardiac monitor, cont pulse ox, and auto BP cuff applied to patient. Pt is clean and well groomed. No JVD visible. Pt reports pain level of 7/10, headache. Pt updated on POC. Bed low and locked with side rails up x2, call bell in pt reach.  SKIN: Skin is warm dry and intact, and color is consistent with ethnicity. Capillary refill <3 seconds. No breakdown or brusing visible. Mucus membranes moist, acyanotic.  RESPIRATORY: Airway is open and patent. Respirations-spontaneous, unlabored, regular rate, equal bilaterally on inspiration and expiration. No accessory muscle use noted. Lungs clear to auscultation in all fields bilaterally anterior and posterior.   CARDIAC: Patient has regular heart rate. No peripheral edema noted, and patient has no c/o chest pain. Peripheral pulses present equal and strong throughout.  ABDOMEN: Soft and non-tender to palpation with no distention noted.   NEUROLOGIC: Eyes open spontaneously and facial expression symmetrical. Pt behavior appropriate to situation, and pt follows commands. Pt reports sensation present in all extremities when touched with a finger, denies any numbness or tingling. PERRLA  MUSCULOSKELETAL: Spontaneous movement noted to all extremities. Hand  equal and leg strength strong +5 bilaterally.

## 2019-01-30 NOTE — ED NOTES
Hourly rounding complete. Patient resting in stretcher and is in NAD at this time. Pt is awake alert and oriented x4, respirations even and unlabored. Pt updated on POC. Family at bedside. Bed low and locked with side rails up x2, call bell in pt reach. Pt provided warm blankets. Pt voices no other needs at this time.

## 2019-07-22 PROBLEM — Z3A.25 25 WEEKS GESTATION OF PREGNANCY: Status: RESOLVED | Noted: 2018-08-10 | Resolved: 2019-07-22

## 2019-10-29 ENCOUNTER — HOSPITAL ENCOUNTER (EMERGENCY)
Facility: HOSPITAL | Age: 34
Discharge: HOME | End: 2019-10-29
Attending: EMERGENCY MEDICINE
Payer: MEDICAID

## 2019-10-29 VITALS
HEIGHT: 69 IN | SYSTOLIC BLOOD PRESSURE: 128 MMHG | DIASTOLIC BLOOD PRESSURE: 84 MMHG | WEIGHT: 160 LBS | BODY MASS INDEX: 23.7 KG/M2 | HEART RATE: 64 BPM | TEMPERATURE: 98 F | RESPIRATION RATE: 14 BRPM | OXYGEN SATURATION: 100 %

## 2019-10-29 DIAGNOSIS — I10 HYPERTENSION: ICD-10-CM

## 2019-10-29 DIAGNOSIS — R42 DIZZINESS: ICD-10-CM

## 2019-10-29 LAB
ALBUMIN SERPL BCP-MCNC: 4 G/DL (ref 3.5–5.2)
ALP SERPL-CCNC: 41 U/L (ref 55–135)
ALT SERPL W/O P-5'-P-CCNC: 13 U/L (ref 10–44)
ANION GAP SERPL CALC-SCNC: 20 MMOL/L (ref 8–16)
ANION GAP SERPL CALC-SCNC: 8 MMOL/L (ref 8–16)
AST SERPL-CCNC: 15 U/L (ref 10–40)
B-HCG UR QL: NEGATIVE
BASOPHILS # BLD AUTO: 0.01 K/UL (ref 0–0.2)
BASOPHILS NFR BLD: 0.2 % (ref 0–1.9)
BILIRUB SERPL-MCNC: 0.3 MG/DL (ref 0.1–1)
BNP SERPL-MCNC: 60 PG/ML (ref 0–99)
BUN SERPL-MCNC: 14 MG/DL (ref 6–20)
BUN SERPL-MCNC: 15 MG/DL (ref 6–30)
CALCIUM SERPL-MCNC: 9 MG/DL (ref 8.7–10.5)
CHLORIDE SERPL-SCNC: 103 MMOL/L (ref 95–110)
CHLORIDE SERPL-SCNC: 98 MMOL/L (ref 95–110)
CO2 SERPL-SCNC: 32 MMOL/L (ref 23–29)
CREAT SERPL-MCNC: 0.9 MG/DL (ref 0.5–1.4)
CREAT SERPL-MCNC: 1 MG/DL (ref 0.5–1.4)
CTP QC/QA: YES
DIFFERENTIAL METHOD: ABNORMAL
EOSINOPHIL # BLD AUTO: 0 K/UL (ref 0–0.5)
EOSINOPHIL NFR BLD: 0.7 % (ref 0–8)
ERYTHROCYTE [DISTWIDTH] IN BLOOD BY AUTOMATED COUNT: 16.4 % (ref 11.5–14.5)
EST. GFR  (AFRICAN AMERICAN): >60 ML/MIN/1.73 M^2
EST. GFR  (NON AFRICAN AMERICAN): >60 ML/MIN/1.73 M^2
GLUCOSE SERPL-MCNC: 79 MG/DL (ref 70–110)
GLUCOSE SERPL-MCNC: 81 MG/DL (ref 70–110)
HCT VFR BLD AUTO: 32.3 % (ref 37–48.5)
HCT VFR BLD CALC: 52 %PCV (ref 36–54)
HGB BLD-MCNC: 9.4 G/DL (ref 12–16)
IMM GRANULOCYTES # BLD AUTO: 0.01 K/UL (ref 0–0.04)
IMM GRANULOCYTES NFR BLD AUTO: 0.2 % (ref 0–0.5)
LYMPHOCYTES # BLD AUTO: 2 K/UL (ref 1–4.8)
LYMPHOCYTES NFR BLD: 44.8 % (ref 18–48)
MAGNESIUM SERPL-MCNC: 1.8 MG/DL (ref 1.6–2.6)
MCH RBC QN AUTO: 23.7 PG (ref 27–31)
MCHC RBC AUTO-ENTMCNC: 29.1 G/DL (ref 32–36)
MCV RBC AUTO: 81 FL (ref 82–98)
MONOCYTES # BLD AUTO: 0.3 K/UL (ref 0.3–1)
MONOCYTES NFR BLD: 6 % (ref 4–15)
NEUTROPHILS # BLD AUTO: 2.2 K/UL (ref 1.8–7.7)
NEUTROPHILS NFR BLD: 48.1 % (ref 38–73)
NRBC BLD-RTO: 0 /100 WBC
PLATELET # BLD AUTO: 300 K/UL (ref 150–350)
PMV BLD AUTO: 11.9 FL (ref 9.2–12.9)
POC IONIZED CALCIUM: 1.22 MMOL/L (ref 1.06–1.42)
POC TCO2 (MEASURED): 30 MMOL/L (ref 23–29)
POTASSIUM BLD-SCNC: 2.4 MMOL/L (ref 3.5–5.1)
POTASSIUM SERPL-SCNC: 2.5 MMOL/L (ref 3.5–5.1)
PROT SERPL-MCNC: 8 G/DL (ref 6–8.4)
RBC # BLD AUTO: 3.97 M/UL (ref 4–5.4)
SAMPLE: ABNORMAL
SODIUM BLD-SCNC: 144 MMOL/L (ref 136–145)
SODIUM SERPL-SCNC: 143 MMOL/L (ref 136–145)
TROPONIN I SERPL DL<=0.01 NG/ML-MCNC: 0.02 NG/ML (ref 0–0.03)
WBC # BLD AUTO: 4.49 K/UL (ref 3.9–12.7)

## 2019-10-29 PROCEDURE — 84132 ASSAY OF SERUM POTASSIUM: CPT | Mod: 91

## 2019-10-29 PROCEDURE — 84295 ASSAY OF SERUM SODIUM: CPT

## 2019-10-29 PROCEDURE — 96374 THER/PROPH/DIAG INJ IV PUSH: CPT

## 2019-10-29 PROCEDURE — 96375 TX/PRO/DX INJ NEW DRUG ADDON: CPT

## 2019-10-29 PROCEDURE — 80053 COMPREHEN METABOLIC PANEL: CPT

## 2019-10-29 PROCEDURE — 81025 URINE PREGNANCY TEST: CPT | Performed by: EMERGENCY MEDICINE

## 2019-10-29 PROCEDURE — 85014 HEMATOCRIT: CPT

## 2019-10-29 PROCEDURE — 83880 ASSAY OF NATRIURETIC PEPTIDE: CPT

## 2019-10-29 PROCEDURE — 82565 ASSAY OF CREATININE: CPT | Mod: 91

## 2019-10-29 PROCEDURE — 82330 ASSAY OF CALCIUM: CPT

## 2019-10-29 PROCEDURE — 99291 CRITICAL CARE FIRST HOUR: CPT | Mod: 25

## 2019-10-29 PROCEDURE — 83735 ASSAY OF MAGNESIUM: CPT

## 2019-10-29 PROCEDURE — 25000003 PHARM REV CODE 250: Performed by: EMERGENCY MEDICINE

## 2019-10-29 PROCEDURE — 93010 EKG 12-LEAD: ICD-10-PCS | Mod: ,,, | Performed by: INTERNAL MEDICINE

## 2019-10-29 PROCEDURE — 85025 COMPLETE CBC W/AUTO DIFF WBC: CPT

## 2019-10-29 PROCEDURE — 99900035 HC TECH TIME PER 15 MIN (STAT)

## 2019-10-29 PROCEDURE — 96376 TX/PRO/DX INJ SAME DRUG ADON: CPT

## 2019-10-29 PROCEDURE — 63600175 PHARM REV CODE 636 W HCPCS: Performed by: EMERGENCY MEDICINE

## 2019-10-29 PROCEDURE — 93010 ELECTROCARDIOGRAM REPORT: CPT | Mod: ,,, | Performed by: INTERNAL MEDICINE

## 2019-10-29 PROCEDURE — 84484 ASSAY OF TROPONIN QUANT: CPT

## 2019-10-29 PROCEDURE — 93005 ELECTROCARDIOGRAM TRACING: CPT

## 2019-10-29 RX ORDER — HYDRALAZINE HYDROCHLORIDE 20 MG/ML
10 INJECTION INTRAMUSCULAR; INTRAVENOUS
Status: COMPLETED | OUTPATIENT
Start: 2019-10-29 | End: 2019-10-29

## 2019-10-29 RX ORDER — LABETALOL 200 MG/1
200 TABLET, FILM COATED ORAL 2 TIMES DAILY
Qty: 60 TABLET | Refills: 1 | Status: ON HOLD | OUTPATIENT
Start: 2019-10-29 | End: 2021-04-21 | Stop reason: HOSPADM

## 2019-10-29 RX ORDER — MECLIZINE HYDROCHLORIDE 25 MG/1
25 TABLET ORAL 3 TIMES DAILY PRN
Qty: 20 TABLET | Refills: 0 | Status: SHIPPED | OUTPATIENT
Start: 2019-10-29 | End: 2022-03-12 | Stop reason: SDDI

## 2019-10-29 RX ORDER — ONDANSETRON 2 MG/ML
4 INJECTION INTRAMUSCULAR; INTRAVENOUS
Status: COMPLETED | OUTPATIENT
Start: 2019-10-29 | End: 2019-10-29

## 2019-10-29 RX ORDER — MORPHINE SULFATE 10 MG/ML
4 INJECTION INTRAMUSCULAR; INTRAVENOUS; SUBCUTANEOUS
Status: COMPLETED | OUTPATIENT
Start: 2019-10-29 | End: 2019-10-29

## 2019-10-29 RX ORDER — NIFEDIPINE 90 MG/1
90 TABLET, EXTENDED RELEASE ORAL DAILY
Qty: 30 TABLET | Refills: 1 | Status: ON HOLD | OUTPATIENT
Start: 2019-10-29 | End: 2021-04-21

## 2019-10-29 RX ORDER — POTASSIUM CHLORIDE 20 MEQ/1
40 TABLET, EXTENDED RELEASE ORAL
Status: COMPLETED | OUTPATIENT
Start: 2019-10-29 | End: 2019-10-29

## 2019-10-29 RX ORDER — MECLIZINE HYDROCHLORIDE 25 MG/1
25 TABLET ORAL
Status: COMPLETED | OUTPATIENT
Start: 2019-10-29 | End: 2019-10-29

## 2019-10-29 RX ORDER — POTASSIUM CHLORIDE 20 MEQ/1
20 TABLET, EXTENDED RELEASE ORAL DAILY
Qty: 14 TABLET | Refills: 0 | Status: ON HOLD | OUTPATIENT
Start: 2019-10-29 | End: 2021-04-21 | Stop reason: HOSPADM

## 2019-10-29 RX ADMIN — POTASSIUM CHLORIDE 40 MEQ: 1500 TABLET, EXTENDED RELEASE ORAL at 02:10

## 2019-10-29 RX ADMIN — ONDANSETRON HYDROCHLORIDE 4 MG: 2 SOLUTION INTRAMUSCULAR; INTRAVENOUS at 01:10

## 2019-10-29 RX ADMIN — HYDRALAZINE HYDROCHLORIDE 10 MG: 20 INJECTION INTRAMUSCULAR; INTRAVENOUS at 01:10

## 2019-10-29 RX ADMIN — MORPHINE SULFATE 4 MG: 10 INJECTION INTRAVENOUS at 01:10

## 2019-10-29 RX ADMIN — MECLIZINE HYDROCHLORIDE 25 MG: 25 TABLET ORAL at 02:10

## 2019-10-29 NOTE — ED NOTES
"Ambulated patient 50 feet. Minimal standby assist but patient ambulated slowly & reported she felt dizzy, "Like the room is spinning"   "

## 2019-10-29 NOTE — ED PROVIDER NOTES
"Encounter Date: 10/29/2019    SCRIBE #1 NOTE: I, Pam Colón, am scribing for, and in the presence of,  Aroldo Merida MD. I have scribed the following portions of the note - Other sections scribed: HPI, ROS, PE.       History     Chief Complaint   Patient presents with    Hypertension     Pt reports woke up with headache and dizziness, went to work had BP taken and it was elevated; reports noncompliance with BP meds     CC: Headache    HPI: The patient is a 34 y.o. F who has Anemia, HTN, Polycystic kidney disease, Cerebral aneurysm, and Hx of subarachnoid hemorrhage who presents to the ED for emergent evaluation of acute, severe, 10/10 frontal headache that began upon waking at 6:00am today. She describes the headache as a heavy-like sensation that is exacerbated with positional changes. Pt has associated dizziness, and lightheadedness. She describes the dizziness as feeling off balance. Pt states that she had a subarachnoid hemorrhage secondary to rupture of aneurysm "8/9/2018." She notes that previous headache was "awhile ago." Pt is non-compliant with Procardia 90mg and Labetalol 200mg. Pt's sister has not noticed a facial droop or slurred speech today. Pt denies vision changes, neck stiffness, CP, SOB, abdominal pain, nausea, vomiting, or numbness or tingling in bilateral upper and lower extremities.                The history is provided by the patient and a relative. No  was used.     Review of patient's allergies indicates:  No Known Allergies  Past Medical History:   Diagnosis Date    Anemia     Bipolar 1 disorder     Cerebral aneurysm     Hypertension     since 2012    Polycystic kidney disease     Polycystic kidney disease     Pre-eclampsia     Renal disorder     Since childhood     SAH (subarachnoid hemorrhage)     Stroke      Past Surgical History:   Procedure Laterality Date    CEREBRAL ANGIOGRAM N/A 8/16/2018    Procedure: ANGIOGRAM-CEREBRAL;  Surgeon: Jayna " Surgeon;  Location: [facility];  Service: Anesthesiology;  Laterality: N/A;     SECTION N/A 2018    Procedure:  SECTION;  Surgeon: Obed Soto MD;  Location: [facility] L&D;  Service: OB/GYN;  Laterality: N/A;    DILATION AND CURETTAGE OF UTERUS           Family History   Problem Relation Age of Onset    Hypertension Mother     Polycystic kidney disease Mother     Hypertension Paternal Grandmother     Polycystic kidney disease Daughter      Social History     Tobacco Use    Smoking status: Never Smoker    Smokeless tobacco: Never Used   Substance Use Topics    Alcohol use: Yes     Comment: occasional    Drug use: No     Review of Systems   Constitutional: Negative for fever.   HENT: Negative for sore throat.    Eyes: Negative for visual disturbance.   Respiratory: Negative for cough and shortness of breath.    Cardiovascular: Negative for chest pain.   Gastrointestinal: Negative for abdominal pain, diarrhea, nausea and vomiting.   Genitourinary: Negative for dysuria.   Musculoskeletal: Negative for back pain, neck pain and neck stiffness.   Skin: Negative for rash.   Neurological: Positive for dizziness, light-headedness and headaches. Negative for facial asymmetry, speech difficulty, weakness and numbness.   Hematological: Does not bruise/bleed easily.       Physical Exam     Initial Vitals [10/29/19 1123]   BP Pulse Resp Temp SpO2   (!) 189/111 (!) 55 18 98 °F (36.7 °C) 99 %      MAP       --         Physical Exam    Nursing note and vitals reviewed.  Constitutional: She appears well-developed and well-nourished. She is not diaphoretic. No distress.   HENT:   Head: Normocephalic and atraumatic.   Mouth/Throat: Oropharynx is clear and moist.   Eyes: Conjunctivae and EOM are normal. Pupils are equal, round, and reactive to light.   Neck: Normal range of motion. Neck supple. No neck rigidity. No Brudzinski's sign and no Kernig's sign noted.   Cardiovascular: Normal rate, regular rhythm,  normal heart sounds and intact distal pulses. Exam reveals no gallop and no friction rub.    No murmur heard.  Pulmonary/Chest: Breath sounds normal. No respiratory distress. She has no wheezes. She has no rhonchi. She has no rales.   Abdominal: Soft. Bowel sounds are normal. There is no tenderness.   Musculoskeletal: Normal range of motion. She exhibits no edema or tenderness.   Neurological: She is alert and oriented to person, place, and time. She has normal strength. No cranial nerve deficit or sensory deficit.   Skin: Skin is warm and dry. No rash noted. No pallor.   Psychiatric: She has a normal mood and affect.         ED Course   Critical Care  Date/Time: 10/29/2019 6:19 PM  Performed by: Aroldo Merida MD  Authorized by: Aroldo Merida MD   Direct patient critical care time: 20 minutes  Additional history critical care time: 10 minutes  Ordering / reviewing critical care time: 10 minutes  Documentation critical care time: 10 minutes  Consulting other physicians critical care time: 5 minutes  Consult with family critical care time: 10 minutes  Total critical care time (exclusive of procedural time) : 65 minutes  Critical care time was exclusive of separately billable procedures and treating other patients and teaching time.  Critical care was necessary to treat or prevent imminent or life-threatening deterioration of the following conditions: CNS failure or compromise.  Critical care was time spent personally by me on the following activities: development of treatment plan with patient or surrogate, discussions with consultants, discussions with primary provider, gastric intubation, interpretation of cardiac output measurements, evaluation of patient's response to treatment, examination of patient, obtaining history from patient or surrogate, ordering and performing treatments and interventions, ordering and review of laboratory studies, ordering and review of radiographic studies, pulse  oximetry, re-evaluation of patient's condition and review of old charts.        Labs Reviewed   CBC W/ AUTO DIFFERENTIAL - Abnormal; Notable for the following components:       Result Value    RBC 3.97 (*)     Hemoglobin 9.4 (*)     Hematocrit 32.3 (*)     Mean Corpuscular Volume 81 (*)     Mean Corpuscular Hemoglobin 23.7 (*)     Mean Corpuscular Hemoglobin Conc 29.1 (*)     RDW 16.4 (*)     All other components within normal limits   COMPREHENSIVE METABOLIC PANEL - Abnormal; Notable for the following components:    Potassium 2.5 (*)     CO2 32 (*)     Alkaline Phosphatase 41 (*)     All other components within normal limits    Narrative:      Potassium  critical result(s) called and verbal readback obtained   from Mone Magaña., 10/29/2019 14:22   ISTAT PROCEDURE - Abnormal; Notable for the following components:    POC Potassium 2.4 (*)     POC TCO2 (MEASURED) 30 (*)     POC Anion Gap 20 (*)     All other components within normal limits   TROPONIN I   B-TYPE NATRIURETIC PEPTIDE   MAGNESIUM   POCT URINE PREGNANCY   ISTAT CHEM8     EKG Readings: (Independently Interpreted)   Initial Reading: No STEMI. Rhythm: Sinus Bradycardia. Heart Rate: 50. Conduction: 1st Degree AV Block.   LVH     ECG Results          EKG 12-lead (In process)  Result time 10/29/19 13:25:04    In process by Interface, Lab In [facility] (10/29/19 13:25:04)                 Narrative:    Test Reason : I10,    Vent. Rate : 050 BPM     Atrial Rate : 050 BPM     P-R Int : 214 ms          QRS Dur : 104 ms      QT Int : 420 ms       P-R-T Axes : 061 004 061 degrees     QTc Int : 382 ms    Sinus bradycardia with 1st degree A-V block  Possible Left atrial enlargement  LVH  Nonspecific T wave abnormality  Abnormal ECG  When compared with ECG of 27-SEP-2018 10:41,  Significant changes have occurred    Referred By: [email or web address]   SELF           Confirmed By:                   In process by Interface, Lab In [facility] (10/29/19 13:24:39)                 Narrative:     Test Reason : I10,    Vent. Rate : 050 BPM     Atrial Rate : 050 BPM     P-R Int : 214 ms          QRS Dur : 104 ms      QT Int : 420 ms       P-R-T Axes : 061 004 061 degrees     QTc Int : 382 ms    Sinus bradycardia with 1st degree A-V block  Possible Left atrial enlargement  LVH  Nonspecific T wave abnormality  Abnormal ECG  When compared with ECG of 27-SEP-2018 10:41,  Significant changes have occurred    Referred By: AAAREFERR   SELF           Confirmed By:                   In process by Interface, Lab In [facility] (10/29/19 13:19:34)                 Narrative:    Test Reason : I10,    Vent. Rate : 050 BPM     Atrial Rate : 050 BPM     P-R Int : 214 ms          QRS Dur : 104 ms      QT Int : 420 ms       P-R-T Axes : 061 004 061 degrees     QTc Int : 382 ms    Sinus bradycardia with 1st degree A-V block  Possible Left atrial enlargement  LVH  Nonspecific T wave abnormality  Abnormal ECG  When compared with ECG of 27-SEP-2018 10:41,  Significant changes have occurred    Referred By: [email or web address]   SELF           Confirmed By:                             Imaging Results          MRI Brain Without Contrast (Final result)  Result time 10/29/19 17:40:23    Final result by Navjot Becker MD (10/29/19 17:40:23)                 Impression:      No MR evidence of acute or subacute infarction.    Changes of prior aneurysmal coiling in the right aspect of the anterior communicating artery.    No acute intracranial process.      Electronically signed by: Navjot Becker MD  Date:    10/29/2019  Time:    17:40             Narrative:    EXAMINATION:  MRI BRAIN WITHOUT CONTRAST    CLINICAL HISTORY:  Focal neuro deficit, new, fixed or worsening, >6 hours;vertigo;    TECHNIQUE:  Multiplanar multisequence MR imaging of the brain was performed without contrast.    COMPARISON:  CT head dated 10/29/2019 and MRI of the brain dated 01/29/2019.    FINDINGS:  The craniocervical junction is within normal limits.  The midline structures are  unremarkable.  The sellar and parasellar structures are within normal limits.  The intracranial flow voids are within normal limits.    No diffusion-weighted signal abnormality is identified.  There is minimal increased T2/FLAIR signal hyperintensity within the periventricular white matter in the occipital regions.  The remainder of the ventricles and sulci are within normal limits.  There are no extra-axial fluid collections.  There is no evidence of intracranial hemorrhage.  There are changes of prior aneurysm coiling in the right aspect of the anterior circulation.    The orbits and intraorbital contents are within normal limits.  There is a mucous retention cyst within the left maxillary sinus.  There is small amount of trapped fluid within the left posterior ethmoid sinus.  The calvarium is intact.                               CT Head Without Contrast (Final result)  Result time 10/29/19 14:05:32    Final result by Lance Rodriguez MD (10/29/19 14:05:32)                 Impression:      Since MRI January 29, 2019, no significant change or acute abnormality.      Electronically signed by: Lance Rodriguez MD  Date:    10/29/2019  Time:    14:05             Narrative:    EXAMINATION:  CT HEAD WITHOUT CONTRAST    CLINICAL HISTORY:  Headache, acute, severe, thunderclap, worst HA of life;    TECHNIQUE:  Low dose axial images were obtained through the head.  Coronal and sagittal reformations were also performed. Contrast was not administered.    COMPARISON:  Exams dating back to January 25, 2018    FINDINGS:  Anterior communicating artery aneurysm coils are in expected position.    No intracranial hemorrhage or acute infarction.  No intracranial mass or mass effect.  No hydrocephalus.    Unremarkable scalp and calvarium.    Paranasal sinuses and mastoid air cells are clear.                             Patient presents complaining of dizziness.  Sensation of the room moving.  Also feeling lightheaded and faint.  Been  since she woke up this morning.  Patient has a history of aneurysm coil.  Also complaining of headache.  Patient's blood pressure was malignant as high as 240 systolic.  Patient given IV hydralazine and patient's blood pressure now 131/79 heart rate 74.  Headache is resolved.  There is no visual changes.  Head CT shows no evidence of depressed syndrome.  Patient still dizzy with ambulation and while in the bed.  There is no nystatin medicine normal neurologic exam.  Discussed with Dr mcnair with Neurology who recommends MRI in the emergency room.  MRI brain without.  If normal patient is stable to go home from a neurologic standpoint.  Patient is mildly anemic.  Patient has a potassium at 2.4.  Tolerated 40 mEq of p.o. here in the emergency department.  Patient has a history of low potassium in the past.  Patient also has a history of anemia.  Her anemia is improved from last year.  Patient feels reasonably well.  Able ambulate around the emergency room.  Still having some dizziness but improved.  Will discharge with follow-up with Neurology and primary care for recheck.  Will refill the patient's Procardia and labetalol that she has been noncompliant with his beginning of this year.                   I, Arolod Merida, personally performed the services described in this documentation. All medical record entries made by the scribe were at my direction and in my presence. I have reviewed the chart and agree that the record reflects my personal performance and is accurate and complete.        Clinical Impression:       ICD-10-CM ICD-9-CM   1. Hypertension I10 401.9   2. Dizziness R42 780.4                                Aroldo Merida MD  10/29/19 1824

## 2019-10-29 NOTE — ED NOTES
Patient back in room from MRI. Amb with standby to bathroom. Patient states she has minimal dizziness at this time. No obvious difficulty ambulating at this time.

## 2019-10-29 NOTE — ED TRIAGE NOTES
"Patient states she hasn't taken her b/p meds for a while. She states "I have them at home but I don't take them." States she woke up with a headache to entire head. Went to work and had b/p check. It was 242/134. Placed on the monitor.   "

## 2019-12-08 NOTE — ASSESSMENT & PLAN NOTE
32 y.o. female 24 weeks pregnant with history of polycystic kidney disease with HH4F4 SAH on 8/10, now s/p acom coiling 8/10. PBD/PCD 14.     NEURO  -Continue NCC  -q 1 hr neuro checks  -evd open to 10 and draining, monitor icps   -CSF profile reviewed from 8/19   -will keep EVD open at 10  -nimotop  -daily TCDs   -TCDs continue to be elevated; exam stable  -Keppra      CV  -permissive HTN  <220 as aneurysm secured    RESP  -stable on room air  -Aggressive pulmonary management  -IS @ bedside    FEN/GI  -cont 2%  -strict I/Os  -goal net even or positive  -Reg diet    ID  -ancef for drain ppx    PPX  -SQH  -famotidine for PUD ppx  -TEDs/SCDs    -further mgmt per ncc and obgyn.   Yes

## 2019-12-10 ENCOUNTER — HOSPITAL ENCOUNTER (EMERGENCY)
Facility: HOSPITAL | Age: 34
Discharge: HOME | End: 2019-12-10
Attending: EMERGENCY MEDICINE
Payer: MEDICAID

## 2019-12-10 VITALS
HEART RATE: 65 BPM | OXYGEN SATURATION: 100 % | HEIGHT: 69 IN | WEIGHT: 160 LBS | BODY MASS INDEX: 23.7 KG/M2 | TEMPERATURE: 99 F | RESPIRATION RATE: 21 BRPM | SYSTOLIC BLOOD PRESSURE: 187 MMHG | DIASTOLIC BLOOD PRESSURE: 110 MMHG

## 2019-12-10 DIAGNOSIS — R07.9 CHEST PAIN: ICD-10-CM

## 2019-12-10 DIAGNOSIS — R09.81 NASAL CONGESTION: ICD-10-CM

## 2019-12-10 DIAGNOSIS — J20.8 ACUTE VIRAL BRONCHITIS: ICD-10-CM

## 2019-12-10 DIAGNOSIS — R05.9 COUGH: ICD-10-CM

## 2019-12-10 DIAGNOSIS — J06.9 VIRAL URI WITH COUGH: Primary | ICD-10-CM

## 2019-12-10 DIAGNOSIS — J02.9 PHARYNGITIS, UNSPECIFIED ETIOLOGY: ICD-10-CM

## 2019-12-10 LAB
B-HCG UR QL: NEGATIVE
CTP QC/QA: YES
CTP QC/QA: YES
POC MOLECULAR INFLUENZA A AGN: NEGATIVE
POC MOLECULAR INFLUENZA B AGN: NEGATIVE

## 2019-12-10 PROCEDURE — 25000003 PHARM REV CODE 250: Performed by: EMERGENCY MEDICINE

## 2019-12-10 PROCEDURE — 93005 ELECTROCARDIOGRAM TRACING: CPT

## 2019-12-10 PROCEDURE — 81025 URINE PREGNANCY TEST: CPT | Performed by: PHYSICIAN ASSISTANT

## 2019-12-10 PROCEDURE — 94640 AIRWAY INHALATION TREATMENT: CPT

## 2019-12-10 PROCEDURE — 99284 EMERGENCY DEPT VISIT MOD MDM: CPT | Mod: 25

## 2019-12-10 PROCEDURE — 93010 EKG 12-LEAD: ICD-10-PCS | Mod: ,,, | Performed by: INTERNAL MEDICINE

## 2019-12-10 PROCEDURE — 25000242 PHARM REV CODE 250 ALT 637 W/ HCPCS: Performed by: EMERGENCY MEDICINE

## 2019-12-10 PROCEDURE — 93010 ELECTROCARDIOGRAM REPORT: CPT | Mod: ,,, | Performed by: INTERNAL MEDICINE

## 2019-12-10 PROCEDURE — 87502 INFLUENZA DNA AMP PROBE: CPT

## 2019-12-10 RX ORDER — DIPHENHYDRAMINE HCL 25 MG
25 CAPSULE ORAL EVERY 4 HOURS PRN
Qty: 20 CAPSULE | Refills: 1 | Status: SHIPPED | OUTPATIENT
Start: 2019-12-10 | End: 2022-03-12

## 2019-12-10 RX ORDER — ACETAMINOPHEN 500 MG
1000 TABLET ORAL
Status: COMPLETED | OUTPATIENT
Start: 2019-12-10 | End: 2019-12-10

## 2019-12-10 RX ORDER — ONDANSETRON 4 MG/1
4 TABLET, ORALLY DISINTEGRATING ORAL
Status: COMPLETED | OUTPATIENT
Start: 2019-12-10 | End: 2019-12-10

## 2019-12-10 RX ORDER — ONDANSETRON 4 MG/1
4 TABLET, ORALLY DISINTEGRATING ORAL EVERY 4 HOURS PRN
Qty: 20 TABLET | Refills: 1 | Status: SHIPPED | OUTPATIENT
Start: 2019-12-10 | End: 2022-03-12 | Stop reason: SDDI

## 2019-12-10 RX ORDER — ALBUTEROL SULFATE 90 UG/1
2 AEROSOL, METERED RESPIRATORY (INHALATION) EVERY 4 HOURS PRN
Qty: 1 INHALER | Refills: 1 | Status: ON HOLD | OUTPATIENT
Start: 2019-12-10 | End: 2024-03-27 | Stop reason: HOSPADM

## 2019-12-10 RX ORDER — IPRATROPIUM BROMIDE AND ALBUTEROL SULFATE 2.5; .5 MG/3ML; MG/3ML
3 SOLUTION RESPIRATORY (INHALATION)
Status: COMPLETED | OUTPATIENT
Start: 2019-12-10 | End: 2019-12-10

## 2019-12-10 RX ADMIN — ACETAMINOPHEN 1000 MG: 500 TABLET, FILM COATED ORAL at 10:12

## 2019-12-10 RX ADMIN — ONDANSETRON 4 MG: 4 TABLET, ORALLY DISINTEGRATING ORAL at 10:12

## 2019-12-10 RX ADMIN — IPRATROPIUM BROMIDE AND ALBUTEROL SULFATE 3 ML: .5; 3 SOLUTION RESPIRATORY (INHALATION) at 10:12

## 2019-12-11 NOTE — ED PROVIDER NOTES
Encounter Date: 12/10/2019       History     Chief Complaint   Patient presents with    Cough     States she has a bad cough and is congested    Nasal Congestion     35 yo female presents via personal transportation with cough productive of green sputum, substernal pressure-like chest pain, nasal congestion, fatigue, shortness of breath, sore throat worse on swallowing, and subjective fever and chills. Patient reports she became ill 2 days ago (19) and symptoms have gradually worsened. Patient has not taken anything for symptoms.    Patient hypertensive here. She admits to not taking her BP meds today. No chest pain, shortness of breath, headache, dizziness, focal weakness.    Patient has 5 children who range in age between 3 and 16. They are not ill, however. Patient also works in a school. She missed work today due to feeling ill.     PMH: polycystic kidney disease, HTN, subarachnoid hemorrhage, cerebral aneurysm, CVA, anemia    Psych: bipolar 1    PSH: cerebral angiogram, csection, D&C    Smokes tobacco.         Review of patient's allergies indicates:  No Known Allergies  Past Medical History:   Diagnosis Date    Anemia     Bipolar 1 disorder     Cerebral aneurysm     Hypertension     since     Polycystic kidney disease     Polycystic kidney disease     Pre-eclampsia     Renal disorder     Since childhood     SAH (subarachnoid hemorrhage)     Stroke      Past Surgical History:   Procedure Laterality Date    CEREBRAL ANGIOGRAM N/A 2018    Procedure: ANGIOGRAM-CEREBRAL;  Surgeon: Jayna Surgeon;  Location: [facility];  Service: Anesthesiology;  Laterality: N/A;     SECTION N/A 2018    Procedure:  SECTION;  Surgeon: Obed Soto MD;  Location: [facility] L&D;  Service: OB/GYN;  Laterality: N/A;    DILATION AND CURETTAGE OF UTERUS           Family History   Problem Relation Age of Onset    Hypertension Mother     Polycystic kidney disease Mother      Hypertension Paternal Grandmother     Polycystic kidney disease Daughter      Social History     Tobacco Use    Smoking status: Never Smoker    Smokeless tobacco: Never Used   Substance Use Topics    Alcohol use: Yes     Comment: occasional    Drug use: No     Review of Systems   Constitutional: Positive for chills and fever.   HENT: Positive for congestion and sore throat.    Eyes: Negative for visual disturbance.   Respiratory: Positive for cough and shortness of breath.    Cardiovascular: Positive for chest pain. Negative for leg swelling.   Gastrointestinal: Negative for abdominal pain.   Genitourinary: Negative for dysuria.   Musculoskeletal: Negative for neck stiffness.   Skin: Negative for rash.   Neurological: Negative for dizziness and headaches.       Physical Exam     Initial Vitals [12/10/19 2032]   BP Pulse Resp Temp SpO2   (!) 196/104 66 18 98.8 °F (37.1 °C) 96 %      MAP       --         Physical Exam    Nursing note and vitals reviewed.  Constitutional: She appears well-developed and well-nourished. She is not diaphoretic.   Awake, alert, nontoxic, speaking in complete sentences.   HENT:   Head: Normocephalic and atraumatic.   Mouth/Throat: Oropharynx is clear and moist.   Nasal phonation.   Eyes: Conjunctivae and EOM are normal. Pupils are equal, round, and reactive to light.   Neck: Normal range of motion. Neck supple.   Cardiovascular: Normal rate, regular rhythm, normal heart sounds and intact distal pulses.   No murmur heard.  Pulmonary/Chest: No respiratory distress. She has no wheezes. She has rhonchi (scattered). She has no rales.   Intermittent cough.   Abdominal: Soft. There is no tenderness. There is no rebound and no guarding.   Musculoskeletal: Normal range of motion. She exhibits no edema or tenderness.   Neurological: She is alert and oriented to person, place, and time. She has normal strength.   Moving all extremities.   Skin: Skin is warm and dry. No erythema. No pallor.    Psychiatric: She has a normal mood and affect.         ED Course   Procedures  Labs Reviewed   POCT URINE PREGNANCY   POCT INFLUENZA A/B MOLECULAR     EKG Readings: (Independently Interpreted)   22:26: Sinus bradycardia, HR 56. Normal axis. No STEMI. Morphology c/w 10/29/19.      ECG Results          EKG 12-lead (In process)  Result time 12/11/19 05:55:31    In process by Interface, Lab In [facility] (12/11/19 05:55:31)                 Narrative:    Test Reason : R07.9,    Vent. Rate : 056 BPM     Atrial Rate : 056 BPM     P-R Int : 206 ms          QRS Dur : 108 ms      QT Int : 444 ms       P-R-T Axes : 059 009 072 degrees     QTc Int : 428 ms    Sinus bradycardia  Otherwise normal ECG  When compared with ECG of 10-DEC-2019 22:26,  No significant change was found    Referred By: [email or web address]   SELF           Confirmed By:                   In process by Interface, Lab In [facility] (12/11/19 05:53:02)                 Narrative:    Test Reason : R07.9,    Vent. Rate : 056 BPM     Atrial Rate : 056 BPM     P-R Int : 206 ms          QRS Dur : 108 ms      QT Int : 444 ms       P-R-T Axes : 059 009 072 degrees     QTc Int : 428 ms    Sinus bradycardia  Otherwise normal ECG  When compared with ECG of 10-DEC-2019 22:26,  No significant change was found    Referred By: [email or web address]   SELF           Confirmed By:                             Imaging Results          X-Ray Chest AP Portable (Final result)  Result time 12/10/19 22:43:45    Final result by Navjot Becker MD (12/10/19 22:43:45)                 Impression:      Airspace opacity in the left midlung zone, consistent with pneumonia.      Electronically signed by: Navjot Becker MD  Date:    12/10/2019  Time:    22:43             Narrative:    EXAMINATION:  XR CHEST AP PORTABLE    CLINICAL HISTORY:  Cough    TECHNIQUE:  Single frontal view of the chest was performed.    COMPARISON:  08/13/2018.    FINDINGS:  There has been removal of the previous right-sided central access  catheters.  The trachea is unremarkable.  The cardiomediastinal silhouette is at upper limits of normal for an AP projection.  The hemidiaphragms are unremarkable.  There is no evidence of free air beneath the hemidiaphragms.  There are no pleural effusions.  There is no evidence of a pneumothorax.  There is no evidence of pneumomediastinum.  There is an airspace opacity in the left midlung zone.  The osseous structures are unremarkable                                 Medical Decision Making:   History:   Old Medical Records: I decided to obtain old medical records.  Old Records Summarized: records from previous admission(s) and records from clinic visits.  Initial Assessment:   35 yo female with cough productive of green sputum, substernal pressure-like chest pain, nasal congestion, fatigue, shortness of breath, sore throat worse on swallowing, subjective fever and chills x 2 days. Patient hypertensive here as well.  Differential Diagnosis:   Ddx includes bronchitis, PNA, ACS, hypertensive emergency, other.  Independently Interpreted Test(s):   I have ordered and independently interpreted X-rays - see prior notes.  I have ordered and independently interpreted EKG Reading(s) - see prior notes  Clinical Tests:   Lab Tests: Ordered and Reviewed  Radiological Study: Ordered and Reviewed  Medical Tests: Reviewed and Ordered  ED Management:  UPT negative.    EKG no STEMI.    CXR NAD.    At age 34 and with female gender, patient is low-risk for ACS and does not require bloodwork for chest pain, especially given reassuring EKG and CXR.    POC influenza negative.    Patient had neb tx, APAP, zofran here in ED with some improvement in symptoms.     BP elevated here but patient admits noncompliance with meds today. Per chart review, she is chronically noncompliant and BPs are generally elevated. No emergent tx indicated at this time as there is no evidence of HTN emergency.    Suspect viral URI. Supportive care rx'ed. Work  note provided.                                 Clinical Impression:       ICD-10-CM ICD-9-CM   1. Viral URI with cough J06.9 465.9    B97.89    2. Cough R05 786.2   3. Chest pain R07.9 786.50   4. Acute viral bronchitis J20.8 466.0   5. Nasal congestion R09.81 478.19   6. Pharyngitis, unspecified etiology J02.9 462                             Shannan Jones MD  12/11/19 1907

## 2020-11-07 NOTE — PROCEDURES
Radiology Post-Procedure Note    Pre Op Diagnosis: Vasospasm    Post Op Diagnosis: Same    Procedure: Cerebral angiogram    Procedure performed by: GAIL Bo MD    Procedure report:     A 5F sheath was placed into the left femoral artery and a 5F Berenstein catheter was advanced into the aortic arch.  The BL ICA and VA arteries were subselected and angiography of the brain was performed after injection into each of these vessels.    Preliminary interpretation:  Please see Imaging report for full details. Mild to moderate vasospasm of BL ICA and VB arteries    The sheath removed and hemostasis achieved using exoseal.  No hematoma was present at the time of hemostasis.    The patient tolerated the procedure well.     Salo Montelongo M.D.  [facility].  [phone].       No

## 2020-11-19 ENCOUNTER — HOSPITAL ENCOUNTER (EMERGENCY)
Facility: HOSPITAL | Age: 35
Discharge: HOME | End: 2020-11-20
Attending: EMERGENCY MEDICINE
Payer: MEDICAID
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
## 2020-11-19 DIAGNOSIS — S39.012A STRAIN OF LUMBAR REGION, INITIAL ENCOUNTER: ICD-10-CM

## 2020-11-19 DIAGNOSIS — I10 ESSENTIAL HYPERTENSION: ICD-10-CM

## 2020-11-19 DIAGNOSIS — M54.2 NECK PAIN: ICD-10-CM

## 2020-11-19 DIAGNOSIS — S16.1XXA STRAIN OF NECK MUSCLE, INITIAL ENCOUNTER: Primary | ICD-10-CM

## 2020-11-19 LAB
B-HCG UR QL: NEGATIVE
CTP QC/QA: YES

## 2020-11-19 PROCEDURE — 99213 OFFICE O/P EST LOW 20 MIN: CPT | Mod: 95,,, | Performed by: PSYCHIATRY & NEUROLOGY

## 2020-11-19 PROCEDURE — 99284 EMERGENCY DEPT VISIT MOD MDM: CPT | Mod: 25,ER

## 2020-11-19 PROCEDURE — 25000003 PHARM REV CODE 250: Mod: ER | Performed by: EMERGENCY MEDICINE

## 2020-11-19 PROCEDURE — 81025 URINE PREGNANCY TEST: CPT | Mod: ER | Performed by: EMERGENCY MEDICINE

## 2020-11-19 PROCEDURE — 99213 PR OFFICE/OUTPT VISIT, EST, LEVL III, 20-29 MIN: ICD-10-PCS | Mod: 95,,, | Performed by: PSYCHIATRY & NEUROLOGY

## 2020-11-19 RX ORDER — METHOCARBAMOL 750 MG/1
1500 TABLET, FILM COATED ORAL
Status: COMPLETED | OUTPATIENT
Start: 2020-11-19 | End: 2020-11-19

## 2020-11-19 RX ORDER — KETOROLAC TROMETHAMINE 10 MG/1
10 TABLET, FILM COATED ORAL
Status: COMPLETED | OUTPATIENT
Start: 2020-11-19 | End: 2020-11-19

## 2020-11-19 RX ADMIN — KETOROLAC TROMETHAMINE 10 MG: 10 TABLET, FILM COATED ORAL at 11:11

## 2020-11-19 RX ADMIN — METHOCARBAMOL TABLETS 1500 MG: 750 TABLET, COATED ORAL at 11:11

## 2020-11-20 VITALS
DIASTOLIC BLOOD PRESSURE: 122 MMHG | HEIGHT: 69 IN | BODY MASS INDEX: 24.44 KG/M2 | WEIGHT: 165 LBS | RESPIRATION RATE: 16 BRPM | SYSTOLIC BLOOD PRESSURE: 200 MMHG | OXYGEN SATURATION: 100 % | HEART RATE: 61 BPM | TEMPERATURE: 98 F

## 2020-11-20 PROCEDURE — 25000003 PHARM REV CODE 250: Mod: ER | Performed by: EMERGENCY MEDICINE

## 2020-11-20 RX ORDER — METHOCARBAMOL 750 MG/1
1500 TABLET, FILM COATED ORAL EVERY 6 HOURS
Qty: 24 TABLET | Refills: 0 | Status: SHIPPED | OUTPATIENT
Start: 2020-11-20 | End: 2020-11-23

## 2020-11-20 RX ORDER — HYDRALAZINE HYDROCHLORIDE 25 MG/1
25 TABLET, FILM COATED ORAL
Status: COMPLETED | OUTPATIENT
Start: 2020-11-20 | End: 2020-11-20

## 2020-11-20 RX ORDER — PREDNISONE 20 MG/1
40 TABLET ORAL DAILY
Qty: 10 TABLET | Refills: 0 | Status: SHIPPED | OUTPATIENT
Start: 2020-11-20 | End: 2020-11-25

## 2020-11-20 RX ORDER — TRAMADOL HYDROCHLORIDE 50 MG/1
50 TABLET ORAL EVERY 6 HOURS PRN
Qty: 12 TABLET | Refills: 0 | Status: SHIPPED | OUTPATIENT
Start: 2020-11-20 | End: 2022-03-12 | Stop reason: RX

## 2020-11-20 RX ADMIN — HYDRALAZINE HYDROCHLORIDE 25 MG: 25 TABLET, FILM COATED ORAL at 12:11
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
## 2021-02-16 NOTE — ED PROVIDER NOTES
Encounter Date: 5/16/2018    This is a SORT/MSE of a 32 y.o. female presenting to the ED with c/o abdominal pain and vaginal bleeding. /101. Also reports HA. Reports compliance with meds. Care will be transferred to an alternate provider when patient is roomed for a full evaluation and final disposition. SANDIP Gerard, MISTY-C    SCRIBE #1 NOTE: IAllison, vitaliy scribing for, and in the presence of,  Seymour Palacios MD. I have scribed the following portions of the note - Other sections scribed: ROS, HPI.       History     Chief Complaint   Patient presents with    Abdominal Pain     started after vaginal bleeding. Also reports headache and blurred vision    Vaginal Bleeding     started saturday and sunday     CC: Pelvic Pain; Vaginal Bleeding    HPI: Patient is a 32 y.o. F with a past medical history of Anemia; Hypertension; Polycystic kidney disease; and Renal disorder who presents to the ED for evaluation of vaginal bleeding with clots which occurred 3-4 days ago. After, the patient reports experiencing pelvic pain, low back pain, and light-headedness. She adds that she has been hypertensive as of late, and did not take her blood pressure medications today. No symptomatic treatment prior to arrival. Patient denies fever, nausea, emesis, diarrhea, dysuria, vaginal discharge, chest pain, and/or shortness of breath.    No past medical history of gonorrhea/chlamydia, uterine fibroids, and/or endometriosis.        The history is provided by the patient. No  was used.     Review of patient's allergies indicates:  No Known Allergies  Past Medical History:   Diagnosis Date    Anemia     Hypertension     since 2012    Polycystic kidney disease     Polycystic kidney disease     Renal disorder     Since childhood      Past Surgical History:   Procedure Laterality Date    DILATION AND CURETTAGE OF UTERUS      2013     Family History   Problem Relation Age of Onset    Hypertension Mother      Polycystic kidney disease Mother     Hypertension Paternal Grandmother     Polycystic kidney disease Daughter      Social History   Substance Use Topics    Smoking status: Never Smoker    Smokeless tobacco: Never Used    Alcohol use Yes      Comment: occasional     Review of Systems   Constitutional: Negative for fever.   HENT: Negative for sore throat.    Eyes: Negative for pain.   Respiratory: Negative for shortness of breath.    Cardiovascular: Negative for chest pain.   Gastrointestinal: Negative for diarrhea, nausea and vomiting.   Genitourinary: Positive for pelvic pain and vaginal bleeding. Negative for dysuria and vaginal discharge.   Musculoskeletal: Positive for back pain (low back pain).   Skin: Negative for rash.   Neurological: Positive for light-headedness.       Physical Exam     Initial Vitals [05/16/18 1040]   BP Pulse Resp Temp SpO2   (!) 204/101 63 18 98.8 °F (37.1 °C) 100 %      MAP       135.33         Physical Exam    Nursing note and vitals reviewed.  Constitutional: She appears well-developed and well-nourished. She is not diaphoretic.  Non-toxic appearance. She does not appear ill. No distress.   HENT:   Head: Normocephalic and atraumatic.   Eyes: EOM are normal. Pupils are equal, round, and reactive to light. No scleral icterus.   Neck: Normal range of motion. Neck supple. No JVD present.   Cardiovascular: Normal rate and regular rhythm.   Pulmonary/Chest: Effort normal and breath sounds normal. No respiratory distress.   Abdominal: Soft. Normal appearance and bowel sounds are normal. She exhibits no distension. There is no tenderness. There is no CVA tenderness.   Complains of pelvic pain.   Genitourinary:   Genitourinary Comments: No active bleeding appreciated.  Cervix closed  No CMT  No adnexal tenderness   Musculoskeletal: Normal range of motion. She exhibits no edema or tenderness.   Neurological: She is alert and oriented to person, place, and time. No cranial nerve deficit  or sensory deficit.   Skin: Skin is warm and dry.   Psychiatric: She has a normal mood and affect.         ED Course   Procedures  Labs Reviewed   URINALYSIS, REFLEX TO URINE CULTURE - Abnormal; Notable for the following:        Result Value    Appearance, UA Hazy (*)     Protein, UA 1+ (*)     Leukocytes, UA 1+ (*)     All other components within normal limits    Narrative:     Preferred Collection Type->Urine, Clean Catch   CBC W/ AUTO DIFFERENTIAL - Abnormal; Notable for the following:     RBC 3.83 (*)     Hemoglobin 10.8 (*)     Hematocrit 32.0 (*)     RDW 14.9 (*)     Gran% 74.9 (*)     All other components within normal limits   COMPREHENSIVE METABOLIC PANEL - Abnormal; Notable for the following:     Sodium 135 (*)     Potassium 3.4 (*)     Albumin 3.4 (*)     Alkaline Phosphatase 39 (*)     ALT 7 (*)     Anion Gap 7 (*)     All other components within normal limits   POCT URINE PREGNANCY - Abnormal; Notable for the following:     POC Preg Test, Ur Positive (*)     All other components within normal limits   LIPASE   HCG, QUANTITATIVE, PREGNANCY   URINALYSIS MICROSCOPIC    Narrative:     Preferred Collection Type->Urine, Clean Catch   TYPE & SCREEN             Medical Decision Making:   History:   Old Medical Records: I decided to obtain old medical records.  Differential Diagnosis:   Dysfunctional uterine bleeding  Fibroids  Endometriosis  Miscarriage  Pregnancy  Clinical Tests:   Lab Tests: Ordered and Reviewed  Radiological Study: Ordered and Reviewed  ED Management:  Afebrile, no acute distress, benign abdominal exam.  No active bleeding noted on pelvic exam.  Patient is Rh positive urine pregnancy test is positive. UA is not indicative of infection.  Pelvic ultrasound demonstrates a single viable intrauterine pregnancy of 12 weeks gestation with a heart rate of 153 beats per minute.  Patient also has ovarian cysts.  Patient has remained hemodynamically stable. Patient reports having a prescription for  Procardia to control her blood pressure.  Patient referred to her gynecologist to establish prenatal care. Counseled on supportive care and appropriate medication usage. Dicussed extensively concerning symptoms for which to return to ER and the importance of follow up . Patient expressed understanding and agreement with treatment plan.   This chart completed using dictation software, as a result there may be some typographical errors.             Scribe Attestation:   Scribe #1: I performed the above scribed service and the documentation accurately describes the services I performed. I attest to the accuracy of the note.    Attending Attestation:           Physician Attestation for Scribe:  Physician Attestation Statement for Scribe #1: I, Seymour Palacios MD, reviewed documentation, as scribed by Allison Perdomo in my presence, and it is both accurate and complete.                    Clinical Impression:   The primary encounter diagnosis was 12 weeks gestation of pregnancy. A diagnosis of Vaginal bleeding during pregnancy was also pertinent to this visit.    Disposition:   Disposition: Discharged  Condition: Stable                        Seymour Palacios MD  05/16/18 6418     [TextBox_4] : Pt is 69 yo F with PMH TIA, left vertebral dissection, HTN, stable pre-diabetes, left ICA stenosis, GERD, duodenal ulcer, arthritis.\par 37 pack year history of smoking, still currently smoking. \par \par First visit 2/19/19: Has tried patches and lozenges in the past. Wants to start Chantix, was prescribed by Dr. Jacob last week but she states she couldn't afford the co-pay. \par Last cigarette was earlier today. Smoking roughly 10 cig/week. \par \par Has mild cough, cough is dry. \par Denies chest tightness, wheezing, recurrent respiratory tract infections. \par No ED visits or hospitalizations for breathing. \par No diagnosis of asthma or PNA in the past. No personal history of blood clots or cancers. \par Currently no pets. Had a parakeet for 8 years, 25 years ago. \par She has down throw pillows. She has a 10-year old carpet in her bedroom. \par No mold in the homes.\par She had a home in [place] [id number] where radon levels were borderline elevated.\par No other inhalational exposures she is aware of. \par She has a history of snoring.\par \par FHx: sister breast CA (diagnosed around age 65 yo)\par PCN: penicillin (angioedema)\par ****\par \par 2-16-21:\par In early January she was complaining of "chest discomfort" difficult to understand if it is chest pain vs chest tightness. She states she is also feeling anxious. When she was walking up a hill, she notices her breathing gets heavier and she has to walk with her mouth open. She started smoking again (about 1/2 ppd) during the pandemic. She stopped smoking and reports her symptoms have improved\par d-dimer negative\par She had a non contrast chest CT, which was stable\par today she is not complaining of chest tightness or shortness of breath\par she went to ER in January for dizziness, evaluated for TIA

## 2021-03-09 NOTE — PROGRESS NOTES
Called patient and left message to call back.     ICU Progress Note  Neurocritical Care    Admit Date: 8/10/2018  LOS: 4    CC: Subarachnoid hemorrhage from anterior communicating artery aneurysm    Code Status: Full Code     SUBJECTIVE:     Interval History/Significant Events:   SAH  ICP's wnl  No vasospasm on TCD's  Hyponatremia  Cerebra salt wasting  Negative fluid balance  PICC in place  keppra    GI: Denies abdominal pain or constipation.  Neurologic: Denies new weakness, headaches, or paresthesias.     Medications:  Continuous Infusions:   sodium chloride 0.9% 75 mL/hr at 08/14/18 0905    Sodium Chloride 2%       Scheduled Meds:   ceFEPime (MAXIPIME) IVPB  1 g Intravenous Q8H    famotidine  20 mg Oral BID    heparin (porcine)  5,000 Units Subcutaneous Q8H    levETIRAcetam  500 mg Oral BID    niMODipine  30 mg Oral Q2H    prenatal vitamin  1 tablet Oral Daily    senna-docusate 8.6-50 mg  1 tablet Oral BID    sodium chloride 0.9%  10 mL Intravenous Q6H     PRN Meds:.acetaminophen, calcium gluconate, labetalol, magnesium oxide, magnesium oxide, midazolam, oxyCODONE, potassium chloride 10%, potassium chloride 10%, potassium chloride 10%, potassium, sodium phosphates, potassium, sodium phosphates, potassium, sodium phosphates, Flushing PICC Protocol **AND** sodium chloride 0.9% **AND** sodium chloride 0.9%, sodium chloride 0.9%    OBJECTIVE:   Vital Signs (Most Recent):   Temp: 98.2 °F (36.8 °C) (08/14/18 0805)  Pulse: 62 (08/14/18 0905)  Resp: 16 (08/14/18 0905)  BP: (!) 164/81 (08/14/18 0905)  SpO2: 100 % (08/14/18 0905)    Vital Signs (24h Range):   Temp:  [98.2 °F (36.8 °C)-100 °F (37.8 °C)] 98.2 °F (36.8 °C)  Pulse:  [59-78] 62  Resp:  [14-28] 16  SpO2:  [98 %-100 %] 100 %  BP: (125-164)/(63-95) 164/81  Arterial Line BP: (141-190)/(68-85) 182/84    ICP/CPP (Last 24h):   ICP Mean (mmHg):  [1 mmHg-15 mmHg] 8 mmHg  CPP (mmHg calculated using NBP):  [] 108  CPP:  [85 mmHg-113 mmHg] 107 mmHg    I & O (Last 24h):     Intake/Output Summary (Last 24  hours) at 8/14/2018 0959  Last data filed at 8/14/2018 0905  Gross per 24 hour   Intake 2401.25 ml   Output 3165 ml   Net -763.75 ml     Physical Exam:  GA: Alert, comfortable, no acute distress.   HEENT: No scleral icterus or JVD.   Pulmonary: Clear to auscultation A/P/L. No wheezing, crackles, or rhonchi.  Cardiac: RRR S1 & S2 w/o rubs/murmurs/gallops.   Abdominal: Bowel sounds present x 4. No appreciable hepatosplenomegaly.  Skin: No jaundice, rashes, or visible lesions.    Neuro:  Awake  Alert  ox3  No drift         Lines/Drains/Airway:   picc  evd 3       PICC Double Lumen 08/13/18 1551 right brachial (Active)   Site Assessment Clean;Dry;No redness;Intact;No swelling 8/14/2018  7:05 AM   Lumen 1 Status Infusing 8/14/2018  7:05 AM   Lumen 2 Status Infusing 8/14/2018  7:05 AM   Length martha (cm) 36 cm 8/13/2018  3:50 PM   Current Exposed Catheter (cm) 0 cm 8/13/2018  3:50 PM   Extremity Circumference (cm) 31 cm 8/13/2018  3:50 PM   Dressing Type Transparent 8/14/2018  7:05 AM   Dressing Status Clean;Dry;Intact;Biopatch in place 8/14/2018  7:05 AM   Dressing Intervention Dressing reinforced 8/14/2018  7:05 AM   Dressing Change Due 08/20/18 8/14/2018  7:05 AM   Daily Line Review Performed 8/14/2018  7:05 AM             ICP/Ventriculostomy 08/04/18 0000 Ventricular drainage catheter with ICP microsensor Right Other (Comment) (Active)   Level of Ventriculostomy (cm above) 10 8/14/2018  7:05 AM   Status Open to drainage 8/14/2018  7:05 AM   Site Assessment Clean;Dry 8/14/2018  7:05 AM   Site Drainage No drainage 8/14/2018  7:05 AM   Waveform normal waveform 8/14/2018  7:05 AM   Output (mL) 19 mL 8/14/2018  9:05 AM   CSF Color pink 8/14/2018  7:05 AM   Dressing Status Biopatch in place;Clean;Dry;Intact 8/14/2018  7:05 AM   Interventions HOB degrees;bed controls locked 8/14/2018  7:05 AM     Nutrition/Tube Feeds (if NPO state why): po    Labs:  ABG: No results for input(s): PH, PO2, PCO2, HCO3, POCSATURATED, BE in the  last 24 hours.  BMP:  Recent Labs   Lab  08/14/18   0157  08/14/18   0404   NA  138  134*   K  4.0   --    CL  111*   --    CO2  20*   --    BUN  11   --    CREATININE  0.6   --    GLU  90   --    MG  1.7   --    PHOS  2.4*   --      LFT:   Lab Results   Component Value Date    AST 22 08/14/2018    ALT 23 08/14/2018    ALKPHOS 54 (L) 08/14/2018    BILITOT 0.3 08/14/2018    ALBUMIN 2.4 (L) 08/14/2018    PROT 6.0 08/14/2018     CBC:   Lab Results   Component Value Date    WBC 10.02 08/14/2018    HGB 7.3 (L) 08/14/2018    HCT 23.9 (L) 08/14/2018    MCV 92 08/14/2018     08/14/2018     Microbiology x 7d:   Microbiology Results (last 7 days)     Procedure Component Value Units Date/Time    Blood culture [[id number]] Collected:  08/10/18 2020    Order Status:  Completed Specimen:  Blood from Peripheral, Foot, Left Updated:  08/14/18 0612     Blood Culture, Routine No Growth to date     Blood Culture, Routine No Growth to date     Blood Culture, Routine No Growth to date     Blood Culture, Routine No Growth to date    Blood culture [[id number]] Collected:  08/10/18 2040    Order Status:  Completed Specimen:  Blood from Peripheral, Antecubital, Left Updated:  08/14/18 0612     Blood Culture, Routine No Growth to date     Blood Culture, Routine No Growth to date     Blood Culture, Routine No Growth to date     Blood Culture, Routine No Growth to date    Urine culture [[id number]] Collected:  08/12/18 1526    Order Status:  Completed Specimen:  Urine, Catheterized Updated:  08/13/18 2118     Urine Culture, Routine No growth    C. trachomatis/N. gonorrhoeae by AMP DNA [facility]; Urine [[id number]] Collected:  08/11/18 1435    Order Status:  Sent Specimen:  Genital Updated:  08/11/18 1435    Culture, Respiratory with Gram Stain [[id number]]     Order Status:  No result Specimen:  Respiratory         Imaging:  TCD's- no spasm    I personally reviewed the above image.    ASSESSMENT/PLAN:     Active Hospital Problems    Diagnosis     *Subarachnoid hemorrhage from anterior communicating artery aneurysm    Intracranial hypertension    Matthew coma scale total score 9-12    Endotracheal tube present    Hyponatremia    Hypokalemia    Hypophosphatemia    Metabolic encephalopathy    Aphasia    JOSE C (acute kidney injury)    Pre-eclampsia in second trimester    Brain compression    Brain edema    Hypertension affecting pregnancy in second trimester    25 weeks gestation of pregnancy    Leukocytosis    PKD (polycystic kidney disease)     Needs baseline P/C ratio.  Strong family history of renal abnormalities              Plan:  follow ICP's  Follow TCD's  Follow exam  Follow I/O's  Discussed with OB  Start 2% gtt  Follow serial Na's      Critical secondary to Patient has a condition that poses threat to life and bodily function: at high risk of deterioration from vsp and follow Na/fluid balance      Total cc time 37 mins     Critical care was time spent personally by me on the following activities: development of treatment plan with patient or surrogate and bedside caregivers, discussions with consultants, evaluation of patient's response to treatment, examination of patient, ordering and performing treatments and interventions, ordering and review of laboratory studies, ordering and review of radiographic studies, pulse oximetry, re-evaluation of patient's condition. This critical care time did not overlap with that of any other provider or involve time for any procedures.     None

## 2021-04-19 ENCOUNTER — HOSPITAL ENCOUNTER (INPATIENT)
Facility: HOSPITAL | Age: 36
LOS: 2 days | Discharge: HOME | DRG: 305 | End: 2021-04-21
Attending: EMERGENCY MEDICINE | Admitting: INTERNAL MEDICINE
Payer: MEDICAID

## 2021-04-19 DIAGNOSIS — I16.1 HYPERTENSIVE EMERGENCY: Primary | ICD-10-CM

## 2021-04-19 DIAGNOSIS — I16.0 HYPERTENSIVE URGENCY: ICD-10-CM

## 2021-04-19 DIAGNOSIS — R07.9 CHEST PAIN: ICD-10-CM

## 2021-04-19 DIAGNOSIS — R51.9 ACUTE INTRACTABLE HEADACHE, UNSPECIFIED HEADACHE TYPE: ICD-10-CM

## 2021-04-19 LAB
ALBUMIN SERPL BCP-MCNC: 3.8 G/DL (ref 3.5–5.2)
ALP SERPL-CCNC: 37 U/L (ref 55–135)
ALT SERPL W/O P-5'-P-CCNC: 10 U/L (ref 10–44)
ANION GAP SERPL CALC-SCNC: 12 MMOL/L (ref 8–16)
ANION GAP SERPL CALC-SCNC: 17 MMOL/L (ref 8–16)
AST SERPL-CCNC: 14 U/L (ref 10–40)
B-HCG UR QL: NEGATIVE
BASOPHILS # BLD AUTO: 0.02 K/UL (ref 0–0.2)
BASOPHILS NFR BLD: 0.4 % (ref 0–1.9)
BILIRUB SERPL-MCNC: 0.1 MG/DL (ref 0.1–1)
BNP SERPL-MCNC: 224 PG/ML (ref 0–99)
BUN SERPL-MCNC: 14 MG/DL (ref 6–30)
BUN SERPL-MCNC: 15 MG/DL (ref 6–20)
CALCIUM SERPL-MCNC: 8.5 MG/DL (ref 8.7–10.5)
CHLORIDE SERPL-SCNC: 103 MMOL/L (ref 95–110)
CHLORIDE SERPL-SCNC: 98 MMOL/L (ref 95–110)
CHOLEST SERPL-MCNC: 212 MG/DL (ref 120–199)
CHOLEST/HDLC SERPL: 3.8 {RATIO} (ref 2–5)
CO2 SERPL-SCNC: 25 MMOL/L (ref 23–29)
CREAT SERPL-MCNC: 1.2 MG/DL (ref 0.5–1.4)
CREAT SERPL-MCNC: 1.3 MG/DL (ref 0.5–1.4)
CTP QC/QA: YES
CTP QC/QA: YES
DIFFERENTIAL METHOD: ABNORMAL
EOSINOPHIL # BLD AUTO: 0.1 K/UL (ref 0–0.5)
EOSINOPHIL NFR BLD: 2.4 % (ref 0–8)
ERYTHROCYTE [DISTWIDTH] IN BLOOD BY AUTOMATED COUNT: 17.9 % (ref 11.5–14.5)
EST. GFR  (AFRICAN AMERICAN): >60 ML/MIN/1.73 M^2
EST. GFR  (NON AFRICAN AMERICAN): 59 ML/MIN/1.73 M^2
GLUCOSE SERPL-MCNC: 83 MG/DL (ref 70–110)
GLUCOSE SERPL-MCNC: 93 MG/DL (ref 70–110)
GLUCOSE SERPL-MCNC: 98 MG/DL (ref 70–110)
HCT VFR BLD AUTO: 31.5 % (ref 37–48.5)
HCT VFR BLD CALC: 34 %PCV (ref 36–54)
HDLC SERPL-MCNC: 56 MG/DL (ref 40–75)
HDLC SERPL: 26.4 % (ref 20–50)
HGB BLD-MCNC: 10 G/DL (ref 12–16)
IMM GRANULOCYTES # BLD AUTO: 0.01 K/UL (ref 0–0.04)
IMM GRANULOCYTES NFR BLD AUTO: 0.2 % (ref 0–0.5)
INR PPP: 1.2 (ref 0.8–1.2)
LDLC SERPL CALC-MCNC: 138.8 MG/DL (ref 63–159)
LYMPHOCYTES # BLD AUTO: 2.2 K/UL (ref 1–4.8)
LYMPHOCYTES NFR BLD: 40.4 % (ref 18–48)
MAGNESIUM SERPL-MCNC: 1.8 MG/DL (ref 1.6–2.6)
MCH RBC QN AUTO: 26.2 PG (ref 27–31)
MCHC RBC AUTO-ENTMCNC: 31.7 G/DL (ref 32–36)
MCV RBC AUTO: 83 FL (ref 82–98)
MONOCYTES # BLD AUTO: 0.5 K/UL (ref 0.3–1)
MONOCYTES NFR BLD: 9.4 % (ref 4–15)
NEUTROPHILS # BLD AUTO: 2.6 K/UL (ref 1.8–7.7)
NEUTROPHILS NFR BLD: 47.2 % (ref 38–73)
NONHDLC SERPL-MCNC: 156 MG/DL
NRBC BLD-RTO: 0 /100 WBC
PHOSPHATE SERPL-MCNC: 2.9 MG/DL (ref 2.7–4.5)
PLATELET # BLD AUTO: 291 K/UL (ref 150–450)
PMV BLD AUTO: 11.5 FL (ref 9.2–12.9)
POC IONIZED CALCIUM: 1.21 MMOL/L (ref 1.06–1.42)
POC PTINR: 1.2 (ref 0.9–1.2)
POC PTWBT: 13.8 SEC (ref 9.7–14.3)
POC TCO2 (MEASURED): 29 MMOL/L (ref 23–29)
POCT GLUCOSE: 98 MG/DL (ref 70–110)
POTASSIUM BLD-SCNC: 2.8 MMOL/L (ref 3.5–5.1)
POTASSIUM SERPL-SCNC: 2.9 MMOL/L (ref 3.5–5.1)
PROT SERPL-MCNC: 7.6 G/DL (ref 6–8.4)
PROTHROMBIN TIME: 12.4 SEC (ref 9–12.5)
RBC # BLD AUTO: 3.81 M/UL (ref 4–5.4)
SAMPLE: ABNORMAL
SAMPLE: NORMAL
SARS-COV-2 RDRP RESP QL NAA+PROBE: NEGATIVE
SODIUM BLD-SCNC: 141 MMOL/L (ref 136–145)
SODIUM SERPL-SCNC: 140 MMOL/L (ref 136–145)
TRIGL SERPL-MCNC: 86 MG/DL (ref 30–150)
TROPONIN I SERPL DL<=0.01 NG/ML-MCNC: 0.03 NG/ML (ref 0–0.03)
TROPONIN I SERPL DL<=0.01 NG/ML-MCNC: 0.03 NG/ML (ref 0–0.03)
TSH SERPL DL<=0.005 MIU/L-ACNC: 0.93 UIU/ML (ref 0.4–4)
WBC # BLD AUTO: 5.44 K/UL (ref 3.9–12.7)

## 2021-04-19 PROCEDURE — 99291 CRITICAL CARE FIRST HOUR: CPT | Mod: 25

## 2021-04-19 PROCEDURE — 63600175 PHARM REV CODE 636 W HCPCS: Performed by: INTERNAL MEDICINE

## 2021-04-19 PROCEDURE — 99900035 HC TECH TIME PER 15 MIN (STAT)

## 2021-04-19 PROCEDURE — 96375 TX/PRO/DX INJ NEW DRUG ADDON: CPT

## 2021-04-19 PROCEDURE — 12000002 HC ACUTE/MED SURGE SEMI-PRIVATE ROOM

## 2021-04-19 PROCEDURE — 82330 ASSAY OF CALCIUM: CPT

## 2021-04-19 PROCEDURE — 96374 THER/PROPH/DIAG INJ IV PUSH: CPT

## 2021-04-19 PROCEDURE — 84295 ASSAY OF SERUM SODIUM: CPT

## 2021-04-19 PROCEDURE — 84484 ASSAY OF TROPONIN QUANT: CPT | Mod: 91 | Performed by: EMERGENCY MEDICINE

## 2021-04-19 PROCEDURE — 93010 EKG 12-LEAD: ICD-10-PCS | Mod: ,,, | Performed by: INTERNAL MEDICINE

## 2021-04-19 PROCEDURE — 81025 URINE PREGNANCY TEST: CPT | Performed by: EMERGENCY MEDICINE

## 2021-04-19 PROCEDURE — 25000003 PHARM REV CODE 250: Performed by: EMERGENCY MEDICINE

## 2021-04-19 PROCEDURE — 82962 GLUCOSE BLOOD TEST: CPT

## 2021-04-19 PROCEDURE — 82746 ASSAY OF FOLIC ACID SERUM: CPT | Performed by: INTERNAL MEDICINE

## 2021-04-19 PROCEDURE — 85014 HEMATOCRIT: CPT

## 2021-04-19 PROCEDURE — 93005 ELECTROCARDIOGRAM TRACING: CPT

## 2021-04-19 PROCEDURE — 80053 COMPREHEN METABOLIC PANEL: CPT | Performed by: EMERGENCY MEDICINE

## 2021-04-19 PROCEDURE — 63600175 PHARM REV CODE 636 W HCPCS: Performed by: EMERGENCY MEDICINE

## 2021-04-19 PROCEDURE — 82565 ASSAY OF CREATININE: CPT

## 2021-04-19 PROCEDURE — 85610 PROTHROMBIN TIME: CPT

## 2021-04-19 PROCEDURE — 85610 PROTHROMBIN TIME: CPT | Performed by: EMERGENCY MEDICINE

## 2021-04-19 PROCEDURE — 80061 LIPID PANEL: CPT | Performed by: EMERGENCY MEDICINE

## 2021-04-19 PROCEDURE — 84443 ASSAY THYROID STIM HORMONE: CPT | Performed by: EMERGENCY MEDICINE

## 2021-04-19 PROCEDURE — 85025 COMPLETE CBC W/AUTO DIFF WBC: CPT | Performed by: EMERGENCY MEDICINE

## 2021-04-19 PROCEDURE — U0002 COVID-19 LAB TEST NON-CDC: HCPCS | Performed by: EMERGENCY MEDICINE

## 2021-04-19 PROCEDURE — 84100 ASSAY OF PHOSPHORUS: CPT | Performed by: EMERGENCY MEDICINE

## 2021-04-19 PROCEDURE — 83880 ASSAY OF NATRIURETIC PEPTIDE: CPT | Performed by: EMERGENCY MEDICINE

## 2021-04-19 PROCEDURE — 25000003 PHARM REV CODE 250: Performed by: INTERNAL MEDICINE

## 2021-04-19 PROCEDURE — 84132 ASSAY OF SERUM POTASSIUM: CPT

## 2021-04-19 PROCEDURE — 93010 ELECTROCARDIOGRAM REPORT: CPT | Mod: ,,, | Performed by: INTERNAL MEDICINE

## 2021-04-19 PROCEDURE — 84484 ASSAY OF TROPONIN QUANT: CPT | Performed by: INTERNAL MEDICINE

## 2021-04-19 PROCEDURE — 83735 ASSAY OF MAGNESIUM: CPT | Performed by: EMERGENCY MEDICINE

## 2021-04-19 RX ORDER — IPRATROPIUM BROMIDE AND ALBUTEROL SULFATE 2.5; .5 MG/3ML; MG/3ML
3 SOLUTION RESPIRATORY (INHALATION) EVERY 4 HOURS PRN
Status: DISCONTINUED | OUTPATIENT
Start: 2021-04-19 | End: 2021-04-21 | Stop reason: HOSPADM

## 2021-04-19 RX ORDER — POTASSIUM CHLORIDE 7.45 MG/ML
10 INJECTION INTRAVENOUS ONCE
Status: COMPLETED | OUTPATIENT
Start: 2021-04-19 | End: 2021-04-19

## 2021-04-19 RX ORDER — OXYCODONE HYDROCHLORIDE 5 MG/1
5 TABLET ORAL EVERY 8 HOURS PRN
Status: DISCONTINUED | OUTPATIENT
Start: 2021-04-19 | End: 2021-04-19

## 2021-04-19 RX ORDER — PROMETHAZINE HYDROCHLORIDE 25 MG/1
25 TABLET ORAL EVERY 6 HOURS PRN
Status: DISCONTINUED | OUTPATIENT
Start: 2021-04-19 | End: 2021-04-19 | Stop reason: SDUPTHER

## 2021-04-19 RX ORDER — TALC
6 POWDER (GRAM) TOPICAL NIGHTLY PRN
Status: DISCONTINUED | OUTPATIENT
Start: 2021-04-19 | End: 2021-04-19

## 2021-04-19 RX ORDER — DEXTROSE MONOHYDRATE, SODIUM CHLORIDE, AND POTASSIUM CHLORIDE 50; 2.98; 4.5 G/1000ML; G/1000ML; G/1000ML
INJECTION, SOLUTION INTRAVENOUS CONTINUOUS
Status: DISCONTINUED | OUTPATIENT
Start: 2021-04-19 | End: 2021-04-19

## 2021-04-19 RX ORDER — SODIUM CHLORIDE 0.9 % (FLUSH) 0.9 %
10 SYRINGE (ML) INJECTION
Status: DISCONTINUED | OUTPATIENT
Start: 2021-04-19 | End: 2021-04-21 | Stop reason: HOSPADM

## 2021-04-19 RX ORDER — ENOXAPARIN SODIUM 100 MG/ML
40 INJECTION SUBCUTANEOUS EVERY 24 HOURS
Status: DISCONTINUED | OUTPATIENT
Start: 2021-04-19 | End: 2021-04-19

## 2021-04-19 RX ORDER — OXYCODONE HYDROCHLORIDE 5 MG/1
5 TABLET ORAL EVERY 6 HOURS PRN
Status: DISCONTINUED | OUTPATIENT
Start: 2021-04-19 | End: 2021-04-21 | Stop reason: HOSPADM

## 2021-04-19 RX ORDER — ENOXAPARIN SODIUM 100 MG/ML
40 INJECTION SUBCUTANEOUS EVERY 24 HOURS
Status: DISCONTINUED | OUTPATIENT
Start: 2021-04-19 | End: 2021-04-21 | Stop reason: HOSPADM

## 2021-04-19 RX ORDER — NICARDIPINE HYDROCHLORIDE 0.2 MG/ML
2.5 INJECTION INTRAVENOUS CONTINUOUS
Status: DISCONTINUED | OUTPATIENT
Start: 2021-04-19 | End: 2021-04-20

## 2021-04-19 RX ORDER — BUTALBITAL, ACETAMINOPHEN AND CAFFEINE 50; 325; 40 MG/1; MG/1; MG/1
1 TABLET ORAL
Status: COMPLETED | OUTPATIENT
Start: 2021-04-19 | End: 2021-04-19

## 2021-04-19 RX ORDER — AMOXICILLIN 250 MG
1 CAPSULE ORAL 2 TIMES DAILY
Status: DISCONTINUED | OUTPATIENT
Start: 2021-04-19 | End: 2021-04-19

## 2021-04-19 RX ORDER — TALC
6 POWDER (GRAM) TOPICAL NIGHTLY PRN
Status: DISCONTINUED | OUTPATIENT
Start: 2021-04-19 | End: 2021-04-21 | Stop reason: HOSPADM

## 2021-04-19 RX ORDER — HYDRALAZINE HYDROCHLORIDE 20 MG/ML
10 INJECTION INTRAMUSCULAR; INTRAVENOUS
Status: COMPLETED | OUTPATIENT
Start: 2021-04-19 | End: 2021-04-19

## 2021-04-19 RX ORDER — DEXTROSE MONOHYDRATE AND SODIUM CHLORIDE 5; .45 G/100ML; G/100ML
INJECTION, SOLUTION INTRAVENOUS CONTINUOUS
Status: DISCONTINUED | OUTPATIENT
Start: 2021-04-19 | End: 2021-04-19

## 2021-04-19 RX ORDER — ACETAMINOPHEN 500 MG
500 TABLET ORAL EVERY 6 HOURS PRN
Status: DISCONTINUED | OUTPATIENT
Start: 2021-04-19 | End: 2021-04-21 | Stop reason: HOSPADM

## 2021-04-19 RX ORDER — ACETAMINOPHEN 325 MG/1
650 TABLET ORAL EVERY 4 HOURS PRN
Status: DISCONTINUED | OUTPATIENT
Start: 2021-04-19 | End: 2021-04-21 | Stop reason: HOSPADM

## 2021-04-19 RX ORDER — ONDANSETRON 8 MG/1
8 TABLET, ORALLY DISINTEGRATING ORAL EVERY 8 HOURS PRN
Status: DISCONTINUED | OUTPATIENT
Start: 2021-04-19 | End: 2021-04-21 | Stop reason: HOSPADM

## 2021-04-19 RX ORDER — ACETAMINOPHEN 325 MG/1
650 TABLET ORAL EVERY 4 HOURS PRN
Status: DISCONTINUED | OUTPATIENT
Start: 2021-04-19 | End: 2021-04-19

## 2021-04-19 RX ORDER — LABETALOL HYDROCHLORIDE 5 MG/ML
10 INJECTION, SOLUTION INTRAVENOUS
Status: COMPLETED | OUTPATIENT
Start: 2021-04-19 | End: 2021-04-19

## 2021-04-19 RX ORDER — AMOXICILLIN 250 MG
1 CAPSULE ORAL 2 TIMES DAILY PRN
Status: DISCONTINUED | OUTPATIENT
Start: 2021-04-19 | End: 2021-04-21 | Stop reason: HOSPADM

## 2021-04-19 RX ORDER — LABETALOL 100 MG/1
200 TABLET, FILM COATED ORAL 2 TIMES DAILY
Status: DISCONTINUED | OUTPATIENT
Start: 2021-04-19 | End: 2021-04-21

## 2021-04-19 RX ORDER — SODIUM CHLORIDE 0.9 % (FLUSH) 0.9 %
10 SYRINGE (ML) INJECTION
Status: DISCONTINUED | OUTPATIENT
Start: 2021-04-19 | End: 2021-04-19

## 2021-04-19 RX ORDER — MAGNESIUM SULFATE 1 G/100ML
1 INJECTION INTRAVENOUS ONCE
Status: COMPLETED | OUTPATIENT
Start: 2021-04-19 | End: 2021-04-19

## 2021-04-19 RX ORDER — IBUPROFEN 200 MG
24 TABLET ORAL
Status: DISCONTINUED | OUTPATIENT
Start: 2021-04-19 | End: 2021-04-21 | Stop reason: HOSPADM

## 2021-04-19 RX ORDER — GLUCAGON 1 MG
1 KIT INJECTION
Status: DISCONTINUED | OUTPATIENT
Start: 2021-04-19 | End: 2021-04-21 | Stop reason: HOSPADM

## 2021-04-19 RX ORDER — POTASSIUM CHLORIDE 20 MEQ/1
20 TABLET, EXTENDED RELEASE ORAL
Status: COMPLETED | OUTPATIENT
Start: 2021-04-19 | End: 2021-04-19

## 2021-04-19 RX ORDER — ONDANSETRON 2 MG/ML
4 INJECTION INTRAMUSCULAR; INTRAVENOUS EVERY 6 HOURS PRN
Status: DISCONTINUED | OUTPATIENT
Start: 2021-04-19 | End: 2021-04-19

## 2021-04-19 RX ORDER — GABAPENTIN 100 MG/1
100 CAPSULE ORAL 3 TIMES DAILY
Status: DISCONTINUED | OUTPATIENT
Start: 2021-04-20 | End: 2021-04-19

## 2021-04-19 RX ORDER — ONDANSETRON 2 MG/ML
4 INJECTION INTRAMUSCULAR; INTRAVENOUS EVERY 6 HOURS PRN
Status: DISCONTINUED | OUTPATIENT
Start: 2021-04-19 | End: 2021-04-19 | Stop reason: SDUPTHER

## 2021-04-19 RX ORDER — IBUPROFEN 200 MG
16 TABLET ORAL
Status: DISCONTINUED | OUTPATIENT
Start: 2021-04-19 | End: 2021-04-21 | Stop reason: HOSPADM

## 2021-04-19 RX ORDER — DIPHENHYDRAMINE HCL 25 MG
25 CAPSULE ORAL EVERY 4 HOURS PRN
Status: DISCONTINUED | OUTPATIENT
Start: 2021-04-19 | End: 2021-04-21 | Stop reason: HOSPADM

## 2021-04-19 RX ORDER — HYDROCODONE BITARTRATE AND ACETAMINOPHEN 5; 325 MG/1; MG/1
1 TABLET ORAL
Status: COMPLETED | OUTPATIENT
Start: 2021-04-19 | End: 2021-04-19

## 2021-04-19 RX ORDER — FAMOTIDINE 20 MG/1
20 TABLET, FILM COATED ORAL 2 TIMES DAILY
Status: DISCONTINUED | OUTPATIENT
Start: 2021-04-19 | End: 2021-04-19

## 2021-04-19 RX ADMIN — ENOXAPARIN SODIUM 40 MG: 40 INJECTION SUBCUTANEOUS at 10:04

## 2021-04-19 RX ADMIN — LABETALOL HYDROCHLORIDE 10 MG: 5 INJECTION INTRAVENOUS at 06:04

## 2021-04-19 RX ADMIN — NICARDIPINE HYDROCHLORIDE 2.5 MG/HR: 0.2 INJECTION INTRAVENOUS at 08:04

## 2021-04-19 RX ADMIN — POTASSIUM CHLORIDE 10 MEQ: 7.46 INJECTION, SOLUTION INTRAVENOUS at 10:04

## 2021-04-19 RX ADMIN — HYDRALAZINE HYDROCHLORIDE 10 MG: 20 INJECTION INTRAMUSCULAR; INTRAVENOUS at 06:04

## 2021-04-19 RX ADMIN — HYDROCODONE BITARTRATE AND ACETAMINOPHEN 1 TABLET: 5; 325 TABLET ORAL at 08:04

## 2021-04-19 RX ADMIN — BUTALBITAL, ACETAMINOPHEN AND CAFFEINE 1 TABLET: 50; 325; 40 TABLET ORAL at 07:04

## 2021-04-19 RX ADMIN — LABETALOL HYDROCHLORIDE 200 MG: 100 TABLET, FILM COATED ORAL at 10:04

## 2021-04-19 RX ADMIN — MAGNESIUM SULFATE 1 G: 1 INJECTION INTRAVENOUS at 10:04

## 2021-04-19 RX ADMIN — POTASSIUM CHLORIDE 20 MEQ: 1500 TABLET, EXTENDED RELEASE ORAL at 06:04

## 2021-04-20 PROBLEM — I16.0 HYPERTENSIVE URGENCY: Status: RESOLVED | Noted: 2021-04-20 | Resolved: 2021-04-20

## 2021-04-20 PROBLEM — I16.0 HYPERTENSIVE URGENCY: Status: ACTIVE | Noted: 2021-04-20

## 2021-04-20 PROBLEM — D63.8 ANEMIA OF CHRONIC DISEASE: Status: ACTIVE | Noted: 2021-04-20

## 2021-04-20 PROBLEM — Z91.148 NON COMPLIANCE W MEDICATION REGIMEN: Status: ACTIVE | Noted: 2021-04-20

## 2021-04-20 PROBLEM — I10 MALIGNANT ESSENTIAL HYPERTENSION: Status: ACTIVE | Noted: 2021-04-20

## 2021-04-20 PROBLEM — G43.509 PERSISTENT MIGRAINE AURA WITHOUT CEREBRAL INFARCTION AND WITHOUT STATUS MIGRAINOSUS, NOT INTRACTABLE: Status: ACTIVE | Noted: 2021-04-20

## 2021-04-20 PROBLEM — I16.1 HYPERTENSIVE EMERGENCY: Status: RESOLVED | Noted: 2021-04-19 | Resolved: 2021-04-20

## 2021-04-20 LAB
ALBUMIN SERPL BCP-MCNC: 3.5 G/DL (ref 3.5–5.2)
ALP SERPL-CCNC: 32 U/L (ref 55–135)
ALT SERPL W/O P-5'-P-CCNC: 8 U/L (ref 10–44)
ANION GAP SERPL CALC-SCNC: 11 MMOL/L (ref 8–16)
ANION GAP SERPL CALC-SCNC: 9 MMOL/L (ref 8–16)
AORTIC ROOT ANNULUS: 3.33 CM
AORTIC VALVE CUSP SEPERATION: 2.57 CM
ASCENDING AORTA: 3.65 CM
AST SERPL-CCNC: 10 U/L (ref 10–40)
AV INDEX (PROSTH): 0.97
AV MEAN GRADIENT: 6 MMHG
AV PEAK GRADIENT: 10 MMHG
AV VALVE AREA: 3.48 CM2
AV VELOCITY RATIO: 1.04
BASOPHILS # BLD AUTO: 0.03 K/UL (ref 0–0.2)
BASOPHILS NFR BLD: 0.6 % (ref 0–1.9)
BILIRUB SERPL-MCNC: 0.2 MG/DL (ref 0.1–1)
BSA FOR ECHO PROCEDURE: 1.92 M2
BUN SERPL-MCNC: 13 MG/DL (ref 6–20)
BUN SERPL-MCNC: 15 MG/DL (ref 6–20)
CALCIUM SERPL-MCNC: 8.5 MG/DL (ref 8.7–10.5)
CALCIUM SERPL-MCNC: 8.9 MG/DL (ref 8.7–10.5)
CHLORIDE SERPL-SCNC: 103 MMOL/L (ref 95–110)
CHLORIDE SERPL-SCNC: 105 MMOL/L (ref 95–110)
CO2 SERPL-SCNC: 24 MMOL/L (ref 23–29)
CO2 SERPL-SCNC: 29 MMOL/L (ref 23–29)
CREAT SERPL-MCNC: 1.3 MG/DL (ref 0.5–1.4)
CREAT SERPL-MCNC: 1.4 MG/DL (ref 0.5–1.4)
CV ECHO LV RWT: 0.75 CM
DIFFERENTIAL METHOD: ABNORMAL
DOP CALC AO PEAK VEL: 1.61 M/S
DOP CALC AO VTI: 31.82 CM
DOP CALC LVOT AREA: 3.6 CM2
DOP CALC LVOT DIAMETER: 2.14 CM
DOP CALC LVOT PEAK VEL: 1.68 M/S
DOP CALC LVOT STROKE VOLUME: 110.69 CM3
DOP CALCLVOT PEAK VEL VTI: 30.79 CM
E WAVE DECELERATION TIME: 261.95 MSEC
E/A RATIO: 1.13
E/E' RATIO: 9.75 M/S
ECHO LV POSTERIOR WALL: 1.81 CM (ref 0.6–1.1)
EJECTION FRACTION: 60 %
EOSINOPHIL # BLD AUTO: 0.1 K/UL (ref 0–0.5)
EOSINOPHIL NFR BLD: 1.2 % (ref 0–8)
ERYTHROCYTE [DISTWIDTH] IN BLOOD BY AUTOMATED COUNT: 17.4 % (ref 11.5–14.5)
EST. GFR  (AFRICAN AMERICAN): 57 ML/MIN/1.73 M^2
EST. GFR  (AFRICAN AMERICAN): >60 ML/MIN/1.73 M^2
EST. GFR  (NON AFRICAN AMERICAN): 49 ML/MIN/1.73 M^2
EST. GFR  (NON AFRICAN AMERICAN): 54 ML/MIN/1.73 M^2
FOLATE SERPL-MCNC: 11.5 NG/ML (ref 4–24)
FRACTIONAL SHORTENING: 39 % (ref 28–44)
GLUCOSE SERPL-MCNC: 106 MG/DL (ref 70–110)
GLUCOSE SERPL-MCNC: 118 MG/DL (ref 70–110)
HCT VFR BLD AUTO: 29.5 % (ref 37–48.5)
HGB BLD-MCNC: 9.6 G/DL (ref 12–16)
IMM GRANULOCYTES # BLD AUTO: 0.01 K/UL (ref 0–0.04)
IMM GRANULOCYTES NFR BLD AUTO: 0.2 % (ref 0–0.5)
INTERVENTRICULAR SEPTUM: 1.53 CM (ref 0.6–1.1)
IRON SERPL-MCNC: 19 UG/DL (ref 30–160)
LA MAJOR: 4.99 CM
LA MINOR: 5.34 CM
LA WIDTH: 4.85 CM
LEFT ATRIUM SIZE: 4.55 CM
LEFT ATRIUM VOLUME INDEX: 50.7 ML/M2
LEFT ATRIUM VOLUME: 96.77 CM3
LEFT INTERNAL DIMENSION IN SYSTOLE: 2.91 CM (ref 2.1–4)
LEFT VENTRICLE DIASTOLIC VOLUME INDEX: 56.28 ML/M2
LEFT VENTRICLE DIASTOLIC VOLUME: 107.49 ML
LEFT VENTRICLE MASS INDEX: 187 G/M2
LEFT VENTRICLE SYSTOLIC VOLUME INDEX: 17 ML/M2
LEFT VENTRICLE SYSTOLIC VOLUME: 32.45 ML
LEFT VENTRICULAR INTERNAL DIMENSION IN DIASTOLE: 4.8 CM (ref 3.5–6)
LEFT VENTRICULAR MASS: 357.33 G
LV LATERAL E/E' RATIO: 7.09 M/S
LV SEPTAL E/E' RATIO: 15.6 M/S
LYMPHOCYTES # BLD AUTO: 1.5 K/UL (ref 1–4.8)
LYMPHOCYTES NFR BLD: 31.5 % (ref 18–48)
MAGNESIUM SERPL-MCNC: 2.1 MG/DL (ref 1.6–2.6)
MCH RBC QN AUTO: 25.9 PG (ref 27–31)
MCHC RBC AUTO-ENTMCNC: 32.5 G/DL (ref 32–36)
MCV RBC AUTO: 80 FL (ref 82–98)
MONOCYTES # BLD AUTO: 0.4 K/UL (ref 0.3–1)
MONOCYTES NFR BLD: 8.2 % (ref 4–15)
MV A" WAVE DURATION": 17.53 MSEC
MV PEAK A VEL: 0.69 M/S
MV PEAK E VEL: 0.78 M/S
MV STENOSIS PRESSURE HALF TIME: 75.97 MS
MV VALVE AREA P 1/2 METHOD: 2.9 CM2
NEUTROPHILS # BLD AUTO: 2.8 K/UL (ref 1.8–7.7)
NEUTROPHILS NFR BLD: 58.3 % (ref 38–73)
NRBC BLD-RTO: 0 /100 WBC
PHOSPHATE SERPL-MCNC: 3.6 MG/DL (ref 2.7–4.5)
PISA MRMAX VEL: 0.06 M/S
PISA TR MAX VEL: 2.51 M/S
PLATELET # BLD AUTO: 268 K/UL (ref 150–450)
PMV BLD AUTO: 10.4 FL (ref 9.2–12.9)
POTASSIUM SERPL-SCNC: 2.8 MMOL/L (ref 3.5–5.1)
POTASSIUM SERPL-SCNC: 3 MMOL/L (ref 3.5–5.1)
PROT SERPL-MCNC: 6.9 G/DL (ref 6–8.4)
PULM VEIN S/D RATIO: 0.63
PV PEAK D VEL: 0.6 M/S
PV PEAK S VEL: 0.38 M/S
PV PEAK VELOCITY: 1.11 CM/S
RA MAJOR: 4.19 CM
RA PRESSURE: 3 MMHG
RA WIDTH: 2.9 CM
RBC # BLD AUTO: 3.71 M/UL (ref 4–5.4)
RIGHT VENTRICULAR END-DIASTOLIC DIMENSION: 3.05 CM
SATURATED IRON: 4 % (ref 20–50)
SINUS: 3.64 CM
SODIUM SERPL-SCNC: 140 MMOL/L (ref 136–145)
SODIUM SERPL-SCNC: 141 MMOL/L (ref 136–145)
STJ: 3.16 CM
TDI LATERAL: 0.11 M/S
TDI SEPTAL: 0.05 M/S
TDI: 0.08 M/S
TOTAL IRON BINDING CAPACITY: 453 UG/DL (ref 250–450)
TR MAX PG: 25 MMHG
TRANSFERRIN SERPL-MCNC: 306 MG/DL (ref 200–375)
TRICUSPID ANNULAR PLANE SYSTOLIC EXCURSION: 2.05 CM
TROPONIN I SERPL DL<=0.01 NG/ML-MCNC: 0.04 NG/ML (ref 0–0.03)
TV REST PULMONARY ARTERY PRESSURE: 28 MMHG
VIT B12 SERPL-MCNC: 270 PG/ML (ref 210–950)
WBC # BLD AUTO: 4.86 K/UL (ref 3.9–12.7)

## 2021-04-20 PROCEDURE — 36415 COLL VENOUS BLD VENIPUNCTURE: CPT | Performed by: INTERNAL MEDICINE

## 2021-04-20 PROCEDURE — 25000003 PHARM REV CODE 250: Performed by: INTERNAL MEDICINE

## 2021-04-20 PROCEDURE — 83540 ASSAY OF IRON: CPT | Performed by: INTERNAL MEDICINE

## 2021-04-20 PROCEDURE — 80053 COMPREHEN METABOLIC PANEL: CPT | Performed by: INTERNAL MEDICINE

## 2021-04-20 PROCEDURE — 63600175 PHARM REV CODE 636 W HCPCS: Performed by: INTERNAL MEDICINE

## 2021-04-20 PROCEDURE — 85025 COMPLETE CBC W/AUTO DIFF WBC: CPT | Performed by: INTERNAL MEDICINE

## 2021-04-20 PROCEDURE — 83735 ASSAY OF MAGNESIUM: CPT | Performed by: INTERNAL MEDICINE

## 2021-04-20 PROCEDURE — 21400001 HC TELEMETRY ROOM

## 2021-04-20 PROCEDURE — 80048 BASIC METABOLIC PNL TOTAL CA: CPT | Performed by: HOSPITALIST

## 2021-04-20 PROCEDURE — 84484 ASSAY OF TROPONIN QUANT: CPT | Performed by: INTERNAL MEDICINE

## 2021-04-20 PROCEDURE — 82607 VITAMIN B-12: CPT | Performed by: INTERNAL MEDICINE

## 2021-04-20 PROCEDURE — 36415 COLL VENOUS BLD VENIPUNCTURE: CPT | Performed by: HOSPITALIST

## 2021-04-20 PROCEDURE — 84100 ASSAY OF PHOSPHORUS: CPT | Performed by: INTERNAL MEDICINE

## 2021-04-20 PROCEDURE — 94761 N-INVAS EAR/PLS OXIMETRY MLT: CPT

## 2021-04-20 RX ORDER — POTASSIUM CHLORIDE 20 MEQ/1
40 TABLET, EXTENDED RELEASE ORAL ONCE
Status: COMPLETED | OUTPATIENT
Start: 2021-04-20 | End: 2021-04-20

## 2021-04-20 RX ORDER — POTASSIUM CHLORIDE 7.45 MG/ML
10 INJECTION INTRAVENOUS
Status: COMPLETED | OUTPATIENT
Start: 2021-04-20 | End: 2021-04-20

## 2021-04-20 RX ORDER — TRAZODONE HYDROCHLORIDE 50 MG/1
50 TABLET ORAL NIGHTLY
Status: DISCONTINUED | OUTPATIENT
Start: 2021-04-20 | End: 2021-04-21 | Stop reason: HOSPADM

## 2021-04-20 RX ORDER — NIFEDIPINE 30 MG/1
90 TABLET, EXTENDED RELEASE ORAL DAILY
Status: DISCONTINUED | OUTPATIENT
Start: 2021-04-20 | End: 2021-04-21

## 2021-04-20 RX ADMIN — NIFEDIPINE 90 MG: 30 TABLET, FILM COATED, EXTENDED RELEASE ORAL at 08:04

## 2021-04-20 RX ADMIN — ENOXAPARIN SODIUM 40 MG: 40 INJECTION SUBCUTANEOUS at 04:04

## 2021-04-20 RX ADMIN — Medication 6 MG: at 01:04

## 2021-04-20 RX ADMIN — POTASSIUM CHLORIDE 10 MEQ: 7.46 INJECTION, SOLUTION INTRAVENOUS at 06:04

## 2021-04-20 RX ADMIN — LABETALOL HYDROCHLORIDE 200 MG: 100 TABLET, FILM COATED ORAL at 08:04

## 2021-04-20 RX ADMIN — POTASSIUM CHLORIDE 40 MEQ: 1500 TABLET, EXTENDED RELEASE ORAL at 06:04

## 2021-04-20 RX ADMIN — TRAZODONE HYDROCHLORIDE 50 MG: 50 TABLET ORAL at 02:04

## 2021-04-20 RX ADMIN — POTASSIUM CHLORIDE 10 MEQ: 7.46 INJECTION, SOLUTION INTRAVENOUS at 08:04

## 2021-04-20 RX ADMIN — TRAZODONE HYDROCHLORIDE 50 MG: 50 TABLET ORAL at 09:04

## 2021-04-20 RX ADMIN — LABETALOL HYDROCHLORIDE 200 MG: 100 TABLET, FILM COATED ORAL at 09:04

## 2021-04-20 NOTE — HPI
34 y/o female LKN 1100 when she took a nap. Woke up at 1445 with sever bilateral throbbing headache and neck pain. Has a history of migraine. Previous aneurysm coiling and clipping.

## 2021-04-20 NOTE — ASSESSMENT & PLAN NOTE
Persistent migraine aura without cerebral infarction and without status migrainosus, not intractable

## 2021-04-20 NOTE — CONSULTS
"      [facility]  Vascular Neurology  Comprehensive Stroke Center  TeleVascular Neurology Acute Consultation Note      Consults    Consulting Provider: KIKI ROMAN  Current Providers  No providers found    Patient Location:  [facility] EMERGENCY DEPARTMENT Emergency Department  Spoke hospital nurse at bedside with patient assisting consultant.     Patient information was obtained from patient.         Assessment/Plan:       Diagnoses:   Persistent migraine aura without cerebral infarction and without status migrainosus, not intractable  Persistent migraine aura without cerebral infarction and without status migrainosus, not intractable        STROKE DOCUMENTATION     Acute Stroke Times:   Acute Stroke Times   Last Known Normal Date: 04/19/21  Last Known Normal Time: 1100  Symptom Onset Date: 04/19/21  Symptom Onset Time: 1100  Stroke Team Called Date: 04/19/21  Stroke Team Called Time: 1552  Stroke Team Arrival Date: 04/19/21  Stroke Team Arrival Time: 1556  CT Interpretation Time: 1556  Alteplase Recommended: No  Thrombectomy Recommended: No    NIH Scale:        Modified [place] Score: 0  [place] Coma Scale:    ABCD2 Score:    [id number] Score:   HAS -BLED Score:   ICH Score:   Hunt & Collins Classification:       Blood pressure (!) 200/122, pulse 61, temperature 98.1 °F (36.7 °C), temperature source Oral, resp. rate 16, height 5' 9" (1.753 m), weight 74.8 kg (165 lb), last menstrual period 11/12/2020, SpO2 100 %, not currently breastfeeding.  Alteplase Eligible?: No  Alteplase Recommendation: Alteplase not recommended due to Suspected stroke mimic   Possible Interventional Revascularization Candidate? No; Large core infarct on imaging , No; No large vessel occlusion identified on imaging , No; No ischemic penumbra, No; no significant neurologic deficit (NIHSS <6) , No; at this time symptoms not suggestive of large vessel occlusion and No; significant pre-stroke disability " "    Disposition Recommendation: pending further studies  discharge from ED    Subjective:     History of Present Illness:  34 y/o female LKN 1100 when she took a nap. Woke up at 1445 with sever bilateral throbbing headache and neck pain. Has a history of migraine. Previous aneurysm coiling and clipping.        Woke up with symptoms?: yes    Recent bleeding noted: no  Does the patient take any Blood Thinners? no  Medications: No relevant medications      Past Medical History: hypertension    Past Surgical History: intracranial or spinal surgery within the last 3 months    Family History: no relevant history    Social History: drinking    Allergies: No Known Allergies     Review of Systems   Constitutional: Negative for chills and fever.   HENT: Negative for congestion and sore throat.    Eyes: Negative for visual disturbance.   Respiratory: Negative for shortness of breath.    Cardiovascular: Negative for chest pain and palpitations.   Gastrointestinal: Negative for blood in stool, diarrhea, nausea and vomiting.   Genitourinary: Negative for difficulty urinating and hematuria.   Musculoskeletal: Negative for back pain and neck pain.   Neurological: Positive for headaches. Negative for dizziness and speech difficulty.     Objective:   Vitals: Blood pressure (!) 200/122, pulse 61, temperature 98.1 °F (36.7 °C), temperature source Oral, resp. rate 16, height 5' 9" (1.753 m), weight 74.8 kg (165 lb), last menstrual period 11/12/2020, SpO2 100 %, not currently breastfeeding.     CT READ: Yes  No hemmorhage. No mass effect. No early infarct signs.     Physical Exam  Vitals reviewed.   Constitutional:       Appearance: Normal appearance. She is well-developed.   HENT:      Head: Normocephalic and atraumatic.      Nose: Nose normal.   Eyes:      Pupils: Pupils are equal, round, and reactive to light.   Cardiovascular:      Rate and Rhythm: Normal rate and regular rhythm.   Pulmonary:      Effort: Pulmonary effort is normal. "   Neurological:      General: No focal deficit present.      Mental Status: She is alert and oriented to person, place, and time. Mental status is at baseline.      Cranial Nerves: No cranial nerve deficit.      Sensory: No sensory deficit.   Psychiatric:         Mood and Affect: Mood normal.               Recommended the emergency room physician to have a brief discussion with the patient and/or family if available regarding the risks and benefits of treatment, and to briefly document the occurrence of that discussion in his clinical encounter note.     The attending portion of this evaluation, treatment, and documentation was performed per Whit Lara MD via audiovisual.    Billing code:  (non-stroke, some mimics)    · This patient has neurological symptom(s)/condition/illness, with minimal potential for morbidity and mortality.  · There is a low probability for acute neurological change leading to clinical and possibly life-threatening deterioration requiring highest level of physician preparedness for urgent intervention.  · Care was coordinated with other physicians involved in the patient's care.  · Radiologic studies and laboratory data were reviewed and interpreted, and plan of care was re-assessed based on the results.  · Diagnosis, treatment options and prognosis may have been discussed with the patient and/or family members or caregiver.      In your opinion, this was a: Tier 2 Van Negative    Consult End Time: 1612     Whit Lara MD  [facility]  Vascular Neurology   [facility]

## 2021-04-20 NOTE — SUBJECTIVE & OBJECTIVE
"  Woke up with symptoms?: yes    Recent bleeding noted: no  Does the patient take any Blood Thinners? no  Medications: No relevant medications      Past Medical History: hypertension    Past Surgical History: intracranial or spinal surgery within the last 3 months    Family History: no relevant history    Social History: drinking    Allergies: No Known Allergies     Review of Systems   Constitutional: Negative for chills and fever.   HENT: Negative for congestion and sore throat.    Eyes: Negative for visual disturbance.   Respiratory: Negative for shortness of breath.    Cardiovascular: Negative for chest pain and palpitations.   Gastrointestinal: Negative for blood in stool, diarrhea, nausea and vomiting.   Genitourinary: Negative for difficulty urinating and hematuria.   Musculoskeletal: Negative for back pain and neck pain.   Neurological: Positive for headaches. Negative for dizziness and speech difficulty.     Objective:   Vitals: Blood pressure (!) 200/122, pulse 61, temperature 98.1 °F (36.7 °C), temperature source Oral, resp. rate 16, height 5' 9" (1.753 m), weight 74.8 kg (165 lb), last menstrual period 11/12/2020, SpO2 100 %, not currently breastfeeding.     CT READ: Yes  No hemmorhage. No mass effect. No early infarct signs.     Physical Exam  Vitals reviewed.   Constitutional:       Appearance: Normal appearance. She is well-developed.   HENT:      Head: Normocephalic and atraumatic.      Nose: Nose normal.   Eyes:      Pupils: Pupils are equal, round, and reactive to light.   Cardiovascular:      Rate and Rhythm: Normal rate and regular rhythm.   Pulmonary:      Effort: Pulmonary effort is normal.   Neurological:      General: No focal deficit present.      Mental Status: She is alert and oriented to person, place, and time. Mental status is at baseline.      Cranial Nerves: No cranial nerve deficit.      Sensory: No sensory deficit.   Psychiatric:         Mood and Affect: Mood normal.           "

## 2021-04-21 VITALS
HEIGHT: 69 IN | RESPIRATION RATE: 17 BRPM | SYSTOLIC BLOOD PRESSURE: 137 MMHG | DIASTOLIC BLOOD PRESSURE: 83 MMHG | BODY MASS INDEX: 24.73 KG/M2 | HEART RATE: 67 BPM | OXYGEN SATURATION: 96 % | WEIGHT: 167 LBS | TEMPERATURE: 98 F

## 2021-04-21 LAB
ALBUMIN SERPL BCP-MCNC: 3.7 G/DL (ref 3.5–5.2)
ALP SERPL-CCNC: 30 U/L (ref 55–135)
ALT SERPL W/O P-5'-P-CCNC: 9 U/L (ref 10–44)
ANION GAP SERPL CALC-SCNC: 9 MMOL/L (ref 8–16)
AST SERPL-CCNC: 12 U/L (ref 10–40)
BASOPHILS # BLD AUTO: 0.03 K/UL (ref 0–0.2)
BASOPHILS NFR BLD: 0.5 % (ref 0–1.9)
BILIRUB SERPL-MCNC: 0.2 MG/DL (ref 0.1–1)
BUN SERPL-MCNC: 12 MG/DL (ref 6–20)
CALCIUM SERPL-MCNC: 8.5 MG/DL (ref 8.7–10.5)
CHLORIDE SERPL-SCNC: 104 MMOL/L (ref 95–110)
CO2 SERPL-SCNC: 28 MMOL/L (ref 23–29)
CREAT SERPL-MCNC: 1.5 MG/DL (ref 0.5–1.4)
DIFFERENTIAL METHOD: ABNORMAL
EOSINOPHIL # BLD AUTO: 0.1 K/UL (ref 0–0.5)
EOSINOPHIL NFR BLD: 1.4 % (ref 0–8)
ERYTHROCYTE [DISTWIDTH] IN BLOOD BY AUTOMATED COUNT: 17.7 % (ref 11.5–14.5)
EST. GFR  (AFRICAN AMERICAN): 52 ML/MIN/1.73 M^2
EST. GFR  (NON AFRICAN AMERICAN): 45 ML/MIN/1.73 M^2
GLUCOSE SERPL-MCNC: 120 MG/DL (ref 70–110)
HCT VFR BLD AUTO: 30.5 % (ref 37–48.5)
HGB BLD-MCNC: 9.7 G/DL (ref 12–16)
IMM GRANULOCYTES # BLD AUTO: 0.02 K/UL (ref 0–0.04)
IMM GRANULOCYTES NFR BLD AUTO: 0.3 % (ref 0–0.5)
LYMPHOCYTES # BLD AUTO: 1.5 K/UL (ref 1–4.8)
LYMPHOCYTES NFR BLD: 25.8 % (ref 18–48)
MAGNESIUM SERPL-MCNC: 1.9 MG/DL (ref 1.6–2.6)
MCH RBC QN AUTO: 25.5 PG (ref 27–31)
MCHC RBC AUTO-ENTMCNC: 31.8 G/DL (ref 32–36)
MCV RBC AUTO: 80 FL (ref 82–98)
MONOCYTES # BLD AUTO: 0.4 K/UL (ref 0.3–1)
MONOCYTES NFR BLD: 6.1 % (ref 4–15)
NEUTROPHILS # BLD AUTO: 3.9 K/UL (ref 1.8–7.7)
NEUTROPHILS NFR BLD: 65.9 % (ref 38–73)
NRBC BLD-RTO: 0 /100 WBC
PHOSPHATE SERPL-MCNC: 2.5 MG/DL (ref 2.7–4.5)
PLATELET # BLD AUTO: 307 K/UL (ref 150–450)
PMV BLD AUTO: 11.2 FL (ref 9.2–12.9)
POTASSIUM SERPL-SCNC: 2.8 MMOL/L (ref 3.5–5.1)
PROT SERPL-MCNC: 7.4 G/DL (ref 6–8.4)
RBC # BLD AUTO: 3.8 M/UL (ref 4–5.4)
SODIUM SERPL-SCNC: 141 MMOL/L (ref 136–145)
WBC # BLD AUTO: 5.89 K/UL (ref 3.9–12.7)

## 2021-04-21 PROCEDURE — 36415 COLL VENOUS BLD VENIPUNCTURE: CPT | Performed by: INTERNAL MEDICINE

## 2021-04-21 PROCEDURE — 25000003 PHARM REV CODE 250: Performed by: HOSPITALIST

## 2021-04-21 PROCEDURE — 99900035 HC TECH TIME PER 15 MIN (STAT)

## 2021-04-21 PROCEDURE — 80053 COMPREHEN METABOLIC PANEL: CPT | Performed by: INTERNAL MEDICINE

## 2021-04-21 PROCEDURE — 94761 N-INVAS EAR/PLS OXIMETRY MLT: CPT

## 2021-04-21 PROCEDURE — 83735 ASSAY OF MAGNESIUM: CPT | Performed by: INTERNAL MEDICINE

## 2021-04-21 PROCEDURE — 85025 COMPLETE CBC W/AUTO DIFF WBC: CPT | Performed by: INTERNAL MEDICINE

## 2021-04-21 PROCEDURE — 84100 ASSAY OF PHOSPHORUS: CPT | Performed by: INTERNAL MEDICINE

## 2021-04-21 PROCEDURE — 25000003 PHARM REV CODE 250: Performed by: INTERNAL MEDICINE

## 2021-04-21 RX ORDER — LABETALOL 100 MG/1
200 TABLET, FILM COATED ORAL 2 TIMES DAILY
Status: DISCONTINUED | OUTPATIENT
Start: 2021-04-21 | End: 2021-04-21 | Stop reason: HOSPADM

## 2021-04-21 RX ORDER — NIFEDIPINE 30 MG/1
90 TABLET, EXTENDED RELEASE ORAL DAILY
Status: DISCONTINUED | OUTPATIENT
Start: 2021-04-21 | End: 2021-04-21 | Stop reason: HOSPADM

## 2021-04-21 RX ORDER — LABETALOL 300 MG/1
300 TABLET, FILM COATED ORAL EVERY 12 HOURS
Qty: 60 TABLET | Refills: 0 | Status: ON HOLD | OUTPATIENT
Start: 2021-04-21 | End: 2024-03-27

## 2021-04-21 RX ORDER — NIFEDIPINE 90 MG/1
90 TABLET, EXTENDED RELEASE ORAL DAILY
Qty: 30 TABLET | Refills: 0 | Status: ON HOLD | OUTPATIENT
Start: 2021-04-21 | End: 2024-03-27

## 2021-04-21 RX ADMIN — LABETALOL HYDROCHLORIDE 200 MG: 100 TABLET, FILM COATED ORAL at 08:04

## 2021-04-21 RX ADMIN — DIPHENHYDRAMINE HYDROCHLORIDE 25 MG: 25 CAPSULE ORAL at 02:04

## 2021-04-21 RX ADMIN — NIFEDIPINE 90 MG: 30 TABLET, FILM COATED, EXTENDED RELEASE ORAL at 08:04

## 2021-04-23 ENCOUNTER — PATIENT OUTREACH (OUTPATIENT)
Dept: ADMINISTRATIVE | Facility: CLINIC | Age: 36
End: 2021-04-23

## 2022-03-12 ENCOUNTER — HOSPITAL ENCOUNTER (EMERGENCY)
Facility: HOSPITAL | Age: 37
Discharge: HOME | End: 2022-03-12
Attending: EMERGENCY MEDICINE
Payer: MEDICAID

## 2022-03-12 VITALS
RESPIRATION RATE: 62 BRPM | WEIGHT: 170 LBS | DIASTOLIC BLOOD PRESSURE: 72 MMHG | HEART RATE: 63 BPM | OXYGEN SATURATION: 100 % | TEMPERATURE: 99 F | SYSTOLIC BLOOD PRESSURE: 126 MMHG | BODY MASS INDEX: 25.1 KG/M2

## 2022-03-12 DIAGNOSIS — I10 SEVERE UNCONTROLLED HYPERTENSION: Primary | ICD-10-CM

## 2022-03-12 DIAGNOSIS — R21 GENERALIZED PAPULAR RASH: ICD-10-CM

## 2022-03-12 LAB
ANION GAP SERPL CALC-SCNC: 9 MMOL/L (ref 8–16)
BUN SERPL-MCNC: 14 MG/DL (ref 6–20)
CALCIUM SERPL-MCNC: 8.9 MG/DL (ref 8.7–10.5)
CHLORIDE SERPL-SCNC: 105 MMOL/L (ref 95–110)
CO2 SERPL-SCNC: 27 MMOL/L (ref 23–29)
CREAT SERPL-MCNC: 1.3 MG/DL (ref 0.5–1.4)
EST. GFR  (AFRICAN AMERICAN): >60 ML/MIN/1.73 M^2
EST. GFR  (NON AFRICAN AMERICAN): 53 ML/MIN/1.73 M^2
GLUCOSE SERPL-MCNC: 86 MG/DL (ref 70–110)
POTASSIUM SERPL-SCNC: 2.7 MMOL/L (ref 3.5–5.1)
SODIUM SERPL-SCNC: 141 MMOL/L (ref 136–145)

## 2022-03-12 PROCEDURE — 96374 THER/PROPH/DIAG INJ IV PUSH: CPT

## 2022-03-12 PROCEDURE — 96375 TX/PRO/DX INJ NEW DRUG ADDON: CPT

## 2022-03-12 PROCEDURE — 80048 BASIC METABOLIC PNL TOTAL CA: CPT | Performed by: EMERGENCY MEDICINE

## 2022-03-12 PROCEDURE — 99284 EMERGENCY DEPT VISIT MOD MDM: CPT | Mod: 25

## 2022-03-12 PROCEDURE — 63600175 PHARM REV CODE 636 W HCPCS: Performed by: EMERGENCY MEDICINE

## 2022-03-12 PROCEDURE — 25000003 PHARM REV CODE 250: Performed by: EMERGENCY MEDICINE

## 2022-03-12 RX ORDER — DIPHENHYDRAMINE HYDROCHLORIDE 50 MG/ML
25 INJECTION INTRAMUSCULAR; INTRAVENOUS
Status: COMPLETED | OUTPATIENT
Start: 2022-03-12 | End: 2022-03-12

## 2022-03-12 RX ORDER — HYDROXYZINE PAMOATE 25 MG/1
25 CAPSULE ORAL EVERY 6 HOURS PRN
Qty: 40 CAPSULE | Refills: 0 | Status: ON HOLD | OUTPATIENT
Start: 2022-03-12 | End: 2024-03-27 | Stop reason: HOSPADM

## 2022-03-12 RX ORDER — CLONIDINE HYDROCHLORIDE 0.1 MG/1
0.1 TABLET ORAL
Status: COMPLETED | OUTPATIENT
Start: 2022-03-12 | End: 2022-03-12

## 2022-03-12 RX ORDER — DIPHENHYDRAMINE HCL 25 MG
CAPSULE ORAL
Qty: 60 CAPSULE | Refills: 0 | Status: ON HOLD | OUTPATIENT
Start: 2022-03-12 | End: 2024-03-27 | Stop reason: HOSPADM

## 2022-03-12 RX ORDER — LABETALOL 100 MG/1
200 TABLET, FILM COATED ORAL
Status: COMPLETED | OUTPATIENT
Start: 2022-03-12 | End: 2022-03-12

## 2022-03-12 RX ORDER — DEXAMETHASONE SODIUM PHOSPHATE 4 MG/ML
12 INJECTION, SOLUTION INTRA-ARTICULAR; INTRALESIONAL; INTRAMUSCULAR; INTRAVENOUS; SOFT TISSUE
Status: COMPLETED | OUTPATIENT
Start: 2022-03-12 | End: 2022-03-12

## 2022-03-12 RX ORDER — HYDRALAZINE HYDROCHLORIDE 20 MG/ML
10 INJECTION INTRAMUSCULAR; INTRAVENOUS
Status: COMPLETED | OUTPATIENT
Start: 2022-03-12 | End: 2022-03-12

## 2022-03-12 RX ORDER — POTASSIUM CHLORIDE 750 MG/1
CAPSULE, EXTENDED RELEASE ORAL
Qty: 40 CAPSULE | Refills: 0 | Status: ON HOLD | OUTPATIENT
Start: 2022-03-12 | End: 2024-03-27 | Stop reason: HOSPADM

## 2022-03-12 RX ORDER — PREDNISONE 20 MG/1
40 TABLET ORAL DAILY
Qty: 10 TABLET | Refills: 0 | Status: SHIPPED | OUTPATIENT
Start: 2022-03-12 | End: 2022-03-17

## 2022-03-12 RX ADMIN — CLONIDINE HYDROCHLORIDE 0.1 MG: 0.1 TABLET ORAL at 02:03

## 2022-03-12 RX ADMIN — POTASSIUM BICARBONATE 40 MEQ: 391 TABLET, EFFERVESCENT ORAL at 04:03

## 2022-03-12 RX ADMIN — HYDRALAZINE HYDROCHLORIDE 10 MG: 20 INJECTION INTRAMUSCULAR; INTRAVENOUS at 02:03

## 2022-03-12 RX ADMIN — LABETALOL HYDROCHLORIDE 200 MG: 100 TABLET, FILM COATED ORAL at 02:03

## 2022-03-12 RX ADMIN — DIPHENHYDRAMINE HYDROCHLORIDE 25 MG: 50 INJECTION, SOLUTION INTRAMUSCULAR; INTRAVENOUS at 02:03

## 2022-03-12 RX ADMIN — DEXAMETHASONE SODIUM PHOSPHATE 12 MG: 4 INJECTION INTRA-ARTICULAR; INTRALESIONAL; INTRAMUSCULAR; INTRAVENOUS; SOFT TISSUE at 02:03

## 2022-03-12 NOTE — ED PROVIDER NOTES
"Encounter Date: 3/12/2022    SCRIBE #1 NOTE: I, Jasmine Hdez, am scribing for, and in the presence of,  Carroll Kruse MD. I have scribed the following portions of the note - Other sections scribed: HPI, ROS.       History     Chief Complaint   Patient presents with    Allergic Reaction     Pt reporting she was seen at urgent care for an allergic reaction but was sent to ED due to elevated pressure. Pt reporting rash that began Thursday to face, arms, and neck. Pt unsure what she is allergic to. Pt has a hx of HTN and reports compliance with meds.      Sharon Grande is a 36 y.o. female, with a PMHx of HTN, Anemia, eczema, who presents to the ED with rash and asymptomatic HTN. Patient reports an itching rash localized at her back, face, hands, arms, onset 2 days ago. She reports the rash has been causing a "burning" pain to her face as a result. She was evaluated at  earlier today prior to arrival and administered steroids, but was incidentally found to have an elevated blood pressure reading at , and was sent to the ED for further evaluation. She reports she is on Labetalol 200 mg for her HTN, but has yet to take her medication today. No other exacerbating or alleviating factors. Patient denies cough, shortness of breath, chest pain, fever, chills, abdominal pain, nausea, vomiting, diarrhea, dysuria, headaches, congestion, sore throat, arm or leg trouble, eye pain, ear pain, or other associated symptoms.    The history is provided by the patient.     Review of patient's allergies indicates:  No Known Allergies  Past Medical History:   Diagnosis Date    Anemia     Bipolar 1 disorder     Cerebral aneurysm     Hypertension     since 2012    Polycystic kidney disease     Polycystic kidney disease     Pre-eclampsia     Renal disorder     Since childhood     SAH (subarachnoid hemorrhage)     Stroke      Past Surgical History:   Procedure Laterality Date    BRAIN SURGERY      CEREBRAL ANGIOGRAM N/A 8/16/2018 "    Procedure: ANGIOGRAM-CEREBRAL;  Surgeon: Jayna Surgeon;  Location: [facility];  Service: Anesthesiology;  Laterality: N/A;     SECTION N/A 2018    Procedure:  SECTION;  Surgeon: Oebd Soto MD;  [facility];  Service: OB/GYN;  Laterality: N/A;    DILATION AND CURETTAGE OF UTERUS           Family History   Problem Relation Age of Onset    Hypertension Mother     Polycystic kidney disease Mother     Hypertension Paternal Grandmother     Polycystic kidney disease Daughter      Social History     Tobacco Use    Smoking status: Never Smoker    Smokeless tobacco: Never Used   Substance Use Topics    Alcohol use: Yes     Comment: occasional    Drug use: Yes     Types: Marijuana     Review of Systems   Constitutional: Negative for chills, diaphoresis and fever.   HENT: Negative for ear pain and sore throat.    Eyes: Negative for pain.   Respiratory: Negative for cough and shortness of breath.    Cardiovascular: Negative for chest pain.        +elevated BP.   Gastrointestinal: Negative for abdominal pain, diarrhea, nausea and vomiting.   Genitourinary: Negative for dysuria.   Musculoskeletal: Negative for back pain.        (-) Arm or leg trouble.    Skin: Positive for rash (+itching, + pain (to face), to face, arms, hands, back).   Neurological: Negative for headaches.   Psychiatric/Behavioral: Negative for confusion.       Physical Exam     Initial Vitals [22 1310]   BP Pulse Resp Temp SpO2   (!) 253/131 (!) 55 18 98.4 °F (36.9 °C) 98 %      MAP       --         Physical Exam  The patient was examined specifically for the following:   General:No significant distress, Good color, Warm and dry. Head and neck:Scalp atraumatic, Neck supple. Neurological:Appropriate conversation, Gross motor deficits. Eyes:Conjugate gaze, Clear corneas. ENT: No epistaxis. Cardiac: Regular rate and rhythm, Grossly normal heart tones. Pulmonary: Wheezing, Rales. Gastrointestinal: Abdominal  tenderness, Abdominal distention. Musculoskeletal: Extremity deformity, Apparent pain with range of motion of the joints. Skin: Rash.   The findings on examination were normal except for the following:  Patient's blood pressure is 253/131.  The patient has a generalized rash that is worse on the face the neck and the arms that appears to be little tiny punctate papules.  There is no linear confirmation.  The lungs are clear.  The heart tones are normal.  The abdomen is nontender.  ED Course   Procedures  Labs Reviewed   BASIC METABOLIC PANEL - Abnormal; Notable for the following components:       Result Value    Potassium 2.7 (*)     eGFR if non  53 (*)     All other components within normal limits    Narrative:       POTASSIUM  critical result(s) called and verbal readback obtained   from CANDIDA RAMIREZ by 6 03/12/2022 16:06          Imaging Results    None       Medical decision making:  Given the above this patient presents to the emergency room with a generalized pruritic exam thumb that are fine pinpoint papules.  It is extremely diffuse.  The patient has a history of eczema.  The patient reports a rash occurs every time this year.  She presents with severe uncontrolled hypertension.  She did not take her medicines this morning.  She is on labetalol 3 him this in this morning.  She was given her medicine.  Her blood pressures corrected.  I will discharge on prednisone Benadryl and Atarax to follow up with dermatology.       Medications   potassium bicarbonate disintegrating tablet 40 mEq (has no administration in time range)   labetaloL tablet 200 mg (200 mg Oral Given 3/12/22 1410)   cloNIDine tablet 0.1 mg (0.1 mg Oral Given 3/12/22 1409)   hydrALAZINE injection 10 mg (10 mg Intravenous Given 3/12/22 1438)   dexamethasone injection 12 mg (12 mg Intravenous Given 3/12/22 1432)   diphenhydrAMINE injection 25 mg (25 mg Intravenous Given 3/12/22 1427)     Medical Decision Making:   History:    Old Medical Records: I decided to obtain old medical records.  Clinical Tests:   Lab Tests: Ordered and Reviewed          Scribe Attestation:   Scribe #1: I performed the above scribed service and the documentation accurately describes the services I performed. I attest to the accuracy of the note.                 Clinical Impression:   Final diagnoses:  [I10] Severe uncontrolled hypertension (Primary)  [R21] Generalized papular rash          ED Disposition Condition    Discharge Stable       I personally performed the services described in this documentation.  All medical record  entries made by the scribe are at my direction and in my presence.  Signed, Dr. Kruse  ED Prescriptions     Medication Sig Dispense Start Date End Date Auth. Provider    predniSONE (DELTASONE) 20 MG tablet Take 2 tablets (40 mg total) by mouth once daily. for 5 days 10 tablet 3/12/2022 3/17/2022 Carroll Kruse MD    diphenhydrAMINE (BENADRYL) 25 mg capsule 25-50 mg by mouth every 6 hours as needed for itching 60 capsule 3/12/2022  Carroll Kruse MD    hydrOXYzine pamoate (VISTARIL) 25 MG Cap Take 1 capsule (25 mg total) by mouth every 6 (six) hours as needed (Itching). 40 capsule 3/12/2022  Carroll Kruse MD    potassium chloride (MICRO-K) 10 MEQ CpSR Please take 4 tablets by mouth every 6 hours for 3 doses, then 2 tablets daily. 40 capsule 3/12/2022  Carroll Kruse MD        Follow-up Information     Follow up With Specialties Details Why Contact Info    [facility]  In 1 week  [address] [place] [address]  [phone]      Carolin Ludwig MD Dermatology In 1 week  [address]  [address]  [facility] [address]  [phone]             Carroll Kruse MD  03/12/22 9622       Carroll Kruse MD  03/12/22 [id number]

## 2022-03-12 NOTE — DISCHARGE INSTRUCTIONS
Please follow-up with the dermatologist above about her rash.  You may also follow-up with the clinic above.  Please return immediately if you get worse or if new problems develop.  Medicines as directed.  Please take your blood pressure medicines exactly as directed.  Potassium as directed

## 2022-03-12 NOTE — ED TRIAGE NOTES
Pt arrived to the ED via personal transport due to allergic reaction. Pt reports hives located on bilateral arms, neck, and face. Minimal swelling noted around eyes and lips, pt denies any difficulty swallowing. Pt reports hives itchy and burning at the sites. Pt denies usage of new fragrances, lotions, or creams. No allergies to meds or foods. Pt BP elevated on arrival to ED bed 200s/100s, 100% on RA. No acute distress noted. Pt has hx of HTN, denies taking BP meds this AM. Pt denies SOB, lightheadedness, dizziness, headache, chest pain, vision changes. Pt placed on cardiac monitoring. MD Kruse at bedside.

## 2022-04-04 ENCOUNTER — HOSPITAL ENCOUNTER (EMERGENCY)
Facility: HOSPITAL | Age: 37
Discharge: HOME | End: 2022-04-04
Attending: EMERGENCY MEDICINE
Payer: MEDICAID

## 2022-04-04 VITALS
TEMPERATURE: 98 F | WEIGHT: 170 LBS | HEART RATE: 60 BPM | BODY MASS INDEX: 25.18 KG/M2 | OXYGEN SATURATION: 100 % | SYSTOLIC BLOOD PRESSURE: 211 MMHG | HEIGHT: 69 IN | RESPIRATION RATE: 18 BRPM | DIASTOLIC BLOOD PRESSURE: 126 MMHG

## 2022-04-04 DIAGNOSIS — M79.644 PAIN OF RIGHT THUMB: ICD-10-CM

## 2022-04-04 DIAGNOSIS — T44.5X1A ACCIDENTAL INJECTION OF EPINEPHRINE, INITIAL ENCOUNTER: Primary | ICD-10-CM

## 2022-04-04 PROCEDURE — 96372 THER/PROPH/DIAG INJ SC/IM: CPT | Performed by: EMERGENCY MEDICINE

## 2022-04-04 PROCEDURE — 90471 IMMUNIZATION ADMIN: CPT | Performed by: EMERGENCY MEDICINE

## 2022-04-04 PROCEDURE — 63600175 PHARM REV CODE 636 W HCPCS: Performed by: EMERGENCY MEDICINE

## 2022-04-04 PROCEDURE — 25000003 PHARM REV CODE 250: Performed by: EMERGENCY MEDICINE

## 2022-04-04 PROCEDURE — 99284 EMERGENCY DEPT VISIT MOD MDM: CPT | Mod: 25

## 2022-04-04 PROCEDURE — 90715 TDAP VACCINE 7 YRS/> IM: CPT | Performed by: EMERGENCY MEDICINE

## 2022-04-04 RX ORDER — CEPHALEXIN 500 MG/1
1000 CAPSULE ORAL EVERY 12 HOURS
Qty: 28 CAPSULE | Refills: 0 | Status: SHIPPED | OUTPATIENT
Start: 2022-04-04 | End: 2022-04-11

## 2022-04-04 RX ORDER — PHENTOLAMINE MESYLATE 5 MG/1
2 INJECTION INTRAMUSCULAR; INTRAVENOUS ONCE
Status: COMPLETED | OUTPATIENT
Start: 2022-04-04 | End: 2022-04-04

## 2022-04-04 RX ORDER — CEPHALEXIN 250 MG/1
1000 CAPSULE ORAL
Status: COMPLETED | OUTPATIENT
Start: 2022-04-04 | End: 2022-04-04

## 2022-04-04 RX ORDER — HYDROCODONE BITARTRATE AND ACETAMINOPHEN 5; 325 MG/1; MG/1
1 TABLET ORAL
Status: COMPLETED | OUTPATIENT
Start: 2022-04-04 | End: 2022-04-04

## 2022-04-04 RX ORDER — HYDROCODONE BITARTRATE AND ACETAMINOPHEN 5; 325 MG/1; MG/1
1 TABLET ORAL EVERY 4 HOURS PRN
Qty: 6 TABLET | Refills: 0 | Status: SHIPPED | OUTPATIENT
Start: 2022-04-04 | End: 2022-04-14

## 2022-04-04 RX ORDER — LIDOCAINE HYDROCHLORIDE 20 MG/ML
5 INJECTION, SOLUTION INFILTRATION; PERINEURAL
Status: COMPLETED | OUTPATIENT
Start: 2022-04-04 | End: 2022-04-04

## 2022-04-04 RX ADMIN — NITROGLYCERIN 1 INCH: 20 OINTMENT TOPICAL at 01:04

## 2022-04-04 RX ADMIN — LIDOCAINE HYDROCHLORIDE 5 ML: 20 INJECTION, SOLUTION INFILTRATION; PERINEURAL at 01:04

## 2022-04-04 RX ADMIN — CEPHALEXIN 1000 MG: 250 CAPSULE ORAL at 02:04

## 2022-04-04 RX ADMIN — PHENTOLAMINE MESYLATE 2 MG: 5 INJECTION, POWDER, FOR SOLUTION INTRAMUSCULAR; INTRAVENOUS at 02:04

## 2022-04-04 RX ADMIN — HYDROCODONE BITARTRATE AND ACETAMINOPHEN 1 TABLET: 5; 325 TABLET ORAL at 01:04

## 2022-04-04 RX ADMIN — TETANUS TOXOID, REDUCED DIPHTHERIA TOXOID AND ACELLULAR PERTUSSIS VACCINE, ADSORBED 0.5 ML: 5; 2.5; 8; 8; 2.5 SUSPENSION INTRAMUSCULAR at 02:04

## 2022-04-04 NOTE — ED PROVIDER NOTES
Encounter Date: 2022       History     Chief Complaint   Patient presents with    Hand Pain     35 yo female to triage w/ complaints of right sided hand pain since yesterday. Pt says she accidentally pricked herself in the thumb w/ her Epi-Pen on yesterday and ever since then it has been very painful. Pt's BP is 228/126 in triage after 2nd reading. Says she took all her meds PTA. Pt is refusing IV and labs if needed, only wants to be seen for her hand injury. AAOx4    Hypertension     HPI   This 36-year-old white female presents to the emergency room after sticking her right thumb with an EpiPen yesterday.  She complains of severe pain in her right thumb, the finger pad, she is noted to have high blood pressure on arrival to the emergency room.   Review of patient's allergies indicates:  No Known Allergies  Past Medical History:   Diagnosis Date    Anemia     Bipolar 1 disorder     Cerebral aneurysm     Hypertension     since     Polycystic kidney disease     Polycystic kidney disease     Pre-eclampsia     Renal disorder     Since childhood     SAH (subarachnoid hemorrhage)     Stroke      Past Surgical History:   Procedure Laterality Date    BRAIN SURGERY      CEREBRAL ANGIOGRAM N/A 2018    Procedure: ANGIOGRAM-CEREBRAL;  Surgeon: Jayna Surgeon;  Location: [facility];  Service: Anesthesiology;  Laterality: N/A;     SECTION N/A 2018    Procedure:  SECTION;  Surgeon: Obed Soto MD;  Location: [facility];  Service: OB/GYN;  Laterality: N/A;    DILATION AND CURETTAGE OF UTERUS           Family History   Problem Relation Age of Onset    Hypertension Mother     Polycystic kidney disease Mother     Hypertension Paternal Grandmother     Polycystic kidney disease Daughter      Social History     Tobacco Use    Smoking status: Never Smoker    Smokeless tobacco: Never Used   Substance Use Topics    Alcohol use: Yes     Comment: occasional    Drug use: Yes      Types: Marijuana     Review of Systems  The patient was questioned specifically with regard to the following.  General: Fever, chills, sweats. Neuro: Headache. Eyes: eye problems. ENT: Ear pain, sore throat. Cardiovascular: Chest pain. Respiratory: Cough, shortness of breath. Gastrointestinal: Abdominal pain, vomiting, diarrhea. Genitourinary: Painful urination.  Musculoskeletal: Arm and leg problems. Skin: Rash.  The review of systems was negative except for the following:  Right thumb pain  Physical Exam     Initial Vitals [04/04/22 1217]   BP Pulse Resp Temp SpO2   (!) 235/128 65 17 98.2 °F (36.8 °C) 100 %      MAP       --         Physical Exam  The patient was examined specifically for the following:   General:No significant distress, Good color, Warm and dry. Head and neck:Scalp atraumatic, Neck supple. Neurological:Appropriate conversation, Gross motor deficits. Eyes:Conjugate gaze, Clear corneas. ENT: No epistaxis. Cardiac: Regular rate and rhythm, Grossly normal heart tones. Pulmonary: Wheezing, Rales. Gastrointestinal: Abdominal tenderness, Abdominal distention. Musculoskeletal: Extremity deformity, Apparent pain with range of motion of the joints. Skin: Rash.   The findings on examination were normal except for the following:  Patient's blood pressure is 235/128.  Lungs are clear and free of wheezing rales rubs or rhonchi.  The abdomen is soft.  The patient has a 1 cm diameter area of ecchymosis on the finger pad of the right thumb as well as blanched tissue on the remainder of the entire finger pad of the right thumb..      ED Course   Procedures  Labs Reviewed - No data to display       Imaging Results    None       Medical decision making:  This 36-year-old white female presents to the emergency room after injecting her right thumb with an epinephrine pen last night.  She has severe pain.  Consultation was obtained with Orthopedics.  Dr. Mars advise treatment with phentolamine and 2% plain xylocaine.   This treatment was performed.  The pain in the thumb resolved.  The thumb changed colors from white to pink.  Dr. Mars recommended antibiotics and tetanus immunization.  These were performed.  I will discharge to outpatient evaluation and treatment.       Medications   phentolamine injection 2 mg (2 mg Subcutaneous Given by Provider 4/4/22 1430)   LIDOcaine HCL 20 mg/ml (2%) injection 5 mL (5 mLs Infiltration Given by Provider 4/4/22 1330)   HYDROcodone-acetaminophen 5-325 mg per tablet 1 tablet (1 tablet Oral Given 4/4/22 1322)   cephALEXin capsule 1,000 mg (1,000 mg Oral Given 4/4/22 1441)   Tdap (BOOSTRIX) vaccine injection 0.5 mL (0.5 mLs Intramuscular Given 4/4/22 1442)                          Clinical Impression:   Final diagnoses:  [T44.5X1A] Accidental injection of epinephrine, initial encounter (Primary)  [M79.644] Pain of right thumb          ED Disposition Condition    Discharge Stable        ED Prescriptions     Medication Sig Dispense Start Date End Date Auth. Provider    cephALEXin (KEFLEX) 500 MG capsule Take 2 capsules (1,000 mg total) by mouth every 12 (twelve) hours. for 7 days 28 capsule 4/4/2022 4/11/2022 Carroll Kruse MD    HYDROcodone-acetaminophen (NORCO) 5-325 mg per tablet Take 1 tablet by mouth every 4 (four) hours as needed for Pain. 6 tablet 4/4/2022 4/14/2022 Carroll Kruse MD        Follow-up Information     Follow up With Specialties Details Why Contact Info    Prasanna Mars III, MD Orthopedic Surgery In 3 days  [address]  [address]  [facility] [place] [address]  [phone]             Carroll Kruse MD  04/04/22 2011

## 2022-04-04 NOTE — ED TRIAGE NOTES
35 yo female to triage w/ complaints of right thumb pain since yesterday. Pt says she accidentally pricked herself in the thumb w/ her Epi-Pen on yesterday and ever since then it has been very painful. Pt's BP is 228/126 in triage after 2nd reading. Says she took all her meds PTA. Pt is refusing IV and labs if needed, only wants to be seen for her hand injury. AAOx4

## 2022-04-04 NOTE — DISCHARGE INSTRUCTIONS
Tylenol with hydrocodone for pain.  Return immediately if you get worse or if new problems develop.  Keflex as directed.  Please follow-up with the orthopedist above.  You may also follow up with primary care doctor.

## 2022-04-04 NOTE — FIRST PROVIDER EVALUATION
Emergency Department TeleTriage Encounter Note      CHIEF COMPLAINT    Chief Complaint   Patient presents with    Hand Pain     37 yo female to triage w/ complaints of right sided hand pain since yesterday. Pt says she accidentally pricked herself in the thumb w/ her Epi-Pen on yesterday and ever since then it has been very painful. Pt's BP is 228/126 in triage after 2nd reading. Says she took all her meds PTA. Pt is refusing IV and labs if needed, only wants to be seen for her hand injury. AAOx4    Hypertension       VITAL SIGNS   Initial Vitals [04/04/22 1217]   BP Pulse Resp Temp SpO2   (!) 235/128 65 17 98.2 °F (36.8 °C) 100 %      MAP       --            ALLERGIES    Review of patient's allergies indicates:  No Known Allergies    PROVIDER TRIAGE NOTE  This is a teletriage evaluation of a 36 y.o. female presenting to the ED complaining of hand pain. Patient reports right thumb pain after accidentally sticking herself with an epipen yesterday. She has a bruise to the thumb. She reports throbbing pain and numbness to her entire hand. She took her blood pressure medication just PTA. No other complaints.     Initial orders will be placed and care will be transferred to an alternate provider when patient is roomed for a full evaluation. Any additional orders and the final disposition will be determined by that provider.           ORDERS  Labs Reviewed - No data to display    ED Orders (720h ago, onward)    None            Virtual Visit Note: The provider triage portion of this emergency department evaluation and documentation was performed via [employer], a HIPAA-compliant telemedicine application, in concert with a tele-presenter in the room. A face to face patient evaluation with one of my colleagues will occur once the patient is placed in an emergency department room.      DISCLAIMER: This note was prepared with [employer] voice recognition transcription software. Garbled syntax, mangled pronouns, and other  bizarre constructions may be attributed to that software system.

## 2024-03-25 ENCOUNTER — HOSPITAL ENCOUNTER (INPATIENT)
Facility: HOSPITAL | Age: 39
LOS: 2 days | Discharge: HOME | DRG: 683 | End: 2024-03-27
Attending: EMERGENCY MEDICINE | Admitting: STUDENT IN AN ORGANIZED HEALTH CARE EDUCATION/TRAINING PROGRAM
Payer: MEDICAID

## 2024-03-25 DIAGNOSIS — E87.6 HYPOKALEMIA: Primary | ICD-10-CM

## 2024-03-25 DIAGNOSIS — N17.9 AKI (ACUTE KIDNEY INJURY): ICD-10-CM

## 2024-03-25 DIAGNOSIS — Q61.3 PKD (POLYCYSTIC KIDNEY DISEASE): ICD-10-CM

## 2024-03-25 DIAGNOSIS — R07.9 CHEST PAIN: ICD-10-CM

## 2024-03-25 DIAGNOSIS — I16.1 HYPERTENSIVE EMERGENCY: ICD-10-CM

## 2024-03-25 DIAGNOSIS — R11.10 INTRACTABLE VOMITING: ICD-10-CM

## 2024-03-25 PROBLEM — I10 HTN (HYPERTENSION): Status: ACTIVE | Noted: 2024-03-25

## 2024-03-25 LAB
ALBUMIN SERPL BCP-MCNC: 3.3 G/DL (ref 3.5–5.2)
ALBUMIN SERPL-MCNC: 3.4 G/DL (ref 3.3–5.5)
ALBUMIN SERPL-MCNC: 3.8 G/DL (ref 3.3–5.5)
ALBUMIN SERPL-MCNC: 4 G/DL (ref 3.3–5.5)
ALP SERPL-CCNC: 36 U/L (ref 55–135)
ALP SERPL-CCNC: 40 U/L (ref 42–141)
ALP SERPL-CCNC: 41 U/L (ref 42–141)
ALP SERPL-CCNC: 42 U/L (ref 42–141)
ALT SERPL W/O P-5'-P-CCNC: 7 U/L (ref 10–44)
ANION GAP SERPL CALC-SCNC: 13 MMOL/L (ref 8–16)
ANION GAP SERPL CALC-SCNC: 6 MMOL/L (ref 8–16)
AST SERPL-CCNC: 15 U/L (ref 10–40)
B-HCG UR QL: NEGATIVE
BILIRUB SERPL-MCNC: 0.3 MG/DL (ref 0.1–1)
BILIRUB SERPL-MCNC: 0.5 MG/DL (ref 0.2–1.6)
BILIRUB SERPL-MCNC: 0.6 MG/DL (ref 0.2–1.6)
BILIRUB SERPL-MCNC: 0.6 MG/DL (ref 0.2–1.6)
BILIRUBIN, POC UA: NEGATIVE
BLOOD, POC UA: NEGATIVE
BUN SERPL-MCNC: 17 MG/DL (ref 6–20)
BUN SERPL-MCNC: 18 MG/DL (ref 7–22)
BUN SERPL-MCNC: 19 MG/DL (ref 6–20)
BUN SERPL-MCNC: 19 MG/DL (ref 7–22)
CALCIUM SERPL-MCNC: 7.9 MG/DL (ref 8.7–10.5)
CALCIUM SERPL-MCNC: 8.3 MG/DL (ref 8.7–10.5)
CALCIUM SERPL-MCNC: 8.7 MG/DL (ref 8–10.3)
CALCIUM SERPL-MCNC: 9.5 MG/DL (ref 8–10.3)
CHLORIDE SERPL-SCNC: 100 MMOL/L (ref 98–108)
CHLORIDE SERPL-SCNC: 105 MMOL/L (ref 95–110)
CHLORIDE SERPL-SCNC: 105 MMOL/L (ref 98–108)
CHLORIDE SERPL-SCNC: 108 MMOL/L (ref 95–110)
CLARITY, POC UA: CLEAR
CO2 SERPL-SCNC: 24 MMOL/L (ref 23–29)
CO2 SERPL-SCNC: 25 MMOL/L (ref 23–29)
COLOR, POC UA: YELLOW
CREAT SERPL-MCNC: 2 MG/DL (ref 0.5–1.4)
CREAT SERPL-MCNC: 2 MG/DL (ref 0.5–1.4)
CREAT SERPL-MCNC: 2 MG/DL (ref 0.6–1.2)
CREAT SERPL-MCNC: 2.1 MG/DL (ref 0.6–1.2)
CTP QC/QA: YES
EST. GFR  (NO RACE VARIABLE): 32 ML/MIN/1.73 M^2
EST. GFR  (NO RACE VARIABLE): 32 ML/MIN/1.73 M^2
GLUCOSE SERPL-MCNC: 108 MG/DL (ref 70–110)
GLUCOSE SERPL-MCNC: 91 MG/DL (ref 70–110)
GLUCOSE SERPL-MCNC: 97 MG/DL (ref 73–118)
GLUCOSE SERPL-MCNC: 98 MG/DL (ref 73–118)
GLUCOSE, POC UA: NEGATIVE
HCT, POC: NORMAL
HGB, POC: NORMAL (ref 14–18)
KETONES, POC UA: NEGATIVE
LEUKOCYTE EST, POC UA: ABNORMAL
MAGNESIUM SERPL-MCNC: 1.5 MG/DL (ref 1.6–2.6)
MCH, POC: NORMAL
MCHC, POC: NORMAL
MCV, POC: NORMAL
MPV, POC: NORMAL
NITRITE, POC UA: NEGATIVE
PH UR STRIP: 6.5 [PH]
POC ALT (SGPT): 11 U/L (ref 10–47)
POC ALT (SGPT): 13 U/L (ref 10–47)
POC ALT (SGPT): <5 U/L (ref 10–47)
POC AMYLASE: 47 U/L (ref 14–97)
POC AST (SGOT): 17 U/L (ref 11–38)
POC AST (SGOT): 19 U/L (ref 11–38)
POC AST (SGOT): 20 U/L (ref 11–38)
POC BETA-HCG (QUANT): <5 IU/L
POC CARDIAC TROPONIN I: 0.06 NG/ML (ref 0–0.08)
POC GGT: 12 U/L (ref 5–65)
POC PLATELET COUNT: NORMAL
POC TCO2: 28 MMOL/L (ref 18–33)
POC TCO2: 31 MMOL/L (ref 18–33)
POTASSIUM BLD-SCNC: 2.2 MMOL/L (ref 3.6–5.1)
POTASSIUM BLD-SCNC: 2.5 MMOL/L (ref 3.6–5.1)
POTASSIUM SERPL-SCNC: 2.4 MMOL/L (ref 3.5–5.1)
POTASSIUM SERPL-SCNC: 2.8 MMOL/L (ref 3.5–5.1)
PROT SERPL-MCNC: 7 G/DL (ref 6–8.4)
PROTEIN, POC UA: ABNORMAL
PROTEIN, POC: 6.8 G/DL (ref 6.4–8.1)
PROTEIN, POC: 7.6 G/DL (ref 6.4–8.1)
PROTEIN, POC: 7.6 G/DL (ref 6.4–8.1)
RBC, POC: NORMAL
RDW, POC: NORMAL
SAMPLE: NORMAL
SAMPLE: NORMAL
SODIUM BLD-SCNC: 142 MMOL/L (ref 128–145)
SODIUM BLD-SCNC: 143 MMOL/L (ref 128–145)
SODIUM SERPL-SCNC: 139 MMOL/L (ref 136–145)
SODIUM SERPL-SCNC: 142 MMOL/L (ref 136–145)
SPECIFIC GRAVITY, POC UA: 1.02
UROBILINOGEN, POC UA: 0.2 E.U./DL
WBC, POC: NORMAL

## 2024-03-25 PROCEDURE — G0378 HOSPITAL OBSERVATION PER HR: HCPCS | Mod: ER

## 2024-03-25 PROCEDURE — 21400001 HC TELEMETRY ROOM

## 2024-03-25 PROCEDURE — 80048 BASIC METABOLIC PNL TOTAL CA: CPT | Performed by: PHYSICIAN ASSISTANT

## 2024-03-25 PROCEDURE — 96375 TX/PRO/DX INJ NEW DRUG ADDON: CPT | Mod: ER

## 2024-03-25 PROCEDURE — 63600175 PHARM REV CODE 636 W HCPCS: Mod: ER | Performed by: PHYSICIAN ASSISTANT

## 2024-03-25 PROCEDURE — 81003 URINALYSIS AUTO W/O SCOPE: CPT | Mod: ER

## 2024-03-25 PROCEDURE — 85025 COMPLETE CBC W/AUTO DIFF WBC: CPT | Mod: ER

## 2024-03-25 PROCEDURE — 96366 THER/PROPH/DIAG IV INF ADDON: CPT | Mod: ER

## 2024-03-25 PROCEDURE — 25000003 PHARM REV CODE 250

## 2024-03-25 PROCEDURE — 63600175 PHARM REV CODE 636 W HCPCS: Performed by: PHYSICIAN ASSISTANT

## 2024-03-25 PROCEDURE — 94761 N-INVAS EAR/PLS OXIMETRY MLT: CPT

## 2024-03-25 PROCEDURE — 80053 COMPREHEN METABOLIC PANEL: CPT | Performed by: PHYSICIAN ASSISTANT

## 2024-03-25 PROCEDURE — 96365 THER/PROPH/DIAG IV INF INIT: CPT | Mod: ER

## 2024-03-25 PROCEDURE — 36415 COLL VENOUS BLD VENIPUNCTURE: CPT | Mod: XB | Performed by: PHYSICIAN ASSISTANT

## 2024-03-25 PROCEDURE — 81025 URINE PREGNANCY TEST: CPT | Mod: ER | Performed by: PHYSICIAN ASSISTANT

## 2024-03-25 PROCEDURE — 25000003 PHARM REV CODE 250: Performed by: PHYSICIAN ASSISTANT

## 2024-03-25 PROCEDURE — 25000003 PHARM REV CODE 250: Mod: ER | Performed by: PHYSICIAN ASSISTANT

## 2024-03-25 PROCEDURE — G0378 HOSPITAL OBSERVATION PER HR: HCPCS

## 2024-03-25 PROCEDURE — 80053 COMPREHEN METABOLIC PANEL: CPT | Mod: ER

## 2024-03-25 PROCEDURE — 96361 HYDRATE IV INFUSION ADD-ON: CPT | Mod: ER

## 2024-03-25 PROCEDURE — 82040 ASSAY OF SERUM ALBUMIN: CPT | Mod: ER

## 2024-03-25 PROCEDURE — 84702 CHORIONIC GONADOTROPIN TEST: CPT | Mod: ER

## 2024-03-25 PROCEDURE — 83735 ASSAY OF MAGNESIUM: CPT | Performed by: PHYSICIAN ASSISTANT

## 2024-03-25 PROCEDURE — 99285 EMERGENCY DEPT VISIT HI MDM: CPT | Mod: 25,ER

## 2024-03-25 RX ORDER — CLONIDINE HYDROCHLORIDE 0.1 MG/1
0.1 TABLET ORAL
Status: COMPLETED | OUTPATIENT
Start: 2024-03-25 | End: 2024-03-25

## 2024-03-25 RX ORDER — POTASSIUM CHLORIDE 20 MEQ/1
20 TABLET, EXTENDED RELEASE ORAL ONCE
Status: COMPLETED | OUTPATIENT
Start: 2024-03-26 | End: 2024-03-25

## 2024-03-25 RX ORDER — GLUCAGON 1 MG
1 KIT INJECTION
Status: DISCONTINUED | OUTPATIENT
Start: 2024-03-25 | End: 2024-03-27 | Stop reason: HOSPADM

## 2024-03-25 RX ORDER — TALC
6 POWDER (GRAM) TOPICAL NIGHTLY PRN
Status: DISCONTINUED | OUTPATIENT
Start: 2024-03-25 | End: 2024-03-25

## 2024-03-25 RX ORDER — SODIUM CHLORIDE 9 MG/ML
INJECTION, SOLUTION INTRAVENOUS CONTINUOUS
Status: ACTIVE | OUTPATIENT
Start: 2024-03-25 | End: 2024-03-26

## 2024-03-25 RX ORDER — BUTALBITAL, ACETAMINOPHEN AND CAFFEINE 50; 325; 40 MG/1; MG/1; MG/1
1 TABLET ORAL ONCE
Status: DISCONTINUED | OUTPATIENT
Start: 2024-03-25 | End: 2024-03-25

## 2024-03-25 RX ORDER — MORPHINE SULFATE 4 MG/ML
4 INJECTION, SOLUTION INTRAMUSCULAR; INTRAVENOUS
Status: COMPLETED | OUTPATIENT
Start: 2024-03-25 | End: 2024-03-25

## 2024-03-25 RX ORDER — SODIUM CHLORIDE 0.9 % (FLUSH) 0.9 %
10 SYRINGE (ML) INJECTION
Status: DISCONTINUED | OUTPATIENT
Start: 2024-03-25 | End: 2024-03-27 | Stop reason: HOSPADM

## 2024-03-25 RX ORDER — ASPIRIN 325 MG
325 TABLET ORAL
Status: COMPLETED | OUTPATIENT
Start: 2024-03-25 | End: 2024-03-25

## 2024-03-25 RX ORDER — POTASSIUM CHLORIDE 7.45 MG/ML
10 INJECTION INTRAVENOUS
Status: DISCONTINUED | OUTPATIENT
Start: 2024-03-25 | End: 2024-03-25

## 2024-03-25 RX ORDER — NIFEDIPINE 30 MG/1
90 TABLET, EXTENDED RELEASE ORAL DAILY
Status: DISCONTINUED | OUTPATIENT
Start: 2024-03-26 | End: 2024-03-27 | Stop reason: HOSPADM

## 2024-03-25 RX ORDER — MAGNESIUM SULFATE HEPTAHYDRATE 40 MG/ML
2 INJECTION, SOLUTION INTRAVENOUS ONCE
Status: COMPLETED | OUTPATIENT
Start: 2024-03-25 | End: 2024-03-25

## 2024-03-25 RX ORDER — TALC
9 POWDER (GRAM) TOPICAL NIGHTLY PRN
Status: DISCONTINUED | OUTPATIENT
Start: 2024-03-25 | End: 2024-03-27 | Stop reason: HOSPADM

## 2024-03-25 RX ORDER — SODIUM CHLORIDE 0.9 % (FLUSH) 0.9 %
10 SYRINGE (ML) INJECTION EVERY 8 HOURS
Status: DISCONTINUED | OUTPATIENT
Start: 2024-03-25 | End: 2024-03-25

## 2024-03-25 RX ORDER — POTASSIUM CHLORIDE 20 MEQ/1
40 TABLET, EXTENDED RELEASE ORAL
Status: COMPLETED | OUTPATIENT
Start: 2024-03-25 | End: 2024-03-25

## 2024-03-25 RX ORDER — IBUPROFEN 200 MG
16 TABLET ORAL
Status: DISCONTINUED | OUTPATIENT
Start: 2024-03-25 | End: 2024-03-27 | Stop reason: HOSPADM

## 2024-03-25 RX ORDER — LANOLIN ALCOHOL/MO/W.PET/CERES
800 CREAM (GRAM) TOPICAL
Status: DISCONTINUED | OUTPATIENT
Start: 2024-03-25 | End: 2024-03-27 | Stop reason: HOSPADM

## 2024-03-25 RX ORDER — SODIUM CHLORIDE, SODIUM LACTATE, POTASSIUM CHLORIDE, CALCIUM CHLORIDE 600; 310; 30; 20 MG/100ML; MG/100ML; MG/100ML; MG/100ML
INJECTION, SOLUTION INTRAVENOUS CONTINUOUS
Status: DISCONTINUED | OUTPATIENT
Start: 2024-03-25 | End: 2024-03-25

## 2024-03-25 RX ORDER — LABETALOL 100 MG/1
300 TABLET, FILM COATED ORAL EVERY 12 HOURS
Status: DISCONTINUED | OUTPATIENT
Start: 2024-03-25 | End: 2024-03-27 | Stop reason: HOSPADM

## 2024-03-25 RX ORDER — POTASSIUM CHLORIDE 7.45 MG/ML
10 INJECTION INTRAVENOUS
Status: COMPLETED | OUTPATIENT
Start: 2024-03-25 | End: 2024-03-25

## 2024-03-25 RX ORDER — IBUPROFEN 200 MG
24 TABLET ORAL
Status: DISCONTINUED | OUTPATIENT
Start: 2024-03-25 | End: 2024-03-27 | Stop reason: HOSPADM

## 2024-03-25 RX ORDER — ONDANSETRON HYDROCHLORIDE 2 MG/ML
4 INJECTION, SOLUTION INTRAVENOUS
Status: COMPLETED | OUTPATIENT
Start: 2024-03-25 | End: 2024-03-25

## 2024-03-25 RX ORDER — ACETAMINOPHEN 325 MG/1
650 TABLET ORAL EVERY 4 HOURS PRN
Status: DISCONTINUED | OUTPATIENT
Start: 2024-03-25 | End: 2024-03-27 | Stop reason: HOSPADM

## 2024-03-25 RX ORDER — AMOXICILLIN 250 MG
1 CAPSULE ORAL DAILY PRN
Status: DISCONTINUED | OUTPATIENT
Start: 2024-03-25 | End: 2024-03-27 | Stop reason: HOSPADM

## 2024-03-25 RX ORDER — NALOXONE HCL 0.4 MG/ML
0.02 VIAL (ML) INJECTION
Status: DISCONTINUED | OUTPATIENT
Start: 2024-03-25 | End: 2024-03-27 | Stop reason: HOSPADM

## 2024-03-25 RX ORDER — MORPHINE SULFATE 4 MG/ML
1 INJECTION, SOLUTION INTRAMUSCULAR; INTRAVENOUS ONCE
Status: COMPLETED | OUTPATIENT
Start: 2024-03-25 | End: 2024-03-25

## 2024-03-25 RX ORDER — POTASSIUM CHLORIDE 20 MEQ/1
40 TABLET, EXTENDED RELEASE ORAL ONCE
Status: COMPLETED | OUTPATIENT
Start: 2024-03-25 | End: 2024-03-25

## 2024-03-25 RX ORDER — HYDRALAZINE HYDROCHLORIDE 20 MG/ML
20 INJECTION INTRAMUSCULAR; INTRAVENOUS
Status: COMPLETED | OUTPATIENT
Start: 2024-03-25 | End: 2024-03-25

## 2024-03-25 RX ADMIN — POTASSIUM CHLORIDE 10 MEQ: 7.46 INJECTION, SOLUTION INTRAVENOUS at 01:03

## 2024-03-25 RX ADMIN — CLONIDINE HYDROCHLORIDE 0.1 MG: 0.1 TABLET ORAL at 01:03

## 2024-03-25 RX ADMIN — MORPHINE SULFATE 1 MG: 4 INJECTION, SOLUTION INTRAMUSCULAR; INTRAVENOUS at 08:03

## 2024-03-25 RX ADMIN — Medication 9 MG: at 10:03

## 2024-03-25 RX ADMIN — MORPHINE SULFATE 4 MG: 4 INJECTION, SOLUTION INTRAMUSCULAR; INTRAVENOUS at 01:03

## 2024-03-25 RX ADMIN — LABETALOL HYDROCHLORIDE 300 MG: 100 TABLET, FILM COATED ORAL at 08:03

## 2024-03-25 RX ADMIN — SODIUM CHLORIDE 1000 ML: 9 INJECTION, SOLUTION INTRAVENOUS at 03:03

## 2024-03-25 RX ADMIN — SODIUM CHLORIDE, POTASSIUM CHLORIDE, SODIUM LACTATE AND CALCIUM CHLORIDE: 600; 310; 30; 20 INJECTION, SOLUTION INTRAVENOUS at 06:03

## 2024-03-25 RX ADMIN — POTASSIUM CHLORIDE 40 MEQ: 1500 TABLET, EXTENDED RELEASE ORAL at 06:03

## 2024-03-25 RX ADMIN — SODIUM CHLORIDE 500 ML: 9 INJECTION, SOLUTION INTRAVENOUS at 01:03

## 2024-03-25 RX ADMIN — MAGNESIUM SULFATE HEPTAHYDRATE 2 G: 40 INJECTION, SOLUTION INTRAVENOUS at 07:03

## 2024-03-25 RX ADMIN — POTASSIUM CHLORIDE 20 MEQ: 1500 TABLET, EXTENDED RELEASE ORAL at 11:03

## 2024-03-25 RX ADMIN — POTASSIUM CHLORIDE 40 MEQ: 1500 TABLET, EXTENDED RELEASE ORAL at 01:03

## 2024-03-25 RX ADMIN — POTASSIUM CHLORIDE 10 MEQ: 7.46 INJECTION, SOLUTION INTRAVENOUS at 06:03

## 2024-03-25 RX ADMIN — ONDANSETRON 4 MG: 2 INJECTION INTRAMUSCULAR; INTRAVENOUS at 01:03

## 2024-03-25 RX ADMIN — HYDRALAZINE HYDROCHLORIDE 20 MG: 20 INJECTION INTRAMUSCULAR; INTRAVENOUS at 01:03

## 2024-03-25 RX ADMIN — CLONIDINE HYDROCHLORIDE 0.1 MG: 0.1 TABLET ORAL at 04:03

## 2024-03-25 RX ADMIN — ASPIRIN 325 MG ORAL TABLET 325 MG: 325 PILL ORAL at 01:03

## 2024-03-25 RX ADMIN — ACETAMINOPHEN 650 MG: 325 TABLET ORAL at 06:03

## 2024-03-25 NOTE — ASSESSMENT & PLAN NOTE
Cr today of 2.1 and 2.0. baseline of 1.2 to 1.5. suspect related to vomiting and volume depletion  Started on IVF as above  Check UA/urine studies    Patient with acute kidney injury/acute renal failure likely due to pre-renal azotemia due to dehydration JOSE C is currently stable. Baseline creatinine  1.0 to 1.4  - Labs reviewed- Renal function/electrolytes with CrCl cannot be calculated (Patient's most recent lab result is older than the maximum 7 days allowed.). according to latest data. Monitor urine output and serial BMP and adjust therapy as needed. Avoid nephrotoxins and renally dose meds for GFR listed above.

## 2024-03-25 NOTE — ASSESSMENT & PLAN NOTE
As above. Suspect viral as young son had similar symptoms. Symptoms improving started on clear liquid diet

## 2024-03-25 NOTE — ED PROVIDER NOTES
Encounter Date: 3/25/2024       History   No chief complaint on file.    Patient is a 38-year-old female with history of bipolar disorder, hypertension, polycystic kidney disease, previous subarachnoid hemorrhage who presents to the emergency department with elevated blood pressure.  She reports since yesterday she has had multiple episodes of vomiting.  She reports a headache that started last night.  She reports she has not able to keep down her blood pressure medicine.  She reports she went to urgent care and they sent her here for evaluation.  She denies chest pain or shortness of breath.    The history is provided by the patient.     Review of patient's allergies indicates:  No Known Allergies  Past Medical History:   Diagnosis Date    Anemia     Bipolar 1 disorder     Cerebral aneurysm     Hypertension     since     Polycystic kidney disease     Polycystic kidney disease     Pre-eclampsia     Renal disorder     Since childhood     SAH (subarachnoid hemorrhage)     Stroke      Past Surgical History:   Procedure Laterality Date    BRAIN SURGERY      CEREBRAL ANGIOGRAM N/A 2018    Procedure: ANGIOGRAM-CEREBRAL;  Surgeon: Jayna Surgeon;  Location: [facility];  Service: Anesthesiology;  Laterality: N/A;     SECTION N/A 2018    Procedure:  SECTION;  Surgeon: Obed Soto MD;  Location: [facility];  Service: OB/GYN;  Laterality: N/A;    DILATION AND CURETTAGE OF UTERUS           Family History   Problem Relation Age of Onset    Hypertension Mother     Polycystic kidney disease Mother     Hypertension Paternal Grandmother     Polycystic kidney disease Daughter      Social History     Tobacco Use    Smoking status: Never    Smokeless tobacco: Never   Substance Use Topics    Alcohol use: Yes     Comment: occasional    Drug use: Yes     Types: Marijuana     Review of Systems   Constitutional:  Positive for activity change and appetite change. Negative for chills, fatigue and fever.   HENT:   Negative for congestion, ear discharge, ear pain, postnasal drip, rhinorrhea, sore throat and trouble swallowing.    Respiratory:  Negative for cough and shortness of breath.    Cardiovascular:  Negative for chest pain.   Gastrointestinal:  Positive for diarrhea, nausea and vomiting. Negative for abdominal pain, blood in stool and constipation.   Genitourinary:  Negative for dysuria, flank pain and hematuria.   Musculoskeletal:  Negative for back pain, neck pain and neck stiffness.   Skin:  Negative for rash and wound.   Neurological:  Positive for weakness (generalized) and headaches. Negative for dizziness and light-headedness.       Physical Exam     Initial Vitals [03/25/24 1238]   BP Pulse Resp Temp SpO2   (!) 230/131 69 17 98.3 °F (36.8 °C) 98 %      MAP       --         Physical Exam    Nursing note and vitals reviewed.  Constitutional: She appears well-developed and well-nourished. She is not diaphoretic. She has a sickly appearance. She appears distressed.   HENT:   Head: Normocephalic.   Right Ear: Hearing, tympanic membrane, external ear and ear canal normal.   Left Ear: Hearing, tympanic membrane, external ear and ear canal normal.   Nose: Nose normal.   Mouth/Throat: Uvula is midline and oropharynx is clear and moist. Mucous membranes are dry. No oropharyngeal exudate.   Eyes: Conjunctivae and EOM are normal. Pupils are equal, round, and reactive to light.   Neck:   Normal range of motion.  Cardiovascular:  Normal rate and regular rhythm.           No lower extremity edema   Pulmonary/Chest: Breath sounds normal.   Abdominal: Abdomen is soft. Bowel sounds are normal. She exhibits no distension and no mass. There is no abdominal tenderness. There is no rebound and no guarding.   Musculoskeletal:         General: Normal range of motion.      Cervical back: Normal range of motion.     Lymphadenopathy:     She has no cervical adenopathy.   Neurological: She is alert and oriented to person, place, and time.  She has normal strength. No cranial nerve deficit or sensory deficit. She displays a negative Romberg sign. Coordination and gait normal. GCS score is 15. GCS eye subscore is 4. GCS verbal subscore is 5. GCS motor subscore is 6.   Skin: Skin is warm and dry. Capillary refill takes less than 2 seconds.   Psychiatric: She has a normal mood and affect.         ED Course   Procedures  Labs Reviewed   POCT LIVER PANEL - Abnormal; Notable for the following components:       Result Value    Alkaline Phosphatase, POC 41 (*)     All other components within normal limits   POCT CMP - Abnormal; Notable for the following components:    Alkaline Phosphatase, POC 40 (*)     POC Creatinine 2.1 (*)     POC Potassium 2.2 (*)     All other components within normal limits   TROPONIN ISTAT   POCT CBC   POCT URINE PREGNANCY   POCT URINALYSIS(INSTRUMENT)   POCT CMP   POCT TROPONIN   POCT LIVER PANEL   POCT B-HCG (QUANT)   POCT B-HCG (QUANT)     EKG Readings: (Independently Interpreted)   Initial Reading: No STEMI. Rhythm: Normal Sinus Rhythm.   Prolonged [place] interval; uwaves        Imaging Results              CT Head Without Contrast (Final result)  Result time 03/25/24 13:14:01      Final result by Alex Jones MD (03/25/24 13:14:01)                   Impression:      No acute intracranial pathology.      Electronically signed by: Alex Jones  Date:    03/25/2024  Time:    13:14               Narrative:    EXAMINATION:  CT HEAD WITHOUT CONTRAST    CLINICAL HISTORY:  Headache, sudden, severe;    TECHNIQUE:  Low dose axial images were obtained through the head.  Coronal and sagittal reformations were also performed. Contrast was not administered.    COMPARISON:  CT head without contrast 04/19/2021, MRI brain without contrast 10/29/2019.    FINDINGS:  Aneurysm coil about the anterior communicating artery.    Ventricles are stable in size without evidence for acute hydrocephalus.  No extra-axial blood or fluid collection.    Brain  parenchyma appears unchanged.  No evidence for parenchymal mass, edema, hemorrhage, or major vascular territory infarct.    No calvarial or skull base fracture or osseous destructive lesion.    Paranasal sinuses and mastoid air cells are essentially clear.                                       Medications   hydrALAZINE injection 20 mg (20 mg Intravenous Given 3/25/24 1311)   sodium chloride 0.9% bolus 500 mL 500 mL (500 mLs Intravenous New Bag 3/25/24 1311)   ondansetron injection 4 mg (4 mg Intravenous Given 3/25/24 1311)   morphine injection 4 mg (4 mg Intravenous Given 3/25/24 1311)   potassium chloride SA CR tablet 40 mEq (40 mEq Oral Given 3/25/24 1345)   potassium chloride 10 mEq in 100 mL IVPB (10 mEq Intravenous New Bag 3/25/24 1348)   aspirin tablet 325 mg (325 mg Oral Given 3/25/24 1345)   cloNIDine tablet 0.1 mg (0.1 mg Oral Given 3/25/24 1346)     Medical Decision Making  Urgent evaluation of a 38-year-old female with history of uncontrolled hypertension, polycystic kidney disease, bipolar, previous subarachnoid hemorrhage who presents to the emergency department with elevated blood pressure.  Patient's blood pressure is significantly elevated.  She is complaining of a headache.  Normal neuro exam.  Dry mucous membranes.  Labs are obtained revealing JOSE C with a creatinine of 2.1.  Potassium is critically low at 2.2.  Troponin is 0.06.  EKG shows no acute ischemia but you waves and NV interval is prolonged.  Replacing orally now that patient has received antiemetics.  Will also replace potassium through IV.  Have given hydralazine and clonidine.  Will discuss with Hospital Medicine for admission.    2:10 PM  Blood pressure is now 156/89 - Dr. Deluca has accepted patient.    Amount and/or Complexity of Data Reviewed  Labs: ordered.  Radiology: ordered.    Risk  OTC drugs.  Prescription drug management.                                      Clinical Impression:  Final diagnoses:  [E87.6] Hypokalemia  (Primary)  [I16.1] Hypertensive emergency  [N17.9] JOSE C (acute kidney injury)          ED Disposition Condition    Admit Stable                Samara Nichols PA-C  03/25/24 1415

## 2024-03-25 NOTE — ASSESSMENT & PLAN NOTE
Stable no acute issues. Contineu BP control. Resume home nifedipine and labetalol. Ct head without abnormality

## 2024-03-25 NOTE — H&P
[facility]  History & Physical    Patient Name: Sharon Grande  MRN: [id number]  Patient Class: OP- Observation  Admission Date: 3/25/2024  Attending Physician: Angy Riojas MD   Primary Care Provider: [facility] -         Patient information was obtained from patient, past medical records, and ER records.     Subjective:     Principal Problem:Hypokalemia    Chief Complaint:   Chief Complaint   Patient presents with    Hypertension     Sent from Urgent care d/t Hypertensive emergency. Pt reports she was being seen for N/V/D for a couple of days and has not been able to take her BP meds.        HPI: Sharon Grande 38 y.o. female with HTN, history of subarachnoid hemorrhage, PCKD presents hospital chief complaint of vomiting.  She reports continuous nausea vomiting and diarrhea that began yesterday.  She finds her symptoms improved with treatment emergency room.  It was initially associated with abdominal pain but this has resolved.  She wishes to begin a diet of clear liquids.  She admits she has not been taking her home labetalol and nifedipine due to not refilling.  She reports her young son had similar symptoms, but his symptoms have now resolved.  She began to feel increasingly fatigued today causing presentation in the emergency room.  She denies chest pain shortness of breath leg swelling melena hematuria hematemesis dizziness syncope.      In the ED, initial potassium 2.2 repeat of 2.5 initial creatinine 2.1 repeat 2.0  on EKG hypertensive improved with blood pressure medications.    Past Medical History:   Diagnosis Date    Anemia     Bipolar 1 disorder     Cerebral aneurysm     Hypertension     since 2012    Polycystic kidney disease     Polycystic kidney disease     Pre-eclampsia     Renal disorder     Since childhood     SAH (subarachnoid hemorrhage)     Stroke        Past Surgical History:   Procedure Laterality Date    BRAIN SURGERY      CEREBRAL  ANGIOGRAM N/A 2018    Procedure: ANGIOGRAM-CEREBRAL;  Surgeon: Jayna Surgeon;  Location: [facility];  Service: Anesthesiology;  Laterality: N/A;     SECTION N/A 2018    Procedure:  SECTION;  Surgeon: Obed Soto MD;  Location: [facility] L&D;  Service: OB/GYN;  Laterality: N/A;    DILATION AND CURETTAGE OF UTERUS             Review of patient's allergies indicates:  No Known Allergies    No current facility-administered medications on file prior to encounter.     Current Outpatient Medications on File Prior to Encounter   Medication Sig    acetaminophen (TYLENOL) 325 MG tablet Take 2 tablets (650 mg total) by mouth every 6 (six) hours.    albuterol (PROVENTIL/VENTOLIN HFA) 90 mcg/actuation inhaler Inhale 2 puffs into the lungs every 4 (four) hours as needed for Wheezing or Shortness of Breath. Rescue    diphenhydrAMINE (BENADRYL) 25 mg capsule 25-50 mg by mouth every 6 hours as needed for itching    hydrOXYzine pamoate (VISTARIL) 25 MG Cap Take 1 capsule (25 mg total) by mouth every 6 (six) hours as needed (Itching).    labetaloL (NORMODYNE) 300 MG tablet Take 1 tablet (300 mg total) by mouth every 12 (twelve) hours.    NIFEdipine (PROCARDIA-XL) 90 MG (OSM) 24 hr tablet Take 1 tablet (90 mg total) by mouth once daily.    potassium chloride (MICRO-K) 10 MEQ CpSR Please take 4 tablets by mouth every 6 hours for 3 doses, then 2 tablets daily.     Family History       Problem Relation (Age of Onset)    Hypertension Mother, Paternal Grandmother    Polycystic kidney disease Mother, Daughter          Tobacco Use    Smoking status: Never    Smokeless tobacco: Never   Substance and Sexual Activity    Alcohol use: Yes     Comment: occasional    Drug use: Yes     Types: Marijuana    Sexual activity: Yes     Partners: Male     Birth control/protection: None     Review of Systems   Constitutional:  Negative for chills and fever.   HENT:  Negative for nosebleeds and tinnitus.    Eyes:  Negative for  photophobia and visual disturbance.   Respiratory:  Negative for shortness of breath and wheezing.    Cardiovascular:  Negative for chest pain, palpitations and leg swelling.   Gastrointestinal:  Positive for diarrhea, nausea and vomiting. Negative for abdominal distention.   Genitourinary:  Negative for dysuria, flank pain and hematuria.   Musculoskeletal:  Negative for gait problem and joint swelling.   Skin:  Negative for rash and wound.   Neurological:  Negative for seizures and syncope.     Objective:     Vital Signs (Most Recent):  Temp: 98.3 °F (36.8 °C) (03/25/24 1238)  Pulse: 74 (03/25/24 1652)  Resp: (!) 24 (03/25/24 1402)  BP: (!) 154/89 (03/25/24 1652)  SpO2: 98 % (03/25/24 1652) Vital Signs (24h Range):  Temp:  [98.3 °F (36.8 °C)] 98.3 °F (36.8 °C)  Pulse:  [65-98] 74  Resp:  [11-25] 24  SpO2:  [93 %-100 %] 98 %  BP: (154-230)/() 154/89     Weight: 69.4 kg (153 lb)  Body mass index is 22.59 kg/m².     Physical Exam  Vitals and nursing note reviewed.   Constitutional:       General: She is not in acute distress.     Appearance: She is well-developed.   HENT:      Head: Normocephalic and atraumatic.      Right Ear: External ear normal.      Left Ear: External ear normal.      Mouth/Throat:      Mouth: Mucous membranes are dry.   Eyes:      General:         Right eye: No discharge.         Left eye: No discharge.      Conjunctiva/sclera: Conjunctivae normal.   Neck:      Thyroid: No thyromegaly.   Cardiovascular:      Rate and Rhythm: Normal rate and regular rhythm.      Heart sounds: No murmur heard.  Pulmonary:      Effort: Pulmonary effort is normal. No respiratory distress.      Breath sounds: Normal breath sounds.   Abdominal:      General: Bowel sounds are normal. There is no distension.      Palpations: Abdomen is soft. There is no mass.      Tenderness: There is no abdominal tenderness.   Musculoskeletal:         General: No deformity.      Cervical back: Normal range of motion.      Right  lower leg: No edema.      Left lower leg: No edema.   Skin:     General: Skin is warm and dry.   Neurological:      Mental Status: She is alert and oriented to person, place, and time.      Sensory: No sensory deficit.   Psychiatric:         Mood and Affect: Mood normal.         Behavior: Behavior normal.                Significant Labs:   CBC  WBC: 2.7  H&H: 12.2&36.2  PLTs: 189    CMP  Sodium: 143  Potassium: 2.2 then 2.5 on repeat  Chloride: 105  BUN: 18  Cr: 2.1 then 2.0  Albumin: 3.4  AST/ALT: 17/5  Amylase: 47  GGT: 12  HCG negative    Significant Imaging:   Imaging Results              CT Head Without Contrast (Final result)  Result time 03/25/24 13:14:01      Final result by Alex Jones MD (03/25/24 13:14:01)                   Impression:      No acute intracranial pathology.      Electronically signed by: Alex Jones  Date:    03/25/2024  Time:    13:14               Narrative:    EXAMINATION:  CT HEAD WITHOUT CONTRAST    CLINICAL HISTORY:  Headache, sudden, severe;    TECHNIQUE:  Low dose axial images were obtained through the head.  Coronal and sagittal reformations were also performed. Contrast was not administered.    COMPARISON:  CT head without contrast 04/19/2021, MRI brain without contrast 10/29/2019.    FINDINGS:  Aneurysm coil about the anterior communicating artery.    Ventricles are stable in size without evidence for acute hydrocephalus.  No extra-axial blood or fluid collection.    Brain parenchyma appears unchanged.  No evidence for parenchymal mass, edema, hemorrhage, or major vascular territory infarct.    No calvarial or skull base fracture or osseous destructive lesion.    Paranasal sinuses and mastoid air cells are essentially clear.                                      Assessment/Plan:     * Hypokalemia  Presents with potassium of 2.2. improved to 2.5 in the ED. QTC of 470 in ED. Suspect related to intractable vomiting.   Continue repletion with 40meq and 2 doses of 10meq IV  Check  UDS    Repeat CMP on arrival with K of 2.5. will continue repletion and repeat CMP at 10p    Repeat K of 2.8. will give 20meq further and continue to advance die to renal.    Patient has hypokalemia which is Acute on Chronic and currently uncontrolled. Most recent potassium levels reviewed-   Lab Results   Component Value Date    K 2.7 (LL) 03/12/2022   . Will continue potassium replacement per protocol and recheck repeat levels after replacement completed.     HTN (hypertension)  Admits has not been taking  medications due to not refilling      Chronic, uncontrolled. Latest blood pressure and vitals reviewed-     Temp:  [98.3 °F (36.8 °C)]   Pulse:  [65-98]   Resp:  [11-25]   BP: (154-230)/()   SpO2:  [93 %-100 %] .   Home meds for hypertension were reviewed and noted below.   Hypertension Medications               labetaloL (NORMODYNE) 300 MG tablet Take 1 tablet (300 mg total) by mouth every 12 (twelve) hours.    NIFEdipine (PROCARDIA-XL) 90 MG (OSM) 24 hr tablet Take 1 tablet (90 mg total) by mouth once daily.            While in the hospital, will manage blood pressure as follows; Continue home antihypertensive regimen    Will utilize p.r.n. blood pressure medication only if patient's blood pressure greater than 180/110 and she develops symptoms such as worsening chest pain or shortness of breath.    Intractable vomiting  As above. Suspect viral as young son had similar symptoms. Symptoms improving started on clear liquid diet    S/P coil embolization of cerebral aneurysm  Stable no acute issues. Contineu BP control. Resume home nifedipine and labetalol. Ct head without abnormality    JOSE C (acute kidney injury)  Cr today of 2.1 and 2.0. baseline of 1.2 to 1.5. suspect related to vomiting and volume depletion  Started on IVF as above  Check UA/urine studies    Patient with acute kidney injury/acute renal failure likely due to pre-renal azotemia due to dehydration JOSE C is currently stable. Baseline creatinine   1.0 to 1.4  - Labs reviewed- Renal function/electrolytes with CrCl cannot be calculated (Patient's most recent lab result is older than the maximum 7 days allowed.). according to latest data. Monitor urine output and serial BMP and adjust therapy as needed. Avoid nephrotoxins and renally dose meds for GFR listed above.      VTE Risk Mitigation (From admission, onward)           Ordered     IP VTE LOW RISK PATIENT  Once         03/25/24 1504     Place sequential compression device  Until discontinued         03/25/24 1504                     Discussed with ED physician.    VTE: scd  Code: full  Diet: clear liquid  Dispo: pending improvement in potassium and JOSE C  On 03/25/2024, patient should be placed in hospital observation services under my care in collaboration with Angy Riojas MD.      AdmissionCare    Guideline: Renal Failure (Acute), Observation    Based on the indications selected for the patient, the bed status of Observation was determined to be MET    The following indications were selected as present at the time of evaluation of the patient:      - Acute kidney injury (eg, rise in serum creatinine, reduction in estimated glomerular filtration rate from baseline)   - Clinical concern necessitating monitoring or treatment beyond emergency department care, as indicated by 1 or more of the following:     - Vital sign findings not as expected for chronic patient condition or baseline (eg, intentionally low blood pressure in heart failure)    - Cardiac telemetry monitoring needed (eg, significant hyperkalemia)    - Electrolyte or metabolic abnormality (eg, acidosis) necessitating treatment and reassessment    AdmissionCare documentation entered by: Catalina Deluca    [facility] [employer] 27th edition, Copyright © 2023 [facility] All Rights Reserved.  [id number]    Avtar Loo PA-C  Department of Hospital Medicine  [facility] - Observation

## 2024-03-25 NOTE — ASSESSMENT & PLAN NOTE
Admits has not been taking  medications due to not refilling      Chronic, uncontrolled. Latest blood pressure and vitals reviewed-     Temp:  [98.3 °F (36.8 °C)]   Pulse:  [65-98]   Resp:  [11-25]   BP: (154-230)/()   SpO2:  [93 %-100 %] .   Home meds for hypertension were reviewed and noted below.   Hypertension Medications               labetaloL (NORMODYNE) 300 MG tablet Take 1 tablet (300 mg total) by mouth every 12 (twelve) hours.    NIFEdipine (PROCARDIA-XL) 90 MG (OSM) 24 hr tablet Take 1 tablet (90 mg total) by mouth once daily.            While in the hospital, will manage blood pressure as follows; Continue home antihypertensive regimen    Will utilize p.r.n. blood pressure medication only if patient's blood pressure greater than 180/110 and she develops symptoms such as worsening chest pain or shortness of breath.

## 2024-03-25 NOTE — HPI
Sharon Grande 38 y.o. female with HTN, history of subarachnoid hemorrhage, PCKD presents hospital chief complaint of vomiting.  She reports continuous nausea vomiting and diarrhea that began yesterday.  She finds her symptoms improved with treatment emergency room.  It was initially associated with abdominal pain but this has resolved.  She wishes to begin a diet of clear liquids.  She admits she has not been taking her home labetalol and nifedipine due to not refilling.  She reports her young son had similar symptoms, but his symptoms have now resolved.  She began to feel increasingly fatigued today causing presentation in the emergency room.  She denies chest pain shortness of breath leg swelling melena hematuria hematemesis dizziness syncope.      In the ED, initial potassium 2.2 repeat of 2.5 initial creatinine 2.1 repeat 2.0  on EKG hypertensive improved with blood pressure medications.

## 2024-03-25 NOTE — ADMISSIONCARE
AdmissionCare    Guideline: Renal Failure (Acute), Observation    Based on the indications selected for the patient, the bed status of Observation was determined to be MET    The following indications were selected as present at the time of evaluation of the patient:      - Acute kidney injury (eg, rise in serum creatinine, reduction in estimated glomerular filtration rate from baseline)   - Clinical concern necessitating monitoring or treatment beyond emergency department care, as indicated by 1 or more of the following:     - Vital sign findings not as expected for chronic patient condition or baseline (eg, intentionally low blood pressure in heart failure)    - Cardiac telemetry monitoring needed (eg, significant hyperkalemia)    - Electrolyte or metabolic abnormality (eg, acidosis) necessitating treatment and reassessment    AdmissionCare documentation entered by: Catalina Deluca    [facility] [employer], 27th edition, Copyright © 2023 [facility] [employer], [facility] All Rights Reserved.  [id number]

## 2024-03-25 NOTE — NURSING
Pt arrive to the unit by stretcher accompanied by EMS. Assisted pt to bed. Tele monitoring initiated.  Admit assessment initiated.  Pt is AAO x 4.  No distress noted.

## 2024-03-25 NOTE — ASSESSMENT & PLAN NOTE
Presents with potassium of 2.2. improved to 2.5 in the ED. QTC of 470 in ED. Suspect related to intractable vomiting.   Continue repletion with 40meq and 2 doses of 10meq IV  Check UDS    Patient has hypokalemia which is Acute on Chronic and currently uncontrolled. Most recent potassium levels reviewed-   Lab Results   Component Value Date    K 2.7 (LL) 03/12/2022   . Will continue potassium replacement per protocol and recheck repeat levels after replacement completed.

## 2024-03-25 NOTE — SUBJECTIVE & OBJECTIVE
Past Medical History:   Diagnosis Date    Anemia     Bipolar 1 disorder     Cerebral aneurysm     Hypertension     since 2012    Polycystic kidney disease     Polycystic kidney disease     Pre-eclampsia     Renal disorder     Since childhood     SAH (subarachnoid hemorrhage)     Stroke        Past Surgical History:   Procedure Laterality Date    BRAIN SURGERY      CEREBRAL ANGIOGRAM N/A 2018    Procedure: ANGIOGRAM-CEREBRAL;  Surgeon: Jayna Surgeon;  Location: [facility];  Service: Anesthesiology;  Laterality: N/A;     SECTION N/A 2018    Procedure:  SECTION;  Surgeon: Obed Soto MD;  Location: [facility] L&D;  Service: OB/GYN;  Laterality: N/A;    DILATION AND CURETTAGE OF UTERUS             Review of patient's allergies indicates:  No Known Allergies    No current facility-administered medications on file prior to encounter.     Current Outpatient Medications on File Prior to Encounter   Medication Sig    acetaminophen (TYLENOL) 325 MG tablet Take 2 tablets (650 mg total) by mouth every 6 (six) hours.    albuterol (PROVENTIL/VENTOLIN HFA) 90 mcg/actuation inhaler Inhale 2 puffs into the lungs every 4 (four) hours as needed for Wheezing or Shortness of Breath. Rescue    diphenhydrAMINE (BENADRYL) 25 mg capsule 25-50 mg by mouth every 6 hours as needed for itching    hydrOXYzine pamoate (VISTARIL) 25 MG Cap Take 1 capsule (25 mg total) by mouth every 6 (six) hours as needed (Itching).    labetaloL (NORMODYNE) 300 MG tablet Take 1 tablet (300 mg total) by mouth every 12 (twelve) hours.    NIFEdipine (PROCARDIA-XL) 90 MG (OSM) 24 hr tablet Take 1 tablet (90 mg total) by mouth once daily.    potassium chloride (MICRO-K) 10 MEQ CpSR Please take 4 tablets by mouth every 6 hours for 3 doses, then 2 tablets daily.     Family History       Problem Relation (Age of Onset)    Hypertension Mother, Paternal Grandmother    Polycystic kidney disease Mother, Daughter          Tobacco Use    Smoking  status: Never    Smokeless tobacco: Never   Substance and Sexual Activity    Alcohol use: Yes     Comment: occasional    Drug use: Yes     Types: Marijuana    Sexual activity: Yes     Partners: Male     Birth control/protection: None     Review of Systems   Constitutional:  Negative for chills and fever.   HENT:  Negative for nosebleeds and tinnitus.    Eyes:  Negative for photophobia and visual disturbance.   Respiratory:  Negative for shortness of breath and wheezing.    Cardiovascular:  Negative for chest pain, palpitations and leg swelling.   Gastrointestinal:  Positive for diarrhea, nausea and vomiting. Negative for abdominal distention.   Genitourinary:  Negative for dysuria, flank pain and hematuria.   Musculoskeletal:  Negative for gait problem and joint swelling.   Skin:  Negative for rash and wound.   Neurological:  Negative for seizures and syncope.     Objective:     Vital Signs (Most Recent):  Temp: 98.3 °F (36.8 °C) (03/25/24 1238)  Pulse: 74 (03/25/24 1652)  Resp: (!) 24 (03/25/24 1402)  BP: (!) 154/89 (03/25/24 1652)  SpO2: 98 % (03/25/24 1652) Vital Signs (24h Range):  Temp:  [98.3 °F (36.8 °C)] 98.3 °F (36.8 °C)  Pulse:  [65-98] 74  Resp:  [11-25] 24  SpO2:  [93 %-100 %] 98 %  BP: (154-230)/() 154/89     Weight: 69.4 kg (153 lb)  Body mass index is 22.59 kg/m².     Physical Exam  Vitals and nursing note reviewed.   Constitutional:       General: She is not in acute distress.     Appearance: She is well-developed.   HENT:      Head: Normocephalic and atraumatic.      Right Ear: External ear normal.      Left Ear: External ear normal.      Mouth/Throat:      Mouth: Mucous membranes are dry.   Eyes:      General:         Right eye: No discharge.         Left eye: No discharge.      Conjunctiva/sclera: Conjunctivae normal.   Neck:      Thyroid: No thyromegaly.   Cardiovascular:      Rate and Rhythm: Normal rate and regular rhythm.      Heart sounds: No murmur heard.  Pulmonary:      Effort:  Pulmonary effort is normal. No respiratory distress.      Breath sounds: Normal breath sounds.   Abdominal:      General: Bowel sounds are normal. There is no distension.      Palpations: Abdomen is soft. There is no mass.      Tenderness: There is no abdominal tenderness.   Musculoskeletal:         General: No deformity.      Cervical back: Normal range of motion.      Right lower leg: No edema.      Left lower leg: No edema.   Skin:     General: Skin is warm and dry.   Neurological:      Mental Status: She is alert and oriented to person, place, and time.      Sensory: No sensory deficit.   Psychiatric:         Mood and Affect: Mood normal.         Behavior: Behavior normal.                Significant Labs:   CBC  WBC: 2.7  H&H: 12.2&36.2  PLTs: 189    CMP  Sodium: 143  Potassium: 2.2 then 2.5 on repeat  Chloride: 105  BUN: 18  Cr: 2.1 then 2.0  Albumin: 3.4  AST/ALT: 17/5  Amylase: 47  GGT: 12  HCG negative    Significant Imaging:   Imaging Results              CT Head Without Contrast (Final result)  Result time 03/25/24 13:14:01      Final result by Alex Jones MD (03/25/24 13:14:01)                   Impression:      No acute intracranial pathology.      Electronically signed by: Alex Jones  Date:    03/25/2024  Time:    13:14               Narrative:    EXAMINATION:  CT HEAD WITHOUT CONTRAST    CLINICAL HISTORY:  Headache, sudden, severe;    TECHNIQUE:  Low dose axial images were obtained through the head.  Coronal and sagittal reformations were also performed. Contrast was not administered.    COMPARISON:  CT head without contrast 04/19/2021, MRI brain without contrast 10/29/2019.    FINDINGS:  Aneurysm coil about the anterior communicating artery.    Ventricles are stable in size without evidence for acute hydrocephalus.  No extra-axial blood or fluid collection.    Brain parenchyma appears unchanged.  No evidence for parenchymal mass, edema, hemorrhage, or major vascular territory infarct.    No  calvarial or skull base fracture or osseous destructive lesion.    Paranasal sinuses and mastoid air cells are essentially clear.

## 2024-03-26 LAB
ALBUMIN SERPL BCP-MCNC: 2.9 G/DL (ref 3.5–5.2)
ALP SERPL-CCNC: 30 U/L (ref 55–135)
ALT SERPL W/O P-5'-P-CCNC: 8 U/L (ref 10–44)
AMPHET+METHAMPHET UR QL: NEGATIVE
ANION GAP SERPL CALC-SCNC: 9 MMOL/L (ref 8–16)
AST SERPL-CCNC: 11 U/L (ref 10–40)
BACTERIA #/AREA URNS HPF: ABNORMAL /HPF
BARBITURATES UR QL SCN>200 NG/ML: NEGATIVE
BASOPHILS # BLD AUTO: 0.01 K/UL (ref 0–0.2)
BASOPHILS NFR BLD: 0.3 % (ref 0–1.9)
BENZODIAZ UR QL SCN>200 NG/ML: NEGATIVE
BILIRUB SERPL-MCNC: 0.2 MG/DL (ref 0.1–1)
BILIRUB UR QL STRIP: NEGATIVE
BUN SERPL-MCNC: 20 MG/DL (ref 6–20)
BZE UR QL SCN: NEGATIVE
CALCIUM SERPL-MCNC: 8 MG/DL (ref 8.7–10.5)
CANNABINOIDS UR QL SCN: NEGATIVE
CHLORIDE SERPL-SCNC: 107 MMOL/L (ref 95–110)
CLARITY UR: ABNORMAL
CO2 SERPL-SCNC: 24 MMOL/L (ref 23–29)
COLOR UR: YELLOW
CREAT SERPL-MCNC: 2.2 MG/DL (ref 0.5–1.4)
CREAT UR-MCNC: 165.3 MG/DL (ref 15–325)
CREAT UR-MCNC: 165.3 MG/DL (ref 15–325)
DIFFERENTIAL METHOD BLD: ABNORMAL
EOSINOPHIL # BLD AUTO: 0 K/UL (ref 0–0.5)
EOSINOPHIL NFR BLD: 0 % (ref 0–8)
ERYTHROCYTE [DISTWIDTH] IN BLOOD BY AUTOMATED COUNT: 12.7 % (ref 11.5–14.5)
EST. GFR  (NO RACE VARIABLE): 29 ML/MIN/1.73 M^2
GLUCOSE SERPL-MCNC: 94 MG/DL (ref 70–110)
GLUCOSE UR QL STRIP: NEGATIVE
HCT VFR BLD AUTO: 30.2 % (ref 37–48.5)
HGB BLD-MCNC: 10.1 G/DL (ref 12–16)
HGB UR QL STRIP: NEGATIVE
HYALINE CASTS #/AREA URNS LPF: 0 /LPF
IMM GRANULOCYTES # BLD AUTO: 0.01 K/UL (ref 0–0.04)
IMM GRANULOCYTES NFR BLD AUTO: 0.3 % (ref 0–0.5)
KETONES UR QL STRIP: NEGATIVE
LEUKOCYTE ESTERASE UR QL STRIP: ABNORMAL
LYMPHOCYTES # BLD AUTO: 0.9 K/UL (ref 1–4.8)
LYMPHOCYTES NFR BLD: 31.5 % (ref 18–48)
MAGNESIUM SERPL-MCNC: 2 MG/DL (ref 1.6–2.6)
MCH RBC QN AUTO: 30.9 PG (ref 27–31)
MCHC RBC AUTO-ENTMCNC: 33.4 G/DL (ref 32–36)
MCV RBC AUTO: 92 FL (ref 82–98)
METHADONE UR QL SCN>300 NG/ML: NEGATIVE
MICROSCOPIC COMMENT: ABNORMAL
MONOCYTES # BLD AUTO: 0.3 K/UL (ref 0.3–1)
MONOCYTES NFR BLD: 10.5 % (ref 4–15)
NEUTROPHILS # BLD AUTO: 1.6 K/UL (ref 1.8–7.7)
NEUTROPHILS NFR BLD: 57.4 % (ref 38–73)
NITRITE UR QL STRIP: NEGATIVE
NRBC BLD-RTO: 0 /100 WBC
OPIATES UR QL SCN: ABNORMAL
PCP UR QL SCN>25 NG/ML: NEGATIVE
PH UR STRIP: 6 [PH] (ref 5–8)
PLATELET # BLD AUTO: 159 K/UL (ref 150–450)
PMV BLD AUTO: 11.7 FL (ref 9.2–12.9)
POTASSIUM SERPL-SCNC: 3 MMOL/L (ref 3.5–5.1)
PROT SERPL-MCNC: 5.7 G/DL (ref 6–8.4)
PROT UR QL STRIP: ABNORMAL
PTH-INTACT SERPL-MCNC: 319.7 PG/ML (ref 9–77)
RBC # BLD AUTO: 3.27 M/UL (ref 4–5.4)
RBC #/AREA URNS HPF: 3 /HPF (ref 0–4)
SODIUM SERPL-SCNC: 140 MMOL/L (ref 136–145)
SODIUM UR-SCNC: 30 MMOL/L (ref 20–250)
SP GR UR STRIP: 1.01 (ref 1–1.03)
SQUAMOUS #/AREA URNS HPF: 5 /HPF
TOXICOLOGY INFORMATION: ABNORMAL
URN SPEC COLLECT METH UR: ABNORMAL
UROBILINOGEN UR STRIP-ACNC: NEGATIVE EU/DL
WBC # BLD AUTO: 2.86 K/UL (ref 3.9–12.7)
WBC #/AREA URNS HPF: 45 /HPF (ref 0–5)
WBC CLUMPS URNS QL MICRO: ABNORMAL

## 2024-03-26 PROCEDURE — 25000003 PHARM REV CODE 250: Performed by: STUDENT IN AN ORGANIZED HEALTH CARE EDUCATION/TRAINING PROGRAM

## 2024-03-26 PROCEDURE — 83970 ASSAY OF PARATHORMONE: CPT | Performed by: PHYSICIAN ASSISTANT

## 2024-03-26 PROCEDURE — 25000003 PHARM REV CODE 250

## 2024-03-26 PROCEDURE — 21400001 HC TELEMETRY ROOM

## 2024-03-26 PROCEDURE — 83735 ASSAY OF MAGNESIUM: CPT | Performed by: PHYSICIAN ASSISTANT

## 2024-03-26 PROCEDURE — G0378 HOSPITAL OBSERVATION PER HR: HCPCS

## 2024-03-26 PROCEDURE — 84300 ASSAY OF URINE SODIUM: CPT | Performed by: PHYSICIAN ASSISTANT

## 2024-03-26 PROCEDURE — 80053 COMPREHEN METABOLIC PANEL: CPT | Performed by: PHYSICIAN ASSISTANT

## 2024-03-26 PROCEDURE — 36415 COLL VENOUS BLD VENIPUNCTURE: CPT | Performed by: PHYSICIAN ASSISTANT

## 2024-03-26 PROCEDURE — 25000003 PHARM REV CODE 250: Performed by: PHYSICIAN ASSISTANT

## 2024-03-26 PROCEDURE — 87086 URINE CULTURE/COLONY COUNT: CPT | Performed by: PHYSICIAN ASSISTANT

## 2024-03-26 PROCEDURE — 80307 DRUG TEST PRSMV CHEM ANLYZR: CPT | Performed by: PHYSICIAN ASSISTANT

## 2024-03-26 PROCEDURE — 63600175 PHARM REV CODE 636 W HCPCS: Performed by: STUDENT IN AN ORGANIZED HEALTH CARE EDUCATION/TRAINING PROGRAM

## 2024-03-26 PROCEDURE — 85025 COMPLETE CBC W/AUTO DIFF WBC: CPT | Performed by: PHYSICIAN ASSISTANT

## 2024-03-26 PROCEDURE — 81000 URINALYSIS NONAUTO W/SCOPE: CPT | Mod: 59 | Performed by: PHYSICIAN ASSISTANT

## 2024-03-26 RX ORDER — SODIUM CHLORIDE 9 MG/ML
INJECTION, SOLUTION INTRAVENOUS CONTINUOUS
Status: ACTIVE | OUTPATIENT
Start: 2024-03-26 | End: 2024-03-27

## 2024-03-26 RX ORDER — ALUMINUM HYDROXIDE, MAGNESIUM HYDROXIDE, AND SIMETHICONE 1200; 120; 1200 MG/30ML; MG/30ML; MG/30ML
30 SUSPENSION ORAL 4 TIMES DAILY PRN
Status: DISCONTINUED | OUTPATIENT
Start: 2024-03-26 | End: 2024-03-27

## 2024-03-26 RX ORDER — ONDANSETRON 8 MG/1
8 TABLET, ORALLY DISINTEGRATING ORAL EVERY 6 HOURS PRN
Status: DISCONTINUED | OUTPATIENT
Start: 2024-03-26 | End: 2024-03-27 | Stop reason: HOSPADM

## 2024-03-26 RX ORDER — POTASSIUM CHLORIDE 20 MEQ/1
40 TABLET, EXTENDED RELEASE ORAL 3 TIMES DAILY
Status: COMPLETED | OUTPATIENT
Start: 2024-03-26 | End: 2024-03-26

## 2024-03-26 RX ADMIN — ONDANSETRON 8 MG: 8 TABLET, ORALLY DISINTEGRATING ORAL at 02:03

## 2024-03-26 RX ADMIN — SODIUM CHLORIDE: 9 INJECTION, SOLUTION INTRAVENOUS at 08:03

## 2024-03-26 RX ADMIN — NIFEDIPINE 90 MG: 30 TABLET, FILM COATED, EXTENDED RELEASE ORAL at 08:03

## 2024-03-26 RX ADMIN — SODIUM CHLORIDE: 9 INJECTION, SOLUTION INTRAVENOUS at 10:03

## 2024-03-26 RX ADMIN — POTASSIUM CHLORIDE 40 MEQ: 1500 TABLET, EXTENDED RELEASE ORAL at 10:03

## 2024-03-26 RX ADMIN — POTASSIUM CHLORIDE 40 MEQ: 1500 TABLET, EXTENDED RELEASE ORAL at 02:03

## 2024-03-26 RX ADMIN — LABETALOL HYDROCHLORIDE 300 MG: 100 TABLET, FILM COATED ORAL at 08:03

## 2024-03-26 RX ADMIN — CEFTRIAXONE 2 G: 2 INJECTION, POWDER, FOR SOLUTION INTRAMUSCULAR; INTRAVENOUS at 10:03

## 2024-03-26 RX ADMIN — POTASSIUM CHLORIDE 40 MEQ: 1500 TABLET, EXTENDED RELEASE ORAL at 08:03

## 2024-03-26 RX ADMIN — SODIUM CHLORIDE: 9 INJECTION, SOLUTION INTRAVENOUS at 12:03

## 2024-03-26 NOTE — PROGRESS NOTES
[facility] Medicine  Progress Note    Patient Name: Sharon Grande  MRN: [id number]  Patient Class: OP- Observation   Admission Date: 3/25/2024  Length of Stay: 0 days  Attending Physician: Carlos Kuhn MD  Primary Care Provider: [facility] -        Subjective:     Principal Problem:Hypokalemia        HPI:  Sharon Grande 38 y.o. female with HTN, history of subarachnoid hemorrhage, PCKD presents hospital chief complaint of vomiting.  She reports continuous nausea vomiting and diarrhea that began yesterday.  She finds her symptoms improved with treatment emergency room.  It was initially associated with abdominal pain but this has resolved.  She wishes to begin a diet of clear liquids.  She admits she has not been taking her home labetalol and nifedipine due to not refilling.  She reports her young son had similar symptoms, but his symptoms have now resolved.  She began to feel increasingly fatigued today causing presentation in the emergency room.  She denies chest pain shortness of breath leg swelling melena hematuria hematemesis dizziness syncope.      In the ED, initial potassium 2.2 repeat of 2.5 initial creatinine 2.1 repeat 2.0  on EKG hypertensive improved with blood pressure medications.    Overview/Hospital Course:  Admitted with JOSE C, hypokalemia with d/n/v, and concerns for UTI with +3 Leuk est and clumps of bacteria and suppressed WBC# of 2.8. Needs another day of IV fluids, abx, and K replacements. Check labs in am. Given diarrhea, r/o cdiff.     Interval History:  NAEON.  No new issues.   CC nausea  All questions answered and updates on care given.       ROS:  General: Negative for fevers or chills.  Cardiac: Negative for chest pain or orthopnea   Pulmonary: Negative for dyspnea or wheezing.  GI: Negative for abdominal distention or pain  D/N/V     Vitals:    03/25/24 2356 03/26/24 0241 03/26/24 0723 03/26/24 0729   BP: 139/83   (!) 144/89   BP Location: Right  arm   Right arm   Patient Position: Lying   Lying   Pulse: 77 68 61 61   Resp:    18   Temp: 98.1 °F (36.7 °C)   98.7 °F (37.1 °C)   TempSrc: Oral   Oral   SpO2: 96%   97%   Weight:       Height:              Body mass index is 22.79 kg/m².      PHYSICAL EXAM:  GENERAL APPEARANCE: alert and cooperative, and appears to be in no acute distress.  HEENT:     HEAD: NC/AT     EYES: PERRL, EOMI.  Vision is grossly intact.  CARDIAC: There is no peripheral edema, cyanosis or pallor.   LUNGS: Clear to auscultation and percussion without rales or wheezing  ABDOMEN: Non-distended. No guarding.  MSK: No joint erythema or tenderness.   EXTREMITIES: No significant deformity or joint abnormality. No edema.   NEUROLOGICAL: CN II-XII grossly intact.   SKIN: Skin normal color, texture and turgor with no lesions or eruptions.  PSYCHIATRIC: Oriented. No tangential speech. No Hyperactive features.        Recent Results (from the past 24 hour(s))   Troponin ISTAT    Collection Time: 03/25/24 12:57 PM   Result Value Ref Range    POC Cardiac Troponin I 0.06 0.00 - 0.08 ng/mL    Sample unknown    POCT bHCG (Quant)    Collection Time: 03/25/24 12:57 PM   Result Value Ref Range    POC Beta-HCG (Quant) <5.0 IU/L    Sample unknown    POCT urine pregnancy    Collection Time: 03/25/24 12:58 PM   Result Value Ref Range    POC Preg Test, Ur Negative Negative     Acceptable Yes    POCT CBC    Collection Time: 03/25/24  1:00 PM   Result Value Ref Range    Hematocrit      Hemoglobin      RBC      WBC      MCV      MCH, POC      MCHC      RDW-CV      Platelet Count, POC      MPV     POCT Liver Panel    Collection Time: 03/25/24  1:06 PM   Result Value Ref Range    Albumin, POC 4.0 3.3 - 5.5 g/dL    Alkaline Phosphatase, POC 41 (L) 42 - 141 U/L    ALT (SGPT), POC 13 10 - 47 U/L    Amylase, POC 47 14 - 97 U/L    AST (SGOT), POC 20 11 - 38 U/L    POC GGT 12 5 - 65 U/L    Bilirubin, POC 0.6 0.2 - 1.6 mg/dL    Protein, POC 7.6 6.4 - 8.1 g/dL    POCT CMP    Collection Time: 03/25/24  1:07 PM   Result Value Ref Range    Albumin, POC 3.8 3.3 - 5.5 g/dL    Alkaline Phosphatase, POC 40 (L) 42 - 141 U/L    ALT (SGPT), POC 11 10 - 47 U/L    AST (SGOT), POC 19 11 - 38 U/L    POC BUN 19 7 - 22 mg/dL    Calcium, POC 9.5 8.0 - 10.3 mg/dL    POC Chloride 100 98 - 108 mmol/L    POC Creatinine 2.1 (H) 0.6 - 1.2 mg/dL    POC Glucose 98 73 - 118 mg/dL    POC Potassium 2.2 (LL) 3.6 - 5.1 mmol/L    POC Sodium 142 128 - 145 mmol/L    Bilirubin, POC 0.6 0.2 - 1.6 mg/dL    POC TCO2 31 18 - 33 mmol/L    Protein, POC 7.6 6.4 - 8.1 g/dL   POCT CMP    Collection Time: 03/25/24  4:29 PM   Result Value Ref Range    Albumin, POC 3.4 3.3 - 5.5 g/dL    Alkaline Phosphatase, POC 42 42 - 141 U/L    ALT (SGPT), POC <5 (<) 10 - 47 U/L    AST (SGOT), POC 17 11 - 38 U/L    POC BUN 18 7 - 22 mg/dL    Calcium, POC 8.7 8.0 - 10.3 mg/dL    POC Chloride 105 98 - 108 mmol/L    POC Creatinine 2.0 (H) 0.6 - 1.2 mg/dL    POC Glucose 97 73 - 118 mg/dL    POC Potassium 2.5 (LL) 3.6 - 5.1 mmol/L    POC Sodium 143 128 - 145 mmol/L    Bilirubin, POC 0.5 0.2 - 1.6 mg/dL    POC TCO2 28 18 - 33 mmol/L    Protein, POC 6.8 6.4 - 8.1 g/dL   POCT URINALYSIS W/O SCOPE    Collection Time: 03/25/24  5:02 PM   Result Value Ref Range    Glucose, UA Negative     Bilirubin, UA Negative     Ketones, UA Negative     Spec Grav UA 1.020     Blood, UA Negative     PH, UA 6.5     Protein, UA 2+ (A)     Urobilinogen, UA 0.2 E.U./dL    Nitrite, UA Negative     Leukocytes, UA 1+ (A)     Color, UA Yellow     Clarity, UA Clear    Comprehensive Metabolic Panel    Collection Time: 03/25/24  6:00 PM   Result Value Ref Range    Sodium 142 136 - 145 mmol/L    Potassium 2.4 (LL) 3.5 - 5.1 mmol/L    Chloride 105 95 - 110 mmol/L    CO2 24 23 - 29 mmol/L    Glucose 91 70 - 110 mg/dL    BUN 19 6 - 20 mg/dL    Creatinine 2.0 (H) 0.5 - 1.4 mg/dL    Calcium 8.3 (L) 8.7 - 10.5 mg/dL    Total Protein 7.0 6.0 - 8.4 g/dL    Albumin 3.3 (L) 3.5  - 5.2 g/dL    Total Bilirubin 0.3 0.1 - 1.0 mg/dL    Alkaline Phosphatase 36 (L) 55 - 135 U/L    AST 15 10 - 40 U/L    ALT 7 (L) 10 - 44 U/L    eGFR 32 (A) >60 mL/min/1.73 m^2    Anion Gap 13 8 - 16 mmol/L   Magnesium    Collection Time: 03/25/24  6:00 PM   Result Value Ref Range    Magnesium 1.5 (L) 1.6 - 2.6 mg/dL   Basic metabolic panel    Collection Time: 03/25/24  9:22 PM   Result Value Ref Range    Sodium 139 136 - 145 mmol/L    Potassium 2.8 (L) 3.5 - 5.1 mmol/L    Chloride 108 95 - 110 mmol/L    CO2 25 23 - 29 mmol/L    Glucose 108 70 - 110 mg/dL    BUN 17 6 - 20 mg/dL    Creatinine 2.0 (H) 0.5 - 1.4 mg/dL    Calcium 7.9 (L) 8.7 - 10.5 mg/dL    Anion Gap 6 (L) 8 - 16 mmol/L    eGFR 32 (A) >60 mL/min/1.73 m^2   Drug screen panel, emergency    Collection Time: 03/26/24 12:04 AM   Result Value Ref Range    Benzodiazepines Negative Negative    Methadone metabolites Negative Negative    Cocaine (Metab.) Negative Negative    Opiate Scrn, Ur Presumptive Positive (A) Negative    Barbiturate Screen, Ur Negative Negative    Amphetamine Screen, Ur Negative Negative    THC Negative Negative    Phencyclidine Negative Negative    Creatinine, Urine 165.3 15.0 - 325.0 mg/dL    Toxicology Information SEE COMMENT    Urinalysis, Reflex to Urine Culture Urine, Clean Catch    Collection Time: 03/26/24 12:04 AM    Specimen: Urine   Result Value Ref Range    Specimen UA Urine, Clean Catch     Color, UA Yellow Yellow, Straw, Delfina    Appearance, UA Cloudy (A) Clear    pH, UA 6.0 5.0 - 8.0    Specific Gravity, UA 1.015 1.005 - 1.030    Protein, UA 1+ (A) Negative    Glucose, UA Negative Negative    Ketones, UA Negative Negative    Bilirubin (UA) Negative Negative    Occult Blood UA Negative Negative    Nitrite, UA Negative Negative    Urobilinogen, UA Negative <2.0 EU/dL    Leukocytes, UA 3+ (A) Negative   Sodium, urine, random    Collection Time: 03/26/24 12:04 AM   Result Value Ref Range    Sodium, Urine 30 20 - 250 mmol/L    Creatinine, urine, random    Collection Time: 03/26/24 12:04 AM   Result Value Ref Range    Creatinine, Urine 165.3 15.0 - 325.0 mg/dL   Urinalysis Microscopic    Collection Time: 03/26/24 12:04 AM   Result Value Ref Range    RBC, UA 3 0 - 4 /hpf    WBC, UA 45 (H) 0 - 5 /hpf    WBC Clumps, UA Occasional (A) None-Rare    Bacteria Rare None-Occ /hpf    Squam Epithel, UA 5 /hpf    Hyaline Casts, UA 0 0-1/lpf /lpf    Microscopic Comment SEE COMMENT    CBC Auto Differential    Collection Time: 03/26/24  3:59 AM   Result Value Ref Range    WBC 2.86 (L) 3.90 - 12.70 K/uL    RBC 3.27 (L) 4.00 - 5.40 M/uL    Hemoglobin 10.1 (L) 12.0 - 16.0 g/dL    Hematocrit 30.2 (L) 37.0 - 48.5 %    MCV 92 82 - 98 fL    MCH 30.9 27.0 - 31.0 pg    MCHC 33.4 32.0 - 36.0 g/dL    RDW 12.7 11.5 - 14.5 %    Platelets 159 150 - 450 K/uL    MPV 11.7 9.2 - 12.9 fL    Immature Granulocytes 0.3 0.0 - 0.5 %    Gran # (ANC) 1.6 (L) 1.8 - 7.7 K/uL    Immature Grans (Abs) 0.01 0.00 - 0.04 K/uL    Lymph # 0.9 (L) 1.0 - 4.8 K/uL    Mono # 0.3 0.3 - 1.0 K/uL    Eos # 0.0 0.0 - 0.5 K/uL    Baso # 0.01 0.00 - 0.20 K/uL    nRBC 0 0 /100 WBC    Gran % 57.4 38.0 - 73.0 %    Lymph % 31.5 18.0 - 48.0 %    Mono % 10.5 4.0 - 15.0 %    Eosinophil % 0.0 0.0 - 8.0 %    Basophil % 0.3 0.0 - 1.9 %    Differential Method Automated    Comprehensive Metabolic Panel    Collection Time: 03/26/24  3:59 AM   Result Value Ref Range    Sodium 140 136 - 145 mmol/L    Potassium 3.0 (L) 3.5 - 5.1 mmol/L    Chloride 107 95 - 110 mmol/L    CO2 24 23 - 29 mmol/L    Glucose 94 70 - 110 mg/dL    BUN 20 6 - 20 mg/dL    Creatinine 2.2 (H) 0.5 - 1.4 mg/dL    Calcium 8.0 (L) 8.7 - 10.5 mg/dL    Total Protein 5.7 (L) 6.0 - 8.4 g/dL    Albumin 2.9 (L) 3.5 - 5.2 g/dL    Total Bilirubin 0.2 0.1 - 1.0 mg/dL    Alkaline Phosphatase 30 (L) 55 - 135 U/L    AST 11 10 - 40 U/L    ALT 8 (L) 10 - 44 U/L    eGFR 29 (A) >60 mL/min/1.73 m^2    Anion Gap 9 8 - 16 mmol/L   Magnesium    Collection Time:  03/26/24  3:59 AM   Result Value Ref Range    Magnesium 2.0 1.6 - 2.6 mg/dL   PTH, intact    Collection Time: 03/26/24  3:59 AM   Result Value Ref Range    PTH, Intact 319.7 (H) 9.0 - 77.0 pg/mL       Microbiology Results (last 7 days)       Procedure Component Value Units Date/Time    Urine culture [[id number]] Collected: 03/26/24 0004    Order Status: No result Specimen: Urine Updated: 03/26/24 0034             Imaging Results              CT Head Without Contrast (Final result)  Result time 03/25/24 13:14:01      Final result by Alex Jones MD (03/25/24 13:14:01)                   Impression:      No acute intracranial pathology.      Electronically signed by: Alex Jones  Date:    03/25/2024  Time:    13:14               Narrative:    EXAMINATION:  CT HEAD WITHOUT CONTRAST    CLINICAL HISTORY:  Headache, sudden, severe;    TECHNIQUE:  Low dose axial images were obtained through the head.  Coronal and sagittal reformations were also performed. Contrast was not administered.    COMPARISON:  CT head without contrast 04/19/2021, MRI brain without contrast 10/29/2019.    FINDINGS:  Aneurysm coil about the anterior communicating artery.    Ventricles are stable in size without evidence for acute hydrocephalus.  No extra-axial blood or fluid collection.    Brain parenchyma appears unchanged.  No evidence for parenchymal mass, edema, hemorrhage, or major vascular territory infarct.    No calvarial or skull base fracture or osseous destructive lesion.    Paranasal sinuses and mastoid air cells are essentially clear.                                             Assessment/Plan:      * Hypokalemia  Presents with potassium of 2.2. improved to 2.5 in the ED. QTC of 470 in ED. Suspect related to intractable vomiting.   Continue repletion with 40meq and 2 doses of 10meq IV  Check UDS    Patient has hypokalemia which is Acute on Chronic and currently uncontrolled. Most recent potassium levels reviewed-   Lab Results    Component Value Date    K 2.7 (LL) 03/12/2022   . Will continue potassium replacement per protocol and recheck repeat levels after replacement completed.     HTN (hypertension)  Admits has not been taking  medications due to not refilling      Chronic, uncontrolled. Latest blood pressure and vitals reviewed-     Temp:  [98.3 °F (36.8 °C)]   Pulse:  [65-98]   Resp:  [11-25]   BP: (154-230)/()   SpO2:  [93 %-100 %] .   Home meds for hypertension were reviewed and noted below.   Hypertension Medications               labetaloL (NORMODYNE) 300 MG tablet Take 1 tablet (300 mg total) by mouth every 12 (twelve) hours.    NIFEdipine (PROCARDIA-XL) 90 MG (OSM) 24 hr tablet Take 1 tablet (90 mg total) by mouth once daily.            While in the hospital, will manage blood pressure as follows; Continue home antihypertensive regimen    Will utilize p.r.n. blood pressure medication only if patient's blood pressure greater than 180/110 and she develops symptoms such as worsening chest pain or shortness of breath.    Intractable vomiting  As above. Suspect viral as young son had similar symptoms. Symptoms improving started on clear liquid diet    S/P coil embolization of cerebral aneurysm  Stable no acute issues. Contineu BP control. Resume home nifedipine and labetalol. Ct head without abnormality    JOSE C (acute kidney injury)  Cr today of 2.1 and 2.0. baseline of 1.2 to 1.5. suspect related to vomiting and volume depletion  Started on IVF as above  Check UA/urine studies    Patient with acute kidney injury/acute renal failure likely due to pre-renal azotemia due to dehydration JOSE C is currently stable. Baseline creatinine  1.0 to 1.4  - Labs reviewed- Renal function/electrolytes with CrCl cannot be calculated (Patient's most recent lab result is older than the maximum 7 days allowed.). according to latest data. Monitor urine output and serial BMP and adjust therapy as needed. Avoid nephrotoxins and renally dose meds for  GFR listed above.    PKD (polycystic kidney disease)  Noted, complicate infection        VTE Risk Mitigation (From admission, onward)           Ordered     IP VTE LOW RISK PATIENT  Once         03/25/24 1504     Place sequential compression device  Until discontinued         03/25/24 1504                    Discharge Planning   IQRA:      Code Status: Full Code   Is the patient medically ready for discharge?:     Reason for patient still in hospital (select all that apply): Patient trending condition and Treatment  Discharge Plan A: Home (with instructions to follow up at [facility])                  Carlos Kuhn MD  Department of Hospital Medicine   [facility] [place] - Observation

## 2024-03-26 NOTE — PLAN OF CARE
03/26/24 0949   Discharge Planning   Assessment Type Discharge Planning Brief Assessment   Resource/Environmental Concerns none   Support Systems Spouse/significant other;Family members   Equipment Currently Used at Home none   Current Living Arrangements home   Patient/Family Anticipates Transition to home   Patient/Family Anticipated Services at Transition none   DME Needed Upon Discharge  none   Discharge Plan A Home  (with instructions to follow up at [facility])         [facility] [address]  [address]   [place] [address]  Phone: [phone] Fax: [phone]

## 2024-03-26 NOTE — PLAN OF CARE
Problem: Adult Inpatient Plan of Care  Goal: Plan of Care Review  Outcome: Ongoing, Progressing  Flowsheets (Taken 3/25/2024 1912)  Plan of Care Reviewed With:   patient   spouse   mother     Problem: Fluid and Electrolyte Imbalance (Acute Kidney Injury/Impairment)  Goal: Fluid and Electrolyte Balance  Outcome: Ongoing, Progressing  Intervention: Monitor and Manage Fluid and Electrolyte Balance  Flowsheets (Taken 3/25/2024 1912)  Fluid/Electrolyte Management:   fluids provided   intravenous fluid replacement initiated     Problem: Oral Intake Inadequate (Acute Kidney Injury/Impairment)  Goal: Optimal Nutrition Intake  Outcome: Ongoing, Progressing     Problem: Renal Function Impairment (Acute Kidney Injury/Impairment)  Goal: Effective Renal Function  Outcome: Ongoing, Progressing  Intervention: Monitor and Support Renal Function  Flowsheets (Taken 3/25/2024 1912)  Medication Review/Management: infusion initiated

## 2024-03-26 NOTE — NURSING
[facility]  Nurses Note -- 4 Eyes      3/25/2024       Skin assessed on: Admit/Shift      [x] No Pressure Injuries Present    [x]Prevention Measures Documented    [] Yes LDA  for Pressure Injury Previously documented     [] Yes New Pressure Injury Discovered   [] LDA for New Pressure Injury Added      Attending RN:  Yvette Murphy, RN     Second RN:  Jasmine MITCHELL

## 2024-03-26 NOTE — HOSPITAL COURSE
Admitted with JOSE C, hypokalemia with d/n/v, and concerns for UTI with +3 Leuk est and clumps of bacteria and suppressed WBC# of 2.8. Needs another day of IV fluids, abx, and K replacements. Check labs in am. Given diarrhea, r/o cdiff.     Patient again with electrolyte derangement after IV and oral replacement, due to vomiting and diarrhea. Will give another 2 doses of IV magnesium and oral potassium. Will check labs again in afternoon, and if able to eat/drink, may go home pending ability. Baseline Cr 1.0 (outside of acute events), presenting with cr 2.2, improving on fluids at 1.8. Continue UTI tx.

## 2024-03-27 VITALS
DIASTOLIC BLOOD PRESSURE: 89 MMHG | BODY MASS INDEX: 22.85 KG/M2 | WEIGHT: 154.31 LBS | RESPIRATION RATE: 18 BRPM | HEART RATE: 60 BPM | SYSTOLIC BLOOD PRESSURE: 155 MMHG | OXYGEN SATURATION: 99 % | HEIGHT: 69 IN | TEMPERATURE: 99 F

## 2024-03-27 PROBLEM — E83.42 HYPOMAGNESEMIA: Status: ACTIVE | Noted: 2024-03-27

## 2024-03-27 LAB
ALBUMIN SERPL BCP-MCNC: 3 G/DL (ref 3.5–5.2)
ALBUMIN SERPL BCP-MCNC: 3.3 G/DL (ref 3.5–5.2)
ALP SERPL-CCNC: 32 U/L (ref 55–135)
ALP SERPL-CCNC: 34 U/L (ref 55–135)
ALT SERPL W/O P-5'-P-CCNC: 11 U/L (ref 10–44)
ALT SERPL W/O P-5'-P-CCNC: 9 U/L (ref 10–44)
ANION GAP SERPL CALC-SCNC: 6 MMOL/L (ref 8–16)
ANION GAP SERPL CALC-SCNC: 7 MMOL/L (ref 8–16)
AST SERPL-CCNC: 13 U/L (ref 10–40)
AST SERPL-CCNC: 15 U/L (ref 10–40)
BASOPHILS # BLD AUTO: 0.01 K/UL (ref 0–0.2)
BASOPHILS NFR BLD: 0.3 % (ref 0–1.9)
BILIRUB SERPL-MCNC: 0.1 MG/DL (ref 0.1–1)
BILIRUB SERPL-MCNC: 0.2 MG/DL (ref 0.1–1)
BUN SERPL-MCNC: 12 MG/DL (ref 6–20)
BUN SERPL-MCNC: 17 MG/DL (ref 6–20)
CALCIUM SERPL-MCNC: 8 MG/DL (ref 8.7–10.5)
CALCIUM SERPL-MCNC: 8.6 MG/DL (ref 8.7–10.5)
CHLORIDE SERPL-SCNC: 108 MMOL/L (ref 95–110)
CHLORIDE SERPL-SCNC: 109 MMOL/L (ref 95–110)
CO2 SERPL-SCNC: 24 MMOL/L (ref 23–29)
CO2 SERPL-SCNC: 26 MMOL/L (ref 23–29)
CREAT SERPL-MCNC: 1.6 MG/DL (ref 0.5–1.4)
CREAT SERPL-MCNC: 1.8 MG/DL (ref 0.5–1.4)
DIFFERENTIAL METHOD BLD: ABNORMAL
EOSINOPHIL # BLD AUTO: 0 K/UL (ref 0–0.5)
EOSINOPHIL NFR BLD: 0.9 % (ref 0–8)
ERYTHROCYTE [DISTWIDTH] IN BLOOD BY AUTOMATED COUNT: 13.2 % (ref 11.5–14.5)
EST. GFR  (NO RACE VARIABLE): 37 ML/MIN/1.73 M^2
EST. GFR  (NO RACE VARIABLE): 42 ML/MIN/1.73 M^2
GLUCOSE SERPL-MCNC: 89 MG/DL (ref 70–110)
GLUCOSE SERPL-MCNC: 93 MG/DL (ref 70–110)
HCT VFR BLD AUTO: 31.6 % (ref 37–48.5)
HGB BLD-MCNC: 10.3 G/DL (ref 12–16)
IMM GRANULOCYTES # BLD AUTO: 0.01 K/UL (ref 0–0.04)
IMM GRANULOCYTES NFR BLD AUTO: 0.3 % (ref 0–0.5)
LYMPHOCYTES # BLD AUTO: 1.3 K/UL (ref 1–4.8)
LYMPHOCYTES NFR BLD: 39.9 % (ref 18–48)
MAGNESIUM SERPL-MCNC: 1.5 MG/DL (ref 1.6–2.6)
MAGNESIUM SERPL-MCNC: 2.2 MG/DL (ref 1.6–2.6)
MCH RBC QN AUTO: 30 PG (ref 27–31)
MCHC RBC AUTO-ENTMCNC: 32.6 G/DL (ref 32–36)
MCV RBC AUTO: 92 FL (ref 82–98)
MONOCYTES # BLD AUTO: 0.3 K/UL (ref 0.3–1)
MONOCYTES NFR BLD: 10.4 % (ref 4–15)
NEUTROPHILS # BLD AUTO: 1.5 K/UL (ref 1.8–7.7)
NEUTROPHILS NFR BLD: 48.2 % (ref 38–73)
NRBC BLD-RTO: 0 /100 WBC
PHOSPHATE SERPL-MCNC: 2.3 MG/DL (ref 2.7–4.5)
PHOSPHATE SERPL-MCNC: 3.4 MG/DL (ref 2.7–4.5)
PLATELET # BLD AUTO: 150 K/UL (ref 150–450)
PMV BLD AUTO: 12.3 FL (ref 9.2–12.9)
POTASSIUM SERPL-SCNC: 3.2 MMOL/L (ref 3.5–5.1)
POTASSIUM SERPL-SCNC: 3.5 MMOL/L (ref 3.5–5.1)
PROT SERPL-MCNC: 5.8 G/DL (ref 6–8.4)
PROT SERPL-MCNC: 6.7 G/DL (ref 6–8.4)
RBC # BLD AUTO: 3.43 M/UL (ref 4–5.4)
SODIUM SERPL-SCNC: 140 MMOL/L (ref 136–145)
SODIUM SERPL-SCNC: 140 MMOL/L (ref 136–145)
WBC # BLD AUTO: 3.16 K/UL (ref 3.9–12.7)

## 2024-03-27 PROCEDURE — 84100 ASSAY OF PHOSPHORUS: CPT | Performed by: STUDENT IN AN ORGANIZED HEALTH CARE EDUCATION/TRAINING PROGRAM

## 2024-03-27 PROCEDURE — 25000003 PHARM REV CODE 250

## 2024-03-27 PROCEDURE — 25000003 PHARM REV CODE 250: Performed by: PHYSICIAN ASSISTANT

## 2024-03-27 PROCEDURE — 25000003 PHARM REV CODE 250: Performed by: STUDENT IN AN ORGANIZED HEALTH CARE EDUCATION/TRAINING PROGRAM

## 2024-03-27 PROCEDURE — 94761 N-INVAS EAR/PLS OXIMETRY MLT: CPT

## 2024-03-27 PROCEDURE — 36415 COLL VENOUS BLD VENIPUNCTURE: CPT | Mod: XB | Performed by: STUDENT IN AN ORGANIZED HEALTH CARE EDUCATION/TRAINING PROGRAM

## 2024-03-27 PROCEDURE — 63600175 PHARM REV CODE 636 W HCPCS: Performed by: STUDENT IN AN ORGANIZED HEALTH CARE EDUCATION/TRAINING PROGRAM

## 2024-03-27 PROCEDURE — 83735 ASSAY OF MAGNESIUM: CPT | Mod: 91 | Performed by: STUDENT IN AN ORGANIZED HEALTH CARE EDUCATION/TRAINING PROGRAM

## 2024-03-27 PROCEDURE — 21400001 HC TELEMETRY ROOM

## 2024-03-27 PROCEDURE — 80053 COMPREHEN METABOLIC PANEL: CPT | Mod: 91 | Performed by: STUDENT IN AN ORGANIZED HEALTH CARE EDUCATION/TRAINING PROGRAM

## 2024-03-27 PROCEDURE — 85025 COMPLETE CBC W/AUTO DIFF WBC: CPT | Performed by: STUDENT IN AN ORGANIZED HEALTH CARE EDUCATION/TRAINING PROGRAM

## 2024-03-27 RX ORDER — CALCIUM CARBONATE 200(500)MG
1000 TABLET,CHEWABLE ORAL ONCE
Status: COMPLETED | OUTPATIENT
Start: 2024-03-27 | End: 2024-03-27

## 2024-03-27 RX ORDER — CEFDINIR 300 MG/1
300 CAPSULE ORAL 2 TIMES DAILY
Qty: 6 CAPSULE | Refills: 0 | Status: SHIPPED | OUTPATIENT
Start: 2024-03-28 | End: 2024-03-31

## 2024-03-27 RX ORDER — MAGNESIUM SULFATE HEPTAHYDRATE 40 MG/ML
2 INJECTION, SOLUTION INTRAVENOUS
Status: COMPLETED | OUTPATIENT
Start: 2024-03-27 | End: 2024-03-27

## 2024-03-27 RX ORDER — POTASSIUM CHLORIDE 20 MEQ/1
40 TABLET, EXTENDED RELEASE ORAL EVERY 4 HOURS
Status: COMPLETED | OUTPATIENT
Start: 2024-03-27 | End: 2024-03-27

## 2024-03-27 RX ORDER — LABETALOL 200 MG/1
200 TABLET, FILM COATED ORAL EVERY 12 HOURS
Qty: 180 TABLET | Refills: 3 | Status: SHIPPED | OUTPATIENT
Start: 2024-03-27 | End: 2025-03-27

## 2024-03-27 RX ORDER — NIFEDIPINE 60 MG/1
60 TABLET, EXTENDED RELEASE ORAL 2 TIMES DAILY
Qty: 180 TABLET | Refills: 3 | Status: SHIPPED | OUTPATIENT
Start: 2024-03-27 | End: 2025-03-27

## 2024-03-27 RX ADMIN — MAGNESIUM SULFATE HEPTAHYDRATE 2 G: 40 INJECTION, SOLUTION INTRAVENOUS at 09:03

## 2024-03-27 RX ADMIN — POTASSIUM CHLORIDE 40 MEQ: 1500 TABLET, EXTENDED RELEASE ORAL at 06:03

## 2024-03-27 RX ADMIN — CEFTRIAXONE 2 G: 2 INJECTION, POWDER, FOR SOLUTION INTRAMUSCULAR; INTRAVENOUS at 09:03

## 2024-03-27 RX ADMIN — Medication 9 MG: at 12:03

## 2024-03-27 RX ADMIN — MAGNESIUM SULFATE HEPTAHYDRATE 2 G: 40 INJECTION, SOLUTION INTRAVENOUS at 06:03

## 2024-03-27 RX ADMIN — CALCIUM CARBONATE (ANTACID) CHEW TAB 500 MG 1000 MG: 500 CHEW TAB at 12:03

## 2024-03-27 RX ADMIN — NIFEDIPINE 90 MG: 30 TABLET, FILM COATED, EXTENDED RELEASE ORAL at 09:03

## 2024-03-27 RX ADMIN — POTASSIUM CHLORIDE 40 MEQ: 1500 TABLET, EXTENDED RELEASE ORAL at 09:03

## 2024-03-27 NOTE — NURSING
"""Type 2 Diabetic eye exam with dilation. No diabetic retinopathy found. Recommend annual dilated examinations. Patient instructed to call office immediately if sudden changes in vision occur. Emphasized importance of good blood glucose control. Summary of care provided to the physician managing the ongoing diabetes care. """ Per handoff received from Radha VILLA RN. Patient care assumed. Patients overall condition assessed and patient appears to be in NAD with no complaints of pain. 20g RAC and 22g LAC PIV's are clean, dry, and intact. NS infusing at 100cc/hr. Call light in reach and patient instructed to inform the nurse if anything is needed. Patient stable and is continually being monitored.

## 2024-03-27 NOTE — DISCHARGE INSTRUCTIONS
Take labetalol 200 mg and Nifedipine 60 mg, both twice daily for BP control  Take cefdinir twice daily for 3 more days for UTI  Go to PCP and Nephrologist     Thank you for trusting [facility] and me with your care.  We are honored that you entrusted us with your healthcare needs. Your satisfaction is very important to us and we hope you have been very pleased with your experience at [facility]. After your discharge you may receive a survey asking you to rate your hospital experience- Please help us by completing this survey. We hope that you have received the very best care possible during your hospitalization at [facility], as your satisfaction is our top priority.    Let me know if there is anything more I can do!!              Carlos Kuhn MD        Medical Director        Section Head of         Board-Certified IM Attending      PATIENT GENERAL DISCHARGE INFORMATION   Things that YOU are responsible for to Manage Your Care At Home:  1. Getting your prescriptions filled.  2. Taking you medications as directed. (DO NOT MISS ANY DOSES!)  3. Going to your follow-up doctor appointments.                 *This is important because it allows the doctor to monitor your progress and make changes.      If you are unable to make your follow up appointments, please call the number listed and reschedule this appointment.   After discharge, if you need assistance, you can call [facility] for 24/7 assistance at [phone]  If you are experience any signs or symptoms, Call your doctor or Call 911 and come to your nearest Emergency Room.    You should receive a call from [facility] within 48-72 hours to help manage your care after discharge.   Please try to make sure that you answer your phone for this important phone call.

## 2024-03-27 NOTE — PLAN OF CARE
Problem: Adult Inpatient Plan of Care  Goal: Plan of Care Review  Flowsheets (Taken 3/27/2024 0249)  Plan of Care Reviewed With: patient  Goal: Absence of Hospital-Acquired Illness or Injury  Intervention: Identify and Manage Fall Risk  Flowsheets (Taken 3/27/2024 0249)  Safety Promotion/Fall Prevention: Fall Risk reviewed with patient/family  Intervention: Prevent Skin Injury  Flowsheets (Taken 3/27/2024 0249)  Body Position: position changed independently  Intervention: Prevent and Manage VTE (Venous Thromboembolism) Risk  Flowsheets (Taken 3/27/2024 0249)  VTE Prevention/Management: ROM (active) performed  Range of Motion: active ROM (range of motion) encouraged  Goal: Optimal Comfort and Wellbeing  Intervention: Monitor Pain and Promote Comfort  Flowsheets (Taken 3/27/2024 0249)  Pain Management Interventions: pain management plan reviewed with patient/caregiver  Intervention: Provide Person-Centered Care  Flowsheets (Taken 3/27/2024 0249)  Trust Relationship/Rapport:   care explained   questions answered   questions encouraged   reassurance provided   thoughts/feelings acknowledged     Problem: Fluid and Electrolyte Imbalance (Acute Kidney Injury/Impairment)  Goal: Fluid and Electrolyte Balance  Intervention: Monitor and Manage Fluid and Electrolyte Balance  Flowsheets (Taken 3/27/2024 0249)  Fluid/Electrolyte Management: intravenous fluid replacement initiated     Problem: Renal Function Impairment (Acute Kidney Injury/Impairment)  Goal: Effective Renal Function  Intervention: Monitor and Support Renal Function  Flowsheets (Taken 3/27/2024 0249)  Medication Review/Management: medications reviewed

## 2024-03-27 NOTE — PROGRESS NOTES
[facility]  Nurses Note -- 4 Eyes      3/27/2024       Skin assessed on: Q Shift      [x] No Pressure Injuries Present    []Prevention Measures Documented    [] Yes LDA  for Pressure Injury Previously documented     [] Yes New Pressure Injury Discovered   [] LDA for New Pressure Injury Added      Attending RN:  Reagan Diop RN     Second RN:  Eneida Gaines RN

## 2024-03-27 NOTE — NURSING
[facility]  Nurses Note -- 4 Eyes      3/26/2024       Skin assessed on: Q Shift      [x] No Pressure Injuries Present    [x]Prevention Measures Documented    [] Yes LDA  for Pressure Injury Previously documented     [] Yes New Pressure Injury Discovered   [] LDA for New Pressure Injury Added      Attending RN:  Eneida Gaines, RN     Second RN:  Radha VILLA RN

## 2024-03-27 NOTE — PROGRESS NOTES
[facility] Medicine  Progress Note    Patient Name: Sharon Grande  MRN: [id number]  Patient Class: OP- Observation   Admission Date: 3/25/2024  Length of Stay: 0 days  Attending Physician: Carlos Kuhn MD  Primary Care Provider: [facility] -        Subjective:     Principal Problem:Hypokalemia        HPI:  Sharon Grande 38 y.o. female with HTN, history of subarachnoid hemorrhage, PCKD presents hospital chief complaint of vomiting.  She reports continuous nausea vomiting and diarrhea that began yesterday.  She finds her symptoms improved with treatment emergency room.  It was initially associated with abdominal pain but this has resolved.  She wishes to begin a diet of clear liquids.  She admits she has not been taking her home labetalol and nifedipine due to not refilling.  She reports her young son had similar symptoms, but his symptoms have now resolved.  She began to feel increasingly fatigued today causing presentation in the emergency room.  She denies chest pain shortness of breath leg swelling melena hematuria hematemesis dizziness syncope.      In the ED, initial potassium 2.2 repeat of 2.5 initial creatinine 2.1 repeat 2.0  on EKG hypertensive improved with blood pressure medications.    Overview/Hospital Course:  Admitted with JOSE C, hypokalemia with d/n/v, and concerns for UTI with +3 Leuk est and clumps of bacteria and suppressed WBC# of 2.8. Needs another day of IV fluids, abx, and K replacements. Check labs in am. Given diarrhea, r/o cdiff.     Patient again with electrolyte derangement after IV and oral replacement, due to vomiting and diarrhea. Will give another 2 doses of IV magnesium and oral potassium. Will check labs again in afternoon, and if able to eat/drink, may go home pending ability. Baseline Cr 1.0 (outside of acute events), presenting with cr 2.2, improving on fluids at 1.8. Continue UTI tx.    Interval History:  NAEON.  No new issues.   CC  nausea  All questions answered and updates on care given.       ROS:  General: Negative for fevers or chills.  Cardiac: Negative for chest pain or orthopnea   Pulmonary: Negative for dyspnea or wheezing.  GI: Negative for abdominal distention or pain  D/N/V     Vitals:    03/27/24 0248 03/27/24 0344 03/27/24 0800 03/27/24 0919   BP:  133/74 (!) 145/92    BP Location:  Right arm Left arm    Patient Position:  Lying Lying    Pulse: (!) 59 66 63 (!) 56   Resp:  18 18    Temp:  98.2 °F (36.8 °C) 98.1 °F (36.7 °C)    TempSrc:  Oral Oral    SpO2:  96% 98%    Weight:       Height:              Body mass index is 22.79 kg/m².      PHYSICAL EXAM:  GENERAL APPEARANCE: alert and cooperative, and appears to be in no acute distress.  HEENT:     HEAD: NC/AT     EYES: PERRL, EOMI.  Vision is grossly intact.  CARDIAC: There is no peripheral edema, cyanosis or pallor.   LUNGS: Clear to auscultation and percussion without rales or wheezing  ABDOMEN: Non-distended. No guarding.  MSK: No joint erythema or tenderness.   EXTREMITIES: No significant deformity or joint abnormality. No edema.   NEUROLOGICAL: CN II-XII grossly intact.   SKIN: Skin normal color, texture and turgor with no lesions or eruptions.  PSYCHIATRIC: Oriented. No tangential speech. No Hyperactive features.        Recent Results (from the past 24 hour(s))   Phosphorus    Collection Time: 03/27/24  3:42 AM   Result Value Ref Range    Phosphorus 3.4 2.7 - 4.5 mg/dL   Magnesium    Collection Time: 03/27/24  3:42 AM   Result Value Ref Range    Magnesium 1.5 (L) 1.6 - 2.6 mg/dL   Comprehensive Metabolic Panel    Collection Time: 03/27/24  3:42 AM   Result Value Ref Range    Sodium 140 136 - 145 mmol/L    Potassium 3.2 (L) 3.5 - 5.1 mmol/L    Chloride 109 95 - 110 mmol/L    CO2 24 23 - 29 mmol/L    Glucose 89 70 - 110 mg/dL    BUN 17 6 - 20 mg/dL    Creatinine 1.8 (H) 0.5 - 1.4 mg/dL    Calcium 8.0 (L) 8.7 - 10.5 mg/dL    Total Protein 5.8 (L) 6.0 - 8.4 g/dL    Albumin 3.0  (L) 3.5 - 5.2 g/dL    Total Bilirubin 0.2 0.1 - 1.0 mg/dL    Alkaline Phosphatase 32 (L) 55 - 135 U/L    AST 13 10 - 40 U/L    ALT 9 (L) 10 - 44 U/L    eGFR 37 (A) >60 mL/min/1.73 m^2    Anion Gap 7 (L) 8 - 16 mmol/L   CBC Auto Differential    Collection Time: 03/27/24  3:42 AM   Result Value Ref Range    WBC 3.16 (L) 3.90 - 12.70 K/uL    RBC 3.43 (L) 4.00 - 5.40 M/uL    Hemoglobin 10.3 (L) 12.0 - 16.0 g/dL    Hematocrit 31.6 (L) 37.0 - 48.5 %    MCV 92 82 - 98 fL    MCH 30.0 27.0 - 31.0 pg    MCHC 32.6 32.0 - 36.0 g/dL    RDW 13.2 11.5 - 14.5 %    Platelets 150 150 - 450 K/uL    MPV 12.3 9.2 - 12.9 fL    Immature Granulocytes 0.3 0.0 - 0.5 %    Gran # (ANC) 1.5 (L) 1.8 - 7.7 K/uL    Immature Grans (Abs) 0.01 0.00 - 0.04 K/uL    Lymph # 1.3 1.0 - 4.8 K/uL    Mono # 0.3 0.3 - 1.0 K/uL    Eos # 0.0 0.0 - 0.5 K/uL    Baso # 0.01 0.00 - 0.20 K/uL    nRBC 0 0 /100 WBC    Gran % 48.2 38.0 - 73.0 %    Lymph % 39.9 18.0 - 48.0 %    Mono % 10.4 4.0 - 15.0 %    Eosinophil % 0.9 0.0 - 8.0 %    Basophil % 0.3 0.0 - 1.9 %    Differential Method Automated        Microbiology Results (last 7 days)       Procedure Component Value Units Date/Time    Urine culture [[id number]] Collected: 03/26/24 0004    Order Status: Completed Specimen: Urine Updated: 03/27/24 0836     Urine Culture, Routine No significant isolate to date    Narrative:      Specimen Source->Urine    Clostridium difficile EIA [[id number]]     Order Status: Canceled Specimen: Stool              Imaging Results              CT Head Without Contrast (Final result)  Result time 03/25/24 13:14:01      Final result by Alex Jones MD (03/25/24 13:14:01)                   Impression:      No acute intracranial pathology.      Electronically signed by: Alex Jones  Date:    03/25/2024  Time:    13:14               Narrative:    EXAMINATION:  CT HEAD WITHOUT CONTRAST    CLINICAL HISTORY:  Headache, sudden, severe;    TECHNIQUE:  Low dose axial images were obtained through  the head.  Coronal and sagittal reformations were also performed. Contrast was not administered.    COMPARISON:  CT head without contrast 04/19/2021, MRI brain without contrast 10/29/2019.    FINDINGS:  Aneurysm coil about the anterior communicating artery.    Ventricles are stable in size without evidence for acute hydrocephalus.  No extra-axial blood or fluid collection.    Brain parenchyma appears unchanged.  No evidence for parenchymal mass, edema, hemorrhage, or major vascular territory infarct.    No calvarial or skull base fracture or osseous destructive lesion.    Paranasal sinuses and mastoid air cells are essentially clear.                                             Assessment/Plan:      * Hypokalemia  Presents with potassium of 2.2. improved to 2.5 in the ED. QTC of 470 in ED. Suspect related to intractable vomiting.   Continue repletion with 40meq and 2 doses of 10meq IV  Check UDS    Patient has hypokalemia which is Acute on Chronic and currently uncontrolled. Most recent potassium levels reviewed-   Lab Results   Component Value Date    K 2.7 (LL) 03/12/2022   . Will continue potassium replacement per protocol and recheck repeat levels after replacement completed.     HTN (hypertension)  Admits has not been taking  medications due to not refilling      Chronic, uncontrolled. Latest blood pressure and vitals reviewed-     Temp:  [98.3 °F (36.8 °C)]   Pulse:  [65-98]   Resp:  [11-25]   BP: (154-230)/()   SpO2:  [93 %-100 %] .   Home meds for hypertension were reviewed and noted below.   Hypertension Medications               labetaloL (NORMODYNE) 300 MG tablet Take 1 tablet (300 mg total) by mouth every 12 (twelve) hours.    NIFEdipine (PROCARDIA-XL) 90 MG (OSM) 24 hr tablet Take 1 tablet (90 mg total) by mouth once daily.            While in the hospital, will manage blood pressure as follows; Continue home antihypertensive regimen    Will utilize p.r.n. blood pressure medication only if  patient's blood pressure greater than 180/110 and she develops symptoms such as worsening chest pain or shortness of breath.    Intractable vomiting  As above. Suspect viral as young son had similar symptoms. Symptoms improving started on clear liquid diet    S/P coil embolization of cerebral aneurysm  Stable no acute issues. Contineu BP control. Resume home nifedipine and labetalol. Ct head without abnormality    JOSE C (acute kidney injury)  Cr today of 2.1 and 2.0. baseline of 1.2 to 1.5. suspect related to vomiting and volume depletion  Started on IVF as above  Check UA/urine studies    Patient with acute kidney injury/acute renal failure likely due to pre-renal azotemia due to dehydration JOSE C is currently stable. Baseline creatinine  1.0 to 1.4  - Labs reviewed- Renal function/electrolytes with CrCl cannot be calculated (Patient's most recent lab result is older than the maximum 7 days allowed.). according to latest data. Monitor urine output and serial BMP and adjust therapy as needed. Avoid nephrotoxins and renally dose meds for GFR listed above.    PKD (polycystic kidney disease)  Noted, complicate infection        VTE Risk Mitigation (From admission, onward)           Ordered     IP VTE LOW RISK PATIENT  Once         03/25/24 1504     Place sequential compression device  Until discontinued         03/25/24 1504                    Discharge Planning   IQRA:      Code Status: Full Code   Is the patient medically ready for discharge?:     Reason for patient still in hospital (select all that apply): Patient trending condition and Treatment  Discharge Plan A: Home (with instructions to follow up at [facility])                  Carlos Kuhn MD  Department of Hospital Medicine   [facility] - Observation

## 2024-03-27 NOTE — PLAN OF CARE
03/27/24 1231   Final Note   Assessment Type Final Discharge Note   Anticipated Discharge Disposition Home   Hospital Resources/Appts/Education Provided Community resources provided   Post-Acute Status   Post-Acute Authorization Other   Other Status No Post-Acute Service Needs     Pts nurse Kirk notified that the pt can d/c from CM standpoint

## 2024-03-27 NOTE — PROGRESS NOTES
Labs drawn and pt is ready for discharge from nursing standpoint. IV and telemetry monitor removed without difficulty.  Patient will  medication from [facility] and does not wish to wait for instructions from virtual nurse .

## 2024-03-27 NOTE — DISCHARGE SUMMARY
[facility] Medicine  Discharge Summary      Patient Name: Sharon Grande  MRN: [id number]  PAZ: [id number]  Patient Class: OP- Observation  Admission Date: 3/25/2024  Hospital Length of Stay: 0 days  Discharge Date and Time:  03/27/2024 12:24 PM  Attending Physician: Carlos Kuhn MD   Discharging Provider: Carlos Kuhn MD  Primary Care Provider: [facility] -    Primary Care Team: Networked reference to record PCT     HPI:   Sharon Grande 38 y.o. female with HTN, history of subarachnoid hemorrhage, PCKD presents hospital chief complaint of vomiting.  She reports continuous nausea vomiting and diarrhea that began yesterday.  She finds her symptoms improved with treatment emergency room.  It was initially associated with abdominal pain but this has resolved.  She wishes to begin a diet of clear liquids.  She admits she has not been taking her home labetalol and nifedipine due to not refilling.  She reports her young son had similar symptoms, but his symptoms have now resolved.  She began to feel increasingly fatigued today causing presentation in the emergency room.  She denies chest pain shortness of breath leg swelling melena hematuria hematemesis dizziness syncope.      In the ED, initial potassium 2.2 repeat of 2.5 initial creatinine 2.1 repeat 2.0  on EKG hypertensive improved with blood pressure medications.    * No surgery found *      Hospital Course:   Admitted with JOSE C, hypokalemia with d/n/v, and concerns for UTI with +3 Leuk est and clumps of bacteria and suppressed WBC# of 2.8. Needs another day of IV fluids, abx, and K replacements. Check labs in am. Given diarrhea, r/o cdiff.     Patient again with electrolyte derangement after IV and oral replacement, due to vomiting and diarrhea. Will give another 2 doses of IV magnesium and oral potassium. Will check labs again in afternoon, and if able to eat/drink, may go home pending ability. Baseline Cr 1.0 (outside of  acute events), presenting with cr 2.2, improving on fluids at 1.8. Continue UTI tx. Mg went down again on 3rd day to 1.5 mg due to continued vomiting and unable to keep down oral. Gave IV mag 2mg (x2 doses) again today 3/27, Cr came down to 1.6, likely would have come down a bit more but patient ready to go home with young kid. Encourage oral intake.    HR was a bit low with the 300 mg labetalol, will go down to 200 mg BID and increase nifedipine from 90 mg daily to 60 mg BID (120 mg total daily).    Take labetalol 200 mg and Nifedipine 60 mg, both twice daily for BP control  Take cefdinir twice daily for 3 more days for UTI  Go to PCP and Nephrologist      Thank you for trusting [facility] and me with your care.  We are honored that you entrusted us with your healthcare needs. Your satisfaction is very important to us and we hope you have been very pleased with your experience at [facility]. After your discharge you may receive a survey asking you to rate your hospital experience- Please help us by completing this survey. We hope that you have received the very best care possible during your hospitalization at [facility], as your satisfaction is our top priority.     Let me know if there is anything more I can do!!                Carlos Kuhn MD        Medical Director        Section Head of         Board-Certified IM Attending      Goals of Care Treatment Preferences:  Code Status: Full Code      Consults:   Consults (From admission, onward)          Status Ordering Provider     Case Management/  Once        Provider:  (Not yet assigned)    Completed DERRICK WASHINGTON            Renal/  Hypomagnesemia  Patient has Abnormal Magnesium: hypomagnesemia. Will continue to monitor electrolytes closely. Will replace the affected electrolytes and repeat labs to be done after interventions completed. The patient's magnesium results have been reviewed and are listed  below.  Recent Labs   Lab 03/27/24  0342   MG 1.5*      IV mag given, went down again on 3rd day to 1.5 mg due to continued vomiting. Gave IV mag again today 3/27.      Final Active Diagnoses:    Diagnosis Date Noted POA    PRINCIPAL PROBLEM:  Hypokalemia [E87.6] 08/13/2018 Yes    Hypomagnesemia [E83.42] 03/27/2024 Unknown    Intractable vomiting [R11.10] 03/25/2024 Yes    HTN (hypertension) [I10] 03/25/2024 Yes    S/P coil embolization of cerebral aneurysm [Z98.890] 10/13/2018 Not Applicable    JOSE C (acute kidney injury) [N17.9] 08/11/2018 Yes    PKD (polycystic kidney disease) [Q61.3] 04/26/2015 Not Applicable      Problems Resolved During this Admission:       Discharged Condition: good    Disposition: home    Follow Up:   Follow-up Information       [facility] - Follow up.    Why: please call at time of discharge to schedule post hospital discharge follow up appointment with your PCP  Contact information:  [address] [place] [address]  [phone]                           Patient Instructions:      Ambulatory referral/consult to Internal Medicine   Standing Status: Future   Referral Priority: Routine Referral Type: Consultation   Referral Reason: Specialty Services Required   Requested Specialty: Internal Medicine   Number of Visits Requested: 1     Ambulatory referral/consult to Nephrology   Standing Status: Future   Referral Priority: Routine Referral Type: Consultation   Referral Reason: Specialty Services Required   Requested Specialty: Nephrology   Number of Visits Requested: 1     Significant Diagnostic Studies:     Recent Results (from the past 100 hour(s))   Troponin ISTAT    Collection Time: 03/25/24 12:57 PM   Result Value Ref Range    POC Cardiac Troponin I 0.06 0.00 - 0.08 ng/mL    Sample unknown    POCT bHCG (Quant)    Collection Time: 03/25/24 12:57 PM   Result Value Ref Range    POC Beta-HCG (Quant) <5.0 IU/L    Sample unknown    POCT urine pregnancy    Collection Time:  03/25/24 12:58 PM   Result Value Ref Range    POC Preg Test, Ur Negative Negative     Acceptable Yes    POCT CBC    Collection Time: 03/25/24  1:00 PM   Result Value Ref Range    Hematocrit      Hemoglobin      RBC      WBC      MCV      MCH, POC      MCHC      RDW-CV      Platelet Count, POC      MPV     POCT Liver Panel    Collection Time: 03/25/24  1:06 PM   Result Value Ref Range    Albumin, POC 4.0 3.3 - 5.5 g/dL    Alkaline Phosphatase, POC 41 (L) 42 - 141 U/L    ALT (SGPT), POC 13 10 - 47 U/L    Amylase, POC 47 14 - 97 U/L    AST (SGOT), POC 20 11 - 38 U/L    POC GGT 12 5 - 65 U/L    Bilirubin, POC 0.6 0.2 - 1.6 mg/dL    Protein, POC 7.6 6.4 - 8.1 g/dL   POCT CMP    Collection Time: 03/25/24  1:07 PM   Result Value Ref Range    Albumin, POC 3.8 3.3 - 5.5 g/dL    Alkaline Phosphatase, POC 40 (L) 42 - 141 U/L    ALT (SGPT), POC 11 10 - 47 U/L    AST (SGOT), POC 19 11 - 38 U/L    POC BUN 19 7 - 22 mg/dL    Calcium, POC 9.5 8.0 - 10.3 mg/dL    POC Chloride 100 98 - 108 mmol/L    POC Creatinine 2.1 (H) 0.6 - 1.2 mg/dL    POC Glucose 98 73 - 118 mg/dL    POC Potassium 2.2 (LL) 3.6 - 5.1 mmol/L    POC Sodium 142 128 - 145 mmol/L    Bilirubin, POC 0.6 0.2 - 1.6 mg/dL    POC TCO2 31 18 - 33 mmol/L    Protein, POC 7.6 6.4 - 8.1 g/dL   POCT CMP    Collection Time: 03/25/24  4:29 PM   Result Value Ref Range    Albumin, POC 3.4 3.3 - 5.5 g/dL    Alkaline Phosphatase, POC 42 42 - 141 U/L    ALT (SGPT), POC <5 (<) 10 - 47 U/L    AST (SGOT), POC 17 11 - 38 U/L    POC BUN 18 7 - 22 mg/dL    Calcium, POC 8.7 8.0 - 10.3 mg/dL    POC Chloride 105 98 - 108 mmol/L    POC Creatinine 2.0 (H) 0.6 - 1.2 mg/dL    POC Glucose 97 73 - 118 mg/dL    POC Potassium 2.5 (LL) 3.6 - 5.1 mmol/L    POC Sodium 143 128 - 145 mmol/L    Bilirubin, POC 0.5 0.2 - 1.6 mg/dL    POC TCO2 28 18 - 33 mmol/L    Protein, POC 6.8 6.4 - 8.1 g/dL   POCT URINALYSIS W/O SCOPE    Collection Time: 03/25/24  5:02 PM   Result Value Ref Range    Glucose,  UA Negative     Bilirubin, UA Negative     Ketones, UA Negative     Spec Grav UA 1.020     Blood, UA Negative     PH, UA 6.5     Protein, UA 2+ (A)     Urobilinogen, UA 0.2 E.U./dL    Nitrite, UA Negative     Leukocytes, UA 1+ (A)     Color, UA Yellow     Clarity, UA Clear    Comprehensive Metabolic Panel    Collection Time: 03/25/24  6:00 PM   Result Value Ref Range    Sodium 142 136 - 145 mmol/L    Potassium 2.4 (LL) 3.5 - 5.1 mmol/L    Chloride 105 95 - 110 mmol/L    CO2 24 23 - 29 mmol/L    Glucose 91 70 - 110 mg/dL    BUN 19 6 - 20 mg/dL    Creatinine 2.0 (H) 0.5 - 1.4 mg/dL    Calcium 8.3 (L) 8.7 - 10.5 mg/dL    Total Protein 7.0 6.0 - 8.4 g/dL    Albumin 3.3 (L) 3.5 - 5.2 g/dL    Total Bilirubin 0.3 0.1 - 1.0 mg/dL    Alkaline Phosphatase 36 (L) 55 - 135 U/L    AST 15 10 - 40 U/L    ALT 7 (L) 10 - 44 U/L    eGFR 32 (A) >60 mL/min/1.73 m^2    Anion Gap 13 8 - 16 mmol/L   Magnesium    Collection Time: 03/25/24  6:00 PM   Result Value Ref Range    Magnesium 1.5 (L) 1.6 - 2.6 mg/dL   Basic metabolic panel    Collection Time: 03/25/24  9:22 PM   Result Value Ref Range    Sodium 139 136 - 145 mmol/L    Potassium 2.8 (L) 3.5 - 5.1 mmol/L    Chloride 108 95 - 110 mmol/L    CO2 25 23 - 29 mmol/L    Glucose 108 70 - 110 mg/dL    BUN 17 6 - 20 mg/dL    Creatinine 2.0 (H) 0.5 - 1.4 mg/dL    Calcium 7.9 (L) 8.7 - 10.5 mg/dL    Anion Gap 6 (L) 8 - 16 mmol/L    eGFR 32 (A) >60 mL/min/1.73 m^2   Drug screen panel, emergency    Collection Time: 03/26/24 12:04 AM   Result Value Ref Range    Benzodiazepines Negative Negative    Methadone metabolites Negative Negative    Cocaine (Metab.) Negative Negative    Opiate Scrn, Ur Presumptive Positive (A) Negative    Barbiturate Screen, Ur Negative Negative    Amphetamine Screen, Ur Negative Negative    THC Negative Negative    Phencyclidine Negative Negative    Creatinine, Urine 165.3 15.0 - 325.0 mg/dL    Toxicology Information SEE COMMENT    Urinalysis, Reflex to Urine Culture  Urine, Clean Catch    Collection Time: 03/26/24 12:04 AM    Specimen: Urine   Result Value Ref Range    Specimen UA Urine, Clean Catch     Color, UA Yellow Yellow, Straw, Delfina    Appearance, UA Cloudy (A) Clear    pH, UA 6.0 5.0 - 8.0    Specific Gravity, UA 1.015 1.005 - 1.030    Protein, UA 1+ (A) Negative    Glucose, UA Negative Negative    Ketones, UA Negative Negative    Bilirubin (UA) Negative Negative    Occult Blood UA Negative Negative    Nitrite, UA Negative Negative    Urobilinogen, UA Negative <2.0 EU/dL    Leukocytes, UA 3+ (A) Negative   Sodium, urine, random    Collection Time: 03/26/24 12:04 AM   Result Value Ref Range    Sodium, Urine 30 20 - 250 mmol/L   Creatinine, urine, random    Collection Time: 03/26/24 12:04 AM   Result Value Ref Range    Creatinine, Urine 165.3 15.0 - 325.0 mg/dL   Urinalysis Microscopic    Collection Time: 03/26/24 12:04 AM   Result Value Ref Range    RBC, UA 3 0 - 4 /hpf    WBC, UA 45 (H) 0 - 5 /hpf    WBC Clumps, UA Occasional (A) None-Rare    Bacteria Rare None-Occ /hpf    Squam Epithel, UA 5 /hpf    Hyaline Casts, UA 0 0-1/lpf /lpf    Microscopic Comment SEE COMMENT    Urine culture    Collection Time: 03/26/24 12:04 AM    Specimen: Urine   Result Value Ref Range    Urine Culture, Routine No significant isolate to date    CBC Auto Differential    Collection Time: 03/26/24  3:59 AM   Result Value Ref Range    WBC 2.86 (L) 3.90 - 12.70 K/uL    RBC 3.27 (L) 4.00 - 5.40 M/uL    Hemoglobin 10.1 (L) 12.0 - 16.0 g/dL    Hematocrit 30.2 (L) 37.0 - 48.5 %    MCV 92 82 - 98 fL    MCH 30.9 27.0 - 31.0 pg    MCHC 33.4 32.0 - 36.0 g/dL    RDW 12.7 11.5 - 14.5 %    Platelets 159 150 - 450 K/uL    MPV 11.7 9.2 - 12.9 fL    Immature Granulocytes 0.3 0.0 - 0.5 %    Gran # (ANC) 1.6 (L) 1.8 - 7.7 K/uL    Immature Grans (Abs) 0.01 0.00 - 0.04 K/uL    Lymph # 0.9 (L) 1.0 - 4.8 K/uL    Mono # 0.3 0.3 - 1.0 K/uL    Eos # 0.0 0.0 - 0.5 K/uL    Baso # 0.01 0.00 - 0.20 K/uL    nRBC 0 0 /100  WBC    Gran % 57.4 38.0 - 73.0 %    Lymph % 31.5 18.0 - 48.0 %    Mono % 10.5 4.0 - 15.0 %    Eosinophil % 0.0 0.0 - 8.0 %    Basophil % 0.3 0.0 - 1.9 %    Differential Method Automated    Comprehensive Metabolic Panel    Collection Time: 03/26/24  3:59 AM   Result Value Ref Range    Sodium 140 136 - 145 mmol/L    Potassium 3.0 (L) 3.5 - 5.1 mmol/L    Chloride 107 95 - 110 mmol/L    CO2 24 23 - 29 mmol/L    Glucose 94 70 - 110 mg/dL    BUN 20 6 - 20 mg/dL    Creatinine 2.2 (H) 0.5 - 1.4 mg/dL    Calcium 8.0 (L) 8.7 - 10.5 mg/dL    Total Protein 5.7 (L) 6.0 - 8.4 g/dL    Albumin 2.9 (L) 3.5 - 5.2 g/dL    Total Bilirubin 0.2 0.1 - 1.0 mg/dL    Alkaline Phosphatase 30 (L) 55 - 135 U/L    AST 11 10 - 40 U/L    ALT 8 (L) 10 - 44 U/L    eGFR 29 (A) >60 mL/min/1.73 m^2    Anion Gap 9 8 - 16 mmol/L   Magnesium    Collection Time: 03/26/24  3:59 AM   Result Value Ref Range    Magnesium 2.0 1.6 - 2.6 mg/dL   PTH, intact    Collection Time: 03/26/24  3:59 AM   Result Value Ref Range    PTH, Intact 319.7 (H) 9.0 - 77.0 pg/mL   Phosphorus    Collection Time: 03/27/24  3:42 AM   Result Value Ref Range    Phosphorus 3.4 2.7 - 4.5 mg/dL   Magnesium    Collection Time: 03/27/24  3:42 AM   Result Value Ref Range    Magnesium 1.5 (L) 1.6 - 2.6 mg/dL   Comprehensive Metabolic Panel    Collection Time: 03/27/24  3:42 AM   Result Value Ref Range    Sodium 140 136 - 145 mmol/L    Potassium 3.2 (L) 3.5 - 5.1 mmol/L    Chloride 109 95 - 110 mmol/L    CO2 24 23 - 29 mmol/L    Glucose 89 70 - 110 mg/dL    BUN 17 6 - 20 mg/dL    Creatinine 1.8 (H) 0.5 - 1.4 mg/dL    Calcium 8.0 (L) 8.7 - 10.5 mg/dL    Total Protein 5.8 (L) 6.0 - 8.4 g/dL    Albumin 3.0 (L) 3.5 - 5.2 g/dL    Total Bilirubin 0.2 0.1 - 1.0 mg/dL    Alkaline Phosphatase 32 (L) 55 - 135 U/L    AST 13 10 - 40 U/L    ALT 9 (L) 10 - 44 U/L    eGFR 37 (A) >60 mL/min/1.73 m^2    Anion Gap 7 (L) 8 - 16 mmol/L   CBC Auto Differential    Collection Time: 03/27/24  3:42 AM   Result Value  Ref Range    WBC 3.16 (L) 3.90 - 12.70 K/uL    RBC 3.43 (L) 4.00 - 5.40 M/uL    Hemoglobin 10.3 (L) 12.0 - 16.0 g/dL    Hematocrit 31.6 (L) 37.0 - 48.5 %    MCV 92 82 - 98 fL    MCH 30.0 27.0 - 31.0 pg    MCHC 32.6 32.0 - 36.0 g/dL    RDW 13.2 11.5 - 14.5 %    Platelets 150 150 - 450 K/uL    MPV 12.3 9.2 - 12.9 fL    Immature Granulocytes 0.3 0.0 - 0.5 %    Gran # (ANC) 1.5 (L) 1.8 - 7.7 K/uL    Immature Grans (Abs) 0.01 0.00 - 0.04 K/uL    Lymph # 1.3 1.0 - 4.8 K/uL    Mono # 0.3 0.3 - 1.0 K/uL    Eos # 0.0 0.0 - 0.5 K/uL    Baso # 0.01 0.00 - 0.20 K/uL    nRBC 0 0 /100 WBC    Gran % 48.2 38.0 - 73.0 %    Lymph % 39.9 18.0 - 48.0 %    Mono % 10.4 4.0 - 15.0 %    Eosinophil % 0.9 0.0 - 8.0 %    Basophil % 0.3 0.0 - 1.9 %    Differential Method Automated        Microbiology Results (last 7 days)       Procedure Component Value Units Date/Time    Urine culture [[id number]] Collected: 03/26/24 0004    Order Status: Completed Specimen: Urine Updated: 03/27/24 0836     Urine Culture, Routine No significant isolate to date    Narrative:      Specimen Source->Urine    Clostridium difficile EIA [[id number]]     Order Status: Canceled Specimen: Stool             Imaging Results              CT Head Without Contrast (Final result)  Result time 03/25/24 13:14:01      Final result by Alex Jones MD (03/25/24 13:14:01)                   Impression:      No acute intracranial pathology.      Electronically signed by: Alex Jones  Date:    03/25/2024  Time:    13:14               Narrative:    EXAMINATION:  CT HEAD WITHOUT CONTRAST    CLINICAL HISTORY:  Headache, sudden, severe;    TECHNIQUE:  Low dose axial images were obtained through the head.  Coronal and sagittal reformations were also performed. Contrast was not administered.    COMPARISON:  CT head without contrast 04/19/2021, MRI brain without contrast 10/29/2019.    FINDINGS:  Aneurysm coil about the anterior communicating artery.    Ventricles are stable in size  without evidence for acute hydrocephalus.  No extra-axial blood or fluid collection.    Brain parenchyma appears unchanged.  No evidence for parenchymal mass, edema, hemorrhage, or major vascular territory infarct.    No calvarial or skull base fracture or osseous destructive lesion.    Paranasal sinuses and mastoid air cells are essentially clear.                                          Pending Diagnostic Studies:       Procedure Component Value Units Date/Time    Comprehensive Metabolic Panel [[id number]]     Order Status: Sent Lab Status: No result     Specimen: Blood     Magnesium [[id number]]     Order Status: Sent Lab Status: No result     Specimen: Blood     Phosphorus [[id number]]     Order Status: Sent Lab Status: No result     Specimen: Blood            Medications:  Reconciled Home Medications:      Medication List        START taking these medications      cefdinir 300 MG capsule  Commonly known as: OMNICEF  Take 1 capsule (300 mg total) by mouth 2 (two) times daily. for 3 days  Start taking on: March 28, 2024            CHANGE how you take these medications      labetaloL 200 MG tablet  Commonly known as: NORMODYNE  Take 1 tablet (200 mg total) by mouth every 12 (twelve) hours.  What changed:   medication strength  how much to take     NIFEdipine 60 MG (OSM) 24 hr tablet  Commonly known as: PROCARDIA-XL  Take 1 tablet (60 mg total) by mouth 2 (two) times a day.  What changed:   medication strength  how much to take  when to take this            STOP taking these medications      acetaminophen 325 MG tablet  Commonly known as: TYLENOL     albuterol 90 mcg/actuation inhaler  Commonly known as: PROVENTIL/VENTOLIN HFA     diphenhydrAMINE 25 mg capsule  Commonly known as: BENADRYL     hydrOXYzine pamoate 25 MG Cap  Commonly known as: VistariL     potassium chloride 10 MEQ Cpsr  Commonly known as: MICRO-K              Indwelling Lines/Drains at time of discharge:   Lines/Drains/Airways       None                    Time spent on the discharge of patient: 35 minutes         Carlos Kuhn MD  Department of Hospital Medicine  [facility] - Observation

## 2024-03-28 LAB
BACTERIA UR CULT: NORMAL
OHS QRS DURATION: 114 MS
OHS QRS DURATION: 114 MS
OHS QTC CALCULATION: 478 MS
OHS QTC CALCULATION: 514 MS

## 2024-06-24 PROBLEM — N17.9 AKI (ACUTE KIDNEY INJURY): Status: RESOLVED | Noted: 2018-08-11 | Resolved: 2024-06-24

## 2024-08-11 ENCOUNTER — HOSPITAL ENCOUNTER (INPATIENT)
Facility: HOSPITAL | Age: 39
LOS: 4 days | Discharge: HOME | DRG: 304 | End: 2024-08-15
Attending: EMERGENCY MEDICINE | Admitting: STUDENT IN AN ORGANIZED HEALTH CARE EDUCATION/TRAINING PROGRAM

## 2024-08-11 DIAGNOSIS — Z86.79: ICD-10-CM

## 2024-08-11 DIAGNOSIS — Q61.3 PKD (POLYCYSTIC KIDNEY DISEASE): ICD-10-CM

## 2024-08-11 DIAGNOSIS — I10 HTN (HYPERTENSION), MALIGNANT: ICD-10-CM

## 2024-08-11 DIAGNOSIS — J81.0 ACUTE PULMONARY EDEMA: ICD-10-CM

## 2024-08-11 DIAGNOSIS — R07.9 CHEST PAIN: ICD-10-CM

## 2024-08-11 DIAGNOSIS — N17.9 AKI (ACUTE KIDNEY INJURY): ICD-10-CM

## 2024-08-11 DIAGNOSIS — R35.89 POLYURIA: ICD-10-CM

## 2024-08-11 DIAGNOSIS — E87.6 HYPOKALEMIA: ICD-10-CM

## 2024-08-11 DIAGNOSIS — I16.9 HYPERTENSIVE CRISIS: Primary | ICD-10-CM

## 2024-08-11 PROBLEM — O16.2 HYPERTENSION AFFECTING PREGNANCY IN SECOND TRIMESTER: Status: RESOLVED | Noted: 2018-08-10 | Resolved: 2024-08-11

## 2024-08-11 PROBLEM — D62 ACUTE BLOOD LOSS ANEMIA: Status: RESOLVED | Noted: 2018-09-29 | Resolved: 2024-08-11

## 2024-08-11 PROBLEM — E87.1 HYPONATREMIA: Status: RESOLVED | Noted: 2018-08-13 | Resolved: 2024-08-11

## 2024-08-11 PROBLEM — E83.42 HYPOMAGNESEMIA: Status: RESOLVED | Noted: 2024-03-27 | Resolved: 2024-08-11

## 2024-08-11 PROBLEM — E83.39 HYPOPHOSPHATEMIA: Status: RESOLVED | Noted: 2018-08-11 | Resolved: 2024-08-11

## 2024-08-11 PROBLEM — O14.10 PRE-ECLAMPSIA, SEVERE: Status: RESOLVED | Noted: 2018-09-27 | Resolved: 2024-08-11

## 2024-08-11 LAB
ALBUMIN SERPL-MCNC: 3.4 G/DL (ref 3.3–5.5)
ALP SERPL-CCNC: 32 U/L (ref 42–141)
AMPHET+METHAMPHET UR QL: NEGATIVE
ANION GAP SERPL CALC-SCNC: 12 MMOL/L (ref 8–16)
B-HCG UR QL: NEGATIVE
BARBITURATES UR QL SCN>200 NG/ML: NEGATIVE
BENZODIAZ UR QL SCN>200 NG/ML: NEGATIVE
BILIRUB SERPL-MCNC: 0.6 MG/DL (ref 0.2–1.6)
BILIRUBIN, POC UA: NEGATIVE
BLOOD, POC UA: ABNORMAL
BUN SERPL-MCNC: 22 MG/DL (ref 7–22)
BUN SERPL-MCNC: 26 MG/DL (ref 6–20)
BZE UR QL SCN: NEGATIVE
CALCIUM SERPL-MCNC: 10 MG/DL (ref 8.7–10.5)
CALCIUM SERPL-MCNC: 9.8 MG/DL (ref 8–10.3)
CANNABINOIDS UR QL SCN: NEGATIVE
CHLORIDE SERPL-SCNC: 102 MMOL/L (ref 95–110)
CHLORIDE SERPL-SCNC: 104 MMOL/L (ref 98–108)
CLARITY, UA POC: CLEAR
CO2 SERPL-SCNC: 27 MMOL/L (ref 23–29)
COLOR, UA POC: YELLOW
CREAT SERPL-MCNC: 2 MG/DL (ref 0.5–1.4)
CREAT SERPL-MCNC: 2.2 MG/DL (ref 0.6–1.2)
CREAT UR-MCNC: 10.2 MG/DL (ref 15–325)
CTP QC/QA: YES
EST. GFR  (NO RACE VARIABLE): 32 ML/MIN/1.73 M^2
GLUCOSE SERPL-MCNC: 87 MG/DL (ref 70–110)
GLUCOSE SERPL-MCNC: 88 MG/DL (ref 73–118)
GLUCOSE, POC UA: NEGATIVE
HCT, POC: NORMAL
HGB, POC: NORMAL (ref 14–18)
KETONES, POC UA: NEGATIVE
LEUKOCYTE EST, POC UA: ABNORMAL
MAGNESIUM SERPL-MCNC: 1.7 MG/DL (ref 1.6–2.6)
MCH, POC: NORMAL
MCHC, POC: NORMAL
MCV, POC: NORMAL
METHADONE UR QL SCN>300 NG/ML: NEGATIVE
MPV, POC: NORMAL
NITRITE, POC UA: NEGATIVE
OPIATES UR QL SCN: NEGATIVE
PCP UR QL SCN>25 NG/ML: NEGATIVE
PH UR STRIP: 7.5 [PH]
POC ALT (SGPT): 14 U/L (ref 10–47)
POC AST (SGOT): 23 U/L (ref 11–38)
POC B-TYPE NATRIURETIC PEPTIDE: 496 PG/ML (ref 0–100)
POC CARDIAC TROPONIN I: 0.05 NG/ML (ref 0–0.08)
POC D-DI: 807 NG/ML (ref 0–450)
POC PLATELET COUNT: NORMAL
POC PTINR: 1.2 (ref 0.9–1.2)
POC PTWBT: 14.3 SEC (ref 9.7–14.3)
POC TCO2: 33 MMOL/L (ref 18–33)
POTASSIUM BLD-SCNC: 2.8 MMOL/L (ref 3.6–5.1)
POTASSIUM SERPL-SCNC: 2.6 MMOL/L (ref 3.5–5.1)
PROT UR-MCNC: 10 MG/DL
PROT/CREAT UR: 0.98 MG/G{CREAT} (ref 0–0.2)
PROTEIN, POC UA: ABNORMAL
PROTEIN, POC: 6.8 G/DL (ref 6.4–8.1)
RBC, POC: NORMAL
RDW, POC: NORMAL
SAMPLE: NORMAL
SAMPLE: NORMAL
SODIUM BLD-SCNC: 142 MMOL/L (ref 128–145)
SODIUM SERPL-SCNC: 141 MMOL/L (ref 136–145)
SODIUM UR-SCNC: 128 MMOL/L (ref 20–250)
SPECIFIC GRAVITY, POC UA: 1.02
TOXICOLOGY INFORMATION: ABNORMAL
TROPONIN I SERPL DL<=0.01 NG/ML-MCNC: 0.06 NG/ML (ref 0–0.03)
TSH SERPL DL<=0.005 MIU/L-ACNC: 1.18 UIU/ML (ref 0.4–4)
UROBILINOGEN, POC UA: 0.2 E.U./DL
WBC, POC: NORMAL

## 2024-08-11 PROCEDURE — 93010 ELECTROCARDIOGRAM REPORT: CPT | Mod: 59,,, | Performed by: INTERNAL MEDICINE

## 2024-08-11 PROCEDURE — 83880 ASSAY OF NATRIURETIC PEPTIDE: CPT | Mod: ER

## 2024-08-11 PROCEDURE — 93005 ELECTROCARDIOGRAM TRACING: CPT

## 2024-08-11 PROCEDURE — 80307 DRUG TEST PRSMV CHEM ANLYZR: CPT | Performed by: INTERNAL MEDICINE

## 2024-08-11 PROCEDURE — 63600175 PHARM REV CODE 636 W HCPCS: Mod: ER | Performed by: EMERGENCY MEDICINE

## 2024-08-11 PROCEDURE — 84484 ASSAY OF TROPONIN QUANT: CPT | Performed by: INTERNAL MEDICINE

## 2024-08-11 PROCEDURE — 85025 COMPLETE CBC W/AUTO DIFF WBC: CPT | Mod: ER

## 2024-08-11 PROCEDURE — 84443 ASSAY THYROID STIM HORMONE: CPT | Performed by: INTERNAL MEDICINE

## 2024-08-11 PROCEDURE — 93010 ELECTROCARDIOGRAM REPORT: CPT | Mod: ,,, | Performed by: INTERNAL MEDICINE

## 2024-08-11 PROCEDURE — 80053 COMPREHEN METABOLIC PANEL: CPT | Mod: ER

## 2024-08-11 PROCEDURE — 36415 COLL VENOUS BLD VENIPUNCTURE: CPT | Performed by: INTERNAL MEDICINE

## 2024-08-11 PROCEDURE — 83735 ASSAY OF MAGNESIUM: CPT | Performed by: INTERNAL MEDICINE

## 2024-08-11 PROCEDURE — 25000003 PHARM REV CODE 250: Performed by: INTERNAL MEDICINE

## 2024-08-11 PROCEDURE — 84300 ASSAY OF URINE SODIUM: CPT | Performed by: INTERNAL MEDICINE

## 2024-08-11 PROCEDURE — 96365 THER/PROPH/DIAG IV INF INIT: CPT | Mod: ER

## 2024-08-11 PROCEDURE — 81025 URINE PREGNANCY TEST: CPT | Mod: ER | Performed by: EMERGENCY MEDICINE

## 2024-08-11 PROCEDURE — 20000000 HC ICU ROOM

## 2024-08-11 PROCEDURE — 25000003 PHARM REV CODE 250: Mod: ER | Performed by: EMERGENCY MEDICINE

## 2024-08-11 PROCEDURE — 25500020 PHARM REV CODE 255: Mod: ER | Performed by: EMERGENCY MEDICINE

## 2024-08-11 PROCEDURE — 85379 FIBRIN DEGRADATION QUANT: CPT | Mod: ER

## 2024-08-11 PROCEDURE — 81025 URINE PREGNANCY TEST: CPT | Mod: ER

## 2024-08-11 PROCEDURE — 93005 ELECTROCARDIOGRAM TRACING: CPT | Mod: ER

## 2024-08-11 PROCEDURE — 63600175 PHARM REV CODE 636 W HCPCS: Performed by: INTERNAL MEDICINE

## 2024-08-11 PROCEDURE — 81003 URINALYSIS AUTO W/O SCOPE: CPT | Mod: 59,ER

## 2024-08-11 PROCEDURE — 80048 BASIC METABOLIC PNL TOTAL CA: CPT | Performed by: INTERNAL MEDICINE

## 2024-08-11 PROCEDURE — 82570 ASSAY OF URINE CREATININE: CPT | Performed by: INTERNAL MEDICINE

## 2024-08-11 PROCEDURE — 25000242 PHARM REV CODE 250 ALT 637 W/ HCPCS: Mod: ER | Performed by: EMERGENCY MEDICINE

## 2024-08-11 PROCEDURE — 85610 PROTHROMBIN TIME: CPT | Mod: ER

## 2024-08-11 PROCEDURE — 83935 ASSAY OF URINE OSMOLALITY: CPT | Performed by: INTERNAL MEDICINE

## 2024-08-11 PROCEDURE — 99285 EMERGENCY DEPT VISIT HI MDM: CPT | Mod: 25,ER

## 2024-08-11 RX ORDER — FUROSEMIDE 10 MG/ML
20 INJECTION INTRAMUSCULAR; INTRAVENOUS ONCE
Status: DISCONTINUED | OUTPATIENT
Start: 2024-08-11 | End: 2024-08-11

## 2024-08-11 RX ORDER — POTASSIUM CHLORIDE 7.45 MG/ML
10 INJECTION INTRAVENOUS
Status: COMPLETED | OUTPATIENT
Start: 2024-08-11 | End: 2024-08-12

## 2024-08-11 RX ORDER — LABETALOL 100 MG/1
100 TABLET, FILM COATED ORAL ONCE
Status: COMPLETED | OUTPATIENT
Start: 2024-08-11 | End: 2024-08-11

## 2024-08-11 RX ORDER — MUPIROCIN 20 MG/G
OINTMENT TOPICAL 2 TIMES DAILY
Status: DISCONTINUED | OUTPATIENT
Start: 2024-08-11 | End: 2024-08-15 | Stop reason: HOSPADM

## 2024-08-11 RX ORDER — NALOXONE HCL 0.4 MG/ML
0.4 VIAL (ML) INJECTION
Status: DISCONTINUED | OUTPATIENT
Start: 2024-08-11 | End: 2024-08-15 | Stop reason: HOSPADM

## 2024-08-11 RX ORDER — ACETAMINOPHEN 500 MG
1000 TABLET ORAL
Status: COMPLETED | OUTPATIENT
Start: 2024-08-11 | End: 2024-08-11

## 2024-08-11 RX ORDER — NITROGLYCERIN 0.4 MG/1
0.4 TABLET SUBLINGUAL
Status: DISCONTINUED | OUTPATIENT
Start: 2024-08-11 | End: 2024-08-15 | Stop reason: HOSPADM

## 2024-08-11 RX ORDER — NIFEDIPINE 30 MG/1
60 TABLET, EXTENDED RELEASE ORAL 2 TIMES DAILY
Status: DISCONTINUED | OUTPATIENT
Start: 2024-08-12 | End: 2024-08-12

## 2024-08-11 RX ORDER — SODIUM CHLORIDE 0.9 % (FLUSH) 0.9 %
10 SYRINGE (ML) INJECTION EVERY 12 HOURS PRN
Status: DISCONTINUED | OUTPATIENT
Start: 2024-08-11 | End: 2024-08-15 | Stop reason: HOSPADM

## 2024-08-11 RX ORDER — NICARDIPINE HYDROCHLORIDE 0.2 MG/ML
0-15 INJECTION INTRAVENOUS CONTINUOUS
Status: DISCONTINUED | OUTPATIENT
Start: 2024-08-11 | End: 2024-08-12

## 2024-08-11 RX ORDER — LABETALOL 100 MG/1
200 TABLET, FILM COATED ORAL EVERY 12 HOURS
Status: DISCONTINUED | OUTPATIENT
Start: 2024-08-12 | End: 2024-08-12

## 2024-08-11 RX ORDER — OXYCODONE AND ACETAMINOPHEN 5; 325 MG/1; MG/1
1 TABLET ORAL EVERY 4 HOURS PRN
Status: COMPLETED | OUTPATIENT
Start: 2024-08-11 | End: 2024-08-11

## 2024-08-11 RX ORDER — TALC
9 POWDER (GRAM) TOPICAL NIGHTLY PRN
Status: DISCONTINUED | OUTPATIENT
Start: 2024-08-11 | End: 2024-08-15 | Stop reason: HOSPADM

## 2024-08-11 RX ORDER — IPRATROPIUM BROMIDE AND ALBUTEROL SULFATE 2.5; .5 MG/3ML; MG/3ML
3 SOLUTION RESPIRATORY (INHALATION) EVERY 4 HOURS PRN
Status: DISCONTINUED | OUTPATIENT
Start: 2024-08-11 | End: 2024-08-15 | Stop reason: HOSPADM

## 2024-08-11 RX ORDER — ONDANSETRON HYDROCHLORIDE 2 MG/ML
4 INJECTION, SOLUTION INTRAVENOUS EVERY 6 HOURS PRN
Status: DISCONTINUED | OUTPATIENT
Start: 2024-08-11 | End: 2024-08-15 | Stop reason: HOSPADM

## 2024-08-11 RX ORDER — ACETAMINOPHEN 325 MG/1
650 TABLET ORAL EVERY 4 HOURS PRN
Status: DISCONTINUED | OUTPATIENT
Start: 2024-08-11 | End: 2024-08-15 | Stop reason: HOSPADM

## 2024-08-11 RX ORDER — MAGNESIUM SULFATE 1 G/100ML
1 INJECTION INTRAVENOUS ONCE
Status: COMPLETED | OUTPATIENT
Start: 2024-08-11 | End: 2024-08-11

## 2024-08-11 RX ORDER — POTASSIUM CHLORIDE 20 MEQ/1
40 TABLET, EXTENDED RELEASE ORAL ONCE
Status: COMPLETED | OUTPATIENT
Start: 2024-08-11 | End: 2024-08-11

## 2024-08-11 RX ORDER — SIMETHICONE 80 MG
1 TABLET,CHEWABLE ORAL 4 TIMES DAILY PRN
Status: DISCONTINUED | OUTPATIENT
Start: 2024-08-11 | End: 2024-08-15 | Stop reason: HOSPADM

## 2024-08-11 RX ORDER — POLYETHYLENE GLYCOL 3350 17 G/17G
17 POWDER, FOR SOLUTION ORAL 2 TIMES DAILY PRN
Status: DISCONTINUED | OUTPATIENT
Start: 2024-08-11 | End: 2024-08-15 | Stop reason: HOSPADM

## 2024-08-11 RX ADMIN — NICARDIPINE HYDROCHLORIDE 7.5 MG/HR: 0.2 INJECTION, SOLUTION INTRAVENOUS at 10:08

## 2024-08-11 RX ADMIN — NITROGLYCERIN 0.4 MG: 0.4 TABLET, ORALLY DISINTEGRATING SUBLINGUAL at 04:08

## 2024-08-11 RX ADMIN — NICARDIPINE HYDROCHLORIDE 2.5 MG/HR: 0.2 INJECTION, SOLUTION INTRAVENOUS at 05:08

## 2024-08-11 RX ADMIN — OXYCODONE HYDROCHLORIDE AND ACETAMINOPHEN 1 TABLET: 5; 325 TABLET ORAL at 09:08

## 2024-08-11 RX ADMIN — POTASSIUM BICARBONATE 25 MEQ: 978 TABLET, EFFERVESCENT ORAL at 05:08

## 2024-08-11 RX ADMIN — POTASSIUM CHLORIDE 40 MEQ: 1500 TABLET, EXTENDED RELEASE ORAL at 10:08

## 2024-08-11 RX ADMIN — POTASSIUM CHLORIDE 10 MEQ: 7.46 INJECTION, SOLUTION INTRAVENOUS at 10:08

## 2024-08-11 RX ADMIN — ACETAMINOPHEN 1000 MG: 500 TABLET, FILM COATED ORAL at 04:08

## 2024-08-11 RX ADMIN — LABETALOL HYDROCHLORIDE 100 MG: 100 TABLET, FILM COATED ORAL at 10:08

## 2024-08-11 RX ADMIN — IOHEXOL 100 ML: 350 INJECTION, SOLUTION INTRAVENOUS at 06:08

## 2024-08-11 RX ADMIN — MAGNESIUM SULFATE HEPTAHYDRATE 1 G: 10 INJECTION, SOLUTION INTRAVENOUS at 10:08

## 2024-08-11 NOTE — SUBJECTIVE & OBJECTIVE
Past Medical History:   Diagnosis Date    Anemia     Bipolar 1 disorder     Cerebral aneurysm     Hypertension     since 2012    Polycystic kidney disease     Polycystic kidney disease     Pre-eclampsia     Renal disorder     Since childhood     SAH (subarachnoid hemorrhage)     Stroke        Past Surgical History:   Procedure Laterality Date    BRAIN SURGERY      CEREBRAL ANGIOGRAM N/A 2018    Procedure: ANGIOGRAM-CEREBRAL;  Surgeon: Jayna Surgeon;  Location: [facility];  Service: Anesthesiology;  Laterality: N/A;     SECTION N/A 2018    Procedure:  SECTION;  Surgeon: Obed Soto MD;  Location: [facility], [place], operated by [facility];  Service: OB/GYN;  Laterality: N/A;    DILATION AND CURETTAGE OF UTERUS             Review of patient's allergies indicates:  No Known Allergies    No current facility-administered medications on file prior to encounter.     Current Outpatient Medications on File Prior to Encounter   Medication Sig    labetaloL (NORMODYNE) 200 MG tablet Take 1 tablet (200 mg total) by mouth every 12 (twelve) hours.    NIFEdipine (PROCARDIA-XL) 60 MG (OSM) 24 hr tablet Take 1 tablet (60 mg total) by mouth 2 (two) times a day.     Family History       Problem Relation (Age of Onset)    Hypertension Mother, Paternal Grandmother    Polycystic kidney disease Mother, Daughter          Tobacco Use    Smoking status: Some Days     Types: Cigarettes    Smokeless tobacco: Never   Substance and Sexual Activity    Alcohol use: Yes     Comment: occasional    Drug use: Yes     Types: Marijuana    Sexual activity: Yes     Partners: Male     Birth control/protection: None     Review of Systems   Constitutional:  Positive for activity change and fatigue. Negative for chills and fever.   HENT:  Negative for congestion, rhinorrhea and sore throat.    Eyes:  Negative for visual disturbance.   Respiratory:  Positive for chest tightness and shortness of breath (lazcano). Negative for cough and wheezing.    Cardiovascular:   Positive for chest pain and leg swelling. Negative for palpitations.   Gastrointestinal:  Negative for abdominal pain, diarrhea, nausea and vomiting.   Endocrine: Positive for polyuria.   Genitourinary:  Negative for dysuria.   Musculoskeletal:  Positive for arthralgias and back pain. Negative for myalgias.   Neurological:  Positive for headaches. Negative for dizziness and syncope.   Psychiatric/Behavioral:  The patient is not nervous/anxious.      Objective:     Vital Signs (Most Recent):  Temp: 98.7 °F (37.1 °C) (08/11/24 1558)  Pulse: 85 (08/11/24 1726)  Resp: 16 (08/11/24 1726)  BP: (!) 187/103 (08/11/24 1813)  SpO2: (!) 92 % (08/11/24 1726) Vital Signs (24h Range):  Temp:  [98.7 °F (37.1 °C)] 98.7 °F (37.1 °C)  Pulse:  [63-85] 85  Resp:  [16-20] 16  SpO2:  [92 %-98 %] 92 %  BP: (187-256)/(103-151) 187/103     Weight: 68 kg (150 lb)  Body mass index is 22.15 kg/m².     Physical Exam  Vitals and nursing note reviewed.   Constitutional:       General: She is not in acute distress.     Appearance: Normal appearance. She is normal weight. She is not ill-appearing, toxic-appearing or diaphoretic.   HENT:      Head: Normocephalic and atraumatic.      Nose: Nose normal. No congestion or rhinorrhea.      Mouth/Throat:      Mouth: Mucous membranes are moist.      Pharynx: Oropharynx is clear.   Eyes:      General: No scleral icterus.     Extraocular Movements: Extraocular movements intact.      Conjunctiva/sclera: Conjunctivae normal.      Pupils: Pupils are equal, round, and reactive to light.   Cardiovascular:      Rate and Rhythm: Normal rate and regular rhythm.      Pulses: Normal pulses.      Heart sounds: Normal heart sounds. No murmur heard.     No friction rub. No gallop.   Pulmonary:      Effort: Pulmonary effort is normal. No respiratory distress.      Breath sounds: Normal breath sounds. No wheezing, rhonchi or rales.   Abdominal:      General: Bowel sounds are normal. There is no distension.      Palpations:  Abdomen is soft.      Tenderness: There is no abdominal tenderness. There is no guarding.   Musculoskeletal:         General: No swelling. Normal range of motion.      Cervical back: Normal range of motion and neck supple.      Right lower leg: No edema.      Left lower leg: No edema.   Skin:     General: Skin is warm.      Capillary Refill: Capillary refill takes less than 2 seconds.      Coloration: Skin is not pale.   Neurological:      General: No focal deficit present.      Mental Status: She is alert and oriented to person, place, and time.      Cranial Nerves: No cranial nerve deficit.      Motor: No weakness.      Coordination: Coordination normal.      Gait: Gait normal.   Psychiatric:         Mood and Affect: Mood normal.         Behavior: Behavior normal.              CRANIAL NERVES     CN III, IV, VI   Pupils are equal, round, and reactive to light.       Recent Results (from the past 24 hour(s))   POCT CMP    Collection Time: 08/11/24  4:14 PM   Result Value Ref Range    Albumin, POC 3.4 3.3 - 5.5 g/dL    Alkaline Phosphatase, POC 32 (L) 42 - 141 U/L    ALT (SGPT), POC 14 10 - 47 U/L    AST (SGOT), POC 23 11 - 38 U/L    POC BUN 22 7 - 22 mg/dL    Calcium, POC 9.8 8.0 - 10.3 mg/dL    POC Chloride 104 98 - 108 mmol/L    POC Creatinine 2.2 (H) 0.6 - 1.2 mg/dL    POC Glucose 88 73 - 118 mg/dL    POC Potassium 2.8 (L) 3.6 - 5.1 mmol/L    POC Sodium 142 128 - 145 mmol/L    Bilirubin, POC 0.6 0.2 - 1.6 mg/dL    POC TCO2 33 18 - 33 mmol/L    Protein, POC 6.8 6.4 - 8.1 g/dL   Troponin ISTAT    Collection Time: 08/11/24  4:14 PM   Result Value Ref Range    POC Cardiac Troponin I 0.05 0.00 - 0.08 ng/mL    Sample unknown    POCT CBC    Collection Time: 08/11/24  4:15 PM   Result Value Ref Range    Hematocrit      Hemoglobin      RBC      WBC      MCV      MCH, POC      MCHC      RDW-CV      Platelet Count, POC      MPV     ISTAT PROCEDURE    Collection Time: 08/11/24  4:15 PM   Result Value Ref Range    POC PTWBT  14.3 9.7 - 14.3 sec    POC PTINR 1.2 0.9 - 1.2    Sample unknown    POCT B-type natriuretic peptide (BNP)    Collection Time: 08/11/24  4:29 PM   Result Value Ref Range    POC B-Type Natriuretic Peptide 496 (H) 0.0 - 100.0 pg/mL   POCT D Dimer    Collection Time: 08/11/24  4:33 PM   Result Value Ref Range    POC D- (H) 0.0 - 450.0 ng/mL   POCT URINALYSIS W/O SCOPE    Collection Time: 08/11/24  5:25 PM   Result Value Ref Range    Glucose, UA Negative     Bilirubin, UA Negative     Ketones, UA Negative     Spec Grav UA 1.020     Blood, UA Trace-intact (A)     PH, UA 7.5     Protein, UA 1+ (A)     Urobilinogen, UA 0.2 E.U./dL    Nitrite, UA Negative     Leukocytes, UA Trace (A)     Color, UA POC Yellow     Clarity, UA, POC Clear    POCT urine pregnancy    Collection Time: 08/11/24  5:26 PM   Result Value Ref Range    POC Preg Test, Ur Negative Negative     Acceptable Yes    Basic metabolic panel    Collection Time: 08/11/24  8:43 PM   Result Value Ref Range    Sodium 141 136 - 145 mmol/L    Potassium 2.6 (LL) 3.5 - 5.1 mmol/L    Chloride 102 95 - 110 mmol/L    CO2 27 23 - 29 mmol/L    Glucose 87 70 - 110 mg/dL    BUN 26 (H) 6 - 20 mg/dL    Creatinine 2.0 (H) 0.5 - 1.4 mg/dL    Calcium 10.0 8.7 - 10.5 mg/dL    Anion Gap 12 8 - 16 mmol/L    eGFR 32 (A) >60 mL/min/1.73 m^2   Magnesium    Collection Time: 08/11/24  8:43 PM   Result Value Ref Range    Magnesium 1.7 1.6 - 2.6 mg/dL   Troponin I    Collection Time: 08/11/24  8:43 PM   Result Value Ref Range    Troponin I 0.064 (H) 0.000 - 0.026 ng/mL   TSH    Collection Time: 08/11/24  8:43 PM   Result Value Ref Range    TSH 1.178 0.400 - 4.000 uIU/mL   Sodium, urine, random    Collection Time: 08/11/24 10:11 PM   Result Value Ref Range    Sodium, Urine 128 20 - 250 mmol/L   Creatinine, urine, random    Collection Time: 08/11/24 10:11 PM   Result Value Ref Range    Creatinine, Urine 10.2 (L) 15.0 - 325.0 mg/dL   Protein / creatinine ratio, urine     Collection Time: 08/11/24 10:11 PM   Result Value Ref Range    Protein, Urine Random 10 mg/dL    Creatinine, Urine 10.2 (L) 15.0 - 325.0 mg/dL    Prot/Creat Ratio, Urine 0.98 (H) 0.00 - 0.20   Drug screen panel, in-house    Collection Time: 08/11/24 10:11 PM   Result Value Ref Range    Benzodiazepines Negative Negative    Methadone metabolites Negative Negative    Cocaine (Metab.) Negative Negative    Opiate Scrn, Ur Negative Negative    Barbiturate Screen, Ur Negative Negative    Amphetamine Screen, Ur Negative Negative    THC Negative Negative    Phencyclidine Negative Negative    Creatinine, Urine 10.2 (L) 15.0 - 325.0 mg/dL    Toxicology Information SEE COMMENT        Microbiology Results (last 7 days)       ** No results found for the last 168 hours. **            Imaging Results              CTA Chest Non-Coronary (PE Studies) (Final result)  Result time 08/11/24 18:36:46      Final result by Jhonny Olsen MD (08/11/24 18:36:46)                   Impression:      1. No evidence of PE.  2. Small bilateral pleural effusions with mild pulmonary interstitial edema.  3. Mild fusiform dilatation of the ascending thoracic aorta measuring 4.2 cm.  4. Additional findings as detailed above.      Electronically signed by: Jhonny Olsen MD  Date:    08/11/2024  Time:    18:36               Narrative:    EXAMINATION:  CTA CHEST NON CORONARY (PE STUDIES)    CLINICAL HISTORY:  Pulmonary embolism (PE) suspected, positive D-dimer;    TECHNIQUE:  Low dose axial images, sagittal and coronal reformations were obtained from the thoracic inlet to the lung bases following the IV administration of 100 mL of Omnipaque 350.  Contrast timing was optimized to evaluate the pulmonary arteries.  MIP images were performed.    COMPARISON:  CT abdomen and pelvis from October 2018.    FINDINGS:  Structures at the base of the neck are unremarkable.  There is mild fusiform dilatation of the ascending thoracic aorta measuring 4.2 cm.  The  heart is mildly enlarged without pericardial effusion.  No intraluminal filling defects within the pulmonary arteries to suggest pulmonary thromboembolism through the proximal segmental branch arteries.  The esophagus is unremarkable along its course.    The trachea and bronchi are patent.  The lungs are symmetrically expanded.  Small bilateral pleural effusions are seen.  There is mild interlobular septal thickening suggestive for mild pulmonary interstitial edema.  Otherwise no evidence of focal consolidation.  No evidence to suggest pulmonary hemorrhage or infarction.    Innumerable cysts are seen throughout the visualized portion of the liver.  No acute osseous abnormality identified.  Extrathoracic soft tissues are unremarkable.                                       X-Ray Chest AP Portable (Final result)  Result time 08/11/24 17:42:16      Final result by Jhonny Olsen MD (08/11/24 17:42:16)                   Impression:      No acute cardiopulmonary process identified.      Electronically signed by: Jhonny Olsen MD  Date:    08/11/2024  Time:    17:42               Narrative:    EXAMINATION:  XR CHEST AP PORTABLE    CLINICAL HISTORY:  Chest Pain;    TECHNIQUE:  Single frontal view of the chest was performed.    COMPARISON:  April 2021.    FINDINGS:  Cardiac silhouette is normal in size.  Lungs are symmetrically expanded.  No evidence of focal consolidative process, pneumothorax, or significant pleural effusion.  No acute osseous abnormality identified.

## 2024-08-11 NOTE — ASSESSMENT & PLAN NOTE
Patient has a current diagnosis of Hypertensive emergency with end organ damage evidenced by acute kidney injury and acute pulmonary edema without heart failure which is uncontrolled.  Suspect due to not taking her BP medications as instructed the last time.  Latest blood pressure and vitals reviewed-   Temp:  [98.7 °F (37.1 °C)]   Pulse:  [63-85]   Resp:  [16-20]   BP: (187-256)/(103-151)   SpO2:  [92 %-98 %] .   Patient currently on IV antihypertensives.   Home meds for hypertension were reviewed and noted below.   Hypertension Medications               labetaloL (NORMODYNE) 200 MG tablet Take 1 tablet (200 mg total) by mouth every 12 (twelve) hours.    NIFEdipine (PROCARDIA-XL) 60 MG (OSM) 24 hr tablet Take 1 tablet (60 mg total) by mouth 2 (two) times a day.            Medication adjustment for hospital antihypertensives is as follows- we will resume her Procardia in the morning and continue her on Cardene drip for now with goal blood pressure control to be 25% of her initial blood pressure presentation.  TSH, Urine Tox, Echo ordered ordered and diet is adjusted.  Continue to monitor in the ICU and mitigate end organ damage as indicated.

## 2024-08-11 NOTE — ASSESSMENT & PLAN NOTE
This time no significant headache to suggest intracranial bleed at this time.  CT head non-contrast negative for mass or bleed.  Continue to control blood pressure.

## 2024-08-11 NOTE — ASSESSMENT & PLAN NOTE
JOSE C is likely due to  hypertensive crisis.  Reviewed her last hospitalization for similar diagnosis (3/2024) and noted that her initial creatinine at that time was 2.1.  With blood pressure control and IV fluids creatinine eventually improved to 1.6 which appears to be her baseline. . Most recent creatinine and eGFR are listed below.  Recent Labs     08/11/24 2043   CREATININE 2.0*   EGFRNORACEVR 32*      Plan  - JOSE C:  We will continue to monitor her renal function after giving her 1 dose of Lasix due to her pulmonary edema.  We will control her blood pressure.  Follow up on repeat labs in the morning.  If no improvement in her renal function we will consider consulting Nephrology.  - Avoid nephrotoxins and renally dose meds for GFR listed above  - Monitor urine output, serial BMP, and adjust therapy as needed

## 2024-08-11 NOTE — H&P
[facility] Medicine  History & Physical    Patient Name: Sharon Grande  MRN: [id number]  Patient Class: IP- Inpatient  Admission Date: 8/11/2024  Attending Physician: Dr. Anne-Marie Dominguez   Primary Care Provider: [facility] -         Patient information was obtained from patient, past medical records, and ER records.     Subjective:     Principal Problem:Hypertensive crisis    Chief Complaint:   Chief Complaint   Patient presents with    Shortness of Breath     Patient presents w/ a c/o sob w/ exertion onset this morning. Report bilateral leg swelling for approximately two days.        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  She also reports bilateral lower extremity swelling (L>R) for the the past 2 days.  A shortness of breath is also accompanied with complaints of chest tightness and pain and a mild headache.  Denies any nausea or vomiting.  States she has been able take her blood pressure medications however she has been checking her blood pressure regularly.  Not sure exactly which one she is taking but states only doing one one day. Upon review of her d/c summary from 3/2024, she was supposed to be on Procardia XL 60mg PO BID and Labetalol 200mg PO BID.     Labs at the outside emergency department were significant for a low potassium of 2.8, elevated creatinine of 2.2.  Her troponin level was 0.05 with an elevated BNP (496 and no h/o CHF) and  D-dimer    Chest x-ray was overall unremarkable for any acute pulmonary or cardiac process. CTA chest ordered:  1. No evidence of PE.   2. Small bilateral pleural effusions with mild pulmonary interstitial edema.   3. Mild fusiform dilatation of the ascending thoracic aorta measuring 4.2 cm.   4. Additional findings as detailed above.     She was started on Cardene drip for an elevated blood pressure initially 234/144 (got as high as 256/151).  She has been transferred to [facility] ICU for further inpatient management of her hypertensive emergency (based on JOSE C and elevated BNP/D-dimer).      Because this patient's clinical condition is guarded and requires frequent monitoring of her vital signs which includes her blood pressure as well as weaning her Cardene drip and further evaluation of her malignant hypertension, care in an alternative location isn't clinically appropriate for the reasons stated above.              Past Medical History:   Diagnosis Date    Anemia     Bipolar 1 disorder     Cerebral aneurysm     Hypertension     since     Polycystic kidney disease     Polycystic kidney disease     Pre-eclampsia     Renal disorder     Since childhood     SAH (subarachnoid hemorrhage)     Stroke        Past Surgical History:   Procedure Laterality Date    BRAIN SURGERY      CEREBRAL ANGIOGRAM N/A 2018    Procedure: ANGIOGRAM-CEREBRAL;  Surgeon: Jayna Surgeon;  Location: [facility];  Service: Anesthesiology;  Laterality: N/A;     SECTION N/A 2018    Procedure:  SECTION;  Surgeon: Obed Soto MD;  Location: [facility] L&D;  Service: OB/GYN;  Laterality: N/A;    DILATION AND CURETTAGE OF UTERUS             Review of patient's allergies indicates:  No Known Allergies    No current facility-administered medications on file prior to encounter.     Current Outpatient Medications on File Prior to Encounter   Medication Sig    labetaloL (NORMODYNE) 200 MG tablet Take 1 tablet (200 mg  total) by mouth every 12 (twelve) hours.    NIFEdipine (PROCARDIA-XL) 60 MG (OSM) 24 hr tablet Take 1 tablet (60 mg total) by mouth 2 (two) times a day.     Family History       Problem Relation (Age of Onset)    Hypertension Mother, Paternal Grandmother    Polycystic kidney disease Mother, Daughter          Tobacco Use    Smoking status: Some Days     Types: Cigarettes    Smokeless tobacco: Never   Substance and Sexual Activity    Alcohol use: Yes     Comment: occasional    Drug use: Yes     Types: Marijuana    Sexual activity: Yes     Partners: Male     Birth control/protection: None     Review of Systems   Constitutional:  Positive for activity change and fatigue. Negative for chills and fever.   HENT:  Negative for congestion, rhinorrhea and sore throat.    Eyes:  Negative for visual disturbance.   Respiratory:  Positive for chest tightness and shortness of breath (lazcano). Negative for cough and wheezing.    Cardiovascular:  Positive for chest pain and leg swelling. Negative for palpitations.   Gastrointestinal:  Negative for abdominal pain, diarrhea, nausea and vomiting.   Endocrine: Positive for polyuria.   Genitourinary:  Negative for dysuria.   Musculoskeletal:  Positive for arthralgias and back pain. Negative for myalgias.   Neurological:  Positive for headaches. Negative for dizziness and syncope.   Psychiatric/Behavioral:  The patient is not nervous/anxious.      Objective:     Vital Signs (Most Recent):  Temp: 98.7 °F (37.1 °C) (08/11/24 1558)  Pulse: 85 (08/11/24 1726)  Resp: 16 (08/11/24 1726)  BP: (!) 187/103 (08/11/24 1813)  SpO2: (!) 92 % (08/11/24 1726) Vital Signs (24h Range):  Temp:  [98.7 °F (37.1 °C)] 98.7 °F (37.1 °C)  Pulse:  [63-85] 85  Resp:  [16-20] 16  SpO2:  [92 %-98 %] 92 %  BP: (187-256)/(103-151) 187/103     Weight: 68 kg (150 lb)  Body mass index is 22.15 kg/m².     Physical Exam  Vitals and nursing note reviewed.   Constitutional:       General: She is not in acute distress.      Appearance: Normal appearance. She is normal weight. She is not ill-appearing, toxic-appearing or diaphoretic.   HENT:      Head: Normocephalic and atraumatic.      Nose: Nose normal. No congestion or rhinorrhea.      Mouth/Throat:      Mouth: Mucous membranes are moist.      Pharynx: Oropharynx is clear.   Eyes:      General: No scleral icterus.     Extraocular Movements: Extraocular movements intact.      Conjunctiva/sclera: Conjunctivae normal.      Pupils: Pupils are equal, round, and reactive to light.   Cardiovascular:      Rate and Rhythm: Normal rate and regular rhythm.      Pulses: Normal pulses.      Heart sounds: Normal heart sounds. No murmur heard.     No friction rub. No gallop.   Pulmonary:      Effort: Pulmonary effort is normal. No respiratory distress.      Breath sounds: Normal breath sounds. No wheezing, rhonchi or rales.   Abdominal:      General: Bowel sounds are normal. There is no distension.      Palpations: Abdomen is soft.      Tenderness: There is no abdominal tenderness. There is no guarding.   Musculoskeletal:         General: No swelling. Normal range of motion.      Cervical back: Normal range of motion and neck supple.      Right lower leg: No edema.      Left lower leg: No edema.   Skin:     General: Skin is warm.      Capillary Refill: Capillary refill takes less than 2 seconds.      Coloration: Skin is not pale.   Neurological:      General: No focal deficit present.      Mental Status: She is alert and oriented to person, place, and time.      Cranial Nerves: No cranial nerve deficit.      Motor: No weakness.      Coordination: Coordination normal.      Gait: Gait normal.   Psychiatric:         Mood and Affect: Mood normal.         Behavior: Behavior normal.              CRANIAL NERVES     CN III, IV, VI   Pupils are equal, round, and reactive to light.       Recent Results (from the past 24 hour(s))   POCT CMP    Collection Time: 08/11/24  4:14 PM   Result Value Ref Range     Albumin, POC 3.4 3.3 - 5.5 g/dL    Alkaline Phosphatase, POC 32 (L) 42 - 141 U/L    ALT (SGPT), POC 14 10 - 47 U/L    AST (SGOT), POC 23 11 - 38 U/L    POC BUN 22 7 - 22 mg/dL    Calcium, POC 9.8 8.0 - 10.3 mg/dL    POC Chloride 104 98 - 108 mmol/L    POC Creatinine 2.2 (H) 0.6 - 1.2 mg/dL    POC Glucose 88 73 - 118 mg/dL    POC Potassium 2.8 (L) 3.6 - 5.1 mmol/L    POC Sodium 142 128 - 145 mmol/L    Bilirubin, POC 0.6 0.2 - 1.6 mg/dL    POC TCO2 33 18 - 33 mmol/L    Protein, POC 6.8 6.4 - 8.1 g/dL   Troponin ISTAT    Collection Time: 08/11/24  4:14 PM   Result Value Ref Range    POC Cardiac Troponin I 0.05 0.00 - 0.08 ng/mL    Sample unknown    POCT CBC    Collection Time: 08/11/24  4:15 PM   Result Value Ref Range    Hematocrit      Hemoglobin      RBC      WBC      MCV      MCH, POC      MCHC      RDW-CV      Platelet Count, POC      MPV     ISTAT PROCEDURE    Collection Time: 08/11/24  4:15 PM   Result Value Ref Range    POC PTWBT 14.3 9.7 - 14.3 sec    POC PTINR 1.2 0.9 - 1.2    Sample unknown    POCT B-type natriuretic peptide (BNP)    Collection Time: 08/11/24  4:29 PM   Result Value Ref Range    POC B-Type Natriuretic Peptide 496 (H) 0.0 - 100.0 pg/mL   POCT D Dimer    Collection Time: 08/11/24  4:33 PM   Result Value Ref Range    POC D- (H) 0.0 - 450.0 ng/mL   POCT URINALYSIS W/O SCOPE    Collection Time: 08/11/24  5:25 PM   Result Value Ref Range    Glucose, UA Negative     Bilirubin, UA Negative     Ketones, UA Negative     Spec Grav UA 1.020     Blood, UA Trace-intact (A)     PH, UA 7.5     Protein, UA 1+ (A)     Urobilinogen, UA 0.2 E.U./dL    Nitrite, UA Negative     Leukocytes, UA Trace (A)     Color, UA POC Yellow     Clarity, UA, POC Clear    POCT urine pregnancy    Collection Time: 08/11/24  5:26 PM   Result Value Ref Range    POC Preg Test, Ur Negative Negative     Acceptable Yes    Basic metabolic panel    Collection Time: 08/11/24  8:43 PM   Result Value Ref Range    Sodium  141 136 - 145 mmol/L    Potassium 2.6 (LL) 3.5 - 5.1 mmol/L    Chloride 102 95 - 110 mmol/L    CO2 27 23 - 29 mmol/L    Glucose 87 70 - 110 mg/dL    BUN 26 (H) 6 - 20 mg/dL    Creatinine 2.0 (H) 0.5 - 1.4 mg/dL    Calcium 10.0 8.7 - 10.5 mg/dL    Anion Gap 12 8 - 16 mmol/L    eGFR 32 (A) >60 mL/min/1.73 m^2   Magnesium    Collection Time: 08/11/24  8:43 PM   Result Value Ref Range    Magnesium 1.7 1.6 - 2.6 mg/dL   Troponin I    Collection Time: 08/11/24  8:43 PM   Result Value Ref Range    Troponin I 0.064 (H) 0.000 - 0.026 ng/mL   TSH    Collection Time: 08/11/24  8:43 PM   Result Value Ref Range    TSH 1.178 0.400 - 4.000 uIU/mL   Sodium, urine, random    Collection Time: 08/11/24 10:11 PM   Result Value Ref Range    Sodium, Urine 128 20 - 250 mmol/L   Creatinine, urine, random    Collection Time: 08/11/24 10:11 PM   Result Value Ref Range    Creatinine, Urine 10.2 (L) 15.0 - 325.0 mg/dL   Protein / creatinine ratio, urine    Collection Time: 08/11/24 10:11 PM   Result Value Ref Range    Protein, Urine Random 10 mg/dL    Creatinine, Urine 10.2 (L) 15.0 - 325.0 mg/dL    Prot/Creat Ratio, Urine 0.98 (H) 0.00 - 0.20   Drug screen panel, in-house    Collection Time: 08/11/24 10:11 PM   Result Value Ref Range    Benzodiazepines Negative Negative    Methadone metabolites Negative Negative    Cocaine (Metab.) Negative Negative    Opiate Scrn, Ur Negative Negative    Barbiturate Screen, Ur Negative Negative    Amphetamine Screen, Ur Negative Negative    THC Negative Negative    Phencyclidine Negative Negative    Creatinine, Urine 10.2 (L) 15.0 - 325.0 mg/dL    Toxicology Information SEE COMMENT        Microbiology Results (last 7 days)       ** No results found for the last 168 hours. **            Imaging Results              CTA Chest Non-Coronary (PE Studies) (Final result)  Result time 08/11/24 18:36:46      Final result by Jhonny Olsen MD (08/11/24 18:36:46)                   Impression:      1. No evidence of  PE.  2. Small bilateral pleural effusions with mild pulmonary interstitial edema.  3. Mild fusiform dilatation of the ascending thoracic aorta measuring 4.2 cm.  4. Additional findings as detailed above.      Electronically signed by: Jhonny Olsen MD  Date:    08/11/2024  Time:    18:36               Narrative:    EXAMINATION:  CTA CHEST NON CORONARY (PE STUDIES)    CLINICAL HISTORY:  Pulmonary embolism (PE) suspected, positive D-dimer;    TECHNIQUE:  Low dose axial images, sagittal and coronal reformations were obtained from the thoracic inlet to the lung bases following the IV administration of 100 mL of Omnipaque 350.  Contrast timing was optimized to evaluate the pulmonary arteries.  MIP images were performed.    COMPARISON:  CT abdomen and pelvis from October 2018.    FINDINGS:  Structures at the base of the neck are unremarkable.  There is mild fusiform dilatation of the ascending thoracic aorta measuring 4.2 cm.  The heart is mildly enlarged without pericardial effusion.  No intraluminal filling defects within the pulmonary arteries to suggest pulmonary thromboembolism through the proximal segmental branch arteries.  The esophagus is unremarkable along its course.    The trachea and bronchi are patent.  The lungs are symmetrically expanded.  Small bilateral pleural effusions are seen.  There is mild interlobular septal thickening suggestive for mild pulmonary interstitial edema.  Otherwise no evidence of focal consolidation.  No evidence to suggest pulmonary hemorrhage or infarction.    Innumerable cysts are seen throughout the visualized portion of the liver.  No acute osseous abnormality identified.  Extrathoracic soft tissues are unremarkable.                                       X-Ray Chest AP Portable (Final result)  Result time 08/11/24 17:42:16      Final result by Jhonny Olsen MD (08/11/24 17:42:16)                   Impression:      No acute cardiopulmonary process  identified.      Electronically signed by: Jhonny Olsen MD  Date:    08/11/2024  Time:    17:42               Narrative:    EXAMINATION:  XR CHEST AP PORTABLE    CLINICAL HISTORY:  Chest Pain;    TECHNIQUE:  Single frontal view of the chest was performed.    COMPARISON:  April 2021.    FINDINGS:  Cardiac silhouette is normal in size.  Lungs are symmetrically expanded.  No evidence of focal consolidative process, pneumothorax, or significant pleural effusion.  No acute osseous abnormality identified.                                        Assessment/Plan:     * Hypertensive crisis  Patient has a current diagnosis of Hypertensive emergency with end organ damage evidenced by acute kidney injury and acute pulmonary edema without heart failure which is uncontrolled.  Suspect due to not taking her BP medications as instructed the last time.  Latest blood pressure and vitals reviewed-   Temp:  [98.7 °F (37.1 °C)]   Pulse:  [63-85]   Resp:  [16-20]   BP: (187-256)/(103-151)   SpO2:  [92 %-98 %] .   Patient currently on IV antihypertensives.   Home meds for hypertension were reviewed and noted below.   Hypertension Medications               labetaloL (NORMODYNE) 200 MG tablet Take 1 tablet (200 mg total) by mouth every 12 (twelve) hours.    NIFEdipine (PROCARDIA-XL) 60 MG (OSM) 24 hr tablet Take 1 tablet (60 mg total) by mouth 2 (two) times a day.            Medication adjustment for hospital antihypertensives is as follows- we will resume her Procardia in the morning and continue her on Cardene drip for now with goal blood pressure control to be 25% of her initial blood pressure presentation.  TSH, Urine Tox, Echo ordered ordered and diet is adjusted.  Continue to monitor in the ICU and mitigate end organ damage as indicated.    Acute pulmonary edema  No history of CHF and suspect acute pulmonary edema due to hypertensive crisis.  We will give her a 1 time dose of Lasix to help with her lower extremity and pulmonary edema  and shortness of breath.  BP control in process as stated above.  Repeat Echo is ordered.    Results for orders placed during the hospital encounter of 04/19/21    Echo Color Flow Doppler? Yes; Bubble Contrast? No    Interpretation Summary  · The left ventricle is normal in size with concentric hypertrophy and normal systolic function.  · The estimated ejection fraction is 60%.  · Indeterminate left ventricular diastolic function.  · Normal right ventricular size with normal right ventricular systolic function.  · Mild left atrial enlargement.  · Normal central venous pressure (3 mmHg).  · The estimated PA systolic pressure is 28 mmHg.  · Trivial posterior pericardial effusion.    Further treatment pending echo results and response to initial dose of Lasix.  We will monitor her renal function while getting IV diuresis.  .      JOSE C (acute kidney injury)  JOSE C is likely due to  hypertensive crisis.  Reviewed her last hospitalization for similar diagnosis (3/2024) and noted that her initial creatinine at that time was 2.1.  With blood pressure control and IV fluids creatinine eventually improved to 1.6 which appears to be her baseline. . Most recent creatinine and eGFR are listed below.  Recent Labs     08/11/24 2043   CREATININE 2.0*   EGFRNORACEVR 32*      Plan  - JOSE C:  We will continue to monitor her renal function after giving her 1 dose of Lasix due to her pulmonary edema.  We will control her blood pressure.  Follow up on repeat labs in the morning.  If no improvement in her renal function we will consider consulting Nephrology.  - Avoid nephrotoxins and renally dose meds for GFR listed above  - Monitor urine output, serial BMP, and adjust therapy as needed    Hypokalemia  Has a history of hypokalemia as well as hypomagnesemia.  We will follow up on repeat labs.  She got replacement in the outside hospital emergency department for her potassium.  We will follow up on magnesium level here and replace if  needed.    H/O spontaneous subarachnoid intracranial hemorrhage due to cerebral aneurysm  This time no significant headache to suggest intracranial bleed at this time.  CT head non-contrast negative for mass or bleed.  Continue to control blood pressure.    PKD (polycystic kidney disease)  Patient does not follow with a nephrologist.  Will need one as outpatient and stressed to her the importance of following up.  Creatinine baseline appears to be closer to 1.6.  I do not see a recent renal ultrasound in her chart.  We will order and follow up on results.      Polyuria  Suspect due to pressure diuresis. Monitor UOP for now and renal function.         VTE Risk Mitigation (From admission, onward)      None               Code Status: FULL CODE    Critical care time spent on the evaluation and treatment of severe organ dysfunction, review of pertinent labs and imaging studies, discussions with consulting providers and discussions with patient/family: 60 minutes.               Anne-Marie Dominguez MD  Department of Hospital Medicine  [facility]

## 2024-08-11 NOTE — ASSESSMENT & PLAN NOTE
No history of CHF and suspect acute pulmonary edema due to hypertensive crisis.  We will give her a 1 time dose of Lasix to help with her lower extremity and pulmonary edema and shortness of breath.  BP control in process as stated above.  Repeat Echo is ordered.    Results for orders placed during the hospital encounter of 04/19/21    Echo Color Flow Doppler? Yes; Bubble Contrast? No    Interpretation Summary  · The left ventricle is normal in size with concentric hypertrophy and normal systolic function.  · The estimated ejection fraction is 60%.  · Indeterminate left ventricular diastolic function.  · Normal right ventricular size with normal right ventricular systolic function.  · Mild left atrial enlargement.  · Normal central venous pressure (3 mmHg).  · The estimated PA systolic pressure is 28 mmHg.  · Trivial posterior pericardial effusion.    Further treatment pending echo results and response to initial dose of Lasix.  We will monitor her renal function while getting IV diuresis.  .

## 2024-08-11 NOTE — HPI
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  She also reports bilateral lower extremity swelling (L>R) for the the past 2 days.  A shortness of breath is also accompanied with complaints of chest tightness and pain and a mild headache.  Denies any nausea or vomiting.  States she has been able take her blood pressure medications however she has been checking her blood pressure regularly.  Not sure exactly which one she is taking but states only doing one one day. Upon review of her d/c summary from 3/2024, she was supposed to be on Procardia XL 60mg PO BID and Labetalol 200mg PO BID.     Labs at the outside emergency department were significant for a low potassium of 2.8, elevated creatinine of 2.2.  Her troponin level was 0.05 with an elevated BNP (496 and no h/o CHF) and D-dimer    Chest x-ray was overall unremarkable for any acute pulmonary or cardiac process. CTA chest ordered:  1. No evidence of PE.   2. Small bilateral pleural effusions with mild pulmonary interstitial edema.   3. Mild fusiform dilatation of the ascending thoracic aorta measuring 4.2 cm.   4. Additional findings as detailed above.     She was started on Cardene drip for an elevated blood pressure initially 234/144 (got as high as 256/151).  She has been transferred to [facility] ICU for further inpatient management of her hypertensive emergency (based on JOSE C and elevated BNP/D-dimer).      Because this patient's clinical condition is  guarded and requires frequent monitoring of her vital signs which includes her blood pressure as well as weaning her Cardene drip and further evaluation of her malignant hypertension, care in an alternative location isn't clinically appropriate for the reasons stated above.

## 2024-08-11 NOTE — ASSESSMENT & PLAN NOTE
Patient does not follow with a nephrologist.  Will need one as outpatient and stressed to her the importance of following up.  Creatinine baseline appears to be closer to 1.6.  I do not see a recent renal ultrasound in her chart.  We will order and follow up on results.     ambulatory

## 2024-08-11 NOTE — ED PROVIDER NOTES
Encounter Date: 2024    SCRIBE #1 NOTE: I, Cristhian Graham Do, am scribing for, and in the presence of,  Sonya Sy MD. I have scribed the following portions of the note - Other sections scribed: HPI, ROS, PE.       History     Chief Complaint   Patient presents with    Shortness of Breath     Patient presents w/ a c/o sob w/ exertion onset this morning. Report bilateral leg swelling for approximately two days.     Sharon Grande is a 38 y.o. female, with a pertinent PMHx of bipolar 1 disorder, HTN, and stroke, who presents to the ED with SOB that began this morning. She also reports leg swelling (L > R), with paresthesias to her bilateral legs with walking, chest tightness, and mild headache. Pt reports her legs usually swell when she works but this weekend she didn't work and they are still swollen. She has HTN but only takes 1/2 meds (nifedipine, not labetalol) and does not monitor her BP. She has not seen anyone for follow up since her admission in March for severely elevated BP. No other exacerbating or alleviating factors. Patient denies any associated fever, chills, or light-headedness. She denies a previous CHF diagnosis. Per spouse, independent historian, patient had an aneurysm during her pregnancy in 2018.     The history is provided by the patient and the spouse. No  was used.     Review of patient's allergies indicates:  No Known Allergies  Past Medical History:   Diagnosis Date    Anemia     Bipolar 1 disorder     Cerebral aneurysm     Hypertension     since     Polycystic kidney disease     Polycystic kidney disease     Pre-eclampsia     Renal disorder     Since childhood     SAH (subarachnoid hemorrhage)     Stroke      Past Surgical History:   Procedure Laterality Date    BRAIN SURGERY      CEREBRAL ANGIOGRAM N/A 2018    Procedure: ANGIOGRAM-CEREBRAL;  Surgeon: Jayna Surgeon;  Location: [facility];  Service: Anesthesiology;  Laterality: N/A;     SECTION N/A  2018    Procedure:  SECTION;  Surgeon: Obed Soto MD;  Location: [facility] L&D;  Service: OB/GYN;  Laterality: N/A;    DILATION AND CURETTAGE OF UTERUS           Family History   Problem Relation Name Age of Onset    Hypertension Mother      Polycystic kidney disease Mother      Hypertension Paternal Grandmother      Polycystic kidney disease Daughter       Social History     Tobacco Use    Smoking status: Some Days     Types: Cigarettes    Smokeless tobacco: Never   Substance Use Topics    Alcohol use: Yes     Comment: occasional    Drug use: Yes     Types: Marijuana     Review of Systems   Constitutional:  Negative for activity change, appetite change, chills and fever.   HENT:  Negative for congestion, rhinorrhea, sneezing and sore throat.    Respiratory:  Positive for chest tightness and shortness of breath. Negative for cough, choking and wheezing.    Cardiovascular:  Positive for leg swelling. Negative for chest pain and palpitations.   Gastrointestinal:  Negative for abdominal pain, diarrhea, nausea and vomiting.   Neurological:  Positive for headaches. Negative for dizziness, syncope and light-headedness.        (+) Paresthesia to the bilateral legs   All other systems reviewed and are negative.      Physical Exam     Initial Vitals [24 1558]   BP Pulse Resp Temp SpO2   (S) (!) 234/144 63 20 98.7 °F (37.1 °C) 98 %      MAP       --         Physical Exam    Nursing note and vitals reviewed.  Constitutional: She appears well-developed and well-nourished. No distress.   HENT:   Head: Normocephalic and atraumatic.   Eyes: Conjunctivae are normal.   Neck:   Normal range of motion.  Cardiovascular:  Normal rate, regular rhythm and normal heart sounds.           No murmur heard.  Pulmonary/Chest: No respiratory distress.   Crackles to the base of the left lung without any heard to the right lung.    Abdominal: Abdomen is soft. Bowel sounds are normal. She exhibits no distension. There is no  abdominal tenderness.   Musculoskeletal:         General: Normal range of motion.      Cervical back: Normal range of motion.      Comments: Bilateral ankle edema noted. No pitting edema to the lower legs.      Neurological: She is alert and oriented to person, place, and time. GCS score is 15. GCS eye subscore is 4. GCS verbal subscore is 5. GCS motor subscore is 6.   Skin: Skin is warm and dry.   Psychiatric: She has a normal mood and affect. Her behavior is normal.         ED Course   Procedures  Labs Reviewed   POCT URINALYSIS W/O SCOPE - Abnormal       Result Value    Glucose, UA Negative      Bilirubin, UA Negative      Ketones, UA Negative      Spec Grav UA 1.020      Blood, UA Trace-intact (*)     PH, UA 7.5      Protein, UA 1+ (*)     Urobilinogen, UA 0.2      Nitrite, UA Negative      Leukocytes, UA Trace (*)     Color, UA POC Yellow      Clarity, UA, POC Clear     POCT CMP - Abnormal    Albumin, POC 3.4      Alkaline Phosphatase, POC 32 (*)     ALT (SGPT), POC 14      AST (SGOT), POC 23      POC BUN 22      Calcium, POC 9.8      POC Chloride 104      POC Creatinine 2.2 (*)     POC Glucose 88      POC Potassium 2.8 (*)     POC Sodium 142      Bilirubin, POC 0.6      POC TCO2 33      Protein, POC 6.8     POCT B-TYPE NATRIURETIC PEPTIDE (BNP) - Abnormal    POC B-Type Natriuretic Peptide 496 (*)    POCT D DIMER - Abnormal    POC D- (*)    TROPONIN ISTAT    POC Cardiac Troponin I 0.05      Sample unknown     POCT CBC    Hematocrit        Hemoglobin        RBC        WBC        MCV        MCH, POC        MCHC        RDW-CV        Platelet Count, POC        MPV       POCT URINE PREGNANCY    POC Preg Test, Ur Negative       Acceptable Yes     POCT URINALYSIS W/O SCOPE   POCT CMP   POCT PROTIME-INR   POCT TROPONIN   POCT B-TYPE NATRIURETIC PEPTIDE (BNP)   POCT D DIMER   ISTAT PROCEDURE    POC PTWBT 14.3      POC PTINR 1.2      Sample unknown       EKG Readings: (Independently Interpreted)    Initial Reading: No STEMI. Rhythm: Normal Sinus Rhythm. Heart Rate: 63. Conduction: 1st Degree AV Block. T Waves Flipped: I, AVL, V5 and V6. Q Waves: V1. Clinical Impression: Normal Sinus Rhythm Other Impression: with LVH. Independently interpreted by me.       Imaging Results              CTA Chest Non-Coronary (PE Studies) (Final result)  Result time 08/11/24 18:36:46      Final result by Jhonny Olsen MD (08/11/24 18:36:46)                   Impression:      1. No evidence of PE.  2. Small bilateral pleural effusions with mild pulmonary interstitial edema.  3. Mild fusiform dilatation of the ascending thoracic aorta measuring 4.2 cm.  4. Additional findings as detailed above.      Electronically signed by: Jhonny Olsen MD  Date:    08/11/2024  Time:    18:36               Narrative:    EXAMINATION:  CTA CHEST NON CORONARY (PE STUDIES)    CLINICAL HISTORY:  Pulmonary embolism (PE) suspected, positive D-dimer;    TECHNIQUE:  Low dose axial images, sagittal and coronal reformations were obtained from the thoracic inlet to the lung bases following the IV administration of 100 mL of Omnipaque 350.  Contrast timing was optimized to evaluate the pulmonary arteries.  MIP images were performed.    COMPARISON:  CT abdomen and pelvis from October 2018.    FINDINGS:  Structures at the base of the neck are unremarkable.  There is mild fusiform dilatation of the ascending thoracic aorta measuring 4.2 cm.  The heart is mildly enlarged without pericardial effusion.  No intraluminal filling defects within the pulmonary arteries to suggest pulmonary thromboembolism through the proximal segmental branch arteries.  The esophagus is unremarkable along its course.    The trachea and bronchi are patent.  The lungs are symmetrically expanded.  Small bilateral pleural effusions are seen.  There is mild interlobular septal thickening suggestive for mild pulmonary interstitial edema.  Otherwise no evidence of focal consolidation.   No evidence to suggest pulmonary hemorrhage or infarction.    Innumerable cysts are seen throughout the visualized portion of the liver.  No acute osseous abnormality identified.  Extrathoracic soft tissues are unremarkable.                                       X-Ray Chest AP Portable (Final result)  Result time 08/11/24 17:42:16      Final result by Jhonny Olsen MD (08/11/24 17:42:16)                   Impression:      No acute cardiopulmonary process identified.      Electronically signed by: Jhonny Olsen MD  Date:    08/11/2024  Time:    17:42               Narrative:    EXAMINATION:  XR CHEST AP PORTABLE    CLINICAL HISTORY:  Chest Pain;    TECHNIQUE:  Single frontal view of the chest was performed.    COMPARISON:  April 2021.    FINDINGS:  Cardiac silhouette is normal in size.  Lungs are symmetrically expanded.  No evidence of focal consolidative process, pneumothorax, or significant pleural effusion.  No acute osseous abnormality identified.                                       Medications   nitroGLYCERIN SL tablet 0.4 mg (0.4 mg Sublingual Given 8/11/24 1611)   niCARdipine 40 mg/200 mL (0.2 mg/mL) infusion (1.3 mg/hr Intravenous Rate/Dose Change 8/12/24 0300)   potassium bicarbonate disintegrating tablet 25 mEq (has no administration in time range)   sodium chloride 0.9% flush 10 mL (has no administration in time range)   naloxone 0.4 mg/mL injection 0.4 mg (has no administration in time range)   acetaminophen tablet 650 mg (has no administration in time range)   polyethylene glycol packet 17 g (has no administration in time range)   ondansetron injection 4 mg (has no administration in time range)   albuterol-ipratropium 2.5 mg-0.5 mg/3 mL nebulizer solution 3 mL (has no administration in time range)   melatonin tablet 9 mg (has no administration in time range)   simethicone chewable tablet 80 mg (has no administration in time range)   NIFEdipine 24 hr tablet 60 mg (has no administration in time  range)   mupirocin 2 % ointment ( Nasal Not Given 8/11/24 2100)   labetaloL tablet 200 mg (has no administration in time range)   heparin (porcine) injection 5,000 Units (has no administration in time range)   acetaminophen tablet 1,000 mg (1,000 mg Oral Given 8/11/24 1641)   potassium bicarbonate disintegrating tablet 25 mEq (25 mEq Oral Given 8/11/24 1700)   iohexoL (OMNIPAQUE 350) injection 100 mL (100 mLs Intravenous Given 8/11/24 1811)   oxyCODONE-acetaminophen 5-325 mg per tablet 1 tablet (1 tablet Oral Given 8/11/24 2159)   labetaloL tablet 100 mg (100 mg Oral Given 8/11/24 2200)   magnesium sulfate in dextrose IVPB (premix) 1 g (0 g Intravenous Stopped 8/11/24 2338)   potassium chloride SA CR tablet 40 mEq (40 mEq Oral Given 8/11/24 2227)   potassium chloride 10 mEq in 100 mL IVPB (10 mEq Intravenous New Bag 8/11/24 2233)     Medical Decision Making  Sharon Grande is a 38 y.o. female, with a pertinent PMHx of bipolar 1 disorder, HTN, and stroke, who presents to the ED with SOB with associated leg swelling (L > R), paresthesias to her bilateral legs with walking, chest tightness, and mild headache. Symptoms began today.    On exam - Crackles to the base of the left lung without any heard to the right lung. Bilateral ankle edema. No lower extremity pitting edema.     In shared decision making with pt, I will order labs, xray, and EKG. Will treat hypertension with nitroglycerin. Minimal response, so will start cardene drip. EKG with LVH. LAbs with elevated creatinine, protein in urine, and hypokalemia 2.8 - oral potassium given. BNP is elevated and pt has cardiomegaly on CXR. Ddimer is also elevated - will get CTA chest when BP is better controlled.  CTA chest revealed no evidence of PE.  Blood pressure was improved on Cardene infusion and patient was transferred to [facility] ICU further evaluation/treatment.  Pt will require admission. Discussed with Dr. Thacker.     Amount and/or Complexity of Data  Reviewed  Independent Historian: spouse     Details: See HPI.  Labs: ordered. Decision-making details documented in ED Course.  Radiology: ordered. Decision-making details documented in ED Course.  ECG/medicine tests: ordered and independent interpretation performed. Decision-making details documented in ED Course.  Discussion of management or test interpretation with external provider(s): BP control with cardene drip. LAbs with elevated creatinine, protein in urine, and hypokalemia 2.8 - oral potassium given. BNP is elevated and pt has cardiomegaly on CXR. Ddimer is also elevated - will get CTA chest when BP is better controlled.    Pt will require admission. Discussed with Dr. Thacker.     Risk  OTC drugs.  Prescription drug management.  Drug therapy requiring intensive monitoring for toxicity.  Decision regarding hospitalization.  Diagnosis or treatment significantly limited by social determinants of health.    Critical Care  Total time providing critical care: 55 minutes            Scribe Attestation:   Scribe #1: I performed the above scribed service and the documentation accurately describes the services I performed. I attest to the accuracy of the note.                               Clinical Impression:  Final diagnoses:  [R07.9] Chest pain  [I16.9] Hypertensive crisis (Primary)       I, Dr. Sonya Sy, personally performed the services described in this documentation.   All medical record entries made by the scribe were at my direction and in my presence.   I have reviewed the chart and agree that the record is accurate and complete.   Sonya Sy MD.  4:17 PM 08/12/2024      ED Disposition Condition    Admit Stable                Johan Ramesh MD  08/12/24 [id number]

## 2024-08-11 NOTE — ASSESSMENT & PLAN NOTE
Has a history of hypokalemia as well as hypomagnesemia.  We will follow up on repeat labs.  She got replacement in the outside hospital emergency department for her potassium.  We will follow up on magnesium level here and replace if needed.

## 2024-08-12 PROBLEM — F17.200 TOBACCO DEPENDENCY: Status: ACTIVE | Noted: 2024-08-12

## 2024-08-12 PROBLEM — I50.33 ACUTE ON CHRONIC DIASTOLIC HEART FAILURE: Status: ACTIVE | Noted: 2024-08-12

## 2024-08-12 PROBLEM — R35.89 POLYURIA: Status: ACTIVE | Noted: 2024-08-12

## 2024-08-12 PROBLEM — J81.0 ACUTE PULMONARY EDEMA: Status: RESOLVED | Noted: 2024-08-11 | Resolved: 2024-08-12

## 2024-08-12 PROBLEM — R35.89 POLYURIA: Status: RESOLVED | Noted: 2024-08-12 | Resolved: 2024-08-12

## 2024-08-12 LAB
ANION GAP SERPL CALC-SCNC: 11 MMOL/L (ref 8–16)
ASCENDING AORTA: 3.93 CM
AV INDEX (PROSTH): 0.69
AV MEAN GRADIENT: 8 MMHG
AV PEAK GRADIENT: 14 MMHG
AV REGURGITATION PRESSURE HALF TIME: 750.66 MS
AV VALVE AREA BY VELOCITY RATIO: 3.08 CM²
AV VALVE AREA: 2.4 CM²
AV VELOCITY RATIO: 0.88
BASOPHILS # BLD AUTO: 0.02 K/UL (ref 0–0.2)
BASOPHILS NFR BLD: 0.4 % (ref 0–1.9)
BSA FOR ECHO PROCEDURE: 1.82 M2
BUN SERPL-MCNC: 22 MG/DL (ref 6–20)
CALCIUM SERPL-MCNC: 9.3 MG/DL (ref 8.7–10.5)
CHLORIDE SERPL-SCNC: 102 MMOL/L (ref 95–110)
CHOLEST SERPL-MCNC: 186 MG/DL (ref 120–199)
CHOLEST/HDLC SERPL: 3.6 {RATIO} (ref 2–5)
CO2 SERPL-SCNC: 27 MMOL/L (ref 23–29)
CREAT SERPL-MCNC: 1.8 MG/DL (ref 0.5–1.4)
CV ECHO LV RWT: 0.99 CM
DIFFERENTIAL METHOD BLD: ABNORMAL
DOP CALC AO PEAK VEL: 1.85 M/S
DOP CALC AO VTI: 36.3 CM
DOP CALC LVOT AREA: 3.5 CM2
DOP CALC LVOT DIAMETER: 2.11 CM
DOP CALC LVOT PEAK VEL: 1.63 M/S
DOP CALC LVOT STROKE VOLUME: 87.02 CM3
DOP CALC MV VTI: 31.2 CM
DOP CALCLVOT PEAK VEL VTI: 24.9 CM
E WAVE DECELERATION TIME: 239.24 MSEC
E/A RATIO: 1.58
E/E' RATIO: 15.8 M/S
ECHO LV POSTERIOR WALL: 2.15 CM (ref 0.6–1.1)
EOSINOPHIL # BLD AUTO: 0.1 K/UL (ref 0–0.5)
EOSINOPHIL NFR BLD: 1.7 % (ref 0–8)
ERYTHROCYTE [DISTWIDTH] IN BLOOD BY AUTOMATED COUNT: 12.5 % (ref 11.5–14.5)
EST. GFR  (NO RACE VARIABLE): 37 ML/MIN/1.73 M^2
FRACTIONAL SHORTENING: 32 % (ref 28–44)
GLUCOSE SERPL-MCNC: 83 MG/DL (ref 70–110)
HCT VFR BLD AUTO: 33.7 % (ref 37–48.5)
HDLC SERPL-MCNC: 52 MG/DL (ref 40–75)
HDLC SERPL: 28 % (ref 20–50)
HGB BLD-MCNC: 11 G/DL (ref 12–16)
IMM GRANULOCYTES # BLD AUTO: 0.01 K/UL (ref 0–0.04)
IMM GRANULOCYTES NFR BLD AUTO: 0.2 % (ref 0–0.5)
INTERVENTRICULAR SEPTUM: 1.77 CM (ref 0.6–1.1)
IVC DIAMETER: 1.69 CM
LA MAJOR: 6.12 CM
LA MINOR: 5.8 CM
LA WIDTH: 5.2 CM
LDLC SERPL CALC-MCNC: 118.4 MG/DL (ref 63–159)
LEFT ATRIUM SIZE: 4.33 CM
LEFT ATRIUM VOLUME INDEX: 61.9 ML/M2
LEFT ATRIUM VOLUME: 113.98 CM3
LEFT INTERNAL DIMENSION IN SYSTOLE: 2.93 CM (ref 2.1–4)
LEFT VENTRICLE DIASTOLIC VOLUME INDEX: 46 ML/M2
LEFT VENTRICLE DIASTOLIC VOLUME: 84.64 ML
LEFT VENTRICLE MASS INDEX: 218 G/M2
LEFT VENTRICLE SYSTOLIC VOLUME INDEX: 18 ML/M2
LEFT VENTRICLE SYSTOLIC VOLUME: 33.17 ML
LEFT VENTRICULAR INTERNAL DIMENSION IN DIASTOLE: 4.33 CM (ref 3.5–6)
LEFT VENTRICULAR MASS: 400.24 G
LV LATERAL E/E' RATIO: 15.8 M/S
LV SEPTAL E/E' RATIO: 15.8 M/S
LVED V (TEICH): 84.64 ML
LVES V (TEICH): 33.17 ML
LVOT MG: 5.59 MMHG
LVOT MV: 1.09 CM/S
LYMPHOCYTES # BLD AUTO: 1.5 K/UL (ref 1–4.8)
LYMPHOCYTES NFR BLD: 28.7 % (ref 18–48)
MCH RBC QN AUTO: 29.3 PG (ref 27–31)
MCHC RBC AUTO-ENTMCNC: 32.6 G/DL (ref 32–36)
MCV RBC AUTO: 90 FL (ref 82–98)
MONOCYTES # BLD AUTO: 0.5 K/UL (ref 0.3–1)
MONOCYTES NFR BLD: 10.4 % (ref 4–15)
MV MEAN GRADIENT: 1 MMHG
MV PEAK A VEL: 0.5 M/S
MV PEAK E VEL: 0.79 M/S
MV PEAK GRADIENT: 3 MMHG
MV STENOSIS PRESSURE HALF TIME: 69.38 MS
MV VALVE AREA BY CONTINUITY EQUATION: 2.79 CM2
MV VALVE AREA P 1/2 METHOD: 3.17 CM2
NEUTROPHILS # BLD AUTO: 3.1 K/UL (ref 1.8–7.7)
NEUTROPHILS NFR BLD: 58.6 % (ref 38–73)
NONHDLC SERPL-MCNC: 134 MG/DL
NRBC BLD-RTO: 0 /100 WBC
OHS CV RV/LV RATIO: 0.76 CM
OHS QRS DURATION: 112 MS
OHS QRS DURATION: 112 MS
OHS QRS DURATION: 116 MS
OHS QTC CALCULATION: 460 MS
OHS QTC CALCULATION: 474 MS
OHS QTC CALCULATION: 499 MS
OSMOLALITY UR: 306 MOSM/KG (ref 50–1200)
PISA AR MAX VEL: 6.1 M/S
PISA TR MAX VEL: 2.69 M/S
PLATELET # BLD AUTO: 175 K/UL (ref 150–450)
PMV BLD AUTO: 11.5 FL (ref 9.2–12.9)
POTASSIUM SERPL-SCNC: 2.9 MMOL/L (ref 3.5–5.1)
PULM VEIN S/D RATIO: 0.58
PV PEAK D VEL: 0.64 M/S
PV PEAK GRADIENT: 11 MMHG
PV PEAK S VEL: 0.37 M/S
PV PEAK VELOCITY: 1.65 M/S
RA MAJOR: 4.78 CM
RA PRESSURE ESTIMATED: 8 MMHG
RA WIDTH: 4.1 CM
RBC # BLD AUTO: 3.75 M/UL (ref 4–5.4)
RIGHT VENTRICLE DIASTOLIC BASEL DIMENSION: 3.3 CM
RIGHT VENTRICULAR END-DIASTOLIC DIMENSION: 3.3 CM
RV TB RVSP: 11 MMHG
RV TISSUE DOPPLER FREE WALL SYSTOLIC VELOCITY 1 (APICAL 4 CHAMBER VIEW): 10.47 CM/S
SINUS: 3.93 CM
SODIUM SERPL-SCNC: 140 MMOL/L (ref 136–145)
STJ: 3.58 CM
TDI LATERAL: 0.05 M/S
TDI SEPTAL: 0.05 M/S
TDI: 0.05 M/S
TR MAX PG: 29 MMHG
TRIGL SERPL-MCNC: 78 MG/DL (ref 30–150)
TROPONIN I SERPL DL<=0.01 NG/ML-MCNC: 0.06 NG/ML (ref 0–0.03)
TV REST PULMONARY ARTERY PRESSURE: 37 MMHG
WBC # BLD AUTO: 5.2 K/UL (ref 3.9–12.7)
Z-SCORE OF LEFT VENTRICULAR DIMENSION IN END DIASTOLE: -1.65
Z-SCORE OF LEFT VENTRICULAR DIMENSION IN END SYSTOLE: -0.58

## 2024-08-12 PROCEDURE — 94761 N-INVAS EAR/PLS OXIMETRY MLT: CPT

## 2024-08-12 PROCEDURE — 63600175 PHARM REV CODE 636 W HCPCS: Performed by: INTERNAL MEDICINE

## 2024-08-12 PROCEDURE — 80061 LIPID PANEL: CPT | Performed by: INTERNAL MEDICINE

## 2024-08-12 PROCEDURE — 84484 ASSAY OF TROPONIN QUANT: CPT | Performed by: INTERNAL MEDICINE

## 2024-08-12 PROCEDURE — 21400001 HC TELEMETRY ROOM

## 2024-08-12 PROCEDURE — 25000003 PHARM REV CODE 250: Performed by: STUDENT IN AN ORGANIZED HEALTH CARE EDUCATION/TRAINING PROGRAM

## 2024-08-12 PROCEDURE — 63600175 PHARM REV CODE 636 W HCPCS: Performed by: STUDENT IN AN ORGANIZED HEALTH CARE EDUCATION/TRAINING PROGRAM

## 2024-08-12 PROCEDURE — 99900035 HC TECH TIME PER 15 MIN (STAT)

## 2024-08-12 PROCEDURE — 85025 COMPLETE CBC W/AUTO DIFF WBC: CPT | Performed by: INTERNAL MEDICINE

## 2024-08-12 PROCEDURE — 99233 SBSQ HOSP IP/OBS HIGH 50: CPT | Mod: 95,,, | Performed by: PSYCHIATRY & NEUROLOGY

## 2024-08-12 PROCEDURE — 25000003 PHARM REV CODE 250: Performed by: INTERNAL MEDICINE

## 2024-08-12 PROCEDURE — 93005 ELECTROCARDIOGRAM TRACING: CPT

## 2024-08-12 PROCEDURE — 36415 COLL VENOUS BLD VENIPUNCTURE: CPT | Performed by: INTERNAL MEDICINE

## 2024-08-12 PROCEDURE — 93010 ELECTROCARDIOGRAM REPORT: CPT | Mod: ,,, | Performed by: INTERNAL MEDICINE

## 2024-08-12 PROCEDURE — 90833 PSYTX W PT W E/M 30 MIN: CPT | Mod: 95,,, | Performed by: PSYCHIATRY & NEUROLOGY

## 2024-08-12 PROCEDURE — 80048 BASIC METABOLIC PNL TOTAL CA: CPT | Performed by: INTERNAL MEDICINE

## 2024-08-12 RX ORDER — CARVEDILOL 12.5 MG/1
12.5 TABLET ORAL 2 TIMES DAILY
Status: DISCONTINUED | OUTPATIENT
Start: 2024-08-12 | End: 2024-08-14

## 2024-08-12 RX ORDER — MAGNESIUM SULFATE HEPTAHYDRATE 40 MG/ML
2 INJECTION, SOLUTION INTRAVENOUS ONCE
Status: COMPLETED | OUTPATIENT
Start: 2024-08-12 | End: 2024-08-12

## 2024-08-12 RX ORDER — FUROSEMIDE 40 MG/1
40 TABLET ORAL DAILY
Status: DISCONTINUED | OUTPATIENT
Start: 2024-08-12 | End: 2024-08-12

## 2024-08-12 RX ORDER — HYDRALAZINE HYDROCHLORIDE 20 MG/ML
10 INJECTION INTRAMUSCULAR; INTRAVENOUS EVERY 6 HOURS PRN
Status: DISCONTINUED | OUTPATIENT
Start: 2024-08-12 | End: 2024-08-14

## 2024-08-12 RX ORDER — HYDRALAZINE HYDROCHLORIDE 25 MG/1
100 TABLET, FILM COATED ORAL EVERY 8 HOURS
Status: DISCONTINUED | OUTPATIENT
Start: 2024-08-12 | End: 2024-08-12

## 2024-08-12 RX ORDER — PANTOPRAZOLE SODIUM 40 MG/10ML
40 INJECTION, POWDER, LYOPHILIZED, FOR SOLUTION INTRAVENOUS DAILY
Status: DISCONTINUED | OUTPATIENT
Start: 2024-08-12 | End: 2024-08-13

## 2024-08-12 RX ORDER — NIFEDIPINE 30 MG/1
90 TABLET, EXTENDED RELEASE ORAL DAILY
Status: DISCONTINUED | OUTPATIENT
Start: 2024-08-12 | End: 2024-08-14

## 2024-08-12 RX ORDER — FUROSEMIDE 20 MG/1
20 TABLET ORAL DAILY
Status: DISCONTINUED | OUTPATIENT
Start: 2024-08-12 | End: 2024-08-12

## 2024-08-12 RX ORDER — OXYCODONE AND ACETAMINOPHEN 5; 325 MG/1; MG/1
1 TABLET ORAL ONCE
Status: COMPLETED | OUTPATIENT
Start: 2024-08-12 | End: 2024-08-12

## 2024-08-12 RX ORDER — IBUPROFEN 200 MG
1 TABLET ORAL DAILY
Status: DISCONTINUED | OUTPATIENT
Start: 2024-08-12 | End: 2024-08-13

## 2024-08-12 RX ORDER — POTASSIUM CHLORIDE 20 MEQ/1
40 TABLET, EXTENDED RELEASE ORAL EVERY 4 HOURS
Status: COMPLETED | OUTPATIENT
Start: 2024-08-12 | End: 2024-08-12

## 2024-08-12 RX ORDER — FUROSEMIDE 20 MG/1
20 TABLET ORAL DAILY
Status: DISCONTINUED | OUTPATIENT
Start: 2024-08-12 | End: 2024-08-13

## 2024-08-12 RX ORDER — HEPARIN SODIUM 5000 [USP'U]/ML
5000 INJECTION, SOLUTION INTRAVENOUS; SUBCUTANEOUS EVERY 8 HOURS
Status: DISCONTINUED | OUTPATIENT
Start: 2024-08-12 | End: 2024-08-15 | Stop reason: HOSPADM

## 2024-08-12 RX ORDER — OXYCODONE HYDROCHLORIDE 5 MG/1
5 TABLET ORAL ONCE
Status: COMPLETED | OUTPATIENT
Start: 2024-08-12 | End: 2024-08-12

## 2024-08-12 RX ORDER — NIFEDIPINE 30 MG/1
30 TABLET, EXTENDED RELEASE ORAL ONCE
Status: COMPLETED | OUTPATIENT
Start: 2024-08-12 | End: 2024-08-12

## 2024-08-12 RX ORDER — HYDRALAZINE HYDROCHLORIDE 25 MG/1
50 TABLET, FILM COATED ORAL EVERY 8 HOURS
Status: DISCONTINUED | OUTPATIENT
Start: 2024-08-12 | End: 2024-08-13

## 2024-08-12 RX ORDER — POTASSIUM CHLORIDE 20 MEQ/1
40 TABLET, EXTENDED RELEASE ORAL ONCE
Status: COMPLETED | OUTPATIENT
Start: 2024-08-12 | End: 2024-08-12

## 2024-08-12 RX ADMIN — NIFEDIPINE 60 MG: 30 TABLET, FILM COATED, EXTENDED RELEASE ORAL at 08:08

## 2024-08-12 RX ADMIN — POTASSIUM CHLORIDE 40 MEQ: 1500 TABLET, EXTENDED RELEASE ORAL at 06:08

## 2024-08-12 RX ADMIN — PANTOPRAZOLE SODIUM 40 MG: 40 INJECTION, POWDER, FOR SOLUTION INTRAVENOUS at 09:08

## 2024-08-12 RX ADMIN — POTASSIUM CHLORIDE 40 MEQ: 1500 TABLET, EXTENDED RELEASE ORAL at 09:08

## 2024-08-12 RX ADMIN — ACETAMINOPHEN 650 MG: 325 TABLET ORAL at 09:08

## 2024-08-12 RX ADMIN — HYDRALAZINE HYDROCHLORIDE 10 MG: 20 INJECTION INTRAMUSCULAR; INTRAVENOUS at 06:08

## 2024-08-12 RX ADMIN — ACETAMINOPHEN 650 MG: 325 TABLET ORAL at 05:08

## 2024-08-12 RX ADMIN — HEPARIN SODIUM 5000 UNITS: 5000 INJECTION INTRAVENOUS; SUBCUTANEOUS at 01:08

## 2024-08-12 RX ADMIN — HYDRALAZINE HYDROCHLORIDE 50 MG: 25 TABLET ORAL at 09:08

## 2024-08-12 RX ADMIN — HEPARIN SODIUM 5000 UNITS: 5000 INJECTION INTRAVENOUS; SUBCUTANEOUS at 05:08

## 2024-08-12 RX ADMIN — MAGNESIUM SULFATE HEPTAHYDRATE 2 G: 40 INJECTION, SOLUTION INTRAVENOUS at 02:08

## 2024-08-12 RX ADMIN — MUPIROCIN: 20 OINTMENT TOPICAL at 08:08

## 2024-08-12 RX ADMIN — OXYCODONE 5 MG: 5 TABLET ORAL at 11:08

## 2024-08-12 RX ADMIN — Medication 9 MG: at 09:08

## 2024-08-12 RX ADMIN — POTASSIUM CHLORIDE 40 MEQ: 1500 TABLET, EXTENDED RELEASE ORAL at 05:08

## 2024-08-12 RX ADMIN — CARVEDILOL 12.5 MG: 12.5 TABLET, FILM COATED ORAL at 09:08

## 2024-08-12 RX ADMIN — OXYCODONE HYDROCHLORIDE AND ACETAMINOPHEN 1 TABLET: 5; 325 TABLET ORAL at 05:08

## 2024-08-12 RX ADMIN — NIFEDIPINE 30 MG: 30 TABLET, FILM COATED, EXTENDED RELEASE ORAL at 09:08

## 2024-08-12 RX ADMIN — LABETALOL HYDROCHLORIDE 200 MG: 100 TABLET, FILM COATED ORAL at 08:08

## 2024-08-12 RX ADMIN — HYDRALAZINE HYDROCHLORIDE 50 MG: 25 TABLET ORAL at 01:08

## 2024-08-12 RX ADMIN — HYDRALAZINE HYDROCHLORIDE 100 MG: 25 TABLET ORAL at 08:08

## 2024-08-12 NOTE — NURSING TRANSFER
Nursing Transfer Note      8/12/2024   11:54 AM    Nurse giving handoff:AZALIA Mortensen  Nurse receiving handoff:MARLYN Ruth    Reason patient is being transferred: stepdown    Transfer From: ICU    Transfer via wheelchair    Transfer with cardiac monitoring    Transported by RN    Transfer Vital Signs:  Blood Pressure:113/60  Heart Rate:65  O2:98  Temperature:98.6  Respirations:19    Telemetry: Box Number [id number]  Order for Tele Monitor? Yes    Additional Lines: NA    4eyes on Skin: yes    Medicines sent: yes    Any special needs or follow-up needed: NA    Patient belongings transferred with patient: Yes    Chart send with patient: Yes

## 2024-08-12 NOTE — ASSESSMENT & PLAN NOTE
Echo with EF 60-65% with grade 2 diastolic dysfunction.  Severe LVH. switch labetalol to Coreg and add Lasix

## 2024-08-12 NOTE — PROGRESS NOTES
[facility] Medicine  Progress Note    Patient Name: Sharon Grande  MRN: [id number]  Patient Class: IP- Inpatient   Admission Date: 8/11/2024  Length of Stay: 1 days  Attending Physician: Shannan Thacker,*  Primary Care Provider: [facility] -        Subjective:     Principal Problem:Hypertensive crisis        HPI:  38-year-old  female with a past medical history significant for malignant hypertension (multiple admissions for hypertensive emergency/urgency), polycystic kidney disease (complicated by JOSE C in the past with a baseline creatinine closer to 1.6), CVA due to subarachnoid hemorrhage due to a ruptured anterior cerebral communicating artery (2018) status post aneurysmal coiling, bipolar 1 disorder and non-compliance presents to the [facility] emergency department with complaints of worsening shortness of breath that started early this morning.  She also reports bilateral lower extremity swelling (L>R) for the the past 2 days.  A shortness of breath is also accompanied with complaints of chest tightness and pain and a mild headache.  Denies any nausea or vomiting.  States she has been able take her blood pressure medications however she has been checking her blood pressure regularly.  Not sure exactly which one she is taking but states only doing one one day. Upon review of her d/c summary from 3/2024, she was supposed to be on Procardia XL 60mg PO BID and Labetalol 200mg PO BID.     Labs at the outside emergency department were significant for a low potassium of 2.8, elevated creatinine of 2.2.  Her troponin level was 0.05 with an elevated BNP (496 and no h/o CHF) and D-dimer    Chest x-ray was overall unremarkable for any acute pulmonary or cardiac process. CTA chest ordered:  1. No evidence of PE.   2. Small bilateral pleural effusions with mild pulmonary interstitial edema.   3. Mild fusiform dilatation of the ascending thoracic aorta measuring 4.2 cm.    4. Additional findings as detailed above.     She was started on Cardene drip for an elevated blood pressure initially 234/144 (got as high as 256/151).  She has been transferred to [facility] ICU for further inpatient management of her hypertensive emergency (based on JOSE C and elevated BNP/D-dimer).      Because this patient's clinical condition is guarded and requires frequent monitoring of her vital signs which includes her blood pressure as well as weaning her Cardene drip and further evaluation of her malignant hypertension, care in an alternative location isn't clinically appropriate for the reasons stated above.              Overview/Hospital Course:  38-year-old female with past medical history of malignant hypertension admissions for hypertensive emergency/urgency, polycystic kidney disease, baseline creatinine 1.6, CVA due to subarachnoid hemorrhage due to ruptured ZEE 2018 status post aneurysmal coiling, bipolar 1 disorder who was admitted for hypertensive emergency secondary to noncompliance with medications.  Hypokalemia/hypomagnesemia repleted.  JOSE C improving.  CT head negative Echo with EF 55-60% with grade 2 diastolic dysfunction.  Renal ultrasound with ADPKD.    Interval History:       Review of Systems   Constitutional: Negative.    Respiratory: Negative.     Cardiovascular: Negative.    Gastrointestinal: Negative.    Genitourinary: Negative.    Musculoskeletal: Negative.    Neurological: Negative.      Objective:     Vital Signs (Most Recent):  Temp: 98.5 °F (36.9 °C) (08/12/24 1149)  Pulse: 62 (08/12/24 1149)  Resp: 18 (08/12/24 1149)  BP: 136/80 (08/12/24 1149)  SpO2: 100 % (08/12/24 1149) Vital Signs (24h Range):  Temp:  [98.5 °F (36.9 °C)-98.7 °F (37.1 °C)] 98.5 °F (36.9 °C)  Pulse:  [55-92] 62  Resp:  [14-49] 18  SpO2:  [92 %-100 %] 100 %  BP: (108-256)/() 136/80     Weight: 68.8 kg (151 lb 10.8 oz)  Body mass index is 22.4 kg/m².    Intake/Output Summary (Last 24 hours) at  8/12/2024 1224  Last data filed at 8/12/2024 1011  Gross per 24 hour   Intake 2127.22 ml   Output 4600 ml   Net -2472.78 ml         Physical Exam  Constitutional:       General: She is not in acute distress.     Appearance: She is normal weight.   Cardiovascular:      Rate and Rhythm: Normal rate.      Pulses: Normal pulses.   Pulmonary:      Effort: No respiratory distress.      Breath sounds: Normal breath sounds. No wheezing.   Abdominal:      General: Bowel sounds are normal. There is no distension.      Palpations: Abdomen is soft.      Tenderness: There is no abdominal tenderness.   Musculoskeletal:      Right lower leg: No edema.      Left lower leg: No edema.   Skin:     General: Skin is warm.   Neurological:      Mental Status: She is alert and oriented to person, place, and time. Mental status is at baseline.   Psychiatric:         Mood and Affect: Mood normal.             Significant Labs: All pertinent labs within the past 24 hours have been reviewed.    Significant Imaging: I have reviewed all pertinent imaging results/findings within the past 24 hours.    Assessment/Plan:      * Hypertensive crisis  Patient has a current diagnosis of Hypertensive emergency with end organ damage evidenced by acute kidney injury and acute pulmonary edema without heart failure which is uncontrolled.  Suspect due to not taking her BP medications as instructed the last time.  Latest blood pressure and vitals reviewed-   Temp:  [98.5 °F (36.9 °C)-98.7 °F (37.1 °C)]   Pulse:  [55-92]   Resp:  [14-49]   BP: (108-256)/()   SpO2:  [92 %-100 %] .   Patient currently on IV antihypertensives.   Home meds for hypertension were reviewed and noted below.   Hypertension Medications               labetaloL (NORMODYNE) 200 MG tablet Take 1 tablet (200 mg total) by mouth every 12 (twelve) hours.    NIFEdipine (PROCARDIA-XL) 60 MG (OSM) 24 hr tablet Take 1 tablet (60 mg total) by mouth 2 (two) times a day.            Medication  adjustment for hospital antihypertensives is as follows-off Cardene drip.  Initiate home nifedipine switch labetalol to coreg  Add hydralazine.    Acute on chronic diastolic heart failure    Echo with EF 60-65% with grade 2 diastolic dysfunction.  Severe LVH. switch labetalol to Coreg and add Lasix    Tobacco dependency  Dangers of cigarette smoking were reviewed with patient in detail. Patient was Counseled for 3-10 minutes. Nicotine replacement options were discussed. Nicotine replacement was discussed- prescribed    H/O spontaneous subarachnoid intracranial hemorrhage due to cerebral aneurysm  This time no significant headache to suggest intracranial bleed at this time.  CT head non-contrast negative for mass or bleed.  Continue to control blood pressure.    Hypokalemia  Repleted    JOSE C (acute kidney injury)  JOSE C is likely due to  hypertensive crisis.  Reviewed her last hospitalization for similar diagnosis (3/2024) and noted that her initial creatinine at that time was 2.1.  With blood pressure control and IV fluids creatinine eventually improved to 1.6 which appears to be her baseline. . Most recent creatinine and eGFR are listed below.  Recent Labs     08/11/24 2043 08/12/24  0439   CREATININE 2.0* 1.8*   EGFRNORACEVR 32* 37*        Plan  - JOSE C:  Improving.  Monitor with diuresis    PKD (polycystic kidney disease)  Patient does not follow with a nephrologist.  Will need one as outpatient and stressed to her the importance of following up.  Creatinine baseline appears to be closer to 1.6.  Renal ultrasound with ADPKD.  We will need outpatient follow up with Nephrology      VTE Risk Mitigation (From admission, onward)           Ordered     heparin (porcine) injection 5,000 Units  Every 8 hours         08/12/24 0355     IP VTE LOW RISK PATIENT  Once         08/11/24 1958     Place sequential compression device  Until discontinued         08/11/24 1958     Place ISABEL hose  Until discontinued         08/11/24 1958                     Discharge Planning   IQRA: 8/14/2024     Code Status: Full Code   Is the patient medically ready for discharge?:     Reason for patient still in hospital (select all that apply): Patient trending condition and Treatment  Discharge Plan A: Home with family                  Shannan Thacker MD  Department of Hospital Medicine   [facility]

## 2024-08-12 NOTE — EICU
"Intervention Initiated From:  COR / CRISTOBALU    Irlanda intervened regarding:  Rounding (Video assessment)    Nurse Notified:  Yes    Doctor Notified:  Yes    Comments: new pt rounding. Pt c/o of H/a. 8/10 and pain involves "whole head". Tylenol given in ED was not effective. Informed Dr. Limon of above.   "

## 2024-08-12 NOTE — SUBJECTIVE & OBJECTIVE
Interval History:       Review of Systems   Constitutional: Negative.    Respiratory: Negative.     Cardiovascular: Negative.    Gastrointestinal: Negative.    Genitourinary: Negative.    Musculoskeletal: Negative.    Neurological: Negative.      Objective:     Vital Signs (Most Recent):  Temp: 98.5 °F (36.9 °C) (08/12/24 1149)  Pulse: 62 (08/12/24 1149)  Resp: 18 (08/12/24 1149)  BP: 136/80 (08/12/24 1149)  SpO2: 100 % (08/12/24 1149) Vital Signs (24h Range):  Temp:  [98.5 °F (36.9 °C)-98.7 °F (37.1 °C)] 98.5 °F (36.9 °C)  Pulse:  [55-92] 62  Resp:  [14-49] 18  SpO2:  [92 %-100 %] 100 %  BP: (108-256)/() 136/80     Weight: 68.8 kg (151 lb 10.8 oz)  Body mass index is 22.4 kg/m².    Intake/Output Summary (Last 24 hours) at 8/12/2024 1224  Last data filed at 8/12/2024 1011  Gross per 24 hour   Intake 2127.22 ml   Output 4600 ml   Net -2472.78 ml         Physical Exam  Constitutional:       General: She is not in acute distress.     Appearance: She is normal weight.   Cardiovascular:      Rate and Rhythm: Normal rate.      Pulses: Normal pulses.   Pulmonary:      Effort: No respiratory distress.      Breath sounds: Normal breath sounds. No wheezing.   Abdominal:      General: Bowel sounds are normal. There is no distension.      Palpations: Abdomen is soft.      Tenderness: There is no abdominal tenderness.   Musculoskeletal:      Right lower leg: No edema.      Left lower leg: No edema.   Skin:     General: Skin is warm.   Neurological:      Mental Status: She is alert and oriented to person, place, and time. Mental status is at baseline.   Psychiatric:         Mood and Affect: Mood normal.             Significant Labs: All pertinent labs within the past 24 hours have been reviewed.    Significant Imaging: I have reviewed all pertinent imaging results/findings within the past 24 hours.

## 2024-08-12 NOTE — ASSESSMENT & PLAN NOTE
JOSE C is likely due to  hypertensive crisis.  Reviewed her last hospitalization for similar diagnosis (3/2024) and noted that her initial creatinine at that time was 2.1.  With blood pressure control and IV fluids creatinine eventually improved to 1.6 which appears to be her baseline. . Most recent creatinine and eGFR are listed below.  Recent Labs     08/11/24 2043 08/12/24  0439   CREATININE 2.0* 1.8*   EGFRNORACEVR 32* 37*        Plan  - JOSE C:  Improving.  Monitor with diuresis

## 2024-08-12 NOTE — PLAN OF CARE
Case Management Assessment     PCP: none Patient stated that she has been to [facility] in the past but not recently  Pharmacy: [facility]      Patient Arrived From: home  Existing Help at Home: spouse and daughter    Barriers to Discharge: uninsured, non-compliant with medications and bipoar    Discharge Plan:    A. Home with fmy   B. Home with fmy      Patient stated that she lives with her  and 5 children (ranging in ages 8yo-20yo), is independent and drives.  She also stated that she is having difficulty paying for her medications due to her medicaid lapsed as  of 8/1/24.   When questioned about her medications she stated that she understood how to take them prior to DC but once discharged she did not understand how to take them.  MD present during conversation and reinforced the need to take her BP meds and that we will provide adequate teaching prior to DC.  Patient verbalized understanding.  Per MD patient is medically stable to transfer to the med surg unit.  Patient would benefit from psyc visit post DC.    Email sent to Medicaid office requesting patient be screened.         08/12/24 1220   Discharge Assessment   Assessment Type Discharge Planning Assessment   Confirmed/corrected address, phone number and insurance Yes   Confirmed Demographics Correct on Facesheet   Source of Information patient   Communicated IQRA with patient/caregiver Yes   People in Home spouse;child(traci), dependent;child(traci), adult   Do you expect to return to your current living situation? Yes   Do you have help at home or someone to help you manage your care at home? Yes   Prior to hospitilization cognitive status: Alert/Oriented   Current cognitive status: Alert/Oriented  (flat affect, speaks in a whisper)   Walking or Climbing Stairs Difficulty no   Dressing/Bathing Difficulty no   Equipment Currently Used at Home none   Readmission within 30 days? No   Patient currently being followed by outpatient case management?  No   Do you currently have service(s) that help you manage your care at home? No   Do you take prescription medications? Yes  (see comment)   Do you have prescription coverage? No   Do you have any problems affording any of your prescribed medications? Yes   If yes, what medications? Allnow that medicaid laspsed   Is the patient taking medications as prescribed? no   If no, which medications is patient not taking? All   How do you get to doctors appointments? car, drives self   Are you on dialysis? No   Do you take coumadin? No   Discharge Plan A Home with family   Discharge Plan B Home with family   DME Needed Upon Discharge  none   Discharge Plan discussed with: Patient   Transition of Care Barriers Does not adhere to care plan;Mental illness;Unisured;Unable to afford medication

## 2024-08-12 NOTE — EICU
Brief Eicu admission review note.              Full H&P,notes, labs images reviewed.     Briefly   37 y/o female with h/o HTN, Polycystic kidney disease, SAH, s/p aneurysm coiling in 2018, Bipolar disorder transferred from [facility] where she presented with c/oSOB, chest tightness, LE swelling, mild HA. She was found to be hypertensive with -260, labs with K 2.8, creatinine 2.2 ( bl around 1.6 ), elevated . CTA no PE, b/l effusions and interstitial prominence c/w edema. She was started on Cardene and transferred. Tox screen, urine HCG negative  Shortly after ICU admission c/o  8/10 HA, CT head ordered - negative for acute intracranial process.       Camera exam::       NAD, 185/17, HR 60, sats 100% RA, resting    Problems:  Hypertensive emergency   Pulmonary edema in the settings of elevated BP   HFpEF 60% on TTE 2021    Elevated trop, likely demand mediated iscemia   JOSE C    HypoK      Plan:   Cardene qtt  Restart PO antiHTN meds  ASA, trend trops, serial EKGs  Lasix held due to increased UO   K replacement  US kidney, urine studies ordered  Percocet x 1 helped with HA, would try to avoid any  further  use    SUP:  n/a  DVT proph:  Heparin sq    Will d/w AZALIA

## 2024-08-12 NOTE — NURSING
Patient arrived to floor via wheelchair via nurse from ICU.  Patient transferred to bed independently.  AAOX4.  Patient was oriented to room, information on communication board, and medication regimen.  Bed low, adequate lighting provided, side rails x2 up, call bell within reach.  Admission assessment completed. VSS.  Patient denied having any acute distress at this time.  None observed.  Will continue to monitor and follow treatment plan.     [facility]  Nurses Note -- 4 Eyes      8/12/2024       Skin assessed on: Q Shift      [x] No Pressure Injuries Present    []Prevention Measures Documented    [] Yes LDA  for Pressure Injury Previously documented     [] Yes New Pressure Injury Discovered   [] LDA for New Pressure Injury Added      Attending RN:  Faraz Ewing LPN     Second RN:  AZALIA Clancy

## 2024-08-12 NOTE — HOSPITAL COURSE
38-year-old female with past medical history of malignant hypertension  with admissions for hypertensive emergency/urgency, polycystic kidney disease, baseline creatinine 1.6, CVA due to subarachnoid hemorrhage due to ruptured ZEE 2018 status post aneurysmal coiling, hx of bipolar 1 disorder who was admitted for hypertensive emergency secondary to noncompliance with medications.  Hypokalemia/hypomagnesemia repleted.  JOSE C worsening.  Nephrology consulted. CT head negative, Echo with EF 55-60% with grade 2 diastolic dysfunction.  Renal ultrasound with ADPKD.  Nephrology suspected renal function worsening due to labile blood pressures.  She was started on labetalol 100 mg t.i.d. and nifedipine 30 mg b.i.d..  This appeared to control blood pressure more consistently in her renal function started to improve on day of discharge.  She was overall feeling well and stable for discharge home.  Of note, her potassium was low which was replaced on day of discharge and she was discharged home with a few days of replacement.  She will follow-up with nephrology as an outpatient to have her labs rechecked the following week.  All questions answered medications brought to bedside prior to discharge.  Patient discharged home in stable condition.

## 2024-08-12 NOTE — CONSULTS
[facility]  Psychiatry  Telepsychiatry Consult Note        Patient agreeable to consultation via telepsychiatry.    Tele-Consultation from Psychiatry started: 8/12/2024 at 5:13 PM  The chief complaint leading to psychiatric consultation is: Hx of Bipolar Disorder ; no current psychiatric medications   This consultation was requested by Shannan Thacker MD, the primary team attending physician.  The location of the consulting psychiatrist is [place], [place].  The patient location is  [facility] MEDICAL SURGICAL UNIT   Also present with the patient at the time of the consultation: nurse / tech     Patient Identification:   Sharon Grande is a 38 y.o. female.    Patient information was obtained from patient, past medical records, and primary team.    Inpatient consult to Telemedicine - Psych  Consult performed by: Howie Jaffe MD  Consult ordered by: Shannan Thacker MD        Consult Start Time: 08/12/2024 17:13 CDT  Consult End Time: 08/12/2024 18:35 CDT        HISTORY    Per Initial History from Primary Team:  Patient presents with    Shortness of Breath       Patient presents w/ a c/o sob w/ exertion onset this morning. Report bilateral leg swelling for approximately two days.   38-year-old  female with a past medical history significant for malignant hypertension (multiple admissions for hypertensive emergency/urgency), polycystic kidney disease (complicated by JOSE C in the past with a baseline creatinine closer to 1.6), CVA due to subarachnoid hemorrhage due to a ruptured anterior cerebral communicating artery (2018) status post aneurysmal coiling, bipolar 1 disorder and non-compliance presents to the [place] emergency department with complaints of worsening shortness of breath that started early this morning.  She also reports bilateral lower extremity swelling (L>R) for the the past 2 days.  A shortness of breath is also accompanied with complaints of chest tightness and pain  and a mild headache.  Denies any nausea or vomiting.  States she has been able take her blood pressure medications however she has been checking her blood pressure regularly.  Not sure exactly which one she is taking but states only doing one one day. Upon review of her d/c summary from 3/2024, she was supposed to be on Procardia XL 60mg PO BID and Labetalol 200mg PO BID.   Labs at the outside emergency department were significant for a low potassium of 2.8, elevated creatinine of 2.2.  Her troponin level was 0.05 with an elevated BNP (496 and no h/o CHF) and D-dimer  Chest x-ray was overall unremarkable for any acute pulmonary or cardiac process. CTA chest ordered:  1. No evidence of PE.   2. Small bilateral pleural effusions with mild pulmonary interstitial edema.   3. Mild fusiform dilatation of the ascending thoracic aorta measuring 4.2 cm.   4. Additional findings as detailed above.   She was started on Cardene drip for an elevated blood pressure initially 234/144 (got as high as 256/151).  She has been transferred to [facility] ICU for further inpatient management of her hypertensive emergency (based on JOSE C and elevated BNP/D-dimer).    Because this patient's clinical condition is guarded and requires frequent monitoring of her vital signs which includes her blood pressure as well as weaning her Cardene drip and further evaluation of her malignant hypertension, care in an alternative location isn't clinically appropriate for the reasons stated above.   Overview/Hospital Course from Primary Team:  38-year-old female with past medical history of malignant hypertension admissions for hypertensive emergency/urgency, polycystic kidney disease, baseline creatinine 1.6, CVA due to subarachnoid hemorrhage due to ruptured ZEE 2018 status post aneurysmal coiling, bipolar 1 disorder who was admitted for hypertensive emergency secondary to noncompliance with medications.  Hypokalemia/hypomagnesemia repleted.  JOSE C  improving.  CT head negative Echo with EF 55-60% with grade 2 diastolic dysfunction.  Renal ultrasound with ADPKD.  Interval History from Primary Team on 08/12/2024:  38-year-old female with past medical history of hypertension with recurrent admissions for hypertensive emergency/urgency, polycystic kidney disease, baseline creatinine 1.6, CVA due to subarachnoid hemorrhage due to ruptured ZEE 2018 status post aneurysmal coiling, bipolar 1 disorder who was admitted for hypertensive emergency secondary to noncompliance with medications.  Hypokalemia/hypomagnesemia repleted.  JOSE C improving.  CT head negative Echo with EF 55-60% with grade 2 diastolic dysfunction.  Renal ultrasound with ADPKD.  Discontinued Card.  Not on any medications for bipolar.  Patient appears depressed  Tele psych consulted  Plan  Titrate antihypertensives  Follow up on tele psych consult  Need outpatient nephrology follow up at discharge      Chief Complaint / Reason for Psychiatry Consult: Hx of Bipolar Disorder ; no current psychiatric medications       HPI:   Sharon Grande is a 38 y.o. female with a past medical history as noted above/below and a past psychiatric history of depression (patient denies any hx of Bipolar Disorder to me today), anxiety, and insomnia, currently being treated by their inpatient primary team for a principle problem of hypertensive crisis.  Psychiatry was originally consulted as noted above.  The patient was seen and examined.  The chart was reviewed.  On examination today, the patient was alert and oriented to person, place, city, state, month, year, and situation.  She was CAM-ICU negative for delirium.  She endorses being on an unknown antidepressant back on 2019, but she states that this was for unipolar depression s/s and specifically denies any current or prior DIGFAST (ananth) s/s.  She endorses intermittent symptoms of depression, anxiety, and insomnia (over the past few months) as noted below in the  detailed psych ROS.  She endorses getting about 5 hours of sleep per night and endorses a reduced appetite.  She denies any current or prior s/s consistent with psychosis, ananth, PTSD, OCD, eating disorders, panic, or substance abuse (other than intermittent cannabis abuse).  She denies any current or prior active or passive SI / HI.  She denies AH, VH, TH, delusions, paranoia, or DIGFAST (ananth) s/s.  She endorses tending to ADLs without difficulty.  She denies any adverse effects to her current medication regimen.  Regarding current medical/physical complaints, she endorses fatigue.  She denies any other medical complaints at this time.  NAD was observed during the examination.  Psychotherapy was implemented as noted below with a focus on improving depression, anxiety, and sleep.  After discussing the risks, benefits, alt vs no treatment, she is agreeable and desiring a trial of Remeron in an effort to improve depression, anxiety, and sleep.       Psychiatric Review Of Systems - Currently, the patient is endorsing and/or denying the following:  (patient's endorsements are BOLDED below; if not BOLDED, then patient denied):    Endorses intermittent Symptoms of Depression: diminished mood, low motivation, loss of interest/anhedonia, irritability, diminished energy, change in sleep, change in appetite, diminished concentration or cognition or indecisiveness, PMA/R, excessive guilt or hopelessness or worthlessness, suicidal ideations    Endorses intermittent issues with Sleep: initiation, maintenance, early morning awakening with inability to return to sleep    Denies Suicidal/Homicidal ideations: active/passive ideations, organized plans, future intentions    Denies Symptoms of psychosis: hallucinations, delusions, disorganized thinking, disorganized behavior or abnormal motor behavior, or negative symptoms (diminshed emotional expression, avolition, anhedonia, alogia, asociality)     Denies Symptoms of ananth or  hypomania: elevated, expansive, or irritable mood with increased energy or activity; with inflated self-esteem or grandiosity, decreased need for sleep, increased rate of speech, FOI or racing thoughts, distractibility, increased goal directed activity or PMA, risky/disinhibited behavior    Endorses intermittent Symptoms of Anxiety: excessive anxiety/worry/fear, more days than not, about numerous issues, difficult to control, with restlessness, fatigue, poor concentration, irritability, muscle tension, sleep disturbance; causes functionally impairing distress     Denies Symptoms of Panic Disorder: recurrent panic attacks, precipitated or un-precipitated, source of worry and/or behavioral changes secondary; with or without agoraphobia    Denies Symptoms of PTSD: h/o trauma; re-experiencing/intrusive symptoms, avoidant behavior, negative alterations in cognition or mood, or hyperarousal symptoms; with or without dissociative symptoms     Denies Symptoms of OCD: obsessions or compulsions     Denies Symptoms of Eating Disorders: anorexia, bulimia or binging    Denies Substance Use: intoxication, withdrawal, tolerance, used in larger amounts or duration than intended, unsuccessful attempts to limit or quit, increased time engaging in or seeking out, cravings or strong desire to use, failure to fulfill obligations, negative consequences in social/interpersonal/occupational,/recreational areas, use in dangerous situations, medical or psychological consequences       [facility] Toolkit ASQ Suicide Screening Tool:  In the past few weeks, have you wished you were dead? Denies   In the past few weeks, have you felt that you or your family would be better off if you were dead? Denies  In the past week, have you been having thoughts about killing yourself? Denies  Have you ever tried to kill yourself? Denies  Are you having thoughts of killing yourself right now? Denies       PSYCHOTHERAPY ADD-ON +[id number]   30 (16-37*) minutes    Time:  20 minutes  Participants: Met with patient    Therapeutic Intervention Type: behavior modifying psychotherapy, supportive psychotherapy  Why chosen therapy is appropriate versus another modality: relevant to diagnosis, patient responds to this modality, evidence based practice    Target symptoms: depression, anxiety, and insomnia   Primary focus: improving depression, anxiety, and sleep  Psychotherapeutic techniques: supportive and psychodynamic techniques; psycho-education; deep breathing exercises; CBT; problem solving techniques and managing life stressors    Outcome monitoring methods: self-report, observation    Patient's response to intervention:  The patient's response to intervention is accepting.    Progress toward goals:  The patient's progress toward goals is fair.      ROS:  General ROS: negative for - chills, fever or night sweats; positive for fatigue  Ophthalmic ROS: negative for - blurry vision, double vision or eye pain  ENT ROS: negative for - sinus pain, headaches, sore throat or visual changes  Allergy and Immunology ROS: negative for - hives, itchy/watery eyes or nasal congestion  Hematological and Lymphatic ROS: negative for - bleeding problems, bruising, jaundice or pallor  Endocrine ROS: negative for - galactorrhea, hot flashes, mood swings, palpitations or temperature intolerance  Respiratory ROS: negative for - cough, hemoptysis, shortness of breath, tachypnea or wheezing  Cardiovascular ROS: negative for - chest pain, dyspnea on exertion, loss of consciousness, palpitations, rapid heart rate or shortness of breath  Gastrointestinal ROS: negative for - nausea, abdominal pain, blood in stools, change in bowel habits, constipation or diarrhea; positive for reduced appetite  Genito-Urinary ROS: negative for - incontinence, nocturia or pelvic pain  Musculoskeletal ROS: negative for - joint stiffness, joint swelling, joint pain or muscle pain   Neurological ROS: negative for - behavioral  changes, confusion, dizziness, memory loss, numbness/tingling or seizures  Dermatological ROS: negative for dry skin, hair changes, pruritus or rash  Psychiatric ROS: see detailed psychiatric ROS above in history section       Past Psychiatric History:  Previous Medication Trials: yes (unknown antidepressant in 2019)   Previous Psychiatric Hospitalizations: denies  Previous Suicide Attempts: denies   History of Violence: denies  Current / Recent Outpatient Psychiatrist: denies ; previously seen at [facility] in 2019 per patient     Social History:  Marital Status: in a relationship   Children: 5   Employment Status/Info: currently unemployed  Education: high school diploma/GED  Special Ed: denies  : denies  Congregational: Yaz[employer]   Housing Status: lives with family in [place], [place]   Hobbies/Leisure time: time with friends and family   History of phys/sexual abuse: denies  Access to gun: denies     Family Psychiatric History: paternal aunt with Schizophrenia     Substance Abuse History:  Recreational Drugs: intermittent minimal cannabis abuse ; counseled on cessation ; denies other   Use of Alcohol: occasional, social use and minimal use ; counseled on cessation   Rehab History: denies   Tobacco Use: intermittent cigarette use ; counseled on cessation   Social History     Tobacco Use   Smoking Status Some Days    Types: Cigarettes   Smokeless Tobacco Never   Use of Caffeine: denies  Use of OTC: denies   Legal consequences of chemical use: denies    Legal History:  Past Charges/Incarcerations: denies  Pending charges: denies      Psychosocial Stressors: family and health  Functioning Relationships: good support system  Strengths AND Liabilities: Strength: Patient accepts guidance/feedback, Strength: Patient is expressive/articulate., Strength: Patient is motivated for change., Strength: Patient has positive support network., Liability: Patient has poor health., Liability: Patient lacks coping skills.      PAST  MEDICAL & SURGICAL HISTORY   Past Medical History:   Diagnosis Date    Anemia     Bipolar 1 disorder     Cerebral aneurysm     Hypertension     since 2012    Polycystic kidney disease     Polycystic kidney disease     Pre-eclampsia     Renal disorder     Since childhood     SAH (subarachnoid hemorrhage)     Stroke      Past Surgical History:   Procedure Laterality Date    BRAIN SURGERY      CEREBRAL ANGIOGRAM N/A 2018    Procedure: ANGIOGRAM-CEREBRAL;  Surgeon: Jayna Surgeon;  Location: [facility];  Service: Anesthesiology;  Laterality: N/A;     SECTION N/A 2018    Procedure:  SECTION;  Surgeon: Obed Soto MD;  [facility];  Service: OB/GYN;  Laterality: N/A;    DILATION AND CURETTAGE OF UTERUS             NEUROLOGIC HISTORY  Seizures: denies   Head trauma: yes   CVA: yes     FAMILY HISTORY   Family History   Problem Relation Name Age of Onset    Hypertension Mother      Polycystic kidney disease Mother      Hypertension Paternal Grandmother      Polycystic kidney disease Daughter         ALLERGIES   Review of patient's allergies indicates:  No Known Allergies    CURRENT MEDICATION REGIMEN   Home Meds:   Prior to Admission medications    Medication Sig Start Date End Date Taking? Authorizing Provider   labetaloL (NORMODYNE) 200 MG tablet Take 1 tablet (200 mg total) by mouth every 12 (twelve) hours. 3/27/24 3/27/25  Carlos Kuhn MD   NIFEdipine (PROCARDIA-XL) 60 MG (OSM) 24 hr tablet Take 1 tablet (60 mg total) by mouth 2 (two) times a day. 3/27/24 3/27/25  Carlos Kuhn MD       OTC Meds: denies     Scheduled Meds:    carvediloL  12.5 mg Oral BID    furosemide  20 mg Oral Daily    heparin (porcine)  5,000 Units Subcutaneous Q8H    hydrALAZINE  50 mg Oral Q8H    mupirocin   Nasal BID    nicotine  1 patch Transdermal Daily    NIFEdipine  90 mg Oral Daily    pantoprazole  40 mg Intravenous Daily      PRN Meds:   Current Facility-Administered Medications:     acetaminophen, 650  mg, Oral, Q4H PRN    albuterol-ipratropium, 3 mL, Nebulization, Q4H PRN    hydrALAZINE, 10 mg, Intravenous, Q6H PRN    melatonin, 9 mg, Oral, Nightly PRN    naloxone, 0.4 mg, Intravenous, PRN    nitroGLYCERIN, 0.4 mg, Sublingual, PRN    ondansetron, 4 mg, Intravenous, Q6H PRN    polyethylene glycol, 17 g, Oral, BID PRN    simethicone, 1 tablet, Oral, QID PRN    sodium chloride 0.9%, 10 mL, Intravenous, Q12H PRN   Psychotherapeutics (From admission, onward)      None            LABORATORY DATA   Recent Results (from the past 72 hour(s))   POCT CMP    Collection Time: 08/11/24  4:14 PM   Result Value Ref Range    Albumin, POC 3.4 3.3 - 5.5 g/dL    Alkaline Phosphatase, POC 32 (L) 42 - 141 U/L    ALT (SGPT), POC 14 10 - 47 U/L    AST (SGOT), POC 23 11 - 38 U/L    POC BUN 22 7 - 22 mg/dL    Calcium, POC 9.8 8.0 - 10.3 mg/dL    POC Chloride 104 98 - 108 mmol/L    POC Creatinine 2.2 (H) 0.6 - 1.2 mg/dL    POC Glucose 88 73 - 118 mg/dL    POC Potassium 2.8 (L) 3.6 - 5.1 mmol/L    POC Sodium 142 128 - 145 mmol/L    Bilirubin, POC 0.6 0.2 - 1.6 mg/dL    POC TCO2 33 18 - 33 mmol/L    Protein, POC 6.8 6.4 - 8.1 g/dL   Troponin ISTAT    Collection Time: 08/11/24  4:14 PM   Result Value Ref Range    POC Cardiac Troponin I 0.05 0.00 - 0.08 ng/mL    Sample unknown    POCT CBC    Collection Time: 08/11/24  4:15 PM   Result Value Ref Range    Hematocrit      Hemoglobin      RBC      WBC      MCV      MCH, POC      MCHC      RDW-CV      Platelet Count, POC      MPV     ISTAT PROCEDURE    Collection Time: 08/11/24  4:15 PM   Result Value Ref Range    POC PTWBT 14.3 9.7 - 14.3 sec    POC PTINR 1.2 0.9 - 1.2    Sample unknown    POCT B-type natriuretic peptide (BNP)    Collection Time: 08/11/24  4:29 PM   Result Value Ref Range    POC B-Type Natriuretic Peptide 496 (H) 0.0 - 100.0 pg/mL   POCT D Dimer    Collection Time: 08/11/24  4:33 PM   Result Value Ref Range    POC D- (H) 0.0 - 450.0 ng/mL   POCT URINALYSIS W/O SCOPE     Collection Time: 08/11/24  5:25 PM   Result Value Ref Range    Glucose, UA Negative     Bilirubin, UA Negative     Ketones, UA Negative     Spec Grav UA 1.020     Blood, UA Trace-intact (A)     PH, UA 7.5     Protein, UA 1+ (A)     Urobilinogen, UA 0.2 E.U./dL    Nitrite, UA Negative     Leukocytes, UA Trace (A)     Color, UA POC Yellow     Clarity, UA, POC Clear    POCT urine pregnancy    Collection Time: 08/11/24  5:26 PM   Result Value Ref Range    POC Preg Test, Ur Negative Negative     Acceptable Yes    Basic metabolic panel    Collection Time: 08/11/24  8:43 PM   Result Value Ref Range    Sodium 141 136 - 145 mmol/L    Potassium 2.6 (LL) 3.5 - 5.1 mmol/L    Chloride 102 95 - 110 mmol/L    CO2 27 23 - 29 mmol/L    Glucose 87 70 - 110 mg/dL    BUN 26 (H) 6 - 20 mg/dL    Creatinine 2.0 (H) 0.5 - 1.4 mg/dL    Calcium 10.0 8.7 - 10.5 mg/dL    Anion Gap 12 8 - 16 mmol/L    eGFR 32 (A) >60 mL/min/1.73 m^2   Magnesium    Collection Time: 08/11/24  8:43 PM   Result Value Ref Range    Magnesium 1.7 1.6 - 2.6 mg/dL   Troponin I    Collection Time: 08/11/24  8:43 PM   Result Value Ref Range    Troponin I 0.064 (H) 0.000 - 0.026 ng/mL   TSH    Collection Time: 08/11/24  8:43 PM   Result Value Ref Range    TSH 1.178 0.400 - 4.000 uIU/mL   Sodium, urine, random    Collection Time: 08/11/24 10:11 PM   Result Value Ref Range    Sodium, Urine 128 20 - 250 mmol/L   Creatinine, urine, random    Collection Time: 08/11/24 10:11 PM   Result Value Ref Range    Creatinine, Urine 10.2 (L) 15.0 - 325.0 mg/dL   Protein / creatinine ratio, urine    Collection Time: 08/11/24 10:11 PM   Result Value Ref Range    Protein, Urine Random 10 mg/dL    Creatinine, Urine 10.2 (L) 15.0 - 325.0 mg/dL    Prot/Creat Ratio, Urine 0.98 (H) 0.00 - 0.20   Drug screen panel, in-house    Collection Time: 08/11/24 10:11 PM   Result Value Ref Range    Benzodiazepines Negative Negative    Methadone metabolites Negative Negative    Cocaine  (Metab.) Negative Negative    Opiate Scrn, Ur Negative Negative    Barbiturate Screen, Ur Negative Negative    Amphetamine Screen, Ur Negative Negative    THC Negative Negative    Phencyclidine Negative Negative    Creatinine, Urine 10.2 (L) 15.0 - 325.0 mg/dL    Toxicology Information SEE COMMENT    Osmolality, Urine    Collection Time: 08/11/24 10:11 PM   Result Value Ref Range    Osmolality, Urine 306 50 - 1200 mOsm/kg   Troponin I    Collection Time: 08/12/24  4:39 AM   Result Value Ref Range    Troponin I 0.058 (H) 0.000 - 0.026 ng/mL   CBC Auto Differential    Collection Time: 08/12/24  4:39 AM   Result Value Ref Range    WBC 5.20 3.90 - 12.70 K/uL    RBC 3.75 (L) 4.00 - 5.40 M/uL    Hemoglobin 11.0 (L) 12.0 - 16.0 g/dL    Hematocrit 33.7 (L) 37.0 - 48.5 %    MCV 90 82 - 98 fL    MCH 29.3 27.0 - 31.0 pg    MCHC 32.6 32.0 - 36.0 g/dL    RDW 12.5 11.5 - 14.5 %    Platelets 175 150 - 450 K/uL    MPV 11.5 9.2 - 12.9 fL    Immature Granulocytes 0.2 0.0 - 0.5 %    Gran # (ANC) 3.1 1.8 - 7.7 K/uL    Immature Grans (Abs) 0.01 0.00 - 0.04 K/uL    Lymph # 1.5 1.0 - 4.8 K/uL    Mono # 0.5 0.3 - 1.0 K/uL    Eos # 0.1 0.0 - 0.5 K/uL    Baso # 0.02 0.00 - 0.20 K/uL    nRBC 0 0 /100 WBC    Gran % 58.6 38.0 - 73.0 %    Lymph % 28.7 18.0 - 48.0 %    Mono % 10.4 4.0 - 15.0 %    Eosinophil % 1.7 0.0 - 8.0 %    Basophil % 0.4 0.0 - 1.9 %    Differential Method Automated    Basic metabolic panel    Collection Time: 08/12/24  4:39 AM   Result Value Ref Range    Sodium 140 136 - 145 mmol/L    Potassium 2.9 (L) 3.5 - 5.1 mmol/L    Chloride 102 95 - 110 mmol/L    CO2 27 23 - 29 mmol/L    Glucose 83 70 - 110 mg/dL    BUN 22 (H) 6 - 20 mg/dL    Creatinine 1.8 (H) 0.5 - 1.4 mg/dL    Calcium 9.3 8.7 - 10.5 mg/dL    Anion Gap 11 8 - 16 mmol/L    eGFR 37 (A) >60 mL/min/1.73 m^2   Lipid Panel    Collection Time: 08/12/24  4:39 AM   Result Value Ref Range    Cholesterol 186 120 - 199 mg/dL    Triglycerides 78 30 - 150 mg/dL    HDL 52 40 - 75  mg/dL    LDL Cholesterol 118.4 63.0 - 159.0 mg/dL    HDL/Cholesterol Ratio 28.0 20.0 - 50.0 %    Total Cholesterol/HDL Ratio 3.6 2.0 - 5.0    Non-HDL Cholesterol 134 mg/dL   Echo Saline Bubble? No    Collection Time: 08/12/24  8:44 AM   Result Value Ref Range    BSA 1.82 m2    RV- hernández basal diam 3.3 cm    LVOT stroke volume 87.02 cm3    LVIDd 4.33 3.5 - 6.0 cm    LV Systolic Volume 33.17 mL    LV Systolic Volume Index 18.0 mL/m2    LVIDs 2.93 2.1 - 4.0 cm    LV Diastolic Volume 84.64 mL    LV Diastolic Volume Index 46.00 mL/m2    Left Ventricular End Systolic Volume by Teichholz Method 33.17 mL    Left Ventricular End Diastolic Volume by Teichholz Method 84.64 mL    IVS 1.77 (A) 0.6 - 1.1 cm    LVOT diameter 2.11 cm    LVOT area 3.5 cm2    FS 32 28 - 44 %    Left Ventricle Relative Wall Thickness 0.99 cm    Posterior Wall 2.15 (A) 0.6 - 1.1 cm    LV mass 400.24 g    LV Mass Index 218 g/m2    MV Peak E Marty 0.79 m/s    TDI LATERAL 0.05 m/s    TDI SEPTAL 0.05 m/s    E/E' ratio 15.80 m/s    MV Peak A Marty 0.50 m/s    TR Max Marty 2.69 m/s    E/A ratio 1.58     E wave deceleration time 239.24 msec    LV SEPTAL E/E' RATIO 15.80 m/s    LV LATERAL E/E' RATIO 15.80 m/s    PV Peak S Marty 0.37 m/s    PV Peak D Marty 0.64 m/s    Pulm vein S/D ratio 0.58     LVOT peak marty 1.63 m/s    Left Ventricular Outflow Tract Mean Velocity 1.09 cm/s    Left Ventricular Outflow Tract Mean Gradient 5.59 mmHg    RV S' 10.47 cm/s    RV/LV Ratio 0.76 cm    LA size 4.33 cm    Left Atrium Minor Axis 5.80 cm    Left Atrium Major Axis 6.12 cm    RA Major Axis 4.78 cm    AV regurgitation pressure 1/2 time [id number] ms    AR Max Marty 6.10 m/s    AV mean gradient 8 mmHg    AV peak gradient 14 mmHg    Ao peak marty 1.85 m/s    Ao VTI 36.30 cm    LVOT peak VTI 24.90 cm    AV valve area 2.40 cm²    AV Velocity Ratio 0.88     AV index (prosthetic) 0.69     ANA by Velocity Ratio 3.08 cm²    MV mean gradient 1 mmHg    MV peak gradient 3 mmHg    MV stenosis  "pressure 1/2 time 69.38 ms    MV valve area p 1/2 method 3.17 cm2    MV valve area by continuity eq 2.79 cm2    MV VTI 31.2 cm    Triscuspid Valve Regurgitation Peak Gradient 29 mmHg    PV PEAK VELOCITY 1.65 m/s    PV peak gradient 11 mmHg    Sinus 3.93 cm    STJ 3.58 cm    Ascending aorta 3.93 cm    IVC diameter 1.69 cm    Mean e' 0.05 m/s    ZLVIDS -0.58     ZLVIDD -1.65     RVDD 3.30 cm    LA Volume Index 61.9 mL/m2    LA volume 113.98 cm3    LA WIDTH 5.2 cm    RA Width 4.1 cm    TV resting pulmonary artery pressure 37 mmHg    RV TB RVSP 11 mmHg    Est. RA pres 8 mmHg      No results found for: "PHENYTOIN", "PHENOBARB", "VALPROATE", "CBMZ"      EXAMINATION    VITALS   Vitals:    08/12/24 1351 08/12/24 1517 08/12/24 1559 08/12/24 1724   BP: 136/80  (!) 146/76    BP Location:   Left arm    Patient Position:   Lying    Pulse:  74 85    Resp:   18    Temp:   98.6 °F (37 °C) 98.6 °F (37 °C)   TempSrc:   Oral    SpO2:   98%    Weight:       Height:            CONSTITUTIONAL  General Appearance: NAD, unremarkable, age appropriate, normal weight, lying in bed, calm    MUSCULOSKELETAL  Muscle Strength and Tone: WNL    Abnormal Involuntary Movements: none observed   Gait and Station: Attempted but unable to assess due to medical acuity     PSYCHIATRIC   Behavior/Cooperation:  cooperative, eye contact normal, calm  Speech:  normal tone, normal rate, normal pitch, normal volume  Language: grossly intact, able to name, able to repeat with spontaneous speech  Mood: "doing OK"  Affect:  mildly constricted   Associations: intact; no ALEISHA  Thought Process: Linear and Future-Oriented   Thought Content: denies SI, HI, AH, VH, TH, delusions, or paranoia (no RIS observed)   Sensorium: Awake  Alert and Oriented: to person, place, city, state, month, year, and situation   Memory: 3/3 immediate, 2/3 at 5 minutes    Recent: Intact; able to report recent events   Remote: Intact; Named 3/4 past presidents   Attention/concentration: Intact. " "Appropriate for age/education. Able to spell w-o-r-l-d & d-l-r-o-w.   Similarities: Intact (difference between apple and orange?)  Abstract reasoning: Intact  Fund of Knowledge: Named 3/4 past presidents   Insight: Intact  Judgment: Intact    CAM ICU Delirium Assessment - NEGATIVE    Is the patient aware of the biomedical complications associated with substance abuse and mental illness? yes        MEDICAL DECISION MAKING    ASSESSMENT        Unspecified Depressive Disorder   Unspecified Anxiety Disorder   Unspecified Insomnia        RECOMMENDATIONS       - With reasonable medical certainty, based on a present-state examination, the patient does not currently meet PEC criteria due to not being an imminent threat to self/others and not being gravely disabled 2/2 mental illness at this time.       - Begin Remeron 15 mg PO QHS for mood / anxiety / sleep / appetite (discussed risks/benefits/alt vs no treatment with patient, including but not limited to SI, Serotonin Syndrome, and Filomena Induction)     - Psychotherapy was performed with patient as noted above with a focus on improving depression, anxiety, and sleep.    - Patient's most recent resulted labs, imaging, and EKG were reviewed today ; UDS negative ; TSH wnl ; CT Head with "No acute intracranial abnormalities identified."     - Please have Hospital CM/SW assist patient with ASAP outpatient mental health f/u for s/p discharge from this facility (patient is requesting assistance with re-establishing at [facility]).       - Patient was instructed to call 911 and/or 988 and return to the nearest ED if she begins feeling suicidal, homicidal, or gravely disabled (for s/p discharge from this facility).      - Thank you for this consult         Total time spent with patient and/or managing/coordinating patient's care today (excluding the time spent on psychotherapy): 62 minutes   Time spent on psychotherapy today (as noted above): 20 minutes   Total time for encounter today " including psychotherapy: 82 minutes      More than 50% of the time was spent counseling/coordinating care.     Consulting clinician was informed of the encounter and consult note.     Consultation ended: 8/12/2024 at 6:35 PM       STAFF:  Howie Jaffe MD  [facility]   8/12/2024

## 2024-08-12 NOTE — PLAN OF CARE
Problem: Acute Kidney Injury/Impairment  Goal: Fluid and Electrolyte Balance  Outcome: Progressing  Goal: Improved Oral Intake  Outcome: Progressing  Goal: Effective Renal Function  Outcome: Progressing     Problem: Infection  Goal: Absence of Infection Signs and Symptoms  Outcome: Progressing

## 2024-08-12 NOTE — PLAN OF CARE
Problem: Adult Inpatient Plan of Care  Goal: Plan of Care Review  Outcome: Progressing  Goal: Patient-Specific Goal (Individualized)  Outcome: Progressing     Problem: Acute Kidney Injury/Impairment  Goal: Fluid and Electrolyte Balance  Outcome: Progressing     Problem: Infection  Goal: Absence of Infection Signs and Symptoms  Outcome: Progressing

## 2024-08-12 NOTE — ASSESSMENT & PLAN NOTE
Patient has a current diagnosis of Hypertensive emergency with end organ damage evidenced by acute kidney injury and acute pulmonary edema without heart failure which is uncontrolled.  Suspect due to not taking her BP medications as instructed the last time.  Latest blood pressure and vitals reviewed-   Temp:  [98.5 °F (36.9 °C)-98.7 °F (37.1 °C)]   Pulse:  [55-92]   Resp:  [14-49]   BP: (108-256)/()   SpO2:  [92 %-100 %] .   Patient currently on IV antihypertensives.   Home meds for hypertension were reviewed and noted below.   Hypertension Medications               labetaloL (NORMODYNE) 200 MG tablet Take 1 tablet (200 mg total) by mouth every 12 (twelve) hours.    NIFEdipine (PROCARDIA-XL) 60 MG (OSM) 24 hr tablet Take 1 tablet (60 mg total) by mouth 2 (two) times a day.            Medication adjustment for hospital antihypertensives is as follows-off Cardene drip.  Initiate home nifedipine switch labetalol to coreg  Add hydralazine.

## 2024-08-12 NOTE — NURSING
[facility], [facility]  Nurses Note -- 4 Eyes      8/12/2024       Skin assessed on: Admit      [x] No Pressure Injuries Present    [x]Prevention Measures Documented    [] Yes LDA  for Pressure Injury Previously documented     [] Yes New Pressure Injury Discovered   [] LDA for New Pressure Injury Added      Attending RN:  Nicole Jaime RN     Second RN:  Petra SLAUGHTER RN

## 2024-08-12 NOTE — EICU
"Pt is c/o severe "whole head" 8/10 HA, no n/v, CP or SOB.  /103. HR 90 Pt was given tylenol without relief of the HA.   No neuro deficits, no aphasia, walking ok. Pt has ocasional HA at home, usually tylenol works for them.  CT head ordered to evaluate for ICH although unlikely, percocet ordered.  D/w RN  "

## 2024-08-12 NOTE — NURSING
[facility], [facility] [place]  Nurses Note -- 4 Eyes      8/12/2024       Skin assessed on: Q Shift      [x] No Pressure Injuries Present    [x]Prevention Measures Documented    [] Yes LDA  for Pressure Injury Previously documented     [] Yes New Pressure Injury Discovered   [] LDA for New Pressure Injury Added      Attending RN:  Balbina MEDINA RN     Second RN:  AZALIA Rivera

## 2024-08-12 NOTE — ASSESSMENT & PLAN NOTE
Patient does not follow with a nephrologist.  Will need one as outpatient and stressed to her the importance of following up.  Creatinine baseline appears to be closer to 1.6.  Renal ultrasound with ADPKD.  We will need outpatient follow up with Nephrology

## 2024-08-12 NOTE — PROVIDER TRANSFER
38-year-old female with past medical history of hypertension with recurrent admissions for hypertensive emergency/urgency, polycystic kidney disease, baseline creatinine 1.6, CVA due to subarachnoid hemorrhage due to ruptured ZEE 2018 status post aneurysmal coiling, bipolar 1 disorder who was admitted for hypertensive emergency secondary to noncompliance with medications.  Hypokalemia/hypomagnesemia repleted.  JOSE C improving.  CT head negative Echo with EF 55-60% with grade 2 diastolic dysfunction.  Renal ultrasound with ADPKD.  Discontinued Card.  Not on any medications for bipolar.  Patient appears depressed  Tele psych consulted    Plan  Titrate antihypertensives  Follow up on tele psych consult  Need outpatient nephrology follow up at discharge

## 2024-08-13 LAB
ANION GAP SERPL CALC-SCNC: 8 MMOL/L (ref 8–16)
BUN SERPL-MCNC: 23 MG/DL (ref 6–20)
CALCIUM SERPL-MCNC: 9.5 MG/DL (ref 8.7–10.5)
CHLORIDE SERPL-SCNC: 106 MMOL/L (ref 95–110)
CO2 SERPL-SCNC: 25 MMOL/L (ref 23–29)
CREAT SERPL-MCNC: 2.2 MG/DL (ref 0.5–1.4)
EST. GFR  (NO RACE VARIABLE): 29 ML/MIN/1.73 M^2
GLUCOSE SERPL-MCNC: 94 MG/DL (ref 70–110)
MAGNESIUM SERPL-MCNC: 2.3 MG/DL (ref 1.6–2.6)
POTASSIUM SERPL-SCNC: 3.5 MMOL/L (ref 3.5–5.1)
SODIUM SERPL-SCNC: 139 MMOL/L (ref 136–145)

## 2024-08-13 PROCEDURE — 63600175 PHARM REV CODE 636 W HCPCS: Performed by: STUDENT IN AN ORGANIZED HEALTH CARE EDUCATION/TRAINING PROGRAM

## 2024-08-13 PROCEDURE — 63600175 PHARM REV CODE 636 W HCPCS: Performed by: INTERNAL MEDICINE

## 2024-08-13 PROCEDURE — 25000003 PHARM REV CODE 250: Performed by: STUDENT IN AN ORGANIZED HEALTH CARE EDUCATION/TRAINING PROGRAM

## 2024-08-13 PROCEDURE — 21400001 HC TELEMETRY ROOM

## 2024-08-13 PROCEDURE — 80048 BASIC METABOLIC PNL TOTAL CA: CPT | Performed by: STUDENT IN AN ORGANIZED HEALTH CARE EDUCATION/TRAINING PROGRAM

## 2024-08-13 PROCEDURE — 94761 N-INVAS EAR/PLS OXIMETRY MLT: CPT

## 2024-08-13 PROCEDURE — 99900035 HC TECH TIME PER 15 MIN (STAT)

## 2024-08-13 PROCEDURE — 63600175 PHARM REV CODE 636 W HCPCS: Performed by: HOSPITALIST

## 2024-08-13 PROCEDURE — 83735 ASSAY OF MAGNESIUM: CPT | Performed by: STUDENT IN AN ORGANIZED HEALTH CARE EDUCATION/TRAINING PROGRAM

## 2024-08-13 PROCEDURE — 63600175 PHARM REV CODE 636 W HCPCS

## 2024-08-13 PROCEDURE — 36415 COLL VENOUS BLD VENIPUNCTURE: CPT | Performed by: STUDENT IN AN ORGANIZED HEALTH CARE EDUCATION/TRAINING PROGRAM

## 2024-08-13 RX ORDER — OXYCODONE AND ACETAMINOPHEN 5; 325 MG/1; MG/1
1 TABLET ORAL ONCE
Status: COMPLETED | OUTPATIENT
Start: 2024-08-13 | End: 2024-08-13

## 2024-08-13 RX ORDER — MIRTAZAPINE 15 MG/1
15 TABLET, FILM COATED ORAL NIGHTLY
Status: DISCONTINUED | OUTPATIENT
Start: 2024-08-13 | End: 2024-08-13

## 2024-08-13 RX ORDER — METOCLOPRAMIDE HYDROCHLORIDE 5 MG/ML
5 INJECTION INTRAMUSCULAR; INTRAVENOUS ONCE
Status: COMPLETED | OUTPATIENT
Start: 2024-08-13 | End: 2024-08-13

## 2024-08-13 RX ORDER — DIPHENHYDRAMINE HYDROCHLORIDE 50 MG/ML
12.5 INJECTION INTRAMUSCULAR; INTRAVENOUS ONCE
Status: COMPLETED | OUTPATIENT
Start: 2024-08-13 | End: 2024-08-13

## 2024-08-13 RX ORDER — ASPIRIN 325 MG
325 TABLET ORAL ONCE
Status: COMPLETED | OUTPATIENT
Start: 2024-08-13 | End: 2024-08-13

## 2024-08-13 RX ORDER — DIPHENHYDRAMINE HYDROCHLORIDE 50 MG/ML
25 INJECTION INTRAMUSCULAR; INTRAVENOUS ONCE
Status: COMPLETED | OUTPATIENT
Start: 2024-08-13 | End: 2024-08-13

## 2024-08-13 RX ORDER — PROCHLORPERAZINE EDISYLATE 5 MG/ML
2.5 INJECTION INTRAMUSCULAR; INTRAVENOUS ONCE
Status: COMPLETED | OUTPATIENT
Start: 2024-08-13 | End: 2024-08-13

## 2024-08-13 RX ADMIN — NIFEDIPINE 90 MG: 30 TABLET, FILM COATED, EXTENDED RELEASE ORAL at 08:08

## 2024-08-13 RX ADMIN — HEPARIN SODIUM 5000 UNITS: 5000 INJECTION INTRAVENOUS; SUBCUTANEOUS at 01:08

## 2024-08-13 RX ADMIN — TRAZODONE HYDROCHLORIDE 25 MG: 50 TABLET ORAL at 10:08

## 2024-08-13 RX ADMIN — PROCHLORPERAZINE EDISYLATE 2.5 MG: 5 INJECTION INTRAMUSCULAR; INTRAVENOUS at 10:08

## 2024-08-13 RX ADMIN — DIPHENHYDRAMINE HYDROCHLORIDE 25 MG: 50 INJECTION INTRAMUSCULAR; INTRAVENOUS at 10:08

## 2024-08-13 RX ADMIN — PANTOPRAZOLE SODIUM 40 MG: 40 INJECTION, POWDER, FOR SOLUTION INTRAVENOUS at 08:08

## 2024-08-13 RX ADMIN — METOCLOPRAMIDE 5 MG: 5 INJECTION, SOLUTION INTRAMUSCULAR; INTRAVENOUS at 12:08

## 2024-08-13 RX ADMIN — DIPHENHYDRAMINE HYDROCHLORIDE 12.5 MG: 50 INJECTION INTRAMUSCULAR; INTRAVENOUS at 12:08

## 2024-08-13 RX ADMIN — HYDRALAZINE HYDROCHLORIDE 10 MG: 20 INJECTION INTRAMUSCULAR; INTRAVENOUS at 01:08

## 2024-08-13 RX ADMIN — CARVEDILOL 12.5 MG: 12.5 TABLET, FILM COATED ORAL at 05:08

## 2024-08-13 RX ADMIN — HYDRALAZINE HYDROCHLORIDE 50 MG: 25 TABLET ORAL at 05:08

## 2024-08-13 RX ADMIN — ASPIRIN 325 MG ORAL TABLET 325 MG: 325 PILL ORAL at 11:08

## 2024-08-13 RX ADMIN — HYDRALAZINE HYDROCHLORIDE 10 MG: 20 INJECTION INTRAMUSCULAR; INTRAVENOUS at 05:08

## 2024-08-13 RX ADMIN — CARVEDILOL 12.5 MG: 12.5 TABLET, FILM COATED ORAL at 08:08

## 2024-08-13 RX ADMIN — OXYCODONE HYDROCHLORIDE AND ACETAMINOPHEN 1 TABLET: 5; 325 TABLET ORAL at 01:08

## 2024-08-13 NOTE — NURSING
[facility], [facility] [place]  Nurses Note -- 4 Eyes      8/12/2024       Skin assessed on: Q Shift      [x] No Pressure Injuries Present    [x]Prevention Measures Documented    [] Yes LDA  for Pressure Injury Previously documented     [] Yes New Pressure Injury Discovered   [] LDA for New Pressure Injury Added      Attending RN:  Trinity Sahni RN     Second RN:  MARLYN Ruth

## 2024-08-13 NOTE — NURSING
[facility], [facility] [place]  Nurses Note -- 4 Eyes      8/13/2024       Skin assessed on: Q Shift      [x] No Pressure Injuries Present    [x]Prevention Measures Documented    [] Yes LDA  for Pressure Injury Previously documented     [] Yes New Pressure Injury Discovered   [] LDA for New Pressure Injury Added      Attending RN:  Julieth Borja, RN     Second RN:  Irina Hernandez, PCT

## 2024-08-13 NOTE — NURSING
1300: Patient complained of migraine - one time aspirin and percocet given. Migraine improved. /116, prn IV Hydralazine given. Recheck /95.    1735: /114 HR 71 - Late Dr. BOAZ MISHRA notified. Ordered to give scheduled Coreg now and prn IV Hydralazine. Recheck /87 HR 82

## 2024-08-13 NOTE — PROGRESS NOTES
[facility]  Progress Note    Patient Name: Sharon Grande  MRN: [id number]  Patient Class: IP- Inpatient   Admission Date: 8/11/2024  Length of Stay: 2 days  Attending Physician: Kwame Alonzo MD  Primary Care Provider: [facility] -        Subjective:     Principal Problem:Hypertensive crisis        HPI:  38-year-old  female with a past medical history significant for malignant hypertension (multiple admissions for hypertensive emergency/urgency), polycystic kidney disease (complicated by JOSE C in the past with a baseline creatinine closer to 1.6), CVA due to subarachnoid hemorrhage due to a ruptured anterior cerebral communicating artery (2018) status post aneurysmal coiling, bipolar 1 disorder and non-compliance presents to the [place] emergency department with complaints of worsening shortness of breath that started early this morning.  She also reports bilateral lower extremity swelling (L>R) for the the past 2 days.  A shortness of breath is also accompanied with complaints of chest tightness and pain and a mild headache.  Denies any nausea or vomiting.  States she has been able take her blood pressure medications however she has been checking her blood pressure regularly.  Not sure exactly which one she is taking but states only doing one one day. Upon review of her d/c summary from 3/2024, she was supposed to be on Procardia XL 60mg PO BID and Labetalol 200mg PO BID.     Labs at the outside emergency department were significant for a low potassium of 2.8, elevated creatinine of 2.2.  Her troponin level was 0.05 with an elevated BNP (496 and no h/o CHF) and D-dimer    Chest x-ray was overall unremarkable for any acute pulmonary or cardiac process. CTA chest ordered:  1. No evidence of PE.   2. Small bilateral pleural effusions with mild pulmonary interstitial edema.   3. Mild fusiform dilatation of the ascending thoracic aorta measuring 4.2 cm.   4.  "Additional findings as detailed above.     She was started on Cardene drip for an elevated blood pressure initially 234/144 (got as high as 256/151).  She has been transferred to [facility] ICU for further inpatient management of her hypertensive emergency (based on JOSE C and elevated BNP/D-dimer).      Because this patient's clinical condition is guarded and requires frequent monitoring of her vital signs which includes her blood pressure as well as weaning her Cardene drip and further evaluation of her malignant hypertension, care in an alternative location isn't clinically appropriate for the reasons stated above.              Overview/Hospital Course:  38-year-old female with past medical history of malignant hypertension admissions for hypertensive emergency/urgency, polycystic kidney disease, baseline creatinine 1.6, CVA due to subarachnoid hemorrhage due to ruptured ZEE 2018 status post aneurysmal coiling, bipolar 1 disorder who was admitted for hypertensive emergency secondary to noncompliance with medications.  Hypokalemia/hypomagnesemia repleted.  JOSE C improving.  CT head negative Echo with EF 55-60% with grade 2 diastolic dysfunction.  Renal ultrasound with ADPKD.    Interval History: no acute issues, she reports being given an IV medication last night that was supposed to help her go to sleep but did not help. She states it actually made her "angry." Also still with headache. Renal function slightly worsening today    Review of Systems  Objective:     Vital Signs (Most Recent):  Temp: 98.6 °F (37 °C) (08/13/24 0810)  Pulse: 72 (08/13/24 0825)  Resp: 19 (08/13/24 0810)  BP: (!) 164/84 (08/13/24 0825)  SpO2: 97 % (08/13/24 0810) Vital Signs (24h Range):  Temp:  [98.5 °F (36.9 °C)-98.9 °F (37.2 °C)] 98.6 °F (37 °C)  Pulse:  [62-86] 72  Resp:  [15-22] 19  SpO2:  [97 %-100 %] 97 %  BP: (113-164)/(60-88) 164/84     Weight: 68.8 kg (151 lb 10.8 oz)  Body mass index is 22.4 kg/m².    Intake/Output Summary " (Last 24 hours) at 8/13/2024 1043  Last data filed at 8/13/2024 0816  Gross per 24 hour   Intake 720 ml   Output 550 ml   Net 170 ml         Physical Exam  Constitutional:       General: She is not in acute distress.     Appearance: She is normal weight.   Cardiovascular:      Rate and Rhythm: Normal rate.      Pulses: Normal pulses.   Pulmonary:      Effort: No respiratory distress.      Breath sounds: Normal breath sounds. No wheezing.   Abdominal:      General: Bowel sounds are normal. There is no distension.      Palpations: Abdomen is soft.      Tenderness: There is no abdominal tenderness.   Musculoskeletal:      Right lower leg: No edema.      Left lower leg: No edema.   Skin:     General: Skin is warm.   Neurological:      Mental Status: She is alert and oriented to person, place, and time. Mental status is at baseline.   Psychiatric:         Mood and Affect: Mood normal.             Significant Labs: All pertinent labs within the past 24 hours have been reviewed.    Significant Imaging: I have reviewed all pertinent imaging results/findings within the past 24 hours.    Assessment/Plan:      * Hypertensive crisis  Patient has a current diagnosis of Hypertensive emergency with end organ damage evidenced by acute kidney injury and acute pulmonary edema without heart failure which is uncontrolled.  Suspect due to not taking her BP medications as instructed the last time.  Latest blood pressure and vitals reviewed-   Temp:  [98.5 °F (36.9 °C)-98.9 °F (37.2 °C)]   Pulse:  [62-86]   Resp:  [15-22]   BP: (113-164)/(60-88)   SpO2:  [97 %-100 %] .   Patient currently on IV antihypertensives.   Home meds for hypertension were reviewed and noted below.   Hypertension Medications               labetaloL (NORMODYNE) 200 MG tablet Take 1 tablet (200 mg total) by mouth every 12 (twelve) hours.    NIFEdipine (PROCARDIA-XL) 60 MG (OSM) 24 hr tablet Take 1 tablet (60 mg total) by mouth 2 (two) times a day.             Medication adjustment for hospital antihypertensives is as follows-off Cardene drip.  Initiate home nifedipine switch labetalol to coreg  Add hydralazine.    Acute on chronic diastolic heart failure  Echo with EF 60-65% with grade 2 diastolic dysfunction.  Severe LVH. switch labetalol to Coreg and add Lasix  - patient has been refusing lasix    Tobacco dependency  Dangers of cigarette smoking were reviewed with patient in detail. Patient was Counseled for 3-10 minutes. Nicotine replacement options were discussed. Nicotine replacement was discussed- prescribed    H/O spontaneous subarachnoid intracranial hemorrhage due to cerebral aneurysm  This time no significant headache to suggest intracranial bleed at this time.  CT head non-contrast negative for mass or bleed.  Continue to control blood pressure.    Hypokalemia  Repleted    JOSE C (acute kidney injury)  JOSE C is likely due to  hypertensive crisis.  Reviewed her last hospitalization for similar diagnosis (3/2024) and noted that her initial creatinine at that time was 2.1.  With blood pressure control and IV fluids creatinine eventually improved to 1.6 which appears to be her baseline. . Most recent creatinine and eGFR are listed below.  Recent Labs     08/11/24 2043 08/12/24  0439 08/13/24  0443   CREATININE 2.0* 1.8* 2.2*   EGFRNORACEVR 32* 37* 29*        Plan  - JOSE C:  worsening today, has been refusing lasix    PKD (polycystic kidney disease)  Patient does not follow with a nephrologist.  Will need one as outpatient and stressed to her the importance of following up.  Creatinine baseline appears to be closer to 1.6.  Renal ultrasound with ADPKD.  We will need outpatient follow up with Nephrology      VTE Risk Mitigation (From admission, onward)           Ordered     heparin (porcine) injection 5,000 Units  Every 8 hours         08/12/24 0355     IP VTE LOW RISK PATIENT  Once         08/11/24 1958     Place sequential compression device  Until discontinued          08/11/24 1958     Place ISABEL hose  Until discontinued         08/11/24 1958                    Discharge Planning   IQRA: 8/14/2024     Code Status: Full Code   Is the patient medically ready for discharge?:     Reason for patient still in hospital (select all that apply): Treatment  Discharge Plan A: Home with family                  Kwame Alonzo MD  Department of Hospital Medicine   [facility]

## 2024-08-13 NOTE — ASSESSMENT & PLAN NOTE
Echo with EF 60-65% with grade 2 diastolic dysfunction.  Severe LVH. switch labetalol to Coreg and add Lasix  - patient has been refusing lasix

## 2024-08-13 NOTE — SUBJECTIVE & OBJECTIVE
"Interval History: no acute issues, she reports being given an IV medication last night that was supposed to help her go to sleep but did not help. She states it actually made her "angry." Also still with headache. Renal function slightly worsening today    Review of Systems  Objective:     Vital Signs (Most Recent):  Temp: 98.6 °F (37 °C) (08/13/24 0810)  Pulse: 72 (08/13/24 0825)  Resp: 19 (08/13/24 0810)  BP: (!) 164/84 (08/13/24 0825)  SpO2: 97 % (08/13/24 0810) Vital Signs (24h Range):  Temp:  [98.5 °F (36.9 °C)-98.9 °F (37.2 °C)] 98.6 °F (37 °C)  Pulse:  [62-86] 72  Resp:  [15-22] 19  SpO2:  [97 %-100 %] 97 %  BP: (113-164)/(60-88) 164/84     Weight: 68.8 kg (151 lb 10.8 oz)  Body mass index is 22.4 kg/m².    Intake/Output Summary (Last 24 hours) at 8/13/2024 1043  Last data filed at 8/13/2024 0816  Gross per 24 hour   Intake 720 ml   Output 550 ml   Net 170 ml         Physical Exam  Constitutional:       General: She is not in acute distress.     Appearance: She is normal weight.   Cardiovascular:      Rate and Rhythm: Normal rate.      Pulses: Normal pulses.   Pulmonary:      Effort: No respiratory distress.      Breath sounds: Normal breath sounds. No wheezing.   Abdominal:      General: Bowel sounds are normal. There is no distension.      Palpations: Abdomen is soft.      Tenderness: There is no abdominal tenderness.   Musculoskeletal:      Right lower leg: No edema.      Left lower leg: No edema.   Skin:     General: Skin is warm.   Neurological:      Mental Status: She is alert and oriented to person, place, and time. Mental status is at baseline.   Psychiatric:         Mood and Affect: Mood normal.             Significant Labs: All pertinent labs within the past 24 hours have been reviewed.    Significant Imaging: I have reviewed all pertinent imaging results/findings within the past 24 hours.  "

## 2024-08-13 NOTE — NURSING
Pt reported pain on her head with pain scale of 5-6/10. Tylenol given at 2142. Melatonin given as well for prn.    Pt states tylenol didn't give relief for her headache. And melatonin was not working also. PA made aware. Benadryl and Reglan given once as ordered. Plan care ongoing.

## 2024-08-13 NOTE — PLAN OF CARE
Problem: Adult Inpatient Plan of Care  Goal: Plan of Care Review  Outcome: Progressing  Flowsheets (Taken 8/13/2024 0509)  Plan of Care Reviewed With: patient  Goal: Optimal Comfort and Wellbeing  Outcome: Progressing  Intervention: Monitor Pain and Promote Comfort  Flowsheets (Taken 8/13/2024 0509)  Pain Management Interventions:   position adjusted   quiet environment facilitated   medication offered     Problem: Acute Kidney Injury/Impairment  Goal: Fluid and Electrolyte Balance  Outcome: Progressing  Goal: Improved Oral Intake  Outcome: Progressing

## 2024-08-13 NOTE — ASSESSMENT & PLAN NOTE
Patient has a current diagnosis of Hypertensive emergency with end organ damage evidenced by acute kidney injury and acute pulmonary edema without heart failure which is uncontrolled.  Suspect due to not taking her BP medications as instructed the last time.  Latest blood pressure and vitals reviewed-   Temp:  [98.5 °F (36.9 °C)-98.9 °F (37.2 °C)]   Pulse:  [62-86]   Resp:  [15-22]   BP: (113-164)/(60-88)   SpO2:  [97 %-100 %] .   Patient currently on IV antihypertensives.   Home meds for hypertension were reviewed and noted below.   Hypertension Medications               labetaloL (NORMODYNE) 200 MG tablet Take 1 tablet (200 mg total) by mouth every 12 (twelve) hours.    NIFEdipine (PROCARDIA-XL) 60 MG (OSM) 24 hr tablet Take 1 tablet (60 mg total) by mouth 2 (two) times a day.            Medication adjustment for hospital antihypertensives is as follows-off Cardene drip.  Initiate home nifedipine switch labetalol to coreg  Add hydralazine.

## 2024-08-13 NOTE — ASSESSMENT & PLAN NOTE
JOSE C is likely due to  hypertensive crisis.  Reviewed her last hospitalization for similar diagnosis (3/2024) and noted that her initial creatinine at that time was 2.1.  With blood pressure control and IV fluids creatinine eventually improved to 1.6 which appears to be her baseline. . Most recent creatinine and eGFR are listed below.  Recent Labs     08/11/24  2043 08/12/24  0439 08/13/24  0443   CREATININE 2.0* 1.8* 2.2*   EGFRNORACEVR 32* 37* 29*        Plan  - JOSE C:  worsening today, has been refusing lasix

## 2024-08-14 LAB
ANION GAP SERPL CALC-SCNC: 12 MMOL/L (ref 8–16)
BUN SERPL-MCNC: 26 MG/DL (ref 6–20)
CALCIUM SERPL-MCNC: 9 MG/DL (ref 8.7–10.5)
CHLORIDE SERPL-SCNC: 107 MMOL/L (ref 95–110)
CO2 SERPL-SCNC: 22 MMOL/L (ref 23–29)
CREAT SERPL-MCNC: 2.7 MG/DL (ref 0.5–1.4)
EST. GFR  (NO RACE VARIABLE): 23 ML/MIN/1.73 M^2
GLUCOSE SERPL-MCNC: 94 MG/DL (ref 70–110)
POTASSIUM SERPL-SCNC: 3.1 MMOL/L (ref 3.5–5.1)
SODIUM SERPL-SCNC: 141 MMOL/L (ref 136–145)

## 2024-08-14 PROCEDURE — 63600175 PHARM REV CODE 636 W HCPCS: Performed by: INTERNAL MEDICINE

## 2024-08-14 PROCEDURE — 94760 N-INVAS EAR/PLS OXIMETRY 1: CPT

## 2024-08-14 PROCEDURE — 25000003 PHARM REV CODE 250: Performed by: STUDENT IN AN ORGANIZED HEALTH CARE EDUCATION/TRAINING PROGRAM

## 2024-08-14 PROCEDURE — 36415 COLL VENOUS BLD VENIPUNCTURE: CPT | Performed by: STUDENT IN AN ORGANIZED HEALTH CARE EDUCATION/TRAINING PROGRAM

## 2024-08-14 PROCEDURE — 25000003 PHARM REV CODE 250: Performed by: HOSPITALIST

## 2024-08-14 PROCEDURE — 21400001 HC TELEMETRY ROOM

## 2024-08-14 PROCEDURE — 94761 N-INVAS EAR/PLS OXIMETRY MLT: CPT

## 2024-08-14 PROCEDURE — 25000003 PHARM REV CODE 250: Performed by: INTERNAL MEDICINE

## 2024-08-14 PROCEDURE — 80048 BASIC METABOLIC PNL TOTAL CA: CPT | Performed by: STUDENT IN AN ORGANIZED HEALTH CARE EDUCATION/TRAINING PROGRAM

## 2024-08-14 PROCEDURE — 99900035 HC TECH TIME PER 15 MIN (STAT)

## 2024-08-14 RX ORDER — LABETALOL 100 MG/1
100 TABLET, FILM COATED ORAL EVERY 8 HOURS
Status: DISCONTINUED | OUTPATIENT
Start: 2024-08-14 | End: 2024-08-14

## 2024-08-14 RX ORDER — LABETALOL 100 MG/1
100 TABLET, FILM COATED ORAL EVERY 8 HOURS
Status: DISCONTINUED | OUTPATIENT
Start: 2024-08-14 | End: 2024-08-15 | Stop reason: HOSPADM

## 2024-08-14 RX ORDER — LIDOCAINE 50 MG/G
1 PATCH TOPICAL
Status: DISCONTINUED | OUTPATIENT
Start: 2024-08-14 | End: 2024-08-15 | Stop reason: HOSPADM

## 2024-08-14 RX ORDER — NIFEDIPINE 30 MG/1
30 TABLET, EXTENDED RELEASE ORAL 2 TIMES DAILY
Status: DISCONTINUED | OUTPATIENT
Start: 2024-08-14 | End: 2024-08-15 | Stop reason: HOSPADM

## 2024-08-14 RX ADMIN — NIFEDIPINE 90 MG: 30 TABLET, FILM COATED, EXTENDED RELEASE ORAL at 08:08

## 2024-08-14 RX ADMIN — NIFEDIPINE 30 MG: 30 TABLET, FILM COATED, EXTENDED RELEASE ORAL at 02:08

## 2024-08-14 RX ADMIN — HYDRALAZINE HYDROCHLORIDE 10 MG: 20 INJECTION INTRAMUSCULAR; INTRAVENOUS at 11:08

## 2024-08-14 RX ADMIN — LABETALOL HYDROCHLORIDE 100 MG: 100 TABLET, FILM COATED ORAL at 08:08

## 2024-08-14 RX ADMIN — POTASSIUM BICARBONATE 50 MEQ: 977.5 TABLET, EFFERVESCENT ORAL at 09:08

## 2024-08-14 RX ADMIN — ACETAMINOPHEN 650 MG: 325 TABLET ORAL at 06:08

## 2024-08-14 RX ADMIN — LIDOCAINE 1 PATCH: 700 PATCH TOPICAL at 08:08

## 2024-08-14 RX ADMIN — LABETALOL HYDROCHLORIDE 100 MG: 100 TABLET, FILM COATED ORAL at 03:08

## 2024-08-14 RX ADMIN — CARVEDILOL 12.5 MG: 12.5 TABLET, FILM COATED ORAL at 08:08

## 2024-08-14 RX ADMIN — NIFEDIPINE 30 MG: 30 TABLET, FILM COATED, EXTENDED RELEASE ORAL at 08:08

## 2024-08-14 NOTE — ASSESSMENT & PLAN NOTE
Patient does not follow with a nephrologist.  Will need one as outpatient and stressed to her the importance of following up.  Creatinine baseline appears to be closer to 1.6.  Renal ultrasound with ADPKD.  We will need outpatient follow up with Nephrology    -serum creatinine rising   -nephrology consulted

## 2024-08-14 NOTE — NURSING
Pt complained headache, Offered tylenol but pt refused. Garry HUSAIN made aware. Prochlorperazine and benadryl given once per ordered. Plan care ongoing.       5484: Pt states she's able to get sleep after she took the trazodone, and some relief of headache after she got IV medicines.

## 2024-08-14 NOTE — NURSING
[facility], [facility] [place]  Nurses Note -- 4 Eyes      8/14/2024       Skin assessed on: Q Shift      [x] No Pressure Injuries Present    [x]Prevention Measures Documented    [] Yes LDA  for Pressure Injury Previously documented     [] Yes New Pressure Injury Discovered   [] LDA for New Pressure Injury Added      Attending RN:  Juliteh Borja RN     Second RN:  Trinity Sahni RN

## 2024-08-14 NOTE — HPI
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 She reports that she was of her usual health on the 10th, however did have some lower leg swelling. This usually occurs at work, however was worse that day than before. She presented to an urgent care and was sent to the ED for high blood pressure. Subsequently, she started to feel short of breath over the course of 1 hour. She denies any fevers/chills/nausea/vomiting or altered sensorium or changed appetite.    On presentation to , blood pressure was 234/144 and started on cardine for HTN emergency. CT with contrast for PE rule out done 8/11, negative for PE. Additionally, 8/12 /131 decreased to 108/56 in 1 hr. Unfortunately BP has been difficult to control this admission as well.         Multiple PRN hydralazine 10 IV doses given as well.     Apropos CKD, her baseline kidney function is approximately 1.6 (based on previous discharge sCr, last stable was 1.3 in 2022). She reports that outside of having had been admitted for an emergency caesarian for preeclampsia in 2022 she had not followed any doctors and has never seen a nephrologist. Her mother has polycystic kidney disease and received a kidney transplant 2018, however she was not told to establish with any provider. After her admission for preeclampsia she states that she was discharged on 2 blood pressure medications, suspects that one was called labetolol and cannot remember the other. Unfortunately she only took one due to misunderstanding. She has had 5 children-all vaginal and the one emergency caesarian and reports that she has never had any bladder prolapse issues or had the feeling of incomplete bladder emptying.

## 2024-08-14 NOTE — ASSESSMENT & PLAN NOTE
Family history of polycystic kidney disease  Renal US 8/12:      Plan/Recommendation  Hold on tolvaptan  Need to have follow up with nephrologist in outpatient setting

## 2024-08-14 NOTE — ASSESSMENT & PLAN NOTE
Echo with EF 60-65% with grade 2 diastolic dysfunction.  Severe LVH. switch labetalol to Coreg and add Lasix  - patient has been refusing lasix   [place] [facility] [place] Would like later morning time as she has trouble getting up in the morning. Please reschedule. No need to call.

## 2024-08-14 NOTE — NURSING
[facility] [facility] [place] 4 [place]      8/14/2024       Skin assessed on: Q Shift      [x] No Pressure Injuries Present    []Prevention Measures Documented    [] Yes LDA  for Pressure Injury Previously documented     [] Yes New Pressure Injury Discovered   [] LDA for New Pressure Injury Added      Attending RN:  Trinity Sahni RN     Second RN:  AZALIA Clancy

## 2024-08-14 NOTE — PLAN OF CARE
Problem: Adult Inpatient Plan of Care  Goal: Plan of Care Review  Outcome: Progressing  Flowsheets (Taken 8/14/2024 0516)  Plan of Care Reviewed With: patient  Goal: Optimal Comfort and Wellbeing  Outcome: Progressing  Intervention: Monitor Pain and Promote Comfort  Flowsheets (Taken 8/14/2024 0516)  Pain Management Interventions:   position adjusted   quiet environment facilitated   pillow support provided     Problem: Acute Kidney Injury/Impairment  Goal: Fluid and Electrolyte Balance  Outcome: Progressing  Goal: Improved Oral Intake  Outcome: Progressing

## 2024-08-14 NOTE — SUBJECTIVE & OBJECTIVE
Interval History:  No acute overnight events.  Patient admits she had a headache overnight, but improved with IV medications.  Renal function slightly worsened today.  Nephrology consulted.  Patient admits drinking lots of water, but not much urine output    Review of Systems   Genitourinary:  Positive for decreased urine volume. Negative for difficulty urinating, dysuria and flank pain.   Neurological:  Positive for headaches (intermittent). Negative for dizziness, speech difficulty, weakness, light-headedness and numbness.     Objective:     Vital Signs (Most Recent):  Temp: 98.7 °F (37.1 °C) (08/14/24 1118)  Pulse: 60 (08/14/24 1118)  Resp: 17 (08/14/24 1118)  BP: (!) 156/102 (08/14/24 1118)  SpO2: 99 % (08/14/24 1118) Vital Signs (24h Range):  Temp:  [97.9 °F (36.6 °C)-99.2 °F (37.3 °C)] 98.7 °F (37.1 °C)  Pulse:  [60-92] 60  Resp:  [17-20] 17  SpO2:  [97 %-100 %] 99 %  BP: (123-200)/() 156/102     Weight: 68.8 kg (151 lb 10.8 oz)  Body mass index is 22.4 kg/m².    Intake/Output Summary (Last 24 hours) at 8/14/2024 1224  Last data filed at 8/14/2024 0810  Gross per 24 hour   Intake 360 ml   Output 500 ml   Net -140 ml         Physical Exam  Vitals and nursing note reviewed.   Constitutional:       General: She is not in acute distress.     Appearance: She is not ill-appearing.   Cardiovascular:      Rate and Rhythm: Normal rate and regular rhythm.      Pulses: Normal pulses.   Pulmonary:      Effort: No respiratory distress.      Breath sounds: Normal breath sounds. No wheezing.   Abdominal:      General: Bowel sounds are normal. There is no distension.      Palpations: Abdomen is soft.      Tenderness: There is no abdominal tenderness.   Musculoskeletal:      Right lower leg: No edema.      Left lower leg: No edema.   Skin:     General: Skin is warm.   Neurological:      Mental Status: She is alert and oriented to person, place, and time. Mental status is at baseline.   Psychiatric:         Mood and  Affect: Mood normal.             Significant Labs: All pertinent labs within the past 24 hours have been reviewed.    Significant Imaging: I have reviewed all pertinent imaging results/findings within the past 24 hours.

## 2024-08-14 NOTE — ASSESSMENT & PLAN NOTE
JOSE C is likely due to  hypertensive crisis.  Reviewed her last hospitalization for similar diagnosis (3/2024) and noted that her initial creatinine at that time was 2.1.  With blood pressure control and IV fluids creatinine eventually improved to 1.6 which appears to be her baseline. . Most recent creatinine and eGFR are listed below.  Recent Labs     08/12/24  0439 08/13/24  0443 08/14/24  0516   CREATININE 1.8* 2.2* 2.7*   EGFRNORACEVR 37* 29* 23*        Plan  - JOSE C:  worsening today, has been refusing lasix  - nephrology consulted

## 2024-08-14 NOTE — PROGRESS NOTES
[facility] Medicine  Progress Note    Patient Name: Sharon Grande  MRN: [id number]  Patient Class: IP- Inpatient   Admission Date: 8/11/2024  Length of Stay: 3 days  Attending Physician: Heena Leong-Ammy BURROUGHS DO  Primary Care Provider: [facility] -        Subjective:     Principal Problem:Hypertensive crisis        HPI:  38-year-old  female with a past medical history significant for malignant hypertension (multiple admissions for hypertensive emergency/urgency), polycystic kidney disease (complicated by JOSE C in the past with a baseline creatinine closer to 1.6), CVA due to subarachnoid hemorrhage due to a ruptured anterior cerebral communicating artery (2018) status post aneurysmal coiling, bipolar 1 disorder and non-compliance presents to the [place] emergency department with complaints of worsening shortness of breath that started early this morning.  She also reports bilateral lower extremity swelling (L>R) for the the past 2 days.  A shortness of breath is also accompanied with complaints of chest tightness and pain and a mild headache.  Denies any nausea or vomiting.  States she has been able take her blood pressure medications however she has been checking her blood pressure regularly.  Not sure exactly which one she is taking but states only doing one one day. Upon review of her d/c summary from 3/2024, she was supposed to be on Procardia XL 60mg PO BID and Labetalol 200mg PO BID.     Labs at the outside emergency department were significant for a low potassium of 2.8, elevated creatinine of 2.2.  Her troponin level was 0.05 with an elevated BNP (496 and no h/o CHF) and D-dimer    Chest x-ray was overall unremarkable for any acute pulmonary or cardiac process. CTA chest ordered:  1. No evidence of PE.   2. Small bilateral pleural effusions with mild pulmonary interstitial edema.   3. Mild fusiform dilatation of the ascending thoracic aorta measuring 4.2 cm.   4.  Additional findings as detailed above.     She was started on Cardene drip for an elevated blood pressure initially 234/144 (got as high as 256/151).  She has been transferred to [facility] ICU for further inpatient management of her hypertensive emergency (based on JOSE C and elevated BNP/D-dimer).      Because this patient's clinical condition is guarded and requires frequent monitoring of her vital signs which includes her blood pressure as well as weaning her Cardene drip and further evaluation of her malignant hypertension, care in an alternative location isn't clinically appropriate for the reasons stated above.              Overview/Hospital Course:  38-year-old female with past medical history of malignant hypertension admissions for hypertensive emergency/urgency, polycystic kidney disease, baseline creatinine 1.6, CVA due to subarachnoid hemorrhage due to ruptured ZEE 2018 status post aneurysmal coiling, bipolar 1 disorder who was admitted for hypertensive emergency secondary to noncompliance with medications.  Hypokalemia/hypomagnesemia repleted.  JOSE C worsening.  Nephrology consulted. CT head negative Echo with EF 55-60% with grade 2 diastolic dysfunction.  Renal ultrasound with ADPKD.    Interval History:  No acute overnight events.  Patient admits she had a headache overnight, but improved with IV medications.  Renal function slightly worsened today.  Nephrology consulted.  Patient admits drinking lots of water, but not much urine output    Review of Systems   Genitourinary:  Positive for decreased urine volume. Negative for difficulty urinating, dysuria and flank pain.   Neurological:  Positive for headaches (intermittent). Negative for dizziness, speech difficulty, weakness, light-headedness and numbness.     Objective:     Vital Signs (Most Recent):  Temp: 98.7 °F (37.1 °C) (08/14/24 1118)  Pulse: 60 (08/14/24 1118)  Resp: 17 (08/14/24 1118)  BP: (!) 156/102 (08/14/24 1118)  SpO2: 99 % (08/14/24  1118) Vital Signs (24h Range):  Temp:  [97.9 °F (36.6 °C)-99.2 °F (37.3 °C)] 98.7 °F (37.1 °C)  Pulse:  [60-92] 60  Resp:  [17-20] 17  SpO2:  [97 %-100 %] 99 %  BP: (123-200)/() 156/102     Weight: 68.8 kg (151 lb 10.8 oz)  Body mass index is 22.4 kg/m².    Intake/Output Summary (Last 24 hours) at 8/14/2024 1224  Last data filed at 8/14/2024 0810  Gross per 24 hour   Intake 360 ml   Output 500 ml   Net -140 ml         Physical Exam  Vitals and nursing note reviewed.   Constitutional:       General: She is not in acute distress.     Appearance: She is not ill-appearing.   Cardiovascular:      Rate and Rhythm: Normal rate and regular rhythm.      Pulses: Normal pulses.   Pulmonary:      Effort: No respiratory distress.      Breath sounds: Normal breath sounds. No wheezing.   Abdominal:      General: Bowel sounds are normal. There is no distension.      Palpations: Abdomen is soft.      Tenderness: There is no abdominal tenderness.   Musculoskeletal:      Right lower leg: No edema.      Left lower leg: No edema.   Skin:     General: Skin is warm.   Neurological:      Mental Status: She is alert and oriented to person, place, and time. Mental status is at baseline.   Psychiatric:         Mood and Affect: Mood normal.             Significant Labs: All pertinent labs within the past 24 hours have been reviewed.    Significant Imaging: I have reviewed all pertinent imaging results/findings within the past 24 hours.    Assessment/Plan:      * Hypertensive crisis  Patient has a current diagnosis of Hypertensive emergency with end organ damage evidenced by acute kidney injury and acute pulmonary edema without heart failure which is uncontrolled.  Suspect due to not taking her BP medications as instructed the last time.  Latest blood pressure and vitals reviewed-   Temp:  [97.9 °F (36.6 °C)-99.2 °F (37.3 °C)]   Pulse:  [60-92]   Resp:  [17-20]   BP: (123-200)/()   SpO2:  [97 %-100 %] .   Patient currently on IV  antihypertensives.   Home meds for hypertension were reviewed and noted below.   Hypertension Medications               labetaloL (NORMODYNE) 200 MG tablet Take 1 tablet (200 mg total) by mouth every 12 (twelve) hours.    NIFEdipine (PROCARDIA-XL) 60 MG (OSM) 24 hr tablet Take 1 tablet (60 mg total) by mouth 2 (two) times a day.            Medication adjustment for hospital antihypertensives is as follows  -pt off Cardene drip.    -Initiate home nifedipine, switch labetalol to coreg  Add hydralazin PRN    Acute on chronic diastolic heart failure  Echo with EF 60-65% with grade 2 diastolic dysfunction.  Severe LVH. switch labetalol to Coreg and add Lasix  - patient has been refusing lasix    Tobacco dependency  Dangers of cigarette smoking were reviewed with patient in detail. Patient was Counseled for 3-10 minutes. Nicotine replacement options were discussed. Nicotine replacement was discussed- prescribed    H/O spontaneous subarachnoid intracranial hemorrhage due to cerebral aneurysm  This time no significant headache to suggest intracranial bleed at this time.  CT head non-contrast negative for mass or bleed.  Continue to control blood pressure.    Hypokalemia  -replace as needed    JOSE C (acute kidney injury)  JOSE C is likely due to  hypertensive crisis.  Reviewed her last hospitalization for similar diagnosis (3/2024) and noted that her initial creatinine at that time was 2.1.  With blood pressure control and IV fluids creatinine eventually improved to 1.6 which appears to be her baseline. . Most recent creatinine and eGFR are listed below.  Recent Labs     08/12/24  0439 08/13/24  0443 08/14/24  0516   CREATININE 1.8* 2.2* 2.7*   EGFRNORACEVR 37* 29* 23*        Plan  - JOSE C:  worsening today, has been refusing lasix  - nephrology consulted    PKD (polycystic kidney disease)  Patient does not follow with a nephrologist.  Will need one as outpatient and stressed to her the importance of following up.  Creatinine  baseline appears to be closer to 1.6.  Renal ultrasound with ADPKD.  We will need outpatient follow up with Nephrology    -serum creatinine rising   -nephrology consulted      VTE Risk Mitigation (From admission, onward)           Ordered     heparin (porcine) injection 5,000 Units  Every 8 hours         08/12/24 0355     IP VTE LOW RISK PATIENT  Once         08/11/24 1958     Place sequential compression device  Until discontinued         08/11/24 1958     Place ISABEL hose  Until discontinued         08/11/24 1958                    Discharge Planning   IQRA: 8/14/2024     Code Status: Full Code   Is the patient medically ready for discharge?:     Reason for patient still in hospital (select all that apply): Patient trending condition, Treatment, and Consult recommendations  Discharge Plan A: Home with family                  Heena-Ammy Leong DO  Department of Hospital Medicine   [facility] - Med Surg

## 2024-08-14 NOTE — ASSESSMENT & PLAN NOTE
Patient has a current diagnosis of Hypertensive emergency with end organ damage evidenced by acute kidney injury and acute pulmonary edema without heart failure which is uncontrolled.  Suspect due to not taking her BP medications as instructed the last time.  Latest blood pressure and vitals reviewed-   Temp:  [97.9 °F (36.6 °C)-99.2 °F (37.3 °C)]   Pulse:  [60-92]   Resp:  [17-20]   BP: (123-200)/()   SpO2:  [97 %-100 %] .   Patient currently on IV antihypertensives.   Home meds for hypertension were reviewed and noted below.   Hypertension Medications               labetaloL (NORMODYNE) 200 MG tablet Take 1 tablet (200 mg total) by mouth every 12 (twelve) hours.    NIFEdipine (PROCARDIA-XL) 60 MG (OSM) 24 hr tablet Take 1 tablet (60 mg total) by mouth 2 (two) times a day.            Medication adjustment for hospital antihypertensives is as follows  -pt off Cardene drip.    -Initiate home nifedipine, switch labetalol to coreg  Add hydralazin PRN

## 2024-08-14 NOTE — SUBJECTIVE & OBJECTIVE
Past Medical History:   Diagnosis Date    Anemia     Bipolar 1 disorder     Cerebral aneurysm     Hypertension     since 2012    Polycystic kidney disease     Polycystic kidney disease     Pre-eclampsia     Renal disorder     Since childhood     SAH (subarachnoid hemorrhage)     Stroke        Past Surgical History:   Procedure Laterality Date    BRAIN SURGERY      CEREBRAL ANGIOGRAM N/A 2018    Procedure: ANGIOGRAM-CEREBRAL;  Surgeon: Jayna Surgeon;  Location: [facility];  Service: Anesthesiology;  Laterality: N/A;     SECTION N/A 2018    Procedure:  SECTION;  Surgeon: Obed Soto MD;  [facility];  Service: OB/GYN;  Laterality: N/A;    DILATION AND CURETTAGE OF UTERUS             Review of patient's allergies indicates:  No Known Allergies  Current Facility-Administered Medications   Medication Frequency    acetaminophen tablet 650 mg Q4H PRN    albuterol-ipratropium 2.5 mg-0.5 mg/3 mL nebulizer solution 3 mL Q4H PRN    carvediloL tablet 12.5 mg BID    heparin (porcine) injection 5,000 Units Q8H    hydrALAZINE injection 10 mg Q6H PRN    melatonin tablet 9 mg Nightly PRN    mupirocin 2 % ointment BID    naloxone 0.4 mg/mL injection 0.4 mg PRN    NIFEdipine 24 hr tablet 90 mg Daily    nitroGLYCERIN SL tablet 0.4 mg PRN    ondansetron injection 4 mg Q6H PRN    polyethylene glycol packet 17 g BID PRN    simethicone chewable tablet 80 mg QID PRN    sodium chloride 0.9% flush 10 mL Q12H PRN    trazodone split tablet 25 mg Nightly PRN     Family History       Problem Relation (Age of Onset)    Hypertension Mother, Paternal Grandmother    Polycystic kidney disease Mother, Daughter          Tobacco Use    Smoking status: Some Days     Types: Cigarettes    Smokeless tobacco: Never   Substance and Sexual Activity    Alcohol use: Yes     Comment: occasional    Drug use: Yes     Types: Marijuana    Sexual activity: Yes     Partners: Male     Birth control/protection: None     Review of  Systems   Neurological:  Positive for headaches.     Objective:     Vital Signs (Most Recent):  Temp: 98.7 °F (37.1 °C) (08/14/24 1118)  Pulse: 60 (08/14/24 1118)  Resp: 17 (08/14/24 1118)  BP: (!) 156/102 (08/14/24 1118)  SpO2: 99 % (08/14/24 1118) Vital Signs (24h Range):  Temp:  [97.9 °F (36.6 °C)-99.2 °F (37.3 °C)] 98.7 °F (37.1 °C)  Pulse:  [60-92] 60  Resp:  [17-20] 17  SpO2:  [97 %-100 %] 99 %  BP: (123-178)/() 156/102     Weight: 68.8 kg (151 lb 10.8 oz) (08/11/24 2015)  Body mass index is 22.4 kg/m².  Body surface area is 1.83 meters squared.    I/O last 3 completed shifts:  In: 720 [P.O.:720]  Out: 1050 [Urine:1050]     Physical Exam  Constitutional:       Appearance: She is not ill-appearing.   HENT:      Head: Normocephalic and atraumatic.      Nose: Nose normal.   Eyes:      General: No scleral icterus.        Right eye: No discharge.         Left eye: No discharge.      Pupils: Pupils are equal, round, and reactive to light.   Cardiovascular:      Rate and Rhythm: Normal rate.      Heart sounds: No murmur heard.  Abdominal:      Palpations: There is mass.      Tenderness: There is abdominal tenderness (1/10 flank).   Musculoskeletal:      Cervical back: Normal range of motion.      Right lower leg: No edema.      Left lower leg: No edema.   Skin:     General: Skin is warm.      Coloration: Skin is not jaundiced.      Findings: No bruising.   Neurological:      General: No focal deficit present.      Mental Status: She is alert.          Significant Labs:  All labs within the past 24 hours have been reviewed.    Significant Imaging:  Labs: Reviewed

## 2024-08-14 NOTE — ASSESSMENT & PLAN NOTE
JOSE C on suspected CKD 3a/b in setting of polycystic kidney disease and multiple admissions for hypertensive emergency    Received contrast on 8/11 and has wildly fluctuating blood pressures this admission that required PRN IV hydralazine.  Current regiment coreg 12.5 BID and nifedipine 90 QD    JOSE C: contrast induced nephropathy compounded by blood pressure ischemic changes  Volume: euvolemic  UPCR 8/11 0.98 g/g    Plan/Recommendation  Contrast induced JOSE C should resolve  Hypertensive volatility - recommend labetalol 100 q8 and nifedipine 60 BID for more averaged blood pressure control.  Limit PRN hydralazine, if hypertensive overnight can increase PO meds.   Encourage PO intake  -Keep MAP > 65  -Keep hemoglobin > 7  -Strict ins and outs  -Avoid nephrotoxic agents if possible and renally dose medications  -Avoid drastic hemodynamic changes if possible

## 2024-08-14 NOTE — CONSULTS
[facility] [place]  [place]phrology  Consult Note    Patient Name: Sharon Grande  MRN: [id number]  Admission Date: 8/11/2024  Hospital Length of Stay: 3 days  Attending Provider: Percy Leong DO   Primary Care Physician: [facility] -  Principal Problem:Hypertensive crisis    Inpatient consult to Nephrology  Consult performed by: Joao Faith MD  Consult ordered by: Percy Leong DO        Subjective:     HPI: 38 year old female with a history of polycystic kidney disease, CVA from subarachnoid in 2018 sp coiling, multiple admissions for hypertensive emergency, bipolar one admitted 8/11 from [facility] with shortness of breath. She reports that she was of her usual health on the 10th, however did have some lower leg swelling. This usually occurs at work, however was worse that day than before. She presented to an urgent care and was sent to the ED for high blood pressure. Subsequently, she started to feel short of breath over the course of 1 hour. She denies any fevers/chills/nausea/vomiting or altered sensorium or changed appetite.    On presentation to , blood pressure was 234/144 and started on cardine for HTN emergency. CT with contrast for PE rule out done 8/11, negative for PE. Additionally, 8/12 /131 decreased to 108/56 in 1 hr. Unfortunately BP has been difficult to control this admission as well.         Multiple PRN hydralazine 10 IV doses given as well.     Apropos CKD, her baseline kidney function is approximately 1.6 (based on previous discharge sCr, last stable was 1.3 in 2022). She reports that outside of having had been admitted for an emergency caesarian for preeclampsia in 2022 she had not followed any doctors and has never seen a nephrologist. Her mother has polycystic kidney disease and received a kidney transplant 2018, however she was not told to establish with any provider. After her admission for preeclampsia she states that she was discharged on 2 blood pressure  medications, suspects that one was called labetolol and cannot remember the other. Unfortunately she only took one due to misunderstanding. She has had 5 children-all vaginal and the one emergency caesarian and reports that she has never had any bladder prolapse issues or had the feeling of incomplete bladder emptying.    Past Medical History:   Diagnosis Date    Anemia     Bipolar 1 disorder     Cerebral aneurysm     Hypertension     since     Polycystic kidney disease     Polycystic kidney disease     Pre-eclampsia     Renal disorder     Since childhood     SAH (subarachnoid hemorrhage)     Stroke        Past Surgical History:   Procedure Laterality Date    BRAIN SURGERY      CEREBRAL ANGIOGRAM N/A 2018    Procedure: ANGIOGRAM-CEREBRAL;  Surgeon: Jayna Surgeon;  Location: [facility];  Service: Anesthesiology;  Laterality: N/A;     SECTION N/A 2018    Procedure:  SECTION;  Surgeon: Obed Soto MD;  Location: [facility];  Service: OB/GYN;  Laterality: N/A;    DILATION AND CURETTAGE OF UTERUS             Review of patient's allergies indicates:  No Known Allergies  Current Facility-Administered Medications   Medication Frequency    acetaminophen tablet 650 mg Q4H PRN    albuterol-ipratropium 2.5 mg-0.5 mg/3 mL nebulizer solution 3 mL Q4H PRN    carvediloL tablet 12.5 mg BID    heparin (porcine) injection 5,000 Units Q8H    hydrALAZINE injection 10 mg Q6H PRN    melatonin tablet 9 mg Nightly PRN    mupirocin 2 % ointment BID    naloxone 0.4 mg/mL injection 0.4 mg PRN    NIFEdipine 24 hr tablet 90 mg Daily    nitroGLYCERIN SL tablet 0.4 mg PRN    ondansetron injection 4 mg Q6H PRN    polyethylene glycol packet 17 g BID PRN    simethicone chewable tablet 80 mg QID PRN    sodium chloride 0.9% flush 10 mL Q12H PRN    trazodone split tablet 25 mg Nightly PRN     Family History       Problem Relation (Age of Onset)    Hypertension Mother, Paternal Grandmother    Polycystic kidney disease  Mother, Daughter          Tobacco Use    Smoking status: Some Days     Types: Cigarettes    Smokeless tobacco: Never   Substance and Sexual Activity    Alcohol use: Yes     Comment: occasional    Drug use: Yes     Types: Marijuana    Sexual activity: Yes     Partners: Male     Birth control/protection: None     Review of Systems   Neurological:  Positive for headaches.     Objective:     Vital Signs (Most Recent):  Temp: 98.7 °F (37.1 °C) (08/14/24 1118)  Pulse: 60 (08/14/24 1118)  Resp: 17 (08/14/24 1118)  BP: (!) 156/102 (08/14/24 1118)  SpO2: 99 % (08/14/24 1118) Vital Signs (24h Range):  Temp:  [97.9 °F (36.6 °C)-99.2 °F (37.3 °C)] 98.7 °F (37.1 °C)  Pulse:  [60-92] 60  Resp:  [17-20] 17  SpO2:  [97 %-100 %] 99 %  BP: (123-178)/() 156/102     Weight: 68.8 kg (151 lb 10.8 oz) (08/11/24 2015)  Body mass index is 22.4 kg/m².  Body surface area is 1.83 meters squared.    I/O last 3 completed shifts:  In: 720 [P.O.:720]  Out: 1050 [Urine:1050]     Physical Exam  Constitutional:       Appearance: She is not ill-appearing.   HENT:      Head: Normocephalic and atraumatic.      Nose: Nose normal.   Eyes:      General: No scleral icterus.        Right eye: No discharge.         Left eye: No discharge.      Pupils: Pupils are equal, round, and reactive to light.   Cardiovascular:      Rate and Rhythm: Normal rate.      Heart sounds: No murmur heard.  Abdominal:      Palpations: There is mass.      Tenderness: There is abdominal tenderness (1/10 flank).   Musculoskeletal:      Cervical back: Normal range of motion.      Right lower leg: No edema.      Left lower leg: No edema.   Skin:     General: Skin is warm.      Coloration: Skin is not jaundiced.      Findings: No bruising.   Neurological:      General: No focal deficit present.      Mental Status: She is alert.          Significant Labs:  All labs within the past 24 hours have been reviewed.    Significant Imaging:  Labs: Reviewed  Assessment/Plan:      Renal/  Hypokalemia  Can supplement 40 mEq x 3    JOSE C (acute kidney injury)  JOSE C on suspected CKD 3a/b in setting of polycystic kidney disease and multiple admissions for hypertensive emergency    Received contrast on 8/11 and has wildly fluctuating blood pressures this admission that required PRN IV hydralazine.  Current regiment coreg 12.5 BID and nifedipine 90 QD    JOSE C: contrast induced nephropathy compounded by blood pressure ischemic changes  Volume: euvolemic  UPCR 8/11 0.98 g/g    Plan/Recommendation  Contrast induced JOSE C should resolve  Hypertensive volatility - recommend labetalol 100 q8 and nifedipine 60 BID for more averaged blood pressure control.  Limit PRN hydralazine, if hypertensive overnight can increase PO meds.   Encourage PO intake  -Keep MAP > 65  -Keep hemoglobin > 7  -Strict ins and outs  -Avoid nephrotoxic agents if possible and renally dose medications  -Avoid drastic hemodynamic changes if possible      PKD (polycystic kidney disease)  Family history of polycystic kidney disease  Renal US 8/12:      Plan/Recommendation  Hold on tolvaptan  Need to have follow up with nephrologist in outpatient setting        Thank you for your consult. I will follow-up with patient. Please contact us if you have any additional questions.    Joao Faith MD  Nephrology  [facility] - [place] - Med Surg

## 2024-08-15 VITALS
HEART RATE: 66 BPM | RESPIRATION RATE: 17 BRPM | BODY MASS INDEX: 22.47 KG/M2 | TEMPERATURE: 99 F | WEIGHT: 151.69 LBS | HEIGHT: 69 IN | OXYGEN SATURATION: 99 % | DIASTOLIC BLOOD PRESSURE: 82 MMHG | SYSTOLIC BLOOD PRESSURE: 140 MMHG

## 2024-08-15 LAB
ALBUMIN SERPL BCP-MCNC: 3.3 G/DL (ref 3.5–5.2)
ANION GAP SERPL CALC-SCNC: 11 MMOL/L (ref 8–16)
BUN SERPL-MCNC: 26 MG/DL (ref 6–20)
CALCIUM SERPL-MCNC: 9.2 MG/DL (ref 8.7–10.5)
CHLORIDE SERPL-SCNC: 106 MMOL/L (ref 95–110)
CO2 SERPL-SCNC: 22 MMOL/L (ref 23–29)
CREAT SERPL-MCNC: 2.4 MG/DL (ref 0.5–1.4)
EST. GFR  (NO RACE VARIABLE): 26 ML/MIN/1.73 M^2
GLUCOSE SERPL-MCNC: 125 MG/DL (ref 70–110)
PHOSPHATE SERPL-MCNC: 4 MG/DL (ref 2.7–4.5)
POTASSIUM SERPL-SCNC: 3 MMOL/L (ref 3.5–5.1)
SODIUM SERPL-SCNC: 139 MMOL/L (ref 136–145)

## 2024-08-15 PROCEDURE — 25000003 PHARM REV CODE 250: Performed by: INTERNAL MEDICINE

## 2024-08-15 PROCEDURE — 25000003 PHARM REV CODE 250: Performed by: STUDENT IN AN ORGANIZED HEALTH CARE EDUCATION/TRAINING PROGRAM

## 2024-08-15 PROCEDURE — 94761 N-INVAS EAR/PLS OXIMETRY MLT: CPT

## 2024-08-15 PROCEDURE — 36415 COLL VENOUS BLD VENIPUNCTURE: CPT | Performed by: STUDENT IN AN ORGANIZED HEALTH CARE EDUCATION/TRAINING PROGRAM

## 2024-08-15 PROCEDURE — 99900035 HC TECH TIME PER 15 MIN (STAT)

## 2024-08-15 PROCEDURE — 80069 RENAL FUNCTION PANEL: CPT | Performed by: STUDENT IN AN ORGANIZED HEALTH CARE EDUCATION/TRAINING PROGRAM

## 2024-08-15 PROCEDURE — 99900031 HC PATIENT EDUCATION (STAT)

## 2024-08-15 RX ORDER — LABETALOL 100 MG/1
100 TABLET, FILM COATED ORAL EVERY 8 HOURS
Qty: 90 TABLET | Refills: 11 | Status: SHIPPED | OUTPATIENT
Start: 2024-08-15 | End: 2025-08-15

## 2024-08-15 RX ORDER — POTASSIUM CHLORIDE 20 MEQ/1
20 TABLET, EXTENDED RELEASE ORAL DAILY
Qty: 2 TABLET | Refills: 0 | Status: SHIPPED | OUTPATIENT
Start: 2024-08-15 | End: 2024-08-17

## 2024-08-15 RX ORDER — NIFEDIPINE 30 MG/1
30 TABLET, EXTENDED RELEASE ORAL 2 TIMES DAILY
Qty: 60 TABLET | Refills: 11 | Status: SHIPPED | OUTPATIENT
Start: 2024-08-15 | End: 2024-08-19

## 2024-08-15 RX ADMIN — LABETALOL HYDROCHLORIDE 100 MG: 100 TABLET, FILM COATED ORAL at 12:08

## 2024-08-15 RX ADMIN — NIFEDIPINE 30 MG: 30 TABLET, FILM COATED, EXTENDED RELEASE ORAL at 08:08

## 2024-08-15 RX ADMIN — TRAZODONE HYDROCHLORIDE 25 MG: 50 TABLET ORAL at 01:08

## 2024-08-15 RX ADMIN — POTASSIUM PHOSPHATE, MONOBASIC 1000 MG: 500 TABLET, SOLUBLE ORAL at 10:08

## 2024-08-15 RX ADMIN — LABETALOL HYDROCHLORIDE 100 MG: 100 TABLET, FILM COATED ORAL at 06:08

## 2024-08-15 RX ADMIN — ACETAMINOPHEN 650 MG: 325 TABLET ORAL at 01:08

## 2024-08-15 NOTE — PROGRESS NOTES
[facility] Surg  Nephrology  Progress Note    Patient Name: Sharon Grande  MRN: [id number]  Admission Date: 8/11/2024  Hospital Length of Stay: 4 days  Attending Provider: Kwame Alonzo MD   Primary Care Physician: [facility] -  Principal Problem:Hypertensive crisis    Subjective:     HPI: 38 year old female with a history of polycystic kidney disease, CVA from subarachnoid in 2018 sp coiling, multiple admissions for hypertensive emergency, bipolar one admitted 8/11 from [facility] with shortness of breath. She reports that she was of her usual health on the 10th, however did have some lower leg swelling. This usually occurs at work, however was worse that day than before. She presented to an urgent care and was sent to the ED for high blood pressure. Subsequently, she started to feel short of breath over the course of 1 hour. She denies any fevers/chills/nausea/vomiting or altered sensorium or changed appetite.    On presentation to , blood pressure was 234/144 and started on cardine for HTN emergency. CT with contrast for PE rule out done 8/11, negative for PE. Additionally, 8/12 /131 decreased to 108/56 in 1 hr. Unfortunately BP has been difficult to control this admission as well.         Multiple PRN hydralazine 10 IV doses given as well.     Apropos CKD, her baseline kidney function is approximately 1.6 (based on previous discharge sCr, last stable was 1.3 in 2022). She reports that outside of having had been admitted for an emergency caesarian for preeclampsia in 2022 she had not followed any doctors and has never seen a nephrologist. Her mother has polycystic kidney disease and received a kidney transplant 2018, however she was not told to establish with any provider. After her admission for preeclampsia she states that she was discharged on 2 blood pressure medications, suspects that one was called labetolol and cannot remember the other. Unfortunately she only took one due to  misunderstanding. She has had 5 children-all vaginal and the one emergency caesarian and reports that she has never had any bladder prolapse issues or had the feeling of incomplete bladder emptying.    Interval History: much improved blood pressure today, stable though acceptably in the 140s-150s range. DC today    Review of patient's allergies indicates:  No Known Allergies  Current Facility-Administered Medications   Medication Frequency    acetaminophen tablet 650 mg Q4H PRN    albuterol-ipratropium 2.5 mg-0.5 mg/3 mL nebulizer solution 3 mL Q4H PRN    heparin (porcine) injection 5,000 Units Q8H    labetaloL tablet 100 mg Q8H    LIDOcaine 5 % patch 1 patch Q24H    melatonin tablet 9 mg Nightly PRN    mupirocin 2 % ointment BID    naloxone 0.4 mg/mL injection 0.4 mg PRN    NIFEdipine 24 hr tablet 30 mg BID    nitroGLYCERIN SL tablet 0.4 mg PRN    ondansetron injection 4 mg Q6H PRN    polyethylene glycol packet 17 g BID PRN    potassium phosphate (monobasic) tablet 1,000 mg TID    simethicone chewable tablet 80 mg QID PRN    sodium chloride 0.9% flush 10 mL Q12H PRN    trazodone split tablet 25 mg Nightly PRN       Objective:     Vital Signs (Most Recent):  Temp: 99.1 °F (37.3 °C) (08/15/24 0733)  Pulse: 70 (08/15/24 0747)  Resp: 17 (08/15/24 0733)  BP: 133/74 (08/15/24 0733)  SpO2: 98 % (08/15/24 0733) Vital Signs (24h Range):  Temp:  [98.3 °F (36.8 °C)-99.7 °F (37.6 °C)] 99.1 °F (37.3 °C)  Pulse:  [60-77] 70  Resp:  [17-19] 17  SpO2:  [96 %-99 %] 98 %  BP: (133-185)/() 133/74     Weight: 68.8 kg (151 lb 10.8 oz) (08/11/24 2015)  Body mass index is 22.4 kg/m².  Body surface area is 1.83 meters squared.    I/O last 3 completed shifts:  In: 480 [P.O.:480]  Out: 900 [Urine:900]     Physical Exam  Constitutional:       Appearance: She is not ill-appearing.   HENT:      Head: Normocephalic and atraumatic.      Nose: Nose normal.   Eyes:      General: No scleral icterus.        Right eye: No discharge.          Left eye: No discharge.      Pupils: Pupils are equal, round, and reactive to light.   Cardiovascular:      Rate and Rhythm: Normal rate.      Heart sounds: No murmur heard.  Abdominal:      Palpations: There is mass.      Tenderness: There is abdominal tenderness (1/10 flank).   Musculoskeletal:      Cervical back: Normal range of motion.      Right lower leg: No edema.      Left lower leg: No edema.   Skin:     General: Skin is warm.      Coloration: Skin is not jaundiced.      Findings: No bruising.   Neurological:      General: No focal deficit present.      Mental Status: She is alert.          Significant Labs:  All labs within the past 24 hours have been reviewed.     Significant Imaging:  Labs: Reviewed  Assessment/Plan:     Renal/  JOSE C (acute kidney injury)  JOSE C on suspected CKD 3a/b in setting of polycystic kidney disease and multiple admissions for hypertensive emergency    Received contrast on 8/11 and has wildly fluctuating blood pressures this admission that required PRN IV hydralazine.  Current regiment coreg 12.5 BID and nifedipine 90 QD    JOSE C: contrast induced nephropathy compounded by blood pressure ischemic changes. Improved today to 2.4 from 2.7  Volume: euvolemic  UPCR 8/11 0.98 g/g    Plan/Recommendation  Discharge with close nephrology follow up and on BP regiment of labetolol 100 q8 and nifedipine 30 BID. Have discussed with primary team regarding follow up  Have impressed upon patient and family the need for medication schedule compliance and the need for follow up.   Will arrange for follow up in 1-2 weeks  Encourage PO intake  -Keep MAP > 65  -Keep hemoglobin > 7  -Strict ins and outs  -Avoid nephrotoxic agents if possible and renally dose medications  -Avoid drastic hemodynamic changes if possible      PKD (polycystic kidney disease)  Family history of polycystic kidney disease  Renal US 8/12:      Plan/Recommendation  Hold on tolvaptan  Need to have follow up with nephrologist in  outpatient settinga    Hypokalemia  Give 60 mEq now and discharge on 20 mEq for 2 days.    Thank you for your consult. I will follow-up with patient. Please contact us if you have any additional questions.    Joao Faith MD  Nephrology  [facility] Surg

## 2024-08-15 NOTE — PLAN OF CARE
Problem: Adult Inpatient Plan of Care  Goal: Plan of Care Review  Outcome: Adequate for Care Transition  Goal: Patient-Specific Goal (Individualized)  Outcome: Adequate for Care Transition  Goal: Absence of Hospital-Acquired Illness or Injury  Outcome: Adequate for Care Transition  Goal: Optimal Comfort and Wellbeing  Outcome: Adequate for Care Transition  Goal: Readiness for Transition of Care  Outcome: Adequate for Care Transition     Problem: Acute Kidney Injury/Impairment  Goal: Fluid and Electrolyte Balance  Outcome: Adequate for Care Transition  Goal: Improved Oral Intake  Outcome: Adequate for Care Transition  Goal: Effective Renal Function  Outcome: Adequate for Care Transition     Problem: Infection  Goal: Absence of Infection Signs and Symptoms  Outcome: Adequate for Care Transition     Problem: Skin Injury Risk Increased  Goal: Skin Health and Integrity  Outcome: Adequate for Care Transition

## 2024-08-15 NOTE — ASSESSMENT & PLAN NOTE
Family history of polycystic kidney disease  Renal US 8/12:      Plan/Recommendation  Hold on tolvaptan  Need to have follow up with nephrologist in outpatient settinga

## 2024-08-15 NOTE — ASSESSMENT & PLAN NOTE
JOSE C on suspected CKD 3a/b in setting of polycystic kidney disease and multiple admissions for hypertensive emergency    Received contrast on 8/11 and has wildly fluctuating blood pressures this admission that required PRN IV hydralazine.  Current regiment coreg 12.5 BID and nifedipine 90 QD    JOSE C: contrast induced nephropathy compounded by blood pressure ischemic changes. Improved today to 2.4 from 2.7  Volume: euvolemic  UPCR 8/11 0.98 g/g    Plan/Recommendation  Discharge with close nephrology follow up and on BP regiment of labetolol 100 q8 and nifedipine 30 BID. Have discussed with primary team regarding follow up  Have impressed upon patient and family the need for medication schedule compliance and the need for follow up.   Will arrange for follow up in 1-2 weeks  Encourage PO intake  -Keep MAP > 65  -Keep hemoglobin > 7  -Strict ins and outs  -Avoid nephrotoxic agents if possible and renally dose medications  -Avoid drastic hemodynamic changes if possible

## 2024-08-15 NOTE — SUBJECTIVE & OBJECTIVE
Interval History: much improved blood pressure today, stable though acceptably in the 140s-150s range. DC today    Review of patient's allergies indicates:  No Known Allergies  Current Facility-Administered Medications   Medication Frequency    acetaminophen tablet 650 mg Q4H PRN    albuterol-ipratropium 2.5 mg-0.5 mg/3 mL nebulizer solution 3 mL Q4H PRN    heparin (porcine) injection 5,000 Units Q8H    labetaloL tablet 100 mg Q8H    LIDOcaine 5 % patch 1 patch Q24H    melatonin tablet 9 mg Nightly PRN    mupirocin 2 % ointment BID    naloxone 0.4 mg/mL injection 0.4 mg PRN    NIFEdipine 24 hr tablet 30 mg BID    nitroGLYCERIN SL tablet 0.4 mg PRN    ondansetron injection 4 mg Q6H PRN    polyethylene glycol packet 17 g BID PRN    potassium phosphate (monobasic) tablet 1,000 mg TID    simethicone chewable tablet 80 mg QID PRN    sodium chloride 0.9% flush 10 mL Q12H PRN    trazodone split tablet 25 mg Nightly PRN       Objective:     Vital Signs (Most Recent):  Temp: 99.1 °F (37.3 °C) (08/15/24 0733)  Pulse: 70 (08/15/24 0747)  Resp: 17 (08/15/24 0733)  BP: 133/74 (08/15/24 0733)  SpO2: 98 % (08/15/24 0733) Vital Signs (24h Range):  Temp:  [98.3 °F (36.8 °C)-99.7 °F (37.6 °C)] 99.1 °F (37.3 °C)  Pulse:  [60-77] 70  Resp:  [17-19] 17  SpO2:  [96 %-99 %] 98 %  BP: (133-185)/() 133/74     Weight: 68.8 kg (151 lb 10.8 oz) (08/11/24 2015)  Body mass index is 22.4 kg/m².  Body surface area is 1.83 meters squared.    I/O last 3 completed shifts:  In: 480 [P.O.:480]  Out: 900 [Urine:900]     Physical Exam  Constitutional:       Appearance: She is not ill-appearing.   HENT:      Head: Normocephalic and atraumatic.      Nose: Nose normal.   Eyes:      General: No scleral icterus.        Right eye: No discharge.         Left eye: No discharge.      Pupils: Pupils are equal, round, and reactive to light.   Cardiovascular:      Rate and Rhythm: Normal rate.      Heart sounds: No murmur heard.  Abdominal:      Palpations:  There is mass.      Tenderness: There is abdominal tenderness (1/10 flank).   Musculoskeletal:      Cervical back: Normal range of motion.      Right lower leg: No edema.      Left lower leg: No edema.   Skin:     General: Skin is warm.      Coloration: Skin is not jaundiced.      Findings: No bruising.   Neurological:      General: No focal deficit present.      Mental Status: She is alert.          Significant Labs:  All labs within the past 24 hours have been reviewed.     Significant Imaging:  Labs: Reviewed

## 2024-08-15 NOTE — PLAN OF CARE
Case Management Final Discharge Note    Discharge Disposition: Home    New DME ordered / company name: None    Relevant SDOH / Transition of Care Barriers:  Medicaid pending    Primary Caretaker and contact information: Sharon barksdale [phone]    Scheduled followup appointment: Pt will call to schedule a hospital follow up with pt.    Referrals placed: Nephrology- Message sent to staff to call pt to schedule hospital follow up.    Transportation: Pt's family will provide transportation when discharged.    Patient and family educated on discharge services and updated on DC plan. Bedside RN notified, patient clear to discharge from Case Management Perspective.       08/15/24 1048   Final Note   Assessment Type Final Discharge Note   Anticipated Discharge Disposition Home   What phone number can be called within the next 1-3 days to see how you are doing after discharge? [phone]   Hospital Resources/Appts/Education Provided Appointments scheduled and added to AVS   Post-Acute Status   Discharge Delays None known at this time

## 2024-08-16 NOTE — DISCHARGE SUMMARY
[facility] Medicine  Discharge Summary      Patient Name: Sharon Grande  MRN: [id number]  [facility]: [id number]  Patient Class: IP- Inpatient  Admission Date: 8/11/2024  Hospital Length of Stay: 4 days  Discharge Date and Time: 8/15/2024  6:12 PM  Attending Physician: No att. providers found   Discharging Provider: Kwame Alonzo MD  Primary Care Provider: [facility] -    Primary Care Team: Networked reference to record PCT     HPI:   38-year-old  female with a past medical history significant for malignant hypertension (multiple admissions for hypertensive emergency/urgency), polycystic kidney disease (complicated by JOSE C in the past with a baseline creatinine closer to 1.6), CVA due to subarachnoid hemorrhage due to a ruptured anterior cerebral communicating artery (2018) status post aneurysmal coiling, bipolar 1 disorder and non-compliance presents to the [facility] emergency department with complaints of worsening shortness of breath that started early this morning.  She also reports bilateral lower extremity swelling (L>R) for the the past 2 days.  A shortness of breath is also accompanied with complaints of chest tightness and pain and a mild headache.  Denies any nausea or vomiting.  States she has been able take her blood pressure medications however she has been checking her blood pressure regularly.  Not sure exactly which one she is taking but states only doing one one day. Upon review of her d/c summary from 3/2024, she was supposed to be on Procardia XL 60mg PO BID and Labetalol 200mg PO BID.     Labs at the outside emergency department were significant for a low potassium of 2.8, elevated creatinine of 2.2.  Her troponin level was 0.05 with an elevated BNP (496 and no h/o CHF) and D-dimer    Chest x-ray was overall unremarkable for any acute pulmonary or cardiac process. CTA chest ordered:  1. No evidence of PE.   2. Small bilateral pleural effusions with mild  pulmonary interstitial edema.   3. Mild fusiform dilatation of the ascending thoracic aorta measuring 4.2 cm.   4. Additional findings as detailed above.     She was started on Cardene drip for an elevated blood pressure initially 234/144 (got as high as 256/151).  She has been transferred to [facility] ICU for further inpatient management of her hypertensive emergency (based on JOSE C and elevated BNP/D-dimer).      Because this patient's clinical condition is guarded and requires frequent monitoring of her vital signs which includes her blood pressure as well as weaning her Cardene drip and further evaluation of her malignant hypertension, care in an alternative location isn't clinically appropriate for the reasons stated above.              * No surgery found *      Hospital Course:   38-year-old female with past medical history of malignant hypertension  with admissions for hypertensive emergency/urgency, polycystic kidney disease, baseline creatinine 1.6, CVA due to subarachnoid hemorrhage due to ruptured ZEE 2018 status post aneurysmal coiling, hx of bipolar 1 disorder who was admitted for hypertensive emergency secondary to noncompliance with medications.  Hypokalemia/hypomagnesemia repleted.  JOSE C worsening.  Nephrology consulted. CT head negative, Echo with EF 55-60% with grade 2 diastolic dysfunction.  Renal ultrasound with ADPKD.  Nephrology suspected renal function worsening due to labile blood pressures.  She was started on labetalol 100 mg t.i.d. and nifedipine 30 mg b.i.d..  This appeared to control blood pressure more consistently in her renal function started to improve on day of discharge.  She was overall feeling well and stable for discharge home.  Of note, her potassium was low which was replaced on day of discharge and she was discharged home with a few days of replacement.  She will follow-up with nephrology as an outpatient to have her labs rechecked the following week.  All questions  answered medications brought to bedside prior to discharge.  Patient discharged home in stable condition.     Goals of Care Treatment Preferences:  Code Status: Full Code         Consults:   Consults (From admission, onward)          Status Ordering Provider     Inpatient consult to Nephrology  Once        Provider:  Su Valenzuela MD    Completed ELEAZAR MEANS T.     Inpatient consult to Telemedicine - Psych  Once        Provider:  (Not yet assigned)    Completed JESENIA GARCIA            No new Assessment & Plan notes have been filed under this hospital service since the last note was generated.  Service: Hospital Medicine    Final Active Diagnoses:    Diagnosis Date Noted POA    PRINCIPAL PROBLEM:  Hypertensive crisis [I16.9] 08/11/2024 Yes    Acute on chronic diastolic heart failure [I50.33] 08/12/2024 Yes    H/O spontaneous subarachnoid intracranial hemorrhage due to cerebral aneurysm [Z86.79] 08/11/2024 Not Applicable    Hypokalemia [E87.6] 08/13/2018 Yes    JOSE C (acute kidney injury) [N17.9] 08/11/2018 Yes    PKD (polycystic kidney disease) [Q61.3] 04/26/2015 Not Applicable      Problems Resolved During this Admission:    Diagnosis Date Noted Date Resolved POA    Polyuria [R35.89] 08/12/2024 08/12/2024 Yes    Acute pulmonary edema [J81.0] 08/11/2024 08/12/2024 Yes       Discharged Condition: stable    Disposition: Home or Self Care    Follow Up:   Follow-up Information       Joao Faith MD Follow up.    Specialty: Nephrology  Why: Office will call patient with a date and time., No appointments available. Message sent to staff.  Contact information:  [address] [facility]  [address]  [place] [address]  [phone]               [facility] -. Call.    Why: To schedule appointment within 7 days.  Contact information:  [address] [place] [address]  [phone]                           Patient Instructions:      Diet renal     Reason for not Ordering Smoking Cessation Referral      Order Specific Question Answer Comments   Reason for not ordering: Patient refused      Reason for not Prescribing Nicotine Replacement     Order Specific Question Answer Comments   Reason for not Prescribing: Patient refused      Activity as tolerated       Significant Diagnostic Studies: Labs: All labs within the past 24 hours have been reviewed    Pending Diagnostic Studies:       None           Medications:  Reconciled Home Medications:      Medication List        START taking these medications      potassium chloride SA 20 MEQ tablet  Commonly known as: K-DUR,KLOR-CON  Take 1 tablet (20 mEq total) by mouth once daily. for 2 days            CHANGE how you take these medications      labetaloL 100 MG tablet  Commonly known as: NORMODYNE  Take 1 tablet (100 mg total) by mouth every 8 (eight) hours.  What changed:   medication strength  how much to take  when to take this     NIFEdipine 30 MG (OSM) 24 hr tablet  Commonly known as: PROCARDIA-XL  Take 1 tablet (30 mg total) by mouth 2 (two) times a day.  What changed:   medication strength  how much to take              Indwelling Lines/Drains at time of discharge:   Lines/Drains/Airways       None                   Time spent on the discharge of patient: >35 minutes         Kwame Alonzo MD  Department of Hospital Medicine  [facility]

## 2024-08-18 PROBLEM — D63.1 ANEMIA DUE TO STAGE 3B CHRONIC KIDNEY DISEASE: Status: ACTIVE | Noted: 2024-08-18

## 2024-08-18 PROBLEM — N18.32 STAGE 3B CHRONIC KIDNEY DISEASE: Status: ACTIVE | Noted: 2024-08-18

## 2024-08-18 PROBLEM — N18.32 ANEMIA DUE TO STAGE 3B CHRONIC KIDNEY DISEASE: Status: ACTIVE | Noted: 2024-08-18

## 2024-08-18 PROBLEM — I10 HYPERTENSION: Status: ACTIVE | Noted: 2024-08-18

## 2024-08-18 PROBLEM — N25.81 SECONDARY HYPERPARATHYROIDISM: Status: ACTIVE | Noted: 2024-08-18

## 2024-08-18 NOTE — PROGRESS NOTES
"Subjective     Chief Complaint: Establish care    History of Present Illness:  Ms. Sharon Grande is a 38 y.o. female bipolar 1, HTN, Polycystic kidney disease, hx of CVA with subarachnoid sp coil, preeclampsia, G5A1P0, HFpEF (G2DD)    Interval history: presents today to establish care since her hospital follow up. She has had nearly daily headaches that get worse when she stresses about her blood pressure. They just bought a new blood pressure cuff and her blood pressure has been elevated to the 180s and above, however does come down when she sits and relaxes. BP elevated to 164/86 in clinic today and has been compliant with her medications - labetolol 100 TID and nifedipine 30 BID.    Additionally has not yet established care with a primary care (will be at [facility])    -MRI brain October 2019:   "CT head dated 10/29/2019 and MRI of the brain dated 01/29/2019.     FINDINGS:  The craniocervical junction is within normal limits.  The midline structures are unremarkable.  The sellar and parasellar structures are within normal limits.  The intracranial flow voids are within normal limits.     No diffusion-weighted signal abnormality is identified.  There is minimal increased T2/FLAIR signal hyperintensity within the periventricular white matter in the occipital regions.  The remainder of the ventricles and sulci are within normal limits.  There are no extra-axial fluid collections.  There is no evidence of intracranial hemorrhage.  There are changes of prior aneurysm coiling in the right aspect of the anterior circulation.     The orbits and intraorbital contents are within normal limits.  There is a mucous retention cyst within the left maxillary sinus.  There is small amount of trapped fluid within the left posterior ethmoid sinus.  The calvarium is intact.     Impression:     No MR evidence of acute or subacute infarction.     Changes of prior aneurysmal coiling in the right aspect of the anterior communicating " "artery.     No acute intracranial process."    #Polycystic kidney disease  #CKD3b  Strong family history: mother statuspost renal transplant 1998 (?), one of 5 children as it and another does not have it. However the rest have not yet been checked.   Baseline creatinine: 1.3 (2022)     Has not seen dietican  Has not seen CKD education  Not eligible for kidney transplant referral - eGFR needs to be <20    #HTN  Multiple admissions for malignant hypertension:  BP: labetolol 100 q8, nifedipine 30 BID  Uncontrolled at home.    #Anemia  Hemoglobin   Date Value Ref Range Status   08/12/2024 11.0 (L) 12.0 - 16.0 g/dL Final     Iron   Date Value Ref Range Status   04/20/2021 19 (L) 30 - 160 ug/dL Final     Transferrin   Date Value Ref Range Status   04/20/2021 306 200 - 375 mg/dL Final     TIBC   Date Value Ref Range Status   04/20/2021 453 (H) 250 - 450 ug/dL Final     Saturated Iron   Date Value Ref Range Status   04/20/2021 4 (L) 20 - 50 % Final     Ferritin   Date Value Ref Range Status   08/15/2018 21 20.0 - 300.0 ng/mL Final     Repeat iron and ferritin    #Secondary hyperparathyriodism  Calcium   Date Value Ref Range Status   08/15/2024 9.2 8.7 - 10.5 mg/dL Final     Phosphorus   Date Value Ref Range Status   08/15/2024 4.0 2.7 - 4.5 mg/dL Final     PTH, Intact   Date Value Ref Range Status   03/26/2024 319.7 (H) 9.0 - 77.0 pg/mL Final   Will start calcitrol 0.25  Repeat PTH 3 months      #Proteinuira  8/11/2024  UPCR 0.98    Review of Systems   Constitutional:  Positive for malaise/fatigue.   Neurological:  Positive for headaches.     PAST HISTORY:     Past Medical History:   Diagnosis Date    Anemia     Bipolar 1 disorder     Cerebral aneurysm     Hypertension     since 2012    Polycystic kidney disease     Polycystic kidney disease     Pre-eclampsia     Renal disorder     Since childhood     SAH (subarachnoid hemorrhage)     Stroke        Past Surgical History:   Procedure Laterality Date    BRAIN SURGERY      " "CEREBRAL ANGIOGRAM N/A 2018    Procedure: ANGIOGRAM-CEREBRAL;  Surgeon: Jayna Surgeon;  Location: [facility];  Service: Anesthesiology;  Laterality: N/A;     SECTION N/A 2018    Procedure:  SECTION;  Surgeon: Obed Soto MD;  Location: [facility] L&D;  Service: OB/GYN;  Laterality: N/A;    DILATION AND CURETTAGE OF UTERUS             Family History   Problem Relation Name Age of Onset    Hypertension Mother      Polycystic kidney disease Mother      Hypertension Paternal Grandmother      Polycystic kidney disease Daughter         Social History     Socioeconomic History    Marital status: Single   Tobacco Use    Smoking status: Some Days     Types: Cigarettes    Smokeless tobacco: Never   Substance and Sexual Activity    Alcohol use: Yes     Comment: occasional    Drug use: Yes     Types: Marijuana    Sexual activity: Yes     Partners: Male     Birth control/protection: None       MEDICATIONS & ALLERGIES:     Current Outpatient Medications on File Prior to Visit   Medication Sig    labetaloL (NORMODYNE) 100 MG tablet Take 1 tablet (100 mg total) by mouth every 8 (eight) hours.    NIFEdipine (PROCARDIA-XL) 30 MG (OSM) 24 hr tablet Take 1 tablet (30 mg total) by mouth 2 (two) times a day.    [] potassium chloride SA (K-DUR,KLOR-CON) 20 MEQ tablet Take 1 tablet (20 mEq total) by mouth once daily. for 2 days     No current facility-administered medications on file prior to visit.       Review of patient's allergies indicates:  No Known Allergies    OBJECTIVE:     Vital Signs:  Vitals:    24 1018   BP: (!) 164/86   Pulse: 60   Resp: 18   SpO2: 98%   Weight: 68.2 kg (150 lb 5.7 oz)   Height: 5' 9" (1.753 m)       Body mass index is 22.2 kg/m².     Physical Exam  Constitutional:       General: She is not in acute distress.     Appearance: She is not ill-appearing or toxic-appearing.   HENT:      Head: Normocephalic and atraumatic.      Nose: Nose normal. No congestion.      " Mouth/Throat:      Mouth: Mucous membranes are moist.   Eyes:      General: No scleral icterus.        Right eye: No discharge.         Left eye: No discharge.   Abdominal:      Palpations: There is mass.   Musculoskeletal:      Cervical back: Normal range of motion. No rigidity.   Lymphadenopathy:      Cervical: No cervical adenopathy.   Skin:     General: Skin is warm.      Coloration: Skin is not jaundiced.   Neurological:      Mental Status: She is alert.     Laboratory  No results found for this or any previous visit (from the past 24 hour(s)).    Diagnostic Results:      Health Maintenance Due   Topic Date Due    Pneumococcal Vaccines (Age 0-64) (1 of 2 - PCV) Never done    Cervical Cancer Screening  04/23/2016    COVID-19 Vaccine (2 - 2023-24 season) 09/01/2023         ASSESSMENT & PLAN:   Ms. Sharon Grande is a 38 y.o. female     We discussed multiple issues today at length:    PKD - we will obtain a genetic test and possibly place a referral to genetics for follow up given strong family history.     CKD - repeat labs today as we only have discharge labs  Needs to start ACE/ARB and SGLT-2  Amb referral to dietician to discuss BP diet and ckd diet  Amb referral to CKD education for CKD 4  Will likely place referral to kidney transplant once eGFR <20    HTN - uncontrolled, will get renin/orin and follow up with blood pressure log  She will get MyChart and message me with a week's worth of blood pressures  Increase nifedipine 30 to 60, BID    Secondary hyperparathryoidism  Start calcitrol .25 and follow up PTH in 3 months    Anemia  Obtain iron and ferritin    PKD aneurysm  Obtain MRA brain and follow up with neurology, have discussed with Dr. Alcantar    PKD (polycystic kidney disease)  -     Misc Sendout Test, Blood Polycystic kidney testing panel; Future; Expected date: 08/19/2024  -     Ambulatory referral/consult to Nephrology    Stage 4 chronic kidney disease  -     Microalbumin/Creatinine Ratio, Urine;  Future; Expected date: 08/19/2024  -     Cancel: RENAL FUNCTION PANEL; Future; Expected date: 09/19/2024  -     Urinalysis; Future; Expected date: 09/19/2024  -     Protein / creatinine ratio, urine; Future; Expected date: 09/19/2024  -     PTH, intact; Future; Expected date: 11/19/2024  -     RENAL FUNCTION PANEL; Future; Expected date: 09/19/2024  -     KIDNEY DISEASE EDUCATION; Future; Expected date: 08/19/2024  -     RENAL DIETICIAN EDUCATION; Future; Expected date: 08/19/2024    Secondary hyperparathyroidism    Anemia due to stage 4 chronic kidney disease  -     Iron and TIBC; Future; Expected date: 08/19/2024  -     Ferritin; Future; Expected date: 08/19/2024    Hypertension, unspecified type  -     Aldosterone/Renin Activity Ratio; Future; Expected date: 08/19/2024    Other cerebral infarction  -     Cancel: MRI Brain Without Contrast; Future; Expected date: 08/19/2024    JOSE C (acute kidney injury)  -     Ambulatory referral/consult to Nephrology    Cerebral aneurysm, nonruptured  -     Ambulatory referral/consult to Neurology; Future; Expected date: 08/26/2024  -     MRA Brain without contrast; Future; Expected date: 08/19/2024    Other orders  -     calcitRIOL (ROCALTROL) 0.25 MCG Cap; Take 1 capsule (0.25 mcg total) by mouth once daily.  Dispense: 90 capsule; Refill: 3  -     NIFEdipine (PROCARDIA-XL) 30 MG (OSM) 24 hr tablet; Take 2 tablets (60 mg total) by mouth 2 (two) times a day.  Dispense: 120 tablet; Refill: 11        RTC in 1 month      Joao Faith MD  Nephrology [facility]

## 2024-08-19 ENCOUNTER — LAB VISIT (OUTPATIENT)
Dept: LAB | Facility: HOSPITAL | Age: 39
End: 2024-08-19
Attending: STUDENT IN AN ORGANIZED HEALTH CARE EDUCATION/TRAINING PROGRAM

## 2024-08-19 ENCOUNTER — OFFICE VISIT (OUTPATIENT)
Dept: NEPHROLOGY | Facility: CLINIC | Age: 39
End: 2024-08-19

## 2024-08-19 ENCOUNTER — TELEPHONE (OUTPATIENT)
Dept: NEPHROLOGY | Facility: CLINIC | Age: 39
End: 2024-08-19

## 2024-08-19 VITALS
BODY MASS INDEX: 22.27 KG/M2 | WEIGHT: 150.38 LBS | OXYGEN SATURATION: 98 % | HEIGHT: 69 IN | RESPIRATION RATE: 18 BRPM | HEART RATE: 60 BPM | SYSTOLIC BLOOD PRESSURE: 164 MMHG | DIASTOLIC BLOOD PRESSURE: 86 MMHG

## 2024-08-19 DIAGNOSIS — D63.1 ANEMIA DUE TO STAGE 4 CHRONIC KIDNEY DISEASE: ICD-10-CM

## 2024-08-19 DIAGNOSIS — N17.9 AKI (ACUTE KIDNEY INJURY): ICD-10-CM

## 2024-08-19 DIAGNOSIS — N18.4 STAGE 4 CHRONIC KIDNEY DISEASE: ICD-10-CM

## 2024-08-19 DIAGNOSIS — N18.31 STAGE 3A CHRONIC KIDNEY DISEASE: Primary | ICD-10-CM

## 2024-08-19 DIAGNOSIS — Q61.3 PKD (POLYCYSTIC KIDNEY DISEASE): Primary | ICD-10-CM

## 2024-08-19 DIAGNOSIS — I67.1 CEREBRAL ANEURYSM, NONRUPTURED: ICD-10-CM

## 2024-08-19 DIAGNOSIS — I10 HYPERTENSION, UNSPECIFIED TYPE: ICD-10-CM

## 2024-08-19 DIAGNOSIS — Q61.3 PKD (POLYCYSTIC KIDNEY DISEASE): ICD-10-CM

## 2024-08-19 DIAGNOSIS — N25.81 SECONDARY HYPERPARATHYROIDISM: ICD-10-CM

## 2024-08-19 DIAGNOSIS — N18.4 ANEMIA DUE TO STAGE 4 CHRONIC KIDNEY DISEASE: ICD-10-CM

## 2024-08-19 DIAGNOSIS — I63.89 OTHER CEREBRAL INFARCTION: ICD-10-CM

## 2024-08-19 LAB
ALBUMIN SERPL BCP-MCNC: 3.6 G/DL (ref 3.5–5.2)
ALBUMIN/CREAT UR: 160 UG/MG (ref 0–30)
ANION GAP SERPL CALC-SCNC: 10 MMOL/L (ref 8–16)
BUN SERPL-MCNC: 25 MG/DL (ref 6–20)
CALCIUM SERPL-MCNC: 8.9 MG/DL (ref 8.7–10.5)
CHLORIDE SERPL-SCNC: 105 MMOL/L (ref 95–110)
CO2 SERPL-SCNC: 26 MMOL/L (ref 23–29)
CREAT SERPL-MCNC: 2.3 MG/DL (ref 0.5–1.4)
CREAT UR-MCNC: 60 MG/DL (ref 15–325)
EST. GFR  (NO RACE VARIABLE): 27 ML/MIN/1.73 M^2
FERRITIN SERPL-MCNC: 65 NG/ML (ref 20–300)
GLUCOSE SERPL-MCNC: 82 MG/DL (ref 70–110)
IRON SERPL-MCNC: 88 UG/DL (ref 30–160)
MICROALBUMIN UR DL<=1MG/L-MCNC: 96 UG/ML
PHOSPHATE SERPL-MCNC: 2.4 MG/DL (ref 2.7–4.5)
POTASSIUM SERPL-SCNC: 3.2 MMOL/L (ref 3.5–5.1)
SATURATED IRON: 24 % (ref 20–50)
SODIUM SERPL-SCNC: 141 MMOL/L (ref 136–145)
TOTAL IRON BINDING CAPACITY: 373 UG/DL (ref 250–450)
TRANSFERRIN SERPL-MCNC: 252 MG/DL (ref 200–375)

## 2024-08-19 PROCEDURE — 99999 PR PBB SHADOW E&M-EST. PATIENT-LVL IV: CPT | Mod: PBBFAC,,, | Performed by: STUDENT IN AN ORGANIZED HEALTH CARE EDUCATION/TRAINING PROGRAM

## 2024-08-19 PROCEDURE — 82088 ASSAY OF ALDOSTERONE: CPT | Performed by: STUDENT IN AN ORGANIZED HEALTH CARE EDUCATION/TRAINING PROGRAM

## 2024-08-19 PROCEDURE — 82728 ASSAY OF FERRITIN: CPT | Performed by: STUDENT IN AN ORGANIZED HEALTH CARE EDUCATION/TRAINING PROGRAM

## 2024-08-19 PROCEDURE — 80069 RENAL FUNCTION PANEL: CPT | Performed by: STUDENT IN AN ORGANIZED HEALTH CARE EDUCATION/TRAINING PROGRAM

## 2024-08-19 PROCEDURE — 82570 ASSAY OF URINE CREATININE: CPT | Performed by: STUDENT IN AN ORGANIZED HEALTH CARE EDUCATION/TRAINING PROGRAM

## 2024-08-19 PROCEDURE — 99214 OFFICE O/P EST MOD 30 MIN: CPT | Mod: PBBFAC | Performed by: STUDENT IN AN ORGANIZED HEALTH CARE EDUCATION/TRAINING PROGRAM

## 2024-08-19 PROCEDURE — 83540 ASSAY OF IRON: CPT | Performed by: STUDENT IN AN ORGANIZED HEALTH CARE EDUCATION/TRAINING PROGRAM

## 2024-08-19 RX ORDER — ACETAMINOPHEN 325 MG/1
325 TABLET ORAL EVERY 6 HOURS PRN
COMMUNITY

## 2024-08-19 RX ORDER — CALCITRIOL 0.25 UG/1
0.25 CAPSULE ORAL DAILY
Qty: 90 CAPSULE | Refills: 3 | Status: SHIPPED | OUTPATIENT
Start: 2024-08-19

## 2024-08-19 RX ORDER — LOSARTAN POTASSIUM 25 MG/1
25 TABLET ORAL DAILY
Qty: 90 TABLET | Refills: 3 | Status: SHIPPED | OUTPATIENT
Start: 2024-08-19 | End: 2025-08-19

## 2024-08-19 RX ORDER — NIFEDIPINE 30 MG/1
60 TABLET, EXTENDED RELEASE ORAL 2 TIMES DAILY
Qty: 120 TABLET | Refills: 11 | Status: SHIPPED | OUTPATIENT
Start: 2024-08-19 | End: 2025-08-19

## 2024-08-19 NOTE — TELEPHONE ENCOUNTER
Patient's call returned in reference of clarifying medications prescribed at today's visit. Patient states that she already has her Procardia- XL left over. She was informed that she could continue using that medication for 2 tablets, twice daily until she runs out. A new prescription has been sent over to her pharmacy.

## 2024-08-28 ENCOUNTER — TELEPHONE (OUTPATIENT)
Facility: CLINIC | Age: 39
End: 2024-08-28
Payer: MEDICAID

## 2024-08-28 LAB
ALDOST SERPL-MCNC: 45.5 NG/DL
ALDOST/RENIN PLAS-RTO: NORMAL RATIO
RENIN PLAS-CCNC: NORMAL NG/ML/HR

## 2024-08-28 NOTE — TELEPHONE ENCOUNTER
CHW reached out to patient to schedule [facility] referral from Dr. Faith. An alert popped up that patient doesn't have coverage at this location and would be flagged as self pay. Patient was not aware of this and verified the [employer] is her current insurance.     CHW called central pricing to see if this was specific to [facility] or if it was [facility] a [facility]. Central pricing verified it's [facility] that is being flagged for the patient's specific insurance. Central pricing double checked Dr. Faith's visit from 8/19/24 and that was flagged as self-pay. Patient states she was unaware of this.     Patient was advised she can apply for financial assistance with [facility] to get assistance with any self-pay bills she receives. Patient was also advised to call her insurance to find covered providers if she wants to transfer care.     Patient states she does not want to transfer care and will contact her insurance because she wants to stay within the [facility] system. Patient states she will follow up once she's spoken with her insurance to verify she is covered and to get scheduled.

## 2024-09-03 ENCOUNTER — LAB VISIT (OUTPATIENT)
Dept: LAB | Facility: HOSPITAL | Age: 39
End: 2024-09-03
Attending: STUDENT IN AN ORGANIZED HEALTH CARE EDUCATION/TRAINING PROGRAM

## 2024-09-03 DIAGNOSIS — N18.31 STAGE 3A CHRONIC KIDNEY DISEASE (CKD): Primary | ICD-10-CM

## 2024-09-03 DIAGNOSIS — N18.31 STAGE 3A CHRONIC KIDNEY DISEASE: ICD-10-CM

## 2024-09-03 LAB
ALBUMIN SERPL BCP-MCNC: 3.4 G/DL (ref 3.5–5.2)
ANION GAP SERPL CALC-SCNC: 10 MMOL/L (ref 8–16)
BUN SERPL-MCNC: 28 MG/DL (ref 6–20)
CALCIUM SERPL-MCNC: 9.2 MG/DL (ref 8.7–10.5)
CHLORIDE SERPL-SCNC: 102 MMOL/L (ref 95–110)
CO2 SERPL-SCNC: 27 MMOL/L (ref 23–29)
CREAT SERPL-MCNC: 2.2 MG/DL (ref 0.5–1.4)
EST. GFR  (NO RACE VARIABLE): 29 ML/MIN/1.73 M^2
GLUCOSE SERPL-MCNC: 114 MG/DL (ref 70–110)
PHOSPHATE SERPL-MCNC: 2.5 MG/DL (ref 2.7–4.5)
POTASSIUM SERPL-SCNC: 2.6 MMOL/L (ref 3.5–5.1)
SODIUM SERPL-SCNC: 139 MMOL/L (ref 136–145)

## 2024-09-03 PROCEDURE — 36415 COLL VENOUS BLD VENIPUNCTURE: CPT | Performed by: STUDENT IN AN ORGANIZED HEALTH CARE EDUCATION/TRAINING PROGRAM

## 2024-09-03 PROCEDURE — 80069 RENAL FUNCTION PANEL: CPT | Performed by: STUDENT IN AN ORGANIZED HEALTH CARE EDUCATION/TRAINING PROGRAM

## 2024-09-03 RX ORDER — POTASSIUM CHLORIDE 750 MG/1
20 TABLET, EXTENDED RELEASE ORAL DAILY
Qty: 6 TABLET | Refills: 0 | Status: SHIPPED | OUTPATIENT
Start: 2024-09-03 | End: 2024-09-04 | Stop reason: SDUPTHER

## 2024-09-04 RX ORDER — POTASSIUM CHLORIDE 750 MG/1
20 TABLET, EXTENDED RELEASE ORAL DAILY
Qty: 6 TABLET | Refills: 0 | Status: SHIPPED | OUTPATIENT
Start: 2024-09-04 | End: 2024-09-07

## 2024-09-04 RX ORDER — LOSARTAN POTASSIUM 25 MG/1
25 TABLET ORAL DAILY
Qty: 90 TABLET | Refills: 3 | Status: CANCELLED | OUTPATIENT
Start: 2024-09-04 | End: 2025-09-04

## 2024-09-06 DIAGNOSIS — N18.9 CHRONIC KIDNEY DISEASE, UNSPECIFIED CKD STAGE: Primary | ICD-10-CM

## 2024-09-09 ENCOUNTER — LAB VISIT (OUTPATIENT)
Dept: LAB | Facility: HOSPITAL | Age: 39
End: 2024-09-09
Attending: STUDENT IN AN ORGANIZED HEALTH CARE EDUCATION/TRAINING PROGRAM

## 2024-09-09 DIAGNOSIS — N18.4 STAGE 4 CHRONIC KIDNEY DISEASE: ICD-10-CM

## 2024-09-09 DIAGNOSIS — N18.31 STAGE 3A CHRONIC KIDNEY DISEASE (CKD): ICD-10-CM

## 2024-09-09 LAB
ALBUMIN SERPL BCP-MCNC: 3.5 G/DL (ref 3.5–5.2)
ANION GAP SERPL CALC-SCNC: 9 MMOL/L (ref 8–16)
BUN SERPL-MCNC: 23 MG/DL (ref 6–20)
CALCIUM SERPL-MCNC: 9.2 MG/DL (ref 8.7–10.5)
CHLORIDE SERPL-SCNC: 99 MMOL/L (ref 95–110)
CO2 SERPL-SCNC: 30 MMOL/L (ref 23–29)
CREAT SERPL-MCNC: 2.5 MG/DL (ref 0.5–1.4)
EST. GFR  (NO RACE VARIABLE): 25 ML/MIN/1.73 M^2
GLUCOSE SERPL-MCNC: 71 MG/DL (ref 70–110)
PHOSPHATE SERPL-MCNC: 2.9 MG/DL (ref 2.7–4.5)
POTASSIUM SERPL-SCNC: 2.8 MMOL/L (ref 3.5–5.1)
SODIUM SERPL-SCNC: 138 MMOL/L (ref 136–145)

## 2024-09-09 PROCEDURE — 80069 RENAL FUNCTION PANEL: CPT | Performed by: STUDENT IN AN ORGANIZED HEALTH CARE EDUCATION/TRAINING PROGRAM

## 2024-09-09 PROCEDURE — 84244 ASSAY OF RENIN: CPT | Performed by: STUDENT IN AN ORGANIZED HEALTH CARE EDUCATION/TRAINING PROGRAM

## 2024-09-13 ENCOUNTER — OFFICE VISIT (OUTPATIENT)
Dept: NEPHROLOGY | Facility: CLINIC | Age: 39
End: 2024-09-13
Attending: STUDENT IN AN ORGANIZED HEALTH CARE EDUCATION/TRAINING PROGRAM
Payer: COMMERCIAL

## 2024-09-13 VITALS
BODY MASS INDEX: 22.13 KG/M2 | OXYGEN SATURATION: 98 % | DIASTOLIC BLOOD PRESSURE: 100 MMHG | HEART RATE: 60 BPM | WEIGHT: 149.38 LBS | HEIGHT: 69 IN | SYSTOLIC BLOOD PRESSURE: 158 MMHG | RESPIRATION RATE: 18 BRPM

## 2024-09-13 DIAGNOSIS — R51.9 CHRONIC NONINTRACTABLE HEADACHE, UNSPECIFIED HEADACHE TYPE: ICD-10-CM

## 2024-09-13 DIAGNOSIS — D63.1 ANEMIA DUE TO STAGE 4 CHRONIC KIDNEY DISEASE: ICD-10-CM

## 2024-09-13 DIAGNOSIS — N18.4 ANEMIA DUE TO STAGE 4 CHRONIC KIDNEY DISEASE: ICD-10-CM

## 2024-09-13 DIAGNOSIS — Q61.3 PKD (POLYCYSTIC KIDNEY DISEASE): ICD-10-CM

## 2024-09-13 DIAGNOSIS — N18.9 CHRONIC KIDNEY DISEASE, UNSPECIFIED CKD STAGE: Primary | ICD-10-CM

## 2024-09-13 DIAGNOSIS — G89.29 CHRONIC NONINTRACTABLE HEADACHE, UNSPECIFIED HEADACHE TYPE: ICD-10-CM

## 2024-09-13 LAB
ALDOST SERPL-MCNC: 37.5 NG/DL
ALDOST/RENIN PLAS-RTO: 53.6 RATIO
RENIN PLAS-CCNC: 0.7 NG/ML/HR

## 2024-09-13 PROCEDURE — 99999 PR PBB SHADOW E&M-EST. PATIENT-LVL III: CPT | Mod: PBBFAC,,, | Performed by: STUDENT IN AN ORGANIZED HEALTH CARE EDUCATION/TRAINING PROGRAM

## 2024-09-13 RX ORDER — POTASSIUM CHLORIDE 750 MG/1
20 TABLET, EXTENDED RELEASE ORAL 2 TIMES DAILY
Qty: 40 TABLET | Refills: 0 | Status: SHIPPED | OUTPATIENT
Start: 2024-09-13 | End: 2024-09-23

## 2024-09-13 NOTE — PATIENT INSTRUCTIONS
Take a picture of your blood pressure and send it to me on mychart    Stop taking the over the counter potassium pills, I have written for potassium replacement; we'll get repeat labs in 1 week.     I will see you back in 1 month.

## 2024-09-13 NOTE — LETTER
September 13, 2024    Sharon Grande  [id number]  [facility] [address]         [facility]  [address]   [place] [address]  Phone: [phone]  Fax: [phone] September 13, 2024     Patient: Sharon Grande   YOB: 1985   Date of Visit: 9/13/2024       To Whom It May Concern:    It is my medical opinion that Sharon Grande should reduce work hours to 6 hours per day until 2 weeks .    If you have any questions or concerns, please don't hesitate to call.    Sincerely,        Joao Faith MD

## 2024-09-13 NOTE — PROGRESS NOTES
"Subjective     Chief Complaint: Establish care    History of Present Illness:  Ms. Sharon Grande is a 38 y.o. female bipolar 1, HTN, Polycystic kidney disease, hx of CVA with subarachnoid sp coil, preeclampsia, G5A1P0, HFpEF (G2DD)    Interval history: repeat labs from before showed hypokalemia, was started on losartan and KCl replacement. Today she presents and is still hypokalemic; Teto renin ratio in process (lab error previously so had to order repeat). Additionally they report that her blood pressure changes from day to day, sometimes in the 160s systolics and other times in the 110s. They forgot to bring their log but will send a picture via "[employer] Suspect that her elevated blood pressures and variations are more due to stresses at home/near daily headahces. Genetic testing positive for polycystic kidney disease.       -MRI brain October 2019:   "CT head dated 10/29/2019 and MRI of the brain dated 01/29/2019.     FINDINGS:  The craniocervical junction is within normal limits.  The midline structures are unremarkable.  The sellar and parasellar structures are within normal limits.  The intracranial flow voids are within normal limits.     No diffusion-weighted signal abnormality is identified.  There is minimal increased T2/FLAIR signal hyperintensity within the periventricular white matter in the occipital regions.  The remainder of the ventricles and sulci are within normal limits.  There are no extra-axial fluid collections.  There is no evidence of intracranial hemorrhage.  There are changes of prior aneurysm coiling in the right aspect of the anterior circulation.     The orbits and intraorbital contents are within normal limits.  There is a mucous retention cyst within the left maxillary sinus.  There is small amount of trapped fluid within the left posterior ethmoid sinus.  The calvarium is intact.     Impression:     No MR evidence of acute or subacute infarction.     Changes of prior aneurysmal coiling " "in the right aspect of the anterior communicating artery.     No acute intracranial process."  Repeat MRI ordered but not covered due to insurance    #Polycystic kidney disease  #CKD4  Strong family history: mother statuspost renal transplant 1998 (?), one of 5 children as it and another does not have it. However the rest have not yet been checked.   Baseline creatinine: 1.6    Has not seen dietican  Has not seen CKD education - again not covered by insurance  Not eligible for kidney transplant referral - eGFR needs to be <20    #HTN  Multiple admissions for malignant hypertension:  BP: labetolol 100 q8, nifedipine 30 BID, losartan 25  Need to assess log at home and will adjust accordingly    #Anemia  Hemoglobin   Date Value Ref Range Status   08/12/2024 11.0 (L) 12.0 - 16.0 g/dL Final     Iron   Date Value Ref Range Status   08/19/2024 88 30 - 160 ug/dL Final     Transferrin   Date Value Ref Range Status   08/19/2024 252 200 - 375 mg/dL Final     TIBC   Date Value Ref Range Status   08/19/2024 373 250 - 450 ug/dL Final     Saturated Iron   Date Value Ref Range Status   08/19/2024 24 20 - 50 % Final     Ferritin   Date Value Ref Range Status   08/19/2024 65 20.0 - 300.0 ng/mL Final       #Secondary hyperparathyriodism  Calcium   Date Value Ref Range Status   09/09/2024 9.2 8.7 - 10.5 mg/dL Final     Phosphorus   Date Value Ref Range Status   09/09/2024 2.9 2.7 - 4.5 mg/dL Final     PTH, Intact   Date Value Ref Range Status   03/26/2024 319.7 (H) 9.0 - 77.0 pg/mL Final   On calcitrol 0.25  Repeat PTH 3 months      #Proteinuira  8/11/2024  UPCR 0.31, improved with jardiance and losartan use    Review of Systems   Constitutional:  Positive for malaise/fatigue.   Neurological:  Positive for headaches.       PAST HISTORY:     Past Medical History:   Diagnosis Date    Anemia     Bipolar 1 disorder     Cerebral aneurysm     Hypertension     since 2012    Polycystic kidney disease     Polycystic kidney disease     " Pre-eclampsia     Renal disorder     Since childhood     SAH (subarachnoid hemorrhage)     Stroke        Past Surgical History:   Procedure Laterality Date    BRAIN SURGERY      CEREBRAL ANGIOGRAM N/A 2018    Procedure: ANGIOGRAM-CEREBRAL;  Surgeon: Jayna Surgeon;  Location: [facility];  Service: Anesthesiology;  Laterality: N/A;     SECTION N/A 2018    Procedure:  SECTION;  Surgeon: Obed Soto MD;  Location: [facility] L[facility];  Service: OB/GYN;  Laterality: N/A;    DILATION AND CURETTAGE OF UTERUS             Family History   Problem Relation Name Age of Onset    Hypertension Mother      Polycystic kidney disease Mother      Hypertension Paternal Grandmother      Polycystic kidney disease Daughter         Social History     Socioeconomic History    Marital status: Single   Tobacco Use    Smoking status: Some Days     Types: Cigarettes    Smokeless tobacco: Never   Substance and Sexual Activity    Alcohol use: Yes     Comment: occasional    Drug use: Yes     Types: Marijuana    Sexual activity: Yes     Partners: Male     Birth control/protection: None       MEDICATIONS & ALLERGIES:     Current Outpatient Medications on File Prior to Visit   Medication Sig    acetaminophen (TYLENOL) 325 MG tablet Take 325 mg by mouth every 6 (six) hours as needed for Pain.    calcitRIOL (ROCALTROL) 0.25 MCG Cap Take 1 capsule (0.25 mcg total) by mouth once daily.    empagliflozin (JARDIANCE) 10 mg tablet Take 1 tablet (10 mg total) by mouth once daily.    labetaloL (NORMODYNE) 100 MG tablet Take 1 tablet (100 mg total) by mouth every 8 (eight) hours.    losartan (COZAAR) 25 MG tablet Take 1 tablet (25 mg total) by mouth once daily.    NIFEdipine (PROCARDIA-XL) 30 MG (OSM) 24 hr tablet Take 2 tablets (60 mg total) by mouth 2 (two) times a day.     No current facility-administered medications on file prior to visit.       Review of patient's allergies indicates:  No Known Allergies    OBJECTIVE:     Vital  Signs:  There were no vitals filed for this visit.      There is no height or weight on file to calculate BMI.     Physical Exam  Constitutional:       General: She is not in acute distress.     Appearance: She is not ill-appearing or toxic-appearing.   HENT:      Head: Normocephalic and atraumatic.      Nose: Nose normal. No congestion.      Mouth/Throat:      Mouth: Mucous membranes are dry.   Eyes:      General: No scleral icterus.        Right eye: No discharge.         Left eye: No discharge.   Abdominal:      Palpations: There is mass.   Musculoskeletal:      Cervical back: Normal range of motion. No rigidity.   Lymphadenopathy:      Cervical: No cervical adenopathy.   Skin:     General: Skin is warm.      Coloration: Skin is not jaundiced.   Neurological:      Mental Status: She is alert.     Laboratory  No results found for this or any previous visit (from the past 24 hour(s)).    Diagnostic Results:      Health Maintenance Due   Topic Date Due    Pneumococcal Vaccines (Age 0-64) (1 of 2 - PCV) Never done    Cervical Cancer Screening  04/23/2016    Influenza Vaccine (1) Never done    COVID-19 Vaccine (2 - 2023-24 season) 09/01/2024         ASSESSMENT & PLAN:   Ms. Sharon Grande is a 38 y.o. female     We discussed multiple issues today at length:    PKD - positive for PKD, will discuss with genetics    CKD - labs and physical today suggestive of volume depletion, will aim for 2.5 liters daily and repeat labs in 1 week.  Will likely place referral to kidney transplant once eGFR <20.   Attempted to send to kidney disease education - not covered by insurance  Attempted to order US vein mapping for HD/vascular referral - not covered by insurance    HTN - will message with log of blood pressure. Adjust medications accordingly    Secondary hyperparathryoidism  On calcitrol, repeat labs 2 months    Brain aneuysm - repeat MRI - not covered by insurance    Anemia - iron labs acceptable, likely need EPO but need to  assess BP first    Chronic kidney disease, unspecified CKD stage  -     RENAL FUNCTION PANEL; Future; Expected date: 09/13/2024  -     RENAL FUNCTION PANEL; Future; Expected date: 10/13/2024  -     Urinalysis; Future; Expected date: 09/13/2024  -     Protein / creatinine ratio, urine  -     Cancel: US Vein mapping, Other, Bilateral; Future; Expected date: 09/13/2024    Chronic nonintractable headache, unspecified headache type  -     Ambulatory referral/consult to Neurology; Future; Expected date: 09/20/2024    Other orders  -     potassium chloride SA (K-DUR,KLOR-CON M) 10 MEQ tablet; Take 2 tablets (20 mEq total) by mouth 2 (two) times daily for 10 days  Dispense: 40 tablet; Refill: 0          RTC in 1 month      Joao Faith MD  Nephrology [facility] [place]

## 2024-09-19 ENCOUNTER — TELEPHONE (OUTPATIENT)
Dept: NEPHROLOGY | Facility: CLINIC | Age: 39
End: 2024-09-19
Payer: COMMERCIAL

## 2024-09-19 NOTE — TELEPHONE ENCOUNTER
I left a message for this patient in reference of inquiring about Blood Pressure readings since visit with Dr. Faith. Provider requesting a Blood Pressure log.

## 2024-09-20 ENCOUNTER — TELEPHONE (OUTPATIENT)
Dept: NEPHROLOGY | Facility: CLINIC | Age: 39
End: 2024-09-20
Payer: COMMERCIAL

## 2024-09-20 NOTE — TELEPHONE ENCOUNTER
I left a message for this patient in reference to blood pressure readings requested by Dr. Faith, and also a courtesy reminder for labs scheduled today. Advised to return call to the office at [phone].

## 2024-09-24 ENCOUNTER — TELEPHONE (OUTPATIENT)
Dept: NEPHROLOGY | Facility: CLINIC | Age: 39
End: 2024-09-24
Payer: MEDICAID

## 2024-09-24 NOTE — TELEPHONE ENCOUNTER
I left a message for this patient in reference of follow up for blood pressure readings, and labs missed on the 20 th. Patient advised to contact the office at her earliest convenience.   
Yes

## 2024-09-24 NOTE — TELEPHONE ENCOUNTER
Patient returned call to the office in reference of Blood Pressure Readings as follows: 147/87(AM), 154/103(PM), 140/81(AM),missed (PM), 172/96(AM), 170/101(PM). Labs rescheduled to this Friday, along with a urine specimen. Patient states that she was having difficulties with transportation. Also, states that she is out of her diabetes medication.

## 2024-09-26 RX ORDER — SPIRONOLACTONE 25 MG/1
25 TABLET ORAL DAILY
Qty: 30 TABLET | Refills: 11 | Status: SHIPPED | OUTPATIENT
Start: 2024-09-26 | End: 2025-09-26

## 2024-09-27 ENCOUNTER — TELEPHONE (OUTPATIENT)
Dept: NEPHROLOGY | Facility: CLINIC | Age: 39
End: 2024-09-27
Payer: MEDICAID

## 2024-09-27 ENCOUNTER — LAB VISIT (OUTPATIENT)
Dept: LAB | Facility: HOSPITAL | Age: 39
End: 2024-09-27
Attending: STUDENT IN AN ORGANIZED HEALTH CARE EDUCATION/TRAINING PROGRAM
Payer: MEDICAID

## 2024-09-27 DIAGNOSIS — E87.6 HYPOKALEMIA: Primary | ICD-10-CM

## 2024-09-27 DIAGNOSIS — N18.9 CHRONIC KIDNEY DISEASE, UNSPECIFIED CKD STAGE: ICD-10-CM

## 2024-09-27 LAB
ALBUMIN SERPL BCP-MCNC: 3.3 G/DL (ref 3.5–5.2)
ANION GAP SERPL CALC-SCNC: 9 MMOL/L (ref 8–16)
BACTERIA #/AREA URNS HPF: ABNORMAL /HPF
BILIRUB UR QL STRIP: NEGATIVE
BUN SERPL-MCNC: 22 MG/DL (ref 6–20)
CALCIUM SERPL-MCNC: 9 MG/DL (ref 8.7–10.5)
CHLORIDE SERPL-SCNC: 104 MMOL/L (ref 95–110)
CLARITY UR: CLEAR
CO2 SERPL-SCNC: 28 MMOL/L (ref 23–29)
COLOR UR: YELLOW
CREAT SERPL-MCNC: 2.3 MG/DL (ref 0.5–1.4)
CREAT UR-MCNC: 146 MG/DL (ref 15–325)
EST. GFR  (NO RACE VARIABLE): 27 ML/MIN/1.73 M^2
GLUCOSE SERPL-MCNC: 117 MG/DL (ref 70–110)
GLUCOSE UR QL STRIP: NEGATIVE
HGB UR QL STRIP: NEGATIVE
HYALINE CASTS #/AREA URNS LPF: 0 /LPF
KETONES UR QL STRIP: NEGATIVE
LEUKOCYTE ESTERASE UR QL STRIP: ABNORMAL
MICROSCOPIC COMMENT: ABNORMAL
NITRITE UR QL STRIP: NEGATIVE
PH UR STRIP: 6 [PH] (ref 5–8)
PHOSPHATE SERPL-MCNC: 2.8 MG/DL (ref 2.7–4.5)
POTASSIUM SERPL-SCNC: 2.7 MMOL/L (ref 3.5–5.1)
PROT UR QL STRIP: ABNORMAL
PROT UR-MCNC: 37 MG/DL
PROT/CREAT UR: 0.25 MG/G{CREAT} (ref 0–0.2)
RBC #/AREA URNS HPF: 0 /HPF (ref 0–4)
SODIUM SERPL-SCNC: 141 MMOL/L (ref 136–145)
SP GR UR STRIP: 1.01 (ref 1–1.03)
SQUAMOUS #/AREA URNS HPF: 4 /HPF
URN SPEC COLLECT METH UR: ABNORMAL
UROBILINOGEN UR STRIP-ACNC: NEGATIVE EU/DL
WBC #/AREA URNS HPF: 9 /HPF (ref 0–5)
WBC CLUMPS URNS QL MICRO: ABNORMAL

## 2024-09-27 PROCEDURE — 36415 COLL VENOUS BLD VENIPUNCTURE: CPT | Performed by: STUDENT IN AN ORGANIZED HEALTH CARE EDUCATION/TRAINING PROGRAM

## 2024-09-27 PROCEDURE — 84156 ASSAY OF PROTEIN URINE: CPT | Performed by: STUDENT IN AN ORGANIZED HEALTH CARE EDUCATION/TRAINING PROGRAM

## 2024-09-27 PROCEDURE — 81000 URINALYSIS NONAUTO W/SCOPE: CPT | Performed by: STUDENT IN AN ORGANIZED HEALTH CARE EDUCATION/TRAINING PROGRAM

## 2024-09-27 PROCEDURE — 80069 RENAL FUNCTION PANEL: CPT | Performed by: STUDENT IN AN ORGANIZED HEALTH CARE EDUCATION/TRAINING PROGRAM

## 2024-09-27 NOTE — TELEPHONE ENCOUNTER
Critical lab for this patient called in for potassium level at 2.7. Message sent to Dr. Faith for review.

## 2024-09-30 ENCOUNTER — TELEPHONE (OUTPATIENT)
Dept: NEPHROLOGY | Facility: CLINIC | Age: 39
End: 2024-09-30
Payer: MEDICAID

## 2024-09-30 DIAGNOSIS — E87.6 HYPOKALEMIA: Primary | ICD-10-CM

## 2024-09-30 NOTE — TELEPHONE ENCOUNTER
I left a message for this patient in reference of verifying medication received, and also to remind that Dr. Faith would like a repeat lab drawn on today. Informed to contact the office.

## 2024-10-01 ENCOUNTER — TELEPHONE (OUTPATIENT)
Dept: NEPHROLOGY | Facility: CLINIC | Age: 39
End: 2024-10-01
Payer: MEDICAID

## 2024-10-01 ENCOUNTER — LAB VISIT (OUTPATIENT)
Dept: LAB | Facility: HOSPITAL | Age: 39
End: 2024-10-01
Attending: STUDENT IN AN ORGANIZED HEALTH CARE EDUCATION/TRAINING PROGRAM
Payer: COMMERCIAL

## 2024-10-01 DIAGNOSIS — E87.6 HYPOKALEMIA: ICD-10-CM

## 2024-10-01 DIAGNOSIS — E87.6 HYPOKALEMIA: Primary | ICD-10-CM

## 2024-10-01 LAB
ALBUMIN SERPL BCP-MCNC: 3.3 G/DL (ref 3.5–5.2)
ANION GAP SERPL CALC-SCNC: 11 MMOL/L (ref 8–16)
BUN SERPL-MCNC: 25 MG/DL (ref 6–20)
CALCIUM SERPL-MCNC: 8.8 MG/DL (ref 8.7–10.5)
CHLORIDE SERPL-SCNC: 103 MMOL/L (ref 95–110)
CO2 SERPL-SCNC: 28 MMOL/L (ref 23–29)
CREAT SERPL-MCNC: 2.5 MG/DL (ref 0.5–1.4)
EST. GFR  (NO RACE VARIABLE): 25 ML/MIN/1.73 M^2
GLUCOSE SERPL-MCNC: 61 MG/DL (ref 70–110)
MAGNESIUM SERPL-MCNC: 1.8 MG/DL (ref 1.6–2.6)
PHOSPHATE SERPL-MCNC: 2.9 MG/DL (ref 2.7–4.5)
POTASSIUM SERPL-SCNC: 3 MMOL/L (ref 3.5–5.1)
SODIUM SERPL-SCNC: 142 MMOL/L (ref 136–145)

## 2024-10-01 PROCEDURE — 36415 COLL VENOUS BLD VENIPUNCTURE: CPT | Performed by: STUDENT IN AN ORGANIZED HEALTH CARE EDUCATION/TRAINING PROGRAM

## 2024-10-01 PROCEDURE — 83735 ASSAY OF MAGNESIUM: CPT | Performed by: STUDENT IN AN ORGANIZED HEALTH CARE EDUCATION/TRAINING PROGRAM

## 2024-10-01 PROCEDURE — 80069 RENAL FUNCTION PANEL: CPT | Performed by: STUDENT IN AN ORGANIZED HEALTH CARE EDUCATION/TRAINING PROGRAM

## 2024-10-01 NOTE — TELEPHONE ENCOUNTER
Patient was contacted in reference of labs needed to be re-checked after medication prescribed to help with potassium. Patient has not been able to get the medicine due to the pharmacy informing her that it was not ready. She states she has been at the hospital with her child, and plans to go and  the medication today. I informed her that I would speak with Dr. Faith to find out about next lab draw.

## 2024-10-01 NOTE — TELEPHONE ENCOUNTER
Patient was called back, and informed that Dr. Faith would like to lab to be drawn today. Depending on the value, he may need a repeat or have the patient come in for admission.

## 2024-10-02 ENCOUNTER — TELEPHONE (OUTPATIENT)
Dept: NEPHROLOGY | Facility: CLINIC | Age: 39
End: 2024-10-02
Payer: MEDICAID

## 2024-10-02 NOTE — TELEPHONE ENCOUNTER
I left a message for this patient in reference of repeat labs scheduled on Friday. Instructed to contact the office to confirm medication was being taken, and labs scheduled.

## 2024-10-03 RX ORDER — POTASSIUM CHLORIDE 20 MEQ/1
60 TABLET, EXTENDED RELEASE ORAL 2 TIMES DAILY
Qty: 6 TABLET | Refills: 0 | Status: SHIPPED | OUTPATIENT
Start: 2024-10-03 | End: 2024-10-05

## 2024-10-03 RX ORDER — POTASSIUM CHLORIDE 750 MG/1
60 TABLET, EXTENDED RELEASE ORAL 2 TIMES DAILY
Qty: 12 TABLET | Refills: 0 | Status: SHIPPED | OUTPATIENT
Start: 2024-10-03 | End: 2024-10-03

## 2024-10-07 ENCOUNTER — LAB VISIT (OUTPATIENT)
Dept: LAB | Facility: HOSPITAL | Age: 39
End: 2024-10-07
Attending: STUDENT IN AN ORGANIZED HEALTH CARE EDUCATION/TRAINING PROGRAM
Payer: MEDICAID

## 2024-10-07 ENCOUNTER — TELEPHONE (OUTPATIENT)
Dept: NEPHROLOGY | Facility: CLINIC | Age: 39
End: 2024-10-07
Payer: COMMERCIAL

## 2024-10-07 DIAGNOSIS — E87.6 HYPOKALEMIA: ICD-10-CM

## 2024-10-07 LAB
ALBUMIN SERPL BCP-MCNC: 3.4 G/DL (ref 3.5–5.2)
ANION GAP SERPL CALC-SCNC: 8 MMOL/L (ref 8–16)
BUN SERPL-MCNC: 27 MG/DL (ref 6–20)
CALCIUM SERPL-MCNC: 9.1 MG/DL (ref 8.7–10.5)
CHLORIDE SERPL-SCNC: 107 MMOL/L (ref 95–110)
CO2 SERPL-SCNC: 26 MMOL/L (ref 23–29)
CREAT SERPL-MCNC: 2.3 MG/DL (ref 0.5–1.4)
EST. GFR  (NO RACE VARIABLE): 27 ML/MIN/1.73 M^2
GLUCOSE SERPL-MCNC: 89 MG/DL (ref 70–110)
PHOSPHATE SERPL-MCNC: 3.3 MG/DL (ref 2.7–4.5)
POTASSIUM SERPL-SCNC: 3.3 MMOL/L (ref 3.5–5.1)
SODIUM SERPL-SCNC: 141 MMOL/L (ref 136–145)

## 2024-10-07 PROCEDURE — 36415 COLL VENOUS BLD VENIPUNCTURE: CPT | Performed by: STUDENT IN AN ORGANIZED HEALTH CARE EDUCATION/TRAINING PROGRAM

## 2024-10-07 PROCEDURE — 80069 RENAL FUNCTION PANEL: CPT | Performed by: STUDENT IN AN ORGANIZED HEALTH CARE EDUCATION/TRAINING PROGRAM

## 2024-10-07 NOTE — TELEPHONE ENCOUNTER
Patient was contacted in reference of labs needed after beginning potassium medication. She states that she was able to pick the medication up on Friday, and took the medication over the weekend. Informed that Dr. Faith wanted her to head to the lab so he could see where she stood. Patient verbalizes understanding, and scheduled for 1 pm today. She states that she will have her daughter bring her in.

## 2024-10-14 ENCOUNTER — LAB VISIT (OUTPATIENT)
Dept: LAB | Facility: HOSPITAL | Age: 39
End: 2024-10-14
Attending: STUDENT IN AN ORGANIZED HEALTH CARE EDUCATION/TRAINING PROGRAM
Payer: MEDICAID

## 2024-10-14 DIAGNOSIS — N18.9 CHRONIC KIDNEY DISEASE, UNSPECIFIED CKD STAGE: ICD-10-CM

## 2024-10-14 LAB
ALBUMIN SERPL BCP-MCNC: 3.3 G/DL (ref 3.5–5.2)
ANION GAP SERPL CALC-SCNC: 10 MMOL/L (ref 8–16)
BUN SERPL-MCNC: 28 MG/DL (ref 6–20)
CALCIUM SERPL-MCNC: 8.9 MG/DL (ref 8.7–10.5)
CHLORIDE SERPL-SCNC: 106 MMOL/L (ref 95–110)
CO2 SERPL-SCNC: 24 MMOL/L (ref 23–29)
CREAT SERPL-MCNC: 2.8 MG/DL (ref 0.5–1.4)
EST. GFR  (NO RACE VARIABLE): 22 ML/MIN/1.73 M^2
GLUCOSE SERPL-MCNC: 116 MG/DL (ref 70–110)
PHOSPHATE SERPL-MCNC: 2.9 MG/DL (ref 2.7–4.5)
POTASSIUM SERPL-SCNC: 3 MMOL/L (ref 3.5–5.1)
SODIUM SERPL-SCNC: 140 MMOL/L (ref 136–145)

## 2024-10-14 PROCEDURE — 80069 RENAL FUNCTION PANEL: CPT | Performed by: STUDENT IN AN ORGANIZED HEALTH CARE EDUCATION/TRAINING PROGRAM

## 2024-10-14 PROCEDURE — 36415 COLL VENOUS BLD VENIPUNCTURE: CPT | Performed by: STUDENT IN AN ORGANIZED HEALTH CARE EDUCATION/TRAINING PROGRAM

## 2024-10-23 ENCOUNTER — LAB VISIT (OUTPATIENT)
Dept: LAB | Facility: HOSPITAL | Age: 39
End: 2024-10-23
Attending: STUDENT IN AN ORGANIZED HEALTH CARE EDUCATION/TRAINING PROGRAM
Payer: MEDICAID

## 2024-10-23 ENCOUNTER — OFFICE VISIT (OUTPATIENT)
Dept: NEPHROLOGY | Facility: CLINIC | Age: 39
End: 2024-10-23
Payer: MEDICAID

## 2024-10-23 VITALS
HEART RATE: 58 BPM | HEIGHT: 69 IN | SYSTOLIC BLOOD PRESSURE: 184 MMHG | DIASTOLIC BLOOD PRESSURE: 98 MMHG | OXYGEN SATURATION: 97 % | RESPIRATION RATE: 18 BRPM | BODY MASS INDEX: 23.67 KG/M2 | WEIGHT: 159.81 LBS

## 2024-10-23 DIAGNOSIS — E87.6 HYPOKALEMIA: ICD-10-CM

## 2024-10-23 DIAGNOSIS — N18.9 CHRONIC KIDNEY DISEASE, UNSPECIFIED CKD STAGE: ICD-10-CM

## 2024-10-23 DIAGNOSIS — E26.9 HYPERALDOSTERONISM: ICD-10-CM

## 2024-10-23 DIAGNOSIS — N25.81 SECONDARY HYPERPARATHYROIDISM: ICD-10-CM

## 2024-10-23 DIAGNOSIS — N18.9 CHRONIC KIDNEY DISEASE, UNSPECIFIED CKD STAGE: Primary | ICD-10-CM

## 2024-10-23 DIAGNOSIS — I15.2 HYPERTENSION DUE TO ENDOCRINE DISORDER: ICD-10-CM

## 2024-10-23 LAB
ALBUMIN SERPL BCP-MCNC: 3.3 G/DL (ref 3.5–5.2)
ANION GAP SERPL CALC-SCNC: 8 MMOL/L (ref 8–16)
BACTERIA #/AREA URNS HPF: ABNORMAL /HPF
BILIRUB UR QL STRIP: NEGATIVE
BUN SERPL-MCNC: 25 MG/DL (ref 6–20)
CALCIUM SERPL-MCNC: 8.7 MG/DL (ref 8.7–10.5)
CHLORIDE SERPL-SCNC: 102 MMOL/L (ref 95–110)
CLARITY UR: CLEAR
CO2 SERPL-SCNC: 30 MMOL/L (ref 23–29)
COLOR UR: YELLOW
CREAT SERPL-MCNC: 2.2 MG/DL (ref 0.5–1.4)
CREAT UR-MCNC: 68.7 MG/DL (ref 15–325)
EST. GFR  (NO RACE VARIABLE): 29 ML/MIN/1.73 M^2
GLUCOSE SERPL-MCNC: 78 MG/DL (ref 70–110)
GLUCOSE UR QL STRIP: NEGATIVE
HGB UR QL STRIP: ABNORMAL
HYALINE CASTS #/AREA URNS LPF: 0 /LPF
KETONES UR QL STRIP: NEGATIVE
LEUKOCYTE ESTERASE UR QL STRIP: ABNORMAL
MICROSCOPIC COMMENT: ABNORMAL
NITRITE UR QL STRIP: NEGATIVE
PH UR STRIP: 6 [PH] (ref 5–8)
PHOSPHATE SERPL-MCNC: 2.8 MG/DL (ref 2.7–4.5)
POTASSIUM SERPL-SCNC: 2.9 MMOL/L (ref 3.5–5.1)
PROT UR QL STRIP: ABNORMAL
PROT UR-MCNC: 51 MG/DL
PROT/CREAT UR: 0.74 MG/G{CREAT} (ref 0–0.2)
RBC #/AREA URNS HPF: 0 /HPF (ref 0–4)
SODIUM SERPL-SCNC: 140 MMOL/L (ref 136–145)
SP GR UR STRIP: 1.01 (ref 1–1.03)
SQUAMOUS #/AREA URNS HPF: 1 /HPF
URN SPEC COLLECT METH UR: ABNORMAL
UROBILINOGEN UR STRIP-ACNC: NEGATIVE EU/DL
WBC #/AREA URNS HPF: 6 /HPF (ref 0–5)

## 2024-10-23 PROCEDURE — 3008F BODY MASS INDEX DOCD: CPT | Mod: CPTII,,, | Performed by: STUDENT IN AN ORGANIZED HEALTH CARE EDUCATION/TRAINING PROGRAM

## 2024-10-23 PROCEDURE — 99213 OFFICE O/P EST LOW 20 MIN: CPT | Mod: S$PBB,,, | Performed by: STUDENT IN AN ORGANIZED HEALTH CARE EDUCATION/TRAINING PROGRAM

## 2024-10-23 PROCEDURE — 81000 URINALYSIS NONAUTO W/SCOPE: CPT | Performed by: STUDENT IN AN ORGANIZED HEALTH CARE EDUCATION/TRAINING PROGRAM

## 2024-10-23 PROCEDURE — 3077F SYST BP >= 140 MM HG: CPT | Mod: CPTII,,, | Performed by: STUDENT IN AN ORGANIZED HEALTH CARE EDUCATION/TRAINING PROGRAM

## 2024-10-23 PROCEDURE — 4010F ACE/ARB THERAPY RXD/TAKEN: CPT | Mod: CPTII,,, | Performed by: STUDENT IN AN ORGANIZED HEALTH CARE EDUCATION/TRAINING PROGRAM

## 2024-10-23 PROCEDURE — 80069 RENAL FUNCTION PANEL: CPT | Performed by: STUDENT IN AN ORGANIZED HEALTH CARE EDUCATION/TRAINING PROGRAM

## 2024-10-23 PROCEDURE — 82570 ASSAY OF URINE CREATININE: CPT | Performed by: STUDENT IN AN ORGANIZED HEALTH CARE EDUCATION/TRAINING PROGRAM

## 2024-10-23 PROCEDURE — 36415 COLL VENOUS BLD VENIPUNCTURE: CPT | Performed by: STUDENT IN AN ORGANIZED HEALTH CARE EDUCATION/TRAINING PROGRAM

## 2024-10-23 PROCEDURE — 3060F POS MICROALBUMINURIA REV: CPT | Mod: CPTII,,, | Performed by: STUDENT IN AN ORGANIZED HEALTH CARE EDUCATION/TRAINING PROGRAM

## 2024-10-23 PROCEDURE — 3066F NEPHROPATHY DOC TX: CPT | Mod: CPTII,,, | Performed by: STUDENT IN AN ORGANIZED HEALTH CARE EDUCATION/TRAINING PROGRAM

## 2024-10-23 PROCEDURE — 99213 OFFICE O/P EST LOW 20 MIN: CPT | Mod: PBBFAC | Performed by: STUDENT IN AN ORGANIZED HEALTH CARE EDUCATION/TRAINING PROGRAM

## 2024-10-23 PROCEDURE — 3080F DIAST BP >= 90 MM HG: CPT | Mod: CPTII,,, | Performed by: STUDENT IN AN ORGANIZED HEALTH CARE EDUCATION/TRAINING PROGRAM

## 2024-10-23 PROCEDURE — 99999 PR PBB SHADOW E&M-EST. PATIENT-LVL III: CPT | Mod: PBBFAC,,, | Performed by: STUDENT IN AN ORGANIZED HEALTH CARE EDUCATION/TRAINING PROGRAM

## 2024-10-23 RX ORDER — CALCITRIOL 0.25 UG/1
0.25 CAPSULE ORAL DAILY
Qty: 90 CAPSULE | Refills: 3 | Status: SHIPPED | OUTPATIENT
Start: 2024-10-23

## 2024-10-23 RX ORDER — SPIRONOLACTONE 50 MG/1
50 TABLET, FILM COATED ORAL DAILY
Qty: 30 TABLET | Refills: 11 | Status: SHIPPED | OUTPATIENT
Start: 2024-10-23 | End: 2025-10-23

## 2024-10-23 RX ORDER — LOSARTAN POTASSIUM 50 MG/1
50 TABLET ORAL DAILY
Qty: 90 TABLET | Refills: 3 | Status: SHIPPED | OUTPATIENT
Start: 2024-10-23 | End: 2025-10-23

## 2024-10-23 NOTE — PATIENT INSTRUCTIONS
Match the medication to what you have at home    These are your blood pressure medications:  Labetolol - 100 mg three times daily    Losartan - 50 mg once daily    Aldactone - 50 mg once daily    This is a diabetes medication, but I am using these for your kidneys    Jardiance - 10 mg once daily    This is the active form of vitamin D    Calcitrol - 0.25 mg once daily    We are getting labs today and then labs in 1 week    I will see you back in 1 month.    Please get the [employer] odilon set up on your phon, it may be a better way for us to keep in touch.

## 2024-10-23 NOTE — PROGRESS NOTES
"Subjective     Chief Complaint: Establish care    History of Present Illness:  Ms. Sharon Grande is a 39 y.o. female bipolar 1, HTN, Polycystic kidney disease, hx of CVA with subarachnoid sp coil, preeclampsia, G5A1P0, HFpEF (G2DD)    Interval history:  Multiple issues with obtaining medications prescribed - KCL, jardiance, aldactone. (Misunderstanding regarding which pharmacy to go to and difficulties with contacting patient on the phone) Most recently - 9/9 orin renin 53.6 and was prescribed aldactone at that time.     She has not been taking her blood pressure as we had previously discussed but has been out of the losartan and the aldactone.       -MRI brain October 2019:   "CT head dated 10/29/2019 and MRI of the brain dated 01/29/2019.     FINDINGS:  The craniocervical junction is within normal limits.  The midline structures are unremarkable.  The sellar and parasellar structures are within normal limits.  The intracranial flow voids are within normal limits.     No diffusion-weighted signal abnormality is identified.  There is minimal increased T2/FLAIR signal hyperintensity within the periventricular white matter in the occipital regions.  The remainder of the ventricles and sulci are within normal limits.  There are no extra-axial fluid collections.  There is no evidence of intracranial hemorrhage.  There are changes of prior aneurysm coiling in the right aspect of the anterior circulation.     The orbits and intraorbital contents are within normal limits.  There is a mucous retention cyst within the left maxillary sinus.  There is small amount of trapped fluid within the left posterior ethmoid sinus.  The calvarium is intact.     Impression:     No MR evidence of acute or subacute infarction.     Changes of prior aneurysmal coiling in the right aspect of the anterior communicating artery.     No acute intracranial process."  Repeat MRI ordered but not covered due to insurance    #Polycystic kidney " disease  #CKD4  Baseline creatinine 2.3-2.5, increased to 2.8 on most recent labs  UA 1+ protein  UPCR 0.25  On jardiance, losartan and aldactone    Creatinine   Date Value Ref Range Status   10/14/2024 2.8 (H) 0.5 - 1.4 mg/dL Final   10/07/2024 2.3 (H) 0.5 - 1.4 mg/dL Final   10/01/2024 2.5 (H) 0.5 - 1.4 mg/dL Final     eGFR   Date Value Ref Range Status   10/14/2024 22 (A) >60 mL/min/1.73 m^2 Final   10/07/2024 27 (A) >60 mL/min/1.73 m^2 Final   10/01/2024 25 (A) >60 mL/min/1.73 m^2 Final     Prot/Creat Ratio, Urine   Date Value Ref Range Status   09/27/2024 0.25 (H) 0.00 - 0.20 Final   08/19/2024 0.31 (H) 0.00 - 0.20 Final   08/11/2024 0.98 (H) 0.00 - 0.20 Final     Strong family history: mother statuspost renal transplant 1998 (?), one of 5 children as it and another does not have it. However the rest have not yet been checked.     Has not seen dietican  Has not seen CKD education - again not covered by insurance  Not eligible for kidney transplant referral - eGFR needs to be <20    #HTN  #Hyperalderstonism  Multiple admissions for malignant hypertension:  BP: labetolol 100 q8, nifedipine 30 BID, losartan 25, aldactone 25  Need to assess log at home and will adjust accordingly    #Anemia  Hemoglobin   Date Value Ref Range Status   08/12/2024 11.0 (L) 12.0 - 16.0 g/dL Final     Iron   Date Value Ref Range Status   08/19/2024 88 30 - 160 ug/dL Final     Transferrin   Date Value Ref Range Status   08/19/2024 252 200 - 375 mg/dL Final     TIBC   Date Value Ref Range Status   08/19/2024 373 250 - 450 ug/dL Final     Saturated Iron   Date Value Ref Range Status   08/19/2024 24 20 - 50 % Final     Ferritin   Date Value Ref Range Status   08/19/2024 65 20.0 - 300.0 ng/mL Final       #Secondary hyperparathyriodism  Calcium   Date Value Ref Range Status   10/14/2024 8.9 8.7 - 10.5 mg/dL Final     Phosphorus   Date Value Ref Range Status   10/14/2024 2.9 2.7 - 4.5 mg/dL Final     PTH, Intact   Date Value Ref Range Status    2024 319.7 (H) 9.0 - 77.0 pg/mL Final   On calcitrol 0.25    Review of Systems   Constitutional:  Positive for malaise/fatigue.   Neurological:  Positive for headaches.       PAST HISTORY:     Past Medical History:   Diagnosis Date    Anemia     Bipolar 1 disorder     Cerebral aneurysm     Hypertension     since     Polycystic kidney disease     Polycystic kidney disease     Pre-eclampsia     Renal disorder     Since childhood     SAH (subarachnoid hemorrhage)     Stroke        Past Surgical History:   Procedure Laterality Date    BRAIN SURGERY      CEREBRAL ANGIOGRAM N/A 2018    Procedure: ANGIOGRAM-CEREBRAL;  Surgeon: Jayna Surgeon;  [facility];  Service: Anesthesiology;  Laterality: N/A;     SECTION N/A 2018    Procedure:  SECTION;  Surgeon: Obed Soto MD;  [facility];  Service: OB/GYN;  Laterality: N/A;    DILATION AND CURETTAGE OF UTERUS             Family History   Problem Relation Name Age of Onset    Hypertension Mother      Polycystic kidney disease Mother      Hypertension Paternal Grandmother      Polycystic kidney disease Daughter         Social History     Socioeconomic History    Marital status: Single   Tobacco Use    Smoking status: Some Days     Types: Cigarettes    Smokeless tobacco: Never   Substance and Sexual Activity    Alcohol use: Yes     Comment: occasional    Drug use: Yes     Types: Marijuana    Sexual activity: Yes     Partners: Male     Birth control/protection: None       MEDICATIONS & ALLERGIES:     Current Outpatient Medications on File Prior to Visit   Medication Sig    acetaminophen (TYLENOL) 325 MG tablet Take 325 mg by mouth every 6 (six) hours as needed for Pain.    calcitRIOL (ROCALTROL) 0.25 MCG Cap Take 1 capsule (0.25 mcg total) by mouth once daily.    empagliflozin (JARDIANCE) 10 mg tablet Take 1 tablet (10 mg total) by mouth once daily.    labetaloL (NORMODYNE) 100 MG tablet Take 1 tablet (100 mg total) by mouth  every 8 (eight) hours.    losartan (COZAAR) 25 MG tablet Take 1 tablet (25 mg total) by mouth once daily.    spironolactone (ALDACTONE) 25 MG tablet Take 1 tablet (25 mg total) by mouth once daily.     No current facility-administered medications on file prior to visit.       Review of patient's allergies indicates:  No Known Allergies    OBJECTIVE:     Vital Signs:  There were no vitals filed for this visit.      There is no height or weight on file to calculate BMI.     Physical Exam  Constitutional:       General: She is not in acute distress.     Appearance: She is not ill-appearing or toxic-appearing.   HENT:      Head: Normocephalic and atraumatic.      Nose: Nose normal. No congestion.      Mouth/Throat:      Mouth: Mucous membranes are dry.   Eyes:      General: No scleral icterus.        Right eye: No discharge.         Left eye: No discharge.   Abdominal:      Palpations: There is mass.   Musculoskeletal:      Cervical back: Normal range of motion. No rigidity.   Lymphadenopathy:      Cervical: No cervical adenopathy.   Skin:     General: Skin is warm.      Coloration: Skin is not jaundiced.   Neurological:      Mental Status: She is alert.       Laboratory  No results found for this or any previous visit (from the past 24 hours).    Diagnostic Results:      Health Maintenance Due   Topic Date Due    Pneumococcal Vaccines (Age 0-64) (1 of 2 - PCV) Never done    Cervical Cancer Screening  04/23/2016    Influenza Vaccine (1) Never done    COVID-19 Vaccine (2 - 2024-25 season) 09/01/2024         ASSESSMENT & PLAN:   Ms. Sharon Grande is a 39 y.o. female     CKD - polycystic kidney disease and HTN    JOSE C - sCr increased to 2.8 on most recent labs; repeat today    Hyperaldo/HTN :  Rewriting all medications to [facility] so she can pick them up and take them correctly. We spent a sigificant amount of time discussing how to take her medications and detailing their uses.   Will discuss with endo about next steps  for hyperaldo once BP better controlled.     Chronic kidney disease, unspecified CKD stage  -     RENAL FUNCTION PANEL; Future; Expected date: 10/23/2024  -     Urinalysis; Future; Expected date: 10/23/2024  -     Cancel: Protein / creatinine ratio, urine  -     RENAL FUNCTION PANEL; Future; Expected date: 11/23/2024  -     CBC Auto Differential; Future; Expected date: 11/23/2024  -     Protein / creatinine ratio, urine; Future; Expected date: 11/23/2024  -     Urinalysis; Future; Expected date: 11/23/2024  -     RENAL FUNCTION PANEL; Future; Expected date: 10/30/2024  -     Protein / creatinine ratio, urine; Future; Expected date: 10/23/2024    Secondary hyperparathyroidism    Hypokalemia    Hypertension due to endocrine disorder    Hyperaldosteronism    Other orders  -     empagliflozin (JARDIANCE) 10 mg tablet; Take 1 tablet (10 mg total) by mouth once daily.  Dispense: 90 tablet; Refill: 3  -     losartan (COZAAR) 50 MG tablet; Take 1 tablet (50 mg total) by mouth once daily.  Dispense: 90 tablet; Refill: 3  -     spironolactone (ALDACTONE) 50 MG tablet; Take 1 tablet (50 mg total) by mouth once daily.  Dispense: 30 tablet; Refill: 11  -     calcitRIOL (ROCALTROL) 0.25 MCG Cap; Take 1 capsule (0.25 mcg total) by mouth once daily.  Dispense: 90 capsule; Refill: 3            RTC in 1 month      Joao Faith MD  Nephrology [facility]

## 2024-10-30 ENCOUNTER — LAB VISIT (OUTPATIENT)
Dept: LAB | Facility: HOSPITAL | Age: 39
End: 2024-10-30
Attending: STUDENT IN AN ORGANIZED HEALTH CARE EDUCATION/TRAINING PROGRAM
Payer: MEDICAID

## 2024-10-30 DIAGNOSIS — N18.9 CHRONIC KIDNEY DISEASE, UNSPECIFIED CKD STAGE: ICD-10-CM

## 2024-10-30 LAB
ALBUMIN SERPL BCP-MCNC: 3.4 G/DL (ref 3.5–5.2)
ANION GAP SERPL CALC-SCNC: 7 MMOL/L (ref 8–16)
BUN SERPL-MCNC: 26 MG/DL (ref 6–20)
CALCIUM SERPL-MCNC: 8.9 MG/DL (ref 8.7–10.5)
CHLORIDE SERPL-SCNC: 104 MMOL/L (ref 95–110)
CO2 SERPL-SCNC: 29 MMOL/L (ref 23–29)
CREAT SERPL-MCNC: 2.7 MG/DL (ref 0.5–1.4)
EST. GFR  (NO RACE VARIABLE): 22 ML/MIN/1.73 M^2
GLUCOSE SERPL-MCNC: 94 MG/DL (ref 70–110)
PHOSPHATE SERPL-MCNC: 3.2 MG/DL (ref 2.7–4.5)
POTASSIUM SERPL-SCNC: 3.3 MMOL/L (ref 3.5–5.1)
SODIUM SERPL-SCNC: 140 MMOL/L (ref 136–145)

## 2024-10-30 PROCEDURE — 80069 RENAL FUNCTION PANEL: CPT | Performed by: STUDENT IN AN ORGANIZED HEALTH CARE EDUCATION/TRAINING PROGRAM

## 2024-10-30 PROCEDURE — 36415 COLL VENOUS BLD VENIPUNCTURE: CPT | Performed by: STUDENT IN AN ORGANIZED HEALTH CARE EDUCATION/TRAINING PROGRAM

## 2024-11-18 ENCOUNTER — LAB VISIT (OUTPATIENT)
Dept: LAB | Facility: HOSPITAL | Age: 39
End: 2024-11-18
Attending: STUDENT IN AN ORGANIZED HEALTH CARE EDUCATION/TRAINING PROGRAM
Payer: MEDICAID

## 2024-11-18 DIAGNOSIS — N18.9 CHRONIC KIDNEY DISEASE, UNSPECIFIED CKD STAGE: ICD-10-CM

## 2024-11-18 DIAGNOSIS — N18.4 STAGE 4 CHRONIC KIDNEY DISEASE: ICD-10-CM

## 2024-11-18 LAB
ALBUMIN SERPL BCP-MCNC: 3.5 G/DL (ref 3.5–5.2)
ANION GAP SERPL CALC-SCNC: 8 MMOL/L (ref 8–16)
BASOPHILS # BLD AUTO: 0.02 K/UL (ref 0–0.2)
BASOPHILS NFR BLD: 0.4 % (ref 0–1.9)
BUN SERPL-MCNC: 26 MG/DL (ref 6–20)
CALCIUM SERPL-MCNC: 8.8 MG/DL (ref 8.7–10.5)
CHLORIDE SERPL-SCNC: 105 MMOL/L (ref 95–110)
CO2 SERPL-SCNC: 25 MMOL/L (ref 23–29)
CREAT SERPL-MCNC: 2.8 MG/DL (ref 0.5–1.4)
DIFFERENTIAL METHOD BLD: ABNORMAL
EOSINOPHIL # BLD AUTO: 0.1 K/UL (ref 0–0.5)
EOSINOPHIL NFR BLD: 1.5 % (ref 0–8)
ERYTHROCYTE [DISTWIDTH] IN BLOOD BY AUTOMATED COUNT: 13 % (ref 11.5–14.5)
EST. GFR  (NO RACE VARIABLE): 21 ML/MIN/1.73 M^2
GLUCOSE SERPL-MCNC: 118 MG/DL (ref 70–110)
HCT VFR BLD AUTO: 35.4 % (ref 37–48.5)
HGB BLD-MCNC: 11.4 G/DL (ref 12–16)
IMM GRANULOCYTES # BLD AUTO: 0.02 K/UL (ref 0–0.04)
IMM GRANULOCYTES NFR BLD AUTO: 0.4 % (ref 0–0.5)
LYMPHOCYTES # BLD AUTO: 1.1 K/UL (ref 1–4.8)
LYMPHOCYTES NFR BLD: 23.3 % (ref 18–48)
MCH RBC QN AUTO: 30 PG (ref 27–31)
MCHC RBC AUTO-ENTMCNC: 32.2 G/DL (ref 32–36)
MCV RBC AUTO: 93 FL (ref 82–98)
MONOCYTES # BLD AUTO: 0.3 K/UL (ref 0.3–1)
MONOCYTES NFR BLD: 6.6 % (ref 4–15)
NEUTROPHILS # BLD AUTO: 3.2 K/UL (ref 1.8–7.7)
NEUTROPHILS NFR BLD: 67.8 % (ref 38–73)
NRBC BLD-RTO: 0 /100 WBC
PHOSPHATE SERPL-MCNC: 2.5 MG/DL (ref 2.7–4.5)
PLATELET # BLD AUTO: 213 K/UL (ref 150–450)
PMV BLD AUTO: 11.5 FL (ref 9.2–12.9)
POTASSIUM SERPL-SCNC: 3.2 MMOL/L (ref 3.5–5.1)
PTH-INTACT SERPL-MCNC: 268.3 PG/ML (ref 9–77)
RBC # BLD AUTO: 3.8 M/UL (ref 4–5.4)
SODIUM SERPL-SCNC: 138 MMOL/L (ref 136–145)
WBC # BLD AUTO: 4.72 K/UL (ref 3.9–12.7)

## 2024-11-18 PROCEDURE — 85025 COMPLETE CBC W/AUTO DIFF WBC: CPT | Performed by: STUDENT IN AN ORGANIZED HEALTH CARE EDUCATION/TRAINING PROGRAM

## 2024-11-18 PROCEDURE — 80069 RENAL FUNCTION PANEL: CPT | Performed by: STUDENT IN AN ORGANIZED HEALTH CARE EDUCATION/TRAINING PROGRAM

## 2024-11-18 PROCEDURE — 83970 ASSAY OF PARATHORMONE: CPT | Performed by: STUDENT IN AN ORGANIZED HEALTH CARE EDUCATION/TRAINING PROGRAM

## 2024-11-18 PROCEDURE — 36415 COLL VENOUS BLD VENIPUNCTURE: CPT | Performed by: STUDENT IN AN ORGANIZED HEALTH CARE EDUCATION/TRAINING PROGRAM

## 2024-11-20 ENCOUNTER — OFFICE VISIT (OUTPATIENT)
Dept: NEPHROLOGY | Facility: CLINIC | Age: 39
End: 2024-11-20
Payer: MEDICAID

## 2024-11-20 VITALS
HEIGHT: 69 IN | SYSTOLIC BLOOD PRESSURE: 172 MMHG | BODY MASS INDEX: 23.92 KG/M2 | HEART RATE: 60 BPM | OXYGEN SATURATION: 99 % | DIASTOLIC BLOOD PRESSURE: 98 MMHG | WEIGHT: 161.5 LBS | RESPIRATION RATE: 18 BRPM

## 2024-11-20 DIAGNOSIS — N18.9 CHRONIC KIDNEY DISEASE, UNSPECIFIED CKD STAGE: Primary | ICD-10-CM

## 2024-11-20 PROCEDURE — 1159F MED LIST DOCD IN RCRD: CPT | Mod: CPTII,,, | Performed by: STUDENT IN AN ORGANIZED HEALTH CARE EDUCATION/TRAINING PROGRAM

## 2024-11-20 PROCEDURE — 3077F SYST BP >= 140 MM HG: CPT | Mod: CPTII,,, | Performed by: STUDENT IN AN ORGANIZED HEALTH CARE EDUCATION/TRAINING PROGRAM

## 2024-11-20 PROCEDURE — 3066F NEPHROPATHY DOC TX: CPT | Mod: CPTII,,, | Performed by: STUDENT IN AN ORGANIZED HEALTH CARE EDUCATION/TRAINING PROGRAM

## 2024-11-20 PROCEDURE — 3060F POS MICROALBUMINURIA REV: CPT | Mod: CPTII,,, | Performed by: STUDENT IN AN ORGANIZED HEALTH CARE EDUCATION/TRAINING PROGRAM

## 2024-11-20 PROCEDURE — 99999 PR PBB SHADOW E&M-EST. PATIENT-LVL III: CPT | Mod: PBBFAC,,, | Performed by: STUDENT IN AN ORGANIZED HEALTH CARE EDUCATION/TRAINING PROGRAM

## 2024-11-20 PROCEDURE — 99213 OFFICE O/P EST LOW 20 MIN: CPT | Mod: PBBFAC | Performed by: STUDENT IN AN ORGANIZED HEALTH CARE EDUCATION/TRAINING PROGRAM

## 2024-11-20 PROCEDURE — 3008F BODY MASS INDEX DOCD: CPT | Mod: CPTII,,, | Performed by: STUDENT IN AN ORGANIZED HEALTH CARE EDUCATION/TRAINING PROGRAM

## 2024-11-20 PROCEDURE — 99213 OFFICE O/P EST LOW 20 MIN: CPT | Mod: S$PBB,,, | Performed by: STUDENT IN AN ORGANIZED HEALTH CARE EDUCATION/TRAINING PROGRAM

## 2024-11-20 PROCEDURE — 4010F ACE/ARB THERAPY RXD/TAKEN: CPT | Mod: CPTII,,, | Performed by: STUDENT IN AN ORGANIZED HEALTH CARE EDUCATION/TRAINING PROGRAM

## 2024-11-20 PROCEDURE — 3080F DIAST BP >= 90 MM HG: CPT | Mod: CPTII,,, | Performed by: STUDENT IN AN ORGANIZED HEALTH CARE EDUCATION/TRAINING PROGRAM

## 2024-11-20 RX ORDER — POTASSIUM CHLORIDE 750 MG/1
20 TABLET, EXTENDED RELEASE ORAL 2 TIMES DAILY
Qty: 4 TABLET | Refills: 0 | Status: SHIPPED | OUTPATIENT
Start: 2024-11-20 | End: 2024-11-20 | Stop reason: SDUPTHER

## 2024-11-20 RX ORDER — SPIRONOLACTONE 50 MG/1
100 TABLET, FILM COATED ORAL DAILY
Qty: 60 TABLET | Refills: 11 | Status: SHIPPED | OUTPATIENT
Start: 2024-11-20 | End: 2024-11-20

## 2024-11-20 RX ORDER — SPIRONOLACTONE 50 MG/1
100 TABLET, FILM COATED ORAL DAILY
Qty: 60 TABLET | Refills: 11 | Status: SHIPPED | OUTPATIENT
Start: 2024-11-20 | End: 2025-11-20

## 2024-11-20 RX ORDER — LOSARTAN POTASSIUM 50 MG/1
100 TABLET ORAL DAILY
Qty: 180 TABLET | Refills: 3 | Status: SHIPPED | OUTPATIENT
Start: 2024-11-20 | End: 2024-11-20

## 2024-11-20 RX ORDER — POTASSIUM CHLORIDE 750 MG/1
20 TABLET, EXTENDED RELEASE ORAL 2 TIMES DAILY
Qty: 4 TABLET | Refills: 0 | Status: SHIPPED | OUTPATIENT
Start: 2024-11-20

## 2024-11-20 RX ORDER — LOSARTAN POTASSIUM 50 MG/1
100 TABLET ORAL DAILY
Qty: 180 TABLET | Refills: 3 | Status: SHIPPED | OUTPATIENT
Start: 2024-11-20 | End: 2025-11-20

## 2024-11-20 NOTE — PROGRESS NOTES
"  Subjective     Chief Complaint: Establish care    History of Present Illness:  Ms. Sharon Grande is a 39 y.o. female bipolar 1, HTN, Polycystic kidney disease, hx of CVA with subarachnoid sp coil, preeclampsia, G5A1P0, HFpEF (G2DD)    Interval history:  Blood pressure cuff broke, but reports that her BP at home has been in the 170s systolics mostly.      -MRI brain October 2019:   "CT head dated 10/29/2019 and MRI of the brain dated 01/29/2019.     FINDINGS:  The craniocervical junction is within normal limits.  The midline structures are unremarkable.  The sellar and parasellar structures are within normal limits.  The intracranial flow voids are within normal limits.     No diffusion-weighted signal abnormality is identified.  There is minimal increased T2/FLAIR signal hyperintensity within the periventricular white matter in the occipital regions.  The remainder of the ventricles and sulci are within normal limits.  There are no extra-axial fluid collections.  There is no evidence of intracranial hemorrhage.  There are changes of prior aneurysm coiling in the right aspect of the anterior circulation.     The orbits and intraorbital contents are within normal limits.  There is a mucous retention cyst within the left maxillary sinus.  There is small amount of trapped fluid within the left posterior ethmoid sinus.  The calvarium is intact.     Impression:     No MR evidence of acute or subacute infarction.     Changes of prior aneurysmal coiling in the right aspect of the anterior communicating artery.     No acute intracranial process."  Repeat MRI ordered but not covered due to insurance    #Polycystic kidney disease  #CKD4  Baseline creatinine 2.3-2.5, increased to 2.8 on most recent labs  UA 1+ protein  UPCR 0.25  On jardiance, losartan and aldactone    Creatinine   Date Value Ref Range Status   11/18/2024 2.8 (H) 0.5 - 1.4 mg/dL Final   10/30/2024 2.7 (H) 0.5 - 1.4 mg/dL Final   10/23/2024 2.2 (H) 0.5 - 1.4 " mg/dL Final     eGFR   Date Value Ref Range Status   11/18/2024 21 (A) >60 mL/min/1.73 m^2 Final   10/30/2024 22 (A) >60 mL/min/1.73 m^2 Final   10/23/2024 29 (A) >60 mL/min/1.73 m^2 Final     Prot/Creat Ratio, Urine   Date Value Ref Range Status   11/18/2024 0.57 (H) 0.00 - 0.20 Final   10/23/2024 0.74 (H) 0.00 - 0.20 Final   09/27/2024 0.25 (H) 0.00 - 0.20 Final     Strong family history: mother statuspost renal transplant 1998 (?), one of 5 children as it and another does not have it. However the rest have not yet been checked.     Has not seen dietican  Has not seen CKD education - again not covered by insurance  Not eligible for kidney transplant referral - eGFR needs to be <20    #HTN  #Hyperalderstonism  Multiple admissions for malignant hypertension:  BP: labetolol 100 q8, nifedipine 30 BID, losartan 25, aldactone 25  Need to assess log at home and will adjust accordingly    #Anemia  Hemoglobin   Date Value Ref Range Status   11/18/2024 11.4 (L) 12.0 - 16.0 g/dL Final     Iron   Date Value Ref Range Status   08/19/2024 88 30 - 160 ug/dL Final     Transferrin   Date Value Ref Range Status   08/19/2024 252 200 - 375 mg/dL Final     TIBC   Date Value Ref Range Status   08/19/2024 373 250 - 450 ug/dL Final     Saturated Iron   Date Value Ref Range Status   08/19/2024 24 20 - 50 % Final     Ferritin   Date Value Ref Range Status   08/19/2024 65 20.0 - 300.0 ng/mL Final       #Secondary hyperparathyriodism  Calcium   Date Value Ref Range Status   11/18/2024 8.8 8.7 - 10.5 mg/dL Final     Phosphorus   Date Value Ref Range Status   11/18/2024 2.5 (L) 2.7 - 4.5 mg/dL Final     PTH, Intact   Date Value Ref Range Status   11/18/2024 268.3 (H) 9.0 - 77.0 pg/mL Final   On calcitrol 0.25    Review of Systems   Constitutional:  Positive for malaise/fatigue.   Neurological:  Positive for headaches.       PAST HISTORY:     Past Medical History:   Diagnosis Date    Anemia     Bipolar 1 disorder     Cerebral aneurysm      Hypertension     since 2012    Polycystic kidney disease     Polycystic kidney disease     Pre-eclampsia     Renal disorder     Since childhood     SAH (subarachnoid hemorrhage)     Stroke        Past Surgical History:   Procedure Laterality Date    BRAIN SURGERY      CEREBRAL ANGIOGRAM N/A 2018    Procedure: ANGIOGRAM-CEREBRAL;  Surgeon: Jayna Surgeon;  Location: [facility];  Service: Anesthesiology;  Laterality: N/A;     SECTION N/A 2018    Procedure:  SECTION;  Surgeon: Obed Soto MD;  Location: [facility];  Service: OB/GYN;  Laterality: N/A;    DILATION AND CURETTAGE OF UTERUS             Family History   Problem Relation Name Age of Onset    Hypertension Mother      Polycystic kidney disease Mother      Hypertension Paternal Grandmother      Polycystic kidney disease Daughter         Social History     Socioeconomic History    Marital status: Single   Tobacco Use    Smoking status: Some Days     Types: Cigarettes    Smokeless tobacco: Never   Substance and Sexual Activity    Alcohol use: Yes     Comment: occasional    Drug use: Yes     Types: Marijuana    Sexual activity: Yes     Partners: Male     Birth control/protection: None       MEDICATIONS & ALLERGIES:     Current Outpatient Medications on File Prior to Visit   Medication Sig    acetaminophen (TYLENOL) 325 MG tablet Take 325 mg by mouth every 6 (six) hours as needed for Pain.    calcitRIOL (ROCALTROL) 0.25 MCG Cap Take 1 capsule (0.25 mcg total) by mouth once daily.    empagliflozin (JARDIANCE) 10 mg tablet Take 1 tablet (10 mg total) by mouth once daily.    labetaloL (NORMODYNE) 100 MG tablet Take 1 tablet (100 mg total) by mouth every 8 (eight) hours.    losartan (COZAAR) 50 MG tablet Take 1 tablet (50 mg total) by mouth once daily.    spironolactone (ALDACTONE) 50 MG tablet Take 1 tablet (50 mg total) by mouth once daily.     No current facility-administered medications on file prior to visit.       Review of patient's  allergies indicates:  No Known Allergies    OBJECTIVE:     Vital Signs:  There were no vitals filed for this visit.      There is no height or weight on file to calculate BMI.     Physical Exam  Constitutional:       General: She is not in acute distress.     Appearance: She is not ill-appearing or toxic-appearing.   HENT:      Head: Normocephalic and atraumatic.      Nose: Nose normal. No congestion.      Mouth/Throat:      Mouth: Mucous membranes are dry.   Eyes:      General: No scleral icterus.        Right eye: No discharge.         Left eye: No discharge.   Abdominal:      Palpations: There is mass.   Musculoskeletal:      Cervical back: Normal range of motion. No rigidity.   Lymphadenopathy:      Cervical: No cervical adenopathy.   Skin:     General: Skin is warm.      Coloration: Skin is not jaundiced.   Neurological:      Mental Status: She is alert.       Laboratory  No results found for this or any previous visit (from the past 24 hours).    Diagnostic Results:      Health Maintenance Due   Topic Date Due    Pneumococcal Vaccines (Age 0-64) (1 of 2 - PCV) Never done    Cervical Cancer Screening  04/23/2016    Influenza Vaccine (1) Never done    COVID-19 Vaccine (2 - 2024-25 season) 09/01/2024         ASSESSMENT & PLAN:   Ms. Sharon Grande is a 39 y.o. female     CKD - polycystic kidney disease and HTN  Poorly controlled HTN - increase losartan to 100 and aldactone to 100.   Hypokalemia - replace K     Repeat RFP in 1 week    Chronic kidney disease, unspecified CKD stage  -     RENAL FUNCTION PANEL; Future; Expected date: 11/27/2024  -     KIDNEY DISEASE EDUCATION; Future; Expected date: 11/20/2024  -     RENAL FUNCTION PANEL; Future; Expected date: 12/20/2024  -     Urinalysis; Future; Expected date: 12/20/2024  -     Protein / creatinine ratio, urine; Future; Expected date: 12/20/2024    Other orders  -     Discontinue: spironolactone (ALDACTONE) 50 MG tablet; Take 2 tablets (100 mg total) by mouth once  daily.  Dispense: 60 tablet; Refill: 11  -     Discontinue: losartan (COZAAR) 50 MG tablet; Take 2 tablets (100 mg total) by mouth once daily.  Dispense: 180 tablet; Refill: 3  -     Discontinue: potassium chloride SA (K-DUR,KLOR-CON M) 10 MEQ tablet; Take 2 tablets (20 mEq total) by mouth 2 (two) times daily.  Dispense: 4 tablet; Refill: 0  -     spironolactone (ALDACTONE) 50 MG tablet; Take 2 tablets (100 mg total) by mouth once daily.  Dispense: 60 tablet; Refill: 11  -     losartan (COZAAR) 50 MG tablet; Take 2 tablets (100 mg total) by mouth once daily.  Dispense: 180 tablet; Refill: 3  -     potassium chloride SA (K-DUR,KLOR-CON M) 10 MEQ tablet; Take 2 tablets (20 mEq total) by mouth 2 (two) times daily.  Dispense: 4 tablet; Refill: 0              RTC in 1 month      Joao Faith MD  Nephrology [facility] [place]

## 2024-12-17 ENCOUNTER — LAB VISIT (OUTPATIENT)
Dept: LAB | Facility: HOSPITAL | Age: 39
End: 2024-12-17
Attending: STUDENT IN AN ORGANIZED HEALTH CARE EDUCATION/TRAINING PROGRAM
Payer: MEDICAID

## 2024-12-17 ENCOUNTER — TELEPHONE (OUTPATIENT)
Dept: NEPHROLOGY | Facility: CLINIC | Age: 39
End: 2024-12-17
Payer: MEDICAID

## 2024-12-17 DIAGNOSIS — N18.9 CHRONIC KIDNEY DISEASE, UNSPECIFIED CKD STAGE: ICD-10-CM

## 2024-12-17 LAB
ALBUMIN SERPL BCP-MCNC: 3.5 G/DL (ref 3.5–5.2)
ANION GAP SERPL CALC-SCNC: 11 MMOL/L (ref 8–16)
BUN SERPL-MCNC: 22 MG/DL (ref 6–20)
CALCIUM SERPL-MCNC: 8.5 MG/DL (ref 8.7–10.5)
CHLORIDE SERPL-SCNC: 104 MMOL/L (ref 95–110)
CO2 SERPL-SCNC: 27 MMOL/L (ref 23–29)
CREAT SERPL-MCNC: 2.4 MG/DL (ref 0.5–1.4)
EST. GFR  (NO RACE VARIABLE): 26 ML/MIN/1.73 M^2
GLUCOSE SERPL-MCNC: 64 MG/DL (ref 70–110)
PHOSPHATE SERPL-MCNC: 2.3 MG/DL (ref 2.7–4.5)
POTASSIUM SERPL-SCNC: 2.9 MMOL/L (ref 3.5–5.1)
SODIUM SERPL-SCNC: 142 MMOL/L (ref 136–145)

## 2024-12-17 PROCEDURE — 80069 RENAL FUNCTION PANEL: CPT | Performed by: STUDENT IN AN ORGANIZED HEALTH CARE EDUCATION/TRAINING PROGRAM

## 2024-12-17 PROCEDURE — 36415 COLL VENOUS BLD VENIPUNCTURE: CPT | Performed by: STUDENT IN AN ORGANIZED HEALTH CARE EDUCATION/TRAINING PROGRAM

## 2024-12-17 NOTE — TELEPHONE ENCOUNTER
I left a message for this patient in efforts of informing that labs are still incomplete. Patient will need to reschedule office visit. Instructed to contact the office at [phone].    -Patsy Fenton MA

## 2024-12-18 ENCOUNTER — OFFICE VISIT (OUTPATIENT)
Dept: NEPHROLOGY | Facility: CLINIC | Age: 39
End: 2024-12-18
Payer: MEDICAID

## 2024-12-18 VITALS
OXYGEN SATURATION: 97 % | TEMPERATURE: 98 F | HEART RATE: 71 BPM | RESPIRATION RATE: 18 BRPM | BODY MASS INDEX: 23.84 KG/M2 | HEIGHT: 69 IN | WEIGHT: 160.94 LBS | SYSTOLIC BLOOD PRESSURE: 180 MMHG | DIASTOLIC BLOOD PRESSURE: 92 MMHG

## 2024-12-18 DIAGNOSIS — N18.9 CHRONIC KIDNEY DISEASE, UNSPECIFIED CKD STAGE: Primary | ICD-10-CM

## 2024-12-18 DIAGNOSIS — I15.2 HYPERTENSION DUE TO ENDOCRINE DISORDER: ICD-10-CM

## 2024-12-18 DIAGNOSIS — E87.6 HYPOKALEMIA: ICD-10-CM

## 2024-12-18 DIAGNOSIS — N25.81 SECONDARY HYPERPARATHYROIDISM: ICD-10-CM

## 2024-12-18 DIAGNOSIS — Q61.3 PKD (POLYCYSTIC KIDNEY DISEASE): ICD-10-CM

## 2024-12-18 PROCEDURE — 99213 OFFICE O/P EST LOW 20 MIN: CPT | Mod: S$PBB,,, | Performed by: STUDENT IN AN ORGANIZED HEALTH CARE EDUCATION/TRAINING PROGRAM

## 2024-12-18 PROCEDURE — 1159F MED LIST DOCD IN RCRD: CPT | Mod: CPTII,,, | Performed by: STUDENT IN AN ORGANIZED HEALTH CARE EDUCATION/TRAINING PROGRAM

## 2024-12-18 PROCEDURE — 99213 OFFICE O/P EST LOW 20 MIN: CPT | Mod: PBBFAC | Performed by: STUDENT IN AN ORGANIZED HEALTH CARE EDUCATION/TRAINING PROGRAM

## 2024-12-18 PROCEDURE — 4010F ACE/ARB THERAPY RXD/TAKEN: CPT | Mod: CPTII,,, | Performed by: STUDENT IN AN ORGANIZED HEALTH CARE EDUCATION/TRAINING PROGRAM

## 2024-12-18 PROCEDURE — 3077F SYST BP >= 140 MM HG: CPT | Mod: CPTII,,, | Performed by: STUDENT IN AN ORGANIZED HEALTH CARE EDUCATION/TRAINING PROGRAM

## 2024-12-18 PROCEDURE — 3080F DIAST BP >= 90 MM HG: CPT | Mod: CPTII,,, | Performed by: STUDENT IN AN ORGANIZED HEALTH CARE EDUCATION/TRAINING PROGRAM

## 2024-12-18 PROCEDURE — 3008F BODY MASS INDEX DOCD: CPT | Mod: CPTII,,, | Performed by: STUDENT IN AN ORGANIZED HEALTH CARE EDUCATION/TRAINING PROGRAM

## 2024-12-18 PROCEDURE — 3066F NEPHROPATHY DOC TX: CPT | Mod: CPTII,,, | Performed by: STUDENT IN AN ORGANIZED HEALTH CARE EDUCATION/TRAINING PROGRAM

## 2024-12-18 PROCEDURE — 99999 PR PBB SHADOW E&M-EST. PATIENT-LVL III: CPT | Mod: PBBFAC,,, | Performed by: STUDENT IN AN ORGANIZED HEALTH CARE EDUCATION/TRAINING PROGRAM

## 2024-12-18 PROCEDURE — 3060F POS MICROALBUMINURIA REV: CPT | Mod: CPTII,,, | Performed by: STUDENT IN AN ORGANIZED HEALTH CARE EDUCATION/TRAINING PROGRAM

## 2024-12-18 RX ORDER — LOSARTAN POTASSIUM 50 MG/1
100 TABLET ORAL DAILY
Qty: 180 TABLET | Refills: 3 | Status: SHIPPED | OUTPATIENT
Start: 2024-12-18 | End: 2025-12-18

## 2024-12-18 RX ORDER — POTASSIUM CHLORIDE 750 MG/1
40 TABLET, EXTENDED RELEASE ORAL 2 TIMES DAILY
Qty: 14 TABLET | Refills: 0 | Status: SHIPPED | OUTPATIENT
Start: 2024-12-18

## 2024-12-18 RX ORDER — SPIRONOLACTONE 50 MG/1
100 TABLET, FILM COATED ORAL DAILY
Qty: 60 TABLET | Refills: 11 | Status: SHIPPED | OUTPATIENT
Start: 2024-12-18 | End: 2025-12-18

## 2024-12-18 RX ORDER — LABETALOL 100 MG/1
100 TABLET, FILM COATED ORAL EVERY 8 HOURS
Qty: 90 TABLET | Refills: 11 | Status: SHIPPED | OUTPATIENT
Start: 2024-12-18 | End: 2025-12-18

## 2024-12-18 NOTE — PROGRESS NOTES
"  Subjective     Chief Complaint: Establish care    History of Present Illness:  Ms. Sharon Grande is a 39 y.o. female bipolar 1, HTN, Polycystic kidney disease, hx of CVA with subarachnoid sp coil, preeclampsia, G5A1P0, HFpEF (G2DD)    Interval history: BP has been in the 140s. She has recently started suffering from sinus pressure as of last night. She states that she is running low on either the losartan or the aldactone but cannot remember which one.     We discussed bring her medications each time we meet so we can make sure she knows which one to take.       -MRI brain October 2019:   "CT head dated 10/29/2019 and MRI of the brain dated 01/29/2019.     FINDINGS:  The craniocervical junction is within normal limits.  The midline structures are unremarkable.  The sellar and parasellar structures are within normal limits.  The intracranial flow voids are within normal limits.     No diffusion-weighted signal abnormality is identified.  There is minimal increased T2/FLAIR signal hyperintensity within the periventricular white matter in the occipital regions.  The remainder of the ventricles and sulci are within normal limits.  There are no extra-axial fluid collections.  There is no evidence of intracranial hemorrhage.  There are changes of prior aneurysm coiling in the right aspect of the anterior circulation.     The orbits and intraorbital contents are within normal limits.  There is a mucous retention cyst within the left maxillary sinus.  There is small amount of trapped fluid within the left posterior ethmoid sinus.  The calvarium is intact.     Impression:     No MR evidence of acute or subacute infarction.     Changes of prior aneurysmal coiling in the right aspect of the anterior communicating artery.     No acute intracranial process."  Repeat MRI ordered but not covered due to insurance    #Polycystic kidney disease  #CKD4  Baseline creatinine 2.3-2.5  UA 1+ protein  UPCR 0.25  On jardiance, losartan and " aldactone    Creatinine   Date Value Ref Range Status   12/17/2024 2.4 (H) 0.5 - 1.4 mg/dL Final   11/18/2024 2.8 (H) 0.5 - 1.4 mg/dL Final   10/30/2024 2.7 (H) 0.5 - 1.4 mg/dL Final     eGFR   Date Value Ref Range Status   12/17/2024 26 (A) >60 mL/min/1.73 m^2 Final   11/18/2024 21 (A) >60 mL/min/1.73 m^2 Final   10/30/2024 22 (A) >60 mL/min/1.73 m^2 Final     Prot/Creat Ratio, Urine   Date Value Ref Range Status   12/17/2024 1.01 (H) 0.00 - 0.20 Final   11/18/2024 0.57 (H) 0.00 - 0.20 Final   10/23/2024 0.74 (H) 0.00 - 0.20 Final     Strong family history: mother statuspost renal transplant 1998 (?), one of 5 children as it and another does not have it. However the rest have not yet been checked.       Not eligible for kidney transplant referral - eGFR needs to be <20    #HTN  #Hyperalderstonism  Multiple admissions for malignant hypertension:  BP: labetolol 100 q8, nifedipine 30 BID, losartan 25, aldactone 25  BP at home has been in the 140s, she takes it twice a day.       #Anemia  Hemoglobin   Date Value Ref Range Status   11/18/2024 11.4 (L) 12.0 - 16.0 g/dL Final     Iron   Date Value Ref Range Status   08/19/2024 88 30 - 160 ug/dL Final     Transferrin   Date Value Ref Range Status   08/19/2024 252 200 - 375 mg/dL Final     TIBC   Date Value Ref Range Status   08/19/2024 373 250 - 450 ug/dL Final     Saturated Iron   Date Value Ref Range Status   08/19/2024 24 20 - 50 % Final     Ferritin   Date Value Ref Range Status   08/19/2024 65 20.0 - 300.0 ng/mL Final       #Secondary hyperparathyriodism  Calcium   Date Value Ref Range Status   12/17/2024 8.5 (L) 8.7 - 10.5 mg/dL Final     Phosphorus   Date Value Ref Range Status   12/17/2024 2.3 (L) 2.7 - 4.5 mg/dL Final     PTH, Intact   Date Value Ref Range Status   11/18/2024 268.3 (H) 9.0 - 77.0 pg/mL Final   On calcitrol 0.25  Due to life stressors she has not been eating as normal. Discussed healthy diet today    Review of Systems   Constitutional:  Positive  for malaise/fatigue. Negative for chills and fever.   HENT:  Negative for ear discharge and ear pain.    Eyes:  Negative for blurred vision and double vision.   Respiratory:  Negative for cough and shortness of breath.    Cardiovascular:  Negative for chest pain and palpitations.   Gastrointestinal:  Negative for abdominal pain, constipation, diarrhea, nausea and vomiting.   Genitourinary:  Negative for dysuria and frequency.   Musculoskeletal:  Negative for myalgias and neck pain.   Skin:  Negative for itching and rash.   Neurological:  Negative for tingling and headaches.       PAST HISTORY:     Past Medical History:   Diagnosis Date    Anemia     Bipolar 1 disorder     Cerebral aneurysm     Hypertension     since     Polycystic kidney disease     Polycystic kidney disease     Pre-eclampsia     Renal disorder     Since childhood     SAH (subarachnoid hemorrhage)     Stroke        Past Surgical History:   Procedure Laterality Date    BRAIN SURGERY      CEREBRAL ANGIOGRAM N/A 2018    Procedure: ANGIOGRAM-CEREBRAL;  Surgeon: Jayna Surgeon;  [facility];  Service: Anesthesiology;  Laterality: N/A;     SECTION N/A 2018    Procedure:  SECTION;  Surgeon: Obed Soto MD;  Location: [facility];  Service: OB/GYN;  Laterality: N/A;    DILATION AND CURETTAGE OF UTERUS             Family History   Problem Relation Name Age of Onset    Hypertension Mother      Polycystic kidney disease Mother      Hypertension Paternal Grandmother      Polycystic kidney disease Daughter         Social History     Socioeconomic History    Marital status: Single   Tobacco Use    Smoking status: Some Days     Types: Cigarettes    Smokeless tobacco: Never   Substance and Sexual Activity    Alcohol use: Yes     Comment: occasional    Drug use: Yes     Types: Marijuana    Sexual activity: Yes     Partners: Male     Birth control/protection: None       MEDICATIONS & ALLERGIES:     Current Outpatient  Medications on File Prior to Visit   Medication Sig    acetaminophen (TYLENOL) 325 MG tablet Take 325 mg by mouth every 6 (six) hours as needed for Pain.    calcitRIOL (ROCALTROL) 0.25 MCG Cap Take 1 capsule (0.25 mcg total) by mouth once daily.    empagliflozin (JARDIANCE) 10 mg tablet Take 1 tablet (10 mg total) by mouth once daily.    labetaloL (NORMODYNE) 100 MG tablet Take 1 tablet (100 mg total) by mouth every 8 (eight) hours.    losartan (COZAAR) 50 MG tablet Take 2 tablets (100 mg total) by mouth once daily.    potassium chloride SA (K-DUR,KLOR-CON M) 10 MEQ tablet Take 2 tablets (20 mEq total) by mouth 2 (two) times daily.    spironolactone (ALDACTONE) 50 MG tablet Take 2 tablets (100 mg total) by mouth once daily.     No current facility-administered medications on file prior to visit.       Review of patient's allergies indicates:  No Known Allergies    OBJECTIVE:     Vital Signs:  There were no vitals filed for this visit.      There is no height or weight on file to calculate BMI.     Physical Exam  Constitutional:       General: She is not in acute distress.     Appearance: She is not ill-appearing or toxic-appearing.   HENT:      Head: Normocephalic and atraumatic.      Nose: Nose normal. No congestion.      Mouth/Throat:      Mouth: Mucous membranes are dry.   Eyes:      General: No scleral icterus.        Right eye: No discharge.         Left eye: No discharge.   Abdominal:      Palpations: There is mass.   Musculoskeletal:      Cervical back: Normal range of motion. No rigidity.   Lymphadenopathy:      Cervical: No cervical adenopathy.   Skin:     General: Skin is warm.      Coloration: Skin is not jaundiced.   Neurological:      Mental Status: She is alert.       Laboratory  Recent Results (from the past 24 hours)   Urinalysis    Collection Time: 12/17/24  4:39 PM   Result Value Ref Range    Specimen UA Urine, Clean Catch     Color, UA Yellow Yellow, Straw, Delfina    Appearance, UA Hazy (A) Clear     pH, UA 6.0 5.0 - 8.0    Specific Gravity, UA 1.010 1.005 - 1.030    Protein, UA 2+ (A) Negative    Glucose, UA Negative Negative    Ketones, UA Negative Negative    Bilirubin (UA) Negative Negative    Occult Blood UA Trace (A) Negative    Nitrite, UA Negative Negative    Urobilinogen, UA Negative <2.0 EU/dL    Leukocytes, UA 2+ (A) Negative   Urinalysis Microscopic    Collection Time: 12/17/24  4:39 PM   Result Value Ref Range    RBC, UA 4 0 - 4 /hpf    WBC, UA 24 (H) 0 - 5 /hpf    WBC Clumps, UA Rare None-Rare    Bacteria Occasional None-Occ /hpf    Squam Epithel, UA 28 /hpf    Hyaline Casts, UA none 0-1/lpf /lpf    Microscopic Comment SEE COMMENT    RENAL FUNCTION PANEL    Collection Time: 12/17/24  4:49 PM   Result Value Ref Range    Glucose 64 (L) 70 - 110 mg/dL    Sodium 142 136 - 145 mmol/L    Potassium 2.9 (L) 3.5 - 5.1 mmol/L    Chloride 104 95 - 110 mmol/L    CO2 27 23 - 29 mmol/L    BUN 22 (H) 6 - 20 mg/dL    Calcium 8.5 (L) 8.7 - 10.5 mg/dL    Creatinine 2.4 (H) 0.5 - 1.4 mg/dL    Albumin 3.5 3.5 - 5.2 g/dL    Phosphorus 2.3 (L) 2.7 - 4.5 mg/dL    eGFR 26 (A) >60 mL/min/1.73 m^2    Anion Gap 11 8 - 16 mmol/L   Protein / creatinine ratio, urine    Collection Time: 12/17/24  4:50 PM   Result Value Ref Range    Protein, Urine Random 114 mg/dL    Creatinine, Urine 113.0 15.0 - 325.0 mg/dL    Prot/Creat Ratio, Urine 1.01 (H) 0.00 - 0.20       Diagnostic Results:      Health Maintenance Due   Topic Date Due    Pneumococcal Vaccines (Age 0-64) (1 of 2 - PCV) Never done    Cervical Cancer Screening  04/23/2016    Influenza Vaccine (1) Never done    COVID-19 Vaccine (2 - 2024-25 season) 09/01/2024         ASSESSMENT & PLAN:   Ms. Sharon Grande is a 39 y.o. female     CKD - polycystic kidney disease and HTN  HTN finally better controlled. Losartan 100, labetolol 100 TID and aldactone 100.   Not yet ready for kidney transplant referral  Send to CKD education class    Hypokalemia - replace K     Send to endocrine  for hyperaldo workup - CT? Vs continued medical management?        Chronic kidney disease, unspecified CKD stage  -     KIDNEY DISEASE EDUCATION; Future; Expected date: 12/18/2024  -     RENAL FUNCTION PANEL; Future; Expected date: 01/18/2025  -     Urinalysis; Future; Expected date: 01/18/2025  -     Protein / creatinine ratio, urine; Future; Expected date: 01/18/2025  -     PTH, intact; Future; Expected date: 01/18/2025    Secondary hyperparathyroidism    Hypokalemia    Hypertension due to endocrine disorder    PKD (polycystic kidney disease)    Other orders  -     potassium chloride SA (K-DUR,KLOR-CON M) 10 MEQ tablet; Take 4 tablets (40 mEq total) by mouth 2 (two) times daily.  Dispense: 14 tablet; Refill: 0  -     labetaloL (NORMODYNE) 100 MG tablet; Take 1 tablet (100 mg total) by mouth every 8 (eight) hours.  Dispense: 90 tablet; Refill: 11  -     losartan (COZAAR) 50 MG tablet; Take 2 tablets (100 mg total) by mouth once daily.  Dispense: 180 tablet; Refill: 3  -     spironolactone (ALDACTONE) 50 MG tablet; Take 2 tablets (100 mg total) by mouth once daily.  Dispense: 60 tablet; Refill: 11          RTC in 1 month      Joao Faith MD  Nephrology [facility] - [place]

## 2024-12-20 ENCOUNTER — TELEPHONE (OUTPATIENT)
Dept: NEPHROLOGY | Facility: CLINIC | Age: 39
End: 2024-12-20
Payer: MEDICAID

## 2024-12-21 ENCOUNTER — HOSPITAL ENCOUNTER (INPATIENT)
Facility: HOSPITAL | Age: 39
LOS: 2 days | Discharge: HOME | DRG: 305 | End: 2024-12-23
Attending: EMERGENCY MEDICINE | Admitting: STUDENT IN AN ORGANIZED HEALTH CARE EDUCATION/TRAINING PROGRAM
Payer: MEDICAID

## 2024-12-21 DIAGNOSIS — I51.7 CARDIOMEGALY: ICD-10-CM

## 2024-12-21 DIAGNOSIS — M79.602 LEFT ARM PAIN: ICD-10-CM

## 2024-12-21 DIAGNOSIS — N18.4 CKD (CHRONIC KIDNEY DISEASE) STAGE 4, GFR 15-29 ML/MIN: ICD-10-CM

## 2024-12-21 DIAGNOSIS — I16.0 HYPERTENSIVE URGENCY: ICD-10-CM

## 2024-12-21 DIAGNOSIS — J90 PLEURAL EFFUSION: ICD-10-CM

## 2024-12-21 DIAGNOSIS — R07.9 CHEST PAIN: ICD-10-CM

## 2024-12-21 DIAGNOSIS — R29.898 LEFT ARM WEAKNESS: Primary | ICD-10-CM

## 2024-12-21 LAB
ALBUMIN SERPL-MCNC: 3.5 G/DL (ref 3.3–5.5)
ALP SERPL-CCNC: 33 U/L (ref 42–141)
B-HCG UR QL: NEGATIVE
BILIRUB SERPL-MCNC: 0.6 MG/DL (ref 0.2–1.6)
BILIRUBIN, POC UA: NEGATIVE
BLOOD, POC UA: ABNORMAL
BUN SERPL-MCNC: 30 MG/DL (ref 7–22)
CALCIUM SERPL-MCNC: 9.4 MG/DL (ref 8–10.3)
CHLORIDE SERPL-SCNC: 108 MMOL/L (ref 98–108)
CLARITY, UA: CLEAR
COLOR, UA: YELLOW
CREAT SERPL-MCNC: 2.9 MG/DL (ref 0.6–1.2)
CREAT UR-MCNC: 59.2 MG/DL (ref 15–325)
CTP QC/QA: YES
GLUCOSE SERPL-MCNC: 105 MG/DL (ref 73–118)
GLUCOSE, POC UA: NEGATIVE
HCT, POC: NORMAL
HGB, POC: NORMAL (ref 14–18)
KETONES, POC UA: NEGATIVE
LEUKOCYTE EST, POC UA: ABNORMAL
MCH, POC: NORMAL
MCHC, POC: NORMAL
MCV, POC: NORMAL
MPV, POC: NORMAL
NITRITE, POC UA: NEGATIVE
PH UR STRIP: 7 [PH] (ref 5–8)
POC ALT (SGPT): 16 U/L (ref 10–47)
POC AST (SGOT): 21 U/L (ref 11–38)
POC B-TYPE NATRIURETIC PEPTIDE: 751 PG/ML (ref 0–100)
POC CARDIAC TROPONIN I: 0.04 NG/ML (ref 0–0.08)
POC PLATELET COUNT: NORMAL
POC TCO2: 31 MMOL/L (ref 18–33)
POTASSIUM BLD-SCNC: 3.2 MMOL/L (ref 3.6–5.1)
PROT UR-MCNC: 67 MG/DL
PROT/CREAT UR: 1.13 MG/G{CREAT} (ref 0–0.2)
PROTEIN, POC UA: ABNORMAL
PROTEIN, POC: 7.2 G/DL (ref 6.4–8.1)
RBC, POC: NORMAL
RDW, POC: NORMAL
SAMPLE: NORMAL
SODIUM BLD-SCNC: 142 MMOL/L (ref 128–145)
SPECIFIC GRAVITY, POC UA: 1.02 (ref 1–1.03)
UROBILINOGEN, POC UA: 0.2 E.U./DL
WBC, POC: NORMAL

## 2024-12-21 PROCEDURE — 63600175 PHARM REV CODE 636 W HCPCS: Mod: ER | Performed by: INTERNAL MEDICINE

## 2024-12-21 PROCEDURE — 20000000 HC ICU ROOM

## 2024-12-21 PROCEDURE — 63600175 PHARM REV CODE 636 W HCPCS: Mod: ER | Performed by: NURSE PRACTITIONER

## 2024-12-21 PROCEDURE — 99285 EMERGENCY DEPT VISIT HI MDM: CPT | Mod: 25,ER

## 2024-12-21 PROCEDURE — 93010 ELECTROCARDIOGRAM REPORT: CPT | Mod: ,,, | Performed by: INTERNAL MEDICINE

## 2024-12-21 PROCEDURE — 85025 COMPLETE CBC W/AUTO DIFF WBC: CPT | Mod: ER

## 2024-12-21 PROCEDURE — 87077 CULTURE AEROBIC IDENTIFY: CPT | Performed by: NURSE PRACTITIONER

## 2024-12-21 PROCEDURE — 87186 SC STD MICRODIL/AGAR DIL: CPT | Performed by: NURSE PRACTITIONER

## 2024-12-21 PROCEDURE — 96374 THER/PROPH/DIAG INJ IV PUSH: CPT | Mod: ER

## 2024-12-21 PROCEDURE — 25000003 PHARM REV CODE 250: Mod: ER | Performed by: NURSE PRACTITIONER

## 2024-12-21 PROCEDURE — 96375 TX/PRO/DX INJ NEW DRUG ADDON: CPT | Mod: ER

## 2024-12-21 PROCEDURE — 84484 ASSAY OF TROPONIN QUANT: CPT | Mod: ER

## 2024-12-21 PROCEDURE — 93005 ELECTROCARDIOGRAM TRACING: CPT | Mod: ER

## 2024-12-21 PROCEDURE — 81025 URINE PREGNANCY TEST: CPT | Mod: ER | Performed by: NURSE PRACTITIONER

## 2024-12-21 PROCEDURE — 84156 ASSAY OF PROTEIN URINE: CPT | Performed by: NURSE PRACTITIONER

## 2024-12-21 PROCEDURE — 80053 COMPREHEN METABOLIC PANEL: CPT | Mod: ER

## 2024-12-21 PROCEDURE — 87088 URINE BACTERIA CULTURE: CPT | Performed by: NURSE PRACTITIONER

## 2024-12-21 PROCEDURE — 96376 TX/PRO/DX INJ SAME DRUG ADON: CPT | Mod: ER

## 2024-12-21 PROCEDURE — 83880 ASSAY OF NATRIURETIC PEPTIDE: CPT | Mod: ER

## 2024-12-21 PROCEDURE — 87086 URINE CULTURE/COLONY COUNT: CPT | Performed by: NURSE PRACTITIONER

## 2024-12-21 RX ORDER — HYDROMORPHONE HYDROCHLORIDE 2 MG/ML
1 INJECTION, SOLUTION INTRAMUSCULAR; INTRAVENOUS; SUBCUTANEOUS
Status: COMPLETED | OUTPATIENT
Start: 2024-12-21 | End: 2024-12-21

## 2024-12-21 RX ORDER — FUROSEMIDE 10 MG/ML
20 INJECTION INTRAMUSCULAR; INTRAVENOUS
Status: COMPLETED | OUTPATIENT
Start: 2024-12-21 | End: 2024-12-21

## 2024-12-21 RX ORDER — MORPHINE SULFATE 4 MG/ML
4 INJECTION, SOLUTION INTRAMUSCULAR; INTRAVENOUS
Status: COMPLETED | OUTPATIENT
Start: 2024-12-21 | End: 2024-12-21

## 2024-12-21 RX ORDER — LABETALOL HYDROCHLORIDE 5 MG/ML
10 INJECTION, SOLUTION INTRAVENOUS
Status: COMPLETED | OUTPATIENT
Start: 2024-12-21 | End: 2024-12-21

## 2024-12-21 RX ORDER — HYDRALAZINE HYDROCHLORIDE 20 MG/ML
10 INJECTION INTRAMUSCULAR; INTRAVENOUS
Status: COMPLETED | OUTPATIENT
Start: 2024-12-21 | End: 2024-12-21

## 2024-12-21 RX ORDER — DIAZEPAM 10 MG/2ML
5 INJECTION INTRAMUSCULAR
Status: COMPLETED | OUTPATIENT
Start: 2024-12-21 | End: 2024-12-21

## 2024-12-21 RX ORDER — ONDANSETRON HYDROCHLORIDE 2 MG/ML
4 INJECTION, SOLUTION INTRAVENOUS
Status: COMPLETED | OUTPATIENT
Start: 2024-12-21 | End: 2024-12-21

## 2024-12-21 RX ORDER — NICARDIPINE HYDROCHLORIDE 0.2 MG/ML
5 INJECTION INTRAVENOUS CONTINUOUS
Status: DISCONTINUED | OUTPATIENT
Start: 2024-12-21 | End: 2024-12-22

## 2024-12-21 RX ADMIN — LABETALOL HYDROCHLORIDE 10 MG: 5 INJECTION INTRAVENOUS at 06:12

## 2024-12-21 RX ADMIN — FUROSEMIDE 20 MG: 10 INJECTION, SOLUTION INTRAMUSCULAR; INTRAVENOUS at 06:12

## 2024-12-21 RX ADMIN — POTASSIUM BICARBONATE 40 MEQ: 391 TABLET, EFFERVESCENT ORAL at 06:12

## 2024-12-21 RX ADMIN — NICARDIPINE HYDROCHLORIDE 5 MG/HR: 0.2 INJECTION, SOLUTION INTRAVENOUS at 09:12

## 2024-12-21 RX ADMIN — MORPHINE SULFATE 4 MG: 4 INJECTION, SOLUTION INTRAMUSCULAR; INTRAVENOUS at 05:12

## 2024-12-21 RX ADMIN — HYDROMORPHONE HYDROCHLORIDE 1 MG: 2 INJECTION INTRAMUSCULAR; INTRAVENOUS; SUBCUTANEOUS at 09:12

## 2024-12-21 RX ADMIN — DIAZEPAM 5 MG: 10 INJECTION, SOLUTION INTRAMUSCULAR; INTRAVENOUS at 07:12

## 2024-12-21 RX ADMIN — HYDRALAZINE HYDROCHLORIDE 10 MG: 20 INJECTION INTRAMUSCULAR; INTRAVENOUS at 04:12

## 2024-12-21 RX ADMIN — ONDANSETRON 4 MG: 2 INJECTION INTRAMUSCULAR; INTRAVENOUS at 09:12

## 2024-12-21 NOTE — Clinical Note
Diagnosis: CKD (chronic kidney disease) stage 4, GFR 15-29 ml/min [024198]   Future Attending Provider: LEESA JOSEPH [[id number]]   Reason for IP Medical Treatment  (Clinical interventions that can only be accomplished in the IP setting? ) :: Further evaluation and treatment for hypertensive urgency, bilateral pleural effusions   Plans for Post-Acute care--if anticipated (pick the single best option):: A. No post acute care anticipated at this time   Special Needs:: No Special Needs [1]

## 2024-12-21 NOTE — ED PROVIDER NOTES
Encounter Date: 2024       History     Chief Complaint   Patient presents with    Arm Pain     Pt reports tingling in (left) arm x 3 hours. Pt has Hx of HTN and did not take her medication.      Chief complaint: Left arm pain     History of present illness:  Patient is a 39-year-old female who reports 3 hours of left arm pain.  She states she is unable to move her arm due to the pain.  Denies decreased appetite or change in appetite stating that she ate just before coming in denies blurred vision headache chest pain nausea vomiting but does 10 tingling in the left arm.    The history is provided by the patient. No  was used.     Review of patient's allergies indicates:  No Known Allergies  Past Medical History:   Diagnosis Date    Anemia     Bipolar 1 disorder     Cerebral aneurysm     Hypertension     since     Polycystic kidney disease     Polycystic kidney disease     Pre-eclampsia     Renal disorder     Since childhood     SAH (subarachnoid hemorrhage)     Stroke      Past Surgical History:   Procedure Laterality Date    BRAIN SURGERY      CEREBRAL ANGIOGRAM N/A 2018    Procedure: ANGIOGRAM-CEREBRAL;  Surgeon: Jayna Surgeon;  Location: [facility];  Service: Anesthesiology;  Laterality: N/A;     SECTION N/A 2018    Procedure:  SECTION;  Surgeon: Obed Soto MD;  Location: [facility];  Service: OB/GYN;  Laterality: N/A;    DILATION AND CURETTAGE OF UTERUS           Family History   Problem Relation Name Age of Onset    Hypertension Mother      Polycystic kidney disease Mother      Hypertension Paternal Grandmother      Polycystic kidney disease Daughter       Social History     Tobacco Use    Smoking status: Some Days     Types: Cigarettes    Smokeless tobacco: Never   Substance Use Topics    Alcohol use: Yes     Comment: occasional    Drug use: Yes     Types: Marijuana     Review of Systems   Musculoskeletal:  Positive for myalgias.   Neurological:          Left arm paresthesia       Physical Exam     Initial Vitals [12/21/24 1556]   BP Pulse Resp Temp SpO2   (!) 250/134 71 18 98.6 °F (37 °C) 98 %      MAP       --         Physical Exam    Nursing note and vitals reviewed.  Constitutional: She appears well-developed and well-nourished.   HENT:   Head: Normocephalic and atraumatic.   Right Ear: External ear normal.   Left Ear: External ear normal.   Nose: Nose normal.   Eyes: Conjunctivae and EOM are normal. Pupils are equal, round, and reactive to light. Right eye exhibits no discharge. Left eye exhibits no discharge.   Neck:   Normal range of motion.  Cardiovascular:  Regular rhythm, S1 normal, S2 normal and normal heart sounds.     Exam reveals no gallop.       No murmur heard.  Pulmonary/Chest: Effort normal and breath sounds normal. No respiratory distress. She has no decreased breath sounds. She has no wheezes. She has no rhonchi. She has no rales.   Abdominal: She exhibits no distension.   Musculoskeletal:         General: Normal range of motion.      Cervical back: Normal range of motion.      Comments: Left arm is significantly weaker with  strength, patient will not range the elbow or shoulder secondary to discomfort.     Neurological: She is alert and oriented to person, place, and time. She has normal strength. No cranial nerve deficit or sensory deficit. She exhibits normal muscle tone. She displays a negative Romberg sign. Coordination and gait normal. GCS eye subscore is 4. GCS verbal subscore is 5. GCS motor subscore is 6.   Equal  strength bilaterally, equal bicep flexion and tricep extension strength, leg extension and flexion strength appropriate and equal, foot plantar- and dorsi-flexion equal and appropriate   Skin: Skin is dry. Capillary refill takes less than 2 seconds.         ED Course   Critical Care    Date/Time: 12/22/2024 12:20 AM    Performed by: Johan Ramesh MD  Authorized by: Kwame Alonzo MD  Direct patient critical  care time: 10 minutes  Ordering / reviewing critical care time: 10 minutes  Documentation critical care time: 10 minutes  Consulting other physicians critical care time: 2 minutes  Consult with family critical care time: 10 minutes  Total critical care time (exclusive of procedural time) : 42 minutes  Critical care was necessary to treat or prevent imminent or life-threatening deterioration of the following conditions: cardiac failure, circulatory failure, respiratory failure and renal failure.  Critical care was time spent personally by me on the following activities: development of treatment plan with patient or surrogate, evaluation of patient's response to treatment, examination of patient, obtaining history from patient or surrogate, ordering and performing treatments and interventions, ordering and review of laboratory studies, ordering and review of radiographic studies, re-evaluation of patient's condition and review of old charts.        Labs Reviewed   PROTEIN / CREATININE RATIO, URINE - Abnormal       Result Value    Protein, Urine Random 67      Creatinine, Urine 59.2      Prot/Creat Ratio, Urine 1.13 (*)     Narrative:     Specimen Source->Urine   POCT CMP - Abnormal    Albumin, POC 3.5      Alkaline Phosphatase, POC 33 (*)     ALT (SGPT), POC 16      AST (SGOT), POC 21      POC BUN 30 (*)     Calcium, POC 9.4      POC Chloride 108      POC Creatinine 2.9 (*)     POC Glucose 105      POC Potassium 3.2 (*)     POC Sodium 142      Bilirubin, POC 0.6      POC TCO2 31      Protein, POC 7.2     POCT B-TYPE NATRIURETIC PEPTIDE (BNP) - Abnormal    POC B-Type Natriuretic Peptide 751 (*)    POCT URINALYSIS W/O SCOPE - Abnormal    Glucose, UA Negative      Bilirubin, UA Negative      Ketones, UA Negative      Spec Grav UA 1.020      Blood, UA Trace-intact (*)     PH, UA 7.0      Protein, UA 2+ (*)     Urobilinogen, UA 0.2      Nitrite, UA Negative      Leukocytes, UA 1+ (*)     Color, UA POC Yellow      Clarity,  UA, POC Clear     CULTURE, URINE   TROPONIN ISTAT    POC Cardiac Troponin I 0.04      Sample unknown     POCT CBC    Hematocrit        Hemoglobin        RBC        WBC        MCV        MCH, POC        MCHC        RDW-CV        Platelet Count, POC        MPV       POCT URINE PREGNANCY    POC Preg Test, Ur Negative       Acceptable Yes     POCT CMP   POCT URINALYSIS W/O SCOPE   POCT TROPONIN   POCT B-TYPE NATRIURETIC PEPTIDE (BNP)          Imaging Results              X-Ray Chest PA And Lateral (Final result)  Result time 12/21/24 17:28:14      Final result by Melanie Lorenzana MD (12/21/24 17:28:14)                   Impression:      Mild cardiomegaly.  Probable small pleural effusions versus bilateral pleural thickening.      Electronically signed by: Melanie Lorenzana  Date:    12/21/2024  Time:    17:28               Narrative:    EXAMINATION:  CHEST PA AND LATERAL    CLINICAL HISTORY:  Chronic kidney disease, stage 4 (severe)    TECHNIQUE:  PA and lateral chest radiograph    COMPARISON:  03/11/2020    FINDINGS:  The cardiac silhouette is mildly enlarged.  There is mild vascularity at the aortic knob.  There is blunting of the costophrenic angles, which may suggest small pleural effusion and/or pleural thickening.  There is hair artifact is obscuring the medial right apex.                                       CT Head Without Contrast (Final result)  Result time 12/21/24 17:18:24      Final result by Melanie Lorenzana MD (12/21/24 17:18:24)                   Impression:      No acute intracranial abnormality detected at this time.  If persistent neurological abnormality, recommend MRI of the brain with diffusion-weighted sequencing.      Electronically signed by: Melanie Lorenzana  Date:    12/21/2024  Time:    17:18               Narrative:    EXAMINATION:  CT OF THE HEAD WITHOUT    CLINICAL HISTORY:  Left arm pain.  History of hypertension.Neuro deficit, acute, stroke  suspected;    TECHNIQUE:  5 mm unenhanced axial images were obtained from the skull base to the vertex.    COMPARISON:  08/11/2020    FINDINGS:  The ventricles, basal cisterns, and cortical sulci are within normal limits for patient's stated age. There is no definite acute intracranial hemorrhage, territorial infarct or mass effect, or midline shift. The visualized paranasal sinuses and mastoid air cells are clear.  Aneurysmal coiling is seen at the right side of the [id number].                                       Medications   niCARdipine 40 mg/200 mL (0.2 mg/mL) infusion (2.5 mg/hr Intravenous Rate/Dose Change 12/21/24 2310)   hydrALAZINE injection 10 mg (10 mg Intravenous Given 12/21/24 1619)   hydrALAZINE injection 10 mg (10 mg Intravenous Given 12/21/24 1659)   morphine injection 4 mg (4 mg Intravenous Given 12/21/24 1713)   labetaloL injection 10 mg (10 mg Intravenous Given 12/21/24 1811)   potassium bicarbonate disintegrating tablet 40 mEq (40 mEq Oral Given 12/21/24 1810)   furosemide injection 20 mg (20 mg Intravenous Given 12/21/24 1811)   diazePAM injection 5 mg (5 mg Intravenous Given 12/21/24 1937)   HYDROmorphone (PF) injection 1 mg (1 mg Intravenous Given 12/21/24 2154)   ondansetron injection 4 mg (4 mg Intravenous Given 12/21/24 2155)     Medical Decision Making  Patient is a 39-year-old female who reports 3 hours of left arm pain.  She states she is unable to move her arm due to the pain.  Denies decreased appetite or change in appetite stating that she ate just before coming in denies blurred vision headache chest pain nausea vomiting but does 10 tingling in the left arm.    Left arm is significantly weaker with  strength, patient will not range the elbow or shoulder secondary to discomfort.         Amount and/or Complexity of Data Reviewed  Labs: ordered. Decision-making details documented in ED Course.  Radiology: ordered. Decision-making details documented in ED  Course.    Risk  Prescription drug management.  Decision regarding hospitalization.               ED Course as of 12/22/24 0021   Sat Dec 21, 2024   1604 BP(!): 250/134 [VC]   1604 Temp: 98.6 °F (37 °C) [VC]   1604 Temp Source: Oral [VC]   1604 Pulse: 71 [VC]   1604 Resp: 18 [VC]   1604 SpO2: 98 % [VC]   1624 POCT CBC  Mild anemia, normal wbc and platelet count. [VC]   1624 BP(!): 234/142 [VC]   1627 Mild hypokalemia.   [VC]   1627 POC Creatinine(!): 2.9  Interval worsening of the bun and creatinine.  Normal nonemergnet cmp otherwise. [VC]   1628 Gfr=22/crcl=30 Stage 4 CKD     [VC]   1630 Nurse observed pt moving her left arm in hallway. [VC]   1633 POC B-Type Natriuretic Peptide(!): 751  Known elevated previously.  No signs of failure at this time. [VC]   1634 ISTAT Cardiac Troponin I: 0.04 [VC]   1634 Sample: unknown [VC]   1634 POCT URINALYSIS W/O SCOPE(!)  Possible uti, will treat and culture.   [VC]   1648 hCG Qualitative, Urine: Negative [VC]   1648 BP(!): 228/128 [VC]   1707 BP(!): 237/131 [VC]   1707 Pt reports she has been taking cold medicine and has been taking advil not knowing it was in the cold medicine.  Reports some wheezing with her uri sx. [VC]   1721 BP(!): 216/130 [VC]   1721 Temp: 98.7 °F (37.1 °C) [VC]   1721 Temp Source: Oral [VC]   1721 Pulse: 90 [VC]   1721 Resp: 20 [VC]   1721 SpO2: 98 %  Map is now 159, target is 130. [VC]   1723 CT Head Without Contrast    No acute intracranial abnormality detected at this time.  If persistent neurological abnormality, recommend MRI of the brain with diffusion-weighted sequencing.      [VC]   1737 X-Ray Chest PA And Lateral    Mild cardiomegaly.  Probable small pleural effusions versus bilateral pleural thickening.    [VC]   1744 Sbar given to patient as well as to Dr. Palacios [VC]   1750 Current map 147.  Have ordered lasix and push of labetolol, current rate is 81.  KCl 40meq ordered. Current K is 2.9. prefer gentle replacement in advanced renal disease.  [VC]   1757 BP(!): 214/114 [VC]   1757 Pulse: 81 [VC]   1757 Resp(!): 22 [VC]   1757 SpO2: 99 % [VC]   1759 Sbar given to Dr. Ramesh, my care ends now. [VC]   Sun Dec 22, 2024   0014 Care of this patient was transferred to me (Dr. Ramesh) at shift change pending reassessment/disposition.  Patient's blood pressure is began to trend downward after receiving 2 doses of hydralazine and a dose of labetalol.  However, blood pressure spiked and Cardene drip was initiated.  Patient was accepted to hospital medicine service (ICU) for hypertensive urgency, CHF, bilateral pleural effusions and CKD. [EB]      ED Course User Index  [EB] Johan Ramesh MD  [VC] Earl Murphy, ANTONI                           Clinical Impression:  Final diagnoses:  [M79.602] Left arm pain  [N18.4] CKD (chronic kidney disease) stage 4, GFR 15-29 ml/min (Primary)  [I16.0] Hypertensive urgency  [I51.7] Cardiomegaly  [J90] Pleural effusion          ED Disposition Condition    Admit Stable                Johan Ramesh MD  12/22/24 0021

## 2024-12-21 NOTE — ED NOTES
Pt presented to ED with c/o pain to left upper extremity. Onset suddenly while on the phone. Denies injury. Pt reports that she is unable to move her arm secondary to pain. Pt does not move arm on assessment but purposeful movement is noted when pt ambulated back from restroom.     On initial assessment  strength is weaker on the left. Otherwise neuro intact. Pain is described as burning.     Pt has CKD 4 and is not currently on HD. Pt is hypertensive. States she did not take her bp meds today and ate salty food pta.     Pt denies headache, dizziness, vision change, cp, sob, URI symptoms, fever, chills, abd pain, n/v/d, urinary symptoms, vaginal bleeding/discharge, and rash of any kind.

## 2024-12-22 ENCOUNTER — DOCUMENTATION ONLY (OUTPATIENT)
Dept: RADIOLOGY | Facility: HOSPITAL | Age: 39
End: 2024-12-22
Payer: MEDICAID

## 2024-12-22 PROBLEM — R29.898 LEFT ARM WEAKNESS: Status: ACTIVE | Noted: 2024-12-22

## 2024-12-22 PROBLEM — I50.32 CHRONIC DIASTOLIC HEART FAILURE: Status: ACTIVE | Noted: 2024-08-12

## 2024-12-22 LAB
ABO + RH BLD: NORMAL
ALBUMIN SERPL BCP-MCNC: 3.5 G/DL (ref 3.5–5.2)
ALP SERPL-CCNC: 29 U/L (ref 40–150)
ALT SERPL W/O P-5'-P-CCNC: 10 U/L (ref 10–44)
ANION GAP SERPL CALC-SCNC: 12 MMOL/L (ref 8–16)
APTT PPP: 27.6 SEC (ref 21–32)
AST SERPL-CCNC: 11 U/L (ref 10–40)
BASOPHILS # BLD AUTO: 0.03 K/UL (ref 0–0.2)
BASOPHILS NFR BLD: 0.6 % (ref 0–1.9)
BILIRUB SERPL-MCNC: 0.3 MG/DL (ref 0.1–1)
BLD GP AB SCN CELLS X3 SERPL QL: NORMAL
BNP SERPL-MCNC: 739 PG/ML (ref 0–99)
BUN SERPL-MCNC: 32 MG/DL (ref 6–20)
CALCIUM SERPL-MCNC: 9.1 MG/DL (ref 8.7–10.5)
CHLORIDE SERPL-SCNC: 103 MMOL/L (ref 95–110)
CHOLEST SERPL-MCNC: 187 MG/DL (ref 120–199)
CHOLEST/HDLC SERPL: 3.5 {RATIO} (ref 2–5)
CO2 SERPL-SCNC: 26 MMOL/L (ref 23–29)
CREAT SERPL-MCNC: 2.7 MG/DL (ref 0.5–1.4)
DIFFERENTIAL METHOD BLD: ABNORMAL
EOSINOPHIL # BLD AUTO: 0.1 K/UL (ref 0–0.5)
EOSINOPHIL NFR BLD: 2.2 % (ref 0–8)
ERYTHROCYTE [DISTWIDTH] IN BLOOD BY AUTOMATED COUNT: 12.6 % (ref 11.5–14.5)
EST. GFR  (NO RACE VARIABLE): 22 ML/MIN/1.73 M^2
ESTIMATED AVG GLUCOSE: 88 MG/DL (ref 68–131)
GLUCOSE SERPL-MCNC: 115 MG/DL (ref 70–110)
HBA1C MFR BLD: 4.7 % (ref 4–5.6)
HCT VFR BLD AUTO: 35 % (ref 37–48.5)
HDLC SERPL-MCNC: 54 MG/DL (ref 40–75)
HDLC SERPL: 28.9 % (ref 20–50)
HGB BLD-MCNC: 11.7 G/DL (ref 12–16)
IMM GRANULOCYTES # BLD AUTO: 0.01 K/UL (ref 0–0.04)
IMM GRANULOCYTES NFR BLD AUTO: 0.2 % (ref 0–0.5)
INR PPP: 1.2 (ref 0.8–1.2)
LACTATE SERPL-SCNC: 0.9 MMOL/L (ref 0.5–2.2)
LDLC SERPL CALC-MCNC: 119.8 MG/DL (ref 63–159)
LYMPHOCYTES # BLD AUTO: 1.3 K/UL (ref 1–4.8)
LYMPHOCYTES NFR BLD: 26 % (ref 18–48)
MAGNESIUM SERPL-MCNC: 1.8 MG/DL (ref 1.6–2.6)
MCH RBC QN AUTO: 30.6 PG (ref 27–31)
MCHC RBC AUTO-ENTMCNC: 33.4 G/DL (ref 32–36)
MCV RBC AUTO: 92 FL (ref 82–98)
MONOCYTES # BLD AUTO: 0.5 K/UL (ref 0.3–1)
MONOCYTES NFR BLD: 9.5 % (ref 4–15)
NEUTROPHILS # BLD AUTO: 3.1 K/UL (ref 1.8–7.7)
NEUTROPHILS NFR BLD: 61.5 % (ref 38–73)
NONHDLC SERPL-MCNC: 133 MG/DL
NRBC BLD-RTO: 0 /100 WBC
PHOSPHATE SERPL-MCNC: 2.9 MG/DL (ref 2.7–4.5)
PLATELET # BLD AUTO: 181 K/UL (ref 150–450)
PMV BLD AUTO: 11 FL (ref 9.2–12.9)
POTASSIUM SERPL-SCNC: 3 MMOL/L (ref 3.5–5.1)
PROT SERPL-MCNC: 7.1 G/DL (ref 6–8.4)
PROTHROMBIN TIME: 12.8 SEC (ref 9–12.5)
RBC # BLD AUTO: 3.82 M/UL (ref 4–5.4)
SODIUM SERPL-SCNC: 141 MMOL/L (ref 136–145)
SPECIMEN OUTDATE: NORMAL
TRIGL SERPL-MCNC: 66 MG/DL (ref 30–150)
TSH SERPL DL<=0.005 MIU/L-ACNC: 1.98 UIU/ML (ref 0.4–4)
WBC # BLD AUTO: 4.96 K/UL (ref 3.9–12.7)

## 2024-12-22 PROCEDURE — 11000001 HC ACUTE MED/SURG PRIVATE ROOM

## 2024-12-22 PROCEDURE — 83605 ASSAY OF LACTIC ACID: CPT | Performed by: STUDENT IN AN ORGANIZED HEALTH CARE EDUCATION/TRAINING PROGRAM

## 2024-12-22 PROCEDURE — 94761 N-INVAS EAR/PLS OXIMETRY MLT: CPT

## 2024-12-22 PROCEDURE — 25000003 PHARM REV CODE 250: Performed by: INTERNAL MEDICINE

## 2024-12-22 PROCEDURE — 83735 ASSAY OF MAGNESIUM: CPT | Performed by: STUDENT IN AN ORGANIZED HEALTH CARE EDUCATION/TRAINING PROGRAM

## 2024-12-22 PROCEDURE — 80053 COMPREHEN METABOLIC PANEL: CPT | Performed by: STUDENT IN AN ORGANIZED HEALTH CARE EDUCATION/TRAINING PROGRAM

## 2024-12-22 PROCEDURE — 85025 COMPLETE CBC W/AUTO DIFF WBC: CPT | Performed by: STUDENT IN AN ORGANIZED HEALTH CARE EDUCATION/TRAINING PROGRAM

## 2024-12-22 PROCEDURE — 84443 ASSAY THYROID STIM HORMONE: CPT | Performed by: STUDENT IN AN ORGANIZED HEALTH CARE EDUCATION/TRAINING PROGRAM

## 2024-12-22 PROCEDURE — 36415 COLL VENOUS BLD VENIPUNCTURE: CPT | Performed by: STUDENT IN AN ORGANIZED HEALTH CARE EDUCATION/TRAINING PROGRAM

## 2024-12-22 PROCEDURE — 85730 THROMBOPLASTIN TIME PARTIAL: CPT | Performed by: STUDENT IN AN ORGANIZED HEALTH CARE EDUCATION/TRAINING PROGRAM

## 2024-12-22 PROCEDURE — 25000003 PHARM REV CODE 250: Performed by: HOSPITALIST

## 2024-12-22 PROCEDURE — 84100 ASSAY OF PHOSPHORUS: CPT | Performed by: STUDENT IN AN ORGANIZED HEALTH CARE EDUCATION/TRAINING PROGRAM

## 2024-12-22 PROCEDURE — 63600175 PHARM REV CODE 636 W HCPCS: Performed by: INTERNAL MEDICINE

## 2024-12-22 PROCEDURE — 86850 RBC ANTIBODY SCREEN: CPT | Performed by: STUDENT IN AN ORGANIZED HEALTH CARE EDUCATION/TRAINING PROGRAM

## 2024-12-22 PROCEDURE — 83880 ASSAY OF NATRIURETIC PEPTIDE: CPT | Performed by: STUDENT IN AN ORGANIZED HEALTH CARE EDUCATION/TRAINING PROGRAM

## 2024-12-22 PROCEDURE — 85610 PROTHROMBIN TIME: CPT | Performed by: STUDENT IN AN ORGANIZED HEALTH CARE EDUCATION/TRAINING PROGRAM

## 2024-12-22 PROCEDURE — 83036 HEMOGLOBIN GLYCOSYLATED A1C: CPT | Performed by: STUDENT IN AN ORGANIZED HEALTH CARE EDUCATION/TRAINING PROGRAM

## 2024-12-22 PROCEDURE — 80061 LIPID PANEL: CPT | Performed by: STUDENT IN AN ORGANIZED HEALTH CARE EDUCATION/TRAINING PROGRAM

## 2024-12-22 PROCEDURE — 25000003 PHARM REV CODE 250: Performed by: STUDENT IN AN ORGANIZED HEALTH CARE EDUCATION/TRAINING PROGRAM

## 2024-12-22 RX ORDER — ALUMINUM HYDROXIDE, MAGNESIUM HYDROXIDE, AND SIMETHICONE 1200; 120; 1200 MG/30ML; MG/30ML; MG/30ML
30 SUSPENSION ORAL 4 TIMES DAILY PRN
Status: DISCONTINUED | OUTPATIENT
Start: 2024-12-22 | End: 2024-12-23 | Stop reason: HOSPADM

## 2024-12-22 RX ORDER — IBUPROFEN 200 MG
16 TABLET ORAL
Status: DISCONTINUED | OUTPATIENT
Start: 2024-12-22 | End: 2024-12-23 | Stop reason: HOSPADM

## 2024-12-22 RX ORDER — ACETAMINOPHEN 325 MG/1
650 TABLET ORAL EVERY 4 HOURS PRN
Status: DISCONTINUED | OUTPATIENT
Start: 2024-12-22 | End: 2024-12-22

## 2024-12-22 RX ORDER — NIFEDIPINE 30 MG/1
30 TABLET, EXTENDED RELEASE ORAL DAILY
Status: DISCONTINUED | OUTPATIENT
Start: 2024-12-22 | End: 2024-12-22

## 2024-12-22 RX ORDER — SPIRONOLACTONE 100 MG/1
100 TABLET, FILM COATED ORAL DAILY
Status: DISCONTINUED | OUTPATIENT
Start: 2024-12-22 | End: 2024-12-23

## 2024-12-22 RX ORDER — BISACODYL 10 MG/1
10 SUPPOSITORY RECTAL DAILY PRN
Status: DISCONTINUED | OUTPATIENT
Start: 2024-12-22 | End: 2024-12-23 | Stop reason: HOSPADM

## 2024-12-22 RX ORDER — IBUPROFEN 200 MG
24 TABLET ORAL
Status: DISCONTINUED | OUTPATIENT
Start: 2024-12-22 | End: 2024-12-23 | Stop reason: HOSPADM

## 2024-12-22 RX ORDER — OXYCODONE HYDROCHLORIDE 5 MG/1
5 TABLET ORAL EVERY 4 HOURS PRN
Status: DISCONTINUED | OUTPATIENT
Start: 2024-12-22 | End: 2024-12-23 | Stop reason: HOSPADM

## 2024-12-22 RX ORDER — ACETAMINOPHEN 325 MG/1
650 TABLET ORAL EVERY 4 HOURS PRN
Status: DISCONTINUED | OUTPATIENT
Start: 2024-12-22 | End: 2024-12-23 | Stop reason: HOSPADM

## 2024-12-22 RX ORDER — SODIUM CHLORIDE 0.9 % (FLUSH) 0.9 %
10 SYRINGE (ML) INJECTION EVERY 12 HOURS PRN
Status: DISCONTINUED | OUTPATIENT
Start: 2024-12-22 | End: 2024-12-23 | Stop reason: HOSPADM

## 2024-12-22 RX ORDER — GLUCAGON 1 MG
1 KIT INJECTION
Status: DISCONTINUED | OUTPATIENT
Start: 2024-12-22 | End: 2024-12-23 | Stop reason: HOSPADM

## 2024-12-22 RX ORDER — OXYCODONE HYDROCHLORIDE 5 MG/1
5 TABLET ORAL EVERY 6 HOURS PRN
Status: DISCONTINUED | OUTPATIENT
Start: 2024-12-22 | End: 2024-12-22

## 2024-12-22 RX ORDER — NIFEDIPINE 30 MG/1
30 TABLET, EXTENDED RELEASE ORAL DAILY
Status: DISCONTINUED | OUTPATIENT
Start: 2024-12-22 | End: 2024-12-23

## 2024-12-22 RX ORDER — CALCITRIOL 0.25 UG/1
0.25 CAPSULE ORAL DAILY
Status: DISCONTINUED | OUTPATIENT
Start: 2024-12-22 | End: 2024-12-23 | Stop reason: HOSPADM

## 2024-12-22 RX ORDER — LOSARTAN POTASSIUM 25 MG/1
100 TABLET ORAL DAILY
Status: DISCONTINUED | OUTPATIENT
Start: 2024-12-22 | End: 2024-12-23 | Stop reason: HOSPADM

## 2024-12-22 RX ORDER — POTASSIUM CHLORIDE 20 MEQ/1
40 TABLET, EXTENDED RELEASE ORAL DAILY
Status: DISCONTINUED | OUTPATIENT
Start: 2024-12-23 | End: 2024-12-23 | Stop reason: HOSPADM

## 2024-12-22 RX ORDER — SIMETHICONE 80 MG
1 TABLET,CHEWABLE ORAL 4 TIMES DAILY PRN
Status: DISCONTINUED | OUTPATIENT
Start: 2024-12-22 | End: 2024-12-23 | Stop reason: HOSPADM

## 2024-12-22 RX ORDER — HYDRALAZINE HYDROCHLORIDE 20 MG/ML
10 INJECTION INTRAMUSCULAR; INTRAVENOUS EVERY 8 HOURS PRN
Status: DISCONTINUED | OUTPATIENT
Start: 2024-12-22 | End: 2024-12-23 | Stop reason: HOSPADM

## 2024-12-22 RX ORDER — AMOXICILLIN 250 MG
1 CAPSULE ORAL DAILY PRN
Status: DISCONTINUED | OUTPATIENT
Start: 2024-12-22 | End: 2024-12-23 | Stop reason: HOSPADM

## 2024-12-22 RX ORDER — NIFEDIPINE 30 MG/1
30 TABLET, EXTENDED RELEASE ORAL DAILY
COMMUNITY

## 2024-12-22 RX ORDER — TALC
6 POWDER (GRAM) TOPICAL NIGHTLY PRN
Status: DISCONTINUED | OUTPATIENT
Start: 2024-12-22 | End: 2024-12-23 | Stop reason: HOSPADM

## 2024-12-22 RX ORDER — PROCHLORPERAZINE EDISYLATE 5 MG/ML
5 INJECTION INTRAMUSCULAR; INTRAVENOUS EVERY 6 HOURS PRN
Status: DISCONTINUED | OUTPATIENT
Start: 2024-12-22 | End: 2024-12-23 | Stop reason: HOSPADM

## 2024-12-22 RX ORDER — POTASSIUM CHLORIDE 20 MEQ/1
40 TABLET, EXTENDED RELEASE ORAL ONCE
Status: COMPLETED | OUTPATIENT
Start: 2024-12-22 | End: 2024-12-22

## 2024-12-22 RX ORDER — LABETALOL 100 MG/1
100 TABLET, FILM COATED ORAL EVERY 8 HOURS
Status: DISCONTINUED | OUTPATIENT
Start: 2024-12-22 | End: 2024-12-23 | Stop reason: HOSPADM

## 2024-12-22 RX ORDER — ONDANSETRON HYDROCHLORIDE 2 MG/ML
4 INJECTION, SOLUTION INTRAVENOUS EVERY 8 HOURS PRN
Status: DISCONTINUED | OUTPATIENT
Start: 2024-12-22 | End: 2024-12-23 | Stop reason: HOSPADM

## 2024-12-22 RX ORDER — ACETAMINOPHEN 325 MG/1
650 TABLET ORAL EVERY 8 HOURS PRN
Status: DISCONTINUED | OUTPATIENT
Start: 2024-12-22 | End: 2024-12-22

## 2024-12-22 RX ORDER — NALOXONE HCL 0.4 MG/ML
0.02 VIAL (ML) INJECTION
Status: DISCONTINUED | OUTPATIENT
Start: 2024-12-22 | End: 2024-12-23 | Stop reason: HOSPADM

## 2024-12-22 RX ADMIN — LOSARTAN POTASSIUM 100 MG: 25 TABLET, FILM COATED ORAL at 02:12

## 2024-12-22 RX ADMIN — SPIRONOLACTONE 100 MG: 100 TABLET ORAL at 11:12

## 2024-12-22 RX ADMIN — LABETALOL HYDROCHLORIDE 100 MG: 100 TABLET, FILM COATED ORAL at 09:12

## 2024-12-22 RX ADMIN — NIFEDIPINE 30 MG: 30 TABLET, FILM COATED, EXTENDED RELEASE ORAL at 11:12

## 2024-12-22 RX ADMIN — OXYCODONE 5 MG: 5 TABLET ORAL at 03:12

## 2024-12-22 RX ADMIN — OXYCODONE 5 MG: 5 TABLET ORAL at 11:12

## 2024-12-22 RX ADMIN — LABETALOL HYDROCHLORIDE 100 MG: 100 TABLET, FILM COATED ORAL at 01:12

## 2024-12-22 RX ADMIN — LABETALOL HYDROCHLORIDE 100 MG: 100 TABLET, FILM COATED ORAL at 02:12

## 2024-12-22 RX ADMIN — OXYCODONE 5 MG: 5 TABLET ORAL at 07:12

## 2024-12-22 RX ADMIN — NICARDIPINE HYDROCHLORIDE 2.5 MG/HR: 0.2 INJECTION, SOLUTION INTRAVENOUS at 01:12

## 2024-12-22 RX ADMIN — ACETAMINOPHEN 650 MG: 325 TABLET ORAL at 02:12

## 2024-12-22 RX ADMIN — POTASSIUM CHLORIDE 40 MEQ: 1500 TABLET, EXTENDED RELEASE ORAL at 05:12

## 2024-12-22 RX ADMIN — CALCITRIOL CAPSULES 0.25 MCG 0.25 MCG: 0.25 CAPSULE ORAL at 02:12

## 2024-12-22 NOTE — NURSING
[facility] [place]  Nurses Note -- 4 Eyes      12/22/2024       Skin assessed on: Q Shift      [x] No Pressure Injuries Present    [x]Prevention Measures Documented    [] Yes LDA  for Pressure Injury Previously documented     [] Yes New Pressure Injury Discovered   [] LDA for New Pressure Injury Added      Attending RN:  Dana Persaud RN     Second RN:  AZALIA Jean Baptiste

## 2024-12-22 NOTE — SUBJECTIVE & OBJECTIVE
Past Medical History:   Diagnosis Date    Anemia     Bipolar 1 disorder     Cerebral aneurysm     Hypertension     since 2012    Polycystic kidney disease     Polycystic kidney disease     Pre-eclampsia     Renal disorder     Since childhood     SAH (subarachnoid hemorrhage)     Stroke        Past Surgical History:   Procedure Laterality Date    BRAIN SURGERY      CEREBRAL ANGIOGRAM N/A 2018    Procedure: ANGIOGRAM-CEREBRAL;  Surgeon: Jayna Surgeon;  Location: [facility];  Service: Anesthesiology;  Laterality: N/A;     SECTION N/A 2018    Procedure:  SECTION;  Surgeon: Obed Soto MD;  Location: [facility];  Service: OB/GYN;  Laterality: N/A;    DILATION AND CURETTAGE OF UTERUS             Review of patient's allergies indicates:  No Known Allergies    No current facility-administered medications on file prior to encounter.     Current Outpatient Medications on File Prior to Encounter   Medication Sig    acetaminophen (TYLENOL) 325 MG tablet Take 325 mg by mouth every 6 (six) hours as needed for Pain.    calcitRIOL (ROCALTROL) 0.25 MCG Cap Take 1 capsule (0.25 mcg total) by mouth once daily.    empagliflozin (JARDIANCE) 10 mg tablet Take 1 tablet (10 mg total) by mouth once daily. (Patient not taking: Reported on 2024)    labetaloL (NORMODYNE) 100 MG tablet Take 1 tablet (100 mg total) by mouth every 8 (eight) hours.    losartan (COZAAR) 50 MG tablet Take 2 tablets (100 mg total) by mouth once daily.    NIFEdipine (ADALAT CC) 30 MG TbSR Take 30 mg by mouth once daily.    potassium chloride SA (K-DUR,KLOR-CON M) 10 MEQ tablet Take 4 tablets (40 mEq total) by mouth 2 (two) times daily.    spironolactone (ALDACTONE) 50 MG tablet Take 2 tablets (100 mg total) by mouth once daily.     Family History       Problem Relation (Age of Onset)    Hypertension Mother, Paternal Grandmother    Polycystic kidney disease Mother, Daughter          Tobacco Use    Smoking status: Some Days     Types:  Cigarettes    Smokeless tobacco: Never   Substance and Sexual Activity    Alcohol use: Yes     Comment: occasional    Drug use: Yes     Types: Marijuana    Sexual activity: Yes     Partners: Male     Birth control/protection: None     Review of Systems   Respiratory:  Positive for shortness of breath.    Cardiovascular: Negative.    Gastrointestinal: Negative.    Genitourinary: Negative.    Musculoskeletal: Negative.         Left arm pain/weakness    Neurological:  Positive for headaches.     Objective:     Vital Signs (Most Recent):  Temp: 98.2 °F (36.8 °C) (12/22/24 0115)  Pulse: 89 (12/22/24 0207)  Resp: (!) 37 (12/22/24 0207)  BP: (!) 179/108 (12/22/24 0207)  SpO2: 99 % (12/22/24 0207) Vital Signs (24h Range):  Temp:  [98.2 °F (36.8 °C)-98.7 °F (37.1 °C)] 98.2 °F (36.8 °C)  Pulse:  [68-90] 89  Resp:  [12-37] 37  SpO2:  [95 %-100 %] 99 %  BP: (150-250)/() 179/108     Weight: 69.1 kg (152 lb 5.4 oz)  Body mass index is 22.5 kg/m².     Physical Exam  Vitals and nursing note reviewed.   Constitutional:       General: She is not in acute distress.     Appearance: She is not ill-appearing.   HENT:      Mouth/Throat:      Mouth: Mucous membranes are moist.   Cardiovascular:      Rate and Rhythm: Normal rate.   Pulmonary:      Effort: Pulmonary effort is normal.   Abdominal:      General: Abdomen is flat.   Skin:     General: Skin is warm.   Neurological:      Mental Status: She is alert.      Comments: Left arm weakness, decreased  strength, limited range of motion of left arm due to pain.                 Significant Labs: All pertinent labs within the past 24 hours have been reviewed.    Significant Imaging: I have reviewed all pertinent imaging results/findings within the past 24 hours.

## 2024-12-22 NOTE — CARE UPDATE
Patient seen and examined.  See H/P, treatment and plan per Dr. Hoffman.  40 y/o female presents with left shoulder pain and left arm weakness.  Noted to be severely hypertensive.  Recent non compliance with medications.  Admitted to ICU on Cardene drip for HTN urgency.  Restarted on home BP medications.  Improved BP and weaned off Cardene drip.  Adjust oral medications as needed and added prn IV Hydralazine.  Still having left arm weakness. MRI of brain has been ordered to rule out CVA, but symptoms seem to be more related to left shoulder pain.  No fracture on Xray.  Will get Ortho input.    Continue working on BP.  Rule out CVA with MRI.  Ortho input.  Hopefully home tomorrow.

## 2024-12-22 NOTE — PLAN OF CARE
Problem: Adult Inpatient Plan of Care  Goal: Optimal Comfort and Wellbeing  Outcome: Progressing     Problem: Acute Kidney Injury/Impairment  Goal: Fluid and Electrolyte Balance  Outcome: Progressing     Problem: Hypertension Acute  Goal: Blood Pressure Within Desired Range  12/22/2024 0438 by Markel Longo RN  Outcome: Progressing  12/22/2024 0434 by Markel Longo RN  Outcome: Progressing     Problem: Pain Acute  Goal: Optimal Pain Control and Function  12/22/2024 0438 by Markel Longo RN  Outcome: Progressing  12/22/2024 0436 by Markel Longo RN  Outcome: Progressing

## 2024-12-22 NOTE — NURSING
[facility], [facility]  Nurses Note -- 4 Eyes      12/22/2024       Skin assessed on: Admit      [x] No Pressure Injuries Present    []Prevention Measures Documented    [] Yes LDA  for Pressure Injury Previously documented     [] Yes New Pressure Injury Discovered   [] LDA for New Pressure Injury Added      Attending RN:  Markel Longo RN     Second RN:  AZALIA Platt

## 2024-12-22 NOTE — ASSESSMENT & PLAN NOTE
Patient has a current diagnosis of hypertensive urgency (without evidence of end organ damage) which is controlled.  Latest blood pressure and vitals reviewed-   Temp:  [98.2 °F (36.8 °C)-98.7 °F (37.1 °C)]   Pulse:  [68-90]   Resp:  [12-37]   BP: (150-250)/()   SpO2:  [95 %-100 %] .   Patient currently on IV antihypertensives.   Home meds for hypertension were reviewed and noted below.   Hypertension Medications               labetaloL (NORMODYNE) 100 MG tablet Take 1 tablet (100 mg total) by mouth every 8 (eight) hours.    losartan (COZAAR) 50 MG tablet Take 2 tablets (100 mg total) by mouth once daily.    NIFEdipine (ADALAT CC) 30 MG TbSR Take 30 mg by mouth once daily.    spironolactone (ALDACTONE) 50 MG tablet Take 2 tablets (100 mg total) by mouth once daily.            Medication adjustment for hospital antihypertensives is as follows- Wean nicardipine as tolerated, resume home medications     Will aim for controlled BP reduction by medications noted above. Monitor and mitigate end organ damage as indicated.

## 2024-12-22 NOTE — PLAN OF CARE
Problem: Infection  Goal: Absence of Infection Signs and Symptoms  Outcome: Adequate for Care Transition     Problem: Adult Inpatient Plan of Care  Goal: Plan of Care Review  Outcome: Adequate for Care Transition  Goal: Patient-Specific Goal (Individualized)  Outcome: Adequate for Care Transition  Goal: Absence of Hospital-Acquired Illness or Injury  Outcome: Adequate for Care Transition  Goal: Optimal Comfort and Wellbeing  Outcome: Adequate for Care Transition  Goal: Readiness for Transition of Care  Outcome: Adequate for Care Transition     Problem: Acute Kidney Injury/Impairment  Goal: Fluid and Electrolyte Balance  Outcome: Adequate for Care Transition  Goal: Improved Oral Intake  Outcome: Adequate for Care Transition  Goal: Effective Renal Function  Outcome: Adequate for Care Transition     Problem: Hypertension Acute  Goal: Blood Pressure Within Desired Range  Outcome: Adequate for Care Transition     Problem: Pain Acute  Goal: Optimal Pain Control and Function  Outcome: Adequate for Care Transition

## 2024-12-22 NOTE — ASSESSMENT & PLAN NOTE
Results for orders placed during the hospital encounter of 08/11/24    Echo Saline Bubble? No    Interpretation Summary    Left Ventricle: The left ventricle is normal in size. Severely increased wall thickness. There is severe concentric hypertrophy. There is normal systolic function with a visually estimated ejection fraction of 55 - 60%. Grade II diastolic dysfunction.    Right Ventricle: Normal right ventricular cavity size. Systolic function is normal.    Aortic Valve: There is mild aortic regurgitation.    Tricuspid Valve: There is mild regurgitation.    Pulmonary Artery: The estimated pulmonary artery systolic pressure is 37 mmHg.    Global strain= -11.3%.    -S/p Lasix 20 mg IV, which the patient responded to well.   - Diuresis PRN   - Repeat echocardiogram

## 2024-12-22 NOTE — ASSESSMENT & PLAN NOTE
Patient presents with left arm pain/weakness and numbness  She denies trauma or falling on her arm.   Exam is significant for decreased strength in left arm, although some limitation might be due to pain. No swelling noted. There is tenderness to palpation of upper arm/shoulder. Radial pulses are equal bilaterally.   Differential diagnosis: neurologic process (CVA, ICH or cervical radiculopathy) should be considered. Less likely related to DVT. Arterial aneurysm/dissection is thought to be less likely.    Plan:   - Left shoulder/arm X-ray   - Brain MRI to r/o intracranial process  - Pain control

## 2024-12-22 NOTE — NURSING
Pt arrives to ICU. Call light within reach, siderails up x2, wheels locked. Plan of care discussed. All questions answered at this time.

## 2024-12-22 NOTE — HPI
This is a 39-year-old female with a past medical history of hypertension, HFpEF (EF: 55%, GIIDD), polycystic kidney disease, cerebral aneurysm s/p coiling, migraines, who presents with left arm pain.      Patient presents for evaluation of left arm pain/numbness that started on the day of presentation.  She reports pain predominantly in her left shoulder/upper arm without radiation, associated with left upper extremity weakness and numbness.  She denied having similar symptoms in the past.  She denied trauma.  She also endorses URI symptoms, with associated shortness of breaths that started 3 days prior to presentation.    In the ED, the patient was hypertensive (up to 250/134, MAP: 173).  Labs were remarkable for an elevated creatinine (2.9-around baseline), elevated BNP (751), hypokalemia (3.2).  CT head showed no acute intracranial abnormality.  Chest x-ray showed mild cardiomegaly, with a probable small pleural effusions versus bilateral pleural thickening.  Patient was given hydralazine 10 mg IV x2, labetalol 10 mg IV x1, morphine 4 mg IV, Dilaudid 1 mg IV, Lasix 20 mg IV, Valium 5 mg IV, Zofran 4 mg IV, potassium bicarbonate 40 mEq and was started on a nicardipine infusion.  She was admitted for further management.

## 2024-12-22 NOTE — NURSING
Pt off unit to MRI.     MRI tech states that patient has hair piece with metal in it, appears in CT image. Pt cannot remove it. Pt also has coiled aneurysm, disqualifying her for the imaging. Pt sent back to ICU.

## 2024-12-22 NOTE — PLAN OF CARE
Case Management Assessment     PCP: [facility]  Pharmacy: [facility]    Patient Arrived From: Home  Existing Help at Home: Adult children    Barriers to Discharge: None    Discharge Plan:    A. Home with family   B. Home with family    Pt is independent and doesn't use DME. Pt's family will provide transprtation home when discharged.       12/22/24 1109   Discharge Assessment   Assessment Type Discharge Planning Assessment   Confirmed/corrected address, phone number and insurance Yes   Confirmed Demographics Correct on Facesheet   Source of Information patient   When was your last doctors appointment?   (unknown)   Communicated IQRA with patient/caregiver Yes   Reason For Admission Hypertensive emergency   People in Home child(traci), dependent;child(traci), adult   Facility Arrived From: home   Do you expect to return to your current living situation? Yes   Do you have help at home or someone to help you manage your care at home? Yes   Who are your caregiver(s) and their phone number(s)? Adult children   Prior to hospitilization cognitive status: Alert/Oriented   Current cognitive status: Alert/Oriented   Walking or Climbing Stairs Difficulty no   Dressing/Bathing Difficulty no   Equipment Currently Used at Home none   Readmission within 30 days? No   Patient currently being followed by outpatient case management? No   Do you currently have service(s) that help you manage your care at home? No   Do you take prescription medications? Yes   Do you have prescription coverage? Yes   Coverage MEDICAID - [facility] ([facility])   Do you have any problems affording any of your prescribed medications? No   Is the patient taking medications as prescribed? yes   Who is going to help you get home at discharge? Family   How do you get to doctors appointments? car, drives self   Are you on dialysis? No   Do you take coumadin? No   Discharge Plan A Home with family   Discharge Plan B Home with family    DME Needed Upon Discharge  other (see comments)  (TBD)   Discharge Plan discussed with: Patient   Transition of Care Barriers None   SDOH   (NONE)   Physical Activity   On average, how many days per week do you engage in moderate to strenuous exercise (like a brisk walk)? 0 days   On average, how many minutes do you engage in exercise at this level? 0 min   Financial Resource Strain   How hard is it for you to pay for the very basics like food, housing, medical care, and heating? Not very   Housing Stability   In the last 12 months, was there a time when you were not able to pay the mortgage or rent on time? N   At any time in the past 12 months, were you homeless or living in a shelter (including now)? N   Transportation Needs   Has the lack of transportation kept you from medical appointments, meetings, work or from getting things needed for daily living? No   Food Insecurity   Within the past 12 months, you worried that your food would run out before you got the money to buy more. Never true   Within the past 12 months, the food you bought just didn't last and you didn't have money to get more. Never true   Stress   Do you feel stress - tense, restless, nervous, or anxious, or unable to sleep at night because your mind is troubled all the time - these days? To some exte   Social Isolation   How often do you feel lonely or isolated from those around you?  Never   Alcohol Use   Q1: How often do you have a drink containing alcohol? Monthly or l   Q2: How many drinks containing alcohol do you have on a typical day when you are drinking? 1 or 2   Q3: How often do you have six or more drinks on one occasion? Never   Centrafuseities   In the past 12 months has the electric, gas, oil, or water company threatened to shut off services in your home? No   Health Literacy   How often do you need to have someone help you when you read instructions, pamphlets, or other written material from your doctor or pharmacy? Never   OTHER    Name(s) of People in Home Dependent and adult children

## 2024-12-22 NOTE — PROGRESS NOTES
Attempted MRI Scan but patient has a nose ring and a wig that is glued to her hair. Was able to remove nose ring but Patient says she can't remove wig.  Wig has metal tobar in it.  Unable to proceed with MRI unless wig is able to be removed.  Informed patients nurse and sent patient back to room.

## 2024-12-22 NOTE — H&P
[facility] Medicine  History & Physical    Patient Name: Sharon Grande  MRN: [id number]  Patient Class: IP- Inpatient  Admission Date: 12/21/2024  Attending Physician: Kwame Alonzo MD   Primary Care Provider: [facility] -         Patient information was obtained from patient and ER records.     Subjective:     Principal Problem:Hypertensive urgency    Chief Complaint:   Chief Complaint   Patient presents with    Arm Pain     Pt reports tingling in (left) arm x 3 hours. Pt has Hx of HTN and did not take her medication.         HPI: This is a 39-year-old female with a past medical history of hypertension, HFpEF (EF: 55%, GIIDD), polycystic kidney disease, cerebral aneurysm s/p coiling, migraines, who presents with left arm pain.      Patient presents for evaluation of left arm pain/numbness that started on the day of presentation.  She reports pain predominantly in her left shoulder/upper arm without radiation, associated with left upper extremity weakness and numbness.  She denied having similar symptoms in the past.  She denied trauma.  She also endorses URI symptoms, with associated shortness of breaths that started 3 days prior to presentation.    In the ED, the patient was hypertensive (up to 250/134, MAP: 173).  Labs were remarkable for an elevated creatinine (2.9-around baseline), elevated BNP (751), hypokalemia (3.2).  CT head showed no acute intracranial abnormality.  Chest x-ray showed mild cardiomegaly, with a probable small pleural effusions versus bilateral pleural thickening.  Patient was given hydralazine 10 mg IV x2, labetalol 10 mg IV x1, morphine 4 mg IV, Dilaudid 1 mg IV, Lasix 20 mg IV, Valium 5 mg IV, Zofran 4 mg IV, potassium bicarbonate 40 mEq and was started on a nicardipine infusion.  She was admitted for further management.    Past Medical History:   Diagnosis Date    Anemia     Bipolar 1 disorder     Cerebral aneurysm     Hypertension     since 2012     Polycystic kidney disease     Polycystic kidney disease     Pre-eclampsia     Renal disorder     Since childhood     SAH (subarachnoid hemorrhage)     Stroke        Past Surgical History:   Procedure Laterality Date    BRAIN SURGERY      CEREBRAL ANGIOGRAM N/A 2018    Procedure: ANGIOGRAM-CEREBRAL;  Surgeon: Jayna Surgeon;  Location: [facility];  Service: Anesthesiology;  Laterality: N/A;     SECTION N/A 2018    Procedure:  SECTION;  Surgeon: Obed Soto MD;  [facility];  Service: OB/GYN;  Laterality: N/A;    DILATION AND CURETTAGE OF UTERUS             Review of patient's allergies indicates:  No Known Allergies    No current facility-administered medications on file prior to encounter.     Current Outpatient Medications on File Prior to Encounter   Medication Sig    acetaminophen (TYLENOL) 325 MG tablet Take 325 mg by mouth every 6 (six) hours as needed for Pain.    calcitRIOL (ROCALTROL) 0.25 MCG Cap Take 1 capsule (0.25 mcg total) by mouth once daily.    empagliflozin (JARDIANCE) 10 mg tablet Take 1 tablet (10 mg total) by mouth once daily. (Patient not taking: Reported on 2024)    labetaloL (NORMODYNE) 100 MG tablet Take 1 tablet (100 mg total) by mouth every 8 (eight) hours.    losartan (COZAAR) 50 MG tablet Take 2 tablets (100 mg total) by mouth once daily.    NIFEdipine (ADALAT CC) 30 MG TbSR Take 30 mg by mouth once daily.    potassium chloride SA (K-DUR,KLOR-CON M) 10 MEQ tablet Take 4 tablets (40 mEq total) by mouth 2 (two) times daily.    spironolactone (ALDACTONE) 50 MG tablet Take 2 tablets (100 mg total) by mouth once daily.     Family History       Problem Relation (Age of Onset)    Hypertension Mother, Paternal Grandmother    Polycystic kidney disease Mother, Daughter          Tobacco Use    Smoking status: Some Days     Types: Cigarettes    Smokeless tobacco: Never   Substance and Sexual Activity    Alcohol use: Yes     Comment: occasional    Drug  use: Yes     Types: Marijuana    Sexual activity: Yes     Partners: Male     Birth control/protection: None     Review of Systems   Respiratory:  Positive for shortness of breath.    Cardiovascular: Negative.    Gastrointestinal: Negative.    Genitourinary: Negative.    Musculoskeletal: Negative.         Left arm pain/weakness    Neurological:  Positive for headaches.     Objective:     Vital Signs (Most Recent):  Temp: 98.2 °F (36.8 °C) (12/22/24 0115)  Pulse: 89 (12/22/24 0207)  Resp: (!) 37 (12/22/24 0207)  BP: (!) 179/108 (12/22/24 0207)  SpO2: 99 % (12/22/24 0207) Vital Signs (24h Range):  Temp:  [98.2 °F (36.8 °C)-98.7 °F (37.1 °C)] 98.2 °F (36.8 °C)  Pulse:  [68-90] 89  Resp:  [12-37] 37  SpO2:  [95 %-100 %] 99 %  BP: (150-250)/() 179/108     Weight: 69.1 kg (152 lb 5.4 oz)  Body mass index is 22.5 kg/m².     Physical Exam  Vitals and nursing note reviewed.   Constitutional:       General: She is not in acute distress.     Appearance: She is not ill-appearing.   HENT:      Mouth/Throat:      Mouth: Mucous membranes are moist.   Cardiovascular:      Rate and Rhythm: Normal rate.   Pulmonary:      Effort: Pulmonary effort is normal.   Abdominal:      General: Abdomen is flat.   Skin:     General: Skin is warm.   Neurological:      Mental Status: She is alert.      Comments: Left arm weakness, decreased  strength, limited range of motion of left arm due to pain.                 Significant Labs: All pertinent labs within the past 24 hours have been reviewed.    Significant Imaging: I have reviewed all pertinent imaging results/findings within the past 24 hours.  Assessment/Plan:     * Hypertensive urgency  Patient has a current diagnosis of hypertensive urgency (without evidence of end organ damage) which is controlled.  Latest blood pressure and vitals reviewed-   Temp:  [98.2 °F (36.8 °C)-98.7 °F (37.1 °C)]   Pulse:  [68-90]   Resp:  [12-37]   BP: (150-250)/()   SpO2:  [95 %-100 %] .   Patient  currently on IV antihypertensives.   Home meds for hypertension were reviewed and noted below.   Hypertension Medications               labetaloL (NORMODYNE) 100 MG tablet Take 1 tablet (100 mg total) by mouth every 8 (eight) hours.    losartan (COZAAR) 50 MG tablet Take 2 tablets (100 mg total) by mouth once daily.    NIFEdipine (ADALAT CC) 30 MG TbSR Take 30 mg by mouth once daily.    spironolactone (ALDACTONE) 50 MG tablet Take 2 tablets (100 mg total) by mouth once daily.            Medication adjustment for hospital antihypertensives is as follows- Wean nicardipine as tolerated, resume home medications     Will aim for controlled BP reduction by medications noted above. Monitor and mitigate end organ damage as indicated.    Left arm weakness  Patient presents with left arm pain/weakness and numbness  She denies trauma or falling on her arm.   Exam is significant for decreased strength in left arm, although some limitation might be due to pain. No swelling noted. There is tenderness to palpation of upper arm/shoulder. Radial pulses are equal bilaterally.   Differential diagnosis: neurologic process (CVA, ICH or cervical radiculopathy) should be considered. Less likely related to DVT. Arterial aneurysm/dissection is thought to be less likely.    Plan:   - Left shoulder/arm X-ray   - Brain MRI to r/o intracranial process  - Pain control     Stage 4 chronic kidney disease  Creatine stable for now. BMP reviewed- noted Estimated Creatinine Clearance: 33.2 mL/min (A) (based on SCr of 2.4 mg/dL (H)). according to latest data. Based on current GFR, CKD stage is stage 4 - GFR 15-29.  Monitor UOP and serial BMP and adjust therapy as needed. Renally dose meds. Avoid nephrotoxic medications and procedures.    Chronic diastolic heart failure  Results for orders placed during the hospital encounter of 08/11/24    Echo Saline Bubble? No    Interpretation Summary    Left Ventricle: The left ventricle is normal in size. Severely  increased wall thickness. There is severe concentric hypertrophy. There is normal systolic function with a visually estimated ejection fraction of 55 - 60%. Grade II diastolic dysfunction.    Right Ventricle: Normal right ventricular cavity size. Systolic function is normal.    Aortic Valve: There is mild aortic regurgitation.    Tricuspid Valve: There is mild regurgitation.    Pulmonary Artery: The estimated pulmonary artery systolic pressure is 37 mmHg.    Global strain= -11.3%.    -S/p Lasix 20 mg IV, which the patient responded to well.   - Diuresis PRN   - Repeat echocardiogram     PKD (polycystic kidney disease)  History noted         VTE Risk Mitigation (From admission, onward)           Ordered     IP VTE HIGH RISK PATIENT  Once         12/22/24 0124     Place sequential compression device  Until discontinued         12/22/24 0124                  Critical care time spent on the evaluation and treatment of severe organ dysfunction, review of pertinent labs and imaging studies, discussions with consulting providers and discussions with patient/family: 45 minutes.                  Grayson Hoffman MD  Department of Hospital Medicine  [facility] [place] - Intensive Care

## 2024-12-22 NOTE — NURSING TRANSFER
Nursing Transfer Note      12/22/2024   5:59 PM    Nurse giving handoff:Dana  Nurse receiving handoff:Jigna    Reason patient is being transferred: Level of care     Transfer To: 313    Transfer via wheelchair    Transfer with cardiac monitoring    Transported by RN    Transfer Vital Signs:  Blood Pressure:141/68  Heart Rate:71  O2:99  Temperature:97.9  Respirations:16      Order for Tele Monitor? Yes    Additional Lines:N/A    Medicines sent: N/A    Any special needs or follow-up needed: Orthopedic sx    Patient belongings transferred with patient: Yes    Chart send with patient: Yes    Notified: FAMILY MEMBER AT BEDSIDE    Patient reassessed at: 9310 12/24/24    Upon arrival to floor: cardiac monitor applied, patient oriented to room, call bell in reach, and bed in lowest position

## 2024-12-22 NOTE — EICU
EICU Physician Brief Note - Overnight Events    K 3.0, Cre at baseline 2.7, has been hypokalemic.  Plan:  KCL 40 mEq po x1 ordered.  Eicu is available should acute issues arise.

## 2024-12-22 NOTE — ASSESSMENT & PLAN NOTE
Creatine stable for now. BMP reviewed- noted Estimated Creatinine Clearance: 33.2 mL/min (A) (based on SCr of 2.4 mg/dL (H)). according to latest data. Based on current GFR, CKD stage is stage 4 - GFR 15-29.  Monitor UOP and serial BMP and adjust therapy as needed. Renally dose meds. Avoid nephrotoxic medications and procedures.

## 2024-12-22 NOTE — EICU
eICU Physician Brief Note    Chart reviewed. On camera, the patient is resting in bed, in NAD.  Mrs Sharon Grande is a 39 year old lady presenting with LUE severe pain for several hours; no other associated symptoms. In the ED she was extremely hypertensive 250/134 and may not have been compliant with home meds. Rec'd IV Labetalol, hydralazine and morphine without significant improvement therefore started on Nicardipine gtt.  PMH: HTN, preeclampsia, CKD4 due to PKD, SAH s/p coiling, bipolar disorder, chronic anemia.  Labs and investigations reviewed.  Current medications reviewed.  A/P:  HTN urgency  Continue Nicardipine gtt, goal no lower than /DBP 90 in the first 24 hours.  Thereafter needs tighter control of BP, especially in the context of her underlying conditions.  Patient needs to be counseled on compliance with medications and medical care for optimal management of her medical conditions (specifically currently HTN).  Likely needs adjustment in her home antihypertensive regimen.  Consider case management consult to explore any barriers to obtaining medications.  GI/DVT prophylaxis - diet; SCDs and ambulate at least 500 feet tid, otherwise recommend heparin SC prophylaxis (enough time has elapsed since her SAH that the benefit of chemoprophylaxis exceeds risks).  eICU is available should acute issues arise.

## 2024-12-23 VITALS
WEIGHT: 151.13 LBS | HEIGHT: 69 IN | BODY MASS INDEX: 22.38 KG/M2 | DIASTOLIC BLOOD PRESSURE: 95 MMHG | TEMPERATURE: 99 F | RESPIRATION RATE: 18 BRPM | OXYGEN SATURATION: 95 % | HEART RATE: 68 BPM | SYSTOLIC BLOOD PRESSURE: 172 MMHG

## 2024-12-23 LAB
ANION GAP SERPL CALC-SCNC: 9 MMOL/L (ref 8–16)
BUN SERPL-MCNC: 33 MG/DL (ref 6–20)
CALCIUM SERPL-MCNC: 8.7 MG/DL (ref 8.7–10.5)
CHLORIDE SERPL-SCNC: 106 MMOL/L (ref 95–110)
CO2 SERPL-SCNC: 26 MMOL/L (ref 23–29)
CREAT SERPL-MCNC: 3.1 MG/DL (ref 0.5–1.4)
EST. GFR  (NO RACE VARIABLE): 19 ML/MIN/1.73 M^2
GLUCOSE SERPL-MCNC: 109 MG/DL (ref 70–110)
POTASSIUM SERPL-SCNC: 3.6 MMOL/L (ref 3.5–5.1)
SODIUM SERPL-SCNC: 141 MMOL/L (ref 136–145)

## 2024-12-23 PROCEDURE — 25000003 PHARM REV CODE 250: Performed by: HOSPITALIST

## 2024-12-23 PROCEDURE — 99223 1ST HOSP IP/OBS HIGH 75: CPT | Mod: ,,, | Performed by: ORTHOPAEDIC SURGERY

## 2024-12-23 PROCEDURE — 36415 COLL VENOUS BLD VENIPUNCTURE: CPT | Performed by: HOSPITALIST

## 2024-12-23 PROCEDURE — 80048 BASIC METABOLIC PNL TOTAL CA: CPT | Performed by: HOSPITALIST

## 2024-12-23 RX ORDER — NIFEDIPINE 30 MG/1
90 TABLET, EXTENDED RELEASE ORAL DAILY
Status: DISCONTINUED | OUTPATIENT
Start: 2024-12-23 | End: 2024-12-23 | Stop reason: HOSPADM

## 2024-12-23 RX ORDER — POTASSIUM CHLORIDE 20 MEQ/1
40 TABLET, EXTENDED RELEASE ORAL DAILY
Start: 2024-12-24

## 2024-12-23 RX ORDER — LIDOCAINE 50 MG/G
1 PATCH TOPICAL DAILY
Qty: 10 PATCH | Refills: 0 | Status: SHIPPED | OUTPATIENT
Start: 2024-12-23 | End: 2025-01-02

## 2024-12-23 RX ORDER — OXYCODONE HYDROCHLORIDE 5 MG/1
5 TABLET ORAL EVERY 8 HOURS PRN
Qty: 15 TABLET | Refills: 0 | Status: SHIPPED | OUTPATIENT
Start: 2024-12-23 | End: 2024-12-29

## 2024-12-23 RX ORDER — METHOCARBAMOL 500 MG/1
500 TABLET, FILM COATED ORAL 4 TIMES DAILY
Qty: 40 TABLET | Refills: 0 | Status: SHIPPED | OUTPATIENT
Start: 2024-12-23 | End: 2025-01-02

## 2024-12-23 RX ADMIN — CALCITRIOL CAPSULES 0.25 MCG 0.25 MCG: 0.25 CAPSULE ORAL at 07:12

## 2024-12-23 RX ADMIN — POTASSIUM CHLORIDE 40 MEQ: 1500 TABLET, EXTENDED RELEASE ORAL at 07:12

## 2024-12-23 RX ADMIN — LABETALOL HYDROCHLORIDE 100 MG: 100 TABLET, FILM COATED ORAL at 01:12

## 2024-12-23 RX ADMIN — LOSARTAN POTASSIUM 100 MG: 25 TABLET, FILM COATED ORAL at 07:12

## 2024-12-23 RX ADMIN — LABETALOL HYDROCHLORIDE 100 MG: 100 TABLET, FILM COATED ORAL at 06:12

## 2024-12-23 RX ADMIN — NIFEDIPINE 30 MG: 30 TABLET, FILM COATED, EXTENDED RELEASE ORAL at 07:12

## 2024-12-23 RX ADMIN — OXYCODONE 5 MG: 5 TABLET ORAL at 04:12

## 2024-12-23 RX ADMIN — OXYCODONE 5 MG: 5 TABLET ORAL at 10:12

## 2024-12-23 RX ADMIN — SPIRONOLACTONE 100 MG: 100 TABLET ORAL at 07:12

## 2024-12-23 RX ADMIN — NIFEDIPINE 90 MG: 30 TABLET, FILM COATED, EXTENDED RELEASE ORAL at 11:12

## 2024-12-23 NOTE — DISCHARGE SUMMARY
[facility] Medicine  Discharge Summary      Patient Name: Sharon Grande  MRN: [id number]  [facility]: [id number]  Patient Class: IP- Inpatient  Admission Date: 12/21/2024  Hospital Length of Stay: 2 days  Discharge Date and Time:  12/23/2024 11:04 AM  Attending Physician: Carl Prado, *   Discharging Provider: Carl Prado MD  Primary Care Provider: [facility] -    Primary Care Team: Networked reference to record PCT     HPI:   This is a 39-year-old female with a past medical history of hypertension, HFpEF (EF: 55%, GIIDD), polycystic kidney disease, cerebral aneurysm s/p coiling, migraines, who presents with left arm pain.      Patient presents for evaluation of left arm pain/numbness that started on the day of presentation.  She reports pain predominantly in her left shoulder/upper arm without radiation, associated with left upper extremity weakness and numbness.  She denied having similar symptoms in the past.  She denied trauma.  She also endorses URI symptoms, with associated shortness of breaths that started 3 days prior to presentation.    In the ED, the patient was hypertensive (up to 250/134, MAP: 173).  Labs were remarkable for an elevated creatinine (2.9-around baseline), elevated BNP (751), hypokalemia (3.2).  CT head showed no acute intracranial abnormality.  Chest x-ray showed mild cardiomegaly, with a probable small pleural effusions versus bilateral pleural thickening.  Patient was given hydralazine 10 mg IV x2, labetalol 10 mg IV x1, morphine 4 mg IV, Dilaudid 1 mg IV, Lasix 20 mg IV, Valium 5 mg IV, Zofran 4 mg IV, potassium bicarbonate 40 mEq and was started on a nicardipine infusion.  She was admitted for further management.    * No surgery found *      Hospital Course:   38 y/o female presents with left shoulder pain and left arm weakness.  Noted to be severely hypertensive.  Recent non compliance with medications.  Admitted to ICU on Cardene drip  for HTN urgency.  Restarted on home BP medications.  Improved BP and weaned off Cardene drip.  Adjust oral medications as needed and added prn IV Hydralazine.  Still having left arm weakness.CT head  show no acute process,symptoms seem to be more related to left shoulder pain.  No fracture on Xray. Ortho was consulted recommended out patient follow up.  Continue working on BP.was transfered to floor and remains stable.patient was discharged home with out patient PT,OT ,pain medication, with PCP,nephrology and orthopedic, follow up.     Goals of Care Treatment Preferences:  Code Status: Full Code      SDOH Screening:  The patient was screened for utility difficulties, food insecurity, transport difficulties, housing insecurity, and interpersonal safety and there were no concerns identified this admission.     Consults:   Consults (From admission, onward)          Status Ordering Provider     Inpatient consult to Orthopedic Surgery  Once        Provider:  Shiloh Gamboa MD    Completed MARIA LUISA LOPES            * Hypertensive urgency  Patient has a current diagnosis of hypertensive urgency (without evidence of end organ damage) which is controlled.  Latest blood pressure and vitals reviewed-   Temp:  [98.2 °F (36.8 °C)-98.7 °F (37.1 °C)]   Pulse:  [68-90]   Resp:  [12-37]   BP: (150-250)/()   SpO2:  [95 %-100 %] .   Patient currently on IV antihypertensives.   Home meds for hypertension were reviewed and noted below.   Hypertension Medications               labetaloL (NORMODYNE) 100 MG tablet Take 1 tablet (100 mg total) by mouth every 8 (eight) hours.    losartan (COZAAR) 50 MG tablet Take 2 tablets (100 mg total) by mouth once daily.    NIFEdipine (ADALAT CC) 30 MG TbSR Take 30 mg by mouth once daily.    spironolactone (ALDACTONE) 50 MG tablet Take 2 tablets (100 mg total) by mouth once daily.            Medication adjustment for hospital antihypertensives is as follows- Wean nicardipine as tolerated,  resume home medications     Will aim for controlled BP reduction by medications noted above. Monitor and mitigate end organ damage as indicated.    Left arm weakness  Patient presents with left arm pain/weakness and numbness  She denies trauma or falling on her arm.   Exam is significant for decreased strength in left arm, although some limitation might be due to pain. No swelling noted. There is tenderness to palpation of upper arm/shoulder. Radial pulses are equal bilaterally.   Differential diagnosis: neurologic process (CVA, ICH or cervical radiculopathy) should be considered. Less likely related to DVT. Arterial aneurysm/dissection is thought to be less likely.    Plan:   - Left shoulder/arm X-ray   - Brain MRI to r/o intracranial process  - Pain control     Stage 4 chronic kidney disease  Creatine stable for now. BMP reviewed- noted Estimated Creatinine Clearance: 33.2 mL/min (A) (based on SCr of 2.4 mg/dL (H)). according to latest data. Based on current GFR, CKD stage is stage 4 - GFR 15-29.  Monitor UOP and serial BMP and adjust therapy as needed. Renally dose meds. Avoid nephrotoxic medications and procedures.    Chronic diastolic heart failure  Results for orders placed during the hospital encounter of 08/11/24    Echo Saline Bubble? No    Interpretation Summary    Left Ventricle: The left ventricle is normal in size. Severely increased wall thickness. There is severe concentric hypertrophy. There is normal systolic function with a visually estimated ejection fraction of 55 - 60%. Grade II diastolic dysfunction.    Right Ventricle: Normal right ventricular cavity size. Systolic function is normal.    Aortic Valve: There is mild aortic regurgitation.    Tricuspid Valve: There is mild regurgitation.    Pulmonary Artery: The estimated pulmonary artery systolic pressure is 37 mmHg.    Global strain= -11.3%.    -S/p Lasix 20 mg IV, which the patient responded to well.   - Diuresis PRN   - Repeat echocardiogram      PKD (polycystic kidney disease)  History noted         Final Active Diagnoses:    Diagnosis Date Noted POA    PRINCIPAL PROBLEM:  Hypertensive urgency [I16.0] 04/20/2021 Yes    Left arm weakness [R29.898] 12/22/2024 Yes    Stage 4 chronic kidney disease [N18.4] 08/18/2024 Yes    Chronic diastolic heart failure [I50.32] 08/12/2024 Yes    PKD (polycystic kidney disease) [Q61.3] 04/26/2015 Not Applicable      Problems Resolved During this Admission:       Discharged Condition: stable    Disposition: Home or Self Care    Follow Up:   Follow-up Information       [facility] Follow [facility] in 1 week(s).    Contact information:  [address]  [place] [address] [phone]               [facility] - Smoking Cessation Follow up today.    Specialty: Smoking Cessation  Why: Office will contact patient  Contact information:  [address]  [facility] [address] [phone]  Additional information:  2nd Floor                         Patient Instructions:      Ambulatory referral/consult to Smoking Cessation Program   Standing Status: Future   Referral Priority: Routine Referral Type: Consultation   Referral Reason: Specialty Services Required   Requested Specialty: CTTS   Number of Visits Requested: 1     Ambulatory Referral/Consult to Physical Therapy   Standing Status: Future   Referral Priority: Routine Referral Type: Physical Medicine   Referral Reason: Specialty Services Required   Number of Visits Requested: 1     Ambulatory referral/consult to Orthopedics   Standing Status: Future   Referral Priority: Routine Referral Type: Consultation   Requested Specialty: Orthopedic Surgery   Number of Visits Requested: 1     Reason for not Prescribing Nicotine Replacement     Order Specific Question Answer Comments   Reason for not Prescribing: Patient refused      Activity as tolerated       Significant Diagnostic Studies: Labs: BMP:   Recent Labs   Lab 12/22/24  0353 12/23/24  0846   * 109   NA  141 141   K 3.0* 3.6    106   CO2 26 26   BUN 32* 33*   CREATININE 2.7* 3.1*   CALCIUM 9.1 8.7   MG 1.8  --    , CMP   Recent Labs   Lab 12/22/24  0353 12/23/24  0846    141   K 3.0* 3.6    106   CO2 26 26   * 109   BUN 32* 33*   CREATININE 2.7* 3.1*   CALCIUM 9.1 8.7   PROT 7.1  --    ALBUMIN 3.5  --    BILITOT 0.3  --    ALKPHOS 29*  --    AST 11  --    ALT 10  --    ANIONGAP 12 9   , and CBC   Recent Labs   Lab 12/22/24  0354   WBC 4.96   HGB 11.7*   HCT 35.0*        Radiology: X-Ray: CXR: X-Ray Chest 1 View (CXR): No results found for this visit on 12/21/24. and X-Ray Chest PA and Lateral (CXR):   Results for orders placed or performed during the hospital encounter of 12/21/24   X-Ray Chest PA And Lateral    Narrative    EXAMINATION:  CHEST PA AND LATERAL    CLINICAL HISTORY:  Chronic kidney disease, stage 4 (severe)    TECHNIQUE:  PA and lateral chest radiograph    COMPARISON:  03/11/2020    FINDINGS:  The cardiac silhouette is mildly enlarged.  There is mild vascularity at the aortic knob.  There is blunting of the costophrenic angles, which may suggest small pleural effusion and/or pleural thickening.  There is hair artifact is obscuring the medial right apex.      Impression    Mild cardiomegaly.  Probable small pleural effusions versus bilateral pleural thickening.      Electronically signed by: Melanie Lorenzana  Date:    12/21/2024  Time:    17:28       Pending Diagnostic Studies:       Procedure Component Value Units Date/Time    Echo Saline Bubble? Yes [[id number]]     Order Status: Sent Lab Status: No result            Medications:  Reconciled Home Medications:      Medication List        START taking these medications      LIDOcaine 5 %  Commonly known as: LIDODERM  Place 1 patch onto the skin once daily. Remove & Discard patch within 12 hours or as directed by MD for 10 days     methocarbamoL 500 MG Tab  Commonly known as: ROBAXIN  Take 1 tablet (500 mg total) by mouth 4  (four) times daily. for 10 days     oxyCODONE 5 MG immediate release tablet  Commonly known as: ROXICODONE  Take 1 tablet (5 mg total) by mouth every 8 (eight) hours as needed.            CONTINUE taking these medications      acetaminophen 325 MG tablet  Commonly known as: TYLENOL  Take 325 mg by mouth every 6 (six) hours as needed for Pain.     calcitRIOL 0.25 MCG Cap  Commonly known as: ROCALTROL  Take 1 capsule (0.25 mcg total) by mouth once daily.     labetaloL 100 MG tablet  Commonly known as: NORMODYNE  Take 1 tablet (100 mg total) by mouth every 8 (eight) hours.     losartan 50 MG tablet  Commonly known as: COZAAR  Take 2 tablets (100 mg total) by mouth once daily.     NIFEdipine 30 MG Tbsr  Commonly known as: ADALAT CC  Take 30 mg by mouth once daily.     spironolactone 50 MG tablet  Commonly known as: ALDACTONE  Take 2 tablets (100 mg total) by mouth once daily.            STOP taking these medications      potassium chloride SA 10 MEQ tablet  Commonly known as: K-DUR,KLOR-CON M            ASK your doctor about these medications      JARDIANCE 10 mg tablet  Generic drug: empagliflozin  Take 1 tablet (10 mg total) by mouth once daily.              Indwelling Lines/Drains at time of discharge:   Lines/Drains/Airways       None                   Time spent on the discharge of patient:  over 30  minutes         Carl Prado MD  Department of Hospital Medicine  [facility]

## 2024-12-23 NOTE — NURSING
[facility]  Nurses Note -- 4 Eyes      12/22/2024       Skin assessed on: Q Shift      [x] No Pressure Injuries Present    [x]Prevention Measures Documented    [] Yes LDA  for Pressure Injury Previously documented     [] Yes New Pressure Injury Discovered   [] LDA for New Pressure Injury Added      Attending RN:  Rosa Dawn LPN     Second RN:  AZALIA Kathleen

## 2024-12-23 NOTE — PLAN OF CARE
Problem: Infection  Goal: Absence of Infection Signs and Symptoms  Outcome: Met     Problem: Adult Inpatient Plan of Care  Goal: Plan of Care Review  Outcome: Met  Goal: Patient-Specific Goal (Individualized)  Outcome: Met  Goal: Absence of Hospital-Acquired Illness or Injury  Outcome: Met  Goal: Optimal Comfort and Wellbeing  Outcome: Met  Goal: Readiness for Transition of Care  Outcome: Met     Problem: Acute Kidney Injury/Impairment  Goal: Fluid and Electrolyte Balance  Outcome: Met  Goal: Improved Oral Intake  Outcome: Met  Goal: Effective Renal Function  Outcome: Met     Problem: Hypertension Acute  Goal: Blood Pressure Within Desired Range  Outcome: Met     Problem: Pain Acute  Goal: Optimal Pain Control and Function  Outcome: Met

## 2024-12-23 NOTE — HOSPITAL COURSE
38 y/o female presents with left shoulder pain and left arm weakness.  Noted to be severely hypertensive.  Recent non compliance with medications.  Admitted to ICU on Cardene drip for HTN urgency.  Restarted on home BP medications.  Improved BP and weaned off Cardene drip.  Adjust oral medications as needed and added prn IV Hydralazine.  Still having left arm weakness.CT head  show no acute process,symptoms seem to be more related to left shoulder pain.  No fracture on Xray. Ortho was consulted recommended out patient follow up.  Continue working on BP.was transfered to floor and remains stable.patient was discharged home with out patient PT,OT ,pain medication, with PCP,nephrology and orthopedic, follow up.

## 2024-12-23 NOTE — CONSULTS
Orthopedics Consult Note     CC: L shoulder and arm pain     HPI: 39 F with Acute left shoulder pain that started   Presented to ER and dx with hypertensive emergency with HTN as concern for cause of arm symptoms   Blood pressure controlled  She has been stepped down from the ICU   History of kidney disease, last GFR 22     Arm pain did involve most of upper arm with radiation into the forearm, associated numbness and tingling in the hand   Was not able to move the arm due to pain   Pain with any motion, arm hanging free when she was upright   Pain has improved but is still significant, localized to subdeltoid fossa and upper arm  Motion has improved some  Numbness has resolve      Past Medical History:   Diagnosis Date    Anemia     Bipolar 1 disorder     Cerebral aneurysm     Hypertension     since 2012    Polycystic kidney disease     Polycystic kidney disease     Pre-eclampsia     Renal disorder     Since childhood     SAH (subarachnoid hemorrhage)     Stroke      Past Surgical History:   Procedure Laterality Date    BRAIN SURGERY      CEREBRAL ANGIOGRAM N/A 2018    Procedure: ANGIOGRAM-CEREBRAL;  Surgeon: Jayna Surgeon;  Location: [facility];  Service: Anesthesiology;  Laterality: N/A;     SECTION N/A 2018    Procedure:  SECTION;  Surgeon: Obed Soto MD;  Location: [facility] L&D;  Service: OB/GYN;  Laterality: N/A;    DILATION AND CURETTAGE OF UTERUS           Social History     Tobacco Use    Smoking status: Some Days     Types: Cigarettes    Smokeless tobacco: Never   Substance Use Topics    Alcohol use: Yes     Comment: occasional    Drug use: Yes     Types: Marijuana     Family History   Problem Relation Name Age of Onset    Hypertension Mother      Polycystic kidney disease Mother      Hypertension Paternal Grandmother      Polycystic kidney disease Daughter           Current Facility-Administered Medications:     acetaminophen tablet 650 mg, 650 mg, Oral, Q4H PRN,  Khris Lanza MD    aluminum-magnesium hydroxide-simethicone 200-200-20 mg/5 mL suspension 30 mL, 30 mL, Oral, QID PRN, Khris Lanza MD    bisacodyL suppository 10 mg, 10 mg, Rectal, Daily PRN, Khris Lanza MD    calcitRIOL capsule 0.25 mcg, 0.25 mcg, Oral, Daily, Khris Lanza MD, 0.25 mcg at 12/23/24 0757    glucagon (human recombinant) injection 1 mg, 1 mg, Intramuscular, PRN, Khris Lanza MD    glucose chewable tablet 16 g, 16 g, Oral, PRN, Khris Lanza MD    glucose chewable tablet 24 g, 24 g, Oral, PRN, Khris Lanza MD    hydrALAZINE injection 10 mg, 10 mg, Intravenous, Q8H PRN, Khris Lanza MD    labetaloL tablet 100 mg, 100 mg, Oral, Q8H, Khris Lanza MD, 100 mg at 12/23/24 0604    losartan tablet 100 mg, 100 mg, Oral, Daily, Khris Lanza MD, 100 mg at 12/23/24 0757    melatonin tablet 6 mg, 6 mg, Oral, Nightly PRN, Khris Lanza MD    naloxone 0.4 mg/mL injection 0.02 mg, 0.02 mg, Intravenous, PRN, Khris Lanza MD    NIFEdipine 24 hr tablet 90 mg, 90 mg, Oral, Daily, Carl Prado MD    ondansetron injection 4 mg, 4 mg, Intravenous, Q8H PRN, Khris Lanza MD    oxyCODONE immediate release tablet 5 mg, 5 mg, Oral, Q4H PRN, Khris Lanza MD, 5 mg at 12/23/24 1016    potassium chloride SA CR tablet 40 mEq, 40 mEq, Oral, Daily, Khris Lanza MD, 40 mEq at 12/23/24 0757    prochlorperazine injection Soln 5 mg, 5 mg, Intravenous, Q6H PRN, Khris Lanza MD    senna-docusate 8.6-50 mg per tablet 1 tablet, 1 tablet, Oral, Daily PRN, Khris Lanza MD    simethicone chewable tablet 80 mg, 1 tablet, Oral, QID PRN, Khris Lanza MD    sodium chloride 0.9% flush 10 mL, 10 mL, Intravenous, Q12H PRN, Khris Lanza MD    Physical Exam:    Temp:  [97.9 °F (36.6 °C)-99.3 °F (37.4 °C)] 98 °F (36.7 °C)  Pulse:  [59-87] 61  Resp:  [5-47] 18  SpO2:  [97 %-100 %] 97 %  BP: (124-192)/() 144/71    General: Patient  is  alert, awake and oriented to time, place and person. Mood and affect are appropriate.  Patient does not appear to be in any distress, denies any constitutional symptoms and appears stated age.   HEENT: Pupils are equal and round, sclera are not injected. External examination of ears and nose reveals no abnormalities. Cranial nerves II-X are grossly intact  Skin:  no rashes, abrasions or open wounds on the affected extremity   Resp: No respiratory distress or audible wheezing   CV: 2+  pulses, all extremities warm and well perfused   Left Upper extremity   FROM of elbow wrist and hand  Some pain with ROM elbow   AROM shoulder: FE 50 supine  PROM: FE 90 supine  Pain worse with overhead motion  LTSI m/u/r  2+ RP  + EPL, IO, FDS, FDP       Imaging: 3 views of the left shoulder show elevation of humeral head. There is an extremely small calcification and in inferior posterior humeral head. This is not consistent in appearance or location with calcific tendinitis. No fractures.  No AC arthritis noted    Hemoglobin   Date Value Ref Range Status   12/22/2024 11.7 (L) 12.0 - 16.0 g/dL Final     POC Hematocrit   Date Value Ref Range Status   04/19/2021 34 (L) 36 - 54 %PCV Final     Hematocrit   Date Value Ref Range Status   12/22/2024 35.0 (L) 37.0 - 48.5 % Final     Platelets   Date Value Ref Range Status   12/22/2024 181 150 - 450 K/uL Final     Sodium   Date Value Ref Range Status   12/23/2024 141 136 - 145 mmol/L Final     Potassium   Date Value Ref Range Status   12/23/2024 3.6 3.5 - 5.1 mmol/L Final     Chloride   Date Value Ref Range Status   12/23/2024 106 95 - 110 mmol/L Final     BUN   Date Value Ref Range Status   12/23/2024 33 (H) 6 - 20 mg/dL Final     Creatinine   Date Value Ref Range Status   12/23/2024 3.1 (H) 0.5 - 1.4 mg/dL Final     Phosphorus   Date Value Ref Range Status   12/22/2024 2.9 2.7 - 4.5 mg/dL Final     Magnesium   Date Value Ref Range Status   12/22/2024 1.8 1.6 - 2.6 mg/dL Final      Glucose   Date Value Ref Range Status   12/23/2024 109 70 - 110 mg/dL Final     Sed Rate   Date Value Ref Range Status   03/07/2012 35 (H) 0 - 20 mm/hr Final         A/P: 39 y.o. female with Acute L shoulder pain   -Weakness likely related to pain but did have some neuro symptoms as well. Could represent combined cervical and shoulder pathology.   Calcification seen on XR not suspicious for calcific tendinitis.  No trauma concerning for acute cuff tear. Elevation seen on XR likely related to patient positioning at time of exam.     - Pain control per primary. NSAIDs contraindicated given kidney disease, recent hypertensive urgency.   Recommend  Tylenol 500 mg scheduled q6 for pain, oxycodone q6 prn. Consider adding medrol dose pack if safe in setting of medical comorbidities.   - Follow up in clinic in 2 weeks with me or RODRIGUE Cochran

## 2024-12-23 NOTE — DISCHARGE INSTRUCTIONS
Our goal at [facility] is to always give you outstanding care and exceptional service. You may receive a survey by mail, text or e-mail in the next 7-10 days from Everardo Blackwood and our leadership team asking about the care you received with us. The survey should only take 5-10 minutes to complete and is very important to us.     Your feedback provides us with a way to recognize our staff who work tirelessly to provide the best care! Also, your responses help us learn how to improve when your experience was below our aspiration of excellence. We WILL use your feedback to continue making improvements to help us provide the highest quality care. We keep your personal information and feedback confidential. We appreciate your time completing this survey and can't wait to hear from you!!!     We want you to leave us today feeling like you can DEFINITELY recommend us to others! We look forward to your continued care with us! Thanks so much for choosing [facility] for your healthcare needs!

## 2024-12-23 NOTE — NURSING
Report received from night nurse AZALIA Lee. Visualized patient and assessed patient's overall condition and appearance. No acute distress noted. Plan of care ongoing.    [facility]  [place]es [place] -- 4 Eyes      12/23/2024       Skin assessed on: Q Shift      [x] No Pressure Injuries Present    [x]Prevention Measures Documented    [] Yes LDA  for Pressure Injury Previously documented     [] Yes New Pressure Injury Discovered   [] LDA for New Pressure Injury Added      Attending RN:  Priyanka Santiago LPN     Second RN:  Rosa Dawn LPN

## 2024-12-23 NOTE — PLAN OF CARE
Case Management Final Discharge Note    Discharge Disposition: Home    New DME ordered / company name: None     Relevant SDOH / Transition of Care Barriers:  None     Primary Caretaker and contact information: Extended Emergency Contact Information  Primary Emergency Contact: Sharon Edwards   [facility]  Home Phone: [phone]  Mobile Phone: [phone]  Relation: Grandparent  Secondary Emergency Contact: Eren Roy  Address: [address] [place] [address] [address]            [place] [address] [facility]  Home Phone: [phone]  Mobile Phone: [phone]  Relation: Relative  Mother: LOPEZ HENRY  Mobile Phone: [phone]     Scheduled followup appointment: PCP- Patient will call in 1 week to schedule a hospital follow-up     Referrals placed: Smoking Cessation, Orthopedic and Physical Therapy- Referral faxed to office staff. Office will contact patient.     Transportation: Patient's family will provide transportation home.    Patient and family educated on discharge services and updated on DC plan. Bedside LPN Priyanka Santiago notified, patient clear to discharge from Case Management Perspective.       12/23/24 1114   Final Note   Assessment Type Final Discharge Note   Anticipated Discharge Disposition Home   What phone number can be called within the next 1-3 days to see how you are doing after discharge? [phone]   Hospital Resources/Appts/Education Provided Appointments scheduled and added to AVS   Post-Acute Status   Coverage Medicaid   Discharge Delays None known at this time

## 2024-12-23 NOTE — PLAN OF CARE
Problem: Infection  Goal: Absence of Infection Signs and Symptoms  Outcome: Progressing     Problem: Adult Inpatient Plan of Care  Goal: Plan of Care Review  Outcome: Progressing  Goal: Patient-Specific Goal (Individualized)  Outcome: Progressing  Goal: Absence of Hospital-Acquired Illness or Injury  Outcome: Progressing  Goal: Optimal Comfort and Wellbeing  Outcome: Progressing  Goal: Readiness for Transition of Care  Outcome: Progressing     Problem: Acute Kidney Injury/Impairment  Goal: Fluid and Electrolyte Balance  Outcome: Progressing  Goal: Improved Oral Intake  Outcome: Progressing  Goal: Effective Renal Function  Outcome: Progressing     Problem: Hypertension Acute  Goal: Blood Pressure Within Desired Range  Outcome: Progressing     Problem: Pain Acute  Goal: Optimal Pain Control and Function  Outcome: Progressing

## 2024-12-24 LAB
BACTERIA UR CULT: NORMAL
OHS QRS DURATION: 114 MS
OHS QTC CALCULATION: 469 MS

## 2025-01-20 NOTE — SUBJECTIVE & OBJECTIVE
Called and spoke with Pt to notify him that his prior authorization for [facility] for the new year has been approved until 12/31/2025 and his shipment he should expect eduardo. Pt confirmed understanding      Neurologic Chief Complaint: SAH    Subjective:     Interval History: Patient is seen for follow-up neurological assessment and treatment recommendations:     Per neurosurgery note patient okay to step down. Continuing Nimotop, to be completed on 8/31. Doing well on exam today.     HPI, Past Medical, Family, and Social History remains the same as documented in the initial encounter. Just c/o headache     Review of Systems   Constitutional: Negative for fever.   HENT: Negative for trouble swallowing.    Respiratory: Negative for shortness of breath.    Cardiovascular: Negative for chest pain.   Neurological: Positive for headaches. Negative for speech difficulty and weakness.     Scheduled Meds:   famotidine  20 mg Oral Daily    heparin (porcine)  5,000 Units Subcutaneous Q8H    niMODipine  30 mg Oral Q2H    polyethylene glycol  17 g Oral Daily    prenatal vits96-iron fum-folic  1 tablet Oral Daily    senna-docusate 8.6-50 mg  1 tablet Oral BID    sodium chloride 0.9%  10 mL Intravenous Q6H    sodium chloride  2 g Oral TID     Continuous Infusions:    PRN Meds:sodium chloride, acetaminophen, labetalol, magnesium oxide, magnesium oxide, oxyCODONE, potassium chloride 10%, potassium chloride 10%, potassium chloride 10%, potassium, sodium phosphates, potassium, sodium phosphates, potassium, sodium phosphates, Flushing PICC Protocol **AND** sodium chloride 0.9% **AND** sodium chloride 0.9%, sodium chloride 0.9%    Objective:     Vital Signs (Most Recent):  Temp: 98.2 °F (36.8 °C) (08/29/18 1100)  Pulse: 64 (08/29/18 1200)  Resp: 20 (08/29/18 1200)  BP: (!) 146/79 (08/29/18 1100)  SpO2: 100 % (08/29/18 1200)  BP Location: Left arm    Vital Signs Range (Last 24H):  Temp:  [98.2 °F (36.8 °C)-98.9 °F (37.2 °C)]   Pulse:  [58-84]   Resp:  [13-33]   BP: (104-154)/()   SpO2:  [99 %-100 %]   BP Location: Left arm    Physical Exam   Constitutional: She appears well-developed and well-nourished.   Pregnant    Pulmonary/Chest: No respiratory distress.   Skin: Skin is warm and dry.   Psychiatric: She has a normal mood and affect. Her behavior is normal.   Nursing note and vitals reviewed.  EVD removed 8/27    Neurological Exam:   LOC: alert  Attention Span: Good   Language: No aphasia  Articulation: No dysarthria  Orientation: Person, Place, Time   Visual Fields: Full  EOM (CN III, IV, VI): Full/intact  Pupils (CN II, III): PERRL  Facial Movement (CN VII): Symmetric facial expression    Motor: Arm left  Normal 5/5  Leg left  Normal 5/5  Arm right  Normal 5/5  Leg right Normal 5/5  Sensation: Intact to light touch, temperature and vibration  Tone: Normal tone throughout    Laboratory:  CMP:   Recent Labs   Lab  08/29/18 0318 08/29/18   1116   CALCIUM  9.0   --    ALBUMIN  2.4*   --    PROT  5.9*   --    NA  137  137  134*   K  4.0   --    CO2  24   --    CL  106   --    BUN  11   --    CREATININE  0.6   --    ALKPHOS  67   --    ALT  13   --    AST  12   --    BILITOT  0.2   --      CBC:   Recent Labs   Lab  08/29/18 0318   WBC  6.25   RBC  2.89*   HGB  8.5*   HCT  26.0*   PLT  290   MCV  90   MCH  29.4   MCHC  32.7     Lipid Panel: No results for input(s): CHOL, LDLCALC, HDL, TRIG in the last 168 hours.  Coagulation:   Recent Labs   Lab  08/29/18 0318   INR  1.1     Platelet Aggregation Study: No results for input(s): PLTAGG, PLTAGINTERP, PLTAGREGLACO, ADPPLTAGGREG in the last 168 hours.  Hgb A1C: No results for input(s): HGBA1C in the last 168 hours.  TSH: No results for input(s): TSH in the last 168 hours.    Diagnostic Results     Brain Imaging   CT Head w/o contrast 8-10-18 results:    1. Extensive subarachnoid hemorrhage, most likely from an aneurysm rupture.  It is difficult to determine the site of the aneurysm rupture on this study due to the diffuse nature of the subarachnoid hemorrhage throughout the posterior fossa as well as the basilar cisternal spaces.  2. Slight increase in the ventricular size  when compared to the previous study suggestive of a developing hydrocephalus.    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  Successful coil embolization of this aneurysm.     Cardiac Imaging   2D Echo 8-11-18 results:    1 - Eccentric hypertrophy.     2 - Normal left ventricular systolic function (EF 60-65%).     3 - No wall motion abnormalities.     4 - Normal left ventricular diastolic function.     5 - Normal right ventricular systolic function .     6 - The estimated PA systolic pressure is 31 mmHg.     7 - Trivial pericardial effusion.     MRI 8/27/18 -   Interval operative change with right frontal ventricular catheter placement.  There is slight reduced distension of the lateral and 3rd ventricles compared to prior which remain mildly prominent.    Previous intraventricular and subarachnoid hemorrhage overall less apparent possibly related to differences in technique.  There is no definite significant new hemorrhage.    There is a punctate focus of diffusion restriction in the right basal ganglia which may be artifactual from adjacent coursing ventricular catheter however small area of recent infarction not excluded.  Clinical correlation and follow-up advised.

## 2025-02-21 ENCOUNTER — TELEPHONE (OUTPATIENT)
Dept: NEPHROLOGY | Facility: CLINIC | Age: 40
End: 2025-02-21
Payer: MEDICAID

## 2025-02-21 ENCOUNTER — LAB VISIT (OUTPATIENT)
Dept: LAB | Facility: HOSPITAL | Age: 40
End: 2025-02-21
Attending: STUDENT IN AN ORGANIZED HEALTH CARE EDUCATION/TRAINING PROGRAM
Payer: MEDICAID

## 2025-02-21 DIAGNOSIS — N18.9 CHRONIC KIDNEY DISEASE, UNSPECIFIED CKD STAGE: ICD-10-CM

## 2025-02-21 LAB
ALBUMIN SERPL BCP-MCNC: 3.8 G/DL (ref 3.5–5.2)
ANION GAP SERPL CALC-SCNC: 9 MMOL/L (ref 8–16)
BACTERIA #/AREA URNS HPF: NORMAL /HPF
BILIRUB UR QL STRIP: NEGATIVE
BUN SERPL-MCNC: 34 MG/DL (ref 6–20)
CALCIUM SERPL-MCNC: 9.1 MG/DL (ref 8.7–10.5)
CHLORIDE SERPL-SCNC: 107 MMOL/L (ref 95–110)
CLARITY UR: CLEAR
CO2 SERPL-SCNC: 24 MMOL/L (ref 23–29)
COLOR UR: COLORLESS
CREAT SERPL-MCNC: 3 MG/DL (ref 0.5–1.4)
CREAT UR-MCNC: 63.1 MG/DL (ref 15–325)
EST. GFR  (NO RACE VARIABLE): 20 ML/MIN/1.73 M^2
GLUCOSE SERPL-MCNC: 111 MG/DL (ref 70–110)
GLUCOSE UR QL STRIP: NEGATIVE
HGB UR QL STRIP: ABNORMAL
HYALINE CASTS #/AREA URNS LPF: 0 /LPF
KETONES UR QL STRIP: NEGATIVE
LEUKOCYTE ESTERASE UR QL STRIP: NEGATIVE
MICROSCOPIC COMMENT: NORMAL
NITRITE UR QL STRIP: NEGATIVE
PH UR STRIP: 6 [PH] (ref 5–8)
PHOSPHATE SERPL-MCNC: 2.9 MG/DL (ref 2.7–4.5)
POTASSIUM SERPL-SCNC: 3.5 MMOL/L (ref 3.5–5.1)
PROT UR QL STRIP: ABNORMAL
PROT UR-MCNC: 60 MG/DL
PROT/CREAT UR: 0.95 MG/G{CREAT} (ref 0–0.2)
PTH-INTACT SERPL-MCNC: 267.6 PG/ML (ref 9–77)
RBC #/AREA URNS HPF: 2 /HPF (ref 0–4)
SODIUM SERPL-SCNC: 140 MMOL/L (ref 136–145)
SP GR UR STRIP: 1.01 (ref 1–1.03)
SQUAMOUS #/AREA URNS HPF: 5 /HPF
URN SPEC COLLECT METH UR: ABNORMAL
UROBILINOGEN UR STRIP-ACNC: NEGATIVE EU/DL
WBC #/AREA URNS HPF: 0 /HPF (ref 0–5)

## 2025-02-21 PROCEDURE — 83970 ASSAY OF PARATHORMONE: CPT | Performed by: STUDENT IN AN ORGANIZED HEALTH CARE EDUCATION/TRAINING PROGRAM

## 2025-02-21 PROCEDURE — 36415 COLL VENOUS BLD VENIPUNCTURE: CPT | Performed by: STUDENT IN AN ORGANIZED HEALTH CARE EDUCATION/TRAINING PROGRAM

## 2025-02-21 PROCEDURE — 81000 URINALYSIS NONAUTO W/SCOPE: CPT | Performed by: STUDENT IN AN ORGANIZED HEALTH CARE EDUCATION/TRAINING PROGRAM

## 2025-02-21 PROCEDURE — 82570 ASSAY OF URINE CREATININE: CPT | Performed by: STUDENT IN AN ORGANIZED HEALTH CARE EDUCATION/TRAINING PROGRAM

## 2025-02-21 PROCEDURE — 80069 RENAL FUNCTION PANEL: CPT | Performed by: STUDENT IN AN ORGANIZED HEALTH CARE EDUCATION/TRAINING PROGRAM

## 2025-02-26 DIAGNOSIS — N18.5 CKD (CHRONIC KIDNEY DISEASE) STAGE 5, GFR LESS THAN 15 ML/MIN: ICD-10-CM

## 2025-02-26 DIAGNOSIS — N18.9 CHRONIC KIDNEY DISEASE, UNSPECIFIED CKD STAGE: Primary | ICD-10-CM

## 2025-02-26 DIAGNOSIS — N18.4 STAGE 4 CHRONIC KIDNEY DISEASE: ICD-10-CM

## 2025-02-27 ENCOUNTER — TELEPHONE (OUTPATIENT)
Dept: TRANSPLANT | Facility: CLINIC | Age: 40
End: 2025-02-27
Payer: MEDICAID

## 2025-02-28 ENCOUNTER — EPISODE CHANGES (OUTPATIENT)
Dept: TRANSPLANT | Facility: CLINIC | Age: 40
End: 2025-02-28

## 2025-03-05 ENCOUNTER — TELEPHONE (OUTPATIENT)
Dept: TRANSPLANT | Facility: CLINIC | Age: 40
End: 2025-03-05
Payer: MEDICAID

## 2025-03-11 ENCOUNTER — TELEPHONE (OUTPATIENT)
Dept: TRANSPLANT | Facility: CLINIC | Age: 40
End: 2025-03-11
Payer: MEDICAID

## 2025-03-11 DIAGNOSIS — N18.6 END STAGE RENAL DISEASE: Primary | ICD-10-CM

## 2025-03-11 DIAGNOSIS — Z76.82 ORGAN TRANSPLANT CANDIDATE: ICD-10-CM

## 2025-03-11 NOTE — PROGRESS NOTES
"  Subjective     Chief Complaint: CKD/HTN    History of Present Illness:  Ms. Sharon Grande is a 39 y.o. female with active medical diagnosis of bipolar 1, HTN, Polycystic kidney disease, hx of CVA with subarachnoid sp coil, preeclampsia, G5A1P0, HFpEF (G2DD)     Interval history: admitted to  ICU for hypertensive emergency 12/21/2024 requiring cardine. Eventually discharged on home regiment of labetolol 100 TID, nifedipine 30, aldactone 100, losartan 50.     -MRI brain October 2019:   "CT head dated 10/29/2019 and MRI of the brain dated 01/29/2019.     FINDINGS:  The craniocervical junction is within normal limits.  The midline structures are unremarkable.  The sellar and parasellar structures are within normal limits.  The intracranial flow voids are within normal limits.     No diffusion-weighted signal abnormality is identified.  There is minimal increased T2/FLAIR signal hyperintensity within the periventricular white matter in the occipital regions.  The remainder of the ventricles and sulci are within normal limits.  There are no extra-axial fluid collections.  There is no evidence of intracranial hemorrhage.  There are changes of prior aneurysm coiling in the right aspect of the anterior circulation.     The orbits and intraorbital contents are within normal limits.  There is a mucous retention cyst within the left maxillary sinus.  There is small amount of trapped fluid within the left posterior ethmoid sinus.  The calvarium is intact.     Impression:     No MR evidence of acute or subacute infarction.     Changes of prior aneurysmal coiling in the right aspect of the anterior communicating artery.     No acute intracranial process."  Repeat MRI ordered but not covered due to insurance    Review of Systems   Constitutional:  Negative for chills and fever.   HENT:  Negative for ear discharge and ear pain.    Eyes:  Negative for blurred vision and double vision.   Respiratory:  Negative for cough and " shortness of breath.    Cardiovascular:  Negative for chest pain and palpitations.   Gastrointestinal:  Negative for abdominal pain, constipation, diarrhea, nausea and vomiting.   Genitourinary:  Negative for dysuria and frequency.   Musculoskeletal:  Negative for myalgias and neck pain.   Skin:  Negative for itching and rash.   Neurological:  Negative for tingling and headaches.         ASSESSMENT & PLAN:     #Polycystic Kidney disease  #CKD4 - polycystic kidney disease, hypertensive nephropathy  Baseline creatinine 2.7-3.0 - progressive CKD  UA 1+ protein, trace occult blood  UPCR 0.95    Creatinine   Date Value Ref Range Status   02/21/2025 3.0 (H) 0.5 - 1.4 mg/dL Final   12/23/2024 3.1 (H) 0.5 - 1.4 mg/dL Final   12/22/2024 2.7 (H) 0.5 - 1.4 mg/dL Final     eGFR   Date Value Ref Range Status   02/21/2025 20 (A) >60 mL/min/1.73 m^2 Final   12/23/2024 19 (A) >60 mL/min/1.73 m^2 Final   12/22/2024 22 (A) >60 mL/min/1.73 m^2 Final     Prot/Creat Ratio, Urine   Date Value Ref Range Status   02/21/2025 0.95 (H) 0.00 - 0.20 Final   12/21/2024 1.13 (H) 0.00 - 0.20 Final   12/17/2024 1.01 (H) 0.00 - 0.20 Final       Imaging: polycystic kidney disease, right kidney 15.6 cm and left 14.3 cm   Kidney transplant: - eGFR finally below 20, send referral  Currently on losartan and aldactone  Jardiance restarted 3/12/2025    #HTN  #Hyperalderstonism  Multiple admissions for malignant hypertension:  BP: labetolol 100 q8, nifedipine 30 BID, losartan 100, aldactone 50  She has not been taking her pressures at home because of family deaths, but we discussed that it was very important to keep track of her blood pressure. She will bring a log of it after 1 week.         #Anemia  Hemoglobin   Date Value Ref Range Status   12/22/2024 11.7 (L) 12.0 - 16.0 g/dL Final     Iron   Date Value Ref Range Status   08/19/2024 88 30 - 160 ug/dL Final     Transferrin   Date Value Ref Range Status   08/19/2024 252 200 - 375 mg/dL Final     TIBC    Date Value Ref Range Status   08/19/2024 373 250 - 450 ug/dL Final     Saturated Iron   Date Value Ref Range Status   08/19/2024 24 20 - 50 % Final     Ferritin   Date Value Ref Range Status   08/19/2024 65 20.0 - 300.0 ng/mL Final   IV iron 3/12/2025 therapy plan ordered. States that recently she has noticed that she has been having pica.     #Secondary hyperparathyroidism  Calcium   Date Value Ref Range Status   02/21/2025 9.1 8.7 - 10.5 mg/dL Final     Phosphorus   Date Value Ref Range Status   02/21/2025 2.9 2.7 - 4.5 mg/dL Final     PTH, Intact   Date Value Ref Range Status   02/21/2025 267.6 (H) 9.0 - 77.0 pg/mL Final   Previously on calcitrol 0.25  Hold calcitrol, continue to monitor PTH            CKD (chronic kidney disease) stage 4, GFR 15-29 ml/min  -     PTH, Intact; Future; Expected date: 06/12/2025  -     Ferritin; Future; Expected date: 06/12/2025  -     Iron and TIBC; Future; Expected date: 06/12/2025  -     Protein/Creatinine Ratio, Urine; Future; Expected date: 06/12/2025  -     Urinalysis; Future; Expected date: 06/12/2025  -     Renal Function Panel; Future; Expected date: 06/12/2025    Secondary hyperparathyroidism    PKD (polycystic kidney disease)    Anemia due to stage 4 chronic kidney disease    Hypertension due to endocrine disorder    Other orders  -     empagliflozin (JARDIANCE) 10 mg tablet; Take 1 tablet (10 mg total) by mouth once daily.  Dispense: 90 tablet; Refill: 3  -     ferric gluconate (Ferrlecit) 62.5 mg/5 mL injection 125 mg  -     EPINEPHrine (EPIPEN) 0.3 mg/0.3 mL pen injection 0.3 mg  -     diphenhydrAMINE injection 50 mg  -     hydrocortisone sodium succinate injection 100 mg  -     0.9% NaCl 250 mL flush bag  -     sodium chloride 0.9% flush 10 mL  -     heparin, porcine (PF) 100 unit/mL injection flush 500 Units  -     alteplase injection 2 mg        RTC in 3 months      PAST HISTORY:     Past Medical History:   Diagnosis Date    Anemia     Bipolar 1 disorder      Cerebral aneurysm     Hypertension     since     Polycystic kidney disease     Polycystic kidney disease     Pre-eclampsia     Renal disorder     Since childhood     SAH (subarachnoid hemorrhage)     Stroke        Past Surgical History:   Procedure Laterality Date    BRAIN SURGERY      CEREBRAL ANGIOGRAM N/A 2018    Procedure: ANGIOGRAM-CEREBRAL;  Surgeon: Jayna Surgeon;  Location: [facility];  Service: Anesthesiology;  Laterality: N/A;     SECTION N/A 2018    Procedure:  SECTION;  Surgeon: Obed Soto MD;  [facility];  Service: OB/GYN;  Laterality: N/A;    DILATION AND CURETTAGE OF UTERUS             Family History   Problem Relation Name Age of Onset    Hypertension Mother      Polycystic kidney disease Mother      Hypertension Paternal Grandmother      Polycystic kidney disease Daughter         Social History     Socioeconomic History    Marital status: Single   Tobacco Use    Smoking status: Some Days     Types: Cigarettes    Smokeless tobacco: Never   Substance and Sexual Activity    Alcohol use: Yes     Comment: occasional    Drug use: Yes     Types: Marijuana    Sexual activity: Yes     Partners: Male     Birth control/protection: None     Social Drivers of Health     Financial Resource Strain: Low Risk  (2024)    Overall Financial Resource Strain (CARDIA)     Difficulty of Paying Living Expenses: Not very hard   Food Insecurity: No Food Insecurity (2024)    Hunger Vital Sign     Worried About Running Out of Food in the Last Year: Never true     Ran Out of Food in the Last Year: Never true   Transportation Needs: No Transportation Needs (2024)    TRANSPORTATION NEEDS     Transportation : No   Physical Activity: Inactive (2024)    Exercise Vital Sign     Days of Exercise per Week: 0 days     Minutes of Exercise per Session: 0 min   Stress: Stress Concern Present (2024)    [place] [employer] - Occupational Stress  Questionnaire     Feeling of Stress : To some extent   Housing Stability: Unknown (12/22/2024)    Housing Stability Vital Sign     Unable to Pay for Housing in the Last Year: No     Homeless in the Last Year: No       MEDICATIONS & ALLERGIES:     Current Outpatient Medications on File Prior to Visit   Medication Sig    acetaminophen (TYLENOL) 325 MG tablet Take 325 mg by mouth every 6 (six) hours as needed for Pain.    calcitRIOL (ROCALTROL) 0.25 MCG Cap Take 1 capsule (0.25 mcg total) by mouth once daily.    empagliflozin (JARDIANCE) 10 mg tablet Take 1 tablet (10 mg total) by mouth once daily. (Patient not taking: Reported on 12/18/2024)    labetaloL (NORMODYNE) 100 MG tablet Take 1 tablet (100 mg total) by mouth every 8 (eight) hours.    losartan (COZAAR) 50 MG tablet Take 2 tablets (100 mg total) by mouth once daily.    NIFEdipine (ADALAT CC) 30 MG TbSR Take 30 mg by mouth once daily.    potassium chloride SA (K-DUR,KLOR-CON) 20 MEQ tablet Take 2 tablets (40 mEq total) by mouth once daily.    spironolactone (ALDACTONE) 50 MG tablet Take 2 tablets (100 mg total) by mouth once daily.     No current facility-administered medications on file prior to visit.       Review of patient's allergies indicates:  No Known Allergies    OBJECTIVE:     Vital Signs:  There were no vitals filed for this visit.    There is no height or weight on file to calculate BMI.     Physical Exam  Constitutional:       General: She is not in acute distress.     Appearance: Normal appearance. She is not ill-appearing or diaphoretic.   HENT:      Head: Normocephalic and atraumatic.      Mouth/Throat:      Pharynx: No oropharyngeal exudate or posterior oropharyngeal erythema.   Eyes:      General: No scleral icterus.        Right eye: No discharge.         Left eye: No discharge.      Extraocular Movements: Extraocular movements intact.      Conjunctiva/sclera: Conjunctivae normal.      Pupils: Pupils are equal, round, and reactive to light.    Neck:      Vascular: No JVD.      Trachea: No tracheal deviation.   Cardiovascular:      Rate and Rhythm: Normal rate and regular rhythm.      Heart sounds: No murmur heard.  Pulmonary:      Effort: Pulmonary effort is normal. No respiratory distress.      Breath sounds: Normal breath sounds. No wheezing or rales.   Abdominal:      General: Abdomen is flat. Bowel sounds are normal. There is no distension.      Palpations: Abdomen is soft. There is no mass.      Tenderness: There is no abdominal tenderness.   Musculoskeletal:         General: No swelling, tenderness or deformity. Normal range of motion.      Cervical back: Normal range of motion and neck supple.      Right lower leg: No edema.      Left lower leg: No edema.   Lymphadenopathy:      Cervical: No cervical adenopathy.   Skin:     General: Skin is warm and dry.      Coloration: Skin is not jaundiced or pale.      Findings: No bruising, erythema or rash.   Neurological:      General: No focal deficit present.      Mental Status: She is alert and oriented to person, place, and time. Mental status is at baseline.      Cranial Nerves: No cranial nerve deficit.         Laboratory  Sodium   Date Value Ref Range Status   02/21/2025 140 136 - 145 mmol/L Final   12/23/2024 141 136 - 145 mmol/L Final     Potassium   Date Value Ref Range Status   02/21/2025 3.5 3.5 - 5.1 mmol/L Final   12/23/2024 3.6 3.5 - 5.1 mmol/L Final     Chloride   Date Value Ref Range Status   02/21/2025 107 95 - 110 mmol/L Final   12/23/2024 106 95 - 110 mmol/L Final     CO2   Date Value Ref Range Status   02/21/2025 24 23 - 29 mmol/L Final   12/23/2024 26 23 - 29 mmol/L Final     BUN   Date Value Ref Range Status   02/21/2025 34 (H) 6 - 20 mg/dL Final   12/23/2024 33 (H) 6 - 20 mg/dL Final     Creatinine   Date Value Ref Range Status   02/21/2025 3.0 (H) 0.5 - 1.4 mg/dL Final   12/23/2024 3.1 (H) 0.5 - 1.4 mg/dL Final     eGFR   Date Value Ref Range Status   02/21/2025 20 (A) >60  mL/min/1.73 m^2 Final   12/23/2024 19 (A) >60 mL/min/1.73 m^2 Final     Calcium   Date Value Ref Range Status   02/21/2025 9.1 8.7 - 10.5 mg/dL Final   12/23/2024 8.7 8.7 - 10.5 mg/dL Final     Phosphorus   Date Value Ref Range Status   02/21/2025 2.9 2.7 - 4.5 mg/dL Final   12/22/2024 2.9 2.7 - 4.5 mg/dL Final     Albumin   Date Value Ref Range Status   02/21/2025 3.8 3.5 - 5.2 g/dL Final   12/22/2024 3.5 3.5 - 5.2 g/dL Final       Diagnostic Results:      Health Maintenance Due   Topic Date Due    Pneumococcal Vaccines (Age 0-49) (1 of 2 - PCV) Never done    Cervical Cancer Screening  04/23/2016    Influenza Vaccine (1) Never done    COVID-19 Vaccine (2 - 2024-25 season) 09/01/2024             Joao Faith MD  Nephrology [facility] [place]

## 2025-03-12 ENCOUNTER — OFFICE VISIT (OUTPATIENT)
Dept: NEPHROLOGY | Facility: CLINIC | Age: 40
End: 2025-03-12
Payer: MEDICAID

## 2025-03-12 VITALS
BODY MASS INDEX: 23.67 KG/M2 | SYSTOLIC BLOOD PRESSURE: 182 MMHG | DIASTOLIC BLOOD PRESSURE: 84 MMHG | WEIGHT: 159.81 LBS | HEIGHT: 69 IN | RESPIRATION RATE: 18 BRPM | HEART RATE: 65 BPM | OXYGEN SATURATION: 98 %

## 2025-03-12 DIAGNOSIS — N18.4 ANEMIA DUE TO STAGE 4 CHRONIC KIDNEY DISEASE: ICD-10-CM

## 2025-03-12 DIAGNOSIS — N25.81 SECONDARY HYPERPARATHYROIDISM: ICD-10-CM

## 2025-03-12 DIAGNOSIS — N18.4 CKD (CHRONIC KIDNEY DISEASE) STAGE 4, GFR 15-29 ML/MIN: Primary | ICD-10-CM

## 2025-03-12 DIAGNOSIS — Q61.3 PKD (POLYCYSTIC KIDNEY DISEASE): ICD-10-CM

## 2025-03-12 DIAGNOSIS — I15.2 HYPERTENSION DUE TO ENDOCRINE DISORDER: ICD-10-CM

## 2025-03-12 DIAGNOSIS — D63.1 ANEMIA DUE TO STAGE 4 CHRONIC KIDNEY DISEASE: ICD-10-CM

## 2025-03-12 PROCEDURE — 99213 OFFICE O/P EST LOW 20 MIN: CPT | Mod: PBBFAC | Performed by: STUDENT IN AN ORGANIZED HEALTH CARE EDUCATION/TRAINING PROGRAM

## 2025-03-12 PROCEDURE — 99999 PR PBB SHADOW E&M-EST. PATIENT-LVL III: CPT | Mod: PBBFAC,,, | Performed by: STUDENT IN AN ORGANIZED HEALTH CARE EDUCATION/TRAINING PROGRAM

## 2025-03-12 RX ORDER — EPINEPHRINE 0.3 MG/.3ML
0.3 INJECTION SUBCUTANEOUS ONCE AS NEEDED
OUTPATIENT
Start: 2025-03-17

## 2025-03-12 RX ORDER — SODIUM FERRIC GLUCONATE COMPLEX IN SUCROSE 12.5 MG/ML
125 INJECTION INTRAVENOUS
OUTPATIENT
Start: 2025-03-17

## 2025-03-12 RX ORDER — METHOCARBAMOL 500 MG/1
TABLET, FILM COATED ORAL
COMMUNITY
Start: 2024-12-23

## 2025-03-12 RX ORDER — HEPARIN 100 UNIT/ML
500 SYRINGE INTRAVENOUS
OUTPATIENT
Start: 2025-03-17

## 2025-03-12 RX ORDER — DIPHENHYDRAMINE HYDROCHLORIDE 50 MG/ML
50 INJECTION, SOLUTION INTRAMUSCULAR; INTRAVENOUS ONCE AS NEEDED
OUTPATIENT
Start: 2025-03-17

## 2025-03-12 RX ORDER — SODIUM CHLORIDE 0.9 % (FLUSH) 0.9 %
10 SYRINGE (ML) INJECTION
OUTPATIENT
Start: 2025-03-17

## 2025-03-12 NOTE — PATIENT INSTRUCTIONS
Please check your blood pressure twice a day for the next week. Check first thing in the morning and right before bed. Keep a log either on your phone or on a piece of paper and write down what happened that day--it does not need to be extensive, but should include if it was a good day or a bad day. (We need to make sure your blood pressure isnt elevated due to stress, pain, etc)    You can either send it to me on [employer] or drop it off at the office, cc: Dr Faith     Keep a log so you can show the transplant team you are taking care of yourself as well    I will see you back in 3 months. I will give you a call if we need to change your blood pressure medications.

## 2025-03-15 NOTE — PROGRESS NOTES
[facility]  Neurosurgery  Progress Note    Subjective:     History of Present Illness: 32 y.o. female 24 weeks pregnant with a history of polycystic kidney disease who presents as transfer from [facility] for SAH. Patient found down by 6 yr old son. Last known normal 0500 today. CT at [facility] revealed SAH within the basal cisterns. US at [facility] with + fetal heart tones. Transferred for neuro evaluation.     Post-Op Info:  Procedure(s) (LRB):  Mri (magnetic resonance imaging) (N/A)   21 Days Post-Op     Interval History: HA significantly improved since yesterday and overall since admission. Patient requesting to be discharged on opioids for her headaches in case she needs it. Denies any seizure activity, focal weakness, or blurry vision. She states she feels a lot better after shower yesterday. Tolerating diet and voiding appropriately. Plan for DC home with nimodipine, salt tabs, outpt therapy, support cane, and bedside commode. Patient will return to clinic in 2 weeks for EVD staple removal and 6 weeks with Dr. Bo. She will follow up with OBGYN within 1 week of dc.        Medications:  Continuous Infusions:  Scheduled Meds:   famotidine  20 mg Oral Daily    heparin (porcine)  5,000 Units Subcutaneous Q8H    niMODipine  30 mg Oral Q2H    polyethylene glycol  17 g Oral Daily    prenatal vits96-iron fum-folic  1 tablet Oral Daily    senna-docusate 8.6-50 mg  1 tablet Oral BID    sodium chloride 0.9%  10 mL Intravenous Q6H    sodium chloride  2 g Oral TID     PRN Meds:sodium chloride, acetaminophen, labetalol, magnesium oxide, magnesium oxide, potassium chloride 10%, potassium chloride 10%, potassium chloride 10%, potassium, sodium phosphates, potassium, sodium phosphates, potassium, sodium phosphates, Flushing PICC Protocol **AND** sodium chloride 0.9% **AND** sodium chloride 0.9%, sodium chloride 0.9%     Review of Systems  Objective:     Weight: 78.3 kg (172 lb 9.9 oz)  Body mass index is 25.49  kg/m².  Vital Signs (Most Recent):  Temp: 97.8 °F (36.6 °C) (08/31/18 1111)  Pulse: 73 (08/31/18 1121)  Resp: 18 (08/31/18 1111)  BP: (!) 136/95 (08/31/18 1111)  SpO2: 98 % (08/31/18 1111) Vital Signs (24h Range):  Temp:  [96.8 °F (36 °C)-98.8 °F (37.1 °C)] 97.8 °F (36.6 °C)  Pulse:  [55-87] 73  Resp:  [16-18] 18  SpO2:  [95 %-99 %] 98 %  BP: (113-137)/(62-95) 136/95     Date 08/31/18 0700 - 09/01/18 0659   Shift 1816-0335 3747-2189 9035-0217 24 Hour Total   INTAKE   P.O. 180   180   Shift Total(mL/kg) 180(2.3)   180(2.3)   OUTPUT   Shift Total(mL/kg)       Weight (kg) 78.3 78.3 78.3 78.3                        Neurosurgery Physical Exam   Vital signs: reviewed above.   Constitutional: well-developed, no acute distress  Cardiovascular: regular rate and rhythm  Resp: normal respirations, not labored, no accessory muscles used  Abdomen: soft, non-distended, not tender to palpation  Pulses: palpable distal pulses   Skin: warm, dry and intact, no rashes  Neurological  GCS: Motor: 6/Verbal: 5/Eyes: 4 GCS Total: 15  Mental Status: Awake, Alert, Oriented x 4  Follows commands   Head: normocephalic, atraumatic  PERRL, EOMI,   Facial expression symmetric, tongue midline, shoulder shrug symmetric  Moves all extremities with good strength 5/5  No drift or dysmetria.   EVD incision c/d/i. Staples intact.         Significant Labs:  Recent Labs   Lab  08/30/18   0347   08/30/18 2008 08/31/18 0419 08/31/18   1215   GLU  81   --    --   100   --    NA  136  136   < >  137  135*  135*  134*   K  4.1   --    --   3.6   --    CL  105   --    --   103   --    CO2  23   --    --   24   --    BUN  14   --    --   13   --    CREATININE  0.6   --    --   0.7   --    CALCIUM  9.0   --    --   8.9   --    MG  1.8   --    --   1.5*   --     < > = values in this interval not displayed.     Recent Labs   Lab  08/30/18   0347  08/31/18   0419   WBC  6.55  6.03   HGB  8.7*  8.5*   HCT  26.9*  27.6*   PLT  300  297     Recent Labs   Lab   08/30/18   0347  08/31/18   0419   INR  1.0  1.0     Microbiology Results (last 7 days)     ** No results found for the last 168 hours. **            Significant Diagnostics:  None new    Assessment/Plan:     * 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 female 26 weeks pregnant with history of polycystic kidney disease with [id number] SAH on 8/10, now s/p acom coiling 8/10 with Dr. Bo. .    -Patient neurostable on exam with continued improvements in headaches.   -continue nimotop X21d with expected end date 9/3/18  -Continue salt tabs  -SQH/TEDs/SCDs for DVTP  -famotidine for PUD ppx  -tylenol PRN pain   -Therapy recommending home with outpt therapy, support cane, and bedside commode.     Dispo: DC home today with 3 day course of nimotop, salt tabs, and DME. She is to resume home HTN meds when she completes nimotop. Patient will return to clinic in 2 weeks for EVD staple removal and 6 weeks with Dr. Bo. She will follow up with OBGYN within 1 week of dc.      Discussed with Dr. Betzy Reagan, PAHarpreetC  Neurosurgery  [facility]   22:55

## 2025-03-28 RX ORDER — LOSARTAN POTASSIUM 50 MG/1
100 TABLET ORAL DAILY
Qty: 180 TABLET | Refills: 3 | Status: SHIPPED | OUTPATIENT
Start: 2025-03-28 | End: 2026-03-28

## 2025-03-28 RX ORDER — SPIRONOLACTONE 50 MG/1
100 TABLET, FILM COATED ORAL DAILY
Qty: 60 TABLET | Refills: 11 | Status: SHIPPED | OUTPATIENT
Start: 2025-03-28 | End: 2026-03-28

## 2025-03-28 RX ORDER — CALCITRIOL 0.25 UG/1
0.25 CAPSULE ORAL DAILY
Qty: 90 CAPSULE | Refills: 3 | Status: SHIPPED | OUTPATIENT
Start: 2025-03-28

## 2025-03-28 NOTE — TELEPHONE ENCOUNTER
Sent to Dr. Marcell RAMOS-3/12/2025  RTC-6/17/2025  Last Prescribed-12/18/2024    -Patient states that she was not able to  Jardiance after the last visit.

## 2025-04-07 ENCOUNTER — TELEPHONE (OUTPATIENT)
Dept: TRANSPLANT | Facility: CLINIC | Age: 40
End: 2025-04-07
Payer: MEDICAID

## 2025-04-07 NOTE — TELEPHONE ENCOUNTER
MA notes per Pre Dialysis adherence form     FOR THE PAST THREE MONTHS:    Joao Faith MD [place], Gina, MA  Yes - sometimes she misses appointments but I have emphazied to her how important it is that she makes her appts. Have never had an issue with her following instructions, but she will need help with polypharmacy    Gina Balderas  Abdominal Transplant MA

## 2025-04-15 ENCOUNTER — TELEPHONE (OUTPATIENT)
Dept: TRANSPLANT | Facility: CLINIC | Age: 40
End: 2025-04-15
Payer: MEDICAID

## 2025-04-15 ENCOUNTER — RESULTS FOLLOW-UP (OUTPATIENT)
Dept: TRANSPLANT | Facility: CLINIC | Age: 40
End: 2025-04-15

## 2025-04-15 ENCOUNTER — OFFICE VISIT (OUTPATIENT)
Dept: TRANSPLANT | Facility: CLINIC | Age: 40
End: 2025-04-15
Payer: MEDICAID

## 2025-04-15 VITALS
HEART RATE: 72 BPM | BODY MASS INDEX: 23.18 KG/M2 | SYSTOLIC BLOOD PRESSURE: 178 MMHG | HEIGHT: 69 IN | WEIGHT: 156.5 LBS | RESPIRATION RATE: 18 BRPM | TEMPERATURE: 97 F | DIASTOLIC BLOOD PRESSURE: 100 MMHG | OXYGEN SATURATION: 100 %

## 2025-04-15 DIAGNOSIS — Q61.3 PKD (POLYCYSTIC KIDNEY DISEASE): ICD-10-CM

## 2025-04-15 DIAGNOSIS — F43.21 COMPLICATED GRIEF: ICD-10-CM

## 2025-04-15 DIAGNOSIS — Z76.82 ORGAN TRANSPLANT CANDIDATE: ICD-10-CM

## 2025-04-15 DIAGNOSIS — Z01.818 PRE-TRANSPLANT EVALUATION FOR KIDNEY TRANSPLANT: Primary | ICD-10-CM

## 2025-04-15 DIAGNOSIS — N18.6 END STAGE RENAL DISEASE: Primary | ICD-10-CM

## 2025-04-15 DIAGNOSIS — Z98.890 S/P COIL EMBOLIZATION OF CEREBRAL ANEURYSM: ICD-10-CM

## 2025-04-15 DIAGNOSIS — F31.9 BIPOLAR 1 DISORDER: ICD-10-CM

## 2025-04-15 DIAGNOSIS — I12.9 BENIGN HYPERTENSION WITH CKD (CHRONIC KIDNEY DISEASE) STAGE IV: ICD-10-CM

## 2025-04-15 DIAGNOSIS — I50.32 CHRONIC DIASTOLIC HEART FAILURE: ICD-10-CM

## 2025-04-15 DIAGNOSIS — N25.81 SECONDARY HYPERPARATHYROIDISM: ICD-10-CM

## 2025-04-15 DIAGNOSIS — R80.9 PROTEINURIA, UNSPECIFIED TYPE: ICD-10-CM

## 2025-04-15 DIAGNOSIS — Z91.148 NON COMPLIANCE W MEDICATION REGIMEN: ICD-10-CM

## 2025-04-15 DIAGNOSIS — N18.4 STAGE 4 CHRONIC KIDNEY DISEASE: ICD-10-CM

## 2025-04-15 DIAGNOSIS — N18.4 BENIGN HYPERTENSION WITH CKD (CHRONIC KIDNEY DISEASE) STAGE IV: ICD-10-CM

## 2025-04-15 DIAGNOSIS — F41.9 ANXIETY: ICD-10-CM

## 2025-04-15 PROBLEM — D63.1 ANEMIA DUE TO STAGE 3B CHRONIC KIDNEY DISEASE: Status: RESOLVED | Noted: 2024-08-18 | Resolved: 2025-04-15

## 2025-04-15 PROBLEM — R26.9 ABNORMALITY OF GAIT AS LATE EFFECT OF CEREBROVASCULAR ACCIDENT (CVA): Status: RESOLVED | Noted: 2018-09-19 | Resolved: 2025-04-15

## 2025-04-15 PROBLEM — D63.1 ANEMIA IN STAGE 4 CHRONIC KIDNEY DISEASE: Status: ACTIVE | Noted: 2021-04-20

## 2025-04-15 PROBLEM — N17.9 AKI (ACUTE KIDNEY INJURY): Status: RESOLVED | Noted: 2018-08-11 | Resolved: 2025-04-15

## 2025-04-15 PROBLEM — R53.83 FATIGUE: Status: RESOLVED | Noted: 2018-09-19 | Resolved: 2025-04-15

## 2025-04-15 PROBLEM — I69.398 ABNORMALITY OF GAIT AS LATE EFFECT OF CEREBROVASCULAR ACCIDENT (CVA): Status: RESOLVED | Noted: 2018-09-19 | Resolved: 2025-04-15

## 2025-04-15 PROBLEM — N18.32 ANEMIA DUE TO STAGE 3B CHRONIC KIDNEY DISEASE: Status: RESOLVED | Noted: 2024-08-18 | Resolved: 2025-04-15

## 2025-04-15 PROCEDURE — 99215 OFFICE O/P EST HI 40 MIN: CPT | Mod: PBBFAC,25,TXP | Performed by: NURSE PRACTITIONER

## 2025-04-15 PROCEDURE — 99204 OFFICE O/P NEW MOD 45 MIN: CPT | Mod: S$PBB,TXP,, | Performed by: TRANSPLANT SURGERY

## 2025-04-15 PROCEDURE — 99204 OFFICE O/P NEW MOD 45 MIN: CPT | Mod: S$PBB,TXP,, | Performed by: PHYSICIAN ASSISTANT

## 2025-04-15 PROCEDURE — 99999 PR PBB SHADOW E&M-EST. PATIENT-LVL V: CPT | Mod: PBBFAC,TXP,, | Performed by: NURSE PRACTITIONER

## 2025-04-15 NOTE — PROGRESS NOTES
PHARM.D. PRE-TRANSPLANT NOTE:    This patient's medication therapy was evaluated as part of her pre-transplant evaluation.      The following general pharmacologic concerns were noted:   - patient's blood pressure uncontrolled on current regimen (BP 200s/100s at visit)    The following concerns for post-operative pain management were noted: none    The following pharmacologic concerns related to HCV therapy were noted: none      This patient's medication profile was reviewed for considerations for DAA Hepatitis C therapy:    [x]  No current inducers of CYP 3A4 or PGP  [x]  No amiodarone on this patient's EMR profile in the last 24 months  [x]  No past or current atrial fibrillation on this patient's EMR profile       Current Medications[1]        I am available for consultation and can be contacted, as needed by the other members of the Transplant team.            [1]   Current Outpatient Medications   Medication Sig Dispense Refill    calcitRIOL (ROCALTROL) 0.25 MCG Cap Take 1 capsule (0.25 mcg total) by mouth once daily. 90 capsule 3    empagliflozin (JARDIANCE) 10 mg tablet Take 1 tablet (10 mg total) by mouth once daily. 90 tablet 3    labetaloL (NORMODYNE) 100 MG tablet Take 1 tablet (100 mg total) by mouth every 8 (eight) hours. 90 tablet 11    losartan (COZAAR) 50 MG tablet Take 2 tablets (100 mg total) by mouth once daily. 180 tablet 3    spironolactone (ALDACTONE) 50 MG tablet Take 2 tablets (100 mg total) by mouth once daily. 60 tablet 11     No current facility-administered medications for this visit.

## 2025-04-15 NOTE — PROGRESS NOTES
"   Transplant Nephrology  Kidney Transplant Recipient Evaluation    Referring Physician: Joao Faith  Current Nephrologist: Joao Faith    Subjective:   CC:  Initial evaluation of kidney transplant candidacy.    HPI:  Ms. Grande is a 39 y.o. year old Black or  female who has presented to be evaluated as a potential kidney transplant recipient.  She has advanced kidney disease secondary to polycystic kidney disease.  Patient is currently pre-dialysis. She has  no   dialysis access.     Previous Transplant: no  Donor: has a few potential donors     PKD  Family HX PKD - (Mother and oldest Daughter)   -Her Mother had LRD kidney  txp -in   CVA due to subarachnoid hemorrhage due to ruptured ZEE  status post aneurysmal coiling on 2018  Residual --no   Fx assessment-Does not formally exercise but takes care of her 5 children . Looks good, not frail.       Hx medical non compliance   Currently,  has Multiple "no shows"  to medical apts in Epic   Last no show 25  -discussed needing to demonstrate compliance moving forward.     Cardiac HX: HTN   BP elevated at clinic, Pt took BP meds during clinic visit  Last TTE 24 : Ef : 55-60%, PA 37         Past Medical and Surgical History: Ms. Grande  has a past medical history of Anemia, Bipolar 1 disorder, Cerebral aneurysm, Encounter for blood transfusion, Hypertension, Polycystic kidney disease, Polycystic kidney disease, Pre-eclampsia, Renal disorder, SAH (subarachnoid hemorrhage), and Stroke.  She has a past surgical history that includes Dilation and curettage of uterus; Cerebral angiogram (N/A, 2018);  section (N/A, 2018); Brain surgery; and Tubal ligation.    Past Social and Family History: Ms. Grande reports that she has been smoking cigarettes. She has never used smokeless tobacco. She reports current alcohol use. She reports current drug use. Drug: Marijuana. Her family history includes Hypertension in her mother and paternal " "grandmother; Polycystic kidney disease in her daughter and mother.    Past Medical History:   Diagnosis Date    Anemia     Bipolar 1 disorder     Cerebral aneurysm     Encounter for blood transfusion     Hypertension     since 2012    Polycystic kidney disease     Polycystic kidney disease     Pre-eclampsia     Renal disorder     Since childhood     SAH (subarachnoid hemorrhage)     Stroke        Review of Systems   Constitutional:  Positive for unexpected weight change. Negative for activity change, appetite change, chills, fatigue and fever.   HENT:  Negative for congestion, facial swelling, postnasal drip, rhinorrhea, sinus pressure, sore throat and trouble swallowing.    Eyes:  Negative for pain, redness and visual disturbance.   Respiratory:  Negative for cough, chest tightness, shortness of breath and wheezing.    Cardiovascular:  Positive for leg swelling. Negative for chest pain and palpitations.   Gastrointestinal:  Negative for abdominal pain, diarrhea, nausea and vomiting.   Genitourinary:  Negative for dysuria, flank pain and urgency.   Musculoskeletal:  Negative for gait problem, neck pain and neck stiffness.   Skin:  Negative for rash.   Allergic/Immunologic: Negative for environmental allergies, food allergies and immunocompromised state.   Neurological:  Negative for dizziness, weakness, light-headedness and headaches.   Psychiatric/Behavioral:  Positive for sleep disturbance. Negative for agitation and confusion. The patient is not nervous/anxious.        Objective:   Blood pressure (!) 190/120, pulse 76, temperature 97.3 °F (36.3 °C), temperature source Tympanic, resp. rate 18, height 5' 9.09" (1.755 m), weight 71 kg (156 lb 8.4 oz), SpO2 100%.body mass index is 23.05 kg/m².    Physical Exam  Constitutional:       Appearance: Normal appearance. She is well-developed.   HENT:      Head: Normocephalic.      Nose: Nose normal.   Eyes:      Conjunctiva/sclera: Conjunctivae normal.      Pupils: Pupils " "are equal, round, and reactive to light.   Cardiovascular:      Rate and Rhythm: Normal rate and regular rhythm.      Heart sounds: Normal heart sounds.   Pulmonary:      Effort: Pulmonary effort is normal.      Breath sounds: Normal breath sounds.   Abdominal:      General: Bowel sounds are normal.      Palpations: Abdomen is soft.   Musculoskeletal:      Cervical back: Normal range of motion and neck supple.   Skin:     General: Skin is warm and dry.   Neurological:      Mental Status: She is alert and oriented to person, place, and time.   Psychiatric:         Behavior: Behavior normal.         Labs:  Lab Results   Component Value Date    WBC 4.68 04/15/2025    HGB 11.5 (L) 04/15/2025    HCT 36.4 (L) 04/15/2025     04/15/2025    K 3.4 (L) 04/15/2025     04/15/2025    CO2 21 (L) 04/15/2025    BUN 36 (H) 04/15/2025    CREATININE 3.8 (H) 04/15/2025    EGFRNORACEVR 15 (L) 04/15/2025    GLUCOSE 99 04/15/2025    CALCIUM 9.1 04/15/2025    PHOS 3.4 04/15/2025    MG 1.8 12/22/2024    ALBUMIN 3.8 04/15/2025    AST 11 04/15/2025    ALT 8 (L) 04/15/2025    UTPCR 0.95 (H) 02/21/2025    .6 (H) 04/15/2025       Lab Results   Component Value Date    BILIRUBINUA Negative 02/21/2025    AMYLASE 53 05/08/2011    LIPASE 37 05/16/2018    PROTEINUA 1+ (A) 02/21/2025    NITRITE Negative 02/21/2025    RBCUA 2 02/21/2025    WBCUA 0 02/21/2025       No results found for: "HLAABCTYPE"    Labs were reviewed with the patient.    Assessment:     1. Pre-transplant evaluation for kidney transplant    2. PKD (polycystic kidney disease)    3. Stage 4 chronic kidney disease    4. Benign hypertension with CKD (chronic kidney disease) stage IV    5. Chronic diastolic heart failure    6. Non compliance w medication regimen    7. S/P coil embolization of cerebral aneurysm    8. Secondary hyperparathyroidism    9. Bipolar 1 disorder    10. Proteinuria, unspecified type        Plan:   HX noncompliance with Multiple "no shows" in " Epic  -->Will need medical compliance f/u and clearance by      HTN: BP elevated at clinic, Pt took BP meds during clinic visit    Bipolar d/o--will defer if  psyc clearance is needed to      Transplant Candidacy:   Based on available information, Ms. Grande is a suitable kidney transplant candidate.   Meets center eligibility for accepting HCV+ donor offer - Yes.  Patient educated on HCV+ donors.  is willing to accept HCV+ donor offer - Yes   Patient is a candidate for KDPI > 85 kidney donor offer - No d/t age   Final determination of transplant candidacy will be made once workup is complete and reviewed by the selection committee.    Patient advised that it is recommended that all transplant candidates, and their close contacts and household members receive Covid vaccination.    UNOS Patient Status  Functional Status: 80% - Normal activity with effort: some symptoms of disease     Carolin Dobbins, NP

## 2025-04-15 NOTE — LETTER
April 17, 2025        Joao Faith  [address] [facility] [address]  [address]  [place] [address]  Phone: [phone]  Fax: [phone]             [facility] 1st Fl  [address]  [facility] [address]  Phone: [phone]   Patient: Sharon Grande   MR Number: [id number]   YOB: 1985   Date of Visit: 4/15/2025       Dear Dr. Joao Faith    Thank you for referring Sharon Grande to me for evaluation. Attached you will find relevant portions of my assessment and plan of care.    If you have questions, please do not hesitate to call me. I look forward to following Sharon Grande along with you.    Sincerely,    Carolin Dobbins, NP    Enclosure    If you would like to receive this communication electronically, please contact [email or web address] or [phone] to request [employer] access.    [employer] is a tool which provides read-only access to select patient information with whom you have a relationship. Its easy to use and provides real time access to review your patients record including encounter summaries, notes, results, and demographic information.    If you feel you have received this communication in error or would no longer like to receive these types of communications, please e-mail [email or web address]

## 2025-04-15 NOTE — PROGRESS NOTES
PRE-TRANSPLANT INFECTIOUS DISEASE CONSULT    Reason for Visit:  Pre-transplant evaluation  Referring Provider: [facility]*     History of Present Illness:    39 y.o. female with a history of CKD on on HD presents for pre-kidney transplant evaluation.    Infectious History:  Recent hospital admissions: No  Recent infections: No  Recent or current antibiotic use: No  History of recurrent infections *(sinus / pneumonia / UTI / SBP)*: No  History of  foot wound or bone/joint infection: No  Recent dental infections, issues or procedures: No  History of chicken pox: Yes  History of shingles: No  History of STI: No    History of Immunosuppression:  Prior chemotherapy / immunosuppression: No  Prior transplant: No  History of splenectomy: No    Tuberculosis:  Prior screening for latent TB: Yes reportedly neagative  Prior diagnosis of latent TB: No  Risk factors for TB *known exposure, incarceration, homelessness*: No    Geographical exposures:  Currently lives in [facility] with [facility] and children  Lived in the following states: [place]  Lived or travelled to the [facility] [place]: No  International travel: No  Travel-associated illness: No    Social/Environmental:  Occupation:   in past  Pets: No   Livestock: No  Fishing / hunting: No  Hobbies: shop  Water: [place]  Consumption of raw/undercooked meat or seafood?  Yes - sushi  Tobacco: Occasionally  Alcohol: Ocasionaky  Recreational drug use:  No  Sexual partners: yes male      Past Histories:   Past Medical History:   Diagnosis Date    Anemia     Bipolar 1 disorder     Cerebral aneurysm     Encounter for blood transfusion     Hypertension     since 2012    Polycystic kidney disease     Polycystic kidney disease     Pre-eclampsia     Renal disorder     Since childhood     SAH (subarachnoid hemorrhage)     Stroke      Past Surgical History:   Procedure Laterality Date    BRAIN SURGERY      CEREBRAL ANGIOGRAM N/A 08/16/2018    Procedure: ANGIOGRAM-CEREBRAL;   Surgeon: Jayna Surgeon;  Location: [facility];  Service: Anesthesiology;  Laterality: N/A;     SECTION N/A 2018    Procedure:  SECTION;  Surgeon: Obed Soto MD;  Location: [facility];  Service: OB/GYN;  Laterality: N/A;    DILATION AND CURETTAGE OF UTERUS          TUBAL LIGATION       Family History   Problem Relation Name Age of Onset    Hypertension Mother      Polycystic kidney disease Mother      Hypertension Paternal Grandmother      Polycystic kidney disease Daughter       Social History[1]  Review of patient's allergies indicates:  No Known Allergies      Current antibiotics:  Antibiotics (From admission, onward)      None              Review of Systems  Review of Systems   Constitutional: Negative for chills, fever, malaise/fatigue and night sweats.   Cardiovascular:  Negative for chest pain.   Respiratory:  Negative for cough, hemoptysis, shortness of breath, sputum production and wheezing.    Skin:  Negative for rash and suspicious lesions.   Gastrointestinal:  Negative for abdominal pain, constipation, diarrhea, heartburn, nausea and vomiting.   Genitourinary:  Negative for dysuria and hematuria.          Objective  Physical Exam  Constitutional:       General: She is not in acute distress.     Appearance: Normal appearance. She is well-developed. She is not ill-appearing, toxic-appearing or diaphoretic.   HENT:      Head: Normocephalic and atraumatic.      Mouth/Throat:      Lips: Pink. No lesions.      Mouth: Mucous membranes are dry. No injury or oral lesions.      Dentition: Abnormal dentition (missing teeth). Does not have dentures. Dental caries (cavitary and eroded teeth and crowns) present. No gingival swelling, dental abscesses or gum lesions.      Tongue: No lesions.      Palate: No lesions.      Pharynx: Oropharynx is clear.   Cardiovascular:      Rate and Rhythm: Normal rate and regular rhythm.      Heart sounds: Murmur heard.      No friction rub. No gallop.  "  Pulmonary:      Effort: Pulmonary effort is normal. No respiratory distress.      Breath sounds: Normal breath sounds. No wheezing or rales.   Abdominal:      General: Bowel sounds are normal. There is no distension.      Palpations: Abdomen is soft. There is no mass.      Tenderness: There is no abdominal tenderness. There is no guarding or rebound.   Skin:     General: Skin is warm and dry.   Neurological:      Mental Status: She is alert and oriented to person, place, and time.   Psychiatric:         Behavior: Behavior normal.         Labs:    CBC:   Lab Results   Component Value Date    WBC 4.68 04/15/2025    HGB 11.5 (L) 04/15/2025    HCT 36.4 (L) 04/15/2025    MCV 91 04/15/2025     04/15/2025    GRAN 3.1 12/22/2024    GRAN 61.5 12/22/2024    LYMPH 15.6 (L) 04/15/2025    LYMPH 0.73 (L) 04/15/2025    MONO 7.3 04/15/2025    MONO 0.34 04/15/2025    EOSINOPHIL 2.2 12/22/2024       Syphilis screening:   Lab Results   Component Value Date    RPR Non-reactive 09/27/2018    TREPABIGMIGG Non-Reactive 04/15/2025        TB screening: No results found for: "TBGOLDPLUS", "TSPOTSCREN"    HIV screening:   Lab Results   Component Value Date    RRV14MYOZ Non-Reactive 04/15/2025       Strongyloides IgG: No results found for: "STRONGANTIGG"    Hepatitis Serologies:   Lab Results   Component Value Date    HEPAIGG Non-Reactive 04/15/2025    HEPBCAB Non-Reactive 04/15/2025    HEPBSAB Non-Reactive 04/15/2025    HEPCAB Reactive (A) 04/15/2025        Varicella IgG:   Lab Results   Component Value Date    VARICELLAINT Positive 04/15/2025         Immunization History   Administered Date(s) Administered    COVID-19, MRNA, LN-S, PF (MODERNA FULL 0.5 ML DOSE) 10/29/2021    Tdap 11/11/2016, 04/04/2022        Immunization History:  Received all childhood vaccines: Yes  All household members receive annual flu vaccine: No  All household members are up to date on COVID vaccine: No    Assessment and Plan    1. Risks of Infection: " Available serologies were reviewed. No unusual risks of infection or significant barriers to transplantation were identified from the infectious disease standpoint given the information available at this time with exception of positive Hep C Ab which needs evaluation and treatment (if needed) by hepatology..    - Acute infectious issues: None   - Pending serologies: Quantiferon gold / T-spot, Strongyloides IgG, and VZV IgG   - Please call if any pending serologic testing is positive.    2. Immunizations:  Based on the patient's immunization history and serologies, the following immunizations are recommended:  - Hepatitis A    Patient does not have immunity to hepatitis A    Vaccination ordered today: Yes   - Hepatitis B    Patient does not have immunity to hepatitis B    Vaccination ordered today: Yes   - COVID    Current CDC vaccination recommendations were discussed with the patient - rx given   - Annual high dose influenza     Vaccination ordered today: Yes   - Prevnar 20    Vaccination ordered today: Yes   - Tdap    Vaccination ordered today: No. Reason for not ordering: Vaccination up to date   - Shingrix    Vaccination ordered today: Yes    Recommended Pre-Transplant Immunization Schedule   Vaccine  0m 1m 2m 6m   Pneumococcal conjugate vaccine (Prevnar 20) X      Tetanus-diphtheria-pertussis (Tdap)* X      Hepatitis A Vaccine (Havrix)** X   X   Hepatitis B Vaccine (Heplisav)** X X     Influenza (annual) X      Zoster Recombinant Vaccine (Shingrix) X  X           *Administer booster every 10 years.       **Administer if no immunity demonstrated on serologies               Patient will receive vaccines at local pharmacy. A written prescription was provided for all vaccine doses.     3. Counseling:   I discussed with the patient the risk for increased susceptibility to infections following transplantation including increased risk for infection right after transplant and if rejection should occur.  The patient has  been counseled on the importance of vaccinations to decrease risk of infection and severe illness. Specific guidance has been provided to the patient regarding the patient's occupation, hobbies and activities to avoid future infectious complications.     4. Transplant Candidacy: Based on available information, there are no identified significant barriers to transplantation from an infectious disease standpoint pending further eval and treatment of positive Hep C testing. It is recommended the patient have their teeth treated prior to transplant but should not delay it. Final determination of transplant candidacy will be made once evaluation is complete and reviewed by the Selection Committee.      Follow up with infectious disease as needed.       The total time for evaluation and management services performed on 04/15/2025 was greater than 45 minutes.              [1]   Social History  Tobacco Use    Smoking status: Some Days     Types: Cigarettes    Smokeless tobacco: Never   Substance Use Topics    Alcohol use: Yes     Comment: occasional    Drug use: Yes     Types: Marijuana

## 2025-04-15 NOTE — PROGRESS NOTES
Transplant Surgery  Kidney Transplant Recipient Evaluation    Referring Physician: Joao Faith  Current Nephrologist: oJao Faith    Subjective:     Reason for Visit: evaluate transplant candidacy    History of Present Illness: Sharon Grande is a 39 y.o. year old female undergoing transplant evaluation.    Dialysis History:  is pre-dialysis.      Transplant History: N/A    Etiology of Renal Disease: Polycystic Kidneys (based on medical records from referral).    External provider notes reviewed: Yes    Review of Systems   Constitutional:  Negative for activity change and chills.   HENT:  Negative for congestion and sore throat.    Eyes:  Negative for discharge and redness.   Respiratory:  Negative for cough and shortness of breath.    Cardiovascular:  Negative for chest pain and palpitations.   Gastrointestinal:  Negative for nausea and vomiting.   Endocrine: Negative for polydipsia and polyuria.   Genitourinary:  Negative for dysuria and frequency.   Musculoskeletal:  Negative for arthralgias and gait problem.   Skin:  Negative for pallor and rash.   Neurological:  Negative for dizziness and light-headedness.   Hematological:  Negative for adenopathy. Does not bruise/bleed easily.   Psychiatric/Behavioral:  Negative for agitation and suicidal ideas.      Objective:     Physical Exam:  Constitutional:   Vitals reviewed: yes   Well-nourished and well-groomed: yes  Eyes:   Sclerae icteric: no   Extraocular movements intact: yes  GI:    Bowel sounds normal: yes   Tenderness: no    If yes, quadrant/location: not applicable   Palpable masses: no    If yes, quadrant/location: not applicable   Hepatosplenomegaly: no   Ascites: no   Hernia: no    If yes, type/location: not applicable   Surgical scars: yes    If yes, type/location: Pfannenstiel  Resp:   Effort normal: yes   Breath sounds normal: yes    CV:   Regular rate and rhythm: yes   Heart sounds normal: yes   Femoral pulses normal: yes   Extremities edematous:  no  Skin:   Rashes or lesions present: no    If yes, describe:not applicable   Jaundice:: no    Musculoskeletal:   Gait normal: yes   Strength normal: yes  Psych:   Oriented to person, place, and time: yes   Affect and mood normal: yes    Additional comments: not applicable    Diagnostics:  The following labs have been reviewed: CBC  BMP  The following radiology images have been independently reviewed and interpreted:     Counseling: We provided Sharon Grande with a group education session today.  We discussed kidney transplantation at length with her, including risks, potential complications, and alternatives in the management of her renal failure.  The discussion included complications related to anesthesia, bleeding, infection, primary nonfunction, and ATN.  I discussed the typical postoperative course, length of hospitalization, the need for long-term immunosuppression, and the need for long-term routine follow-up.  I discussed living-donor and -donor transplantation and the relative advantages and disadvantages of each.  I also discussed average waiting times for both living donation and  donation.  I discussed national and center-specific survival rates.  I also mentioned the potential benefit of multicenter listing to candidates listed with centers within more than one organ procurement organization.  All questions were answered.    Patient advised that it is recommended that all transplant candidates, and their close contacts and household members receive Covid vaccination.    Final determination of transplant candidacy will be made once evaluation is complete and reviewed by the Kidney & Kidney/Pancreas Selection Committee.    Coronavirus disease (COVID-19) caused by severe acute respiratory virus coronavirus 2 (SARS-C0V 2) is associated with increased mortality in solid organ transplant recipients (SOT) compared to non-transplant patients. Vaccine responses to vaccination are depressed  against SARS-CoV2 compared to normal individuals but improve with third vaccination doses. Vaccination prior to SOT provides both the best opportunity for transplant candidates to develop protective immunity and to reduce the risk of serious COVID19 infections post transplantation. Organ transplant candidates at [facility] will be required to receive SARS-CoV-2 vaccination prior to being listed with a an active status, whenever possible. Exceptions will be made for disability related reasons or for sincerely held [facility] beliefs.          Transplant Surgery - Candidacy   Assessment/Plan:   Sharon Grande is pre-dialysis with CKD stage 4 (GFR 15-29 mL/min). I see no surgical contraindication to placing a kidney transplant. Based on available information, Sharon Grande is a suitable kidney transplant candidate.     Additional testing to be completed according to the Written Order Guidelines for Adult Pre-kidney and Pancreas Transplant Evaluation (KI-02).  Interpretation of tests and discussion of patient management involves all members of the multidisciplinary transplant team.    Carroll Ayala MD

## 2025-04-15 NOTE — PROGRESS NOTES
"Transplant Recipient Adult Psychosocial Assessment    Sharon Grande  [address] [place] [address]  Telephone Information:   Mobile [phone]   Home  [phone] (home)  Work  There is no work phone number on file.  E-mail  [email or web address]    Sex: female  YOB: 1985  Age: 39 y.o.    Encounter Date: 4/15/2025  [place] Citizen: yes  Primary Language: English   Needed: no    Emergency Contact:  Justin Null, 38 yo fiance/friend/children's father (together since high school), BRIANA, does drive/own car, works full time as . [phone]    Family/Social Support:   Number of dependents/: 17 yo, Jose Null, 11 yo Justin Grande; 10 yo Bennie, 6 yo Paula Null. Pt reports family will assist with transplant .  Marital history: single, not . Has known Justin Null father of her children since 7th grade.  Other family dynamics: Pt reports father is  May 2022 due to accidental fentanyl overdose. Mother Bindu Acevedo lives in [facility] and is supportive; mother kidney transplanted [facility]  via living donor sister. Pt reports no siblings living; brother  at 6 yo from a "fire cracker pipe bomb" meant for another family member. Pt reports having 5 children with 4 minor children living with her and one 22 yo grown child/daughter Morro Null living on own in [facility]; daughter is supportive. Pt's long time significant other, Justin Null, is supportive and lives nearby in [place] [facility].     Household Composition:  Minor children live with patient in [facility]: 17 yo, Jose Null, 11 yo Justin Grande; 10 yo Bennie, 6 yo Paula Null.    Do you and your caregivers have access to reliable transportation? yes  PRIMARY CAREGIVER: Justin Null, significant other, [phone] Bindu Acevedo, mother, [phone] will be primary caregivers. Pt and mother report mother does not drive but Justin will assist or other family members who do drive will assist with all " transplant transportation needs.      provided in-depth information to patient and caregiver regarding pre- and post-transplant caregiver role.   strongly encourages patient and caregiver to have concrete plan regarding post-transplant care giving, including back-up caregiver(s) to ensure care giving needs are met as needed.    Patient and Caregiver states understanding all aspects of caregiver role/commitment and is able/willing/committed to being caregiver to the fullest extent necessary.    Patient and Caregiver verbalizes understanding of the education provided today and caregiver responsibilities.         remains available. Patient and Caregiver agree to contact  in a timely manner if concerns arise.      Able to take time off work without financial concerns: yes.     Additional Significant Others who will Assist with Transplant:  Morro Null, 22 yo significant other, Vahe [place], does drive/own car, working in . [phone]    Living Will: no  Healthcare Power of : no  Advance Directives on file: <<no information> per medical record.  Verbally reviewed LW/HCPA information.   provided patient with copy of LW/HCPA documents and provided education on completion of forms.    Living Donors: Education and resource information given to patient.    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 Pt reports work history first in  and then in  (4 to 5 years).    Spouse/Significant Other Employment: pt reports is not     Disabled: no. Pt reports was denied disability application due to HTN and swelling interfering  with her ability to work. Is in appeals process at this time. Pt reports is not on dialysis.    Monthly Income:  Pt reports only income is $250 from children's father, Justin Null.  Able to afford all costs now and if transplanted, including medications: yes, pt denies any problems affording medications at this time.  Patient and Caregiver verbalizes understanding of personal responsibilities related to transplant costs and the importance of having a financial plan to ensure that patients transplant costs are fully covered.       provided fundraising information/education. Patient and Caregiververbalizes understanding.   remains available.    Insurance:   Payer/Plan Subscr  Sex Relation Sub. Ins. ID Effective Group Num   1. MEDICAID - [place]* SONJA MCMILLAN 1985 Female Self [id number]* 24                                    [address]     Primary Insurance (for UNOS reporting): Public Insurance - Medicaid. Utilizes [employer].  Secondary Insurance (for UNOS reporting): None Pt reports is not disabled, not on dialysis and not on Medicare at this time.  Patient and Caregiver verbalizes clear understanding that patient may experience difficulty obtaining and/or be denied insurance coverage post-surgery. This includes and is not limited to disability insurance, life insurance, health insurance, burial insurance, long term care insurance, and other insurances.      Patient and Caregiver also reports understanding that future health concerns related to or unrelated to transplantation may not be covered by patient's insurance.  Resources and information provided and reviewed.     Patient and Caregiver provides verbal permission to release any necessary information to outside resources for patient care and discharge planning.  Resources and information provided are reviewed.      Dialysis Adherence: Pt reports is not on dialysis at this time.    Infusion Service: patient  utilizing? yes [facility] for iron infusion.  Home Health: patient utilizing? no  DME: no  Pulmonary/Cardiac Rehab: pt denies   ADLS:  independent with self care, medication management and does drive/own car.    Adherence:   Pt reports has missed appointments but does not miss medications. 4-7-25 Nephrology compliance update shows patient has missed appointments. Nephrology compliance update shows nephrology providers have been encouraging medical compliance.  Adherence education and counseling provided today at transplant clinic; pt agrees to improve her medical compliance.     Per History Section:  Past Medical History:   Diagnosis Date    Anemia     Bipolar 1 disorder     Cerebral aneurysm     Encounter for blood transfusion     Hypertension     since 2012    Polycystic kidney disease     Polycystic kidney disease     Pre-eclampsia     Renal disorder     Since childhood     SAH (subarachnoid hemorrhage)     Stroke      Social History     Tobacco Use    Smoking status: Some Days     Types: Cigarettes    Smokeless tobacco: Never   Substance Use Topics    Alcohol use: Yes     Comment: occasional     Social History     Substance and Sexual Activity   Drug Use Yes    Types: Marijuana     Social History     Substance and Sexual Activity   Sexual Activity Yes    Partners: Male    Birth control/protection: None       Per Today's Psychosocial:  Tobacco: none, patient denies any use.  Alcohol: pt reports rare alcohol use.  Illicit Drugs/Non-prescribed Medications: pt reports marijuana use. Pt denies any illicit drug use history other than marijuana. Pt was strongly encouraged to discontinue marijuana use for transplant.    Patient and Caregiver states clear understanding of the potential impact of substance use as it relates to transplant candidacy and is aware of possible random substance screening.  Substance abstinence/cessation counseling, education and resources provided and reviewed.  "    Arrests/DWI/Treatment/Rehab: patient denies    Psychiatric History:    Mental Health: Please consult [facility] psychiatry for organ transplant clearance. 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 Pt does not agree she has this mental health issue and patient reports somehow her aunt's mental health issues were put in pt's medical chart. Pt reports many traumatic losses since 6 yo. Patient reports experienced grief from many family deaths (pt's uncle  2025, pt's cousin's 6 month old baby boy accidentally  2025, pt's own 3 month old baby [facility]  [facility]pneumonia 2018 at [facility], pt's father  accidental overdose Dec. 2018; pt's 8 yo brother killed by a "fire cracker pipe bomb" when pt was 7 years old). Pt reports anxiety from lack of financial independence due to significant medical issues impacting her ability to consistently work. Pt's EMR shows patient has history of medical non compliance and patient reports missed some appointments in May 2024 due to coping with "re-opened old wounds" of family death and dying circumstances. Adherence education and counseling provided today at transplant clinic; pt agrees to improve her medical compliance.   Psychiatrist/Counselor: pt denies  Medications:  pt denies  Suicide/Homicide Issues: pt denies any si/hi history   Safety at home: pt reports living in safe home environment with no abuse at this time.    Knowledge: Patient and Caregiver states having clear understanding and realistic expectations regarding the potential risks and potential benefits of organ transplantation and organ donation and agrees to discuss with health care team members and support system members, as well as to utilize available resources and express questions and/or concerns in order to further facilitate the pt informed decision-making.  Resources and information provided and reviewed.    Patient and Caregiver is aware of " [facility] affiliation and/or partnership with agencies in home health care, LTAC, SNF, [facility], and other hospitals and clinics.    Understanding: Patient and Caregiver reports having a clear understanding of the many lifetime commitments involved with being a transplant recipient, including costs, compliance, medications, lab work, procedures, appointments, concrete and financial planning, preparedness, timely and appropriate communication of concerns, abstinence (ETOH, tobacco, illicit non-prescribed drugs), adherence to all health care team recommendations, support system and caregiver involvement, appropriate and timely resource utilization and follow-through, mental health counseling as needed/recommended, and patient and caregiver responsibilities.  Social Service Handbook, resources and detailed educational information provided and reviewed.  Educational information provided.    Patient and Caregiver also reports current and expected compliance with health care regime and states having a clear understanding of the importance of compliance.      Patient and Caregiver reports a clear understanding that risks and benefits may be involved with organ transplantation and with organ donation.       Patient and Caregiver also reports clear understanding that psychosocial risk factors may affect patient, and include but are not limited to feelings of depression, generalized anxiety, anxiety regarding dependence on others, post traumatic stress disorder, feelings of guilt and other emotional and/or mental concerns, and/or exacerbation of existing mental health concerns.  Detailed resources provided and discussed.      Patient and Caregiver agrees to access appropriate resources in a timely manner as needed and/or as recommended, and to communicate concerns appropriately.  Patient and Caregiver also reports a clear understanding of treatment options available.     Patient and Caregiver received education in a group setting.  "  reviewed education, provided additional information, and answered questions.    Feelings or Concerns: Pt reports high motivation to pursue kidney organ transplant.    Coping: Identify Patient & Caregiver Strategies to Roulette:   1. In the past, coping with major surgery and/or related stress - family support; enjoys cooking and spending time with family playing card game Spades.   2. Currently & Pre-transplant - family support; enjoys cooking and spending time with family playing card game Spades.   3. At the time of surgery - family support   4. During post-Transplant & Recovery Period - family support    Goals: Pt reports hope for successful kidney organ transplant so that she is not placed on dialysis. Patient referred to Vocational Rehabilitation.    Interview Behavior: Patient and Caregiver presents as alert and oriented x 4, pleasant, good eye contact, well groomed, recall good, concentration/judgement good, average intelligence, calm, communicative, cooperative, and asking and answering questions appropriately. Pt's supportive mother in session with patient's permission.         Transplant Social Work - Candidacy  Assessment/Plan:     Psychosocial Suitability: Patient presents as high risk candidate for kidney transplant at this time due to 8-12-24 [facility] mental health diagnosis "bi polar disorder", which patient denies having. Pt reports recent problems coping with history of traumatic losses which impacted patient's medical compliance and going to scheduled medical appointments. Please consult [facility] psychiatry for organ transplant clearance.    Recommendations/Additional Comments:  4-7-25 Nephrology compliance update shows patient has missed medical appointments. Nephrology compliance update shows nephrology providers have been encouraging medical compliance.  Adherence education and counseling provided today at transplant clinic; pt agrees to improve her medical compliance.  Pt is on [place] Medicaid, is " not disabled, is not on dialysis and is not on Medicare at this time. Utilizes [facility] pharmacy. Pt reports minimal finances with only $250 per month from children's father; pt reports has been denied disability and is in disability appeal process.     SW recommends that pt conduct fundraising to assist pt with pay for cost of medications, food, gas, and other transplant related needs.  SW recommends that pt remain aware of potential mental health concerns and contact the team if any concerns arise.  SW recommends that pt remain abstinent from tobacco, ETOH, and drug use. SW supports pt's continued adherence. SW remains available to answer any questions or concerns that arise as the pt moves through the transplant process.     Final determination of transplant candidacy will be reviewed by the selection committee.       Bindu QUINNW

## 2025-04-15 NOTE — PROGRESS NOTES
"INITIAL PATIENT EDUCATION NOTE     Ms. Sharon Grande was seen in pre-kidney transplant clinic for evaluation for kidney, kidney/pancreas or pancreas only transplant.  The patient attended an individual video education session that discussed/reviewed the following aspects of transplantation: evaluation including diagnostic and laboratory testing,(Chemistries, Hematology, Serologies including HIV and Hepatitis and HLA) required for transplantation and selection committee process, UNOS waitlist management/multiple listings, types of organs offered (KDPI < 85%, KDPI > 85%, PHS risk, DCD, HCV+, HIV+ for HIV+ recipients and enbloc/dual), financial aspects, surgical procedures, dietary instruction pre- and post-transplant, health maintenance pre- and post-transplant, post-transplant hospitalization and outpatient follow-up, potential to participate in a research protocol, and medication management and side effects.  A question and answer session was provided after the presentation.    The patient was seen by all members of the multi-disciplinary team to include: Nephrologist/AMANDA, Surgeon, , Transplant Coordinator, , Pharmacist and Dietician (if applicable).    The patient reviewed and signed all consents for evaluation which were witnessed and sent to scanning into the [facility] chart.    The patient was given an education book and plan for further evaluation based on her individual assessment.      The Patient was educated on OPTN policy change regarding race based eGFR. For Black or  Americans, this eGFR could have shown that their kidneys were working better than they were.    Because of this change, we are looking at everyone's record and assessing waiting time for people who are eligible. We will be reviewing your medical records and will notify you if you are eligible. We also encouraged patient to provide '"span 20 labs" that are not in our electronic medical records.      Reviewed " program requirement for complete COVID vaccination with documentation prior to listing.  COVID education information reviewed with patient. Patient encouraged to be up to date on all vaccinations.     The patient was informed that the transplant team would manage immediate post op pain. If the patient requires long term pain management, they will need to have that pain management addressed by their PCP or previous provider who wrote for long term pain medicines.    The patient was encouraged to call with any questions or concerns.

## 2025-04-16 ENCOUNTER — INFUSION (OUTPATIENT)
Dept: INFUSION THERAPY | Facility: HOSPITAL | Age: 40
End: 2025-04-16
Payer: MEDICAID

## 2025-04-16 VITALS
TEMPERATURE: 98 F | HEART RATE: 62 BPM | SYSTOLIC BLOOD PRESSURE: 196 MMHG | OXYGEN SATURATION: 99 % | DIASTOLIC BLOOD PRESSURE: 124 MMHG | RESPIRATION RATE: 18 BRPM

## 2025-04-16 DIAGNOSIS — N18.4 ANEMIA IN STAGE 4 CHRONIC KIDNEY DISEASE: Primary | ICD-10-CM

## 2025-04-16 DIAGNOSIS — D63.1 ANEMIA IN STAGE 4 CHRONIC KIDNEY DISEASE: Primary | ICD-10-CM

## 2025-04-16 RX ORDER — SODIUM FERRIC GLUCONATE COMPLEX IN SUCROSE 12.5 MG/ML
125 INJECTION INTRAVENOUS
OUTPATIENT
Start: 2025-04-23

## 2025-04-16 RX ORDER — SODIUM FERRIC GLUCONATE COMPLEX IN SUCROSE 12.5 MG/ML
125 INJECTION INTRAVENOUS
Status: DISCONTINUED | OUTPATIENT
Start: 2025-04-16 | End: 2025-04-16

## 2025-04-16 RX ORDER — NIFEDIPINE 60 MG/1
60 TABLET, EXTENDED RELEASE ORAL DAILY
Qty: 30 TABLET | Refills: 11 | Status: SHIPPED | OUTPATIENT
Start: 2025-04-16 | End: 2026-04-16

## 2025-04-16 RX ORDER — DIPHENHYDRAMINE HYDROCHLORIDE 50 MG/ML
50 INJECTION, SOLUTION INTRAMUSCULAR; INTRAVENOUS ONCE AS NEEDED
Status: DISCONTINUED | OUTPATIENT
Start: 2025-04-16 | End: 2025-04-16

## 2025-04-16 RX ORDER — EPINEPHRINE 0.3 MG/.3ML
0.3 INJECTION SUBCUTANEOUS ONCE AS NEEDED
Status: DISCONTINUED | OUTPATIENT
Start: 2025-04-16 | End: 2025-04-16

## 2025-04-16 RX ORDER — HEPARIN 100 UNIT/ML
500 SYRINGE INTRAVENOUS
OUTPATIENT
Start: 2025-04-23

## 2025-04-16 RX ORDER — SODIUM FERRIC GLUCONATE COMPLEX IN SUCROSE 12.5 MG/ML
125 INJECTION INTRAVENOUS
Status: CANCELLED | OUTPATIENT
Start: 2025-04-23

## 2025-04-16 RX ORDER — EPINEPHRINE 0.3 MG/.3ML
0.3 INJECTION SUBCUTANEOUS ONCE AS NEEDED
OUTPATIENT
Start: 2025-04-23

## 2025-04-16 RX ORDER — SODIUM CHLORIDE 0.9 % (FLUSH) 0.9 %
10 SYRINGE (ML) INJECTION
OUTPATIENT
Start: 2025-04-23

## 2025-04-16 RX ORDER — DIPHENHYDRAMINE HYDROCHLORIDE 50 MG/ML
50 INJECTION, SOLUTION INTRAMUSCULAR; INTRAVENOUS ONCE AS NEEDED
OUTPATIENT
Start: 2025-04-23

## 2025-04-16 NOTE — PROGRESS NOTES
Presented to iron infusion today  Initial +, repeat after 15 minutes manual 190s SBP    Current regiement: labetolol 100 TID, aldactone 100 qd, losartan 100 qd  Add nifedipine 60 qd, starting today    Repeat RFP 1 week

## 2025-04-16 NOTE — PLAN OF CARE
Pt arrived to clinic for scheduled Ferrlecit 125 mg IVP (1/8).     1018: /138 and 213/128. Pt reports taking BP meds at approx 930-945 PTA. Dr. Faith notified and ordered to have her sit and recheck in 20 mins.     1047: Repeat /123. Manual check was 196/124. Per Dr. Faith, hold iron infusion this week. Nifedipine rx sent down stairs to pharmacy, have pt pick it up and start taking it. Labs ordered for next and Iron Infusions will start next week. Pt made aware, and verbalized understanding.    Implemented All Universal Safety Interventions:  [place] to call system. Call bell, personal items and telephone within reach. Instruct patient to call for assistance. Room bathroom lighting operational. Non-slip footwear when patient is off stretcher. Physically safe environment: no spills, clutter or unnecessary equipment. Stretcher in lowest position, wheels locked, appropriate side rails in place.

## 2025-04-17 ENCOUNTER — HOSPITAL ENCOUNTER (OUTPATIENT)
Dept: RADIOLOGY | Facility: HOSPITAL | Age: 40
Discharge: HOME | End: 2025-04-17
Attending: NURSE PRACTITIONER
Payer: MEDICAID

## 2025-04-17 DIAGNOSIS — Z76.82 ORGAN TRANSPLANT CANDIDATE: ICD-10-CM

## 2025-04-17 DIAGNOSIS — N18.4 CKD (CHRONIC KIDNEY DISEASE) STAGE 4, GFR 15-29 ML/MIN: Primary | ICD-10-CM

## 2025-04-17 PROCEDURE — 93978 VASCULAR STUDY: CPT | Mod: TC,TXP

## 2025-04-23 ENCOUNTER — INFUSION (OUTPATIENT)
Dept: INFUSION THERAPY | Facility: HOSPITAL | Age: 40
End: 2025-04-23
Payer: MEDICAID

## 2025-04-23 ENCOUNTER — LAB VISIT (OUTPATIENT)
Dept: LAB | Facility: HOSPITAL | Age: 40
End: 2025-04-23
Attending: STUDENT IN AN ORGANIZED HEALTH CARE EDUCATION/TRAINING PROGRAM
Payer: MEDICAID

## 2025-04-23 VITALS
DIASTOLIC BLOOD PRESSURE: 92 MMHG | HEART RATE: 70 BPM | RESPIRATION RATE: 18 BRPM | OXYGEN SATURATION: 100 % | SYSTOLIC BLOOD PRESSURE: 160 MMHG | TEMPERATURE: 98 F

## 2025-04-23 DIAGNOSIS — N18.4 ANEMIA IN STAGE 4 CHRONIC KIDNEY DISEASE: Primary | ICD-10-CM

## 2025-04-23 DIAGNOSIS — D63.1 ANEMIA IN STAGE 4 CHRONIC KIDNEY DISEASE: Primary | ICD-10-CM

## 2025-04-23 DIAGNOSIS — N18.4 CKD (CHRONIC KIDNEY DISEASE) STAGE 4, GFR 15-29 ML/MIN: ICD-10-CM

## 2025-04-23 LAB
ALBUMIN SERPL BCP-MCNC: 3.9 G/DL (ref 3.5–5.2)
ANION GAP (OHS): 9 MMOL/L (ref 8–16)
BUN SERPL-MCNC: 42 MG/DL (ref 6–20)
CALCIUM SERPL-MCNC: 9.6 MG/DL (ref 8.7–10.5)
CHLORIDE SERPL-SCNC: 107 MMOL/L (ref 95–110)
CO2 SERPL-SCNC: 24 MMOL/L (ref 23–29)
CREAT SERPL-MCNC: 3.5 MG/DL (ref 0.5–1.4)
GFR SERPLBLD CREATININE-BSD FMLA CKD-EPI: 16 ML/MIN/1.73/M2
GLUCOSE SERPL-MCNC: 86 MG/DL (ref 70–110)
PHOSPHATE SERPL-MCNC: 3.4 MG/DL (ref 2.7–4.5)
POTASSIUM SERPL-SCNC: 3.6 MMOL/L (ref 3.5–5.1)
SODIUM SERPL-SCNC: 140 MMOL/L (ref 136–145)

## 2025-04-23 PROCEDURE — 36415 COLL VENOUS BLD VENIPUNCTURE: CPT | Mod: TXP

## 2025-04-23 PROCEDURE — 96374 THER/PROPH/DIAG INJ IV PUSH: CPT

## 2025-04-23 PROCEDURE — 80069 RENAL FUNCTION PANEL: CPT | Mod: TXP

## 2025-04-23 RX ORDER — SODIUM CHLORIDE 0.9 % (FLUSH) 0.9 %
10 SYRINGE (ML) INJECTION
OUTPATIENT
Start: 2025-04-30

## 2025-04-23 RX ORDER — DIPHENHYDRAMINE HYDROCHLORIDE 50 MG/ML
50 INJECTION, SOLUTION INTRAMUSCULAR; INTRAVENOUS ONCE AS NEEDED
Status: DISCONTINUED | OUTPATIENT
Start: 2025-04-23 | End: 2025-04-23 | Stop reason: HOSPADM

## 2025-04-23 RX ORDER — SODIUM FERRIC GLUCONATE COMPLEX IN SUCROSE 12.5 MG/ML
125 INJECTION INTRAVENOUS
OUTPATIENT
Start: 2025-04-30

## 2025-04-23 RX ORDER — SODIUM FERRIC GLUCONATE COMPLEX IN SUCROSE 12.5 MG/ML
125 INJECTION INTRAVENOUS
Status: COMPLETED | OUTPATIENT
Start: 2025-04-23 | End: 2025-04-23

## 2025-04-23 RX ORDER — EPINEPHRINE 0.3 MG/.3ML
0.3 INJECTION SUBCUTANEOUS ONCE AS NEEDED
OUTPATIENT
Start: 2025-04-30

## 2025-04-23 RX ORDER — EPINEPHRINE 0.3 MG/.3ML
0.3 INJECTION SUBCUTANEOUS ONCE AS NEEDED
Status: DISCONTINUED | OUTPATIENT
Start: 2025-04-23 | End: 2025-04-23 | Stop reason: HOSPADM

## 2025-04-23 RX ORDER — DIPHENHYDRAMINE HYDROCHLORIDE 50 MG/ML
50 INJECTION, SOLUTION INTRAMUSCULAR; INTRAVENOUS ONCE AS NEEDED
OUTPATIENT
Start: 2025-04-30

## 2025-04-23 RX ORDER — HEPARIN 100 UNIT/ML
500 SYRINGE INTRAVENOUS
OUTPATIENT
Start: 2025-04-30

## 2025-04-23 NOTE — PLAN OF CARE
Pt arrived for 1'st Ferrlecit infusion today; as when pt arrived last week to begin Ferrlecit, infusion was delayed due to HTN. Today /100. Notified Dr. NIKOLAY Faith of BP. MD gave OK to proceed. Provided written and verbal education on Ferrlecit, and reviewed action & purpose of medication including side effects and adverse rx's. Verbalized understanding. Tolerated infusion well with no side effects or adverse rx's noted. Completion VS=160/92.Remained on unit 30 minutes post infusion; denies c/o, no ill effects.

## 2025-04-30 ENCOUNTER — INFUSION (OUTPATIENT)
Dept: INFUSION THERAPY | Facility: HOSPITAL | Age: 40
End: 2025-04-30
Payer: MEDICAID

## 2025-04-30 VITALS
TEMPERATURE: 97 F | DIASTOLIC BLOOD PRESSURE: 100 MMHG | OXYGEN SATURATION: 100 % | RESPIRATION RATE: 16 BRPM | HEART RATE: 56 BPM | SYSTOLIC BLOOD PRESSURE: 170 MMHG

## 2025-04-30 DIAGNOSIS — N18.4 ANEMIA IN STAGE 4 CHRONIC KIDNEY DISEASE: Primary | ICD-10-CM

## 2025-04-30 DIAGNOSIS — D63.1 ANEMIA IN STAGE 4 CHRONIC KIDNEY DISEASE: Primary | ICD-10-CM

## 2025-04-30 PROCEDURE — 96374 THER/PROPH/DIAG INJ IV PUSH: CPT

## 2025-04-30 PROCEDURE — 63600175 PHARM REV CODE 636 W HCPCS: Performed by: STUDENT IN AN ORGANIZED HEALTH CARE EDUCATION/TRAINING PROGRAM

## 2025-04-30 RX ORDER — SODIUM FERRIC GLUCONATE COMPLEX IN SUCROSE 12.5 MG/ML
125 INJECTION INTRAVENOUS
OUTPATIENT
Start: 2025-05-07

## 2025-04-30 RX ORDER — EPINEPHRINE 0.3 MG/.3ML
0.3 INJECTION SUBCUTANEOUS ONCE AS NEEDED
OUTPATIENT
Start: 2025-05-07

## 2025-04-30 RX ORDER — SODIUM FERRIC GLUCONATE COMPLEX IN SUCROSE 12.5 MG/ML
125 INJECTION INTRAVENOUS
Status: COMPLETED | OUTPATIENT
Start: 2025-04-30 | End: 2025-04-30

## 2025-04-30 RX ORDER — SODIUM CHLORIDE 0.9 % (FLUSH) 0.9 %
10 SYRINGE (ML) INJECTION
OUTPATIENT
Start: 2025-05-07

## 2025-04-30 RX ORDER — HEPARIN 100 UNIT/ML
500 SYRINGE INTRAVENOUS
OUTPATIENT
Start: 2025-05-07

## 2025-04-30 RX ORDER — EPINEPHRINE 0.3 MG/.3ML
0.3 INJECTION SUBCUTANEOUS ONCE AS NEEDED
Status: DISCONTINUED | OUTPATIENT
Start: 2025-04-30 | End: 2025-04-30 | Stop reason: HOSPADM

## 2025-04-30 RX ORDER — DIPHENHYDRAMINE HYDROCHLORIDE 50 MG/ML
50 INJECTION, SOLUTION INTRAMUSCULAR; INTRAVENOUS ONCE AS NEEDED
OUTPATIENT
Start: 2025-05-07

## 2025-04-30 RX ORDER — DIPHENHYDRAMINE HYDROCHLORIDE 50 MG/ML
50 INJECTION, SOLUTION INTRAMUSCULAR; INTRAVENOUS ONCE AS NEEDED
Status: DISCONTINUED | OUTPATIENT
Start: 2025-04-30 | End: 2025-04-30 | Stop reason: HOSPADM

## 2025-04-30 RX ADMIN — SODIUM FERRIC GLUCONATE COMPLEX 125 MG: 12.5 INJECTION INTRAVENOUS at 02:04

## 2025-04-30 NOTE — PLAN OF CARE
Pt ambulated into unit for #2 of 8 Ferrlecit IVP. Confirmed pt had taken her b/p med today. Denies and signs or symptoms of allergic reaction following initial iron last week.Pt tolerated #2 of 8 Ferrlecit IVP today. Has appt for 5/7/25 for #3 Ferrlecit. Pt ambulated off unit with spouse.

## 2025-05-01 ENCOUNTER — HOSPITAL ENCOUNTER (OUTPATIENT)
Dept: RADIOLOGY | Facility: HOSPITAL | Age: 40
Discharge: HOME | End: 2025-05-01
Attending: NURSE PRACTITIONER
Payer: MEDICAID

## 2025-05-01 DIAGNOSIS — Z76.82 ORGAN TRANSPLANT CANDIDATE: ICD-10-CM

## 2025-05-01 DIAGNOSIS — N18.6 END STAGE RENAL DISEASE: ICD-10-CM

## 2025-05-01 PROCEDURE — 71046 X-RAY EXAM CHEST 2 VIEWS: CPT | Mod: TC,TXP

## 2025-05-01 PROCEDURE — 72192 CT PELVIS W/O DYE: CPT | Mod: TC,TXP

## 2025-05-01 PROCEDURE — 76700 US EXAM ABDOM COMPLETE: CPT | Mod: TC,TXP

## 2025-05-07 ENCOUNTER — INFUSION (OUTPATIENT)
Dept: INFUSION THERAPY | Facility: HOSPITAL | Age: 40
End: 2025-05-07
Payer: MEDICAID

## 2025-05-07 VITALS
SYSTOLIC BLOOD PRESSURE: 165 MMHG | DIASTOLIC BLOOD PRESSURE: 111 MMHG | RESPIRATION RATE: 18 BRPM | TEMPERATURE: 99 F | HEART RATE: 66 BPM | OXYGEN SATURATION: 100 %

## 2025-05-07 DIAGNOSIS — N18.4 ANEMIA IN STAGE 4 CHRONIC KIDNEY DISEASE: Primary | ICD-10-CM

## 2025-05-07 DIAGNOSIS — D63.1 ANEMIA IN STAGE 4 CHRONIC KIDNEY DISEASE: Primary | ICD-10-CM

## 2025-05-07 PROCEDURE — 63600175 PHARM REV CODE 636 W HCPCS: Performed by: STUDENT IN AN ORGANIZED HEALTH CARE EDUCATION/TRAINING PROGRAM

## 2025-05-07 PROCEDURE — 96374 THER/PROPH/DIAG INJ IV PUSH: CPT

## 2025-05-07 RX ORDER — HEPARIN 100 UNIT/ML
500 SYRINGE INTRAVENOUS
OUTPATIENT
Start: 2025-05-14

## 2025-05-07 RX ORDER — SODIUM FERRIC GLUCONATE COMPLEX IN SUCROSE 12.5 MG/ML
125 INJECTION INTRAVENOUS
OUTPATIENT
Start: 2025-05-14

## 2025-05-07 RX ORDER — EPINEPHRINE 0.3 MG/.3ML
0.3 INJECTION SUBCUTANEOUS ONCE AS NEEDED
OUTPATIENT
Start: 2025-05-14

## 2025-05-07 RX ORDER — EPINEPHRINE 0.3 MG/.3ML
0.3 INJECTION SUBCUTANEOUS ONCE AS NEEDED
Status: DISCONTINUED | OUTPATIENT
Start: 2025-05-07 | End: 2025-05-07 | Stop reason: HOSPADM

## 2025-05-07 RX ORDER — SODIUM FERRIC GLUCONATE COMPLEX IN SUCROSE 12.5 MG/ML
125 INJECTION INTRAVENOUS
Status: COMPLETED | OUTPATIENT
Start: 2025-05-07 | End: 2025-05-07

## 2025-05-07 RX ORDER — SODIUM CHLORIDE 0.9 % (FLUSH) 0.9 %
10 SYRINGE (ML) INJECTION
OUTPATIENT
Start: 2025-05-14

## 2025-05-07 RX ORDER — DIPHENHYDRAMINE HYDROCHLORIDE 50 MG/ML
50 INJECTION, SOLUTION INTRAMUSCULAR; INTRAVENOUS ONCE AS NEEDED
OUTPATIENT
Start: 2025-05-14

## 2025-05-07 RX ADMIN — SODIUM FERRIC GLUCONATE COMPLEX 125 MG: 12.5 INJECTION INTRAVENOUS at 02:05

## 2025-05-07 NOTE — PLAN OF CARE
Pt arrived to the Infusion unit for maintenance iron infusion (3/8). Pt is alert and oriented x4, ambulates independently with steady gait. Pt reports no new allergies, symptoms, or concerns at this time. Pt /111, pt asymptomatic, reports taking all blood pressure medications before arrival today. Pt educated on importance of blood pressure medication compliance, to follow-up with PCP for evaluation of BP regimen, and side effects/ complications of high blood pressure. Pt verbalizes understanding. Denies any previous reactions to medication and consents to plan of care for today. PIV 24g placed in (R) AC. Pt administered Ferrlecit 125mg IVP over 10 minutes followed by 0.9% NS 250mL flush bag. Pt tolerated medication well with no adverse reactions. Pt aware of next scheduled appointment and verbalizes no additional needs at this time. Pt ambulatory at discharge in no acute distress.      Problem: Anemia  Goal: Anemia Symptom Improvement  Outcome: Progressing     Problem: Fatigue  Goal: Improved Activity Tolerance  Outcome: Progressing

## 2025-05-20 ENCOUNTER — DOCUMENTATION ONLY (OUTPATIENT)
Dept: TRANSPLANT | Facility: CLINIC | Age: 40
End: 2025-05-20
Payer: MEDICAID

## 2025-05-20 NOTE — PROGRESS NOTES
Attempted to call patient on 5/16/25, 5/19/25. Left voicemail's for patient to call back. Will attempt to follow up with patient at a later date. Will email patient a copy of pre transplant handout and attempt to set up an appointment to chat over the phone.

## 2025-05-21 ENCOUNTER — INFUSION (OUTPATIENT)
Dept: INFUSION THERAPY | Facility: HOSPITAL | Age: 40
End: 2025-05-21
Payer: MEDICAID

## 2025-05-21 VITALS
DIASTOLIC BLOOD PRESSURE: 112 MMHG | SYSTOLIC BLOOD PRESSURE: 181 MMHG | OXYGEN SATURATION: 100 % | TEMPERATURE: 98 F | RESPIRATION RATE: 16 BRPM | HEART RATE: 55 BPM

## 2025-05-21 DIAGNOSIS — D63.1 ANEMIA IN STAGE 4 CHRONIC KIDNEY DISEASE: Primary | ICD-10-CM

## 2025-05-21 DIAGNOSIS — N18.4 ANEMIA IN STAGE 4 CHRONIC KIDNEY DISEASE: Primary | ICD-10-CM

## 2025-05-21 PROCEDURE — 63600175 PHARM REV CODE 636 W HCPCS: Performed by: STUDENT IN AN ORGANIZED HEALTH CARE EDUCATION/TRAINING PROGRAM

## 2025-05-21 PROCEDURE — 96374 THER/PROPH/DIAG INJ IV PUSH: CPT

## 2025-05-21 RX ORDER — EPINEPHRINE 0.3 MG/.3ML
0.3 INJECTION SUBCUTANEOUS ONCE AS NEEDED
Status: DISCONTINUED | OUTPATIENT
Start: 2025-05-21 | End: 2025-05-21 | Stop reason: HOSPADM

## 2025-05-21 RX ORDER — DIPHENHYDRAMINE HYDROCHLORIDE 50 MG/ML
50 INJECTION, SOLUTION INTRAMUSCULAR; INTRAVENOUS ONCE AS NEEDED
Status: DISCONTINUED | OUTPATIENT
Start: 2025-05-21 | End: 2025-05-21 | Stop reason: HOSPADM

## 2025-05-21 RX ORDER — SODIUM CHLORIDE 0.9 % (FLUSH) 0.9 %
10 SYRINGE (ML) INJECTION
OUTPATIENT
Start: 2025-05-28

## 2025-05-21 RX ORDER — SODIUM FERRIC GLUCONATE COMPLEX IN SUCROSE 12.5 MG/ML
125 INJECTION INTRAVENOUS
OUTPATIENT
Start: 2025-05-28

## 2025-05-21 RX ORDER — EPINEPHRINE 0.3 MG/.3ML
0.3 INJECTION SUBCUTANEOUS ONCE AS NEEDED
OUTPATIENT
Start: 2025-05-28

## 2025-05-21 RX ORDER — SODIUM FERRIC GLUCONATE COMPLEX IN SUCROSE 12.5 MG/ML
125 INJECTION INTRAVENOUS
Status: COMPLETED | OUTPATIENT
Start: 2025-05-21 | End: 2025-05-21

## 2025-05-21 RX ORDER — DIPHENHYDRAMINE HYDROCHLORIDE 50 MG/ML
50 INJECTION, SOLUTION INTRAMUSCULAR; INTRAVENOUS ONCE AS NEEDED
OUTPATIENT
Start: 2025-05-28

## 2025-05-21 RX ORDER — HEPARIN 100 UNIT/ML
500 SYRINGE INTRAVENOUS
OUTPATIENT
Start: 2025-05-28

## 2025-05-21 RX ADMIN — SODIUM FERRIC GLUCONATE COMPLEX 125 MG: 12.5 INJECTION INTRAVENOUS at 11:05

## 2025-05-21 NOTE — PLAN OF CARE
Pt ambulatory to unit, AAOx4. Denies any new or worsening of symptoms since last visit. Pt received Ferrlecit IVP via 24g L AC. Dsg c/d/i; no s/s of infection or infiltration noted. PIV flushed and patent with blood return. Pt tolerated medication well. No S&S of an adverse reaction, VSS. Denies pain or discomfort. Pt educated on high BP, Pt states she is going to follow up with Primary Care.  Care plan discussed with pt. Pt verbalized understanding. Pt aware of upcoming appointment via MyChart. Pt ambulatory upon discharge in NAD.

## 2025-05-27 ENCOUNTER — TELEPHONE (OUTPATIENT)
Dept: PSYCHIATRY | Facility: CLINIC | Age: 40
End: 2025-05-27
Payer: MEDICAID

## 2025-05-27 NOTE — TELEPHONE ENCOUNTER
----- Message from Med Assistant Kwame sent at 5/27/2025  3:54 PM CDT -----  The attached patient is in need of an initial appointment for Psych clearance for a pre Kidney transplant workup. Patient has history of anxiety and bi-polar disorder. Patient is pre dialysis. Please contact patient to schedule. Referring provider is Carolin Dobbins. Thanks,            Kwame

## 2025-05-28 ENCOUNTER — INFUSION (OUTPATIENT)
Dept: INFUSION THERAPY | Facility: HOSPITAL | Age: 40
End: 2025-05-28
Attending: STUDENT IN AN ORGANIZED HEALTH CARE EDUCATION/TRAINING PROGRAM
Payer: MEDICAID

## 2025-05-28 VITALS
HEART RATE: 60 BPM | OXYGEN SATURATION: 100 % | TEMPERATURE: 99 F | DIASTOLIC BLOOD PRESSURE: 112 MMHG | SYSTOLIC BLOOD PRESSURE: 210 MMHG | RESPIRATION RATE: 18 BRPM

## 2025-05-28 DIAGNOSIS — D63.1 ANEMIA IN STAGE 4 CHRONIC KIDNEY DISEASE: Primary | ICD-10-CM

## 2025-05-28 DIAGNOSIS — N18.4 ANEMIA IN STAGE 4 CHRONIC KIDNEY DISEASE: Primary | ICD-10-CM

## 2025-05-28 RX ORDER — HEPARIN 100 UNIT/ML
500 SYRINGE INTRAVENOUS
OUTPATIENT
Start: 2025-06-04

## 2025-05-28 RX ORDER — EPINEPHRINE 0.3 MG/.3ML
0.3 INJECTION SUBCUTANEOUS ONCE AS NEEDED
Status: DISCONTINUED | OUTPATIENT
Start: 2025-05-28 | End: 2025-05-28 | Stop reason: HOSPADM

## 2025-05-28 RX ORDER — SODIUM CHLORIDE 0.9 % (FLUSH) 0.9 %
10 SYRINGE (ML) INJECTION
OUTPATIENT
Start: 2025-06-04

## 2025-05-28 RX ORDER — SODIUM FERRIC GLUCONATE COMPLEX IN SUCROSE 12.5 MG/ML
125 INJECTION INTRAVENOUS
Status: DISCONTINUED | OUTPATIENT
Start: 2025-05-28 | End: 2025-05-28 | Stop reason: HOSPADM

## 2025-05-28 RX ORDER — NIFEDIPINE 60 MG/1
60 TABLET, EXTENDED RELEASE ORAL DAILY
Qty: 30 TABLET | Refills: 11 | Status: SHIPPED | OUTPATIENT
Start: 2025-05-28 | End: 2026-05-28

## 2025-05-28 RX ORDER — SODIUM FERRIC GLUCONATE COMPLEX IN SUCROSE 12.5 MG/ML
125 INJECTION INTRAVENOUS
OUTPATIENT
Start: 2025-06-04

## 2025-05-28 RX ORDER — DIPHENHYDRAMINE HYDROCHLORIDE 50 MG/ML
50 INJECTION, SOLUTION INTRAMUSCULAR; INTRAVENOUS ONCE AS NEEDED
OUTPATIENT
Start: 2025-06-04

## 2025-05-28 RX ORDER — EPINEPHRINE 0.3 MG/.3ML
0.3 INJECTION SUBCUTANEOUS ONCE AS NEEDED
OUTPATIENT
Start: 2025-06-04

## 2025-05-28 RX ORDER — LABETALOL 100 MG/1
100 TABLET, FILM COATED ORAL EVERY 8 HOURS
Qty: 90 TABLET | Refills: 11 | Status: SHIPPED | OUTPATIENT
Start: 2025-05-28 | End: 2026-05-28

## 2025-05-28 NOTE — PLAN OF CARE
Pt arrived to the Infusion unit for maintenance iron medication (6/8). Pt is awake, alert, and oriented x4. Ambulates independently with steady gait. Pt reports no new allergies, symptoms, or concerns at this time. Pt blood pressure currently 227/131. Pt reports taking all BP meds prior to arrival around 1030 this morning. Pt denies lightheadedness, dizziness, headache, chest pain, SOB, weakness, or vision changes. Vital signs reassessed manually 210/112. Dr. SANTI Faith notified, reports to discontinue Ferrlecit 125mg IVP for today and follow-up with MD appt on 6/17. Pt informed of schedule changes, additional iron infusions to resume post MD appointment. Pt educated on stroke/heart attack symptoms and to visit ER if she is symptomatic. Pt verbalizes understanding. Pt ambulatory at discharge in no acute distress.    Problem: Hypertension Acute  Goal: Blood Pressure Within Desired Range  Outcome: Progressing

## 2025-05-29 ENCOUNTER — TELEPHONE (OUTPATIENT)
Dept: TRANSPLANT | Facility: CLINIC | Age: 40
End: 2025-05-29
Payer: MEDICAID

## 2025-05-29 DIAGNOSIS — Z76.82 ORGAN TRANSPLANT CANDIDATE: ICD-10-CM

## 2025-05-29 DIAGNOSIS — N18.6 END STAGE RENAL DISEASE: Primary | ICD-10-CM

## 2025-05-29 NOTE — TELEPHONE ENCOUNTER
Spoke to pt in regards to Stress and Hepatology that needed to be husam'd. Confirmed date, time and location of upcoming appointment. Reminder mailed. Patient voiced understanding.

## 2025-05-30 ENCOUNTER — DOCUMENTATION ONLY (OUTPATIENT)
Dept: TRANSPLANT | Facility: CLINIC | Age: 40
End: 2025-05-30
Payer: MEDICAID

## 2025-05-30 NOTE — PROGRESS NOTES
Kidney TRANSPLANT NUTRITIONAL ASSESSMENT     Reason for Visit: Pre-kidney transplant work-up     Age: 39 y.o.          Sex: Female    Patient Active Problem List  Diagnosis    PKD (polycystic kidney disease)    Subarachnoid hemorrhage from anterior communicating artery aneurysm    Hypokalemia    Headache    History of bilateral tubal ligation    S/P coil embolization of cerebral aneurysm    Hypertensive urgency    Malignant hypertension requiring acute intensive management    Anemia in stage 4 chronic kidney disease    Non compliance w medication regimen    Persistent migraine aura without cerebral infarction and without status migrainosus, not intractable    Intractable vomiting    Hypertensive crisis    H/O spontaneous subarachnoid intracranial hemorrhage due to cerebral aneurysm    Tobacco dependency    Chronic diastolic heart failure    Stage 4 chronic kidney disease    Secondary hyperparathyroidism    Benign hypertension with CKD (chronic kidney disease) stage IV    Left arm weakness    Bipolar 1 disorder    Proteinuria     Past Medical History:   Diagnosis Date    Anemia     Bipolar 1 disorder     Cerebral aneurysm     Encounter for blood transfusion     Hypertension     since 2012    Polycystic kidney disease     Polycystic kidney disease     Pre-eclampsia     Renal disorder     Since childhood     SAH (subarachnoid hemorrhage)     Stroke      Current Outpatient Medications   Medication Instructions    calcitRIOL (ROCALTROL) 0.25 mcg, Oral, Daily    empagliflozin (JARDIANCE) 10 mg, Oral, Daily    hepatitis B vacc-CpG 1018, PF, 20 mcg/0.5 mL Syrg as directed    labetaloL (NORMODYNE) 100 mg, Oral, Every 8 hours    losartan (COZAAR) 100 mg, Oral, Daily    NIFEdipine (PROCARDIA-XL) 60 mg, Oral, Daily    spironolactone (ALDACTONE) 100 mg, Oral, Daily     Lab Results   Component Value Date    GLU 86 04/23/2025    PHOS 3.4 04/23/2025    MG 1.8 12/22/2024    CHOL 201 (H) 04/15/2025    HDL 56 04/15/2025    TRIG 44  "04/15/2025    ALBUMIN 3.9 04/23/2025    HGBA1C 4.7 12/22/2024     BMP  Lab Results   Component Value Date     04/23/2025    K 3.6 04/23/2025     04/23/2025    CO2 24 04/23/2025    BUN 42 (H) 04/23/2025    CREATININE 3.5 (H) 04/23/2025    CALCIUM 9.6 04/23/2025    ANIONGAP 9 04/23/2025    EGFRNORACEVR 16 (L) 04/23/2025     Assessment of Lab Values:   Reviewed with pt   The patient stated her primary MD provides updates on lab results and education if adjustments are required.     Ht Readings from Last 1 Encounters:   04/15/25 5' 9.09" (1.755 m)     Wt Readings from Last 20 Encounters:   04/15/25 71 kg (156 lb 8.4 oz)   03/12/25 72.5 kg (159 lb 13.3 oz)   12/22/24 68.5 kg (151 lb 1.6 oz)   12/18/24 73 kg (160 lb 15 oz)   11/20/24 73.2 kg (161 lb 7.8 oz)   10/23/24 72.5 kg (159 lb 13.3 oz)   09/13/24 67.8 kg (149 lb 5.8 oz)   08/19/24 68.2 kg (150 lb 5.7 oz)   08/11/24 68.8 kg (151 lb 10.8 oz)   03/25/24 70 kg (154 lb 5.2 oz)   04/04/22 77.1 kg (170 lb)   03/12/22 77.1 kg (170 lb)   04/20/21 75.8 kg (167 lb)   11/19/20 74.8 kg (165 lb)   12/10/19 72.6 kg (160 lb)   10/29/19 72.6 kg (160 lb)   01/29/19 69.9 kg (154 lb)   10/12/18 67.7 kg (149 lb 4 oz)   10/11/18 65.3 kg (144 lb)   09/27/18 74.8 kg (165 lb)      BMI: Estimated body mass index is 23.05 kg/m² as calculated from the following:    Height as of 4/15/25: 5' 9.09" (1.755 m).    Weight as of 4/15/25: 71 kg (156 lb 8.4 oz).   Classification: Healthy Weight (18.5 to less than 25)    Usual Weight: 156 to 160 pounds     Current Diet: Regular Diet and focuses on limiting sodium intake.     Allergies: Patient has no known allergies.    Appetite/Current Intake: Fair     Chewing/Swallowing Problems: No chewing/swallowing difficulties reported     GI Symptoms:   Negative for nausea, vomiting, diarrhea, and constipation  Normal bowel movements reported     Food Insecurity:   No food insecurity identified on 5/29/25  Food Insecurity: No Food Insecurity " (12/22/2024)    Hunger Vital Sign     Worried About Running Out of Food in the Last Year: Never true     Ran Out of Food in the Last Year: Never true     Nutritional/Herbal Supplements:   Patient is consuming Tawanda Multivitamin per MD recommendation   No herbal products consumed     Potential Food/Medication Interactions:  RD discussed vitamin/mineral/herbal supplement intake plus possible food and drug interactions.   Focused on importance of following up with the transplant team if any additional herbal / nutrition supplements are initiated / consumed to ensure no food and drug interactions occur.     Dietary Intake examples obtained   Average food groups per day   Protein: 2  Dairy: 0 and 1 - consumes lactaid milk products if consumed   Fruit : 2 and 3  Vegetables: 1 and 2  Grains: 3 and 4    Exercise/Physical Activity: She cleans house and involved in activities with her kids.     Estimated Kcal Need  Recommendations:   ~ 2132 to 2486 calories per day, (30 to 35 calories/kg/day using weight of 71 kgs).   [place] x PAL (1.2) and [place] X PAL (1.4)    Estimated Protein Need  Recommendations: 0.6-0.8g/kg CKD 4/5 (varies depending on abnormal lab values)    Estimated Fluid Need: Recommendations: Per MD    Nutritional Diagnoses - Food-and nutrition-related knowledge deficit    Related to (etiology):- Lack of prior nutrition education     Signs and Symptoms (as evidenced by):   Report of inaccurate knowledge (24 hour diet recall)   Need for further education     Interventions/Recommendations (treatment strategy):  Handout's Given (mailed), Dietitian's Contact Information Provided, and all questions and concerns answered.   Handout: Pre-Kidney/Kidney-Pancreas Transplant    Goals: Diet adherence    Patient and/or family comprehend instructions: Yes, adherence expected    Strategies Used: Motivation Interviewing    Outcome: Verbalizes understanding    Monitoring: Contact information provided, will f/u  in clinic and communicate with the care team as needed    Educational Need? Yes    Barriers: None identified for education     Discussed with: Patient - Sharon Grande     Counseling Time: 30 minutes    Sim Narayan RDN., LD.  Liver / Lung / Kidney Transplant Registered Dietitian Nutritionist ([facility])  Desk Phone: [phone]

## 2025-06-03 ENCOUNTER — TELEPHONE (OUTPATIENT)
Dept: TRANSPLANT | Facility: CLINIC | Age: 40
End: 2025-06-03
Payer: MEDICAID

## 2025-06-10 ENCOUNTER — LAB VISIT (OUTPATIENT)
Dept: LAB | Facility: HOSPITAL | Age: 40
End: 2025-06-10
Attending: STUDENT IN AN ORGANIZED HEALTH CARE EDUCATION/TRAINING PROGRAM
Payer: MEDICAID

## 2025-06-10 DIAGNOSIS — N18.4 CKD (CHRONIC KIDNEY DISEASE) STAGE 4, GFR 15-29 ML/MIN: ICD-10-CM

## 2025-06-10 LAB
ALBUMIN SERPL BCP-MCNC: 3.5 G/DL (ref 3.5–5.2)
ANION GAP (OHS): 13 MMOL/L (ref 8–16)
BACTERIA #/AREA URNS AUTO: ABNORMAL /HPF
BILIRUB UR QL STRIP.AUTO: NEGATIVE
BUN SERPL-MCNC: 39 MG/DL (ref 6–20)
CALCIUM SERPL-MCNC: 8.9 MG/DL (ref 8.7–10.5)
CHLORIDE SERPL-SCNC: 104 MMOL/L (ref 95–110)
CLARITY UR: CLEAR
CO2 SERPL-SCNC: 24 MMOL/L (ref 23–29)
COLOR UR AUTO: COLORLESS
CREAT SERPL-MCNC: 3.5 MG/DL (ref 0.5–1.4)
CREAT UR-MCNC: 97.9 MG/DL (ref 15–325)
FERRITIN SERPL-MCNC: 136.2 NG/ML (ref 20–300)
GFR SERPLBLD CREATININE-BSD FMLA CKD-EPI: 16 ML/MIN/1.73/M2
GLUCOSE SERPL-MCNC: 86 MG/DL (ref 70–110)
GLUCOSE UR QL STRIP: ABNORMAL
HGB UR QL STRIP: ABNORMAL
HYALINE CASTS UR QL AUTO: 0 /LPF (ref 0–1)
IRON SATN MFR SERPL: 22 % (ref 20–50)
IRON SERPL-MCNC: 65 UG/DL (ref 30–160)
KETONES UR QL STRIP: NEGATIVE
LEUKOCYTE ESTERASE UR QL STRIP: ABNORMAL
MICROSCOPIC COMMENT: ABNORMAL
NITRITE UR QL STRIP: NEGATIVE
PH UR STRIP: 6 [PH]
PHOSPHATE SERPL-MCNC: 3.5 MG/DL (ref 2.7–4.5)
POTASSIUM SERPL-SCNC: 3.6 MMOL/L (ref 3.5–5.1)
PROT UR QL STRIP: ABNORMAL
PROT UR-MCNC: 45 MG/DL
PROT/CREAT UR: 0.46 MG/G{CREAT}
PTH-INTACT SERPL-MCNC: 322.5 PG/ML (ref 9–77)
RBC #/AREA URNS AUTO: 1 /HPF (ref 0–4)
SODIUM SERPL-SCNC: 141 MMOL/L (ref 136–145)
SP GR UR STRIP: 1.01
SQUAMOUS #/AREA URNS AUTO: 2 /HPF
TIBC SERPL-MCNC: 297 UG/DL (ref 250–450)
TRANSFERRIN SERPL-MCNC: 201 MG/DL (ref 200–375)
UROBILINOGEN UR STRIP-ACNC: NEGATIVE EU/DL
WBC #/AREA URNS AUTO: 9 /HPF (ref 0–5)
WBC CLUMPS UR QL AUTO: ABNORMAL
YEAST UR QL AUTO: ABNORMAL /HPF

## 2025-06-10 PROCEDURE — 80069 RENAL FUNCTION PANEL: CPT | Mod: TXP

## 2025-06-10 PROCEDURE — 82728 ASSAY OF FERRITIN: CPT | Mod: TXP

## 2025-06-10 PROCEDURE — 36415 COLL VENOUS BLD VENIPUNCTURE: CPT | Mod: NTX

## 2025-06-10 PROCEDURE — 84156 ASSAY OF PROTEIN URINE: CPT | Mod: TXP

## 2025-06-10 PROCEDURE — 84466 ASSAY OF TRANSFERRIN: CPT | Mod: TXP

## 2025-06-10 PROCEDURE — 83970 ASSAY OF PARATHORMONE: CPT | Mod: TXP

## 2025-06-10 PROCEDURE — 81003 URINALYSIS AUTO W/O SCOPE: CPT | Mod: TXP

## 2025-06-10 NOTE — PROGRESS NOTES
Patient No Show.  Contacted at 5:05pm.  States she forgot appt.    Patient also No Show on 6/3.      [facility]ek Care Elsewhere warning letter will be sent.      Kidney transplant team will be notified.

## 2025-06-10 NOTE — LETTER
Ally 10, 2025    Sharon Grande  [address] [place] [address]             [facility] [address]  [address]  [facility] [address]  Phone: [phone]  Fax: [phone] Dear Ms. Sharon Grande:    We are sorry that you missed your appointment with [facility] on 6/10/2025. Your health and follow-up medical care are important to us. Please call our office as soon as possible so that we may reschedule your appointment. If you have already rescheduled your appointment, please disregard this letter.    Sincerely,        Kitty White MD

## 2025-06-13 ENCOUNTER — COMMITTEE REVIEW (OUTPATIENT)
Dept: TRANSPLANT | Facility: CLINIC | Age: 40
End: 2025-06-13
Payer: MEDICAID

## 2025-06-13 ENCOUNTER — TELEPHONE (OUTPATIENT)
Dept: TRANSPLANT | Facility: CLINIC | Age: 40
End: 2025-06-13
Payer: MEDICAID

## 2025-06-13 NOTE — TELEPHONE ENCOUNTER
----- Message from Cailin Tuttle NP sent at 6/13/2025 10:46 AM CDT -----  Regarding: RE: Appointment  Yani should be calling her to tell her we are closing her out after this morning's selection meeting and will let her know.  ----- Message -----  From: Lamar Jaeger RN  Sent: 6/13/2025   9:47 AM CDT  To: Cailin Tuttle NP  Subject: FW: Appointment                                  Pt calling to reschedule. I would rather you tell her she no longer needs it.  ----- Message -----  From: Jocy Young  Sent: 6/11/2025   4:18 PM CDT  To: Lamar Jaeger RN  Subject: Appointment                                      Patient is calling to reschedule her missed appointment of 6/10/25 for Kidney Transplant.    She can be reached at [phone].    Thank you.

## 2025-06-13 NOTE — LETTER
June 13, 2025    Sharon Grande  [address] [place] [address]    Dear Sharon Grande:  MRN: [id number]    It is the duty of the [facility] Selection Committee to determine which patients are candidates for a transplant. For this reason, our committee has the difficult task of evaluating patients to determine which ones have the greatest chance of having a successful transplant. We are aware of the magnitude of this responsibility, and we approach it with reverence and humility.    It is with regret I inform you that you are not approved as a transplant candidate due to a history of no shows and the need to demonstrate compliance. She was a no show for psych evaluation twice: on 6/3 and 6/10. This SW tried contacting the patient multiple times by phone to follow-up regarding her missed psych appointment but she did not answer her cell phone. The patient did not have her voice mail set-up so SW was unable to left a message. Patient also no-showed stress test on 6/12. In eval since 4/15/2025 and outstanding for all rerquested tests and clearances..  Your future transplant appointments for transplant evaluation have been canceled.  Based on this review, we have determined that at this time, you are not a candidate for a transplant at [facility].      Patient can be re referred back to us by her nephrologist ONLY after she demonstrates compliance with her appointments and completed psychiatry evaluation and clearance to be approved for a kidney transplant.     The selection committee carefully considers each patient's transplant candidacy and determines whether it is safe to proceed with transplantation on a case-by-case basis using established selection criteria.  At present, the risk of proceeding with an elective transplant surgery has become too high.                                                                               Although the selection committee believes you are not a suitable transplant  candidate, you have the option to be evaluated at other transplant centers who may have different selection criteria.  You may request your [facility] records be sent to any center of your choice by contacting our Medical Records Department at [phone].                                                                  Attached is a letter from the [employer] ([employer]).  It describes the services and information offered to patients by [employer] and the Organ Procurement and Transplant Network.    The [facility] Selection [facility] sincerely wishes you the best and remains available to answer any questions.  Please do not hesitate to contact our pre-transplant office if we can assist you in any other way.                                                                               Sincerely,    Sigrid Simons MD  Medical Director, Kidney & Kidney/Pancreas Transplantation  lh/Encl: [employer] Letter  CC:  Joao Faith MD               The Organ Procurement and Transplantation Network   Toll-free patient services line: 1-929.912.5890  Your resource for organ transplant information      Staffed 8:30 am - 5:00 pm ET Monday - Friday   Leave a message 24/7 to receive a call back    The Organ Procurement and Transplantation Network (OPTN) is the national transplant system. It makes the policies that decide how donated organs are matched to patients waiting for a transplant. The OPTN:    Makes sure donated organs get matched to people on the transplant waiting list  Tells people about the donation and transplant processes  Makes sure that the public knows about the need for more organ and tissue donations    The OPTN has a free patient services line that you can call to:  Get more information about:   o Organ donation and organ transplants   o Donation and transplant policies  Get an information kit with:   o A list of transplant hospitals   o Waiting list information  Talk about any questions you may  have about your transplant hospital or organ procurement organization. The staff will do their best to help you or point you to others who may help.  Find out how you can volunteer with the OPTN and help shape transplant policy    The patient services line number is: 9-430-838-9735    Patient services line staff CANNOT answer questions about your own medical care, including:  Waiting list status  Test results  Medical records  You will need to call your transplant hospital for this information.    The following websites have more information about transplantation and donation:  OPTN: https://optn.transplant.hrsa.gov/  For potential living donors and transplant recipients:   o Living donation process: https://optn.transplant.hrsa.gov/living-donation/     o Financial assistance: https://www.livingdonorassistance.org/  Transplantation data: https://www.srtr.org/  Organ donation: https://www.organdonor.gov/    Volunteer with the OPTN: https://optn.transplant.hrsa.gov/get-involved

## 2025-06-13 NOTE — COMMITTEE REVIEW
Native Organ Dx: Polycystic Kidneys      Not approved for LRD/CAD transplant due to a history of no shows and the need to demonstrate compliance. She was a no show for psych evaluation twice: on 6/3 and 6/10. This SW tried contacting the patient multiple times by phone to follow-up regarding her missed psych appointment but she did not answer her cell phone. The patient did not have her voice mail set-up so SW was unable to left a message. Patient also no-showed stress test on 6/12. In eval since 4/15/2025 and outstanding for all rerquested tests and clearances.    Patient can be re referred back to us by her nephrologist ONLY after she demonstrates compliance with her appointments and completed psychiatry evaluation and clearance to be approved for a kidney transplant.   Letter sent to patient and a referring nephrologist.  Patient was informed of the decision after the committee meeting by the phone and agreed to the plan.    Note written by   Yani Her RN  ===============================================    I was present at the meeting and attest to the decision of the committee.    Rickie Jin  06/13/2025

## 2025-06-13 NOTE — TELEPHONE ENCOUNTER
Patient no-showed a stress test on 6/12 required for a kidney transplant eval. Discussed non-compliance with the team.

## 2025-06-16 NOTE — PROGRESS NOTES
"  Subjective     Chief Complaint: CKD/HTN    History of Present Illness:  Ms. Sharon Grande is a 39 y.o. female with active medical diagnosis of bipolar 1, HTN, Polycystic kidney disease, hx of CVA with subarachnoid sp coil, preeclampsia, G5A1P0, HFpEF (G2DD)     Interval history: Transplant letter received, denied due to missed appointments and stress test. Needs to be cleared by psych first and show compliance with appointments.  Started on IV iron, feels a lot better but has had elevated blood pressures precluding the procedures. She reports that she was told to come at a certain time but when she showed up was told to come again at a later time, which led to a lot of frustration.    -MRI brain October 2019:   "CT head dated 10/29/2019 and MRI of the brain dated 01/29/2019.     FINDINGS:  The craniocervical junction is within normal limits.  The midline structures are unremarkable.  The sellar and parasellar structures are within normal limits.  The intracranial flow voids are within normal limits.     No diffusion-weighted signal abnormality is identified.  There is minimal increased T2/FLAIR signal hyperintensity within the periventricular white matter in the occipital regions.  The remainder of the ventricles and sulci are within normal limits.  There are no extra-axial fluid collections.  There is no evidence of intracranial hemorrhage.  There are changes of prior aneurysm coiling in the right aspect of the anterior circulation.     The orbits and intraorbital contents are within normal limits.  There is a mucous retention cyst within the left maxillary sinus.  There is small amount of trapped fluid within the left posterior ethmoid sinus.  The calvarium is intact.     Impression:     No MR evidence of acute or subacute infarction.     Changes of prior aneurysmal coiling in the right aspect of the anterior communicating artery.     No acute intracranial process."    Repeat MRI ordered but not covered due to " insurance    Review of Systems   Constitutional:  Negative for chills and fever.   HENT:  Negative for ear discharge and ear pain.    Eyes:  Negative for blurred vision and double vision.   Respiratory:  Negative for cough and shortness of breath.    Cardiovascular:  Negative for chest pain and palpitations.   Gastrointestinal:  Negative for abdominal pain, constipation, diarrhea, nausea and vomiting.   Genitourinary:  Negative for dysuria and frequency.   Musculoskeletal:  Negative for myalgias and neck pain.   Skin:  Negative for itching and rash.   Neurological:  Negative for tingling and headaches.         ASSESSMENT & PLAN:     #Polycystic Kidney disease  #CKD4 - polycystic kidney disease, hypertensive nephropathy  Baseline creatinine 3.5 - progressive CKD  UA 1+ protein, trace occult blood  UPCR 0.95    Creatinine   Date Value Ref Range Status   06/10/2025 3.5 (H) 0.5 - 1.4 mg/dL Final   04/23/2025 3.5 (H) 0.5 - 1.4 mg/dL Final   04/15/2025 3.8 (H) 0.5 - 1.4 mg/dL Final     eGFR   Date Value Ref Range Status   06/10/2025 16 (L) >60 mL/min/1.73/m2 Final     Comment:     Estimated GFR calculated using the CKD-EPI creatinine (2021) equation.   04/23/2025 16 (L) >60 mL/min/1.73/m2 Final     Comment:     Estimated GFR calculated using the CKD-EPI creatinine (2021) equation.   04/15/2025 15 (L) >60 mL/min/1.73/m2 Final     Comment:     Estimated GFR calculated using the CKD-EPI creatinine (2021) equation.   02/21/2025 20 (A) >60 mL/min/1.73 m^2 Final   12/23/2024 19 (A) >60 mL/min/1.73 m^2 Final   12/22/2024 22 (A) >60 mL/min/1.73 m^2 Final     Urine Protein/Creatinine Ratio   Date Value Ref Range Status   06/10/2025 0.46 (H) <=0.20 Final     Prot/Creat Ratio, Urine   Date Value Ref Range Status   02/21/2025 0.95 (H) 0.00 - 0.20 Final   12/21/2024 1.13 (H) 0.00 - 0.20 Final   12/17/2024 1.01 (H) 0.00 - 0.20 Final       Imaging: polycystic kidney disease, right kidney 15.6 cm and left 14.3 cm   Kidney transplant: -  eGFR finally below 20, send referral  -denied. Needs clearance from psych and needs to demonstrate compliance with appts    Currently on losartan and aldactone  Jardiance restarted 3/12/2025    #HTN  #Hyperalderstonism  Multiple admissions for malignant hypertension:  BP: labetolol 100 q8, nifedipine 60 qd, losartan 100, aldactone 100    In clinic 160/98, reports about the same at home.     Increasing nifedipine to 90 mg    #Anemia  HGB   Date Value Ref Range Status   04/15/2025 11.5 (L) 12.0 - 16.0 gm/dL Final     Iron Level   Date Value Ref Range Status   06/10/2025 65 30 - 160 ug/dL Final     Transferrin   Date Value Ref Range Status   06/10/2025 201 200 - 375 mg/dL Final     Iron Binding Capacity Total   Date Value Ref Range Status   06/10/2025 297 250 - 450 ug/dL Final     Saturated Iron   Date Value Ref Range Status   08/19/2024 24 20 - 50 % Final     Ferritin   Date Value Ref Range Status   06/10/2025 136.2 20.0 - 300.0 ng/mL Final   On IV iron therapy.     #Secondary hyperparathyroidism  Calcium   Date Value Ref Range Status   06/10/2025 8.9 8.7 - 10.5 mg/dL Final     Phosphorus Level   Date Value Ref Range Status   06/10/2025 3.5 2.7 - 4.5 mg/dL Final     PTH Intact   Date Value Ref Range Status   06/10/2025 322.5 (H) 9.0 - 77.0 pg/mL Final     Comment:     The testing method is chemiluminescent microparticle immunoassay manufactured by [employer] and performed on the [employer] or [employer]. Values obtained with different assay manufactures for methods may be different and cannot be used interchangeably.   On calcitrol 0.25, stable  Hold calcitrol, continue to monitor PTH            CKD (chronic kidney disease) stage 4, GFR 15-29 ml/min  -     Renal Function Panel; Future; Expected date: 09/17/2025  -     CBC Auto Differential; Future; Expected date: 09/17/2025  -     Iron and TIBC; Future; Expected date: 09/17/2025  -     Ferritin; Future; Expected date: 09/17/2025  -     PTH, intact;  Future; Expected date: 2025    Other orders  -     NIFEdipine (ADALAT CC) 90 MG TbSR; Take 1 tablet (90 mg total) by mouth once daily.  Dispense: 90 tablet; Refill: 3          RTC in 3 months      PAST HISTORY:     Past Medical History:   Diagnosis Date    Anemia     Bipolar 1 disorder     Cerebral aneurysm     Encounter for blood transfusion     Hypertension     since     Polycystic kidney disease     Polycystic kidney disease     Pre-eclampsia     Renal disorder     Since childhood     SAH (subarachnoid hemorrhage)     Stroke        Past Surgical History:   Procedure Laterality Date    BRAIN SURGERY      CEREBRAL ANGIOGRAM N/A 2018    Procedure: ANGIOGRAM-CEREBRAL;  Surgeon: Jayna Surgeon;  Location: [facility];  Service: Anesthesiology;  Laterality: N/A;     SECTION N/A 2018    Procedure:  SECTION;  Surgeon: Obed Soto MD;  Location: [facility];  Service: OB/GYN;  Laterality: N/A;    DILATION AND CURETTAGE OF UTERUS          TUBAL LIGATION         Family History   Problem Relation Name Age of Onset    Hypertension Mother      Polycystic kidney disease Mother      Hypertension Paternal Grandmother      Polycystic kidney disease Daughter         Social History     Socioeconomic History    Marital status: Single   Tobacco Use    Smoking status: Some Days     Types: Cigarettes    Smokeless tobacco: Never   Substance and Sexual Activity    Alcohol use: Yes     Comment: occasional    Drug use: Yes     Types: Marijuana    Sexual activity: Yes     Partners: Male     Birth control/protection: None     Social Drivers of Health     Financial Resource Strain: Low Risk  (2024)    Overall Financial Resource Strain (CARDIA)     Difficulty of Paying Living Expenses: Not very hard   Food Insecurity: No Food Insecurity (2024)    Hunger Vital Sign     Worried About Running Out of Food in the Last Year: Never true     Ran Out of Food in the Last Year: Never true   Transportation  Needs: No Transportation Needs (12/22/2024)    TRANSPORTATION NEEDS     Transportation : No   Physical Activity: Inactive (12/22/2024)    Exercise Vital Sign     Days of Exercise per Week: 0 days     Minutes of Exercise per Session: 0 min   Stress: Stress Concern Present (12/22/2024)    [employer] - Occupational Stress Questionnaire     Feeling of Stress : To some extent   Housing Stability: Unknown (12/22/2024)    Housing Stability Vital Sign     Unable to Pay for Housing in the Last Year: No     Homeless in the Last Year: No       MEDICATIONS & ALLERGIES:     Current Outpatient Medications on File Prior to Visit   Medication Sig    calcitRIOL (ROCALTROL) 0.25 MCG Cap Take 1 capsule (0.25 mcg total) by mouth once daily.    empagliflozin (JARDIANCE) 10 mg tablet Take 1 tablet (10 mg total) by mouth once daily.    hepatitis B vacc-CpG 1018, PF, 20 mcg/0.5 mL Syrg as directed    labetaloL (NORMODYNE) 100 MG tablet Take 1 tablet (100 mg total) by mouth every 8 (eight) hours.    losartan (COZAAR) 50 MG tablet Take 2 tablets (100 mg total) by mouth once daily.    NIFEdipine (PROCARDIA-XL) 60 MG (OSM) 24 hr tablet Take 1 tablet (60 mg total) by mouth once daily.    spironolactone (ALDACTONE) 50 MG tablet Take 2 tablets (100 mg total) by mouth once daily.     No current facility-administered medications on file prior to visit.       Review of patient's allergies indicates:  No Known Allergies    OBJECTIVE:     Vital Signs:  There were no vitals filed for this visit.    There is no height or weight on file to calculate BMI.     Physical Exam  Constitutional:       General: She is not in acute distress.     Appearance: Normal appearance. She is not ill-appearing or diaphoretic.   HENT:      Head: Normocephalic and atraumatic.      Mouth/Throat:      Pharynx: No oropharyngeal exudate or posterior oropharyngeal erythema.   Eyes:      General: No scleral icterus.        Right eye: No discharge.          Left eye: No discharge.      Extraocular Movements: Extraocular movements intact.      Conjunctiva/sclera: Conjunctivae normal.      Pupils: Pupils are equal, round, and reactive to light.   Neck:      Vascular: No JVD.      Trachea: No tracheal deviation.   Cardiovascular:      Rate and Rhythm: Normal rate and regular rhythm.      Heart sounds: No murmur heard.  Pulmonary:      Effort: Pulmonary effort is normal. No respiratory distress.      Breath sounds: Normal breath sounds. No wheezing or rales.   Abdominal:      General: Abdomen is flat. Bowel sounds are normal. There is no distension.      Palpations: Abdomen is soft. There is no mass.      Tenderness: There is no abdominal tenderness.   Musculoskeletal:         General: No swelling, tenderness or deformity. Normal range of motion.      Cervical back: Normal range of motion and neck supple.      Right lower leg: No edema.      Left lower leg: No edema.   Lymphadenopathy:      Cervical: No cervical adenopathy.   Skin:     General: Skin is warm and dry.      Coloration: Skin is not jaundiced or pale.      Findings: No bruising, erythema or rash.   Neurological:      General: No focal deficit present.      Mental Status: She is alert and oriented to person, place, and time. Mental status is at baseline.      Cranial Nerves: No cranial nerve deficit.         Laboratory  Sodium   Date Value Ref Range Status   06/10/2025 141 136 - 145 mmol/L Final   04/23/2025 140 136 - 145 mmol/L Final   02/21/2025 140 136 - 145 mmol/L Final   12/23/2024 141 136 - 145 mmol/L Final     Potassium   Date Value Ref Range Status   06/10/2025 3.6 3.5 - 5.1 mmol/L Final   04/23/2025 3.6 3.5 - 5.1 mmol/L Final   02/21/2025 3.5 3.5 - 5.1 mmol/L Final   12/23/2024 3.6 3.5 - 5.1 mmol/L Final     Chloride   Date Value Ref Range Status   06/10/2025 104 95 - 110 mmol/L Final   04/23/2025 107 95 - 110 mmol/L Final   02/21/2025 107 95 - 110 mmol/L Final   12/23/2024 106 95 - 110 mmol/L  Final     CO2   Date Value Ref Range Status   06/10/2025 24 23 - 29 mmol/L Final   04/23/2025 24 23 - 29 mmol/L Final   02/21/2025 24 23 - 29 mmol/L Final   12/23/2024 26 23 - 29 mmol/L Final     BUN   Date Value Ref Range Status   06/10/2025 39 (H) 6 - 20 mg/dL Final   04/23/2025 42 (H) 6 - 20 mg/dL Final     Creatinine   Date Value Ref Range Status   06/10/2025 3.5 (H) 0.5 - 1.4 mg/dL Final   04/23/2025 3.5 (H) 0.5 - 1.4 mg/dL Final     eGFR   Date Value Ref Range Status   06/10/2025 16 (L) >60 mL/min/1.73/m2 Final     Comment:     Estimated GFR calculated using the CKD-EPI creatinine (2021) equation.   04/23/2025 16 (L) >60 mL/min/1.73/m2 Final     Comment:     Estimated GFR calculated using the CKD-EPI creatinine (2021) equation.   02/21/2025 20 (A) >60 mL/min/1.73 m^2 Final   12/23/2024 19 (A) >60 mL/min/1.73 m^2 Final     Calcium   Date Value Ref Range Status   06/10/2025 8.9 8.7 - 10.5 mg/dL Final   04/23/2025 9.6 8.7 - 10.5 mg/dL Final   02/21/2025 9.1 8.7 - 10.5 mg/dL Final   12/23/2024 8.7 8.7 - 10.5 mg/dL Final     Phosphorus Level   Date Value Ref Range Status   06/10/2025 3.5 2.7 - 4.5 mg/dL Final   04/23/2025 3.4 2.7 - 4.5 mg/dL Final     Phosphorus   Date Value Ref Range Status   02/21/2025 2.9 2.7 - 4.5 mg/dL Final   12/22/2024 2.9 2.7 - 4.5 mg/dL Final     Albumin   Date Value Ref Range Status   06/10/2025 3.5 3.5 - 5.2 g/dL Final   04/23/2025 3.9 3.5 - 5.2 g/dL Final   02/21/2025 3.8 3.5 - 5.2 g/dL Final   12/22/2024 3.5 3.5 - 5.2 g/dL Final       Diagnostic Results:      Health Maintenance Due   Topic Date Due    Cervical Cancer Screening  04/23/2016    COVID-19 Vaccine (2 - 2024-25 season) 09/01/2024       Visit today included increased complexity associated with the care of the episodic problem PKD, HTN addressed and managing the longitudinal care of the patient due to the serious and/or complex managed problem(s) CKD.      Joao Faith MD  Nephrology [facility] [place]

## 2025-06-17 ENCOUNTER — OFFICE VISIT (OUTPATIENT)
Dept: NEPHROLOGY | Facility: CLINIC | Age: 40
End: 2025-06-17
Payer: MEDICAID

## 2025-06-17 VITALS
OXYGEN SATURATION: 99 % | HEART RATE: 78 BPM | RESPIRATION RATE: 18 BRPM | HEIGHT: 69 IN | SYSTOLIC BLOOD PRESSURE: 160 MMHG | BODY MASS INDEX: 23.18 KG/M2 | DIASTOLIC BLOOD PRESSURE: 98 MMHG | WEIGHT: 156.5 LBS

## 2025-06-17 DIAGNOSIS — N18.4 CKD (CHRONIC KIDNEY DISEASE) STAGE 4, GFR 15-29 ML/MIN: Primary | ICD-10-CM

## 2025-06-17 PROCEDURE — 99214 OFFICE O/P EST MOD 30 MIN: CPT | Mod: S$PBB,,, | Performed by: STUDENT IN AN ORGANIZED HEALTH CARE EDUCATION/TRAINING PROGRAM

## 2025-06-17 PROCEDURE — 3080F DIAST BP >= 90 MM HG: CPT | Mod: CPTII,,, | Performed by: STUDENT IN AN ORGANIZED HEALTH CARE EDUCATION/TRAINING PROGRAM

## 2025-06-17 PROCEDURE — 3077F SYST BP >= 140 MM HG: CPT | Mod: CPTII,,, | Performed by: STUDENT IN AN ORGANIZED HEALTH CARE EDUCATION/TRAINING PROGRAM

## 2025-06-17 PROCEDURE — 99213 OFFICE O/P EST LOW 20 MIN: CPT | Mod: PBBFAC | Performed by: STUDENT IN AN ORGANIZED HEALTH CARE EDUCATION/TRAINING PROGRAM

## 2025-06-17 PROCEDURE — G2211 COMPLEX E/M VISIT ADD ON: HCPCS | Mod: ,,, | Performed by: STUDENT IN AN ORGANIZED HEALTH CARE EDUCATION/TRAINING PROGRAM

## 2025-06-17 PROCEDURE — 4010F ACE/ARB THERAPY RXD/TAKEN: CPT | Mod: CPTII,,, | Performed by: STUDENT IN AN ORGANIZED HEALTH CARE EDUCATION/TRAINING PROGRAM

## 2025-06-17 PROCEDURE — 3008F BODY MASS INDEX DOCD: CPT | Mod: CPTII,,, | Performed by: STUDENT IN AN ORGANIZED HEALTH CARE EDUCATION/TRAINING PROGRAM

## 2025-06-17 PROCEDURE — 3066F NEPHROPATHY DOC TX: CPT | Mod: CPTII,,, | Performed by: STUDENT IN AN ORGANIZED HEALTH CARE EDUCATION/TRAINING PROGRAM

## 2025-06-17 PROCEDURE — 99999 PR PBB SHADOW E&M-EST. PATIENT-LVL III: CPT | Mod: PBBFAC,,, | Performed by: STUDENT IN AN ORGANIZED HEALTH CARE EDUCATION/TRAINING PROGRAM

## 2025-06-17 RX ORDER — NIFEDIPINE 90 MG/1
90 TABLET, EXTENDED RELEASE ORAL DAILY
Qty: 90 TABLET | Refills: 3 | Status: SHIPPED | OUTPATIENT
Start: 2025-06-17 | End: 2026-06-17

## 2025-07-23 ENCOUNTER — INFUSION (OUTPATIENT)
Dept: INFUSION THERAPY | Facility: HOSPITAL | Age: 40
End: 2025-07-23
Attending: STUDENT IN AN ORGANIZED HEALTH CARE EDUCATION/TRAINING PROGRAM
Payer: MEDICAID

## 2025-07-23 VITALS
SYSTOLIC BLOOD PRESSURE: 180 MMHG | OXYGEN SATURATION: 95 % | DIASTOLIC BLOOD PRESSURE: 116 MMHG | RESPIRATION RATE: 16 BRPM | HEART RATE: 66 BPM | TEMPERATURE: 97 F

## 2025-07-23 DIAGNOSIS — D63.1 ANEMIA IN STAGE 4 CHRONIC KIDNEY DISEASE: Primary | ICD-10-CM

## 2025-07-23 DIAGNOSIS — N18.4 ANEMIA IN STAGE 4 CHRONIC KIDNEY DISEASE: Primary | ICD-10-CM

## 2025-07-23 PROCEDURE — 96374 THER/PROPH/DIAG INJ IV PUSH: CPT

## 2025-07-23 PROCEDURE — 63600175 PHARM REV CODE 636 W HCPCS: Performed by: STUDENT IN AN ORGANIZED HEALTH CARE EDUCATION/TRAINING PROGRAM

## 2025-07-23 RX ORDER — SODIUM FERRIC GLUCONATE COMPLEX IN SUCROSE 12.5 MG/ML
125 INJECTION INTRAVENOUS
OUTPATIENT
Start: 2025-07-30

## 2025-07-23 RX ORDER — EPINEPHRINE 0.3 MG/.3ML
0.3 INJECTION SUBCUTANEOUS ONCE AS NEEDED
Status: DISCONTINUED | OUTPATIENT
Start: 2025-07-23 | End: 2025-07-23 | Stop reason: HOSPADM

## 2025-07-23 RX ORDER — DIPHENHYDRAMINE HYDROCHLORIDE 50 MG/ML
50 INJECTION, SOLUTION INTRAMUSCULAR; INTRAVENOUS ONCE AS NEEDED
Status: DISCONTINUED | OUTPATIENT
Start: 2025-07-23 | End: 2025-07-23 | Stop reason: HOSPADM

## 2025-07-23 RX ORDER — SODIUM FERRIC GLUCONATE COMPLEX IN SUCROSE 12.5 MG/ML
125 INJECTION INTRAVENOUS
Status: COMPLETED | OUTPATIENT
Start: 2025-07-23 | End: 2025-07-23

## 2025-07-23 RX ORDER — HEPARIN 100 UNIT/ML
500 SYRINGE INTRAVENOUS
OUTPATIENT
Start: 2025-07-30

## 2025-07-23 RX ORDER — EPINEPHRINE 0.3 MG/.3ML
0.3 INJECTION SUBCUTANEOUS ONCE AS NEEDED
OUTPATIENT
Start: 2025-07-30

## 2025-07-23 RX ORDER — DIPHENHYDRAMINE HYDROCHLORIDE 50 MG/ML
50 INJECTION, SOLUTION INTRAMUSCULAR; INTRAVENOUS ONCE AS NEEDED
OUTPATIENT
Start: 2025-07-30

## 2025-07-23 RX ORDER — SODIUM CHLORIDE 0.9 % (FLUSH) 0.9 %
10 SYRINGE (ML) INJECTION
OUTPATIENT
Start: 2025-07-30

## 2025-07-23 RX ADMIN — SODIUM FERRIC GLUCONATE COMPLEX 125 MG: 12.5 INJECTION INTRAVENOUS at 02:07

## 2025-07-23 NOTE — PLAN OF CARE
Pt ambulatory to unit, AAOx4. Denies any new or worsening of symptoms since last visit. Pt BP was elevated when she arrived, Dr Faith notified and requested Pt receive infusion. Pt received Ferrlecit IVP via 24g R AC. Dsg c/d/i; no s/s of infection or infiltration noted. PIV flushed and patent with blood return. Pt tolerated medication well. No S&S of an adverse reaction, VSS. Denies pain or discomfort. Care plan discussed with pt. Pt verbalized understanding. Pt aware of upcoming appointment via MyChart. Pt ambulatory upon discharge in NAD.

## 2025-08-11 ENCOUNTER — INFUSION (OUTPATIENT)
Dept: INFUSION THERAPY | Facility: HOSPITAL | Age: 40
End: 2025-08-11
Attending: STUDENT IN AN ORGANIZED HEALTH CARE EDUCATION/TRAINING PROGRAM
Payer: MEDICAID

## 2025-08-11 VITALS
RESPIRATION RATE: 16 BRPM | SYSTOLIC BLOOD PRESSURE: 190 MMHG | TEMPERATURE: 99 F | OXYGEN SATURATION: 99 % | DIASTOLIC BLOOD PRESSURE: 110 MMHG | HEART RATE: 63 BPM

## 2025-08-11 DIAGNOSIS — D63.1 ANEMIA IN STAGE 4 CHRONIC KIDNEY DISEASE: Primary | ICD-10-CM

## 2025-08-11 DIAGNOSIS — N18.4 ANEMIA IN STAGE 4 CHRONIC KIDNEY DISEASE: Primary | ICD-10-CM

## 2025-08-11 PROCEDURE — 96374 THER/PROPH/DIAG INJ IV PUSH: CPT

## 2025-08-11 PROCEDURE — 63600175 PHARM REV CODE 636 W HCPCS: Performed by: STUDENT IN AN ORGANIZED HEALTH CARE EDUCATION/TRAINING PROGRAM

## 2025-08-11 RX ORDER — SODIUM CHLORIDE 0.9 % (FLUSH) 0.9 %
10 SYRINGE (ML) INJECTION
Status: DISCONTINUED | OUTPATIENT
Start: 2025-08-11 | End: 2025-08-11 | Stop reason: HOSPADM

## 2025-08-11 RX ORDER — HEPARIN 100 UNIT/ML
500 SYRINGE INTRAVENOUS
Status: DISCONTINUED | OUTPATIENT
Start: 2025-08-11 | End: 2025-08-11 | Stop reason: HOSPADM

## 2025-08-11 RX ORDER — EPINEPHRINE 0.3 MG/.3ML
0.3 INJECTION SUBCUTANEOUS ONCE AS NEEDED
OUTPATIENT
Start: 2025-08-18

## 2025-08-11 RX ORDER — SODIUM FERRIC GLUCONATE COMPLEX IN SUCROSE 12.5 MG/ML
125 INJECTION INTRAVENOUS
OUTPATIENT
Start: 2025-08-18

## 2025-08-11 RX ORDER — SODIUM FERRIC GLUCONATE COMPLEX IN SUCROSE 12.5 MG/ML
125 INJECTION INTRAVENOUS
Status: COMPLETED | OUTPATIENT
Start: 2025-08-11 | End: 2025-08-11

## 2025-08-11 RX ORDER — HEPARIN 100 UNIT/ML
500 SYRINGE INTRAVENOUS
OUTPATIENT
Start: 2025-08-18

## 2025-08-11 RX ORDER — SODIUM CHLORIDE 0.9 % (FLUSH) 0.9 %
10 SYRINGE (ML) INJECTION
OUTPATIENT
Start: 2025-08-18

## 2025-08-11 RX ORDER — DIPHENHYDRAMINE HYDROCHLORIDE 50 MG/ML
50 INJECTION, SOLUTION INTRAMUSCULAR; INTRAVENOUS ONCE AS NEEDED
OUTPATIENT
Start: 2025-08-18

## 2025-08-11 RX ADMIN — SODIUM FERRIC GLUCONATE COMPLEX IN SUCROSE 125 MG: 12.5 INJECTION INTRAVENOUS at 12:08
